# Patient Record
Sex: MALE | Race: WHITE | NOT HISPANIC OR LATINO | Employment: FULL TIME | URBAN - METROPOLITAN AREA
[De-identification: names, ages, dates, MRNs, and addresses within clinical notes are randomized per-mention and may not be internally consistent; named-entity substitution may affect disease eponyms.]

---

## 2018-10-16 ENCOUNTER — HOSPITAL ENCOUNTER (EMERGENCY)
Facility: HOSPITAL | Age: 58
Discharge: HOME | End: 2018-10-16
Attending: EMERGENCY MEDICINE

## 2018-10-16 VITALS
DIASTOLIC BLOOD PRESSURE: 70 MMHG | SYSTOLIC BLOOD PRESSURE: 145 MMHG | TEMPERATURE: 98 F | HEART RATE: 55 BPM | BODY MASS INDEX: 33.72 KG/M2 | RESPIRATION RATE: 18 BRPM | WEIGHT: 235 LBS | OXYGEN SATURATION: 97 %

## 2018-10-16 DIAGNOSIS — L97.528 DIABETIC ULCER OF TOE OF LEFT FOOT ASSOCIATED WITH DIABETES MELLITUS DUE TO UNDERLYING CONDITION, WITH OTHER ULCER SEVERITY: Primary | ICD-10-CM

## 2018-10-16 DIAGNOSIS — E08.621 DIABETIC ULCER OF TOE OF LEFT FOOT ASSOCIATED WITH DIABETES MELLITUS DUE TO UNDERLYING CONDITION, WITH OTHER ULCER SEVERITY: Primary | ICD-10-CM

## 2018-10-16 PROCEDURE — 99283 EMERGENCY DEPT VISIT LOW MDM: CPT | Mod: 25

## 2018-10-16 PROCEDURE — 25000003 PHARM REV CODE 250: Performed by: EMERGENCY MEDICINE

## 2018-10-16 RX ORDER — CEPHALEXIN 250 MG/1
500 CAPSULE ORAL
Status: COMPLETED | OUTPATIENT
Start: 2018-10-16 | End: 2018-10-16

## 2018-10-16 RX ORDER — CEPHALEXIN 500 MG/1
500 CAPSULE ORAL 4 TIMES DAILY
Qty: 39 CAPSULE | Refills: 0 | Status: SHIPPED | OUTPATIENT
Start: 2018-10-16 | End: 2018-10-26

## 2018-10-16 RX ORDER — CEPHALEXIN 250 MG/1
CAPSULE ORAL
Status: DISCONTINUED
Start: 2018-10-16 | End: 2018-10-16 | Stop reason: HOSPADM

## 2018-10-16 RX ADMIN — CEPHALEXIN 500 MG: 250 CAPSULE ORAL at 06:10

## 2018-10-16 NOTE — ED PROVIDER NOTES
Encounter Date: 10/16/2018    SCRIBE #1 NOTE: I, Aisha Vogel , am scribing for, and in the presence of, Dr. Farias .       History     Chief Complaint   Patient presents with    Wound Infection     reports wound on lt great toe       Time seen by provider: 5:43 PM on 10/16/2018    Ye Olivera is a 58 y.o. male with a PMHx of DM and HTN who presents to the ED with complaints of left toe pain with an onset x 1.5 months PTA. The patient relays that the skin on the bottom of his left great toe became very hard. After two weeks, the patient decided to peel off the hardened skin which created a small hole on the toe. Within the last 10 days, the patient has noticed that the toe has become red, painful, and swollen. He describes the pain as a constant burning. The patient decided to come in today because his daughter was very worried about gangrene. The patient states that he just started working again and will not have insurance for another 30 days. He denies any relief with Gabapentin. Denies any recent injury. He claims to be in good compliance with his diabetic medications. Denies any diatary changes. He does not have a podiatrist. The patient denies fever or any other symptoms at this time. No pertinent SHx noted.       The history is provided by the patient.     Review of patient's allergies indicates:  No Known Allergies  Past Medical History:   Diagnosis Date    Arthritis of knee 8/13/2013    HTN (hypertension) 8/13/2013    Type II or unspecified type diabetes mellitus without mention of complication, not stated as uncontrolled 8/13/2013     Past Surgical History:   Procedure Laterality Date    APPENDECTOMY      CATARACT EXTRACTION, BILATERAL       Family History   Problem Relation Age of Onset    Heart disease Mother     Heart disease Father     Diabetes Neg Hx     Cancer Neg Hx      Social History     Tobacco Use    Smoking status: Never Smoker   Substance Use Topics    Alcohol use:  Not on file    Drug use: Not on file     Review of Systems   Constitutional: Negative for activity change, appetite change, chills, fatigue and fever.   Eyes: Negative for visual disturbance.   Respiratory: Negative for apnea and shortness of breath.    Cardiovascular: Negative for chest pain and palpitations.   Gastrointestinal: Negative for abdominal distention and abdominal pain.   Genitourinary: Negative for difficulty urinating.   Musculoskeletal: Positive for arthralgias (L great toe ). Negative for neck pain and neck stiffness.   Skin: Positive for color change (erythema L great toe ). Negative for pallor and rash.        Positive swelling of L great toe    Neurological: Negative for headaches.   Hematological: Does not bruise/bleed easily.   Psychiatric/Behavioral: Positive for behavioral problems. Negative for agitation.       Physical Exam     Initial Vitals [10/16/18 1741]   BP Pulse Resp Temp SpO2   (!) 145/70 (!) 55 18 98 °F (36.7 °C) 97 %      MAP       --         Physical Exam    Nursing note and vitals reviewed.  Constitutional: He appears well-developed and well-nourished. He is not diaphoretic. No distress.   HENT:   Head: Normocephalic and atraumatic.   Mouth/Throat: Oropharynx is clear and moist.   Eyes: Conjunctivae are normal.   Neck: Neck supple.   Cardiovascular: Normal rate, regular rhythm, normal heart sounds and intact distal pulses. Exam reveals no gallop and no friction rub.    No murmur heard.  Pulses:       Dorsalis pedis pulses are 2+ on the right side, and 2+ on the left side.        Posterior tibial pulses are 2+ on the right side, and 2+ on the left side.   Pulmonary/Chest: Breath sounds normal. He has no wheezes. He has no rhonchi. He has no rales.   Abdominal: Soft. He exhibits no distension. There is no tenderness.   Musculoskeletal: Normal range of motion. He exhibits tenderness.    Mild tenderness of the L 1st toe. No crepitus. Full ROM at the 1st left MTP and   Metatarsophalangeal joint    Neurological: He is alert and oriented to person, place, and time. No sensory deficit.   Equal sensation to light touch in distal lower extremities.  Intact and equal gross motor function.   Skin: No rash noted. There is erythema.   Ulceration of the plantar surface of the left 1st toe. Mild erythema.   The bottom of the ulcer is visible. There is no bone exposure.   Brisk capillary refill .          ED Course   Procedures  Labs Reviewed - No data to display       Imaging Results    None          Medical Decision Making:   History:   Old Medical Records: I decided to obtain old medical records.  Clinical Tests:   Radiological Study: Reviewed and Ordered            Scribe Attestation:   Scribe #1: I performed the above scribed service and the documentation accurately describes the services I performed. I attest to the accuracy of the note.    I, Dr. Logan Farias, personally performed the services described in this documentation. All medical record entries made by the scribe were at my direction and in my presence.  I have reviewed the chart and agree that the record reflects my personal performance and is accurate and complete. Logan Farias MD.  9:36 PM 10/16/2018    Ye Olivera is a 58 y.o. male presenting with over 1 month history of left 1st toe ulceration.  Possible surrounding cellulitis with antibiotics initiated and outpatient podiatry follow-up recommended.  I do not think he requires admission for IV antibiotics.  I doubt osteomyelitis at this point.  Patient is aware he may require further imaging for further rule out of osteomyelitis including CT or MRI at podiatry discretion.  No sign of abscess.  Return precautions reviewed.           Clinical Impression:   The encounter diagnosis was Diabetic ulcer of toe of left foot associated with diabetes mellitus due to underlying condition, with other ulcer severity.      Disposition:   Disposition:  Discharged  Condition: Stable                        Logan Farias MD  10/16/18 [id number]

## 2018-11-13 ENCOUNTER — HISTORICAL (OUTPATIENT)
Dept: LAB | Facility: HOSPITAL | Age: 58
End: 2018-11-13

## 2018-11-16 LAB — FINAL CULTURE: NORMAL

## 2020-03-18 ENCOUNTER — HISTORICAL (OUTPATIENT)
Dept: ADMINISTRATIVE | Facility: HOSPITAL | Age: 60
End: 2020-03-18

## 2020-03-18 LAB
FLUAV AG UPPER RESP QL IA.RAPID: NEGATIVE
FLUBV AG UPPER RESP QL IA.RAPID: NEGATIVE

## 2020-08-21 ENCOUNTER — HISTORICAL (OUTPATIENT)
Dept: ADMINISTRATIVE | Facility: HOSPITAL | Age: 60
End: 2020-08-21

## 2020-08-21 LAB
ABS NEUT (OLG): 7.29 X10(3)/MCL (ref 2.1–9.2)
ALBUMIN SERPL-MCNC: 3.7 GM/DL (ref 3.4–5)
ALBUMIN/GLOB SERPL: 1.1 RATIO (ref 1.1–2)
ALP SERPL-CCNC: 100 UNIT/L (ref 40–150)
ALT SERPL-CCNC: 10 UNIT/L (ref 0–55)
AST SERPL-CCNC: 10 UNIT/L (ref 5–34)
BASOPHILS # BLD AUTO: 0.1 X10(3)/MCL (ref 0–0.2)
BASOPHILS NFR BLD AUTO: 0.6 %
BILIRUB SERPL-MCNC: 0.6 MG/DL
BILIRUBIN DIRECT+TOT PNL SERPL-MCNC: 0.3 MG/DL (ref 0–0.5)
BILIRUBIN DIRECT+TOT PNL SERPL-MCNC: 0.3 MG/DL (ref 0–0.8)
BUN SERPL-MCNC: 18 MG/DL (ref 8.4–25.7)
CALCIUM SERPL-MCNC: 8.4 MG/DL (ref 8.8–10)
CHLORIDE SERPL-SCNC: 104 MMOL/L (ref 98–107)
CO2 SERPL-SCNC: 26 MMOL/L (ref 23–31)
CREAT SERPL-MCNC: 1.32 MG/DL (ref 0.73–1.18)
EOSINOPHIL # BLD AUTO: 0.2 X10(3)/MCL (ref 0–0.9)
EOSINOPHIL NFR BLD AUTO: 1.5 %
ERYTHROCYTE [DISTWIDTH] IN BLOOD BY AUTOMATED COUNT: 16.7 % (ref 11.5–17)
GLOBULIN SER-MCNC: 3.3 GM/DL (ref 2.4–3.5)
GLUCOSE SERPL-MCNC: 92 MG/DL (ref 82–115)
HCT VFR BLD AUTO: 42.7 % (ref 42–52)
HGB BLD-MCNC: 13 GM/DL (ref 14–18)
LDH SERPL-CCNC: 144 UNIT/L (ref 140–271)
LYMPHOCYTES # BLD AUTO: 2 X10(3)/MCL (ref 0.6–4.6)
LYMPHOCYTES NFR BLD AUTO: 19 %
MCH RBC QN AUTO: 23.4 PG (ref 27–31)
MCHC RBC AUTO-ENTMCNC: 30.4 GM/DL (ref 33–36)
MCV RBC AUTO: 76.9 FL (ref 80–94)
MONOCYTES # BLD AUTO: 1 X10(3)/MCL (ref 0.1–1.3)
MONOCYTES NFR BLD AUTO: 9.4 %
NEUTROPHILS # BLD AUTO: 7.3 X10(3)/MCL (ref 2.1–9.2)
NEUTROPHILS NFR BLD AUTO: 68.7 %
PLATELET # BLD AUTO: 282 X10(3)/MCL (ref 130–400)
PMV BLD AUTO: 9.9 FL (ref 9.4–12.4)
POTASSIUM SERPL-SCNC: 4.6 MMOL/L (ref 3.5–5.1)
PROT SERPL-MCNC: 7 GM/DL (ref 5.8–7.6)
RBC # BLD AUTO: 5.55 X10(6)/MCL (ref 4.7–6.1)
SODIUM SERPL-SCNC: 138 MMOL/L (ref 136–145)
WBC # SPEC AUTO: 10.6 X10(3)/MCL (ref 4.5–11.5)

## 2020-09-03 ENCOUNTER — HISTORICAL (OUTPATIENT)
Dept: ADMINISTRATIVE | Facility: HOSPITAL | Age: 60
End: 2020-09-03

## 2020-09-03 LAB
ABS NEUT (OLG): 5.57 X10(3)/MCL (ref 2.1–9.2)
ALBUMIN SERPL-MCNC: 3.6 GM/DL (ref 3.4–4.8)
ALBUMIN/GLOB SERPL: 1.3 RATIO (ref 1.1–2)
ALP SERPL-CCNC: 95 UNIT/L (ref 40–150)
ALT SERPL-CCNC: 10 UNIT/L (ref 0–55)
APTT PPP: 29.9 SECOND(S) (ref 23.2–33.7)
AST SERPL-CCNC: 10 UNIT/L (ref 5–34)
BASOPHILS # BLD AUTO: 0.1 X10(3)/MCL (ref 0–0.2)
BASOPHILS NFR BLD AUTO: 1 %
BILIRUB SERPL-MCNC: 0.4 MG/DL
BILIRUBIN DIRECT+TOT PNL SERPL-MCNC: 0.2 MG/DL (ref 0–0.5)
BILIRUBIN DIRECT+TOT PNL SERPL-MCNC: 0.2 MG/DL (ref 0–0.8)
BUN SERPL-MCNC: 17.8 MG/DL (ref 8.4–25.7)
CALCIUM SERPL-MCNC: 8.6 MG/DL (ref 8.8–10)
CHLORIDE SERPL-SCNC: 102 MMOL/L (ref 98–107)
CO2 SERPL-SCNC: 31 MMOL/L (ref 23–31)
CREAT SERPL-MCNC: 1.23 MG/DL (ref 0.73–1.18)
EOSINOPHIL # BLD AUTO: 0.3 X10(3)/MCL (ref 0–0.9)
EOSINOPHIL NFR BLD AUTO: 3 %
ERYTHROCYTE [DISTWIDTH] IN BLOOD BY AUTOMATED COUNT: 17.2 % (ref 11.5–17)
GLOBULIN SER-MCNC: 2.8 GM/DL (ref 2.4–3.5)
GLUCOSE SERPL-MCNC: 128 MG/DL (ref 82–115)
HCT VFR BLD AUTO: 44.7 % (ref 42–52)
HGB BLD-MCNC: 12.8 GM/DL (ref 14–18)
INR PPP: 1.1 (ref 0–1.3)
LYMPHOCYTES # BLD AUTO: 2.1 X10(3)/MCL (ref 0.6–4.6)
LYMPHOCYTES NFR BLD AUTO: 24 %
MCH RBC QN AUTO: 22.8 PG (ref 27–31)
MCHC RBC AUTO-ENTMCNC: 28.6 GM/DL (ref 33–36)
MCV RBC AUTO: 79.5 FL (ref 80–94)
MONOCYTES # BLD AUTO: 0.8 X10(3)/MCL (ref 0.1–1.3)
MONOCYTES NFR BLD AUTO: 9 %
NEUTROPHILS # BLD AUTO: 5.57 X10(3)/MCL (ref 2.1–9.2)
NEUTROPHILS NFR BLD AUTO: 62 %
PLATELET # BLD AUTO: 261 X10(3)/MCL (ref 130–400)
PMV BLD AUTO: 10.4 FL (ref 9.4–12.4)
POTASSIUM SERPL-SCNC: 4.7 MMOL/L (ref 3.5–5.1)
PROT SERPL-MCNC: 6.4 GM/DL (ref 5.8–7.6)
PROTHROMBIN TIME: 13.9 SECOND(S) (ref 11.1–13.7)
RBC # BLD AUTO: 5.62 X10(6)/MCL (ref 4.7–6.1)
SODIUM SERPL-SCNC: 141 MMOL/L (ref 136–145)
WBC # SPEC AUTO: 8.9 X10(3)/MCL (ref 4.5–11.5)

## 2020-09-22 ENCOUNTER — HISTORICAL (OUTPATIENT)
Dept: ADMINISTRATIVE | Facility: HOSPITAL | Age: 60
End: 2020-09-22

## 2020-09-22 LAB
ABS NEUT (OLG): 15.36 X10(3)/MCL (ref 2.1–9.2)
ALBUMIN SERPL-MCNC: 2.7 GM/DL (ref 3.4–4.8)
ALBUMIN/GLOB SERPL: 0.8 RATIO (ref 1.1–2)
ALP SERPL-CCNC: 105 UNIT/L (ref 40–150)
ALT SERPL-CCNC: 12 UNIT/L (ref 0–55)
AST SERPL-CCNC: 23 UNIT/L (ref 5–34)
BASOPHILS # BLD AUTO: 0.1 X10(3)/MCL (ref 0–0.2)
BASOPHILS NFR BLD AUTO: 0.4 %
BILIRUB SERPL-MCNC: 0.6 MG/DL
BILIRUBIN DIRECT+TOT PNL SERPL-MCNC: 0.3 MG/DL (ref 0–0.5)
BILIRUBIN DIRECT+TOT PNL SERPL-MCNC: 0.3 MG/DL (ref 0–0.8)
BUN SERPL-MCNC: 62.1 MG/DL (ref 8.4–25.7)
CALCIUM SERPL-MCNC: 7.7 MG/DL (ref 8.8–10)
CHLORIDE SERPL-SCNC: 104 MMOL/L (ref 98–107)
CO2 SERPL-SCNC: 22 MMOL/L (ref 23–31)
CREAT SERPL-MCNC: 2.92 MG/DL (ref 0.73–1.18)
EOSINOPHIL # BLD AUTO: 0.3 X10(3)/MCL (ref 0–0.9)
EOSINOPHIL NFR BLD AUTO: 1.3 %
ERYTHROCYTE [DISTWIDTH] IN BLOOD BY AUTOMATED COUNT: 17 % (ref 11.5–17)
FERRITIN SERPL-MCNC: 426.73 NG/ML (ref 21.81–274.66)
GLOBULIN SER-MCNC: 3.3 GM/DL (ref 2.4–3.5)
GLUCOSE SERPL-MCNC: 129 MG/DL (ref 82–115)
HCT VFR BLD AUTO: 28.9 % (ref 42–52)
HGB BLD-MCNC: 8.8 GM/DL (ref 14–18)
IRON SATN MFR SERPL: 12 % (ref 20–50)
IRON SERPL-MCNC: 29 UG/DL (ref 65–175)
LYMPHOCYTES # BLD AUTO: 2.4 X10(3)/MCL (ref 0.6–4.6)
LYMPHOCYTES NFR BLD AUTO: 11.8 %
MCH RBC QN AUTO: 25.7 PG (ref 27–31)
MCHC RBC AUTO-ENTMCNC: 30.4 GM/DL (ref 33–36)
MCV RBC AUTO: 84.5 FL (ref 80–94)
MONOCYTES # BLD AUTO: 1.7 X10(3)/MCL (ref 0.1–1.3)
MONOCYTES NFR BLD AUTO: 8.2 %
NEUTROPHILS # BLD AUTO: 15.4 X10(3)/MCL (ref 2.1–9.2)
NEUTROPHILS NFR BLD AUTO: 73.8 %
PLATELET # BLD AUTO: 473 X10(3)/MCL (ref 130–400)
PMV BLD AUTO: 9.5 FL (ref 9.4–12.4)
POTASSIUM SERPL-SCNC: 4.8 MMOL/L (ref 3.5–5.1)
PROT SERPL-MCNC: 6 GM/DL (ref 5.8–7.6)
RBC # BLD AUTO: 3.42 X10(6)/MCL (ref 4.7–6.1)
SODIUM SERPL-SCNC: 137 MMOL/L (ref 136–145)
TIBC SERPL-MCNC: 204 UG/DL (ref 69–240)
TIBC SERPL-MCNC: 233 UG/DL (ref 250–450)
TRANSFERRIN SERPL-MCNC: 186 MG/DL (ref 174–364)
VIT B12 SERPL-MCNC: 332 PG/ML (ref 213–816)
WBC # SPEC AUTO: 20.8 X10(3)/MCL (ref 4.5–11.5)

## 2020-10-15 ENCOUNTER — HISTORICAL (OUTPATIENT)
Dept: ADMINISTRATIVE | Facility: HOSPITAL | Age: 60
End: 2020-10-15

## 2020-10-15 LAB
ABS NEUT (OLG): 6.12 X10(3)/MCL (ref 2.1–9.2)
ALBUMIN SERPL-MCNC: 3.7 GM/DL (ref 3.4–5)
ALBUMIN/GLOB SERPL: 1.2 RATIO (ref 1.1–2)
ALP SERPL-CCNC: 109 UNIT/L (ref 40–150)
ALT SERPL-CCNC: 13 UNIT/L (ref 0–55)
AST SERPL-CCNC: 17 UNIT/L (ref 5–34)
BASOPHILS # BLD AUTO: 0 X10(3)/MCL (ref 0–0.2)
BASOPHILS NFR BLD AUTO: 0.4 %
BILIRUB SERPL-MCNC: 0.4 MG/DL
BILIRUBIN DIRECT+TOT PNL SERPL-MCNC: 0.1 MG/DL (ref 0–0.5)
BILIRUBIN DIRECT+TOT PNL SERPL-MCNC: 0.3 MG/DL (ref 0–0.8)
BUN SERPL-MCNC: 30.5 MG/DL (ref 8.4–25.7)
CALCIUM SERPL-MCNC: 7.6 MG/DL (ref 8.8–10)
CHLORIDE SERPL-SCNC: 112 MMOL/L (ref 98–107)
CO2 SERPL-SCNC: 22 MMOL/L (ref 23–31)
CREAT SERPL-MCNC: 1.87 MG/DL (ref 0.73–1.18)
EOSINOPHIL # BLD AUTO: 0.2 X10(3)/MCL (ref 0–0.9)
EOSINOPHIL NFR BLD AUTO: 2.1 %
ERYTHROCYTE [DISTWIDTH] IN BLOOD BY AUTOMATED COUNT: 17.5 % (ref 11.5–17)
GLOBULIN SER-MCNC: 3.1 GM/DL (ref 2.4–3.5)
GLUCOSE SERPL-MCNC: 108 MG/DL (ref 82–115)
HCT VFR BLD AUTO: 34.1 % (ref 42–52)
HGB BLD-MCNC: 10.2 GM/DL (ref 14–18)
LYMPHOCYTES # BLD AUTO: 1.9 X10(3)/MCL (ref 0.6–4.6)
LYMPHOCYTES NFR BLD AUTO: 20.6 %
MCH RBC QN AUTO: 25.7 PG (ref 27–31)
MCHC RBC AUTO-ENTMCNC: 29.9 GM/DL (ref 33–36)
MCV RBC AUTO: 85.9 FL (ref 80–94)
MONOCYTES # BLD AUTO: 1 X10(3)/MCL (ref 0.1–1.3)
MONOCYTES NFR BLD AUTO: 10.3 %
NEUTROPHILS # BLD AUTO: 6.1 X10(3)/MCL (ref 2.1–9.2)
NEUTROPHILS NFR BLD AUTO: 65.4 %
PLATELET # BLD AUTO: 252 X10(3)/MCL (ref 130–400)
PMV BLD AUTO: 9.8 FL (ref 9.4–12.4)
POTASSIUM SERPL-SCNC: 5 MMOL/L (ref 3.5–5.1)
PROT SERPL-MCNC: 6.8 GM/DL (ref 5.8–7.6)
RBC # BLD AUTO: 3.97 X10(6)/MCL (ref 4.7–6.1)
SODIUM SERPL-SCNC: 148 MMOL/L (ref 136–145)
WBC # SPEC AUTO: 9.4 X10(3)/MCL (ref 4.5–11.5)

## 2020-11-11 ENCOUNTER — HISTORICAL (OUTPATIENT)
Dept: HEMATOLOGY/ONCOLOGY | Facility: CLINIC | Age: 60
End: 2020-11-11

## 2020-11-11 LAB
ABS NEUT (OLG): 11.06 X10(3)/MCL (ref 2.1–9.2)
ALBUMIN SERPL-MCNC: 3.5 GM/DL (ref 3.4–4.8)
ALP SERPL-CCNC: 184 UNIT/L (ref 40–150)
ALT SERPL-CCNC: 23 UNIT/L (ref 0–55)
ANION GAP SERPL CALC-SCNC: 15 MMOL/L
AST SERPL-CCNC: 18 UNIT/L (ref 5–34)
BASOPHILS # BLD AUTO: 0.2 X10(3)/MCL (ref 0–0.2)
BASOPHILS NFR BLD AUTO: 0.6 %
BILIRUB SERPL-MCNC: 0.4 MG/DL
BILIRUBIN DIRECT+TOT PNL SERPL-MCNC: 0.2 MG/DL (ref 0–0.5)
BILIRUBIN DIRECT+TOT PNL SERPL-MCNC: 0.2 MG/DL (ref 0–0.8)
BUN SERPL-MCNC: 30 MG/DL (ref 8–26)
CHLORIDE SERPL-SCNC: 104 MMOL/L (ref 98–109)
CREAT SERPL-MCNC: 2.5 MG/DL (ref 0.6–1.3)
EOSINOPHIL # BLD AUTO: 1.6 X10(3)/MCL (ref 0–0.9)
EOSINOPHIL NFR BLD AUTO: 6.3 %
ERYTHROCYTE [DISTWIDTH] IN BLOOD BY AUTOMATED COUNT: 17 % (ref 11.5–17)
GLUCOSE SERPL-MCNC: 109 MG/DL (ref 70–105)
HCT VFR BLD AUTO: 40.7 % (ref 42–52)
HCT VFR BLD CALC: 42 % (ref 38–51)
HGB BLD-MCNC: 12.7 GM/DL (ref 14–18)
HGB BLD-MCNC: 14.3 MG/DL (ref 12–17)
LIVER PROFILE INTERP: ABNORMAL
LYMPHOCYTES # BLD AUTO: 9.3 X10(3)/MCL (ref 0.6–4.6)
LYMPHOCYTES NFR BLD AUTO: 36.7 %
MCH RBC QN AUTO: 25.4 PG (ref 27–31)
MCHC RBC AUTO-ENTMCNC: 31.2 GM/DL (ref 33–36)
MCV RBC AUTO: 81.4 FL (ref 80–94)
MONOCYTES # BLD AUTO: 2.1 X10(3)/MCL (ref 0.1–1.3)
MONOCYTES NFR BLD AUTO: 8.3 %
NEUTROPHILS # BLD AUTO: 11.1 X10(3)/MCL (ref 2.1–9.2)
NEUTROPHILS NFR BLD AUTO: 43.8 %
PLATELET # BLD AUTO: 356 X10(3)/MCL (ref 130–400)
PMV BLD AUTO: 9.1 FL (ref 9.4–12.4)
POC IONIZED CALCIUM: 1.16 MMOL/L (ref 1.12–1.32)
POC TCO2: 27 MMOL/L (ref 24–29)
POTASSIUM BLD-SCNC: 4.4 MMOL/L (ref 3.5–4.9)
PROT SERPL-MCNC: 8.1 GM/DL (ref 5.8–7.6)
RBC # BLD AUTO: 5 X10(6)/MCL (ref 4.7–6.1)
SODIUM BLD-SCNC: 141 MMOL/L (ref 138–146)
WBC # SPEC AUTO: 25.3 X10(3)/MCL (ref 4.5–11.5)

## 2020-11-12 ENCOUNTER — HISTORICAL (OUTPATIENT)
Dept: INFUSION THERAPY | Facility: HOSPITAL | Age: 60
End: 2020-11-12

## 2020-11-25 ENCOUNTER — HISTORICAL (OUTPATIENT)
Dept: ADMINISTRATIVE | Facility: HOSPITAL | Age: 60
End: 2020-11-25

## 2020-11-25 LAB
ABS NEUT (OLG): 5.99 X10(3)/MCL (ref 2.1–9.2)
ALBUMIN SERPL-MCNC: 3.4 GM/DL (ref 3.4–4.8)
ALP SERPL-CCNC: 117 UNIT/L (ref 40–150)
ALT SERPL-CCNC: 12 UNIT/L (ref 0–55)
ANION GAP SERPL CALC-SCNC: 16 MMOL/L
ANISOCYTOSIS BLD QL SMEAR: ABNORMAL
AST SERPL-CCNC: 11 UNIT/L (ref 5–34)
BASOPHILS # BLD AUTO: 0.2 X10(3)/MCL (ref 0–0.2)
BASOPHILS NFR BLD AUTO: 1 %
BASOPHILS NFR BLD MANUAL: 2 % (ref 0–2)
BILIRUB SERPL-MCNC: 0.3 MG/DL
BILIRUBIN DIRECT+TOT PNL SERPL-MCNC: 0.1 MG/DL (ref 0–0.5)
BILIRUBIN DIRECT+TOT PNL SERPL-MCNC: 0.2 MG/DL (ref 0–0.8)
BUN SERPL-MCNC: 37 MG/DL (ref 8–26)
CHLORIDE SERPL-SCNC: 102 MMOL/L (ref 98–109)
CREAT SERPL-MCNC: 2.8 MG/DL (ref 0.6–1.3)
EOSINOPHIL # BLD AUTO: 4.2 X10(3)/MCL (ref 0–0.9)
EOSINOPHIL NFR BLD AUTO: 19.3 %
EOSINOPHIL NFR BLD MANUAL: 22 % (ref 0–8)
ERYTHROCYTE [DISTWIDTH] IN BLOOD BY AUTOMATED COUNT: 16.7 % (ref 11.5–17)
GLUCOSE SERPL-MCNC: 95 MG/DL (ref 70–105)
GRANULOCYTES NFR BLD MANUAL: 31 % (ref 47–80)
HCT VFR BLD AUTO: 41.3 % (ref 42–52)
HCT VFR BLD CALC: 42 % (ref 38–51)
HGB BLD-MCNC: 12.9 GM/DL (ref 14–18)
HGB BLD-MCNC: 14.3 MG/DL (ref 12–17)
HYPOCHROMIA BLD QL SMEAR: ABNORMAL
LIVER PROFILE INTERP: NORMAL
LYMPHOCYTES # BLD AUTO: 9.3 X10(3)/MCL (ref 0.6–4.6)
LYMPHOCYTES NFR BLD AUTO: 42 %
LYMPHOCYTES NFR BLD MANUAL: 38 % (ref 13–40)
MCH RBC QN AUTO: 25.2 PG (ref 27–31)
MCHC RBC AUTO-ENTMCNC: 31.2 GM/DL (ref 33–36)
MCV RBC AUTO: 80.8 FL (ref 80–94)
MICROCYTES BLD QL SMEAR: ABNORMAL
MONOCYTES # BLD AUTO: 1.4 X10(3)/MCL (ref 0.1–1.3)
MONOCYTES NFR BLD AUTO: 6.6 %
MONOCYTES NFR BLD MANUAL: 7 % (ref 2–11)
NEUTROPHILS # BLD AUTO: 6 X10(3)/MCL (ref 2.1–9.2)
NEUTROPHILS NFR BLD AUTO: 27.6 %
PLATELET # BLD AUTO: 434 X10(3)/MCL (ref 130–400)
PLATELET # BLD EST: ABNORMAL 10*3/UL
PMV BLD AUTO: 9.3 FL (ref 9.4–12.4)
POC IONIZED CALCIUM: 1.15 MMOL/L (ref 1.12–1.32)
POC TCO2: 27 MMOL/L (ref 24–29)
POTASSIUM BLD-SCNC: 4.7 MMOL/L (ref 3.5–4.9)
PROT SERPL-MCNC: 7.6 GM/DL (ref 5.8–7.6)
RBC # BLD AUTO: 5.11 X10(6)/MCL (ref 4.7–6.1)
RBC MORPH BLD: ABNORMAL
SODIUM BLD-SCNC: 140 MMOL/L (ref 138–146)
WBC # SPEC AUTO: 21.7 X10(3)/MCL (ref 4.5–11.5)

## 2020-12-04 ENCOUNTER — HISTORICAL (OUTPATIENT)
Dept: HEMATOLOGY/ONCOLOGY | Facility: CLINIC | Age: 60
End: 2020-12-04

## 2020-12-04 LAB
ABS NEUT (OLG): 5.5 X10(3)/MCL (ref 2.1–9.2)
ALBUMIN SERPL-MCNC: 3.6 GM/DL (ref 3.4–4.8)
ALP SERPL-CCNC: 115 UNIT/L (ref 40–150)
ALT SERPL-CCNC: 23 UNIT/L (ref 0–55)
ANION GAP SERPL CALC-SCNC: 16 MMOL/L
AST SERPL-CCNC: 13 UNIT/L (ref 5–34)
BASOPHILS # BLD AUTO: 0.1 X10(3)/MCL (ref 0–0.2)
BASOPHILS NFR BLD AUTO: 1 %
BILIRUB SERPL-MCNC: 0.4 MG/DL
BILIRUBIN DIRECT+TOT PNL SERPL-MCNC: 0.1 MG/DL (ref 0–0.5)
BILIRUBIN DIRECT+TOT PNL SERPL-MCNC: 0.3 MG/DL (ref 0–0.8)
BUN SERPL-MCNC: 39 MG/DL (ref 8–26)
CHLORIDE SERPL-SCNC: 102 MMOL/L (ref 98–109)
CREAT SERPL-MCNC: 2.5 MG/DL (ref 0.6–1.3)
EOSINOPHIL # BLD AUTO: 0.7 X10(3)/MCL (ref 0–0.9)
EOSINOPHIL NFR BLD AUTO: 6.1 %
ERYTHROCYTE [DISTWIDTH] IN BLOOD BY AUTOMATED COUNT: 16.8 % (ref 11.5–17)
GLUCOSE SERPL-MCNC: 102 MG/DL (ref 70–105)
HCT VFR BLD AUTO: 38.3 % (ref 42–52)
HCT VFR BLD CALC: 36 % (ref 38–51)
HGB BLD-MCNC: 11.8 GM/DL (ref 14–18)
HGB BLD-MCNC: 12.2 MG/DL (ref 12–17)
LIVER PROFILE INTERP: ABNORMAL
LYMPHOCYTES # BLD AUTO: 4.3 X10(3)/MCL (ref 0.6–4.6)
LYMPHOCYTES NFR BLD AUTO: 36.9 %
MCH RBC QN AUTO: 25.4 PG (ref 27–31)
MCHC RBC AUTO-ENTMCNC: 30.8 GM/DL (ref 33–36)
MCV RBC AUTO: 82.5 FL (ref 80–94)
MONOCYTES # BLD AUTO: 0.9 X10(3)/MCL (ref 0.1–1.3)
MONOCYTES NFR BLD AUTO: 8 %
NEUTROPHILS # BLD AUTO: 5.5 X10(3)/MCL (ref 2.1–9.2)
NEUTROPHILS NFR BLD AUTO: 47.5 %
PLATELET # BLD AUTO: 302 X10(3)/MCL (ref 130–400)
PMV BLD AUTO: 10 FL (ref 9.4–12.4)
POC IONIZED CALCIUM: 1.16 MMOL/L (ref 1.12–1.32)
POC TCO2: 24 MMOL/L (ref 24–29)
POTASSIUM BLD-SCNC: 4.3 MMOL/L (ref 3.5–4.9)
PROT SERPL-MCNC: 7.9 GM/DL (ref 5.8–7.6)
RBC # BLD AUTO: 4.64 X10(6)/MCL (ref 4.7–6.1)
SODIUM BLD-SCNC: 137 MMOL/L (ref 138–146)
WBC # SPEC AUTO: 11.6 X10(3)/MCL (ref 4.5–11.5)

## 2020-12-09 ENCOUNTER — HISTORICAL (OUTPATIENT)
Dept: INFUSION THERAPY | Facility: HOSPITAL | Age: 60
End: 2020-12-09

## 2021-01-08 ENCOUNTER — HISTORICAL (OUTPATIENT)
Dept: INFUSION THERAPY | Facility: HOSPITAL | Age: 61
End: 2021-01-08

## 2021-01-08 LAB
ABS NEUT (OLG): 4.61 X10(3)/MCL (ref 2.1–9.2)
ALBUMIN SERPL-MCNC: 4.3 GM/DL (ref 3.4–4.8)
ALP SERPL-CCNC: 114 UNIT/L (ref 40–150)
ALT SERPL-CCNC: 15 UNIT/L (ref 0–55)
ANION GAP SERPL CALC-SCNC: 14 MMOL/L
APPEARANCE, UA: CLEAR
AST SERPL-CCNC: 14 UNIT/L (ref 5–34)
BACTERIA SPEC CULT: NORMAL /HPF
BASOPHILS # BLD AUTO: 0 X10(3)/MCL (ref 0–0.2)
BASOPHILS NFR BLD AUTO: 0.5 %
BILIRUB SERPL-MCNC: 0.5 MG/DL
BILIRUB UR QL STRIP: NEGATIVE
BILIRUBIN DIRECT+TOT PNL SERPL-MCNC: 0.2 MG/DL (ref 0–0.5)
BILIRUBIN DIRECT+TOT PNL SERPL-MCNC: 0.3 MG/DL (ref 0–0.8)
BUN SERPL-MCNC: 42 MG/DL (ref 8–26)
CHLORIDE SERPL-SCNC: 104 MMOL/L (ref 98–109)
COLOR UR: YELLOW
CREAT SERPL-MCNC: 2.2 MG/DL (ref 0.6–1.3)
EOSINOPHIL # BLD AUTO: 0.5 X10(3)/MCL (ref 0–0.9)
EOSINOPHIL NFR BLD AUTO: 5.5 %
ERYTHROCYTE [DISTWIDTH] IN BLOOD BY AUTOMATED COUNT: 16.3 % (ref 11.5–17)
GLUCOSE (UA): NEGATIVE
GLUCOSE SERPL-MCNC: 110 MG/DL (ref 70–105)
HCT VFR BLD AUTO: 40.9 % (ref 42–52)
HCT VFR BLD CALC: 42 % (ref 38–51)
HGB BLD-MCNC: 12.8 GM/DL (ref 14–18)
HGB BLD-MCNC: 14.3 MG/DL (ref 12–17)
HGB UR QL STRIP: NEGATIVE
KETONES UR QL STRIP: NEGATIVE
LEUKOCYTE ESTERASE UR QL STRIP: NEGATIVE
LIVER PROFILE INTERP: ABNORMAL
LYMPHOCYTES # BLD AUTO: 3.4 X10(3)/MCL (ref 0.6–4.6)
LYMPHOCYTES NFR BLD AUTO: 36 %
MCH RBC QN AUTO: 25.4 PG (ref 27–31)
MCHC RBC AUTO-ENTMCNC: 31.3 GM/DL (ref 33–36)
MCV RBC AUTO: 81.3 FL (ref 80–94)
MONOCYTES # BLD AUTO: 0.8 X10(3)/MCL (ref 0.1–1.3)
MONOCYTES NFR BLD AUTO: 8.2 %
NEUTROPHILS # BLD AUTO: 4.6 X10(3)/MCL (ref 2.1–9.2)
NEUTROPHILS NFR BLD AUTO: 49.3 %
NITRITE UR QL STRIP: NEGATIVE
PH UR STRIP: 6 [PH] (ref 5–9)
PLATELET # BLD AUTO: 215 X10(3)/MCL (ref 130–400)
PMV BLD AUTO: 10.2 FL (ref 9.4–12.4)
POC IONIZED CALCIUM: 1.17 MMOL/L (ref 1.12–1.32)
POC TCO2: 27 MMOL/L (ref 24–29)
POTASSIUM BLD-SCNC: 4.9 MMOL/L (ref 3.5–4.9)
PROT SERPL-MCNC: 7.8 GM/DL (ref 5.8–7.6)
PROT UR QL STRIP: NEGATIVE
RBC # BLD AUTO: 5.03 X10(6)/MCL (ref 4.7–6.1)
RBC #/AREA URNS HPF: NORMAL /[HPF]
SODIUM BLD-SCNC: 139 MMOL/L (ref 138–146)
SP GR UR STRIP: 1.01 (ref 1–1.03)
SQUAMOUS EPITHELIAL, UA: NORMAL
TSH SERPL-ACNC: 1.98 UIU/ML (ref 0.35–4.94)
UA WBC MAN: NORMAL
UROBILINOGEN UR STRIP-ACNC: 0.2
WBC # SPEC AUTO: 9.4 X10(3)/MCL (ref 4.5–11.5)

## 2021-01-15 ENCOUNTER — HISTORICAL (OUTPATIENT)
Dept: ADMINISTRATIVE | Facility: HOSPITAL | Age: 61
End: 2021-01-15

## 2021-01-15 LAB
ABS NEUT (OLG): 4.57 X10(3)/MCL (ref 2.1–9.2)
ALBUMIN SERPL-MCNC: 4.1 GM/DL (ref 3.4–4.8)
ALBUMIN/GLOB SERPL: 1.1 RATIO (ref 1.1–2)
ALP SERPL-CCNC: 120 UNIT/L (ref 40–150)
ALT SERPL-CCNC: 16 UNIT/L (ref 0–55)
AST SERPL-CCNC: 16 UNIT/L (ref 5–34)
BASOPHILS # BLD AUTO: 0.1 X10(3)/MCL (ref 0–0.2)
BASOPHILS NFR BLD AUTO: 0.7 %
BILIRUB SERPL-MCNC: 0.5 MG/DL
BILIRUBIN DIRECT+TOT PNL SERPL-MCNC: 0.2 MG/DL (ref 0–0.5)
BILIRUBIN DIRECT+TOT PNL SERPL-MCNC: 0.3 MG/DL (ref 0–0.8)
BUN SERPL-MCNC: 35.1 MG/DL (ref 8.4–25.7)
CALCIUM SERPL-MCNC: 9.1 MG/DL (ref 8.8–10)
CHLORIDE SERPL-SCNC: 103 MMOL/L (ref 98–107)
CO2 SERPL-SCNC: 24 MMOL/L (ref 23–31)
CREAT SERPL-MCNC: 2.28 MG/DL (ref 0.73–1.18)
EOSINOPHIL # BLD AUTO: 0.4 X10(3)/MCL (ref 0–0.9)
EOSINOPHIL NFR BLD AUTO: 4.8 %
ERYTHROCYTE [DISTWIDTH] IN BLOOD BY AUTOMATED COUNT: 16 % (ref 11.5–17)
GLOBULIN SER-MCNC: 3.9 GM/DL (ref 2.4–3.5)
GLUCOSE SERPL-MCNC: 107 MG/DL (ref 82–115)
HCT VFR BLD AUTO: 42.2 % (ref 42–52)
HGB BLD-MCNC: 13.5 GM/DL (ref 14–18)
LYMPHOCYTES # BLD AUTO: 3.3 X10(3)/MCL (ref 0.6–4.6)
LYMPHOCYTES NFR BLD AUTO: 36.5 %
MCH RBC QN AUTO: 25.6 PG (ref 27–31)
MCHC RBC AUTO-ENTMCNC: 32 GM/DL (ref 33–36)
MCV RBC AUTO: 79.9 FL (ref 80–94)
MONOCYTES # BLD AUTO: 0.7 X10(3)/MCL (ref 0.1–1.3)
MONOCYTES NFR BLD AUTO: 7.5 %
NEUTROPHILS # BLD AUTO: 4.6 X10(3)/MCL (ref 2.1–9.2)
NEUTROPHILS NFR BLD AUTO: 50.2 %
PLATELET # BLD AUTO: 204 X10(3)/MCL (ref 130–400)
PMV BLD AUTO: 9.8 FL (ref 9.4–12.4)
POTASSIUM SERPL-SCNC: 4.7 MMOL/L (ref 3.5–5.1)
PROT SERPL-MCNC: 8 GM/DL (ref 5.8–7.6)
RBC # BLD AUTO: 5.28 X10(6)/MCL (ref 4.7–6.1)
SODIUM SERPL-SCNC: 137 MMOL/L (ref 136–145)
WBC # SPEC AUTO: 9.1 X10(3)/MCL (ref 4.5–11.5)

## 2021-01-29 ENCOUNTER — HISTORICAL (OUTPATIENT)
Dept: INFUSION THERAPY | Facility: HOSPITAL | Age: 61
End: 2021-01-29

## 2021-01-29 LAB
ABS NEUT (OLG): 5.91 X10(3)/MCL (ref 2.1–9.2)
ALBUMIN SERPL-MCNC: 4.1 GM/DL (ref 3.4–4.8)
ALP SERPL-CCNC: 116 UNIT/L (ref 40–150)
ALT SERPL-CCNC: 22 UNIT/L (ref 0–55)
ANION GAP SERPL CALC-SCNC: 14 MMOL/L
AST SERPL-CCNC: 17 UNIT/L (ref 5–34)
BASOPHILS # BLD AUTO: 0 X10(3)/MCL (ref 0–0.2)
BASOPHILS NFR BLD AUTO: 0.4 %
BILIRUB SERPL-MCNC: 0.5 MG/DL
BILIRUBIN DIRECT+TOT PNL SERPL-MCNC: 0.2 MG/DL (ref 0–0.5)
BILIRUBIN DIRECT+TOT PNL SERPL-MCNC: 0.3 MG/DL (ref 0–0.8)
BUN SERPL-MCNC: 41 MG/DL (ref 8–26)
CHLORIDE SERPL-SCNC: 106 MMOL/L (ref 98–109)
CREAT SERPL-MCNC: 2.3 MG/DL (ref 0.6–1.3)
EOSINOPHIL # BLD AUTO: 0.4 X10(3)/MCL (ref 0–0.9)
EOSINOPHIL NFR BLD AUTO: 3.3 %
ERYTHROCYTE [DISTWIDTH] IN BLOOD BY AUTOMATED COUNT: 16.2 % (ref 11.5–17)
GLUCOSE SERPL-MCNC: 82 MG/DL (ref 70–105)
HCT VFR BLD AUTO: 43 % (ref 42–52)
HCT VFR BLD CALC: 44 % (ref 38–51)
HGB BLD-MCNC: 13.9 GM/DL (ref 14–18)
HGB BLD-MCNC: 15 MG/DL (ref 12–17)
LIVER PROFILE INTERP: NORMAL
LYMPHOCYTES # BLD AUTO: 4.3 X10(3)/MCL (ref 0.6–4.6)
LYMPHOCYTES NFR BLD AUTO: 37.2 %
MCH RBC QN AUTO: 25.8 PG (ref 27–31)
MCHC RBC AUTO-ENTMCNC: 32.3 GM/DL (ref 33–36)
MCV RBC AUTO: 79.8 FL (ref 80–94)
MONOCYTES # BLD AUTO: 0.9 X10(3)/MCL (ref 0.1–1.3)
MONOCYTES NFR BLD AUTO: 8.1 %
NEUTROPHILS # BLD AUTO: 5.9 X10(3)/MCL (ref 2.1–9.2)
NEUTROPHILS NFR BLD AUTO: 50.7 %
PLATELET # BLD AUTO: 228 X10(3)/MCL (ref 130–400)
PMV BLD AUTO: 10 FL (ref 9.4–12.4)
POC IONIZED CALCIUM: 1.22 MMOL/L (ref 1.12–1.32)
POC TCO2: 25 MMOL/L (ref 24–29)
POTASSIUM BLD-SCNC: 4.8 MMOL/L (ref 3.5–4.9)
PROT SERPL-MCNC: 7.2 GM/DL (ref 5.8–7.6)
RBC # BLD AUTO: 5.39 X10(6)/MCL (ref 4.7–6.1)
SODIUM BLD-SCNC: 139 MMOL/L (ref 138–146)
TSH SERPL-ACNC: 1.13 UIU/ML (ref 0.35–4.94)
WBC # SPEC AUTO: 11.7 X10(3)/MCL (ref 4.5–11.5)

## 2021-02-04 ENCOUNTER — HISTORICAL (OUTPATIENT)
Dept: ADMINISTRATIVE | Facility: HOSPITAL | Age: 61
End: 2021-02-04

## 2021-02-04 LAB
ABS NEUT (OLG): 7.3 X10(3)/MCL (ref 2.1–9.2)
ALBUMIN SERPL-MCNC: 4 GM/DL (ref 3.4–4.8)
ALBUMIN/GLOB SERPL: 1.1 RATIO (ref 1.1–2)
ALP SERPL-CCNC: 117 UNIT/L (ref 40–150)
ALT SERPL-CCNC: 26 UNIT/L (ref 0–55)
AST SERPL-CCNC: 19 UNIT/L (ref 5–34)
BASOPHILS # BLD AUTO: 0.1 X10(3)/MCL (ref 0–0.2)
BASOPHILS NFR BLD AUTO: 0.5 %
BILIRUB SERPL-MCNC: 0.5 MG/DL
BILIRUBIN DIRECT+TOT PNL SERPL-MCNC: 0.2 MG/DL (ref 0–0.5)
BILIRUBIN DIRECT+TOT PNL SERPL-MCNC: 0.3 MG/DL (ref 0–0.8)
BUN SERPL-MCNC: 29.5 MG/DL (ref 8.4–25.7)
CALCIUM SERPL-MCNC: 9.1 MG/DL (ref 8.8–10)
CHLORIDE SERPL-SCNC: 103 MMOL/L (ref 98–107)
CO2 SERPL-SCNC: 25 MMOL/L (ref 23–31)
CREAT SERPL-MCNC: 2.01 MG/DL (ref 0.73–1.18)
EOSINOPHIL # BLD AUTO: 0.4 X10(3)/MCL (ref 0–0.9)
EOSINOPHIL NFR BLD AUTO: 2.9 %
ERYTHROCYTE [DISTWIDTH] IN BLOOD BY AUTOMATED COUNT: 16.1 % (ref 11.5–17)
GLOBULIN SER-MCNC: 3.7 GM/DL (ref 2.4–3.5)
GLUCOSE SERPL-MCNC: 107 MG/DL (ref 82–115)
HCT VFR BLD AUTO: 44.2 % (ref 42–52)
HGB BLD-MCNC: 14.4 GM/DL (ref 14–18)
LYMPHOCYTES # BLD AUTO: 4 X10(3)/MCL (ref 0.6–4.6)
LYMPHOCYTES NFR BLD AUTO: 31.2 %
MCH RBC QN AUTO: 26.1 PG (ref 27–31)
MCHC RBC AUTO-ENTMCNC: 32.6 GM/DL (ref 33–36)
MCV RBC AUTO: 80.2 FL (ref 80–94)
MONOCYTES # BLD AUTO: 1 X10(3)/MCL (ref 0.1–1.3)
MONOCYTES NFR BLD AUTO: 7.7 %
NEUTROPHILS # BLD AUTO: 7.3 X10(3)/MCL (ref 2.1–9.2)
NEUTROPHILS NFR BLD AUTO: 57.4 %
PLATELET # BLD AUTO: 229 X10(3)/MCL (ref 130–400)
PMV BLD AUTO: 10.1 FL (ref 9.4–12.4)
POTASSIUM SERPL-SCNC: 4.9 MMOL/L (ref 3.5–5.1)
PROT SERPL-MCNC: 7.7 GM/DL (ref 5.8–7.6)
RBC # BLD AUTO: 5.51 X10(6)/MCL (ref 4.7–6.1)
SODIUM SERPL-SCNC: 142 MMOL/L (ref 136–145)
WBC # SPEC AUTO: 12.7 X10(3)/MCL (ref 4.5–11.5)

## 2021-02-19 ENCOUNTER — HISTORICAL (OUTPATIENT)
Dept: INFUSION THERAPY | Facility: HOSPITAL | Age: 61
End: 2021-02-19

## 2021-02-19 LAB
ABS NEUT (OLG): 8.75 X10(3)/MCL (ref 2.1–9.2)
ALBUMIN SERPL-MCNC: 3.9 GM/DL (ref 3.4–4.8)
ALP SERPL-CCNC: 111 UNIT/L (ref 40–150)
ALT SERPL-CCNC: 23 UNIT/L (ref 0–55)
ANION GAP SERPL CALC-SCNC: 15 MMOL/L
AST SERPL-CCNC: 16 UNIT/L (ref 5–34)
BASOPHILS # BLD AUTO: 0 X10(3)/MCL (ref 0–0.2)
BASOPHILS NFR BLD AUTO: 0.4 %
BILIRUB SERPL-MCNC: 0.4 MG/DL
BILIRUBIN DIRECT+TOT PNL SERPL-MCNC: 0.2 MG/DL (ref 0–0.5)
BILIRUBIN DIRECT+TOT PNL SERPL-MCNC: 0.2 MG/DL (ref 0–0.8)
BUN SERPL-MCNC: 41 MG/DL (ref 8–26)
CHLORIDE SERPL-SCNC: 109 MMOL/L (ref 98–109)
CREAT SERPL-MCNC: 2.5 MG/DL (ref 0.6–1.3)
EOSINOPHIL # BLD AUTO: 0.3 X10(3)/MCL (ref 0–0.9)
EOSINOPHIL NFR BLD AUTO: 2.2 %
ERYTHROCYTE [DISTWIDTH] IN BLOOD BY AUTOMATED COUNT: 15.4 % (ref 11.5–17)
GLUCOSE SERPL-MCNC: 166 MG/DL (ref 70–105)
HCT VFR BLD AUTO: 44.1 % (ref 42–52)
HCT VFR BLD CALC: 45 % (ref 38–51)
HGB BLD-MCNC: 14.6 GM/DL (ref 14–18)
HGB BLD-MCNC: 15.3 MG/DL (ref 12–17)
LIVER PROFILE INTERP: NORMAL
LYMPHOCYTES # BLD AUTO: 3.5 X10(3)/MCL (ref 0.6–4.6)
LYMPHOCYTES NFR BLD AUTO: 26.1 %
MCH RBC QN AUTO: 26.4 PG (ref 27–31)
MCHC RBC AUTO-ENTMCNC: 33.1 GM/DL (ref 33–36)
MCV RBC AUTO: 79.6 FL (ref 80–94)
MONOCYTES # BLD AUTO: 0.8 X10(3)/MCL (ref 0.1–1.3)
MONOCYTES NFR BLD AUTO: 6 %
NEUTROPHILS # BLD AUTO: 8.8 X10(3)/MCL (ref 2.1–9.2)
NEUTROPHILS NFR BLD AUTO: 65 %
PLATELET # BLD AUTO: 245 X10(3)/MCL (ref 130–400)
PMV BLD AUTO: 10.3 FL (ref 9.4–12.4)
POC IONIZED CALCIUM: 1.2 MMOL/L (ref 1.12–1.32)
POC TCO2: 21 MMOL/L (ref 24–29)
POTASSIUM BLD-SCNC: 4.3 MMOL/L (ref 3.5–4.9)
PROT SERPL-MCNC: 7.2 GM/DL (ref 5.8–7.6)
RBC # BLD AUTO: 5.54 X10(6)/MCL (ref 4.7–6.1)
SODIUM BLD-SCNC: 139 MMOL/L (ref 138–146)
TSH SERPL-ACNC: 0.43 UIU/ML (ref 0.35–4.94)
WBC # SPEC AUTO: 13.5 X10(3)/MCL (ref 4.5–11.5)

## 2021-02-25 ENCOUNTER — HISTORICAL (OUTPATIENT)
Dept: ADMINISTRATIVE | Facility: HOSPITAL | Age: 61
End: 2021-02-25

## 2021-02-25 LAB
ABS NEUT (OLG): 7.75 X10(3)/MCL (ref 2.1–9.2)
ALBUMIN SERPL-MCNC: 3.9 GM/DL (ref 3.4–4.8)
ALBUMIN/GLOB SERPL: 1.1 RATIO (ref 1.1–2)
ALP SERPL-CCNC: 109 UNIT/L (ref 40–150)
ALT SERPL-CCNC: 27 UNIT/L (ref 0–55)
AST SERPL-CCNC: 19 UNIT/L (ref 5–34)
BASOPHILS # BLD AUTO: 0 X10(3)/MCL (ref 0–0.2)
BASOPHILS NFR BLD AUTO: 0.4 %
BILIRUB SERPL-MCNC: 0.4 MG/DL
BILIRUBIN DIRECT+TOT PNL SERPL-MCNC: 0.1 MG/DL (ref 0–0.5)
BILIRUBIN DIRECT+TOT PNL SERPL-MCNC: 0.3 MG/DL (ref 0–0.8)
BUN SERPL-MCNC: 36 MG/DL (ref 8.4–25.7)
CALCIUM SERPL-MCNC: 8.9 MG/DL (ref 8.8–10)
CHLORIDE SERPL-SCNC: 107 MMOL/L (ref 98–107)
CO2 SERPL-SCNC: 23 MMOL/L (ref 23–31)
CREAT SERPL-MCNC: 2.22 MG/DL (ref 0.73–1.18)
EOSINOPHIL # BLD AUTO: 0.3 X10(3)/MCL (ref 0–0.9)
EOSINOPHIL NFR BLD AUTO: 2.7 %
ERYTHROCYTE [DISTWIDTH] IN BLOOD BY AUTOMATED COUNT: 15.2 % (ref 11.5–17)
GLOBULIN SER-MCNC: 3.7 GM/DL (ref 2.4–3.5)
GLUCOSE SERPL-MCNC: 94 MG/DL (ref 82–115)
HCT VFR BLD AUTO: 43.3 % (ref 42–52)
HGB BLD-MCNC: 14.2 GM/DL (ref 14–18)
LYMPHOCYTES # BLD AUTO: 3.6 X10(3)/MCL (ref 0.6–4.6)
LYMPHOCYTES NFR BLD AUTO: 28.2 %
MCH RBC QN AUTO: 26.4 PG (ref 27–31)
MCHC RBC AUTO-ENTMCNC: 32.8 GM/DL (ref 33–36)
MCV RBC AUTO: 80.5 FL (ref 80–94)
MONOCYTES # BLD AUTO: 0.9 X10(3)/MCL (ref 0.1–1.3)
MONOCYTES NFR BLD AUTO: 7.4 %
NEUTROPHILS # BLD AUTO: 7.8 X10(3)/MCL (ref 2.1–9.2)
NEUTROPHILS NFR BLD AUTO: 61.1 %
PLATELET # BLD AUTO: 237 X10(3)/MCL (ref 130–400)
PMV BLD AUTO: 10.2 FL (ref 9.4–12.4)
POTASSIUM SERPL-SCNC: 4.8 MMOL/L (ref 3.5–5.1)
PROT SERPL-MCNC: 7.6 GM/DL (ref 5.8–7.6)
RBC # BLD AUTO: 5.38 X10(6)/MCL (ref 4.7–6.1)
SODIUM SERPL-SCNC: 140 MMOL/L (ref 136–145)
WBC # SPEC AUTO: 12.7 X10(3)/MCL (ref 4.5–11.5)

## 2021-03-12 ENCOUNTER — HISTORICAL (OUTPATIENT)
Dept: INFUSION THERAPY | Facility: HOSPITAL | Age: 61
End: 2021-03-12

## 2021-03-12 LAB
ABS NEUT (OLG): 6.99 X10(3)/MCL (ref 2.1–9.2)
ALBUMIN SERPL-MCNC: 4.1 GM/DL (ref 3.4–4.8)
ALP SERPL-CCNC: 116 UNIT/L (ref 40–150)
ALT SERPL-CCNC: 33 UNIT/L (ref 0–55)
ANION GAP SERPL CALC-SCNC: 16 MMOL/L
AST SERPL-CCNC: 25 UNIT/L (ref 5–34)
BASOPHILS # BLD AUTO: 0 X10(3)/MCL (ref 0–0.2)
BASOPHILS NFR BLD AUTO: 0.4 %
BILIRUB SERPL-MCNC: 0.6 MG/DL
BILIRUBIN DIRECT+TOT PNL SERPL-MCNC: 0.2 MG/DL (ref 0–0.5)
BILIRUBIN DIRECT+TOT PNL SERPL-MCNC: 0.4 MG/DL (ref 0–0.8)
BUN SERPL-MCNC: 30 MG/DL (ref 8–26)
CHLORIDE SERPL-SCNC: 107 MMOL/L (ref 98–109)
CREAT SERPL-MCNC: 2.2 MG/DL (ref 0.6–1.3)
EOSINOPHIL # BLD AUTO: 0.3 X10(3)/MCL (ref 0–0.9)
EOSINOPHIL NFR BLD AUTO: 2.9 %
ERYTHROCYTE [DISTWIDTH] IN BLOOD BY AUTOMATED COUNT: 14.9 % (ref 11.5–17)
GLUCOSE SERPL-MCNC: 116 MG/DL (ref 70–105)
HCT VFR BLD AUTO: 46.3 % (ref 42–52)
HCT VFR BLD CALC: 46 % (ref 38–51)
HGB BLD-MCNC: 15.3 GM/DL (ref 14–18)
HGB BLD-MCNC: 15.6 MG/DL (ref 12–17)
LIVER PROFILE INTERP: NORMAL
LYMPHOCYTES # BLD AUTO: 2.9 X10(3)/MCL (ref 0.6–4.6)
LYMPHOCYTES NFR BLD AUTO: 25.2 %
MCH RBC QN AUTO: 26.2 PG (ref 27–31)
MCHC RBC AUTO-ENTMCNC: 33 GM/DL (ref 33–36)
MCV RBC AUTO: 79.3 FL (ref 80–94)
MONOCYTES # BLD AUTO: 1.2 X10(3)/MCL (ref 0.1–1.3)
MONOCYTES NFR BLD AUTO: 10.4 %
NEUTROPHILS # BLD AUTO: 7 X10(3)/MCL (ref 2.1–9.2)
NEUTROPHILS NFR BLD AUTO: 60.8 %
PLATELET # BLD AUTO: 207 X10(3)/MCL (ref 130–400)
PMV BLD AUTO: 10.5 FL (ref 9.4–12.4)
POC IONIZED CALCIUM: 1.23 MMOL/L (ref 1.12–1.32)
POC TCO2: 22 MMOL/L (ref 24–29)
POTASSIUM BLD-SCNC: 4.2 MMOL/L (ref 3.5–4.9)
PROT SERPL-MCNC: 7.6 GM/DL (ref 5.8–7.6)
RBC # BLD AUTO: 5.84 X10(6)/MCL (ref 4.7–6.1)
SODIUM BLD-SCNC: 140 MMOL/L (ref 138–146)
TSH SERPL-ACNC: 0.86 UIU/ML (ref 0.35–4.94)
WBC # SPEC AUTO: 11.5 X10(3)/MCL (ref 4.5–11.5)

## 2021-04-01 ENCOUNTER — HISTORICAL (OUTPATIENT)
Dept: INFUSION THERAPY | Facility: HOSPITAL | Age: 61
End: 2021-04-01

## 2021-04-01 LAB
ABS NEUT (OLG): 4.08 X10(3)/MCL (ref 2.1–9.2)
ALBUMIN SERPL-MCNC: 3.7 GM/DL (ref 3.4–4.8)
ALP SERPL-CCNC: 96 UNIT/L (ref 40–150)
ALT SERPL-CCNC: 25 UNIT/L (ref 0–55)
ANION GAP SERPL CALC-SCNC: 16 MMOL/L
AST SERPL-CCNC: 22 UNIT/L (ref 5–34)
BASOPHILS # BLD AUTO: 0 X10(3)/MCL (ref 0–0.2)
BASOPHILS NFR BLD AUTO: 0.4 %
BILIRUB SERPL-MCNC: 0.4 MG/DL
BILIRUBIN DIRECT+TOT PNL SERPL-MCNC: 0.2 MG/DL (ref 0–0.5)
BILIRUBIN DIRECT+TOT PNL SERPL-MCNC: 0.2 MG/DL (ref 0–0.8)
BUN SERPL-MCNC: 33 MG/DL (ref 8–26)
CHLORIDE SERPL-SCNC: 101 MMOL/L (ref 98–109)
CREAT SERPL-MCNC: 2.4 MG/DL (ref 0.6–1.3)
EOSINOPHIL # BLD AUTO: 0.1 X10(3)/MCL (ref 0–0.9)
EOSINOPHIL NFR BLD AUTO: 2.1 %
ERYTHROCYTE [DISTWIDTH] IN BLOOD BY AUTOMATED COUNT: 14.8 % (ref 11.5–17)
GLUCOSE SERPL-MCNC: 164 MG/DL (ref 70–105)
HCT VFR BLD AUTO: 45.5 % (ref 42–52)
HCT VFR BLD CALC: 45 % (ref 38–51)
HGB BLD-MCNC: 14.6 GM/DL (ref 14–18)
HGB BLD-MCNC: 15.3 MG/DL (ref 12–17)
LIVER PROFILE INTERP: NORMAL
LYMPHOCYTES # BLD AUTO: 1.8 X10(3)/MCL (ref 0.6–4.6)
LYMPHOCYTES NFR BLD AUTO: 26.8 %
MCH RBC QN AUTO: 26.4 PG (ref 27–31)
MCHC RBC AUTO-ENTMCNC: 32.1 GM/DL (ref 33–36)
MCV RBC AUTO: 82.4 FL (ref 80–94)
MONOCYTES # BLD AUTO: 0.6 X10(3)/MCL (ref 0.1–1.3)
MONOCYTES NFR BLD AUTO: 9.4 %
NEUTROPHILS # BLD AUTO: 4.1 X10(3)/MCL (ref 2.1–9.2)
NEUTROPHILS NFR BLD AUTO: 61 %
PLATELET # BLD AUTO: 153 X10(3)/MCL (ref 130–400)
PMV BLD AUTO: 10.4 FL (ref 9.4–12.4)
POC IONIZED CALCIUM: 1.21 MMOL/L (ref 1.12–1.32)
POC TCO2: 26 MMOL/L (ref 24–29)
POTASSIUM BLD-SCNC: 4.1 MMOL/L (ref 3.5–4.9)
PROT SERPL-MCNC: 7 GM/DL (ref 5.8–7.6)
RBC # BLD AUTO: 5.52 X10(6)/MCL (ref 4.7–6.1)
SODIUM BLD-SCNC: 138 MMOL/L (ref 138–146)
TSH SERPL-ACNC: 0.68 UIU/ML (ref 0.35–4.94)
WBC # SPEC AUTO: 6.7 X10(3)/MCL (ref 4.5–11.5)

## 2021-04-08 ENCOUNTER — HISTORICAL (OUTPATIENT)
Dept: ADMINISTRATIVE | Facility: HOSPITAL | Age: 61
End: 2021-04-08

## 2021-04-08 LAB
ABS NEUT (OLG): 9.24 X10(3)/MCL (ref 2.1–9.2)
ALBUMIN SERPL-MCNC: 3.9 GM/DL (ref 3.4–4.8)
ALBUMIN/GLOB SERPL: 1.1 RATIO (ref 1.1–2)
ALP SERPL-CCNC: 111 UNIT/L (ref 40–150)
ALT SERPL-CCNC: 26 UNIT/L (ref 0–55)
APPEARANCE, UA: CLEAR
AST SERPL-CCNC: 20 UNIT/L (ref 5–34)
BACTERIA SPEC CULT: ABNORMAL /HPF
BASOPHILS # BLD AUTO: 0.1 X10(3)/MCL (ref 0–0.2)
BASOPHILS NFR BLD AUTO: 0.4 %
BILIRUB SERPL-MCNC: 0.4 MG/DL
BILIRUB UR QL STRIP: NEGATIVE
BILIRUBIN DIRECT+TOT PNL SERPL-MCNC: 0.1 MG/DL (ref 0–0.5)
BILIRUBIN DIRECT+TOT PNL SERPL-MCNC: 0.3 MG/DL (ref 0–0.8)
BUN SERPL-MCNC: 25.9 MG/DL (ref 8.4–25.7)
CALCIUM SERPL-MCNC: 9.2 MG/DL (ref 8.8–10)
CHLORIDE SERPL-SCNC: 104 MMOL/L (ref 98–107)
CO2 SERPL-SCNC: 22 MMOL/L (ref 23–31)
COLOR UR: YELLOW
CREAT SERPL-MCNC: 2.19 MG/DL (ref 0.73–1.18)
EOSINOPHIL # BLD AUTO: 0.4 X10(3)/MCL (ref 0–0.9)
EOSINOPHIL NFR BLD AUTO: 2.5 %
ERYTHROCYTE [DISTWIDTH] IN BLOOD BY AUTOMATED COUNT: 14.6 % (ref 11.5–17)
GLOBULIN SER-MCNC: 3.6 GM/DL (ref 2.4–3.5)
GLUCOSE (UA): NEGATIVE
GLUCOSE SERPL-MCNC: 93 MG/DL (ref 82–115)
HCT VFR BLD AUTO: 46.6 % (ref 42–52)
HGB BLD-MCNC: 15.3 GM/DL (ref 14–18)
HGB UR QL STRIP: NEGATIVE
KETONES UR QL STRIP: NEGATIVE
LEUKOCYTE ESTERASE UR QL STRIP: NEGATIVE
LYMPHOCYTES # BLD AUTO: 4.6 X10(3)/MCL (ref 0.6–4.6)
LYMPHOCYTES NFR BLD AUTO: 30.1 %
MCH RBC QN AUTO: 26.5 PG (ref 27–31)
MCHC RBC AUTO-ENTMCNC: 32.8 GM/DL (ref 33–36)
MCV RBC AUTO: 80.8 FL (ref 80–94)
MONOCYTES # BLD AUTO: 0.9 X10(3)/MCL (ref 0.1–1.3)
MONOCYTES NFR BLD AUTO: 5.8 %
NEUTROPHILS # BLD AUTO: 9.2 X10(3)/MCL (ref 2.1–9.2)
NEUTROPHILS NFR BLD AUTO: 60.5 %
NITRITE UR QL STRIP: NEGATIVE
PH UR STRIP: 5.5 [PH] (ref 5–9)
PLATELET # BLD AUTO: 267 X10(3)/MCL (ref 130–400)
PMV BLD AUTO: 9.6 FL (ref 9.4–12.4)
POTASSIUM SERPL-SCNC: 4.3 MMOL/L (ref 3.5–5.1)
PROT SERPL-MCNC: 7.5 GM/DL (ref 5.8–7.6)
PROT UR QL STRIP: ABNORMAL
RBC # BLD AUTO: 5.77 X10(6)/MCL (ref 4.7–6.1)
RBC #/AREA URNS HPF: ABNORMAL /[HPF]
SODIUM SERPL-SCNC: 139 MMOL/L (ref 136–145)
SP GR UR STRIP: 1.02 (ref 1–1.03)
SQUAMOUS EPITHELIAL, UA: ABNORMAL /HPF (ref 0–4)
UROBILINOGEN UR STRIP-ACNC: 0.2
WBC # SPEC AUTO: 15.3 X10(3)/MCL (ref 4.5–11.5)
WBC #/AREA URNS HPF: ABNORMAL /HPF

## 2021-04-22 ENCOUNTER — HISTORICAL (OUTPATIENT)
Dept: INFUSION THERAPY | Facility: HOSPITAL | Age: 61
End: 2021-04-22

## 2021-04-22 LAB
ABS NEUT (OLG): 5.63 X10(3)/MCL (ref 2.1–9.2)
ALBUMIN SERPL-MCNC: 3.8 GM/DL (ref 3.4–4.8)
ALP SERPL-CCNC: 102 UNIT/L (ref 40–150)
ALT SERPL-CCNC: 20 UNIT/L (ref 0–55)
ANION GAP SERPL CALC-SCNC: 15 MMOL/L
AST SERPL-CCNC: 21 UNIT/L (ref 5–34)
BASOPHILS # BLD AUTO: 0 X10(3)/MCL (ref 0–0.2)
BASOPHILS NFR BLD AUTO: 0.4 %
BILIRUB SERPL-MCNC: 0.6 MG/DL
BILIRUBIN DIRECT+TOT PNL SERPL-MCNC: 0.2 MG/DL (ref 0–0.5)
BILIRUBIN DIRECT+TOT PNL SERPL-MCNC: 0.4 MG/DL (ref 0–0.8)
BUN SERPL-MCNC: 36 MG/DL (ref 8–26)
CHLORIDE SERPL-SCNC: 105 MMOL/L (ref 98–109)
CREAT SERPL-MCNC: 2.2 MG/DL (ref 0.6–1.3)
EOSINOPHIL # BLD AUTO: 0.3 X10(3)/MCL (ref 0–0.9)
EOSINOPHIL NFR BLD AUTO: 2.8 %
ERYTHROCYTE [DISTWIDTH] IN BLOOD BY AUTOMATED COUNT: 14.6 % (ref 11.5–17)
GLUCOSE SERPL-MCNC: 116 MG/DL (ref 70–105)
HCT VFR BLD AUTO: 44.1 % (ref 42–52)
HCT VFR BLD CALC: 44 % (ref 38–51)
HGB BLD-MCNC: 14.1 GM/DL (ref 14–18)
HGB BLD-MCNC: 15 MG/DL (ref 12–17)
LIVER PROFILE INTERP: NORMAL
LYMPHOCYTES # BLD AUTO: 3 X10(3)/MCL (ref 0.6–4.6)
LYMPHOCYTES NFR BLD AUTO: 30.5 %
MCH RBC QN AUTO: 26.7 PG (ref 27–31)
MCHC RBC AUTO-ENTMCNC: 32 GM/DL (ref 33–36)
MCV RBC AUTO: 83.4 FL (ref 80–94)
MONOCYTES # BLD AUTO: 0.8 X10(3)/MCL (ref 0.1–1.3)
MONOCYTES NFR BLD AUTO: 8.2 %
NEUTROPHILS # BLD AUTO: 5.6 X10(3)/MCL (ref 2.1–9.2)
NEUTROPHILS NFR BLD AUTO: 57.9 %
PLATELET # BLD AUTO: 189 X10(3)/MCL (ref 130–400)
PMV BLD AUTO: 9.8 FL (ref 9.4–12.4)
POC IONIZED CALCIUM: 1.23 MMOL/L (ref 1.12–1.32)
POC TCO2: 24 MMOL/L (ref 24–29)
POTASSIUM BLD-SCNC: 4.3 MMOL/L (ref 3.5–4.9)
PROT SERPL-MCNC: 6.9 GM/DL (ref 5.8–7.6)
RBC # BLD AUTO: 5.29 X10(6)/MCL (ref 4.7–6.1)
SODIUM BLD-SCNC: 138 MMOL/L (ref 138–146)
TSH SERPL-ACNC: 1.42 UIU/ML (ref 0.35–4.94)
WBC # SPEC AUTO: 9.7 X10(3)/MCL (ref 4.5–11.5)

## 2021-05-13 ENCOUNTER — HISTORICAL (OUTPATIENT)
Dept: INFUSION THERAPY | Facility: HOSPITAL | Age: 61
End: 2021-05-13

## 2021-05-13 LAB
ABS NEUT (OLG): 5.65 X10(3)/MCL (ref 2.1–9.2)
ALBUMIN SERPL-MCNC: 3.9 GM/DL (ref 3.4–4.8)
ALP SERPL-CCNC: 114 UNIT/L (ref 40–150)
ALT SERPL-CCNC: 28 UNIT/L (ref 0–55)
ANION GAP SERPL CALC-SCNC: 12 MMOL/L
AST SERPL-CCNC: 24 UNIT/L (ref 5–34)
BASOPHILS # BLD AUTO: 0 X10(3)/MCL (ref 0–0.2)
BASOPHILS NFR BLD AUTO: 0.4 %
BILIRUB SERPL-MCNC: <0.5 MG/DL
BILIRUBIN DIRECT+TOT PNL SERPL-MCNC: 0.2 MG/DL (ref 0–0.5)
BILIRUBIN DIRECT+TOT PNL SERPL-MCNC: <0.3 MG/DL (ref 0–0.8)
BUN SERPL-MCNC: 32 MG/DL (ref 8–26)
CHLORIDE SERPL-SCNC: 103 MMOL/L (ref 98–109)
CREAT SERPL-MCNC: 2.3 MG/DL (ref 0.6–1.3)
EOSINOPHIL # BLD AUTO: 0.4 X10(3)/MCL (ref 0–0.9)
EOSINOPHIL NFR BLD AUTO: 4 %
ERYTHROCYTE [DISTWIDTH] IN BLOOD BY AUTOMATED COUNT: 14.8 % (ref 11.5–17)
GLUCOSE SERPL-MCNC: 129 MG/DL (ref 70–105)
HCT VFR BLD AUTO: 45.6 % (ref 42–52)
HCT VFR BLD CALC: 44 % (ref 38–51)
HGB BLD-MCNC: 14.5 GM/DL (ref 14–18)
HGB BLD-MCNC: 15 MG/DL (ref 12–17)
LIVER PROFILE INTERP: NORMAL
LYMPHOCYTES # BLD AUTO: 3.3 X10(3)/MCL (ref 0.6–4.6)
LYMPHOCYTES NFR BLD AUTO: 31.9 %
MCH RBC QN AUTO: 26.4 PG (ref 27–31)
MCHC RBC AUTO-ENTMCNC: 31.8 GM/DL (ref 33–36)
MCV RBC AUTO: 83.1 FL (ref 80–94)
MONOCYTES # BLD AUTO: 0.9 X10(3)/MCL (ref 0.1–1.3)
MONOCYTES NFR BLD AUTO: 8.4 %
NEUTROPHILS # BLD AUTO: 5.6 X10(3)/MCL (ref 2.1–9.2)
NEUTROPHILS NFR BLD AUTO: 54.9 %
PLATELET # BLD AUTO: 185 X10(3)/MCL (ref 130–400)
PMV BLD AUTO: 10.8 FL (ref 9.4–12.4)
POC IONIZED CALCIUM: 1.12 MMOL/L (ref 1.12–1.32)
POC TCO2: 28 MMOL/L (ref 24–29)
POTASSIUM BLD-SCNC: 4.7 MMOL/L (ref 3.5–4.9)
PROT SERPL-MCNC: 6.9 GM/DL (ref 5.8–7.6)
RBC # BLD AUTO: 5.49 X10(6)/MCL (ref 4.7–6.1)
SODIUM BLD-SCNC: 138 MMOL/L (ref 138–146)
TSH SERPL-ACNC: 1.51 UIU/ML (ref 0.35–4.94)
WBC # SPEC AUTO: 10.3 X10(3)/MCL (ref 4.5–11.5)

## 2021-06-02 ENCOUNTER — HISTORICAL (OUTPATIENT)
Dept: ADMINISTRATIVE | Facility: HOSPITAL | Age: 61
End: 2021-06-02

## 2021-06-02 LAB
ABS NEUT (OLG): 6.33 X10(3)/MCL (ref 2.1–9.2)
ALBUMIN SERPL-MCNC: 3.9 GM/DL (ref 3.4–4.8)
ALP SERPL-CCNC: 118 UNIT/L (ref 40–150)
ALT SERPL-CCNC: 37 UNIT/L (ref 0–55)
ANION GAP SERPL CALC-SCNC: 16 MMOL/L
AST SERPL-CCNC: 25 UNIT/L (ref 5–34)
BASOPHILS # BLD AUTO: 0.1 X10(3)/MCL (ref 0–0.2)
BASOPHILS NFR BLD AUTO: 0.6 %
BILIRUB SERPL-MCNC: 0.6 MG/DL
BILIRUBIN DIRECT+TOT PNL SERPL-MCNC: 0.2 MG/DL (ref 0–0.5)
BILIRUBIN DIRECT+TOT PNL SERPL-MCNC: 0.4 MG/DL (ref 0–0.8)
BUN SERPL-MCNC: 27 MG/DL (ref 8–26)
CHLORIDE SERPL-SCNC: 105 MMOL/L (ref 98–109)
CREAT SERPL-MCNC: 2.5 MG/DL (ref 0.6–1.3)
EOSINOPHIL # BLD AUTO: 0.4 X10(3)/MCL (ref 0–0.9)
EOSINOPHIL NFR BLD AUTO: 3.9 %
ERYTHROCYTE [DISTWIDTH] IN BLOOD BY AUTOMATED COUNT: 14.9 % (ref 11.5–17)
GLUCOSE SERPL-MCNC: 116 MG/DL (ref 70–105)
HCT VFR BLD AUTO: 47.2 % (ref 42–52)
HCT VFR BLD CALC: 46 % (ref 38–51)
HGB BLD-MCNC: 15.3 GM/DL (ref 14–18)
HGB BLD-MCNC: 15.6 MG/DL (ref 12–17)
LIVER PROFILE INTERP: NORMAL
LYMPHOCYTES # BLD AUTO: 3.1 X10(3)/MCL (ref 0.6–4.6)
LYMPHOCYTES NFR BLD AUTO: 28 %
MCH RBC QN AUTO: 27 PG (ref 27–31)
MCHC RBC AUTO-ENTMCNC: 32.4 GM/DL (ref 33–36)
MCV RBC AUTO: 83.4 FL (ref 80–94)
MONOCYTES # BLD AUTO: 1 X10(3)/MCL (ref 0.1–1.3)
MONOCYTES NFR BLD AUTO: 9.5 %
NEUTROPHILS # BLD AUTO: 6.3 X10(3)/MCL (ref 2.1–9.2)
NEUTROPHILS NFR BLD AUTO: 57.5 %
PLATELET # BLD AUTO: 201 X10(3)/MCL (ref 130–400)
PMV BLD AUTO: 10.5 FL (ref 9.4–12.4)
POC IONIZED CALCIUM: 1.18 MMOL/L (ref 1.12–1.32)
POC TCO2: 26 MMOL/L (ref 24–29)
POTASSIUM BLD-SCNC: 4.6 MMOL/L (ref 3.5–4.9)
PROT SERPL-MCNC: 7.5 GM/DL (ref 5.8–7.6)
RBC # BLD AUTO: 5.66 X10(6)/MCL (ref 4.7–6.1)
SODIUM BLD-SCNC: 141 MMOL/L (ref 138–146)
TSH SERPL-ACNC: 1.37 UIU/ML (ref 0.35–4.94)
WBC # SPEC AUTO: 11 X10(3)/MCL (ref 4.5–11.5)

## 2021-06-10 ENCOUNTER — HISTORICAL (OUTPATIENT)
Dept: INFUSION THERAPY | Facility: HOSPITAL | Age: 61
End: 2021-06-10

## 2021-07-01 ENCOUNTER — HISTORICAL (OUTPATIENT)
Dept: INFUSION THERAPY | Facility: HOSPITAL | Age: 61
End: 2021-07-01

## 2021-07-01 LAB
ABS NEUT (OLG): 6 X10(3)/MCL (ref 2.1–9.2)
ALBUMIN SERPL-MCNC: 3.6 GM/DL (ref 3.4–4.8)
ALP SERPL-CCNC: 132 UNIT/L (ref 40–150)
ALT SERPL-CCNC: 64 UNIT/L (ref 0–55)
ANION GAP SERPL CALC-SCNC: 18 MMOL/L
AST SERPL-CCNC: 41 UNIT/L (ref 5–34)
BASOPHILS # BLD AUTO: 0 X10(3)/MCL (ref 0–0.2)
BASOPHILS NFR BLD AUTO: 0.5 %
BILIRUB SERPL-MCNC: 0.6 MG/DL
BILIRUBIN DIRECT+TOT PNL SERPL-MCNC: 0.2 MG/DL (ref 0–0.5)
BILIRUBIN DIRECT+TOT PNL SERPL-MCNC: 0.4 MG/DL (ref 0–0.8)
BUN SERPL-MCNC: 46 MG/DL (ref 8–26)
CHLORIDE SERPL-SCNC: 104 MMOL/L (ref 98–109)
CREAT SERPL-MCNC: 2.8 MG/DL (ref 0.6–1.3)
EOSINOPHIL # BLD AUTO: 0.2 X10(3)/MCL (ref 0–0.9)
EOSINOPHIL NFR BLD AUTO: 2.5 %
ERYTHROCYTE [DISTWIDTH] IN BLOOD BY AUTOMATED COUNT: 14.5 % (ref 11.5–17)
GLUCOSE SERPL-MCNC: 113 MG/DL (ref 70–105)
HCT VFR BLD AUTO: 46.9 % (ref 42–52)
HCT VFR BLD CALC: 46 % (ref 38–51)
HGB BLD-MCNC: 15.4 GM/DL (ref 14–18)
HGB BLD-MCNC: 15.6 MG/DL (ref 12–17)
LIVER PROFILE INTERP: ABNORMAL
LYMPHOCYTES # BLD AUTO: 2.7 X10(3)/MCL (ref 0.6–4.6)
LYMPHOCYTES NFR BLD AUTO: 27.1 %
MCH RBC QN AUTO: 27.3 PG (ref 27–31)
MCHC RBC AUTO-ENTMCNC: 32.8 GM/DL (ref 33–36)
MCV RBC AUTO: 83 FL (ref 80–94)
MONOCYTES # BLD AUTO: 0.9 X10(3)/MCL (ref 0.1–1.3)
MONOCYTES NFR BLD AUTO: 9.1 %
NEUTROPHILS # BLD AUTO: 6 X10(3)/MCL (ref 2.1–9.2)
NEUTROPHILS NFR BLD AUTO: 60.6 %
PLATELET # BLD AUTO: 190 X10(3)/MCL (ref 130–400)
PMV BLD AUTO: 11.1 FL (ref 9.4–12.4)
POC IONIZED CALCIUM: 1.15 MMOL/L (ref 1.12–1.32)
POC TCO2: 23 MMOL/L (ref 24–29)
POTASSIUM BLD-SCNC: 4.8 MMOL/L (ref 3.5–4.9)
PROT SERPL-MCNC: 7.2 GM/DL (ref 5.8–7.6)
RBC # BLD AUTO: 5.65 X10(6)/MCL (ref 4.7–6.1)
SODIUM BLD-SCNC: 139 MMOL/L (ref 138–146)
TSH SERPL-ACNC: 1.52 UIU/ML (ref 0.35–4.94)
WBC # SPEC AUTO: 9.9 X10(3)/MCL (ref 4.5–11.5)

## 2021-07-14 ENCOUNTER — HISTORICAL (OUTPATIENT)
Dept: ADMINISTRATIVE | Facility: HOSPITAL | Age: 61
End: 2021-07-14

## 2021-07-14 LAB
ABS NEUT (OLG): 5.87 X10(3)/MCL (ref 2.1–9.2)
ALBUMIN SERPL-MCNC: 3.3 GM/DL (ref 3.4–4.8)
ALBUMIN/GLOB SERPL: 1 RATIO (ref 1.1–2)
ALP SERPL-CCNC: 120 UNIT/L (ref 40–150)
ALT SERPL-CCNC: 56 UNIT/L (ref 0–55)
AST SERPL-CCNC: 31 UNIT/L (ref 5–34)
BASOPHILS # BLD AUTO: 0 X10(3)/MCL (ref 0–0.2)
BASOPHILS NFR BLD AUTO: 0.5 %
BILIRUB SERPL-MCNC: 0.4 MG/DL
BILIRUBIN DIRECT+TOT PNL SERPL-MCNC: 0.2 MG/DL (ref 0–0.5)
BILIRUBIN DIRECT+TOT PNL SERPL-MCNC: 0.2 MG/DL (ref 0–0.8)
BUN SERPL-MCNC: 25.4 MG/DL (ref 8.4–25.7)
CALCIUM SERPL-MCNC: 8.2 MG/DL (ref 8.8–10)
CHLORIDE SERPL-SCNC: 108 MMOL/L (ref 98–107)
CO2 SERPL-SCNC: 19 MMOL/L (ref 23–31)
CREAT SERPL-MCNC: 2.08 MG/DL (ref 0.73–1.18)
EOSINOPHIL # BLD AUTO: 0.4 X10(3)/MCL (ref 0–0.9)
EOSINOPHIL NFR BLD AUTO: 4.3 %
ERYTHROCYTE [DISTWIDTH] IN BLOOD BY AUTOMATED COUNT: 14.6 % (ref 11.5–17)
GLOBULIN SER-MCNC: 3.4 GM/DL (ref 2.4–3.5)
GLUCOSE SERPL-MCNC: 153 MG/DL (ref 82–115)
HCT VFR BLD AUTO: 45 % (ref 42–52)
HGB BLD-MCNC: 15 GM/DL (ref 14–18)
LYMPHOCYTES # BLD AUTO: 3.1 X10(3)/MCL (ref 0.6–4.6)
LYMPHOCYTES NFR BLD AUTO: 29.5 %
MCH RBC QN AUTO: 27.5 PG (ref 27–31)
MCHC RBC AUTO-ENTMCNC: 33.3 GM/DL (ref 33–36)
MCV RBC AUTO: 82.4 FL (ref 80–94)
MONOCYTES # BLD AUTO: 0.9 X10(3)/MCL (ref 0.1–1.3)
MONOCYTES NFR BLD AUTO: 9 %
NEUTROPHILS # BLD AUTO: 5.9 X10(3)/MCL (ref 2.1–9.2)
NEUTROPHILS NFR BLD AUTO: 56.5 %
PLATELET # BLD AUTO: 190 X10(3)/MCL (ref 130–400)
PMV BLD AUTO: 10.4 FL (ref 9.4–12.4)
POTASSIUM SERPL-SCNC: 4.3 MMOL/L (ref 3.5–5.1)
PROT SERPL-MCNC: 6.7 GM/DL (ref 5.8–7.6)
RBC # BLD AUTO: 5.46 X10(6)/MCL (ref 4.7–6.1)
SODIUM SERPL-SCNC: 139 MMOL/L (ref 136–145)
WBC # SPEC AUTO: 10.4 X10(3)/MCL (ref 4.5–11.5)

## 2021-07-22 ENCOUNTER — HISTORICAL (OUTPATIENT)
Dept: INFUSION THERAPY | Facility: HOSPITAL | Age: 61
End: 2021-07-22

## 2021-07-22 LAB
ABS NEUT (OLG): 5.95 X10(3)/MCL (ref 2.1–9.2)
ALBUMIN SERPL-MCNC: 3.3 GM/DL (ref 3.4–4.8)
ALP SERPL-CCNC: 117 UNIT/L (ref 40–150)
ALT SERPL-CCNC: 66 UNIT/L (ref 0–55)
ANION GAP SERPL CALC-SCNC: 19 MMOL/L
AST SERPL-CCNC: 36 UNIT/L (ref 5–34)
BASOPHILS # BLD AUTO: 0 X10(3)/MCL (ref 0–0.2)
BASOPHILS NFR BLD AUTO: 0.5 %
BILIRUB SERPL-MCNC: 0.6 MG/DL
BILIRUBIN DIRECT+TOT PNL SERPL-MCNC: 0.2 MG/DL (ref 0–0.5)
BILIRUBIN DIRECT+TOT PNL SERPL-MCNC: 0.4 MG/DL (ref 0–0.8)
BUN SERPL-MCNC: 26 MG/DL (ref 8–26)
CHLORIDE SERPL-SCNC: 104 MMOL/L (ref 98–109)
CREAT SERPL-MCNC: 1.8 MG/DL (ref 0.6–1.3)
EOSINOPHIL # BLD AUTO: 0.4 X10(3)/MCL (ref 0–0.9)
EOSINOPHIL NFR BLD AUTO: 3.8 %
ERYTHROCYTE [DISTWIDTH] IN BLOOD BY AUTOMATED COUNT: 15.1 % (ref 11.5–17)
GLUCOSE SERPL-MCNC: 80 MG/DL (ref 70–105)
HCT VFR BLD AUTO: 46.2 % (ref 42–52)
HCT VFR BLD CALC: 46 % (ref 38–51)
HGB BLD-MCNC: 15.1 GM/DL (ref 14–18)
HGB BLD-MCNC: 15.6 MG/DL (ref 12–17)
LIVER PROFILE INTERP: ABNORMAL
LYMPHOCYTES # BLD AUTO: 2.6 X10(3)/MCL (ref 0.6–4.6)
LYMPHOCYTES NFR BLD AUTO: 25.7 %
MCH RBC QN AUTO: 27.1 PG (ref 27–31)
MCHC RBC AUTO-ENTMCNC: 32.7 GM/DL (ref 33–36)
MCV RBC AUTO: 82.8 FL (ref 80–94)
MONOCYTES # BLD AUTO: 1 X10(3)/MCL (ref 0.1–1.3)
MONOCYTES NFR BLD AUTO: 9.9 %
NEUTROPHILS # BLD AUTO: 6 X10(3)/MCL (ref 2.1–9.2)
NEUTROPHILS NFR BLD AUTO: 59.9 %
PLATELET # BLD AUTO: 164 X10(3)/MCL (ref 130–400)
PMV BLD AUTO: 10.4 FL (ref 9.4–12.4)
POC IONIZED CALCIUM: 1.25 MMOL/L (ref 1.12–1.32)
POC TCO2: 25 MMOL/L (ref 24–29)
POTASSIUM BLD-SCNC: 3.9 MMOL/L (ref 3.5–4.9)
PROT SERPL-MCNC: 6.7 GM/DL (ref 5.8–7.6)
RBC # BLD AUTO: 5.58 X10(6)/MCL (ref 4.7–6.1)
SODIUM BLD-SCNC: 143 MMOL/L (ref 138–146)
TSH SERPL-ACNC: 1.02 UIU/ML (ref 0.35–4.94)
WBC # SPEC AUTO: 9.9 X10(3)/MCL (ref 4.5–11.5)

## 2021-08-11 ENCOUNTER — HISTORICAL (OUTPATIENT)
Dept: ADMINISTRATIVE | Facility: HOSPITAL | Age: 61
End: 2021-08-11

## 2021-08-11 LAB
ABS NEUT (OLG): 6.91 X10(3)/MCL (ref 2.1–9.2)
ALBUMIN SERPL-MCNC: 3.6 GM/DL (ref 3.4–4.8)
ALP SERPL-CCNC: 120 UNIT/L (ref 40–150)
ALT SERPL-CCNC: 49 UNIT/L (ref 0–55)
ANION GAP SERPL CALC-SCNC: 19 MMOL/L
AST SERPL-CCNC: 31 UNIT/L (ref 5–34)
BASOPHILS # BLD AUTO: 0 X10(3)/MCL (ref 0–0.2)
BASOPHILS NFR BLD AUTO: 0.5 %
BILIRUB SERPL-MCNC: 0.6 MG/DL
BILIRUBIN DIRECT+TOT PNL SERPL-MCNC: 0.2 MG/DL (ref 0–0.5)
BILIRUBIN DIRECT+TOT PNL SERPL-MCNC: 0.4 MG/DL (ref 0–0.8)
BUN SERPL-MCNC: 22 MG/DL (ref 8–26)
CHLORIDE SERPL-SCNC: 101 MMOL/L (ref 98–109)
CREAT SERPL-MCNC: 2 MG/DL (ref 0.6–1.3)
EOSINOPHIL # BLD AUTO: 0.3 X10(3)/MCL (ref 0–0.9)
EOSINOPHIL NFR BLD AUTO: 2.7 %
ERYTHROCYTE [DISTWIDTH] IN BLOOD BY AUTOMATED COUNT: 14.6 % (ref 11.5–17)
GLUCOSE SERPL-MCNC: 113 MG/DL (ref 70–105)
HCT VFR BLD AUTO: 48.4 % (ref 42–52)
HCT VFR BLD CALC: 48 % (ref 38–51)
HGB BLD-MCNC: 15.4 GM/DL (ref 14–18)
HGB BLD-MCNC: 16.3 MG/DL (ref 12–17)
LIVER PROFILE INTERP: NORMAL
LYMPHOCYTES # BLD AUTO: 2.4 X10(3)/MCL (ref 0.6–4.6)
LYMPHOCYTES NFR BLD AUTO: 22.1 %
MCH RBC QN AUTO: 27.5 PG (ref 27–31)
MCHC RBC AUTO-ENTMCNC: 31.8 GM/DL (ref 33–36)
MCV RBC AUTO: 86.4 FL (ref 80–94)
MONOCYTES # BLD AUTO: 1 X10(3)/MCL (ref 0.1–1.3)
MONOCYTES NFR BLD AUTO: 9.5 %
NEUTROPHILS # BLD AUTO: 6.9 X10(3)/MCL (ref 2.1–9.2)
NEUTROPHILS NFR BLD AUTO: 64.9 %
PLATELET # BLD AUTO: 171 X10(3)/MCL (ref 130–400)
PMV BLD AUTO: 10.6 FL (ref 9.4–12.4)
POC IONIZED CALCIUM: 1.15 MMOL/L (ref 1.12–1.32)
POC TCO2: 25 MMOL/L (ref 24–29)
POTASSIUM BLD-SCNC: 4.1 MMOL/L (ref 3.5–4.9)
PROT SERPL-MCNC: 6.7 GM/DL (ref 5.8–7.6)
RBC # BLD AUTO: 5.6 X10(6)/MCL (ref 4.7–6.1)
SODIUM BLD-SCNC: 140 MMOL/L (ref 138–146)
TSH SERPL-ACNC: 1.56 UIU/ML (ref 0.35–4.94)
WBC # SPEC AUTO: 10.6 X10(3)/MCL (ref 4.5–11.5)

## 2021-08-12 ENCOUNTER — HISTORICAL (OUTPATIENT)
Dept: INFUSION THERAPY | Facility: HOSPITAL | Age: 61
End: 2021-08-12

## 2021-09-02 ENCOUNTER — HISTORICAL (OUTPATIENT)
Dept: INFUSION THERAPY | Facility: HOSPITAL | Age: 61
End: 2021-09-02

## 2021-09-02 LAB
ABS NEUT (OLG): 6.13 X10(3)/MCL (ref 2.1–9.2)
ALBUMIN SERPL-MCNC: 3.7 GM/DL (ref 3.4–4.8)
ALP SERPL-CCNC: 136 UNIT/L (ref 40–150)
ALT SERPL-CCNC: 25 UNIT/L (ref 0–55)
ANION GAP SERPL CALC-SCNC: 18 MMOL/L
AST SERPL-CCNC: 21 UNIT/L (ref 5–34)
BASOPHILS # BLD AUTO: 0.1 X10(3)/MCL (ref 0–0.2)
BASOPHILS NFR BLD AUTO: 0.7 %
BILIRUB SERPL-MCNC: 0.5 MG/DL
BILIRUBIN DIRECT+TOT PNL SERPL-MCNC: 0.2 MG/DL (ref 0–0.5)
BILIRUBIN DIRECT+TOT PNL SERPL-MCNC: 0.3 MG/DL (ref 0–0.8)
BUN SERPL-MCNC: 30 MG/DL (ref 8–26)
CHLORIDE SERPL-SCNC: 104 MMOL/L (ref 98–109)
CREAT SERPL-MCNC: 2.3 MG/DL (ref 0.6–1.3)
EOSINOPHIL # BLD AUTO: 0.4 X10(3)/MCL (ref 0–0.9)
EOSINOPHIL NFR BLD AUTO: 4.3 %
ERYTHROCYTE [DISTWIDTH] IN BLOOD BY AUTOMATED COUNT: 14.3 % (ref 11.5–17)
GLUCOSE SERPL-MCNC: 138 MG/DL (ref 70–105)
HCT VFR BLD AUTO: 45.2 % (ref 42–52)
HCT VFR BLD CALC: 44 % (ref 38–51)
HGB BLD-MCNC: 14.4 GM/DL (ref 14–18)
HGB BLD-MCNC: 15 MG/DL (ref 12–17)
LIVER PROFILE INTERP: NORMAL
LYMPHOCYTES # BLD AUTO: 2.5 X10(3)/MCL (ref 0.6–4.6)
LYMPHOCYTES NFR BLD AUTO: 24.5 %
MCH RBC QN AUTO: 27.9 PG (ref 27–31)
MCHC RBC AUTO-ENTMCNC: 31.9 GM/DL (ref 33–36)
MCV RBC AUTO: 87.4 FL (ref 80–94)
MONOCYTES # BLD AUTO: 0.9 X10(3)/MCL (ref 0.1–1.3)
MONOCYTES NFR BLD AUTO: 9.3 %
NEUTROPHILS # BLD AUTO: 6.1 X10(3)/MCL (ref 2.1–9.2)
NEUTROPHILS NFR BLD AUTO: 60.4 %
PLATELET # BLD AUTO: 195 X10(3)/MCL (ref 130–400)
PMV BLD AUTO: 10.3 FL (ref 9.4–12.4)
POC IONIZED CALCIUM: 1.18 MMOL/L (ref 1.12–1.32)
POC TCO2: 25 MMOL/L (ref 24–29)
POTASSIUM BLD-SCNC: 4.3 MMOL/L (ref 3.5–4.9)
PROT SERPL-MCNC: 7.3 GM/DL (ref 5.8–7.6)
RBC # BLD AUTO: 5.17 X10(6)/MCL (ref 4.7–6.1)
SODIUM BLD-SCNC: 141 MMOL/L (ref 138–146)
TSH SERPL-ACNC: 1.46 UIU/ML (ref 0.35–4.94)
WBC # SPEC AUTO: 10.2 X10(3)/MCL (ref 4.5–11.5)

## 2021-09-22 ENCOUNTER — HISTORICAL (OUTPATIENT)
Dept: ADMINISTRATIVE | Facility: HOSPITAL | Age: 61
End: 2021-09-22

## 2021-09-22 LAB
ABS NEUT (OLG): 7.35 X10(3)/MCL (ref 2.1–9.2)
ALBUMIN SERPL-MCNC: 4.1 GM/DL (ref 3.4–4.8)
ALP SERPL-CCNC: 178 UNIT/L (ref 40–150)
ALT SERPL-CCNC: 32 UNIT/L (ref 0–55)
ANION GAP SERPL CALC-SCNC: 17 MMOL/L
AST SERPL-CCNC: 30 UNIT/L (ref 5–34)
BASOPHILS # BLD AUTO: 0.1 X10(3)/MCL (ref 0–0.2)
BASOPHILS NFR BLD AUTO: 0.5 %
BILIRUB SERPL-MCNC: 0.6 MG/DL
BILIRUBIN DIRECT+TOT PNL SERPL-MCNC: 0.2 MG/DL (ref 0–0.5)
BILIRUBIN DIRECT+TOT PNL SERPL-MCNC: 0.4 MG/DL (ref 0–0.8)
BUN SERPL-MCNC: 29 MG/DL (ref 8–26)
CHLORIDE SERPL-SCNC: 103 MMOL/L (ref 98–109)
CREAT SERPL-MCNC: 2.1 MG/DL (ref 0.6–1.3)
EOSINOPHIL # BLD AUTO: 0.5 X10(3)/MCL (ref 0–0.9)
EOSINOPHIL NFR BLD AUTO: 4.8 %
ERYTHROCYTE [DISTWIDTH] IN BLOOD BY AUTOMATED COUNT: 13.2 % (ref 11.5–17)
GLUCOSE SERPL-MCNC: 121 MG/DL (ref 70–105)
HCT VFR BLD AUTO: 49.5 % (ref 42–52)
HCT VFR BLD CALC: 51 % (ref 38–51)
HGB BLD-MCNC: 15.7 GM/DL (ref 14–18)
HGB BLD-MCNC: 17.3 MG/DL (ref 12–17)
LIVER PROFILE INTERP: ABNORMAL
LYMPHOCYTES # BLD AUTO: 2.3 X10(3)/MCL (ref 0.6–4.6)
LYMPHOCYTES NFR BLD AUTO: 20.6 %
MCH RBC QN AUTO: 27.5 PG (ref 27–31)
MCHC RBC AUTO-ENTMCNC: 31.7 GM/DL (ref 33–36)
MCV RBC AUTO: 86.8 FL (ref 80–94)
MONOCYTES # BLD AUTO: 0.9 X10(3)/MCL (ref 0.1–1.3)
MONOCYTES NFR BLD AUTO: 8.1 %
NEUTROPHILS # BLD AUTO: 7.4 X10(3)/MCL (ref 2.1–9.2)
NEUTROPHILS NFR BLD AUTO: 65.4 %
PLATELET # BLD AUTO: 195 X10(3)/MCL (ref 130–400)
PMV BLD AUTO: 10.2 FL (ref 9.4–12.4)
POC IONIZED CALCIUM: 1.22 MMOL/L (ref 1.12–1.32)
POC TCO2: 26 MMOL/L (ref 24–29)
POTASSIUM BLD-SCNC: 4.4 MMOL/L (ref 3.5–4.9)
PROT SERPL-MCNC: 7.9 GM/DL (ref 5.8–7.6)
RBC # BLD AUTO: 5.7 X10(6)/MCL (ref 4.7–6.1)
SODIUM BLD-SCNC: 141 MMOL/L (ref 138–146)
TSH SERPL-ACNC: 2.21 UIU/ML (ref 0.35–4.94)
WBC # SPEC AUTO: 11.2 X10(3)/MCL (ref 4.5–11.5)

## 2021-09-23 ENCOUNTER — HISTORICAL (OUTPATIENT)
Dept: INFUSION THERAPY | Facility: HOSPITAL | Age: 61
End: 2021-09-23

## 2021-10-14 ENCOUNTER — HISTORICAL (OUTPATIENT)
Dept: INFUSION THERAPY | Facility: HOSPITAL | Age: 61
End: 2021-10-14

## 2021-10-14 LAB
ABS NEUT (OLG): 7.87 X10(3)/MCL (ref 2.1–9.2)
ALBUMIN SERPL-MCNC: 3.8 GM/DL (ref 3.4–4.8)
ALP SERPL-CCNC: 145 UNIT/L (ref 40–150)
ALT SERPL-CCNC: 28 UNIT/L (ref 0–55)
ANION GAP SERPL CALC-SCNC: 19 MMOL/L
AST SERPL-CCNC: 27 UNIT/L (ref 5–34)
BASOPHILS # BLD AUTO: 0 X10(3)/MCL (ref 0–0.2)
BASOPHILS NFR BLD AUTO: 0.4 %
BILIRUB SERPL-MCNC: 0.5 MG/DL
BILIRUBIN DIRECT+TOT PNL SERPL-MCNC: 0.2 MG/DL (ref 0–0.5)
BILIRUBIN DIRECT+TOT PNL SERPL-MCNC: 0.3 MG/DL (ref 0–0.8)
BUN SERPL-MCNC: 23 MG/DL (ref 8–26)
CHLORIDE SERPL-SCNC: 103 MMOL/L (ref 98–109)
CREAT SERPL-MCNC: 1.9 MG/DL (ref 0.6–1.3)
EOSINOPHIL # BLD AUTO: 0.4 X10(3)/MCL (ref 0–0.9)
EOSINOPHIL NFR BLD AUTO: 3 %
ERYTHROCYTE [DISTWIDTH] IN BLOOD BY AUTOMATED COUNT: 13.8 % (ref 11.5–17)
GLUCOSE SERPL-MCNC: 128 MG/DL (ref 70–105)
HCT VFR BLD AUTO: 44.6 % (ref 42–52)
HCT VFR BLD CALC: 45 % (ref 38–51)
HGB BLD-MCNC: 14.7 GM/DL (ref 14–18)
HGB BLD-MCNC: 15.3 MG/DL (ref 12–17)
LIVER PROFILE INTERP: NORMAL
LYMPHOCYTES # BLD AUTO: 3.2 X10(3)/MCL (ref 0.6–4.6)
LYMPHOCYTES NFR BLD AUTO: 26 %
MCH RBC QN AUTO: 27.6 PG (ref 27–31)
MCHC RBC AUTO-ENTMCNC: 33 GM/DL (ref 33–36)
MCV RBC AUTO: 83.7 FL (ref 80–94)
MONOCYTES # BLD AUTO: 0.9 X10(3)/MCL (ref 0.1–1.3)
MONOCYTES NFR BLD AUTO: 7.1 %
NEUTROPHILS # BLD AUTO: 7.9 X10(3)/MCL (ref 2.1–9.2)
NEUTROPHILS NFR BLD AUTO: 63 %
PLATELET # BLD AUTO: 213 X10(3)/MCL (ref 130–400)
PMV BLD AUTO: 9.9 FL (ref 9.4–12.4)
POC IONIZED CALCIUM: 1.16 MMOL/L (ref 1.12–1.32)
POC TCO2: 23 MMOL/L (ref 24–29)
POTASSIUM BLD-SCNC: 3.9 MMOL/L (ref 3.5–4.9)
PROT SERPL-MCNC: 7.2 GM/DL (ref 5.8–7.6)
RBC # BLD AUTO: 5.33 X10(6)/MCL (ref 4.7–6.1)
SODIUM BLD-SCNC: 140 MMOL/L (ref 138–146)
TSH SERPL-ACNC: 1.68 UIU/ML (ref 0.35–4.94)
WBC # SPEC AUTO: 12.5 X10(3)/MCL (ref 4.5–11.5)

## 2021-11-04 ENCOUNTER — HISTORICAL (OUTPATIENT)
Dept: INFUSION THERAPY | Facility: HOSPITAL | Age: 61
End: 2021-11-04

## 2021-11-04 LAB
ABS NEUT (OLG): 5.38 X10(3)/MCL (ref 2.1–9.2)
ALBUMIN SERPL-MCNC: 3.5 GM/DL (ref 3.4–4.8)
ALP SERPL-CCNC: 148 UNIT/L (ref 40–150)
ALT SERPL-CCNC: 31 UNIT/L (ref 0–55)
ANION GAP SERPL CALC-SCNC: 17 MMOL/L
AST SERPL-CCNC: 26 UNIT/L (ref 5–34)
BASOPHILS # BLD AUTO: 0.1 X10(3)/MCL (ref 0–0.2)
BASOPHILS NFR BLD AUTO: 0.6 %
BILIRUB SERPL-MCNC: 0.4 MG/DL
BILIRUBIN DIRECT+TOT PNL SERPL-MCNC: 0.1 MG/DL (ref 0–0.5)
BILIRUBIN DIRECT+TOT PNL SERPL-MCNC: 0.3 MG/DL (ref 0–0.8)
BUN SERPL-MCNC: 31 MG/DL (ref 8–26)
CHLORIDE SERPL-SCNC: 105 MMOL/L (ref 98–109)
CREAT SERPL-MCNC: 2.1 MG/DL (ref 0.6–1.3)
EOSINOPHIL # BLD AUTO: 0.5 X10(3)/MCL (ref 0–0.9)
EOSINOPHIL NFR BLD AUTO: 5 %
ERYTHROCYTE [DISTWIDTH] IN BLOOD BY AUTOMATED COUNT: 14.3 % (ref 11.5–17)
GLUCOSE SERPL-MCNC: 88 MG/DL (ref 70–105)
HCT VFR BLD AUTO: 44.7 % (ref 42–52)
HCT VFR BLD CALC: 44 % (ref 38–51)
HGB BLD-MCNC: 14.4 GM/DL (ref 14–18)
HGB BLD-MCNC: 15 MG/DL (ref 12–17)
LIVER PROFILE INTERP: NORMAL
LYMPHOCYTES # BLD AUTO: 3.2 X10(3)/MCL (ref 0.6–4.6)
LYMPHOCYTES NFR BLD AUTO: 31.8 %
MCH RBC QN AUTO: 27.3 PG (ref 27–31)
MCHC RBC AUTO-ENTMCNC: 32.2 GM/DL (ref 33–36)
MCV RBC AUTO: 84.8 FL (ref 80–94)
MONOCYTES # BLD AUTO: 0.8 X10(3)/MCL (ref 0.1–1.3)
MONOCYTES NFR BLD AUTO: 8.5 %
NEUTROPHILS # BLD AUTO: 5.4 X10(3)/MCL (ref 2.1–9.2)
NEUTROPHILS NFR BLD AUTO: 53.7 %
PLATELET # BLD AUTO: 170 X10(3)/MCL (ref 130–400)
PMV BLD AUTO: 10.1 FL (ref 9.4–12.4)
POC IONIZED CALCIUM: 1.19 MMOL/L (ref 1.12–1.32)
POC TCO2: 24 MMOL/L (ref 24–29)
POTASSIUM BLD-SCNC: 4 MMOL/L (ref 3.5–4.9)
PROT SERPL-MCNC: 7 GM/DL (ref 5.8–7.6)
RBC # BLD AUTO: 5.27 X10(6)/MCL (ref 4.7–6.1)
SODIUM BLD-SCNC: 141 MMOL/L (ref 138–146)
TSH SERPL-ACNC: 1.42 UIU/ML (ref 0.35–4.94)
WBC # SPEC AUTO: 10 X10(3)/MCL (ref 4.5–11.5)

## 2021-11-29 ENCOUNTER — HISTORICAL (OUTPATIENT)
Dept: INFUSION THERAPY | Facility: HOSPITAL | Age: 61
End: 2021-11-29

## 2021-11-29 LAB
ABS NEUT (OLG): 6.6 X10(3)/MCL (ref 2.1–9.2)
ALBUMIN SERPL-MCNC: 4 GM/DL (ref 3.4–4.8)
ALP SERPL-CCNC: 186 UNIT/L (ref 40–150)
ALT SERPL-CCNC: 31 UNIT/L (ref 0–55)
ANION GAP SERPL CALC-SCNC: 17 MMOL/L
AST SERPL-CCNC: 28 UNIT/L (ref 5–34)
BASOPHILS # BLD AUTO: 0.1 X10(3)/MCL (ref 0–0.2)
BASOPHILS NFR BLD AUTO: 0.5 %
BILIRUB SERPL-MCNC: 0.7 MG/DL
BILIRUBIN DIRECT+TOT PNL SERPL-MCNC: 0.2 MG/DL (ref 0–0.5)
BILIRUBIN DIRECT+TOT PNL SERPL-MCNC: 0.5 MG/DL (ref 0–0.8)
BUN SERPL-MCNC: 28 MG/DL (ref 8–26)
CHLORIDE SERPL-SCNC: 106 MMOL/L (ref 98–109)
CREAT SERPL-MCNC: 2.3 MG/DL (ref 0.6–1.3)
EOSINOPHIL # BLD AUTO: 0.4 X10(3)/MCL (ref 0–0.9)
EOSINOPHIL NFR BLD AUTO: 3.2 %
ERYTHROCYTE [DISTWIDTH] IN BLOOD BY AUTOMATED COUNT: 14.5 % (ref 11.5–17)
GLUCOSE SERPL-MCNC: 82 MG/DL (ref 70–105)
HCT VFR BLD AUTO: 48.8 % (ref 42–52)
HCT VFR BLD CALC: 48 % (ref 38–51)
HGB BLD-MCNC: 15.9 GM/DL (ref 14–18)
HGB BLD-MCNC: 16.3 MG/DL (ref 12–17)
LIVER PROFILE INTERP: ABNORMAL
LYMPHOCYTES # BLD AUTO: 2.9 X10(3)/MCL (ref 0.6–4.6)
LYMPHOCYTES NFR BLD AUTO: 25.9 %
MCH RBC QN AUTO: 27.4 PG (ref 27–31)
MCHC RBC AUTO-ENTMCNC: 32.6 GM/DL (ref 33–36)
MCV RBC AUTO: 84.1 FL (ref 80–94)
MONOCYTES # BLD AUTO: 1.2 X10(3)/MCL (ref 0.1–1.3)
MONOCYTES NFR BLD AUTO: 10.5 %
NEUTROPHILS # BLD AUTO: 6.6 X10(3)/MCL (ref 2.1–9.2)
NEUTROPHILS NFR BLD AUTO: 59.6 %
PLATELET # BLD AUTO: 214 X10(3)/MCL (ref 130–400)
PMV BLD AUTO: 10.3 FL (ref 9.4–12.4)
POC IONIZED CALCIUM: 1.17 MMOL/L (ref 1.12–1.32)
POC TCO2: 24 MMOL/L (ref 24–29)
POTASSIUM BLD-SCNC: 3.7 MMOL/L (ref 3.5–4.9)
PROT SERPL-MCNC: 7.4 GM/DL (ref 5.8–7.6)
RBC # BLD AUTO: 5.8 X10(6)/MCL (ref 4.7–6.1)
SODIUM BLD-SCNC: 142 MMOL/L (ref 138–146)
TSH SERPL-ACNC: 1.67 UIU/ML (ref 0.35–4.94)
WBC # SPEC AUTO: 11.1 X10(3)/MCL (ref 4.5–11.5)

## 2021-12-15 ENCOUNTER — HISTORICAL (OUTPATIENT)
Dept: ADMINISTRATIVE | Facility: HOSPITAL | Age: 61
End: 2021-12-15

## 2021-12-15 LAB
ABS NEUT (OLG): 5.31 X10(3)/MCL (ref 2.1–9.2)
ALBUMIN SERPL-MCNC: 3.7 GM/DL (ref 3.4–4.8)
ALP SERPL-CCNC: 269 UNIT/L (ref 40–150)
ALT SERPL-CCNC: 56 UNIT/L (ref 0–55)
ANION GAP SERPL CALC-SCNC: 16 MMOL/L
AST SERPL-CCNC: 49 UNIT/L (ref 5–34)
BASOPHILS # BLD AUTO: 0 X10(3)/MCL (ref 0–0.2)
BASOPHILS NFR BLD AUTO: 0.4 %
BILIRUB SERPL-MCNC: 0.6 MG/DL
BILIRUBIN DIRECT+TOT PNL SERPL-MCNC: 0.3 MG/DL (ref 0–0.5)
BILIRUBIN DIRECT+TOT PNL SERPL-MCNC: 0.3 MG/DL (ref 0–0.8)
BUN SERPL-MCNC: 25 MG/DL (ref 8–26)
CHLORIDE SERPL-SCNC: 103 MMOL/L (ref 98–109)
CREAT SERPL-MCNC: 1.9 MG/DL (ref 0.6–1.3)
EOSINOPHIL # BLD AUTO: 0.4 X10(3)/MCL (ref 0–0.9)
EOSINOPHIL NFR BLD AUTO: 4.2 %
ERYTHROCYTE [DISTWIDTH] IN BLOOD BY AUTOMATED COUNT: 14.4 % (ref 11.5–17)
GLUCOSE SERPL-MCNC: 106 MG/DL (ref 70–105)
HCT VFR BLD AUTO: 46.8 % (ref 42–52)
HCT VFR BLD CALC: 47 % (ref 38–51)
HGB BLD-MCNC: 15 GM/DL (ref 14–18)
HGB BLD-MCNC: 16 MG/DL (ref 12–17)
LIVER PROFILE INTERP: ABNORMAL
LYMPHOCYTES # BLD AUTO: 2.7 X10(3)/MCL (ref 0.6–4.6)
LYMPHOCYTES NFR BLD AUTO: 28.6 %
MCH RBC QN AUTO: 27.4 PG (ref 27–31)
MCHC RBC AUTO-ENTMCNC: 32.1 GM/DL (ref 33–36)
MCV RBC AUTO: 85.4 FL (ref 80–94)
MONOCYTES # BLD AUTO: 1 X10(3)/MCL (ref 0.1–1.3)
MONOCYTES NFR BLD AUTO: 10.2 %
NEUTROPHILS # BLD AUTO: 5.3 X10(3)/MCL (ref 2.1–9.2)
NEUTROPHILS NFR BLD AUTO: 56.4 %
PLATELET # BLD AUTO: 181 X10(3)/MCL (ref 130–400)
PMV BLD AUTO: 10.6 FL (ref 9.4–12.4)
POC IONIZED CALCIUM: 1.18 MMOL/L (ref 1.12–1.32)
POC TCO2: 27 MMOL/L (ref 24–29)
POTASSIUM BLD-SCNC: 4 MMOL/L (ref 3.5–4.9)
PROT SERPL-MCNC: 7 GM/DL (ref 5.8–7.6)
RBC # BLD AUTO: 5.48 X10(6)/MCL (ref 4.7–6.1)
SODIUM BLD-SCNC: 141 MMOL/L (ref 138–146)
TSH SERPL-ACNC: 1.26 UIU/ML (ref 0.35–4.94)
WBC # SPEC AUTO: 9.4 X10(3)/MCL (ref 4.5–11.5)

## 2021-12-20 ENCOUNTER — HISTORICAL (OUTPATIENT)
Dept: INFUSION THERAPY | Facility: HOSPITAL | Age: 61
End: 2021-12-20

## 2022-01-10 ENCOUNTER — HISTORICAL (OUTPATIENT)
Dept: INFUSION THERAPY | Facility: HOSPITAL | Age: 62
End: 2022-01-10

## 2022-01-10 LAB
ABS NEUT (OLG): 5.04 X10(3)/MCL (ref 2.1–9.2)
ALBUMIN SERPL-MCNC: 3.6 GM/DL (ref 3.4–4.8)
ALP SERPL-CCNC: 563 UNIT/L (ref 40–150)
ALT SERPL-CCNC: 132 UNIT/L (ref 0–55)
ANION GAP SERPL CALC-SCNC: 15 MMOL/L
AST SERPL-CCNC: 157 UNIT/L (ref 5–34)
BASOPHILS # BLD AUTO: 0 X10(3)/MCL (ref 0–0.2)
BASOPHILS NFR BLD AUTO: 0.6 %
BILIRUB SERPL-MCNC: 0.6 MG/DL
BILIRUBIN DIRECT+TOT PNL SERPL-MCNC: 0.2 MG/DL (ref 0–0.8)
BILIRUBIN DIRECT+TOT PNL SERPL-MCNC: 0.4 MG/DL (ref 0–0.5)
BUN SERPL-MCNC: 27 MG/DL (ref 8–26)
CHLORIDE SERPL-SCNC: 103 MMOL/L (ref 98–109)
CREAT SERPL-MCNC: 2 MG/DL (ref 0.6–1.3)
EOSINOPHIL # BLD AUTO: 0.4 X10(3)/MCL (ref 0–0.9)
EOSINOPHIL NFR BLD AUTO: 4.1 %
ERYTHROCYTE [DISTWIDTH] IN BLOOD BY AUTOMATED COUNT: 14.5 % (ref 11.5–17)
EST CREAT CLEARANCE SER (OHS): 39.65 ML/MIN
GLUCOSE SERPL-MCNC: 101 MG/DL (ref 70–105)
HCT VFR BLD AUTO: 48.4 % (ref 42–52)
HCT VFR BLD CALC: 49 % (ref 38–51)
HGB BLD-MCNC: 15.5 GM/DL (ref 14–18)
HGB BLD-MCNC: 16.7 MG/DL (ref 12–17)
LIVER PROFILE INTERP: ABNORMAL
LYMPHOCYTES # BLD AUTO: 2.3 X10(3)/MCL (ref 0.6–4.6)
LYMPHOCYTES NFR BLD AUTO: 25.6 %
MCH RBC QN AUTO: 27.1 PG (ref 27–31)
MCHC RBC AUTO-ENTMCNC: 32 GM/DL (ref 33–36)
MCV RBC AUTO: 84.6 FL (ref 80–94)
MONOCYTES # BLD AUTO: 1.1 X10(3)/MCL (ref 0.1–1.3)
MONOCYTES NFR BLD AUTO: 12.6 %
NEUTROPHILS # BLD AUTO: 5 X10(3)/MCL (ref 2.1–9.2)
NEUTROPHILS NFR BLD AUTO: 56.8 %
PLATELET # BLD AUTO: 186 X10(3)/MCL (ref 130–400)
PMV BLD AUTO: 10.9 FL (ref 9.4–12.4)
POC IONIZED CALCIUM: 1.22 MMOL/L (ref 1.12–1.32)
POC TCO2: 27 MMOL/L (ref 24–29)
POTASSIUM BLD-SCNC: 4 MMOL/L (ref 3.5–4.9)
PROT SERPL-MCNC: 7.3 GM/DL (ref 5.8–7.6)
RBC # BLD AUTO: 5.72 X10(6)/MCL (ref 4.7–6.1)
SODIUM BLD-SCNC: 140 MMOL/L (ref 138–146)
TSH SERPL-ACNC: 1.54 UIU/ML (ref 0.35–4.94)
WBC # SPEC AUTO: 8.9 X10(3)/MCL (ref 4.5–11.5)

## 2022-01-31 ENCOUNTER — HISTORICAL (OUTPATIENT)
Dept: ADMINISTRATIVE | Facility: HOSPITAL | Age: 62
End: 2022-01-31

## 2022-01-31 LAB
ABS NEUT (OLG): 5.8 (ref 2.1–9.2)
ANION GAP SERPL CALC-SCNC: 15 MMOL/L
BASOPHILS # BLD AUTO: 0 10*3/UL (ref 0–0.2)
BASOPHILS NFR BLD AUTO: 0.4 %
BUN SERPL-MCNC: 33 MG/DL (ref 8–26)
CHLORIDE SERPL-SCNC: 102 MMOL/L (ref 98–109)
CREAT SERPL-MCNC: 1.9 MG/DL (ref 0.6–1.3)
EOSINOPHIL # BLD AUTO: 0.4 10*3/UL (ref 0–0.9)
EOSINOPHIL NFR BLD AUTO: 4.9 %
ERYTHROCYTE [DISTWIDTH] IN BLOOD BY AUTOMATED COUNT: 15 % (ref 11.5–17)
GLUCOSE SERPL-MCNC: 129 MG/DL (ref 70–105)
HCT VFR BLD AUTO: 46.4 % (ref 42–52)
HCT VFR BLD CALC: 48 % (ref 38–51)
HGB BLD-MCNC: 14.3 G/DL (ref 14–18)
HGB BLD-MCNC: 16.3 G/DL (ref 12–17)
LYMPHOCYTES # BLD AUTO: 1.5 10*3/UL (ref 0.6–4.6)
LYMPHOCYTES NFR BLD AUTO: 16.5 %
MANUAL DIFF? (OHS): NO
MCH RBC QN AUTO: 27 PG (ref 27–31)
MCHC RBC AUTO-ENTMCNC: 30.8 G/DL (ref 33–36)
MCV RBC AUTO: 87.5 FL (ref 80–94)
MONOCYTES # BLD AUTO: 1.1 10*3/UL (ref 0.1–1.3)
MONOCYTES NFR BLD AUTO: 12.3 %
NEUTROPHILS # BLD AUTO: 5.8 10*3/UL (ref 2.1–9.2)
NEUTROPHILS NFR BLD AUTO: 64.9 %
PLATELET # BLD AUTO: 210 10*3/UL (ref 130–400)
PMV BLD AUTO: 10.8 FL (ref 9.4–12.4)
POC IONIZED CALCIUM: 1.18 (ref 1.12–1.32)
POC TCO2: 27 (ref 24–29)
POTASSIUM BLD-SCNC: 4.7 MMOL/L (ref 3.5–4.9)
RBC # BLD AUTO: 5.3 10*6/UL (ref 4.7–6.1)
SODIUM BLD-SCNC: 138 MMOL/L (ref 138–146)
TSH SERPL-ACNC: 1.94 M[IU]/L (ref 0.35–4.94)
WBC # SPEC AUTO: 9 10*3/UL (ref 4.5–11.5)

## 2022-02-22 ENCOUNTER — HISTORICAL (OUTPATIENT)
Dept: ADMINISTRATIVE | Facility: HOSPITAL | Age: 62
End: 2022-02-22

## 2022-02-22 LAB
ABS NEUT (OLG): 12.46 (ref 2.1–9.2)
ALBUMIN SERPL-MCNC: 3.2 G/DL (ref 3.4–4.8)
ALBUMIN/GLOB SERPL: 0.9 {RATIO} (ref 1.1–2)
ALP SERPL-CCNC: 364 U/L (ref 40–150)
ALT SERPL-CCNC: 78 U/L (ref 0–55)
AST SERPL-CCNC: 33 U/L (ref 5–34)
BASOPHILS # BLD AUTO: 0 10*3/UL (ref 0–0.2)
BASOPHILS NFR BLD AUTO: 0.1 %
BILIRUB SERPL-MCNC: 0.8 MG/DL
BILIRUBIN DIRECT+TOT PNL SERPL-MCNC: 0.3 (ref 0–0.8)
BILIRUBIN DIRECT+TOT PNL SERPL-MCNC: 0.5 (ref 0–0.5)
BUN SERPL-MCNC: 39 MG/DL (ref 8.4–25.7)
CALCIUM SERPL-MCNC: 9.1 MG/DL (ref 8.7–10.5)
CHLORIDE SERPL-SCNC: 103 MMOL/L (ref 98–107)
CO2 SERPL-SCNC: 25 MMOL/L (ref 23–31)
CREAT SERPL-MCNC: 2.09 MG/DL (ref 0.73–1.18)
EOSINOPHIL # BLD AUTO: 0 10*3/UL (ref 0–0.9)
EOSINOPHIL NFR BLD AUTO: 0.3 %
ERYTHROCYTE [DISTWIDTH] IN BLOOD BY AUTOMATED COUNT: 14.7 % (ref 11.5–17)
GLOBULIN SER-MCNC: 3.5 G/DL (ref 2.4–3.5)
GLUCOSE SERPL-MCNC: 252 MG/DL (ref 82–115)
HCT VFR BLD AUTO: 40.9 % (ref 42–52)
HEMOLYSIS INTERF INDEX SERPL-ACNC: 5
HGB BLD-MCNC: 13 G/DL (ref 14–18)
ICTERIC INTERF INDEX SERPL-ACNC: 1
LIPEMIC INTERF INDEX SERPL-ACNC: 4
LYMPHOCYTES # BLD AUTO: 1.3 10*3/UL (ref 0.6–4.6)
LYMPHOCYTES NFR BLD AUTO: 8.8 %
MANUAL DIFF? (OHS): NO
MCH RBC QN AUTO: 26.9 PG (ref 27–31)
MCHC RBC AUTO-ENTMCNC: 31.8 G/DL (ref 33–36)
MCV RBC AUTO: 84.7 FL (ref 80–94)
MONOCYTES # BLD AUTO: 0.6 10*3/UL (ref 0.1–1.3)
MONOCYTES NFR BLD AUTO: 3.8 %
NEUTROPHILS # BLD AUTO: 12.5 10*3/UL (ref 2.1–9.2)
NEUTROPHILS NFR BLD AUTO: 84.5 %
PLATELET # BLD AUTO: 205 10*3/UL (ref 130–400)
PMV BLD AUTO: 10.4 FL (ref 9.4–12.4)
POTASSIUM SERPL-SCNC: 4.6 MMOL/L (ref 3.5–5.1)
PROT SERPL-MCNC: 6.7 G/DL (ref 5.8–7.6)
RBC # BLD AUTO: 4.83 10*6/UL (ref 4.7–6.1)
SODIUM SERPL-SCNC: 137 MMOL/L (ref 136–145)
TSH SERPL-ACNC: 1.21 M[IU]/L (ref 0.35–4.94)
WBC # SPEC AUTO: 14.8 10*3/UL (ref 4.5–11.5)

## 2022-03-15 ENCOUNTER — HISTORICAL (OUTPATIENT)
Dept: ADMINISTRATIVE | Facility: HOSPITAL | Age: 62
End: 2022-03-15

## 2022-03-15 LAB
ABS NEUT (OLG): 11.92 (ref 2.1–9.2)
ANION GAP SERPL CALC-SCNC: 14 MMOL/L
BASOPHILS # BLD AUTO: 0 10*3/UL (ref 0–0.2)
BASOPHILS NFR BLD AUTO: 0.1 %
BUN SERPL-MCNC: 36 MG/DL (ref 8–26)
CHLORIDE SERPL-SCNC: 98 MMOL/L (ref 98–109)
CREAT SERPL-MCNC: 2 MG/DL (ref 0.6–1.3)
EOSINOPHIL # BLD AUTO: 0 10*3/UL (ref 0–0.9)
EOSINOPHIL NFR BLD AUTO: 0.1 %
ERYTHROCYTE [DISTWIDTH] IN BLOOD BY AUTOMATED COUNT: 15 % (ref 11.5–17)
GLUCOSE SERPL-MCNC: 375 MG/DL (ref 70–105)
HCT VFR BLD AUTO: 39.4 % (ref 42–52)
HCT VFR BLD CALC: 40 % (ref 38–51)
HGB BLD-MCNC: 13 G/DL (ref 14–18)
HGB BLD-MCNC: 13.6 G/DL (ref 12–17)
LYMPHOCYTES # BLD AUTO: 1.6 10*3/UL (ref 0.6–4.6)
LYMPHOCYTES NFR BLD AUTO: 10.9 %
MANUAL DIFF? (OHS): NO
MCH RBC QN AUTO: 27.7 PG (ref 27–31)
MCHC RBC AUTO-ENTMCNC: 33 G/DL (ref 33–36)
MCV RBC AUTO: 84 FL (ref 80–94)
MONOCYTES # BLD AUTO: 0.7 10*3/UL (ref 0.1–1.3)
MONOCYTES NFR BLD AUTO: 4.7 %
NEUTROPHILS # BLD AUTO: 11.9 10*3/UL (ref 2.1–9.2)
NEUTROPHILS NFR BLD AUTO: 80 %
PLATELET # BLD AUTO: 219 10*3/UL (ref 130–400)
PMV BLD AUTO: 10.4 FL (ref 9.4–12.4)
POC IONIZED CALCIUM: 1.11 (ref 1.12–1.32)
POC TCO2: 28 (ref 24–29)
POTASSIUM BLD-SCNC: 4.1 MMOL/L (ref 3.5–4.9)
RBC # BLD AUTO: 4.69 10*6/UL (ref 4.7–6.1)
SODIUM BLD-SCNC: 135 MMOL/L (ref 138–146)
WBC # SPEC AUTO: 14.9 10*3/UL (ref 4.5–11.5)

## 2022-04-28 ENCOUNTER — HISTORICAL (OUTPATIENT)
Dept: ADMINISTRATIVE | Facility: HOSPITAL | Age: 62
End: 2022-04-28
Payer: COMMERCIAL

## 2022-04-28 DIAGNOSIS — C64.9 RENAL CELL CARCINOMA, UNSPECIFIED LATERALITY: Primary | ICD-10-CM

## 2022-04-28 LAB
ABS NEUT (OLG): 8.64 (ref 2.1–9.2)
ALBUMIN SERPL-MCNC: 3.1 G/DL (ref 3.4–4.8)
ALBUMIN/GLOB SERPL: 0.8 {RATIO} (ref 1.1–2)
ALP SERPL-CCNC: 167 U/L (ref 40–150)
ALT SERPL-CCNC: 44 U/L (ref 0–55)
AST SERPL-CCNC: 36 U/L (ref 5–34)
BASOPHILS # BLD AUTO: 0.1 10*3/UL (ref 0–0.2)
BASOPHILS NFR BLD AUTO: 0.6 %
BILIRUB SERPL-MCNC: 0.8 MG/DL
BILIRUBIN DIRECT+TOT PNL SERPL-MCNC: 0.4 (ref 0–0.5)
BILIRUBIN DIRECT+TOT PNL SERPL-MCNC: 0.4 (ref 0–0.8)
BUN SERPL-MCNC: 35.6 MG/DL (ref 8.4–25.7)
CALCIUM SERPL-MCNC: 9.1 MG/DL (ref 8.7–10.5)
CHLORIDE SERPL-SCNC: 99 MMOL/L (ref 98–107)
CO2 SERPL-SCNC: 31 MMOL/L (ref 23–31)
CREAT SERPL-MCNC: 1.77 MG/DL (ref 0.73–1.18)
EOSINOPHIL # BLD AUTO: 0.2 10*3/UL (ref 0–0.9)
EOSINOPHIL NFR BLD AUTO: 1.9 %
ERYTHROCYTE [DISTWIDTH] IN BLOOD BY AUTOMATED COUNT: 16.9 % (ref 11.5–17)
GLOBULIN SER-MCNC: 3.9 G/DL (ref 2.4–3.5)
GLUCOSE SERPL-MCNC: 141 MG/DL (ref 82–115)
HCT VFR BLD AUTO: 44.9 % (ref 42–52)
HEMOLYSIS INTERF INDEX SERPL-ACNC: 14
HGB BLD-MCNC: 14.7 G/DL (ref 14–18)
ICTERIC INTERF INDEX SERPL-ACNC: 1
LIPEMIC INTERF INDEX SERPL-ACNC: 7
LYMPHOCYTES # BLD AUTO: 2.6 10*3/UL (ref 0.6–4.6)
LYMPHOCYTES NFR BLD AUTO: 21.1 %
MANUAL DIFF? (OHS): NO
MCH RBC QN AUTO: 27.2 PG (ref 27–31)
MCHC RBC AUTO-ENTMCNC: 32.7 G/DL (ref 33–36)
MCV RBC AUTO: 83 FL (ref 80–94)
MONOCYTES # BLD AUTO: 0.9 10*3/UL (ref 0.1–1.3)
MONOCYTES NFR BLD AUTO: 7.2 %
NEUTROPHILS # BLD AUTO: 8.6 10*3/UL (ref 2.1–9.2)
NEUTROPHILS NFR BLD AUTO: 69 %
PLATELET # BLD AUTO: 352 10*3/UL (ref 130–400)
PMV BLD AUTO: 9.4 FL (ref 9.4–12.4)
POTASSIUM SERPL-SCNC: 3.7 MMOL/L (ref 3.5–5.1)
PROT SERPL-MCNC: 7 G/DL (ref 5.8–7.6)
RBC # BLD AUTO: 5.41 10*6/UL (ref 4.7–6.1)
SODIUM SERPL-SCNC: 140 MMOL/L (ref 136–145)
WBC # SPEC AUTO: 12.5 10*3/UL (ref 4.5–11.5)

## 2022-04-30 NOTE — PROGRESS NOTES
Patient:   Ye Olivera            MRN: [id number]            FIN: [id number]               Age:   60 years     Sex:  Male     :  1960   Associated Diagnoses:   Cough; Renal cell carcinoma; Anemia; Secondary and unspecified malignant neoplasm of lymph node, unspecified   Author:   Elizabeth Alvarado MD      Urologist: Dr. Christopher Villasenor  PCP: Dr. Ly Quevedo    Renal Cell Carcinoma Stage IV([id number]) Mediastinal lymph nodes--Diagnosed 20  Biopsy/pathology:  Upper EUS done 9/3/20--large cluster of malignant appearing lymph nodes in subcarinal mediastinum causing extrinsic compression in middle 1/3 and thoracic esophagus biopsy c/w metastatic carcinoma, c/w renal cell carcinoma CD10+, PAX -8+, erythematous mucosa in antrum biopsy c/w mild chronic gastritis, inactive, normal duodenum, no pathology in CBD or left lobe of liver or pancreas.  CARIS results- MSI stable, Mismatch repair status proficient. Tumor burden low. Negative for: PD-L1, NTRK1/2/3, BAP1, FH, MET, PBRM1, SDHA. Pathologic variants identified: KDMSC (Exon 13 and 11) and VHL. Immunohistochemistry results: MLH1, MSH2, MSH6, and PM52 positive. PD-L1 negative.   Surgery/pathology:  Right radical nephrectomy and lymph node dissection done 20--clear cell renal cell carcinoma measures 9.2X8C8.7cm, Grade 3, margins clear, tumor limited to kidney, 4/6 lymph nodes positive for metastatic clear cell carcinoma.   Imagin. CT A/P w/o contrast AG 20--bulky right renal mass, midpole, measuring 10cm with evidence of internal necrosis and calcifications, primary mass does not extend outside the perinephric space, associated right perinephric edema, multiple enlarged retroperitoneal lymph nodes at level of the kidneys and inferior retroperitoneum, one measures 2.9X1.4cm, additional node measures 2.1X1.8cm, additional node measures 1.8X1.2cm, inferior retroperitoneal node at level of inferior pole right kidney measures 1.8X1.4cm, no  convincing evidence of renal vein involvement, no right adrenal gland involvement, no disease apparent outside of abdomen.  2. CT A/P w/ and w/o contrast [facility] 8/20/20--large right renal mass measures 10.6X9.5X7.4cm, with enlarged retroperitoneal nodes, largest measures 2.7cm.  3. CT chest [facility] 8/28/20--solitary left supraclavicular node, small and not overtly concerning, heterogeneous retroesophageal mass at the level of the tariq c/w metastatic deposit, no other evidence for metastatic disease.  4. MRI brain w/ and w/o contrast [facility] 8/28/20--no evidence for intracranial metastatic disease.  5. CT C/A/P done at [facility] 10/28/20--now appears to be 2 adjacent masses at the level of the tariq, measuring in aggregate 6.2X2.7cm as compared with 5.2X2.1cm, subcentimeter mediastinal and hilar nodes are seen, large intermediate density structure at the right renal fossa extending to pelvic brim, most likely post-operative hematoma or seroma, resolution of previously seen retroperitoneal adenopathy.    Current treatment plan:   1. IL2 started on 11/2/20--s/p 5 days, next treatment due 11/16/20      Visit Information   Visit type:  Scheduled follow-up.    Accompanied by:  Spouse.       Chief Complaint       11/11/2020 15:28 CST     Not going to bathroom right...    11/11/2020 15:21 CST     Not going to bathroom right...        Interval History   Current complaint.   Patient presents for follow-up of renal cell carcinoma. He received first 5 days of IL-2 last week. He reports having some rigors during the treatment and fluid retention. Also had constipation. In addition, he has had itching, not relieved with benadryl, atarax or ativan. He tried some hydrocortisone cream and helped some but he wasn't able to put it all over. Also has had fatigue. Fluid retention has now resolved.      Review of Systems   Constitutional:  Weakness, Fatigue, No fever, No chills.    Eye:  No recent visual problem.    Ear/Nose/Mouth/Throat:  No  decreased hearing, No nasal congestion, No sore throat.    Respiratory:  No cough, No sputum production, No wheezing.    Cardiovascular:  No chest pain, No palpitations, No peripheral edema.    Gastrointestinal:  Constipation, No nausea, No vomiting, No diarrhea, No abdominal pain.    Genitourinary   Hematology/Lymphatics:  No bleeding tendency.    Musculoskeletal:  No back pain, No joint pain.    Integumentary:  Rash, Pruritus, No skin lesion.    Neurologic:  No confusion, No headache.    Psychiatric:  Anxiety, No depression.    All other systems are negative      Health Status   Allergies:    Allergies (1) Active Reaction  No Known Allergies None Documented     Current medications:  (Selected)   Outpatient Medications  Future  CCA Normal Saline (0.9% NS) - 1000 mL: 1,000, form: Infusion, IV Piggyback, Once-chemo, Infuse over: 2 hr, *Est. first dose 11/12/20 8:20:00 CST, *Est. stop date 11/12/20 8:20:00 CST, Future Order, Days 1  Pending Complete  midazolam: 2 mg, form: Injection, IV Push, q5min, Order duration: 2 dose(s), first dose 09/03/20 8:54:00 CDT, stop date 09/03/20 9:03:00 CDT, STAT, (up to 5 mg for moderate anxiety)  Documented Medications  Documented  Aspirin Low Dose 81 mg oral delayed release tablet: 81 mg = 1 tab(s), Oral, Daily, # 30 tab(s), 0 Refill(s)  LORazepam 1 mg oral tablet: 1 mg = 1 tab(s), Oral, q4hr  atorvastatin 80 mg oral tablet: 80 mg = 1 tab(s), Oral, Daily  ciprofloxacin 500 mg oral tablet: 500 mg = 1 tab(s), Oral, BID  finasteride 5 mg oral tablet: 5 mg = 1 tab(s), Oral, Daily  lisinopril 10 mg oral tablet: 10 mg = 1 tab(s), Oral, Daily  metformin 850 mg oral tablet: 850 mg = 1 tab(s), Oral, BID  metoprolol succinate 25 mg oral tablet, extended release: 25 mg = 1 tab(s), Oral, Daily  potassium chloride 10 mEq oral CAPSULE extended release: 10 mEq = 1 cap(s), Oral, Daily  pregabalin 75 mg oral capsule: 75 mg = 1 cap(s), Oral, BID  sertraline 50 mg oral tablet: 50 mg = 1 tab(s), Oral,  Daily  tamsulosin 0.4 mg oral capsule: 0.4 mg = 1 cap(s), Oral, Daily  traMADol 50 mg oral tablet: 50 mg = 1 tab(s), Oral, q6hr  traZODone 100 mg oral tablet: 150 mg = 1.5 tab(s), Oral, qPM   Problem list:    Active Problems (15)  2019-nCoV   Anxiety   Back pain   CAD - Coronary artery disease   CPAP (continuous positive airway pressure) at home   Diabetes mellitus   Diabetic neuropathy   Esophageal mass   Hematuria   Hyperlipidemia   Hypertension   Kidney mass   MI - myocardial infarction   Obesity   Sleep apnea         Histories   Past Medical History:    Active  Diabetes mellitus ([id number])  CAD - Coronary artery disease ([id number])  Resolved  HTN - Hypertension ([id number]):  Resolved.   Family History:    Father  Cardiac arrest.  CAD (coronary artery disease)....  Mother  Cardiac arrest.  CAD (coronary artery disease)....  Hypertension.  Brother    History is negative.  Sister  CHF (congestive heart failure)....     Procedure history:    Biopsy Gastrointestinal (Upper) on 9/3/2020 at 60 Years.  Comments:  9/3/2020 11:01 Amor Richey RN  auto-populated from documented surgical case  EGD w/ Fine Needle Biopsy / Aspiration (Upper) on 9/3/2020 at 60 Years.  Comments:  9/3/2020 11:01 Amor Richey RN  auto-populated from documented surgical case  Endoscopic Ultrasonography (Upper) on 9/3/2020 at 60 Years.  Comments:  9/3/2020 11:01 Amor Richey RN  auto-populated from documented surgical case  Colonoscopy ([id number]) in 2012 at 52 Years.  CABG.  Appendectomy.  Angiogram ([id number]).  Cataract ([id number]).  Comments:  9/2/2020 10:09 ÁLVARO Lopez RN, Beckie ZAMORANO  bilateral  Esophagogastroduodenoscopy ([id number]).  R Nephrectomy.   Social History        Social & Psychosocial Habits    Alcohol  09/22/2020  Use: Past    Employment/School  08/21/2020  Status: Employed    Description: Cost planner    Home/Environment  08/21/2020  Lives with: Spouse    Substance Use  09/22/2020  Use:  Never    Tobacco  09/03/2020  Use: Former smoker, quit more    Patient Wants Consult For Cessation Counseling N/A    Comment: Quit smoking in 2015 - 08/21/2020 10:12 - Hardik Bronson LPN    Abuse/Neglect  09/03/2020  SHX Any signs of abuse or neglect No    Spiritual/Cultural  09/02/2020  Shinto Preference [place]    Spiritual Services to Visit? Yes  .     Alcohol  Use: Past    Employment/School  Status: Employed  Description: Cost planner    Home/Environment  Lives with: Spouse    Substance Use  Use: Never    Tobacco  Use: Former smoker, quit more  Comment: Quit smoking in 2015    Spiritual/Cultural  Shinto Preference [place].        Physical Examination      Vital Signs (last 24 hrs)_____  Last Charted___________  Temp Oral     36.5 DegC  (NOV 11 15:28)  Heart Rate Peripheral   69 bpm  (NOV 11 15:28)  Resp Rate         19 br/min  (NOV 11 15:28)  SBP      H 145mmHg  (NOV 11 15:31)  DBP      86 mmHg  (NOV 11 15:31)  SpO2      97 %  (NOV 11 15:28)  Weight      94.9 kg  (NOV 11 15:28)  Height      177 cm  (NOV 11 15:28)  BMI      30.29  (NOV 11 15:28)     General:  Alert and oriented, No acute distress, pleasant white male.    Eye:  Pupils are equal, round and reactive to light, Vision unchanged.    HENT:  Normocephalic, Normal hearing, Oral mucosa is moist.    Neck:  Supple, Non-tender, No jugular venous distention, No lymphadenopathy, No thyromegaly.    Respiratory:  Lungs are clear to auscultation, Respirations are non-labored, Breath sounds are equal.    Cardiovascular:  Normal rate, Regular rhythm, No murmur, No gallop, Normal peripheral perfusion, No edema.    Gastrointestinal:  Soft, Non-tender, Non-distended, Normal bowel sounds, No organomegaly, Scattered laparoscopic incisions healing well.    Lymphatics:  No lymphadenopathy neck, axilla, groin.    Musculoskeletal:  Normal range of motion, Normal strength, No deformity, Normal gait, right upper extremity PICC in place.    Integumentary:  Warm,  Intact, No rash.    Neurologic:  Alert, Oriented, Normal sensory, Normal motor function.    Psychiatric:  Cooperative, Appropriate mood & affect.    Cognition and Speech:  Oriented, Speech clear and coherent.    ECOG Performance Scale: 1- Strenuous physical activity restricted; fully ambulatory and able to carry out light work.      Review / Management   Results review:  Lab results   11/11/2020 15:07 CST     POC TCO2                  27.0 mmol/L                             POC Hb                    14.3 mg/dL                             POC Hct                   42.0 %                             POC Sodium                141 mmol/L                             POC Potassium             4.4 mmol/L                             POC Chloride              104 mmol/L                             POC Ion Calcium           1.16 mmol/L                             POC Glucose               109 mg/dL  HI                             POC BUN                   30.0 mg/dL  HI                             POC Creatinine            2.5 mg/dL  HI                             POC AGAP                  15.0  NA    11/11/2020 14:42 CST     WBC                       25.3 x10(3)/mcL  HI                             RBC                       5.00 x10(6)/mcL                             Hgb                       12.7 gm/dL  LOW                             Hct                       40.7 %  LOW                             Platelet                  356 x10(3)/mcL                             MCV                       81.4 fL                             MCH                       25.4 pg  LOW                             MCHC                      31.2 gm/dL  LOW                             RDW                       17.0 %                             MPV                       9.1 fL  LOW                             Abs Neut                  11.06 x10(3)/mcL  HI                             NEUT%                     43.8 %  NA                              NEUT#                     11.1 x10(3)/mcL  HI                             LYMPH%                    36.7 %  NA                             LYMPH#                    9.3 x10(3)/mcL  HI                             MONO%                     8.3 %  NA                             MONO#                     2.1 x10(3)/mcL  HI                             EOS%                      6.3 %  NA                             EOS#                      1.6 x10(3)/mcL  HI                             BASO%                     0.6 %  NA                             BASO#                     0.2 x10(3)/mcL  .       Impression and Plan   Diagnosis     Cough (PXY18-ZG R05).     Renal cell carcinoma (VXF62-DB C64.9).     Anemia (EJX38-RQ D64.9).     Renal cell carcinoma (LPT72-ZQ C64.9).     Secondary and unspecified malignant neoplasm of lymph node, unspecified (KKH10-BQ C77.9).     Plan   Patient with large right renal mass and retroperitoneal adenopathy diagnosed by CT in 7/2020.  CT chest showed mediastinal fanny mass and biopsy done via EUS + for metastatic renal cell carcinoma.  Patient s/p radical right nephrectomy done 9/11/20. Pathology showed 9.2cm clear cell renal cell carcinoma, margins clear with 4/6 lymph nodes positive.  Post-operative course complicated by hematoma requiring blood transfusion.  CARIS results- MSI stable, Mismatch repair status proficient. Tumor burden low. Negative for: PD-L1, NTRK1/2/3, BAP1, FH, MET, PBRM1, SDHA. Pathologic variants identified: KDMSC (Exon 13 and 11) and VHL. Immunohistochemistry results: MLH1, MSH2, MSH6, and PM52 positive. PD-L1 negative.     Referred to Dr. Haja Sal for evaluation for IL-2. Explained this is only treatment to be shown to achieve long-term remissions in stage IV disease and patient has limited disease at this time.  Patient started 1st 5 days of IL-2 on 11/2/20. Tolerated fairly well. Still having itching with scattered rash.   Recommended topical hydrocortisone  cream and Sarna lotion.   Labs today show elevated WBC, BUN/creatinine increased.  Will give 1L IV fluids tomorrow.  Patient will have 2nd 5 days starting on 11/16/20.  Will have patient RTC in 2 weeks with labs and visit.  PICC dressing changed today and flushed.    All questions answered at this time.      Elizabeth Alvarado MD

## 2022-04-30 NOTE — PROGRESS NOTES
Patient:   Ye Olivera            MRN: [id number]            FIN: [id number]               Age:   60 years     Sex:  Male     :  1960   Associated Diagnoses:   Cough; Renal cell carcinoma; Anemia; Secondary and unspecified malignant neoplasm of lymph node, unspecified   Author:   Jackie Bonilla      Urologist: Dr. Christopher Villasenor  PCP: Dr. Ly Quevedo    Renal Cell Carcinoma Stage IV(G4cP5M1) Mediastinal lymph nodes--Diagnosed 20  Biopsy/pathology:  Upper EUS done 9/3/20--large cluster of malignant appearing lymph nodes in subcarinal mediastinum causing extrinsic compression in middle 1/3 and thoracic esophagus biopsy c/w metastatic carcinoma, c/w renal cell carcinoma CD10+, PAX -8+, erythematous mucosa in antrum biopsy c/w mild chronic gastritis, inactive, normal duodenum, no pathology in CBD or left lobe of liver or pancreas.  CARIS results- MSI stable, Mismatch repair status proficient. Tumor burden low. Negative for: PD-L1, NTRK1/2/3, BAP1, FH, MET, PBRM1, SDHA. Pathologic variants identified: KDMSC (Exon 13 and 11) and VHL. Immunohistochemistry results: MLH1, MSH2, MSH6, and PM52 positive. PD-L1 negative.   Surgery/pathology:  Right radical nephrectomy and lymph node dissection done 20--clear cell renal cell carcinoma measures 9.2X8C8.7cm, Grade 3, margins clear, tumor limited to kidney, 4/6 lymph nodes positive for metastatic clear cell carcinoma.   Imagin. CT A/P w/o contrast AG 20--bulky right renal mass, midpole, measuring 10cm with evidence of internal necrosis and calcifications, primary mass does not extend outside the perinephric space, associated right perinephric edema, multiple enlarged retroperitoneal lymph nodes at level of the kidneys and inferior retroperitoneum, one measures 2.9X1.4cm, additional node measures 2.1X1.8cm, additional node measures 1.8X1.2cm, inferior retroperitoneal node at level of inferior pole right kidney measures 1.8X1.4cm,  no convincing evidence of renal vein involvement, no right adrenal gland involvement, no disease apparent outside of abdomen.  2. CT A/P w/ and w/o contrast [facility] 8/20/20--large right renal mass measures 10.6X9.5X7.4cm, with enlarged retroperitoneal nodes, largest measures 2.7cm.  3. CT chest [facility] 8/28/20--solitary left supraclavicular node, small and not overtly concerning, heterogeneous retroesophageal mass at the level of the tariq c/w metastatic deposit, no other evidence for metastatic disease.  4. MRI brain w/ and w/o contrast [facility] 8/28/20--no evidence for intracranial metastatic disease.  5. CT C/A/P done at [facility] 10/28/20--now appears to be 2 adjacent masses at the level of the tariq, measuring in aggregate 6.2X2.7cm as compared with 5.2X2.1cm, subcentimeter mediastinal and hilar nodes are seen, large intermediate density structure at the right renal fossa extending to pelvic brim, most likely post-operative hematoma or seroma, resolution of previously seen retroperitoneal adenopathy.    Current treatment plan:   1. IL2 started on 11/2/20.   2nd cycle started 11/16/20. Stopped early due to acute renal failure, volume overload and confusion.       Visit Information   Visit type:  Scheduled follow-up.    Accompanied by:  Spouse.       Chief Complaint   12/9/2020 13:59 CST      R knee pain, still itching        Interval History   Current complaint.   Patient presents for follow-up of renal cell carcinoma. His last round of IL-2 was stopped on 11/22/20 (after 7 days) due to acute renal failure, volume overload and confusion. His creatinine continues improving - 2.5 last week. He has gained back a few lbs that was lost after his first cycle. He has no more fluid retention. His only complaint is itching and he continues taking the Lorazepam and Benadryl. Otherwise he is doing okay. Scans are scheduled at [facility] on 12/23/20 and he has an appointment with Dr. Sal on 1/4/21.       Review of Systems   Constitutional:   Fatigue, No fever, No chills.    Eye:  No recent visual problem.    Ear/Nose/Mouth/Throat:  No decreased hearing, No nasal congestion, No sore throat.    Respiratory:  No cough, No sputum production, No wheezing.    Cardiovascular:  No chest pain, No palpitations, No peripheral edema.    Gastrointestinal:  Constipation, better, No nausea, No vomiting, No diarrhea, No abdominal pain.    Genitourinary   Hematology/Lymphatics:  No bleeding tendency.    Musculoskeletal:  No back pain, No joint pain.    Integumentary:  Pruritus, No rash, No skin lesion.    Neurologic:  Alert and oriented X4, No confusion, No headache.    Psychiatric:  Anxiety, No depression.    All other systems are negative      Health Status   Allergies:    Allergic Reactions (Selected)  No Known Allergies   Current medications:  (Selected)   Outpatient Medications  Pending Complete  midazolam: 2 mg, form: Injection, IV Push, q5min, Order duration: 2 dose(s), first dose 09/03/20 8:54:00 CDT, stop date 09/03/20 9:03:00 CDT, STAT, (up to 5 mg for moderate anxiety)  Prescriptions  Prescribed  LORazepam 1 mg oral tablet: 1 mg = 1 tab(s), Oral, q4hr, as needed for itching/anxiety, # 90 tab(s), 1 Refill(s), Pharmacy: [facility], 177, cm, Height/Length Dosing, 11/25/20 14:17:00 CST, 95.2, kg, Weight Dosing, 11/25/20 14:17:00 CST  Documented Medications  Documented  Aspirin Low Dose 81 mg oral delayed release tablet: 81 mg = 1 tab(s), Oral, Daily, # 30 tab(s), 0 Refill(s)  atorvastatin 80 mg oral tablet: 80 mg = 1 tab(s), Oral, Daily  ciprofloxacin 500 mg oral tablet: 500 mg = 1 tab(s), Oral, BID  finasteride 5 mg oral tablet: 5 mg = 1 tab(s), Oral, Daily  lisinopril 10 mg oral tablet: 10 mg = 1 tab(s), Oral, Daily  metformin 850 mg oral tablet: 850 mg = 1 tab(s), Oral, BID  metoprolol succinate 25 mg oral tablet, extended release: 25 mg = 1 tab(s), Oral, Daily  potassium chloride 10 mEq oral CAPSULE extended release: 10 mEq = 1 cap(s), Oral,  Daily  pregabalin 75 mg oral capsule: 75 mg = 1 cap(s), Oral, BID  sertraline 50 mg oral tablet: 50 mg = 1 tab(s), Oral, Daily  tamsulosin 0.4 mg oral capsule: 0.4 mg = 1 cap(s), Oral, Daily  traMADol 50 mg oral tablet: 50 mg = 1 tab(s), Oral, q6hr  traZODone 100 mg oral tablet: 150 mg = 1.5 tab(s), Oral, qPM      Histories   Past Medical History:    Active  Diabetes mellitus ([id number])  CAD - Coronary artery disease ([id number])  Resolved  HTN - Hypertension ([id number]):  Resolved.   Family History:    Father  Cardiac arrest.  CAD (coronary artery disease)....  Mother  Cardiac arrest.  CAD (coronary artery disease)....  Hypertension.  Brother    History is negative.  Sister  CHF (congestive heart failure)....     Procedure history:    Biopsy Gastrointestinal (Upper) on 9/3/2020 at 60 Years.  Comments:  9/3/2020 11:01 Amor Richey RN  auto-populated from documented surgical case  EGD w/ Fine Needle Biopsy / Aspiration (Upper) on 9/3/2020 at 60 Years.  Comments:  9/3/2020 11:01 Amor Richey RN  auto-populated from documented surgical case  Endoscopic Ultrasonography (Upper) on 9/3/2020 at 60 Years.  Comments:  9/3/2020 11:01 Amor Richey RN  auto-populated from documented surgical case  Colonoscopy ([id number]) in 2012 at 52 Years.  CABG.  Appendectomy.  Angiogram ([id number]).  Cataract ([id number]).  Comments:  9/2/2020 10:09 Beckie [place] RN  bilateral  Esophagogastroduodenoscopy ([id number]).  R Nephrectomy.   Social History     Alcohol  Use: Past    Employment/School  Status: Employed  Description: Cost planner    Home/Environment  Lives with: Spouse    Substance Use  Use: Never    Tobacco  Use: Former smoker, quit more  Comment: Quit smoking in 2015    Spiritual/Cultural  Sabianist Preference Oriental orthodox.        Physical Examination   General:  Alert and oriented, No acute distress, pleasant white male.    Eye:  Pupils are equal, round and reactive to light, Vision  unchanged.    HENT:  Normocephalic, Normal hearing, Oral mucosa is moist.    Neck:  Supple, Non-tender, No jugular venous distention, No lymphadenopathy, No thyromegaly.    Respiratory:  Lungs are clear to auscultation, Respirations are non-labored, Breath sounds are equal.    Cardiovascular:  Normal rate, Regular rhythm, No murmur, No gallop, Normal peripheral perfusion, No edema.    Gastrointestinal:  Soft, Non-tender, Non-distended, Normal bowel sounds, No organomegaly, Scattered laparoscopic incisions healing well.    Vital Signs   12/9/2020 13:59 CST      Temperature Oral          36.8 DegC                             Temperature Oral (calculated)             98.24 DegF                             Peripheral Pulse Rate     67 bpm                             SpO2                      100 %                             Systolic Blood Pressure   138 mmHg                             Diastolic Blood Pressure  80 mmHg     Lymphatics:  No lymphadenopathy neck, axilla, groin.    Musculoskeletal:  Normal range of motion, Normal strength, No deformity, Normal gait.    Integumentary:  Warm, Intact, No rash.    Neurologic:  Alert, Oriented, Normal sensory, Normal motor function.    Psychiatric:  Cooperative, Appropriate mood & affect.    Cognition and Speech:  Oriented, Speech clear and coherent.    ECOG Performance Scale: 1- Strenuous physical activity restricted; fully ambulatory and able to carry out light work.      Review / Management   Results review:  Lab results   12/4/2020 14:57 CST      Liver Interp                                           Bili Total                0.4 mg/dL                             Bili Direct               0.1 mg/dL                             Bili Indirect             0.30 mg/dL                             AST                       13 unit/L                             ALT                       23 unit/L                             Alk Phos                  115 unit/L                              Total Protein             7.9 gm/dL  HI                             Albumin Lvl               3.6 gm/dL    12/4/2020 13:03 CST      POC TCO2                  24.0 mmol/L                             POC Hb                    12.2 mg/dL                             POC Hct                   36.0 %  LOW                             POC Sodium                137 mmol/L  LOW                             POC Potassium             4.3 mmol/L                             POC Chloride              102 mmol/L                             POC Ion Calcium           1.16 mmol/L                             POC Glucose               102 mg/dL                             POC BUN                   39.0 mg/dL  HI                             POC Creatinine            2.5 mg/dL  HI                             POC AGAP                  16.0  NA    12/4/2020 12:54 CST      WBC                       11.6 x10(3)/mcL  HI                             RBC                       4.64 x10(6)/mcL  LOW                             Hgb                       11.8 gm/dL  LOW                             Hct                       38.3 %  LOW                             Platelet                  302 x10(3)/mcL                             MCV                       82.5 fL                             MCH                       25.4 pg  LOW                             MCHC                      30.8 gm/dL  LOW                             RDW                       16.8 %                             MPV                       10.0 fL                             Abs Neut                  5.50 x10(3)/mcL                             NEUT%                     47.5 %  NA                             NEUT#                     5.5 x10(3)/mcL                             LYMPH%                    36.9 %  NA                             LYMPH#                    4.3 x10(3)/mcL                             MONO%                     8.0 %  NA                             MONO#                      0.9 x10(3)/mcL                             EOS%                      6.1 %  NA                             EOS#                      0.7 x10(3)/mcL                             BASO%                     1.0 %  NA                             BASO#                     0.1 x10(3)/mcL  .       Impression and Plan   Diagnosis     Cough ([id number] R05).     Renal cell carcinoma (BON06-SV C64.9).     Anemia (AIQ25-UA D64.9).     Renal cell carcinoma (DCV78-EV C64.9).     Secondary and unspecified malignant neoplasm of lymph node, unspecified (AVC77-OV C77.9).     Plan   Patient with large right renal mass and retroperitoneal adenopathy diagnosed by CT in 7/2020.  CT chest showed mediastinal fanny mass and biopsy done via EUS + for metastatic renal cell carcinoma.  Patient s/p radical right nephrectomy done 9/11/20. Pathology showed 9.2cm clear cell renal cell carcinoma, margins clear with 4/6 lymph nodes positive.  Post-operative course complicated by hematoma requiring blood transfusion.  CARIS results- MSI stable, Mismatch repair status proficient. Tumor burden low. Negative for: PD-L1, NTRK1/2/3, BAP1, FH, MET, PBRM1, SDHA. Pathologic variants identified: KDMSC (Exon 13 and 11) and VHL. Immunohistochemistry results: MLH1, MSH2, MSH6, and PM52 positive. PD-L1 negative.     Referred to Dr. Haja Sal for evaluation for IL-2. Explained this is only treatment to be shown to achieve long-term remissions in stage IV disease and patient has limited disease at this time.  Patient started 1st 5 days of IL-2 on 11/2/20.   Started his 2nd round on 11/16/20 and it was stopped on 11/22/20 due to acute renal failure, volume overload and agitation.   Labs show continued improvement in renal function. WBC remains elevated but is better.   He would like IV fluids today - will give 1 liter of Normal saline today.   Will try Vistaril as needed for itching to see if it offers better relief than Benadryl.   Continue  topical hydrocortisone cream and Sarna lotion.     Plan is for scans to be done at [facility], already ordered by Dr. Sal for 12/23/20.   Scheduled to return to Dr. Sal on 1/4/21.   If IL-2 is not successful, will plan for treatment with Inlyta/Keytruda.  Will have him follow-up in clinic in 3 weeks prior to him returning to [place].   All questions answered at this time.      MARCIN Laboy

## 2022-05-02 DIAGNOSIS — C64.9 RENAL CELL CARCINOMA, UNSPECIFIED LATERALITY: Primary | ICD-10-CM

## 2022-05-04 ENCOUNTER — PATIENT MESSAGE (OUTPATIENT)
Dept: HEMATOLOGY/ONCOLOGY | Facility: CLINIC | Age: 62
End: 2022-05-04
Payer: COMMERCIAL

## 2022-05-18 PROBLEM — G47.30 SLEEP APNEA: Status: ACTIVE | Noted: 2022-05-18

## 2022-05-18 PROBLEM — I10 BENIGN ESSENTIAL HYPERTENSION: Status: ACTIVE | Noted: 2022-05-18

## 2022-05-18 PROBLEM — C64.9 RENAL CELL CARCINOMA: Status: ACTIVE | Noted: 2022-05-18

## 2022-05-18 PROBLEM — M54.9 BACK PAIN: Status: ACTIVE | Noted: 2022-05-18

## 2022-05-18 PROBLEM — E11.40 DIABETIC NEUROPATHY: Status: ACTIVE | Noted: 2022-05-18

## 2022-05-18 PROBLEM — C64.1 CLEAR CELL CARCINOMA OF RIGHT KIDNEY: Status: ACTIVE | Noted: 2022-05-18

## 2022-05-18 PROBLEM — F41.9 ANXIETY: Status: ACTIVE | Noted: 2022-05-18

## 2022-05-18 PROBLEM — M10.9 GOUT: Status: ACTIVE | Noted: 2022-05-18

## 2022-05-18 PROBLEM — C77.9 MALIGNANT NEOPLASM METASTATIC TO LYMPH NODES: Status: ACTIVE | Noted: 2022-05-18

## 2022-05-18 PROBLEM — Z99.89 USES CONTINUOUS POSITIVE AIRWAY PRESSURE (CPAP) VENTILATION AT HOME: Status: ACTIVE | Noted: 2022-05-18

## 2022-05-18 PROBLEM — R11.2 CHEMOTHERAPY INDUCED NAUSEA AND VOMITING: Status: ACTIVE | Noted: 2022-05-18

## 2022-05-18 PROBLEM — F32.A DEPRESSIVE DISORDER: Status: ACTIVE | Noted: 2022-05-18

## 2022-05-18 PROBLEM — I25.10 CORONARY ARTERY DISEASE INVOLVING NATIVE CORONARY ARTERY OF NATIVE HEART WITHOUT ANGINA PECTORIS: Status: ACTIVE | Noted: 2019-06-20

## 2022-05-18 PROBLEM — T45.1X5A CHEMOTHERAPY INDUCED NAUSEA AND VOMITING: Status: ACTIVE | Noted: 2022-05-18

## 2022-05-18 PROBLEM — E78.5 HYPERLIPIDEMIA: Status: ACTIVE | Noted: 2017-12-21

## 2022-05-18 NOTE — PROGRESS NOTES
Subjective:       Patient ID: Ye Olivera is a 62 y.o. male.    Urologist: Dr. Christopher Villasenor  PCP: Dr. Ly Quevedo  Nephrologist: Dr. Jenae Le    Renal Cell Carcinoma Stage IV([id number]) Mediastinal lymph nodes--Diagnosed 20  Biopsy/pathology:  Upper EUS done 9/3/20--large cluster of malignant appearing lymph nodes in subcarinal mediastinum causing extrinsic compression in middle 1/3 and thoracic esophagus biopsy c/w metastatic carcinoma, c/w renal cell carcinoma CD10+, PAX -8+, erythematous mucosa in antrum biopsy c/w mild chronic gastritis, inactive, normal duodenum, no pathology in CBD or left lobe of liver or pancreas.  CARIS results- MSI stable, Mismatch repair status proficient. Tumor burden low. Negative for: PD-L1, NTRK1/2/3, BAP1, FH, MET, PBRM1, SDHA. Pathologic variants identified: KDMSC (Exon 13 and 11) and VHL. Immunohistochemistry results: MLH1, MSH2, MSH6, and PM52 positive. PD-L1 negative.   Surgery/pathology:  Right radical nephrectomy and lymph node dissection done 20--clear cell renal cell carcinoma measures 9.2X8C8.7cm, Grade 3, margins clear, tumor limited to kidney, 4/6 lymph nodes positive for metastatic clear cell carcinoma.   Imagin. CT A/P w/o contrast [facility] 20--bulky right renal mass, midpole, measuring 10cm with evidence of internal necrosis and calcifications, primary mass does not extend outside the perinephric space, associated right perinephric edema, multiple enlarged retroperitoneal lymph nodes at level of the kidneys and inferior retroperitoneum, one measures 2.9X1.4cm, additional node measures 2.1X1.8cm, additional node measures 1.8X1.2cm, inferior retroperitoneal node at level of inferior pole right kidney measures 1.8X1.4cm, no convincing evidence of renal vein involvement, no right adrenal gland involvement, no disease apparent outside of abdomen.  2. CT A/P w/ and w/o contrast [facility] 20--large right renal mass measures 10.6X9.5X7.4cm, with  enlarged retroperitoneal nodes, largest measures 2.7cm.  3. CT chest [facility] 8/28/20--solitary left supraclavicular node, small and not overtly concerning, heterogeneous retroesophageal mass at the level of the tariq c/w metastatic deposit, no other evidence for metastatic disease.  4. MRI brain w/ and w/o contrast [facility] 8/28/20--no evidence for intracranial metastatic disease.  5. CT C/A/P done at [facility] 10/28/20--now appears to be 2 adjacent masses at the level of the tariq, measuring in aggregate 6.2X2.7cm as compared with 5.2X2.1cm, subcentimeter mediastinal and hilar nodes are seen, large intermediate density structure at the right renal fossa extending to pelvic brim, most likely post-operative hematoma or seroma, resolution of previously seen retroperitoneal adenopathy.  6. CT C/A/P w&w/o at [facility] on 12/23/20--Interval enlargement of the retro-esophageal mass with mild interval increase in size of multiple paraesophageal and prevascular lymph nodes compatible with progression of disease. Interval decrease in size of the right retroperitoneal postop seroma or hematoma. No evidence of metastatic disease below the diaphragm. Prior right nephrectomy and adrenalectomy. Chololithiasis and stable atherosclerosis.   7. CT C/A/P w/o contrast done at [facility] on 4/5/21--interval decrease in size of multiple prevascular, subcarinal and para-esophageal lymph nodes c/w response to treatment, no new pulmonary nodule or lymph node, no evidence for metastatic disease below the diaphragm, interval decrease in size of the post-operative seromas adjacent to right psoas muscle.  8. CT C/A/P w/o contrast done at [facility] on 7/7/21--stable appearance of previously identified prevascular, subcarinal, and paraesophageal nodes, there are an increased number of sub-centimeter nodes in the aortopulmonary window and precarinal region, nonspecific in appearance, no other evidence of local recurrence or metastatic disease.   9. MRI cervical spine w/ and w/o  contrast done at [facility] 8/4/21--no metastatic disease, multilevel spondylotic changes, no acute abnormality.   10. MRI brain w/ and w/o contrast 8/4/21--normal maturation of the previously seen lacunar infarct at the left centrum semiovale, no new abnormalities, specifically, no evidence of intracranial metastatic disease.   11. CT C/A/P w/o contrast done at [facility] 10/22/21--stable reactive mediastinal adenopathy, interval resection of right kidney and adrenal gland with post-surgical changes but no evidence of metastatic disease.   12. CT C/A/P w/o contrast done at [facility] 1/26/22--4 new pulmonary nodules larges LLL 14mm, 9mm LLL, 5mm LLL, 5mm RML, with enlarging prevascular lymph node now measures 18mm, previous 8mm, concerning for metastatic disease, new mesenteric implant in LLQ measures 11mm, enlarging retroperitoneal LN now measures 10D98tl, previous 14X8mm concerning for progression disease, cholelithiasis.   13. MRI brain w/ and w/o  contrast done at [facility] 3/31/22--no acute intracranial abnormality.   14. CT C/A/P w/o contrast done at [facility] 4/5/22--progressive retroperitoneal and mediastinal adenopathy, with development of hilar adenopathy, increase in size and number of pulmonary nodules.     Treatment History:   1. IL2 started on 11/2/20. 2nd cycle started 11/16/20 - stopped after 7 days due to acute renal failure, volume overload and confusion. Stopped due to progression.  2. Keytruda/Inlyta 1/8/21--1/31/22 (stopped due to progression, autoimmune hepatitis).  3. Prednisone 80mg daily 1/31/22--weaned and stopped 4/14/22.    Treatment Plan:  Cabometyx 60mg daily started 4/7/22  Decrease dose to 40mg daily 4/28/22 due to side effects.  HOLD on 5/19/22 until groin rash and abscess heal.     Chief Complaint: Pain (Pt reports pain in his hands and knees. ) and Cramping    HPI   Patient presents for follow-up of renal cell carcinoma. Patient is doing okay. His daughter is with him. He is eating better and gained a few lbs.  "Occasional nausea but no vomiting. Still having fatigue and joint pains. His biggest complaint today is the rash and boils noted to his groin. States they have not improved and he will occasionally "pop" them with pus colored discharge. Denies any fevers. He also has knee spurs and is requesting referral to Ortho. Continues on the Cabometyx at 40mg daily. No other problems reported.        Past Medical History:   Diagnosis Date    Arthritis of knee 8/13/2013    HTN (hypertension) 8/13/2013    Type II or unspecified type diabetes mellitus without mention of complication, not stated as uncontrolled 8/13/2013      Review of patient's allergies indicates:  No Known Allergies     Current Outpatient Medications:     aspirin 81 mg Cap, Take 81 mg by mouth once daily at 6am., Disp: , Rfl:     atorvastatin (LIPITOR) 80 MG tablet, Take 80 mg by mouth once daily., Disp: , Rfl:     BENICAR HCT 40-25 mg per tablet, TAKE ONE TABLET BY MOUTH ONCE DAILY, Disp: 90 tablet, Rfl: 0    cabozantinib (CABOMETYX) 40 mg Tab, Take 40 mg by mouth once daily., Disp: , Rfl:     desvenlafaxine succinate (PRISTIQ) 50 MG Tb24, Take 50 mg by mouth once daily., Disp: , Rfl:     diphenoxylate-atropine 2.5-0.025 mg (LOMOTIL) 2.5-0.025 mg per tablet, Take 1 tablet by mouth as needed., Disp: , Rfl:     finasteride (PROSCAR) 5 mg tablet, Take 5 mg by mouth as needed., Disp: , Rfl:     furosemide (LASIX) 40 MG tablet, Take 40 mg by mouth once daily at 6am., Disp: , Rfl:     HYDROcodone-acetaminophen (NORCO) 5-325 mg per tablet, Take 1 tablet by mouth as needed., Disp: , Rfl:     LORazepam (ATIVAN) 1 MG tablet, Take 1 mg by mouth 2 (two) times a day., Disp: , Rfl:     metformin (GLUCOPHAGE) 1000 MG tablet, Take 1 tablet (1,000 mg total) by mouth 2 (two) times daily with meals., Disp: 180 tablet, Rfl: 12    metoprolol succinate (TOPROL-XL) 50 MG 24 hr tablet, Take 1 tablet by mouth once daily., Disp: , Rfl:     ondansetron (ZOFRAN) 4 MG " tablet, Take 4 mg by mouth every 6 (six) hours as needed., Disp: , Rfl:     pantoprazole (PROTONIX) 40 MG tablet, Take 40 mg by mouth once daily., Disp: , Rfl:     potassium chloride (MICRO-K) 10 MEQ CpSR, Take 10 mEq by mouth once daily., Disp: , Rfl:     pregabalin (LYRICA) 75 MG capsule, Take 75 mg by mouth 2 (two) times a day., Disp: , Rfl:     prochlorperazine (COMPAZINE) 5 MG tablet, Take 5 mg by mouth as needed., Disp: , Rfl:     QUEtiapine (SEROQUEL) 50 MG tablet, Take 150 mg by mouth every evening., Disp: , Rfl:     tamsulosin (FLOMAX) 0.4 mg Cap, Take 1 capsule by mouth every evening., Disp: , Rfl:     traMADoL (ULTRAM) 50 mg tablet, Take 50 mg by mouth every 6 (six) hours., Disp: , Rfl:     albuterol 90 mcg/actuation inhaler, Inhale 2 puffs into the lungs every 4 (four) hours as needed., Disp: 17 g, Rfl: 2    citalopram (CELEXA) 20 MG tablet, Take 1 tablet (20 mg total) by mouth once daily., Disp: 30 tablet, Rfl: 6    fluconazole (DIFLUCAN) 150 MG Tab, Take 1 tablet (150 mg total) by mouth once. for 1 dose, Disp: 1 tablet, Rfl: 0    mupirocin calcium 2% (BACTROBAN) 2 % cream, Apply to affected area 3 times daily, Disp: 30 g, Rfl: 3    sulfamethoxazole-trimethoprim 800-160mg (BACTRIM DS) 800-160 mg Tab, Take 1 tablet by mouth 2 (two) times daily. for 14 days, Disp: 28 tablet, Rfl: 0    Current Facility-Administered Medications:     albuterol sulfate nebulizer solution 2.5 mg, 2.5 mg, Nebulization, Q4H PRN, Thierno Snider MD  Review of Systems   Constitutional: Positive for fatigue. Negative for activity change and fever.        Weight gain   Eyes: Negative for visual disturbance.   Respiratory: Negative for cough and shortness of breath.    Cardiovascular: Negative for chest pain.   Gastrointestinal: Positive for nausea. Negative for abdominal pain, blood in stool, constipation, diarrhea and vomiting.   Genitourinary: Negative for difficulty urinating.   Musculoskeletal: Positive for back  pain and leg pain.        Joint pains all over   Integumentary:  Positive for rash.        Multiple boils on his groin/scrotum   Neurological: Negative for dizziness, weakness and headaches.   Psychiatric/Behavioral: Negative for behavioral problems and suicidal ideas.            Vitals:    05/19/22 0904   BP: 132/84   Pulse: 87   Resp: 14   Temp: 97.3 °F (36.3 °C)      Physical Exam  Constitutional:       General: He is awake.      Appearance: Normal appearance. He is overweight.   HENT:      Head: Normocephalic.   Eyes:      General: Lids are normal. Vision grossly intact.      Extraocular Movements: Extraocular movements intact.      Conjunctiva/sclera: Conjunctivae normal.   Cardiovascular:      Rate and Rhythm: Normal rate and regular rhythm.      Pulses: Normal pulses.      Heart sounds: Normal heart sounds.   Pulmonary:      Effort: Pulmonary effort is normal.      Breath sounds: Normal breath sounds and air entry.   Abdominal:      General: Abdomen is flat. Bowel sounds are normal.      Palpations: Abdomen is soft.      Comments: Scattered lap incisions healed well   Genitourinary:         Comments: Erythematous, blistering cellulitis rash noted to groin areas. Also looks fungal in appearance. Several abscess noted in scrotum. Erythematous, hard and tender  Musculoskeletal:      Cervical back: Normal, normal range of motion and neck supple.      Thoracic back: Normal.      Lumbar back: Normal.   Feet:      Right foot:      Skin integrity: Skin integrity normal.   Lymphadenopathy:      Comments: No palpable adenopathy   Skin:     General: Skin is warm.      Comments: See above     Neurological:      Mental Status: He is alert and oriented to person, place, and time.   Psychiatric:         Attention and Perception: Attention normal.         Mood and Affect: Mood normal.         Speech: Speech normal.         Behavior: Behavior is cooperative.         Thought Content: Thought content does not include suicidal  ideation.       ECOG SCORE    1 - Restricted in strenuous activity-ambulatory and able to carry out work of a light nature       Lab Visit on 05/19/2022   Component Date Value    WBC 05/19/2022 9.9     RBC 05/19/2022 4.86     Hgb 05/19/2022 13.8 (A)    Hct 05/19/2022 43.1     MCV 05/19/2022 88.7     MCH 05/19/2022 28.4     MCHC 05/19/2022 32.0 (A)    RDW 05/19/2022 18.7 (A)    Platelet 05/19/2022 182     MPV 05/19/2022 10.3     Neut % 05/19/2022 57.0     Lymph % 05/19/2022 31.2     Mono % 05/19/2022 7.5     Eos % 05/19/2022 3.7     Basophil % 05/19/2022 0.3     Lymph # 05/19/2022 3.08     Neut # 05/19/2022 5.6     Mono # 05/19/2022 0.74     Eos # 05/19/2022 0.37     Baso # 05/19/2022 0.03     IG# 05/19/2022 0.03 (A)    IG% 05/19/2022 0.3           Assessment:       Problem List Items Addressed This Visit        Oncology    Clear cell carcinoma of right kidney - Primary    Relevant Medications    fluconazole (DIFLUCAN) 150 MG Tab    sulfamethoxazole-trimethoprim 800-160mg (BACTRIM DS) 800-160 mg Tab    mupirocin calcium 2% (BACTROBAN) 2 % cream    Other Relevant Orders    Ambulatory referral/consult to Orthopedics    Comprehensive Metabolic Panel    CBC Auto Differential       GI    Chemotherapy induced nausea and vomiting    Relevant Medications    fluconazole (DIFLUCAN) 150 MG Tab    sulfamethoxazole-trimethoprim 800-160mg (BACTRIM DS) 800-160 mg Tab    mupirocin calcium 2% (BACTROBAN) 2 % cream    Other Relevant Orders    Ambulatory referral/consult to Orthopedics    Comprehensive Metabolic Panel    CBC Auto Differential             Plan:         Patient with large right renal mass and retroperitoneal adenopathy diagnosed by CT in 7/2020.  CT chest showed mediastinal fanny mass and biopsy done via EUS + for metastatic renal cell carcinoma.  Patient s/p radical right nephrectomy done 9/11/20. Pathology showed 9.2cm clear cell renal cell carcinoma, margins clear with 4/6 lymph nodes  positive.  Post-operative course complicated by hematoma requiring blood transfusion.  CARIS results- MSI stable, Mismatch repair status proficient. Tumor burden low. Negative for: PD-L1, NTRK1/2/3, BAP1, FH, MET, PBRM1, SDHA. Pathologic variants identified: KDMSC (Exon 13 and 11) and VHL. Immunohistochemistry results: MLH1, MSH2, MSH6, and PM52 positive. PD-L1 negative.     Referred to Dr. Haja Sal for evaluation for IL-2. Explained this is only treatment to be shown to achieve long-term remissions in stage IV disease and patient has limited disease at this time.  Patient started 1st 5 days of IL-2 on 11/2/20.   Started his 2nd round on 11/16/20 and it was stopped on 11/22/20 due to acute renal failure, volume overload and agitation.   CT scan on 12/23/20 at [facility] revealed progression of his disease. Dr. Sal contacted patient and discussed the results. He also reached out to Dr. Alvarado to notify her that the patient will not be returning.   Started Keytruda 200mg every 3 weeks on 1/8/21 + Inlyta 5mg oral BID on 1/11/21.  CT C/A/P done at [facility] on 10/22/21 showed stable mild reactive mediastinal nodes, otherwise stable.  Patient continued on treatment s/p cycle 18 Keytruda given 1/10/22.  Labs on 1/10/22 showed elevated LFTs. Inlyta held.  CT C/A/P done at [facility] on 1/26/22 showed unfortunately disease progression with new pulmonary nodules, enlargement in mediastinal node, new mesenteric nodule and retroperitoneal adenopathy.  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 Hepatitis panel negative. Keytruda stopped. Started on Prednisone 80mg daily on 1/31/22.  Patient had suicidal ideation and severe depression during visit in 3/2022, seen by psychiatry and meds adjusted, much improved.  CT C/A/P from [facility] on 4/5/22 showed progressive disease.    Treatment change recommended to Cabometyx 60mg daily per NCCN guidelines (Category 1) for subsequent treatment. Started on  4/7/22.  Prednisone weaned and stopped 4/14/22.  Patient is having multiple side effects from Cabometyx.  Reduced dose to 40mg at his last appointment on 4/28/22.     Worsening cellulitis rash to groin. Several abscesses palpated in scrotum.   Recommended he HOLD the Cabometyx for now. Will send prescription for Bactrim DS BID x 14 days to his pharmacy. Diflucan 150mg oral x 1 dose as well. He was instructed to contact clinic or present to the ED if rash worsens or he develops a fever. Recommended he keep the area clean and dry.   This was a problem before his treatments, chronic.     Continue Compazine for nausea.   Continue Norco for joint pain.   Continue MMW for soreness in mouth.   RTC 3 weeks for follow-up with labs.   Plan to repeat CT C/A/P w/o contrast at [facility] in 7/2022.     All questions answered at this time.       MARCIN Laboy

## 2022-05-19 ENCOUNTER — TELEPHONE (OUTPATIENT)
Dept: HEMATOLOGY/ONCOLOGY | Facility: CLINIC | Age: 62
End: 2022-05-19

## 2022-05-19 ENCOUNTER — OFFICE VISIT (OUTPATIENT)
Dept: HEMATOLOGY/ONCOLOGY | Facility: CLINIC | Age: 62
End: 2022-05-19
Payer: MEDICAID

## 2022-05-19 ENCOUNTER — LAB VISIT (OUTPATIENT)
Dept: LAB | Facility: HOSPITAL | Age: 62
End: 2022-05-19
Attending: INTERNAL MEDICINE
Payer: MEDICAID

## 2022-05-19 VITALS
TEMPERATURE: 97 F | BODY MASS INDEX: 31.31 KG/M2 | OXYGEN SATURATION: 96 % | SYSTOLIC BLOOD PRESSURE: 132 MMHG | HEART RATE: 87 BPM | RESPIRATION RATE: 14 BRPM | DIASTOLIC BLOOD PRESSURE: 84 MMHG | WEIGHT: 218.69 LBS | HEIGHT: 70 IN

## 2022-05-19 DIAGNOSIS — T45.1X5A CHEMOTHERAPY INDUCED NAUSEA AND VOMITING: ICD-10-CM

## 2022-05-19 DIAGNOSIS — C64.9 RENAL CELL CARCINOMA, UNSPECIFIED LATERALITY: ICD-10-CM

## 2022-05-19 DIAGNOSIS — R11.2 CHEMOTHERAPY INDUCED NAUSEA AND VOMITING: ICD-10-CM

## 2022-05-19 DIAGNOSIS — C64.1 CLEAR CELL CARCINOMA OF RIGHT KIDNEY: Primary | ICD-10-CM

## 2022-05-19 LAB
ALBUMIN SERPL-MCNC: 3.2 GM/DL (ref 3.4–4.8)
ALBUMIN/GLOB SERPL: 0.9 RATIO (ref 1.1–2)
ALP SERPL-CCNC: 220 UNIT/L (ref 40–150)
ALT SERPL-CCNC: 48 UNIT/L (ref 0–55)
AST SERPL-CCNC: 39 UNIT/L (ref 5–34)
BASOPHILS # BLD AUTO: 0.03 X10(3)/MCL (ref 0–0.2)
BASOPHILS NFR BLD AUTO: 0.3 %
BILIRUBIN DIRECT+TOT PNL SERPL-MCNC: 0.6 MG/DL
BUN SERPL-MCNC: 25.2 MG/DL (ref 8.4–25.7)
CALCIUM SERPL-MCNC: 8.3 MG/DL (ref 8.8–10)
CHLORIDE SERPL-SCNC: 102 MMOL/L (ref 98–107)
CO2 SERPL-SCNC: 27 MMOL/L (ref 23–31)
CREAT SERPL-MCNC: 1.7 MG/DL (ref 0.73–1.18)
EOSINOPHIL # BLD AUTO: 0.37 X10(3)/MCL (ref 0–0.9)
EOSINOPHIL NFR BLD AUTO: 3.7 %
ERYTHROCYTE [DISTWIDTH] IN BLOOD BY AUTOMATED COUNT: 18.7 % (ref 11.5–17)
GLOBULIN SER-MCNC: 3.5 GM/DL (ref 2.4–3.5)
GLUCOSE SERPL-MCNC: 93 MG/DL (ref 82–115)
HCT VFR BLD AUTO: 43.1 % (ref 42–52)
HGB BLD-MCNC: 13.8 GM/DL (ref 14–18)
IMM GRANULOCYTES # BLD AUTO: 0.03 X10(3)/MCL (ref 0–0.02)
IMM GRANULOCYTES NFR BLD AUTO: 0.3 % (ref 0–0.43)
LYMPHOCYTES # BLD AUTO: 3.08 X10(3)/MCL (ref 0.6–4.6)
LYMPHOCYTES NFR BLD AUTO: 31.2 %
MCH RBC QN AUTO: 28.4 PG (ref 27–31)
MCHC RBC AUTO-ENTMCNC: 32 MG/DL (ref 33–36)
MCV RBC AUTO: 88.7 FL (ref 80–94)
MONOCYTES # BLD AUTO: 0.74 X10(3)/MCL (ref 0.1–1.3)
MONOCYTES NFR BLD AUTO: 7.5 %
NEUTROPHILS # BLD AUTO: 5.6 X10(3)/MCL (ref 2.1–9.2)
NEUTROPHILS NFR BLD AUTO: 57 %
PLATELET # BLD AUTO: 182 X10(3)/MCL (ref 130–400)
PMV BLD AUTO: 10.3 FL (ref 9.4–12.4)
POTASSIUM SERPL-SCNC: 3.9 MMOL/L (ref 3.5–5.1)
PROT SERPL-MCNC: 6.7 GM/DL (ref 5.8–7.6)
RBC # BLD AUTO: 4.86 X10(6)/MCL (ref 4.7–6.1)
SODIUM SERPL-SCNC: 140 MMOL/L (ref 136–145)
WBC # SPEC AUTO: 9.9 X10(3)/MCL (ref 4.5–11.5)

## 2022-05-19 PROCEDURE — 99999 PR PBB SHADOW E&M-EST. PATIENT-LVL V: CPT | Mod: PBBFAC,,, | Performed by: NURSE PRACTITIONER

## 2022-05-19 PROCEDURE — 3079F DIAST BP 80-89 MM HG: CPT | Mod: CPTII,,, | Performed by: NURSE PRACTITIONER

## 2022-05-19 PROCEDURE — 3075F PR MOST RECENT SYSTOLIC BLOOD PRESS GE 130-139MM HG: ICD-10-PCS | Mod: CPTII,,, | Performed by: NURSE PRACTITIONER

## 2022-05-19 PROCEDURE — 99215 OFFICE O/P EST HI 40 MIN: CPT | Mod: S$PBB,,, | Performed by: NURSE PRACTITIONER

## 2022-05-19 PROCEDURE — 3075F SYST BP GE 130 - 139MM HG: CPT | Mod: CPTII,,, | Performed by: NURSE PRACTITIONER

## 2022-05-19 PROCEDURE — 1160F PR REVIEW ALL MEDS BY PRESCRIBER/CLIN PHARMACIST DOCUMENTED: ICD-10-PCS | Mod: CPTII,,, | Performed by: NURSE PRACTITIONER

## 2022-05-19 PROCEDURE — 1159F PR MEDICATION LIST DOCUMENTED IN MEDICAL RECORD: ICD-10-PCS | Mod: CPTII,,, | Performed by: NURSE PRACTITIONER

## 2022-05-19 PROCEDURE — 99215 PR OFFICE/OUTPT VISIT, EST, LEVL V, 40-54 MIN: ICD-10-PCS | Mod: S$PBB,,, | Performed by: NURSE PRACTITIONER

## 2022-05-19 PROCEDURE — 3079F PR MOST RECENT DIASTOLIC BLOOD PRESSURE 80-89 MM HG: ICD-10-PCS | Mod: CPTII,,, | Performed by: NURSE PRACTITIONER

## 2022-05-19 PROCEDURE — 3008F BODY MASS INDEX DOCD: CPT | Mod: CPTII,,, | Performed by: NURSE PRACTITIONER

## 2022-05-19 PROCEDURE — 1160F RVW MEDS BY RX/DR IN RCRD: CPT | Mod: CPTII,,, | Performed by: NURSE PRACTITIONER

## 2022-05-19 PROCEDURE — 36415 COLL VENOUS BLD VENIPUNCTURE: CPT

## 2022-05-19 PROCEDURE — 99999 PR PBB SHADOW E&M-EST. PATIENT-LVL V: ICD-10-PCS | Mod: PBBFAC,,, | Performed by: NURSE PRACTITIONER

## 2022-05-19 PROCEDURE — 1159F MED LIST DOCD IN RCRD: CPT | Mod: CPTII,,, | Performed by: NURSE PRACTITIONER

## 2022-05-19 PROCEDURE — 99215 OFFICE O/P EST HI 40 MIN: CPT | Mod: PBBFAC | Performed by: NURSE PRACTITIONER

## 2022-05-19 PROCEDURE — 3008F PR BODY MASS INDEX (BMI) DOCUMENTED: ICD-10-PCS | Mod: CPTII,,, | Performed by: NURSE PRACTITIONER

## 2022-05-19 PROCEDURE — 85025 COMPLETE CBC W/AUTO DIFF WBC: CPT

## 2022-05-19 PROCEDURE — 80053 COMPREHEN METABOLIC PANEL: CPT

## 2022-05-19 RX ORDER — TAMSULOSIN HYDROCHLORIDE 0.4 MG/1
1 CAPSULE ORAL NIGHTLY
COMMUNITY
Start: 2022-04-28 | End: 2022-09-22 | Stop reason: SDUPTHER

## 2022-05-19 RX ORDER — FINASTERIDE 5 MG/1
5 TABLET, FILM COATED ORAL
COMMUNITY
Start: 2022-03-24

## 2022-05-19 RX ORDER — POTASSIUM CHLORIDE 750 MG/1
10 CAPSULE, EXTENDED RELEASE ORAL DAILY
COMMUNITY
Start: 2022-05-04 | End: 2022-10-11

## 2022-05-19 RX ORDER — METOPROLOL SUCCINATE 50 MG/1
1 TABLET, EXTENDED RELEASE ORAL DAILY
COMMUNITY
Start: 2021-07-22 | End: 2022-11-01

## 2022-05-19 RX ORDER — SULFAMETHOXAZOLE AND TRIMETHOPRIM 800; 160 MG/1; MG/1
1 TABLET ORAL 2 TIMES DAILY
Qty: 28 TABLET | Refills: 0 | Status: SHIPPED | OUTPATIENT
Start: 2022-05-19 | End: 2022-06-09

## 2022-05-19 RX ORDER — ATORVASTATIN CALCIUM 80 MG/1
80 TABLET, FILM COATED ORAL DAILY
COMMUNITY
Start: 2022-05-04

## 2022-05-19 RX ORDER — DESVENLAFAXINE SUCCINATE 50 MG/1
100 TABLET, EXTENDED RELEASE ORAL DAILY
Status: ON HOLD | COMMUNITY
Start: 2022-05-17 | End: 2023-05-17 | Stop reason: HOSPADM

## 2022-05-19 RX ORDER — HYDROCODONE BITARTRATE AND ACETAMINOPHEN 5; 325 MG/1; MG/1
1 TABLET ORAL
COMMUNITY
Start: 2022-04-28 | End: 2022-09-01

## 2022-05-19 RX ORDER — FUROSEMIDE 40 MG/1
40 TABLET ORAL DAILY
COMMUNITY
Start: 2022-04-28 | End: 2022-10-11

## 2022-05-19 RX ORDER — LORAZEPAM 1 MG/1
1 TABLET ORAL 2 TIMES DAILY
COMMUNITY
Start: 2022-04-28 | End: 2022-10-11

## 2022-05-19 RX ORDER — QUETIAPINE FUMARATE 50 MG/1
150 TABLET, FILM COATED ORAL NIGHTLY
Status: ON HOLD | COMMUNITY
Start: 2022-03-23 | End: 2023-05-17 | Stop reason: HOSPADM

## 2022-05-19 RX ORDER — ONDANSETRON 4 MG/1
4 TABLET, FILM COATED ORAL EVERY 6 HOURS PRN
Status: ON HOLD | COMMUNITY
Start: 2021-10-14 | End: 2023-05-17 | Stop reason: HOSPADM

## 2022-05-19 RX ORDER — PROCHLORPERAZINE MALEATE 5 MG
5 TABLET ORAL
Status: ON HOLD | COMMUNITY
Start: 2022-04-28 | End: 2023-05-17 | Stop reason: HOSPADM

## 2022-05-19 RX ORDER — PANTOPRAZOLE SODIUM 40 MG/1
40 TABLET, DELAYED RELEASE ORAL DAILY
COMMUNITY
Start: 2022-04-14 | End: 2022-09-22 | Stop reason: SDUPTHER

## 2022-05-19 RX ORDER — DIPHENOXYLATE HYDROCHLORIDE AND ATROPINE SULFATE 2.5; .025 MG/1; MG/1
1 TABLET ORAL
COMMUNITY
Start: 2021-09-02

## 2022-05-19 RX ORDER — TRAMADOL HYDROCHLORIDE 50 MG/1
50 TABLET ORAL EVERY 6 HOURS
COMMUNITY
Start: 2022-05-17

## 2022-05-19 RX ORDER — FLUCONAZOLE 150 MG/1
150 TABLET ORAL ONCE
Qty: 1 TABLET | Refills: 0 | Status: SHIPPED | OUTPATIENT
Start: 2022-05-19 | End: 2022-05-19

## 2022-05-19 RX ORDER — PREGABALIN 75 MG/1
100 CAPSULE ORAL 3 TIMES DAILY
COMMUNITY
Start: 2022-04-15 | End: 2023-03-27

## 2022-05-19 RX ORDER — MUPIROCIN CALCIUM 20 MG/G
CREAM TOPICAL
Qty: 30 G | Refills: 3 | Status: SHIPPED | OUTPATIENT
Start: 2022-05-19 | End: 2022-09-01

## 2022-05-19 NOTE — TELEPHONE ENCOUNTER
His potassium has been good since he is not having any more diarrhea. Have him hold it for the duration of the Bactrim. Thanks!

## 2022-06-03 PROBLEM — Z87.891 FORMER TOBACCO USE: Status: ACTIVE | Noted: 2020-05-19

## 2022-06-03 PROBLEM — C64.1 CLEAR CELL CARCINOMA OF RIGHT KIDNEY: Status: ACTIVE | Noted: 2020-10-01

## 2022-06-03 NOTE — PROGRESS NOTES
Subjective:       Patient ID: Ye Olivera is a 62 y.o. male.    Urologist: Dr. Christopher Villasenor  PCP: Dr. Ly Quevedo  Nephrologist: Dr. Jenae Le    Renal Cell Carcinoma Stage IV([id number]) Mediastinal lymph nodes--Diagnosed 20  Biopsy/pathology:  Upper EUS done 9/3/20--large cluster of malignant appearing lymph nodes in subcarinal mediastinum causing extrinsic compression in middle 1/3 and thoracic esophagus biopsy c/w metastatic carcinoma, c/w renal cell carcinoma CD10+, PAX -8+, erythematous mucosa in antrum biopsy c/w mild chronic gastritis, inactive, normal duodenum, no pathology in CBD or left lobe of liver or pancreas.  CARIS results- MSI stable, Mismatch repair status proficient. Tumor burden low. Negative for: PD-L1, NTRK1/2/3, BAP1, FH, MET, PBRM1, SDHA. Pathologic variants identified: KDMSC (Exon 13 and 11) and VHL. Immunohistochemistry results: MLH1, MSH2, MSH6, and PM52 positive. PD-L1 negative.   Surgery/pathology:  Right radical nephrectomy and lymph node dissection done 20--clear cell renal cell carcinoma measures 9.2X8C8.7cm, Grade 3, margins clear, tumor limited to kidney, 4/6 lymph nodes positive for metastatic clear cell carcinoma.   Imagin. CT A/P w/o contrast [facility] 20--bulky right renal mass, midpole, measuring 10cm with evidence of internal necrosis and calcifications, primary mass does not extend outside the perinephric space, associated right perinephric edema, multiple enlarged retroperitoneal lymph nodes at level of the kidneys and inferior retroperitoneum, one measures 2.9X1.4cm, additional node measures 2.1X1.8cm, additional node measures 1.8X1.2cm, inferior retroperitoneal node at level of inferior pole right kidney measures 1.8X1.4cm, no convincing evidence of renal vein involvement, no right adrenal gland involvement, no disease apparent outside of abdomen.  2. CT A/P w/ and w/o contrast [facility] 20--large right renal mass measures 10.6X9.5X7.4cm, with  enlarged retroperitoneal nodes, largest measures 2.7cm.  3. CT chest [facility] 8/28/20--solitary left supraclavicular node, small and not overtly concerning, heterogeneous retroesophageal mass at the level of the tariq c/w metastatic deposit, no other evidence for metastatic disease.  4. MRI brain w/ and w/o contrast [facility] 8/28/20--no evidence for intracranial metastatic disease.  5. CT C/A/P done at [facility] 10/28/20--now appears to be 2 adjacent masses at the level of the tariq, measuring in aggregate 6.2X2.7cm as compared with 5.2X2.1cm, subcentimeter mediastinal and hilar nodes are seen, large intermediate density structure at the right renal fossa extending to pelvic brim, most likely post-operative hematoma or seroma, resolution of previously seen retroperitoneal adenopathy.  6. CT C/A/P w&w/o at [facility] on 12/23/20--Interval enlargement of the retro-esophageal mass with mild interval increase in size of multiple paraesophageal and prevascular lymph nodes compatible with progression of disease. Interval decrease in size of the right retroperitoneal postop seroma or hematoma. No evidence of metastatic disease below the diaphragm. Prior right nephrectomy and adrenalectomy. Chololithiasis and stable atherosclerosis.   7. CT C/A/P w/o contrast done at [facility] on 4/5/21--interval decrease in size of multiple prevascular, subcarinal and para-esophageal lymph nodes c/w response to treatment, no new pulmonary nodule or lymph node, no evidence for metastatic disease below the diaphragm, interval decrease in size of the post-operative seromas adjacent to right psoas muscle.  8. CT C/A/P w/o contrast done at [facility] on 7/7/21--stable appearance of previously identified prevascular, subcarinal, and paraesophageal nodes, there are an increased number of sub-centimeter nodes in the aortopulmonary window and precarinal region, nonspecific in appearance, no other evidence of local recurrence or metastatic disease.   9. MRI cervical spine w/ and w/o  contrast done at [facility] 8/4/21--no metastatic disease, multilevel spondylotic changes, no acute abnormality.   10. MRI brain w/ and w/o contrast 8/4/21--normal maturation of the previously seen lacunar infarct at the left centrum semiovale, no new abnormalities, specifically, no evidence of intracranial metastatic disease.   11. CT C/A/P w/o contrast done at [facility] 10/22/21--stable reactive mediastinal adenopathy, interval resection of right kidney and adrenal gland with post-surgical changes but no evidence of metastatic disease.   12. CT C/A/P w/o contrast done at [facility] 1/26/22--4 new pulmonary nodules larges LLL 14mm, 9mm LLL, 5mm LLL, 5mm RML, with enlarging prevascular lymph node now measures 18mm, previous 8mm, concerning for metastatic disease, new mesenteric implant in LLQ measures 11mm, enlarging retroperitoneal LN now measures [id number], previous 14X8mm concerning for progression disease, cholelithiasis.   13. MRI brain w/ and w/o  contrast done at [facility] 3/31/22--no acute intracranial abnormality.   14. CT C/A/P w/o contrast done at [facility] 4/5/22--progressive retroperitoneal and mediastinal adenopathy, with development of hilar adenopathy, increase in size and number of pulmonary nodules.     Treatment History:   1. IL2 started on 11/2/20. 2nd cycle started 11/16/20 - stopped after 7 days due to acute renal failure, volume overload and confusion. Stopped due to progression.  2. Keytruda/Inlyta 1/8/21--1/31/22 (stopped due to progression, autoimmune hepatitis).  3. Prednisone 80mg daily 1/31/22--weaned and stopped 4/14/22.    Treatment Plan:  Cabometyx 60mg daily started 4/7/22  Decrease dose to 40mg daily 4/28/22 due to side effects.  HOLD on 5/19/22 until groin rash and abscess heal.     Chief Complaint: Other Misc (Pt reports that PCP increased his Pristiq to 100 mg.)    HPI   Patient presents for follow-up of renal cell carcinoma. Patient is doing okay. His daughter is with him. He is eating better and gained a few  "lbs. Occasional nausea but no vomiting. Still having fatigue and joint pains. His biggest complaint today is the rash and boils noted to his groin. States they have not improved and he will occasionally "pop" them with pus colored discharge. Denies any fevers. He also has knee spurs and is requesting referral to Ortho. Continues on the Cabometyx at 40mg daily. No other problems reported.        Past Medical History:   Diagnosis Date    Arthritis of knee 8/13/2013    HTN (hypertension) 8/13/2013    Type II or unspecified type diabetes mellitus without mention of complication, not stated as uncontrolled 8/13/2013      Review of patient's allergies indicates:  No Known Allergies     Current Outpatient Medications:     aspirin 81 mg Cap, Take 81 mg by mouth once daily at 6am., Disp: , Rfl:     atorvastatin (LIPITOR) 80 MG tablet, Take 80 mg by mouth once daily., Disp: , Rfl:     desvenlafaxine succinate (PRISTIQ) 50 MG Tb24, Take 50 mg by mouth once daily., Disp: , Rfl:     diphenoxylate-atropine 2.5-0.025 mg (LOMOTIL) 2.5-0.025 mg per tablet, Take 1 tablet by mouth as needed., Disp: , Rfl:     finasteride (PROSCAR) 5 mg tablet, Take 5 mg by mouth as needed., Disp: , Rfl:     furosemide (LASIX) 40 MG tablet, Take 40 mg by mouth once daily at 6am., Disp: , Rfl:     HYDROcodone-acetaminophen (NORCO) 5-325 mg per tablet, Take 1 tablet by mouth as needed., Disp: , Rfl:     LORazepam (ATIVAN) 1 MG tablet, Take 1 mg by mouth 2 (two) times a day., Disp: , Rfl:     metformin (GLUCOPHAGE) 1000 MG tablet, Take 1 tablet (1,000 mg total) by mouth 2 (two) times daily with meals., Disp: 180 tablet, Rfl: 12    metoprolol succinate (TOPROL-XL) 50 MG 24 hr tablet, Take 1 tablet by mouth once daily., Disp: , Rfl:     mupirocin calcium 2% (BACTROBAN) 2 % cream, Apply to affected area 3 times daily, Disp: 30 g, Rfl: 3    ondansetron (ZOFRAN) 4 MG tablet, Take 4 mg by mouth every 6 (six) hours as needed., Disp: , Rfl:     " pantoprazole (PROTONIX) 40 MG tablet, Take 40 mg by mouth once daily., Disp: , Rfl:     potassium chloride (MICRO-K) 10 MEQ CpSR, Take 10 mEq by mouth once daily., Disp: , Rfl:     pregabalin (LYRICA) 75 MG capsule, Take 75 mg by mouth 2 (two) times a day., Disp: , Rfl:     prochlorperazine (COMPAZINE) 5 MG tablet, Take 5 mg by mouth as needed., Disp: , Rfl:     QUEtiapine (SEROQUEL) 50 MG tablet, Take 150 mg by mouth every evening., Disp: , Rfl:     tamsulosin (FLOMAX) 0.4 mg Cap, Take 1 capsule by mouth every evening., Disp: , Rfl:     tolterodine (DETROL LA) 4 MG 24 hr capsule, Take 4 mg by mouth once daily., Disp: , Rfl:     traMADoL (ULTRAM) 50 mg tablet, Take 50 mg by mouth every 6 (six) hours., Disp: , Rfl:     albuterol 90 mcg/actuation inhaler, Inhale 2 puffs into the lungs every 4 (four) hours as needed., Disp: 17 g, Rfl: 2    citalopram (CELEXA) 20 MG tablet, Take 1 tablet (20 mg total) by mouth once daily., Disp: 30 tablet, Rfl: 6    Current Facility-Administered Medications:     albuterol sulfate nebulizer solution 2.5 mg, 2.5 mg, Nebulization, Q4H PRN, Thierno Snider MD  Review of Systems   Constitutional: Positive for fatigue. Negative for activity change and fever.        Weight gain   Eyes: Negative for visual disturbance.   Respiratory: Negative for cough and shortness of breath.    Cardiovascular: Negative for chest pain.   Gastrointestinal: Positive for nausea. Negative for abdominal pain, blood in stool, constipation, diarrhea and vomiting.   Genitourinary: Negative for difficulty urinating.   Musculoskeletal: Positive for back pain and leg pain.        Joint pains all over   Integumentary:  Positive for rash and wound (Diabetic wound to left great toe).        Boils on his groin/scrotum are resolved   Neurological: Negative for dizziness, weakness and headaches.   Psychiatric/Behavioral: Negative for behavioral problems and suicidal ideas.        Vitals:    06/09/22 1427   BP: 114/71    Pulse: 81   Resp: 14   Temp: 97.9 °F (36.6 °C)      Physical Exam  Constitutional:       General: He is awake.      Appearance: Normal appearance. He is overweight.   HENT:      Head: Normocephalic.   Eyes:      General: Lids are normal. Vision grossly intact.      Extraocular Movements: Extraocular movements intact.      Conjunctiva/sclera: Conjunctivae normal.   Cardiovascular:      Rate and Rhythm: Normal rate and regular rhythm.      Pulses: Normal pulses.      Heart sounds: Normal heart sounds.   Pulmonary:      Effort: Pulmonary effort is normal.      Breath sounds: Normal breath sounds and air entry.   Abdominal:      General: Abdomen is flat. Bowel sounds are normal.      Palpations: Abdomen is soft.      Comments: Scattered lap incisions healed well   Genitourinary:     Testes: Normal.          Comments: Rash and abscesses have resolved to groin. He does have irritation and moisture noted to perineum. Small irritation noted to right buttock/rectum  Musculoskeletal:      Cervical back: Normal, normal range of motion and neck supple.      Thoracic back: Normal.      Lumbar back: Normal.        Feet:    Feet:      Comments: Diabetic ulcer/wound on left great toe with necrotic tissue present  Lymphadenopathy:      Comments: No palpable adenopathy   Skin:     General: Skin is warm.      Comments: Several bruises noted to bilateral forearms   Neurological:      Mental Status: He is alert and oriented to person, place, and time.   Psychiatric:         Attention and Perception: Attention normal.         Mood and Affect: Mood normal.         Speech: Speech normal.         Behavior: Behavior is cooperative.         Thought Content: Thought content does not include suicidal ideation.       ECOG SCORE    1 - Restricted in strenuous activity-ambulatory and able to carry out work of a light nature       Lab Visit on 06/09/2022   Component Date Value    WBC 06/09/2022 9.7     RBC 06/09/2022 4.48 (A)    Hgb 06/09/2022  13.1 (A)    Hct 06/09/2022 40.9 (A)    MCV 06/09/2022 91.3     MCH 06/09/2022 29.2     MCHC 06/09/2022 32.0 (A)    RDW 06/09/2022 17.8 (A)    Platelet 06/09/2022 183     MPV 06/09/2022 9.9     Neut % 06/09/2022 60.4     Lymph % 06/09/2022 28.5     Mono % 06/09/2022 7.5     Eos % 06/09/2022 2.9     Basophil % 06/09/2022 0.3     Lymph # 06/09/2022 2.76     Neut # 06/09/2022 5.8     Mono # 06/09/2022 0.73     Eos # 06/09/2022 0.28     Baso # 06/09/2022 0.03     IG# 06/09/2022 0.04 (A)    IG% 06/09/2022 0.4           Assessment:       Problem List Items Addressed This Visit        Oncology    Clear cell carcinoma of right kidney - Primary    Relevant Orders    Comprehensive Metabolic Panel    CBC Auto Differential             Plan:         Patient with large right renal mass and retroperitoneal adenopathy diagnosed by CT in 7/2020.  CT chest showed mediastinal fanny mass and biopsy done via EUS + for metastatic renal cell carcinoma.  Patient s/p radical right nephrectomy done 9/11/20. Pathology showed 9.2cm clear cell renal cell carcinoma, margins clear with 4/6 lymph nodes positive.  Post-operative course complicated by hematoma requiring blood transfusion.  CARIS results- MSI stable, Mismatch repair status proficient. Tumor burden low. Negative for: PD-L1, NTRK1/2/3, BAP1, FH, MET, PBRM1, SDHA. Pathologic variants identified: KDMSC (Exon 13 and 11) and VHL. Immunohistochemistry results: MLH1, MSH2, MSH6, and PM52 positive. PD-L1 negative.     Referred to Dr. Haja Sal for evaluation for IL-2. Explained this is only treatment to be shown to achieve long-term remissions in stage IV disease and patient has limited disease at this time.  Patient started 1st 5 days of IL-2 on 11/2/20.   Started his 2nd round on 11/16/20 and it was stopped on 11/22/20 due to acute renal failure, volume overload and agitation.   CT scan on 12/23/20 at [facility] revealed progression of his disease. Dr. Sal contacted  patient and discussed the results. He also reached out to Dr. Alvarado to notify her that the patient will not be returning.   Started Keytruda 200mg every 3 weeks on 1/8/21 + Inlyta 5mg oral BID on 1/11/21.  CT C/A/P done at [facility] on 10/22/21 showed stable mild reactive mediastinal nodes, otherwise stable.  Patient continued on treatment s/p cycle 18 Keytruda given 1/10/22.  Labs on 1/10/22 showed elevated LFTs. Inlyta held.  CT C/A/P done at [facility] on 1/26/22 showed unfortunately disease progression with new pulmonary nodules, enlargement in mediastinal node, new mesenteric nodule and retroperitoneal adenopathy.  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 Hepatitis panel negative. Keytruda stopped. Started on Prednisone 80mg daily on 1/31/22.  Patient had suicidal ideation and severe depression during visit in 3/2022, seen by psychiatry and meds adjusted, much improved.  CT C/A/P from [facility] on 4/5/22 showed progressive disease.    Treatment change recommended to Cabometyx 60mg daily per NCCN guidelines (Category 1) for subsequent treatment. Started on 4/7/22.  Prednisone weaned and stopped 4/14/22.  Patient was having multiple side effects (n/v, diarrhea, mouth sores) from Cabometyx.  Reduced dose to 40mg on 4/28/22.     At his last appointment, noted worsening cellulitis rash to groin. Several abscesses palpated in scrotum.   Cabometyx has been on hold since 5/19/22. Completed Diflucan and Bactrim DS. The rash looks much better today, no abscess noted. He does have moisture noted to groin area. Recommended he keep the area clean and dry. Apply antifungal powders to help keep dry. This was a problem before his treatments, chronic.   Patient mentions he is scheduled for TURP with Dr. Villasenor at [facility] on 6/23/22. Dr. Alvarado is aware.   For this reason, will continue to hold the Cabometyx.   Will have him follow-up in 1 month with Dr. Alvarado.   Plan to schedule his CT scans at his  next appointment.     Continue Compazine for nausea.   Continue Norco for joint pain.   All questions answered at this time.       MARCIN Laboy

## 2022-06-09 ENCOUNTER — LAB VISIT (OUTPATIENT)
Dept: LAB | Facility: HOSPITAL | Age: 62
End: 2022-06-09
Attending: INTERNAL MEDICINE
Payer: MEDICAID

## 2022-06-09 ENCOUNTER — OFFICE VISIT (OUTPATIENT)
Dept: HEMATOLOGY/ONCOLOGY | Facility: CLINIC | Age: 62
End: 2022-06-09
Payer: MEDICAID

## 2022-06-09 VITALS
OXYGEN SATURATION: 99 % | SYSTOLIC BLOOD PRESSURE: 114 MMHG | HEIGHT: 70 IN | HEART RATE: 81 BPM | DIASTOLIC BLOOD PRESSURE: 71 MMHG | TEMPERATURE: 98 F | WEIGHT: 222 LBS | RESPIRATION RATE: 14 BRPM | BODY MASS INDEX: 31.78 KG/M2

## 2022-06-09 DIAGNOSIS — C64.1 CLEAR CELL CARCINOMA OF RIGHT KIDNEY: ICD-10-CM

## 2022-06-09 DIAGNOSIS — T45.1X5A CHEMOTHERAPY INDUCED NAUSEA AND VOMITING: ICD-10-CM

## 2022-06-09 DIAGNOSIS — R11.2 CHEMOTHERAPY INDUCED NAUSEA AND VOMITING: ICD-10-CM

## 2022-06-09 DIAGNOSIS — C64.1 CLEAR CELL CARCINOMA OF RIGHT KIDNEY: Primary | ICD-10-CM

## 2022-06-09 LAB
ALBUMIN SERPL-MCNC: 3.4 GM/DL (ref 3.4–4.8)
ALBUMIN/GLOB SERPL: 1 RATIO (ref 1.1–2)
ALP SERPL-CCNC: 143 UNIT/L (ref 40–150)
ALT SERPL-CCNC: 38 UNIT/L (ref 0–55)
AST SERPL-CCNC: 32 UNIT/L (ref 5–34)
BASOPHILS # BLD AUTO: 0.03 X10(3)/MCL (ref 0–0.2)
BASOPHILS NFR BLD AUTO: 0.3 %
BILIRUBIN DIRECT+TOT PNL SERPL-MCNC: 0.4 MG/DL
BUN SERPL-MCNC: 25.7 MG/DL (ref 8.4–25.7)
CALCIUM SERPL-MCNC: 8.7 MG/DL (ref 8.8–10)
CHLORIDE SERPL-SCNC: 103 MMOL/L (ref 98–107)
CO2 SERPL-SCNC: 26 MMOL/L (ref 23–31)
CREAT SERPL-MCNC: 1.87 MG/DL (ref 0.73–1.18)
EOSINOPHIL # BLD AUTO: 0.28 X10(3)/MCL (ref 0–0.9)
EOSINOPHIL NFR BLD AUTO: 2.9 %
ERYTHROCYTE [DISTWIDTH] IN BLOOD BY AUTOMATED COUNT: 17.8 % (ref 11.5–17)
GLOBULIN SER-MCNC: 3.4 GM/DL (ref 2.4–3.5)
GLUCOSE SERPL-MCNC: 116 MG/DL (ref 82–115)
HCT VFR BLD AUTO: 40.9 % (ref 42–52)
HGB BLD-MCNC: 13.1 GM/DL (ref 14–18)
IMM GRANULOCYTES # BLD AUTO: 0.04 X10(3)/MCL (ref 0–0.02)
IMM GRANULOCYTES NFR BLD AUTO: 0.4 % (ref 0–0.43)
LYMPHOCYTES # BLD AUTO: 2.76 X10(3)/MCL (ref 0.6–4.6)
LYMPHOCYTES NFR BLD AUTO: 28.5 %
MCH RBC QN AUTO: 29.2 PG (ref 27–31)
MCHC RBC AUTO-ENTMCNC: 32 MG/DL (ref 33–36)
MCV RBC AUTO: 91.3 FL (ref 80–94)
MONOCYTES # BLD AUTO: 0.73 X10(3)/MCL (ref 0.1–1.3)
MONOCYTES NFR BLD AUTO: 7.5 %
NEUTROPHILS # BLD AUTO: 5.8 X10(3)/MCL (ref 2.1–9.2)
NEUTROPHILS NFR BLD AUTO: 60.4 %
PLATELET # BLD AUTO: 183 X10(3)/MCL (ref 130–400)
PMV BLD AUTO: 9.9 FL (ref 9.4–12.4)
POTASSIUM SERPL-SCNC: 4.2 MMOL/L (ref 3.5–5.1)
PROT SERPL-MCNC: 6.8 GM/DL (ref 5.8–7.6)
RBC # BLD AUTO: 4.48 X10(6)/MCL (ref 4.7–6.1)
SODIUM SERPL-SCNC: 141 MMOL/L (ref 136–145)
WBC # SPEC AUTO: 9.7 X10(3)/MCL (ref 4.5–11.5)

## 2022-06-09 PROCEDURE — 3074F PR MOST RECENT SYSTOLIC BLOOD PRESSURE < 130 MM HG: ICD-10-PCS | Mod: CPTII,,, | Performed by: NURSE PRACTITIONER

## 2022-06-09 PROCEDURE — 3078F DIAST BP <80 MM HG: CPT | Mod: CPTII,,, | Performed by: NURSE PRACTITIONER

## 2022-06-09 PROCEDURE — 99999 PR PBB SHADOW E&M-EST. PATIENT-LVL V: ICD-10-PCS | Mod: PBBFAC,,, | Performed by: NURSE PRACTITIONER

## 2022-06-09 PROCEDURE — 1159F PR MEDICATION LIST DOCUMENTED IN MEDICAL RECORD: ICD-10-PCS | Mod: CPTII,,, | Performed by: NURSE PRACTITIONER

## 2022-06-09 PROCEDURE — 99215 OFFICE O/P EST HI 40 MIN: CPT | Mod: S$PBB,,, | Performed by: NURSE PRACTITIONER

## 2022-06-09 PROCEDURE — 85025 COMPLETE CBC W/AUTO DIFF WBC: CPT

## 2022-06-09 PROCEDURE — 80053 COMPREHEN METABOLIC PANEL: CPT

## 2022-06-09 PROCEDURE — 99215 OFFICE O/P EST HI 40 MIN: CPT | Mod: PBBFAC | Performed by: NURSE PRACTITIONER

## 2022-06-09 PROCEDURE — 1160F PR REVIEW ALL MEDS BY PRESCRIBER/CLIN PHARMACIST DOCUMENTED: ICD-10-PCS | Mod: CPTII,,, | Performed by: NURSE PRACTITIONER

## 2022-06-09 PROCEDURE — 3008F PR BODY MASS INDEX (BMI) DOCUMENTED: ICD-10-PCS | Mod: CPTII,,, | Performed by: NURSE PRACTITIONER

## 2022-06-09 PROCEDURE — 3078F PR MOST RECENT DIASTOLIC BLOOD PRESSURE < 80 MM HG: ICD-10-PCS | Mod: CPTII,,, | Performed by: NURSE PRACTITIONER

## 2022-06-09 PROCEDURE — 99999 PR PBB SHADOW E&M-EST. PATIENT-LVL V: CPT | Mod: PBBFAC,,, | Performed by: NURSE PRACTITIONER

## 2022-06-09 PROCEDURE — 3074F SYST BP LT 130 MM HG: CPT | Mod: CPTII,,, | Performed by: NURSE PRACTITIONER

## 2022-06-09 PROCEDURE — 1159F MED LIST DOCD IN RCRD: CPT | Mod: CPTII,,, | Performed by: NURSE PRACTITIONER

## 2022-06-09 PROCEDURE — 99215 PR OFFICE/OUTPT VISIT, EST, LEVL V, 40-54 MIN: ICD-10-PCS | Mod: S$PBB,,, | Performed by: NURSE PRACTITIONER

## 2022-06-09 PROCEDURE — 1160F RVW MEDS BY RX/DR IN RCRD: CPT | Mod: CPTII,,, | Performed by: NURSE PRACTITIONER

## 2022-06-09 PROCEDURE — 3008F BODY MASS INDEX DOCD: CPT | Mod: CPTII,,, | Performed by: NURSE PRACTITIONER

## 2022-06-09 PROCEDURE — 36415 COLL VENOUS BLD VENIPUNCTURE: CPT

## 2022-06-09 RX ORDER — TOLTERODINE 4 MG/1
4 CAPSULE, EXTENDED RELEASE ORAL DAILY
COMMUNITY
Start: 2022-06-01

## 2022-06-27 ENCOUNTER — TELEPHONE (OUTPATIENT)
Dept: HEMATOLOGY/ONCOLOGY | Facility: CLINIC | Age: 62
End: 2022-06-27
Payer: MEDICAID

## 2022-06-27 NOTE — TELEPHONE ENCOUNTER
Dr. Alvarado said 7-10 days after surgery is fine. If he is all healed up then he can start this Thursday but he can wait until next Monday if he would like.

## 2022-07-01 NOTE — PROGRESS NOTES
Subjective:       Patient ID: Ye Olivera is a 62 y.o. male.    Urologist: Dr. Christopher Villasenor  PCP: Dr. Ly Quevedo  Nephrologist: Dr. Jenae Le    Renal Cell Carcinoma Stage IV([id number]) Mediastinal lymph nodes--Diagnosed 20  Biopsy/pathology:  Upper EUS done 9/3/20--large cluster of malignant appearing lymph nodes in subcarinal mediastinum causing extrinsic compression in middle 1/3 and thoracic esophagus biopsy c/w metastatic carcinoma, c/w renal cell carcinoma CD10+, PAX -8+, erythematous mucosa in antrum biopsy c/w mild chronic gastritis, inactive, normal duodenum, no pathology in CBD or left lobe of liver or pancreas.  CARIS results- MSI stable, Mismatch repair status proficient. Tumor burden low. Negative for: PD-L1, NTRK1/2/3, BAP1, FH, MET, PBRM1, SDHA. Pathologic variants identified: KDMSC (Exon 13 and 11) and VHL. Immunohistochemistry results: MLH1, MSH2, MSH6, and PM52 positive. PD-L1 negative.   Surgery/pathology:  Right radical nephrectomy and lymph node dissection done 20--clear cell renal cell carcinoma measures 9.2X8C8.7cm, Grade 3, margins clear, tumor limited to kidney, 4/6 lymph nodes positive for metastatic clear cell carcinoma.   Imagin. CT A/P w/o contrast [facility] 20--bulky right renal mass, midpole, measuring 10cm with evidence of internal necrosis and calcifications, primary mass does not extend outside the perinephric space, associated right perinephric edema, multiple enlarged retroperitoneal lymph nodes at level of the kidneys and inferior retroperitoneum, one measures 2.9X1.4cm, additional node measures 2.1X1.8cm, additional node measures 1.8X1.2cm, inferior retroperitoneal node at level of inferior pole right kidney measures 1.8X1.4cm, no convincing evidence of renal vein involvement, no right adrenal gland involvement, no disease apparent outside of abdomen.  2. CT A/P w/ and w/o contrast [facility] 20--large right renal mass measures 10.6X9.5X7.4cm, with  enlarged retroperitoneal nodes, largest measures 2.7cm.  3. CT chest [facility] 8/28/20--solitary left supraclavicular node, small and not overtly concerning, heterogeneous retroesophageal mass at the level of the tariq c/w metastatic deposit, no other evidence for metastatic disease.  4. MRI brain w/ and w/o contrast [facility] 8/28/20--no evidence for intracranial metastatic disease.  5. CT C/A/P done at [facility] 10/28/20--now appears to be 2 adjacent masses at the level of the tariq, measuring in aggregate 6.2X2.7cm as compared with 5.2X2.1cm, subcentimeter mediastinal and hilar nodes are seen, large intermediate density structure at the right renal fossa extending to pelvic brim, most likely post-operative hematoma or seroma, resolution of previously seen retroperitoneal adenopathy.  6. CT C/A/P w&w/o at [facility] on 12/23/20--Interval enlargement of the retro-esophageal mass with mild interval increase in size of multiple paraesophageal and prevascular lymph nodes compatible with progression of disease. Interval decrease in size of the right retroperitoneal postop seroma or hematoma. No evidence of metastatic disease below the diaphragm. Prior right nephrectomy and adrenalectomy. Chololithiasis and stable atherosclerosis.   7. CT C/A/P w/o contrast done at [facility] on 4/5/21--interval decrease in size of multiple prevascular, subcarinal and para-esophageal lymph nodes c/w response to treatment, no new pulmonary nodule or lymph node, no evidence for metastatic disease below the diaphragm, interval decrease in size of the post-operative seromas adjacent to right psoas muscle.  8. CT C/A/P w/o contrast done at [facility] on 7/7/21--stable appearance of previously identified prevascular, subcarinal, and paraesophageal nodes, there are an increased number of sub-centimeter nodes in the aortopulmonary window and precarinal region, nonspecific in appearance, no other evidence of local recurrence or metastatic disease.   9. MRI cervical spine w/ and w/o  contrast done at [facility] 8/4/21--no metastatic disease, multilevel spondylotic changes, no acute abnormality.   10. MRI brain w/ and w/o contrast 8/4/21--normal maturation of the previously seen lacunar infarct at the left centrum semiovale, no new abnormalities, specifically, no evidence of intracranial metastatic disease.   11. CT C/A/P w/o contrast done at [facility] 10/22/21--stable reactive mediastinal adenopathy, interval resection of right kidney and adrenal gland with post-surgical changes but no evidence of metastatic disease.   12. CT C/A/P w/o contrast done at [facility] 1/26/22--4 new pulmonary nodules larges LLL 14mm, 9mm LLL, 5mm LLL, 5mm RML, with enlarging prevascular lymph node now measures 18mm, previous 8mm, concerning for metastatic disease, new mesenteric implant in LLQ measures 11mm, enlarging retroperitoneal LN now measures [id number], previous 14X8mm concerning for progression disease, cholelithiasis.   13. MRI brain w/ and w/o  contrast done at [facility] 3/31/22--no acute intracranial abnormality.   14. CT C/A/P w/o contrast done at [facility] 4/5/22--progressive retroperitoneal and mediastinal adenopathy, with development of hilar adenopathy, increase in size and number of pulmonary nodules.     Treatment History:   1. IL2 started on 11/2/20. 2nd cycle started 11/16/20 - stopped after 7 days due to acute renal failure, volume overload and confusion. Stopped due to progression.  2. Keytruda/Inlyta 1/8/21--1/31/22 (stopped due to progression, autoimmune hepatitis).  3. Prednisone 80mg daily 1/31/22--weaned and stopped 4/14/22.    Treatment Plan:  Cabometyx 60mg daily started 4/7/22  Decrease dose to 40mg daily 4/28/22 due to side effects.  HOLD on 5/19/22 until groin rash and abscess heal/also underwent TURP on 6/23/22  Restarted 7/6/22    Chief Complaint: Other Misc (Pt reports that he is cleared from [facility] to start chemo pills.)    HPI   Patient presents for follow-up of renal cell carcinoma. He underwent TURP with   [facility] 2 weeks ago. He restarted his Cabometyx yesterday. So far doing okay. Labs today with normal CBCdiff. Discussed delaying obtaining next CT to give him more time back on Cabometyx.      Past Medical History:   Diagnosis Date    Arthritis of knee 8/13/2013    HTN (hypertension) 8/13/2013    Type II or unspecified type diabetes mellitus without mention of complication, not stated as uncontrolled 8/13/2013      Review of patient's allergies indicates:  No Known Allergies     Current Outpatient Medications:     atorvastatin (LIPITOR) 80 MG tablet, Take 80 mg by mouth once daily., Disp: , Rfl:     desvenlafaxine succinate (PRISTIQ) 50 MG Tb24, Take 50 mg by mouth once daily., Disp: , Rfl:     diphenoxylate-atropine 2.5-0.025 mg (LOMOTIL) 2.5-0.025 mg per tablet, Take 1 tablet by mouth as needed., Disp: , Rfl:     finasteride (PROSCAR) 5 mg tablet, Take 5 mg by mouth as needed., Disp: , Rfl:     furosemide (LASIX) 40 MG tablet, Take 40 mg by mouth once daily at 6am., Disp: , Rfl:     HYDROcodone-acetaminophen (NORCO) 5-325 mg per tablet, Take 1 tablet by mouth as needed., Disp: , Rfl:     LORazepam (ATIVAN) 1 MG tablet, Take 1 mg by mouth 2 (two) times a day., Disp: , Rfl:     metformin (GLUCOPHAGE) 1000 MG tablet, Take 1 tablet (1,000 mg total) by mouth 2 (two) times daily with meals., Disp: 180 tablet, Rfl: 12    metoprolol succinate (TOPROL-XL) 50 MG 24 hr tablet, Take 1 tablet by mouth once daily., Disp: , Rfl:     mupirocin calcium 2% (BACTROBAN) 2 % cream, Apply to affected area 3 times daily, Disp: 30 g, Rfl: 3    ondansetron (ZOFRAN) 4 MG tablet, Take 4 mg by mouth every 6 (six) hours as needed., Disp: , Rfl:     pantoprazole (PROTONIX) 40 MG tablet, Take 40 mg by mouth once daily., Disp: , Rfl:     potassium chloride (MICRO-K) 10 MEQ CpSR, Take 10 mEq by mouth once daily., Disp: , Rfl:     pregabalin (LYRICA) 75 MG capsule, Take 75 mg by mouth 2 (two) times a day., Disp: , Rfl:      prochlorperazine (COMPAZINE) 5 MG tablet, Take 5 mg by mouth as needed., Disp: , Rfl:     QUEtiapine (SEROQUEL) 50 MG tablet, Take 150 mg by mouth every evening., Disp: , Rfl:     tamsulosin (FLOMAX) 0.4 mg Cap, Take 1 capsule by mouth every evening., Disp: , Rfl:     tolterodine (DETROL LA) 4 MG 24 hr capsule, Take 4 mg by mouth once daily., Disp: , Rfl:     traMADoL (ULTRAM) 50 mg tablet, Take 50 mg by mouth every 6 (six) hours., Disp: , Rfl:     albuterol 90 mcg/actuation inhaler, Inhale 2 puffs into the lungs every 4 (four) hours as needed., Disp: 17 g, Rfl: 2    aspirin 81 mg Cap, Take 81 mg by mouth once daily at 6am., Disp: , Rfl:     citalopram (CELEXA) 20 MG tablet, Take 1 tablet (20 mg total) by mouth once daily., Disp: 30 tablet, Rfl: 6    Current Facility-Administered Medications:     albuterol sulfate nebulizer solution 2.5 mg, 2.5 mg, Nebulization, Q4H PRN, Thierno Snider MD     Review of Systems   Constitutional: Negative for activity change, appetite change, fatigue, fever and unexpected weight change.        Weight gain   HENT: Negative for mouth sores.    Eyes: Negative for visual disturbance.   Respiratory: Negative for cough and shortness of breath.    Cardiovascular: Negative for chest pain.   Gastrointestinal: Negative for abdominal pain, blood in stool, constipation, diarrhea, nausea and vomiting.   Genitourinary: Negative for difficulty urinating and frequency.   Musculoskeletal: Positive for back pain. Negative for leg pain.        Joint pains all over   Integumentary:  Negative for rash and wound.   Neurological: Negative for dizziness, weakness and headaches.   Hematological: Negative for adenopathy.   Psychiatric/Behavioral: Negative for behavioral problems and suicidal ideas. The patient is not nervous/anxious.      Physical Exam  Constitutional:       Appearance: Normal appearance.   HENT:      Head: Normocephalic and atraumatic.      Nose: Nose normal.   Eyes:      Extraocular  Movements: Extraocular movements intact.      Conjunctiva/sclera: Conjunctivae normal.   Cardiovascular:      Rate and Rhythm: Normal rate and regular rhythm.      Heart sounds: Normal heart sounds.   Pulmonary:      Effort: Pulmonary effort is normal.   Abdominal:      Palpations: Abdomen is soft.   Musculoskeletal:         General: Normal range of motion.   Skin:     General: Skin is warm and dry.   Neurological:      General: No focal deficit present.      Mental Status: He is alert.   Psychiatric:         Mood and Affect: Mood normal.            Vitals:    07/07/22 1101   BP: 128/80   Pulse: 65   Resp: 14   Temp: 98.2 °F (36.8 °C)        ECOG SCORE         Lab Visit on 07/07/2022   Component Date Value    WBC 07/07/2022 11.8 (A)    RBC 07/07/2022 4.40 (A)    Hgb 07/07/2022 12.7 (A)    Hct 07/07/2022 40.9 (A)    MCV 07/07/2022 93.0     MCH 07/07/2022 28.9     MCHC 07/07/2022 31.1 (A)    RDW 07/07/2022 15.5     Platelet 07/07/2022 230     MPV 07/07/2022 9.8     Neut % 07/07/2022 66.2     Lymph % 07/07/2022 20.2     Mono % 07/07/2022 8.6     Eos % 07/07/2022 2.9     Basophil % 07/07/2022 0.7     Lymph # 07/07/2022 2.38     Neut # 07/07/2022 7.8     Mono # 07/07/2022 1.01     Eos # 07/07/2022 0.34     Baso # 07/07/2022 0.08     IG# 07/07/2022 0.17 (A)    IG% 07/07/2022 1.4           Assessment:        1. 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  CT chest showed mediastinal fanny mass and biopsy done via EUS + for metastatic renal cell carcinoma.  Patient s/p radical right nephrectomy done 9/11/20. Pathology showed 9.2cm clear cell renal cell carcinoma, margins clear with 4/6 lymph nodes positive.  Post-operative course complicated by hematoma requiring blood transfusion.  CARIS results- MSI stable, Mismatch repair status proficient. Tumor  burden low. Negative for: PD-L1, NTRK1/2/3, BAP1, FH, MET, PBRM1, SDHA. Pathologic variants identified: KDMSC (Exon 13 and 11) and VHL. Immunohistochemistry results: MLH1, MSH2, MSH6, and PM52 positive. PD-L1 negative.     Referred to Dr. Haja Sal for evaluation for IL-2. Explained this is only treatment to be shown to achieve long-term remissions in stage IV disease and patient has limited disease at this time.  Patient started 1st 5 days of IL-2 on 11/2/20.   Started his 2nd round on 11/16/20 and it was stopped on 11/22/20 due to acute renal failure, volume overload and agitation.   CT scan on 12/23/20 at [facility] revealed progression of his disease. Dr. Sal contacted patient and discussed the results. He also reached out to Dr. Alvarado to notify her that the patient will not be returning.   Started Keytruda 200mg every 3 weeks on 1/8/21 + Inlyta 5mg oral BID on 1/11/21.  CT C/A/P done at [facility] on 10/22/21 showed stable mild reactive mediastinal nodes, otherwise stable.  Patient continued on treatment s/p cycle 18 Keytruda given 1/10/22.  Labs on 1/10/22 showed elevated LFTs. Inlyta held.  CT C/A/P done at [facility] on 1/26/22 showed unfortunately disease progression with new pulmonary nodules, enlargement in mediastinal node, new mesenteric nodule and retroperitoneal adenopathy.  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 Hepatitis panel negative. Keytruda stopped. Started on Prednisone 80mg daily on 1/31/22.  Patient had suicidal ideation and severe depression during visit in 3/2022, seen by psychiatry and meds adjusted, much improved.  CT C/A/P from [facility] on 4/5/22 showed progressive disease.    Treatment change recommended to Cabometyx 60mg daily per NCCN guidelines (Category 1) for subsequent treatment. Started on 4/7/22.  Prednisone weaned and stopped 4/14/22.  Patient was having multiple side effects (n/v, diarrhea, mouth sores) from Cabometyx.  Reduced dose to 40mg on 4/28/22.  Held on 5/19/22 due to cellulitis/rashes/abscesses.  Continued to hold s/p TURP on 6/23/22.    Cabometyx restarted on 7/6/22.  Labs today with mildly elevated WBC, otherwise CBCdiff normal. Will f/u CMP.  RTC 3 weeks for follow-up labs and toxicity check. Will also check TSH due to his prior history of immunotherapy.   Plan to schedule his CT scans at his next appointment to be done sometime in August.  I don't want to get them too early since he has been off treatment for sometime.    Continue Compazine for nausea.   Continue Norco for joint pain.   All questions answered at this time.       Elizabeth Alvarado MD

## 2022-07-07 ENCOUNTER — LAB VISIT (OUTPATIENT)
Dept: LAB | Facility: HOSPITAL | Age: 62
End: 2022-07-07
Attending: INTERNAL MEDICINE
Payer: MEDICAID

## 2022-07-07 ENCOUNTER — OFFICE VISIT (OUTPATIENT)
Dept: HEMATOLOGY/ONCOLOGY | Facility: CLINIC | Age: 62
End: 2022-07-07
Payer: MEDICAID

## 2022-07-07 VITALS
SYSTOLIC BLOOD PRESSURE: 128 MMHG | DIASTOLIC BLOOD PRESSURE: 80 MMHG | RESPIRATION RATE: 14 BRPM | BODY MASS INDEX: 31.88 KG/M2 | TEMPERATURE: 98 F | HEIGHT: 70 IN | WEIGHT: 222.69 LBS | HEART RATE: 65 BPM | OXYGEN SATURATION: 96 %

## 2022-07-07 DIAGNOSIS — E03.2 HYPOTHYROIDISM DUE TO DRUGS: ICD-10-CM

## 2022-07-07 DIAGNOSIS — C64.1 CLEAR CELL CARCINOMA OF RIGHT KIDNEY: ICD-10-CM

## 2022-07-07 DIAGNOSIS — C64.9 RENAL CELL CARCINOMA, UNSPECIFIED LATERALITY: Primary | ICD-10-CM

## 2022-07-07 LAB
ALBUMIN SERPL-MCNC: 3.4 GM/DL (ref 3.4–4.8)
ALBUMIN/GLOB SERPL: 1 RATIO (ref 1.1–2)
ALP SERPL-CCNC: 120 UNIT/L (ref 40–150)
ALT SERPL-CCNC: 12 UNIT/L (ref 0–55)
AST SERPL-CCNC: 17 UNIT/L (ref 5–34)
BASOPHILS # BLD AUTO: 0.08 X10(3)/MCL (ref 0–0.2)
BASOPHILS NFR BLD AUTO: 0.7 %
BILIRUBIN DIRECT+TOT PNL SERPL-MCNC: 0.5 MG/DL
BUN SERPL-MCNC: 27.8 MG/DL (ref 8.4–25.7)
CALCIUM SERPL-MCNC: 9 MG/DL (ref 8.8–10)
CHLORIDE SERPL-SCNC: 101 MMOL/L (ref 98–107)
CO2 SERPL-SCNC: 27 MMOL/L (ref 23–31)
CREAT SERPL-MCNC: 1.94 MG/DL (ref 0.73–1.18)
EOSINOPHIL # BLD AUTO: 0.34 X10(3)/MCL (ref 0–0.9)
EOSINOPHIL NFR BLD AUTO: 2.9 %
ERYTHROCYTE [DISTWIDTH] IN BLOOD BY AUTOMATED COUNT: 15.5 % (ref 11.5–17)
GLOBULIN SER-MCNC: 3.4 GM/DL (ref 2.4–3.5)
GLUCOSE SERPL-MCNC: 96 MG/DL (ref 82–115)
HCT VFR BLD AUTO: 40.9 % (ref 42–52)
HGB BLD-MCNC: 12.7 GM/DL (ref 14–18)
IMM GRANULOCYTES # BLD AUTO: 0.17 X10(3)/MCL (ref 0–0.04)
IMM GRANULOCYTES NFR BLD AUTO: 1.4 %
LYMPHOCYTES # BLD AUTO: 2.38 X10(3)/MCL (ref 0.6–4.6)
LYMPHOCYTES NFR BLD AUTO: 20.2 %
MCH RBC QN AUTO: 28.9 PG (ref 27–31)
MCHC RBC AUTO-ENTMCNC: 31.1 MG/DL (ref 33–36)
MCV RBC AUTO: 93 FL (ref 80–94)
MONOCYTES # BLD AUTO: 1.01 X10(3)/MCL (ref 0.1–1.3)
MONOCYTES NFR BLD AUTO: 8.6 %
NEUTROPHILS # BLD AUTO: 7.8 X10(3)/MCL (ref 2.1–9.2)
NEUTROPHILS NFR BLD AUTO: 66.2 %
PLATELET # BLD AUTO: 230 X10(3)/MCL (ref 130–400)
PMV BLD AUTO: 9.8 FL (ref 7.4–10.4)
POTASSIUM SERPL-SCNC: 4.1 MMOL/L (ref 3.5–5.1)
PROT SERPL-MCNC: 6.8 GM/DL (ref 5.8–7.6)
RBC # BLD AUTO: 4.4 X10(6)/MCL (ref 4.7–6.1)
SODIUM SERPL-SCNC: 141 MMOL/L (ref 136–145)
WBC # SPEC AUTO: 11.8 X10(3)/MCL (ref 4.5–11.5)

## 2022-07-07 PROCEDURE — 3074F PR MOST RECENT SYSTOLIC BLOOD PRESSURE < 130 MM HG: ICD-10-PCS | Mod: CPTII,,, | Performed by: INTERNAL MEDICINE

## 2022-07-07 PROCEDURE — 3008F BODY MASS INDEX DOCD: CPT | Mod: CPTII,,, | Performed by: INTERNAL MEDICINE

## 2022-07-07 PROCEDURE — 3074F SYST BP LT 130 MM HG: CPT | Mod: CPTII,,, | Performed by: INTERNAL MEDICINE

## 2022-07-07 PROCEDURE — 3079F PR MOST RECENT DIASTOLIC BLOOD PRESSURE 80-89 MM HG: ICD-10-PCS | Mod: CPTII,,, | Performed by: INTERNAL MEDICINE

## 2022-07-07 PROCEDURE — 3079F DIAST BP 80-89 MM HG: CPT | Mod: CPTII,,, | Performed by: INTERNAL MEDICINE

## 2022-07-07 PROCEDURE — 1159F PR MEDICATION LIST DOCUMENTED IN MEDICAL RECORD: ICD-10-PCS | Mod: CPTII,,, | Performed by: INTERNAL MEDICINE

## 2022-07-07 PROCEDURE — 99215 OFFICE O/P EST HI 40 MIN: CPT | Mod: S$PBB,,, | Performed by: INTERNAL MEDICINE

## 2022-07-07 PROCEDURE — 99999 PR PBB SHADOW E&M-EST. PATIENT-LVL V: ICD-10-PCS | Mod: PBBFAC,,, | Performed by: INTERNAL MEDICINE

## 2022-07-07 PROCEDURE — 99215 PR OFFICE/OUTPT VISIT, EST, LEVL V, 40-54 MIN: ICD-10-PCS | Mod: S$PBB,,, | Performed by: INTERNAL MEDICINE

## 2022-07-07 PROCEDURE — 85025 COMPLETE CBC W/AUTO DIFF WBC: CPT

## 2022-07-07 PROCEDURE — 80053 COMPREHEN METABOLIC PANEL: CPT

## 2022-07-07 PROCEDURE — 1160F PR REVIEW ALL MEDS BY PRESCRIBER/CLIN PHARMACIST DOCUMENTED: ICD-10-PCS | Mod: CPTII,,, | Performed by: INTERNAL MEDICINE

## 2022-07-07 PROCEDURE — 36415 COLL VENOUS BLD VENIPUNCTURE: CPT

## 2022-07-07 PROCEDURE — 99215 OFFICE O/P EST HI 40 MIN: CPT | Mod: PBBFAC | Performed by: INTERNAL MEDICINE

## 2022-07-07 PROCEDURE — 99999 PR PBB SHADOW E&M-EST. PATIENT-LVL V: CPT | Mod: PBBFAC,,, | Performed by: INTERNAL MEDICINE

## 2022-07-07 PROCEDURE — 1160F RVW MEDS BY RX/DR IN RCRD: CPT | Mod: CPTII,,, | Performed by: INTERNAL MEDICINE

## 2022-07-07 PROCEDURE — 1159F MED LIST DOCD IN RCRD: CPT | Mod: CPTII,,, | Performed by: INTERNAL MEDICINE

## 2022-07-07 PROCEDURE — 3008F PR BODY MASS INDEX (BMI) DOCUMENTED: ICD-10-PCS | Mod: CPTII,,, | Performed by: INTERNAL MEDICINE

## 2022-07-07 PROCEDURE — 84075 ASSAY ALKALINE PHOSPHATASE: CPT

## 2022-07-14 ENCOUNTER — TELEPHONE (OUTPATIENT)
Dept: HEMATOLOGY/ONCOLOGY | Facility: CLINIC | Age: 62
End: 2022-07-14
Payer: MEDICAID

## 2022-07-27 ENCOUNTER — TELEPHONE (OUTPATIENT)
Dept: HEMATOLOGY/ONCOLOGY | Facility: CLINIC | Age: 62
End: 2022-07-27
Payer: MEDICAID

## 2022-07-27 NOTE — TELEPHONE ENCOUNTER
Currently inpatient at [facility]. States he has been diagnosed with complete renal failure; currently on dialysis 3x weekly. He went into hospital shortly after having TURP. Should be discharged today or tomorrow. He wants to keep appointment on Monday.     Ania -     Could you please get records.    Thanks.

## 2022-07-27 NOTE — TELEPHONE ENCOUNTER
If for some reason he does not go home today or tomorrow. He needs to let us know. I hope he stopped the Cabometyx while admitted.

## 2022-08-01 ENCOUNTER — TELEPHONE (OUTPATIENT)
Dept: HEMATOLOGY/ONCOLOGY | Facility: CLINIC | Age: 62
End: 2022-08-01
Payer: MEDICAID

## 2022-08-01 NOTE — TELEPHONE ENCOUNTER
I called pt again and he said that he was back in the hospital. He said that he will have his wife call us to r/s when he gets out.

## 2022-08-01 NOTE — TELEPHONE ENCOUNTER
I called pt to check on him as he was a n/s today. I LM for him to see if he wants to r/s for Wednesday, 8/3.

## 2022-08-10 ENCOUNTER — OFFICE VISIT (OUTPATIENT)
Dept: HEMATOLOGY/ONCOLOGY | Facility: CLINIC | Age: 62
End: 2022-08-10
Payer: MEDICAID

## 2022-08-10 ENCOUNTER — LAB VISIT (OUTPATIENT)
Dept: LAB | Facility: HOSPITAL | Age: 62
End: 2022-08-10
Attending: INTERNAL MEDICINE
Payer: MEDICAID

## 2022-08-10 ENCOUNTER — TELEPHONE (OUTPATIENT)
Dept: HEMATOLOGY/ONCOLOGY | Facility: CLINIC | Age: 62
End: 2022-08-10

## 2022-08-10 VITALS
RESPIRATION RATE: 14 BRPM | BODY MASS INDEX: 29.97 KG/M2 | WEIGHT: 214.06 LBS | DIASTOLIC BLOOD PRESSURE: 86 MMHG | HEART RATE: 81 BPM | TEMPERATURE: 98 F | OXYGEN SATURATION: 99 % | HEIGHT: 71 IN | SYSTOLIC BLOOD PRESSURE: 129 MMHG

## 2022-08-10 DIAGNOSIS — C77.9 MALIGNANT NEOPLASM METASTATIC TO LYMPH NODES, UNSPECIFIED LYMPH NODE REGION: ICD-10-CM

## 2022-08-10 DIAGNOSIS — C64.1 CLEAR CELL CARCINOMA OF RIGHT KIDNEY: Primary | ICD-10-CM

## 2022-08-10 DIAGNOSIS — C64.9 RENAL CELL CARCINOMA, UNSPECIFIED LATERALITY: ICD-10-CM

## 2022-08-10 DIAGNOSIS — E03.2 HYPOTHYROIDISM DUE TO DRUGS: ICD-10-CM

## 2022-08-10 LAB
ALBUMIN SERPL-MCNC: 3 GM/DL (ref 3.4–4.8)
ALBUMIN/GLOB SERPL: 0.8 RATIO (ref 1.1–2)
ALP SERPL-CCNC: 116 UNIT/L (ref 40–150)
ALT SERPL-CCNC: 8 UNIT/L (ref 0–55)
AST SERPL-CCNC: 15 UNIT/L (ref 5–34)
BASOPHILS # BLD AUTO: 0.04 X10(3)/MCL (ref 0–0.2)
BASOPHILS NFR BLD AUTO: 0.3 %
BILIRUBIN DIRECT+TOT PNL SERPL-MCNC: 0.3 MG/DL
BUN SERPL-MCNC: 44 MG/DL (ref 8.4–25.7)
CALCIUM SERPL-MCNC: 8.7 MG/DL (ref 8.8–10)
CHLORIDE SERPL-SCNC: 103 MMOL/L (ref 98–107)
CO2 SERPL-SCNC: 29 MMOL/L (ref 23–31)
CREAT SERPL-MCNC: 3.22 MG/DL (ref 0.73–1.18)
EOSINOPHIL # BLD AUTO: 0.3 X10(3)/MCL (ref 0–0.9)
EOSINOPHIL NFR BLD AUTO: 2.4 %
ERYTHROCYTE [DISTWIDTH] IN BLOOD BY AUTOMATED COUNT: 15.8 % (ref 11.5–17)
GFR SERPLBLD CREATININE-BSD FMLA CKD-EPI: 21 MLS/MIN/1.73/M2
GLOBULIN SER-MCNC: 3.9 GM/DL (ref 2.4–3.5)
GLUCOSE SERPL-MCNC: 122 MG/DL (ref 82–115)
HCT VFR BLD AUTO: 30.4 % (ref 42–52)
HGB BLD-MCNC: 9.3 GM/DL (ref 14–18)
IMM GRANULOCYTES # BLD AUTO: 0.6 X10(3)/MCL (ref 0–0.04)
IMM GRANULOCYTES NFR BLD AUTO: 4.7 %
LYMPHOCYTES # BLD AUTO: 3.68 X10(3)/MCL (ref 0.6–4.6)
LYMPHOCYTES NFR BLD AUTO: 28.8 %
MCH RBC QN AUTO: 27.6 PG (ref 27–31)
MCHC RBC AUTO-ENTMCNC: 30.6 MG/DL (ref 33–36)
MCV RBC AUTO: 90.2 FL (ref 80–94)
MONOCYTES # BLD AUTO: 1.29 X10(3)/MCL (ref 0.1–1.3)
MONOCYTES NFR BLD AUTO: 10.1 %
NEUTROPHILS # BLD AUTO: 6.9 X10(3)/MCL (ref 2.1–9.2)
NEUTROPHILS NFR BLD AUTO: 53.7 %
PLATELET # BLD AUTO: 301 X10(3)/MCL (ref 130–400)
PMV BLD AUTO: 9.7 FL (ref 7.4–10.4)
POTASSIUM SERPL-SCNC: 3.8 MMOL/L (ref 3.5–5.1)
PROT SERPL-MCNC: 6.9 GM/DL (ref 5.8–7.6)
RBC # BLD AUTO: 3.37 X10(6)/MCL (ref 4.7–6.1)
SODIUM SERPL-SCNC: 142 MMOL/L (ref 136–145)
TSH SERPL-ACNC: 1.13 UIU/ML (ref 0.35–4.94)
WBC # SPEC AUTO: 12.8 X10(3)/MCL (ref 4.5–11.5)

## 2022-08-10 PROCEDURE — 3074F SYST BP LT 130 MM HG: CPT | Mod: CPTII,,, | Performed by: INTERNAL MEDICINE

## 2022-08-10 PROCEDURE — 99215 OFFICE O/P EST HI 40 MIN: CPT | Mod: PBBFAC | Performed by: INTERNAL MEDICINE

## 2022-08-10 PROCEDURE — 99999 PR PBB SHADOW E&M-EST. PATIENT-LVL V: CPT | Mod: PBBFAC,,, | Performed by: INTERNAL MEDICINE

## 2022-08-10 PROCEDURE — 80053 COMPREHEN METABOLIC PANEL: CPT

## 2022-08-10 PROCEDURE — 36415 COLL VENOUS BLD VENIPUNCTURE: CPT

## 2022-08-10 PROCEDURE — 1159F PR MEDICATION LIST DOCUMENTED IN MEDICAL RECORD: ICD-10-PCS | Mod: CPTII,,, | Performed by: INTERNAL MEDICINE

## 2022-08-10 PROCEDURE — 3074F PR MOST RECENT SYSTOLIC BLOOD PRESSURE < 130 MM HG: ICD-10-PCS | Mod: CPTII,,, | Performed by: INTERNAL MEDICINE

## 2022-08-10 PROCEDURE — 1160F RVW MEDS BY RX/DR IN RCRD: CPT | Mod: CPTII,,, | Performed by: INTERNAL MEDICINE

## 2022-08-10 PROCEDURE — 99215 OFFICE O/P EST HI 40 MIN: CPT | Mod: S$PBB,,, | Performed by: INTERNAL MEDICINE

## 2022-08-10 PROCEDURE — 3008F PR BODY MASS INDEX (BMI) DOCUMENTED: ICD-10-PCS | Mod: CPTII,,, | Performed by: INTERNAL MEDICINE

## 2022-08-10 PROCEDURE — 3008F BODY MASS INDEX DOCD: CPT | Mod: CPTII,,, | Performed by: INTERNAL MEDICINE

## 2022-08-10 PROCEDURE — 1159F MED LIST DOCD IN RCRD: CPT | Mod: CPTII,,, | Performed by: INTERNAL MEDICINE

## 2022-08-10 PROCEDURE — 99999 PR PBB SHADOW E&M-EST. PATIENT-LVL V: ICD-10-PCS | Mod: PBBFAC,,, | Performed by: INTERNAL MEDICINE

## 2022-08-10 PROCEDURE — 3079F PR MOST RECENT DIASTOLIC BLOOD PRESSURE 80-89 MM HG: ICD-10-PCS | Mod: CPTII,,, | Performed by: INTERNAL MEDICINE

## 2022-08-10 PROCEDURE — 85025 COMPLETE CBC W/AUTO DIFF WBC: CPT

## 2022-08-10 PROCEDURE — 84443 ASSAY THYROID STIM HORMONE: CPT

## 2022-08-10 PROCEDURE — 3079F DIAST BP 80-89 MM HG: CPT | Mod: CPTII,,, | Performed by: INTERNAL MEDICINE

## 2022-08-10 PROCEDURE — 99215 PR OFFICE/OUTPT VISIT, EST, LEVL V, 40-54 MIN: ICD-10-PCS | Mod: S$PBB,,, | Performed by: INTERNAL MEDICINE

## 2022-08-10 PROCEDURE — 1160F PR REVIEW ALL MEDS BY PRESCRIBER/CLIN PHARMACIST DOCUMENTED: ICD-10-PCS | Mod: CPTII,,, | Performed by: INTERNAL MEDICINE

## 2022-08-10 NOTE — PROGRESS NOTES
Subjective:       Patient ID: Ye Olivera is a 62 y.o. male.    Urologist: Dr. Christopher Villasenor  PCP: Dr. Ly Quevedo  Nephrologist: Dr. Jenae Le    Renal Cell Carcinoma Stage IV([id number]) Mediastinal lymph nodes--Diagnosed 20  Biopsy/pathology:  Upper EUS done 9/3/20--large cluster of malignant appearing lymph nodes in subcarinal mediastinum causing extrinsic compression in middle 1/3 and thoracic esophagus biopsy c/w metastatic carcinoma, c/w renal cell carcinoma CD10+, PAX -8+, erythematous mucosa in antrum biopsy c/w mild chronic gastritis, inactive, normal duodenum, no pathology in CBD or left lobe of liver or pancreas.  CARIS results- MSI stable, Mismatch repair status proficient. Tumor burden low. Negative for: PD-L1, NTRK1/2/3, BAP1, FH, MET, PBRM1, SDHA. Pathologic variants identified: KDMSC (Exon 13 and 11) and VHL. Immunohistochemistry results: MLH1, MSH2, MSH6, and PM52 positive. PD-L1 negative.   Surgery/pathology:  Right radical nephrectomy and lymph node dissection done 20--clear cell renal cell carcinoma measures 9.2X8C8.7cm, Grade 3, margins clear, tumor limited to kidney, 4/6 lymph nodes positive for metastatic clear cell carcinoma.   Imagin. CT A/P w/o contrast [facility] 20--bulky right renal mass, midpole, measuring 10cm with evidence of internal necrosis and calcifications, primary mass does not extend outside the perinephric space, associated right perinephric edema, multiple enlarged retroperitoneal lymph nodes at level of the kidneys and inferior retroperitoneum, one measures 2.9X1.4cm, additional node measures 2.1X1.8cm, additional node measures 1.8X1.2cm, inferior retroperitoneal node at level of inferior pole right kidney measures 1.8X1.4cm, no convincing evidence of renal vein involvement, no right adrenal gland involvement, no disease apparent outside of abdomen.  2. CT A/P w/ and w/o contrast [facility] 20--large right renal mass measures 10.6X9.5X7.4cm, with  enlarged retroperitoneal nodes, largest measures 2.7cm.  3. CT chest [facility] 8/28/20--solitary left supraclavicular node, small and not overtly concerning, heterogeneous retroesophageal mass at the level of the tariq c/w metastatic deposit, no other evidence for metastatic disease.  4. MRI brain w/ and w/o contrast [facility] 8/28/20--no evidence for intracranial metastatic disease.  5. CT C/A/P done at [facility] 10/28/20--now appears to be 2 adjacent masses at the level of the tariq, measuring in aggregate 6.2X2.7cm as compared with 5.2X2.1cm, subcentimeter mediastinal and hilar nodes are seen, large intermediate density structure at the right renal fossa extending to pelvic brim, most likely post-operative hematoma or seroma, resolution of previously seen retroperitoneal adenopathy.  6. CT C/A/P w&w/o at [facility] on 12/23/20--Interval enlargement of the retro-esophageal mass with mild interval increase in size of multiple paraesophageal and prevascular lymph nodes compatible with progression of disease. Interval decrease in size of the right retroperitoneal postop seroma or hematoma. No evidence of metastatic disease below the diaphragm. Prior right nephrectomy and adrenalectomy. Chololithiasis and stable atherosclerosis.   7. CT C/A/P w/o contrast done at [facility] on 4/5/21--interval decrease in size of multiple prevascular, subcarinal and para-esophageal lymph nodes c/w response to treatment, no new pulmonary nodule or lymph node, no evidence for metastatic disease below the diaphragm, interval decrease in size of the post-operative seromas adjacent to right psoas muscle.  8. CT C/A/P w/o contrast done at [facility] on 7/7/21--stable appearance of previously identified prevascular, subcarinal, and paraesophageal nodes, there are an increased number of sub-centimeter nodes in the aortopulmonary window and precarinal region, nonspecific in appearance, no other evidence of local recurrence or metastatic disease.   9. MRI cervical spine w/ and w/o  contrast done at [facility] 8/4/21--no metastatic disease, multilevel spondylotic changes, no acute abnormality.   10. MRI brain w/ and w/o contrast 8/4/21--normal maturation of the previously seen lacunar infarct at the left centrum semiovale, no new abnormalities, specifically, no evidence of intracranial metastatic disease.   11. CT C/A/P w/o contrast done at [facility] 10/22/21--stable reactive mediastinal adenopathy, interval resection of right kidney and adrenal gland with post-surgical changes but no evidence of metastatic disease.   12. CT C/A/P w/o contrast done at [facility] 1/26/22--4 new pulmonary nodules larges LLL 14mm, 9mm LLL, 5mm LLL, 5mm RML, with enlarging prevascular lymph node now measures 18mm, previous 8mm, concerning for metastatic disease, new mesenteric implant in LLQ measures 11mm, enlarging retroperitoneal LN now measures [id number], previous 14X8mm concerning for progression disease, cholelithiasis.   13. MRI brain w/ and w/o  contrast done at [facility] 3/31/22--no acute intracranial abnormality.   14. CT C/A/P w/o contrast done at [facility] 4/5/22--progressive retroperitoneal and mediastinal adenopathy, with development of hilar adenopathy, increase in size and number of pulmonary nodules.   15. CT done at [facility] showed large hematoma 14cm in bladder, results not available.  16. CT head w/o contrast done at [facility] 7/31/22--No intracranial hemorrhage or acute traumatic brain parenchymal injury evident by CT.         Treatment History:   1. IL2 started on 11/2/20. 2nd cycle started 11/16/20 - stopped after 7 days due to acute renal failure, volume overload and confusion. Stopped due to progression.  2. Keytruda/Inlyta 1/8/21--1/31/22 (stopped due to progression, autoimmune hepatitis).  3. Prednisone 80mg daily 1/31/22--weaned and stopped 4/14/22.    Treatment Plan:  Cabometyx 60mg daily started 4/7/22  Decrease dose to 40mg daily 4/28/22 due to side effects.  HOLD on 5/19/22 until groin rash and abscess heal/also underwent TURP  on 6/23/22  Restarted 7/6/22--held 7/16/22 for hospitalization/bladder hematoma    Chief Complaint: Other Misc (Pt reports that he is recovering from his hospital stay.)    HPI   Patient presents for follow-up of renal cell carcinoma. He has not been here in awhile. [facility] found to have a 14cm hematoma s/p TURP causing hydronephrosis and now he is on dialysis. He also had Covid infection and UTI. Patient underwent clot evacuation X 2 from the bladder as well. He was just discharged on Saturday and had catheter removed when his urine cleared. He continues on dialysis, and he is not sure if it will be permanent.      Past Medical History:   Diagnosis Date    Arthritis of knee 8/13/2013    HTN (hypertension) 8/13/2013    Type II or unspecified type diabetes mellitus without mention of complication, not stated as uncontrolled 8/13/2013      Review of patient's allergies indicates:  No Known Allergies     Current Outpatient Medications:     atorvastatin (LIPITOR) 80 MG tablet, Take 80 mg by mouth once daily., Disp: , Rfl:     desvenlafaxine succinate (PRISTIQ) 50 MG Tb24, Take 100 mg by mouth once daily., Disp: , Rfl:     finasteride (PROSCAR) 5 mg tablet, Take 5 mg by mouth as needed., Disp: , Rfl:     furosemide (LASIX) 40 MG tablet, Take 40 mg by mouth once daily at 6am., Disp: , Rfl:     HYDROcodone-acetaminophen (NORCO) 5-325 mg per tablet, Take 1 tablet by mouth as needed., Disp: , Rfl:     metoprolol succinate (TOPROL-XL) 50 MG 24 hr tablet, Take 1 tablet by mouth once daily., Disp: , Rfl:     mupirocin calcium 2% (BACTROBAN) 2 % cream, Apply to affected area 3 times daily, Disp: 30 g, Rfl: 3    pantoprazole (PROTONIX) 40 MG tablet, Take 40 mg by mouth once daily., Disp: , Rfl:     potassium chloride (MICRO-K) 10 MEQ CpSR, Take 10 mEq by mouth once daily., Disp: , Rfl:     pregabalin (LYRICA) 75 MG capsule, Take 75 mg by mouth 2 (two) times a day., Disp: , Rfl:     QUEtiapine  (SEROQUEL) 50 MG tablet, Take 150 mg by mouth every evening., Disp: , Rfl:     tamsulosin (FLOMAX) 0.4 mg Cap, Take 1 capsule by mouth every evening., Disp: , Rfl:     tolterodine (DETROL LA) 4 MG 24 hr capsule, Take 4 mg by mouth once daily., Disp: , Rfl:     traMADoL (ULTRAM) 50 mg tablet, Take 50 mg by mouth every 6 (six) hours., Disp: , Rfl:     albuterol 90 mcg/actuation inhaler, Inhale 2 puffs into the lungs every 4 (four) hours as needed., Disp: 17 g, Rfl: 2    aspirin 81 mg Cap, Take 81 mg by mouth once daily at 6am., Disp: , Rfl:     citalopram (CELEXA) 20 MG tablet, Take 1 tablet (20 mg total) by mouth once daily., Disp: 30 tablet, Rfl: 6    diphenoxylate-atropine 2.5-0.025 mg (LOMOTIL) 2.5-0.025 mg per tablet, Take 1 tablet by mouth as needed., Disp: , Rfl:     LORazepam (ATIVAN) 1 MG tablet, Take 1 mg by mouth 2 (two) times a day., Disp: , Rfl:     metformin (GLUCOPHAGE) 1000 MG tablet, Take 1 tablet (1,000 mg total) by mouth 2 (two) times daily with meals. (Patient not taking: Reported on 8/10/2022), Disp: 180 tablet, Rfl: 12    ondansetron (ZOFRAN) 4 MG tablet, Take 4 mg by mouth every 6 (six) hours as needed., Disp: , Rfl:     prochlorperazine (COMPAZINE) 5 MG tablet, Take 5 mg by mouth as needed., Disp: , Rfl:     Current Facility-Administered Medications:     albuterol sulfate nebulizer solution 2.5 mg, 2.5 mg, Nebulization, Q4H PRN, Thierno Snider MD     Review of Systems   Constitutional: Negative for activity change, appetite change, fatigue, fever and unexpected weight change.        Weight gain   HENT: Negative for mouth sores.    Eyes: Negative for visual disturbance.   Respiratory: Negative for cough and shortness of breath.    Cardiovascular: Negative for chest pain.   Gastrointestinal: Negative for abdominal pain, blood in stool, constipation, diarrhea, nausea and vomiting.   Genitourinary: Negative for difficulty urinating and frequency.   Musculoskeletal: Positive for back  pain. Negative for leg pain.        Joint pains all over   Integumentary:  Negative for rash and wound.   Neurological: Negative for dizziness, weakness and headaches.   Hematological: Negative for adenopathy.   Psychiatric/Behavioral: Negative for behavioral problems and suicidal ideas. The patient is not nervous/anxious.      Physical Exam  Constitutional:       Appearance: Normal appearance.   HENT:      Head: Normocephalic and atraumatic.      Nose: Nose normal.   Eyes:      Extraocular Movements: Extraocular movements intact.      Conjunctiva/sclera: Conjunctivae normal.   Cardiovascular:      Rate and Rhythm: Normal rate and regular rhythm.      Heart sounds: Normal heart sounds.   Pulmonary:      Effort: Pulmonary effort is normal.   Chest:      Comments: Right chest wall with dialysis catheter in place  Abdominal:      Palpations: Abdomen is soft.   Musculoskeletal:         General: Normal range of motion.      Comments: Generalized weakness, walking with a walker   Skin:     General: Skin is warm and dry.   Neurological:      General: No focal deficit present.      Mental Status: He is alert.   Psychiatric:         Mood and Affect: Mood normal.            Vitals:    08/10/22 1437   BP: 129/86   Pulse: 81   Resp: 14   Temp: 97.7 °F (36.5 °C)        ECOG SCORE         Lab Visit on 08/10/2022   Component Date Value    WBC 08/10/2022 12.8 (A)    RBC 08/10/2022 3.37 (A)    Hgb 08/10/2022 9.3 (A)    Hct 08/10/2022 30.4 (A)    MCV 08/10/2022 90.2     MCH 08/10/2022 27.6     MCHC 08/10/2022 30.6 (A)    RDW 08/10/2022 15.8     Platelet 08/10/2022 301     MPV 08/10/2022 9.7     Neut % 08/10/2022 53.7     Lymph % 08/10/2022 28.8     Mono % 08/10/2022 10.1     Eos % 08/10/2022 2.4     Basophil % 08/10/2022 0.3     Lymph # 08/10/2022 3.68     Neut # 08/10/2022 6.9     Mono # 08/10/2022 1.29     Eos # 08/10/2022 0.30     Baso # 08/10/2022 0.04     IG# 08/10/2022 0.60 (A)    IG% 08/10/2022 4.7            Assessment:        1. Clear cell carcinoma of right kidney    2. Malignant neoplasm metastatic to lymph nodes, unspecified lymph node region            Plan:         Patient with large right renal mass and retroperitoneal adenopathy diagnosed by CT in 7/2020.  CT chest showed mediastinal fanny mass and biopsy done via EUS + for metastatic renal cell carcinoma.  Patient s/p radical right nephrectomy done 9/11/20. Pathology showed 9.2cm clear cell renal cell carcinoma, margins clear with 4/6 lymph nodes positive.  Post-operative course complicated by hematoma requiring blood transfusion.  CARIS results- MSI stable, Mismatch repair status proficient. Tumor burden low. Negative for: PD-L1, NTRK1/2/3, BAP1, FH, MET, PBRM1, SDHA. Pathologic variants identified: KDMSC (Exon 13 and 11) and VHL. Immunohistochemistry results: MLH1, MSH2, MSH6, and PM52 positive. PD-L1 negative.     Referred to Dr. Haja Sal for evaluation for IL-2. Explained this is only treatment to be shown to achieve long-term remissions in stage IV disease and patient has limited disease at this time.  Patient started 1st 5 days of IL-2 on 11/2/20.   Started his 2nd round on 11/16/20 and it was stopped on 11/22/20 due to acute renal failure, volume overload and agitation.   CT scan on 12/23/20 at [facility] revealed progression of his disease. Dr. Sal contacted patient and discussed the results. He also reached out to Dr. Alvarado to notify her that the patient will not be returning.   Started Keytruda 200mg every 3 weeks on 1/8/21 + Inlyta 5mg oral BID on 1/11/21.  CT C/A/P done at [facility] on 10/22/21 showed stable mild reactive mediastinal nodes, otherwise stable.  Patient continued on treatment s/p cycle 18 Keytruda given 1/10/22.  Labs on 1/10/22 showed elevated LFTs. Inlyta held.  CT C/A/P done at [facility] on 1/26/22 showed unfortunately disease progression with new pulmonary nodules, enlargement in mediastinal node, new mesenteric nodule and  retroperitoneal adenopathy.  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 Hepatitis panel negative. Keytruda stopped. Started on Prednisone 80mg daily on 1/31/22.  Patient had suicidal ideation and severe depression during visit in 3/2022, seen by psychiatry and meds adjusted, much improved.  CT C/A/P from [facility] on 4/5/22 showed progressive disease.    Treatment change recommended to Cabometyx 60mg daily per NCCN guidelines (Category 1) for subsequent treatment. Started on 4/7/22.  Prednisone weaned and stopped 4/14/22.  Patient was having multiple side effects (n/v, diarrhea, mouth sores) from Cabometyx.  Reduced dose to 40mg on 4/28/22. Held on 5/19/22 due to cellulitis/rashes/abscesses.  Continued to hold s/p TURP on 6/23/22 and restarted 7/6/22.  Patient hospitalized at [facility] 7/16/22 for large bladder hematoma s/p clot evacuation X 2.   Cabometyx has been on hold since 7/16/22.  Will obtain CT results from [facility].    Labs today with mildly elevated WBC, and progressive anemia, patient now on dialysis.  Will continue holding Cabometyx for now.    RTC 3 weeks for follow-up. Patient to bring lab results from dialysis.   Will obtain repeat CT C/A/P w/o contrast at [facility] before RTC.     Continue Compazine for nausea.   Continue Norco for joint pain.   All questions answered at this time.       Elizabeth Alvarado MD

## 2022-08-29 NOTE — PROGRESS NOTES
Subjective:       Patient ID: Ye Olivera is a 62 y.o. male.    Urologist: Dr. Christopher Villasenor  PCP: Dr. Ly Quevedo  Nephrologist: Dr. Jenae Le    Renal Cell Carcinoma Stage IV([id number]) Mediastinal lymph nodes--Diagnosed 20  Biopsy/pathology:  Upper EUS done 9/3/20--large cluster of malignant appearing lymph nodes in subcarinal mediastinum causing extrinsic compression in middle 1/3 and thoracic esophagus biopsy c/w metastatic carcinoma, c/w renal cell carcinoma CD10+, PAX -8+, erythematous mucosa in antrum biopsy c/w mild chronic gastritis, inactive, normal duodenum, no pathology in CBD or left lobe of liver or pancreas.  CARIS results- MSI stable, Mismatch repair status proficient. Tumor burden low. Negative for: PD-L1, NTRK1/2/3, BAP1, FH, MET, PBRM1, SDHA. Pathologic variants identified: KDMSC (Exon 13 and 11) and VHL. Immunohistochemistry results: MLH1, MSH2, MSH6, and PM52 positive. PD-L1 negative.   Surgery/pathology:  Right radical nephrectomy and lymph node dissection done 20--clear cell renal cell carcinoma measures 9.2X8C8.7cm, Grade 3, margins clear, tumor limited to kidney, 4/6 lymph nodes positive for metastatic clear cell carcinoma.   Imagin. CT A/P w/o contrast [facility] 20--bulky right renal mass, midpole, measuring 10cm with evidence of internal necrosis and calcifications, primary mass does not extend outside the perinephric space, associated right perinephric edema, multiple enlarged retroperitoneal lymph nodes at level of the kidneys and inferior retroperitoneum, one measures 2.9X1.4cm, additional node measures 2.1X1.8cm, additional node measures 1.8X1.2cm, inferior retroperitoneal node at level of inferior pole right kidney measures 1.8X1.4cm, no convincing evidence of renal vein involvement, no right adrenal gland involvement, no disease apparent outside of abdomen.  2. CT A/P w/ and w/o contrast [facility] 20--large right renal mass measures 10.6X9.5X7.4cm, with  enlarged retroperitoneal nodes, largest measures 2.7cm.  3. CT chest [facility] 8/28/20--solitary left supraclavicular node, small and not overtly concerning, heterogeneous retroesophageal mass at the level of the tariq c/w metastatic deposit, no other evidence for metastatic disease.  4. MRI brain w/ and w/o contrast [facility] 8/28/20--no evidence for intracranial metastatic disease.  5. CT C/A/P done at [facility] 10/28/20--now appears to be 2 adjacent masses at the level of the tariq, measuring in aggregate 6.2X2.7cm as compared with 5.2X2.1cm, subcentimeter mediastinal and hilar nodes are seen, large intermediate density structure at the right renal fossa extending to pelvic brim, most likely post-operative hematoma or seroma, resolution of previously seen retroperitoneal adenopathy.  6. CT C/A/P w&w/o at [facility] on 12/23/20--Interval enlargement of the retro-esophageal mass with mild interval increase in size of multiple paraesophageal and prevascular lymph nodes compatible with progression of disease. Interval decrease in size of the right retroperitoneal postop seroma or hematoma. No evidence of metastatic disease below the diaphragm. Prior right nephrectomy and adrenalectomy. Chololithiasis and stable atherosclerosis.   7. CT C/A/P w/o contrast done at [facility] on 4/5/21--interval decrease in size of multiple prevascular, subcarinal and para-esophageal lymph nodes c/w response to treatment, no new pulmonary nodule or lymph node, no evidence for metastatic disease below the diaphragm, interval decrease in size of the post-operative seromas adjacent to right psoas muscle.  8. CT C/A/P w/o contrast done at [facility] on 7/7/21--stable appearance of previously identified prevascular, subcarinal, and paraesophageal nodes, there are an increased number of sub-centimeter nodes in the aortopulmonary window and precarinal region, nonspecific in appearance, no other evidence of local recurrence or metastatic disease.   9. MRI cervical spine w/ and w/o  contrast done at [facility] 8/4/21--no metastatic disease, multilevel spondylotic changes, no acute abnormality.   10. MRI brain w/ and w/o contrast 8/4/21--normal maturation of the previously seen lacunar infarct at the left centrum semiovale, no new abnormalities, specifically, no evidence of intracranial metastatic disease.   11. CT C/A/P w/o contrast done at [facility] 10/22/21--stable reactive mediastinal adenopathy, interval resection of right kidney and adrenal gland with post-surgical changes but no evidence of metastatic disease.   12. CT C/A/P w/o contrast done at [facility] 1/26/22--4 new pulmonary nodules larges LLL 14mm, 9mm LLL, 5mm LLL, 5mm RML, with enlarging prevascular lymph node now measures 18mm, previous 8mm, concerning for metastatic disease, new mesenteric implant in LLQ measures 11mm, enlarging retroperitoneal LN now measures [id number], previous 14X8mm concerning for progression disease, cholelithiasis.   13. MRI brain w/ and w/o  contrast done at [facility] 3/31/22--no acute intracranial abnormality.   14. CT C/A/P w/o contrast done at [facility] 4/5/22--progressive retroperitoneal and mediastinal adenopathy, with development of hilar adenopathy, increase in size and number of pulmonary nodules.   15. CT done at [facility] showed large hematoma 14cm in bladder, results not available.  16. CT head w/o contrast done at [facility] 7/31/22--No intracranial hemorrhage or acute traumatic brain parenchymal injury evident by CT.     17. CT C/A/P w/o contrast done 8/29/22--mixed response to treatment, worsening mediastinal adenopathy, stable pulmonary nodules, improved retroperitoneal lymph node, enlarging pericaval lymph node.    Treatment History:   1. IL2 started on 11/2/20. 2nd cycle started 11/16/20 - stopped after 7 days due to acute renal failure, volume overload and confusion. Stopped due to progression.  2. Keytruda/Inlyta 1/8/21--1/31/22 (stopped due to progression, autoimmune hepatitis).  3. Prednisone 80mg daily 1/31/22--weaned and  stopped 4/14/22.    Hospitalization:   [facility] 7/2022 14cm hematoma s/p TURP causing hydronephrosis, JOE and patient required initiation of dialysis, UTI, Covid infection.     Treatment Plan:  Cabometyx 60mg daily started 4/7/22  Decrease dose to 40mg daily 4/28/22 due to side effects.  HOLD on 5/19/22 until groin rash and abscess heal/also underwent TURP on 6/23/22  Restarted 7/6/22--held 7/16/22 for hospitalization/bladder hematoma  Cabometyx 40mg daily restarted 9/1/22    Chief Complaint: Other Misc (Pt reports no new concerns today.)    HPI   Patient presents for follow-up of renal cell carcinoma. He is doing much better. He remains on dialysis though there are plans to try to wean him off. He is urinating normally. Denies any other complaints. Repeat CT does show some disease progression, which is to be expected. Discussed starting back Cabometyx and he is in agreement.      Past Medical History:   Diagnosis Date    Arthritis of knee 8/13/2013    HTN (hypertension) 8/13/2013    Type II or unspecified type diabetes mellitus without mention of complication, not stated as uncontrolled 8/13/2013      Review of patient's allergies indicates:  No Known Allergies     Current Outpatient Medications:     atorvastatin (LIPITOR) 80 MG tablet, Take 80 mg by mouth once daily., Disp: , Rfl:     desvenlafaxine succinate (PRISTIQ) 50 MG Tb24, Take 100 mg by mouth once daily., Disp: , Rfl:     finasteride (PROSCAR) 5 mg tablet, Take 5 mg by mouth as needed., Disp: , Rfl:     furosemide (LASIX) 40 MG tablet, Take 40 mg by mouth once daily at 6am., Disp: , Rfl:     LORazepam (ATIVAN) 1 MG tablet, Take 1 mg by mouth 2 (two) times a day., Disp: , Rfl:     metoprolol succinate (TOPROL-XL) 50 MG 24 hr tablet, Take 1 tablet by mouth once daily. Pt takes a half is BP is over 130/80, Disp: , Rfl:     ondansetron (ZOFRAN) 4 MG tablet, Take 4 mg by mouth every 6 (six) hours as needed., Disp: , Rfl:     pantoprazole (PROTONIX) 40 MG tablet,  Take 40 mg by mouth once daily., Disp: , Rfl:     pregabalin (LYRICA) 75 MG capsule, Take 75 mg by mouth 2 (two) times a day., Disp: , Rfl:     prochlorperazine (COMPAZINE) 5 MG tablet, Take 5 mg by mouth as needed., Disp: , Rfl:     QUEtiapine (SEROQUEL) 50 MG tablet, Take 150 mg by mouth every evening., Disp: , Rfl:     tamsulosin (FLOMAX) 0.4 mg Cap, Take 1 capsule by mouth every evening., Disp: , Rfl:     tolterodine (DETROL LA) 4 MG 24 hr capsule, Take 4 mg by mouth once daily., Disp: , Rfl:     traMADoL (ULTRAM) 50 mg tablet, Take 50 mg by mouth every 6 (six) hours., Disp: , Rfl:     albuterol 90 mcg/actuation inhaler, Inhale 2 puffs into the lungs every 4 (four) hours as needed., Disp: 17 g, Rfl: 2    aspirin 81 mg Cap, Take 81 mg by mouth once daily at 6am., Disp: , Rfl:     cabozantinib (CABOMETYX) 40 mg Tab, TAKE ONE TABLET BY MOUTH ONCE DAILY AT THE SAME TIME ON AN EMPTY STOMACH AT LEAST 1 HOUR BEFORE OR 2 HOURS AFTER EATING. AVOID GRAPEFRUIT PRODUCTS (Patient not taking: Reported on 9/1/2022.), Disp: 30 tablet, Rfl: 2    citalopram (CELEXA) 20 MG tablet, Take 1 tablet (20 mg total) by mouth once daily., Disp: 30 tablet, Rfl: 6    diphenoxylate-atropine 2.5-0.025 mg (LOMOTIL) 2.5-0.025 mg per tablet, Take 1 tablet by mouth as needed., Disp: , Rfl:     HYDROcodone-acetaminophen (NORCO) 5-325 mg per tablet, Take 1 tablet by mouth as needed., Disp: , Rfl:     metformin (GLUCOPHAGE) 1000 MG tablet, Take 1 tablet (1,000 mg total) by mouth 2 (two) times daily with meals. (Patient not taking: No sig reported), Disp: 180 tablet, Rfl: 12    mupirocin calcium 2% (BACTROBAN) 2 % cream, Apply to affected area 3 times daily (Patient not taking: Reported on 9/1/2022), Disp: 30 g, Rfl: 3    potassium chloride (MICRO-K) 10 MEQ CpSR, Take 10 mEq by mouth once daily., Disp: , Rfl:     Current Facility-Administered Medications:     albuterol sulfate nebulizer solution 2.5 mg, 2.5 mg, Nebulization, Q4H PRN, Thierno Snider,  MD     Review of Systems   Constitutional:  Negative for activity change, appetite change, fatigue, fever and unexpected weight change.        Weight gain   HENT:  Negative for mouth sores.    Eyes:  Negative for visual disturbance.   Respiratory:  Negative for cough and shortness of breath.    Cardiovascular:  Negative for chest pain.   Gastrointestinal:  Negative for abdominal pain, blood in stool, constipation, diarrhea, nausea and vomiting.   Genitourinary:  Negative for difficulty urinating and frequency.   Musculoskeletal:  Positive for back pain. Negative for leg pain.        Joint pains all over   Integumentary:  Negative for rash and wound.   Neurological:  Negative for dizziness, weakness and headaches.   Hematological:  Negative for adenopathy.   Psychiatric/Behavioral:  Negative for behavioral problems and suicidal ideas. The patient is not nervous/anxious.    Physical Exam  Constitutional:       Appearance: Normal appearance.   HENT:      Head: Normocephalic and atraumatic.      Nose: Nose normal.   Eyes:      Extraocular Movements: Extraocular movements intact.      Conjunctiva/sclera: Conjunctivae normal.   Cardiovascular:      Rate and Rhythm: Normal rate and regular rhythm.      Heart sounds: Normal heart sounds.   Pulmonary:      Effort: Pulmonary effort is normal.   Chest:      Comments: Right chest wall with dialysis catheter in place  Abdominal:      Palpations: Abdomen is soft.   Musculoskeletal:         General: Normal range of motion.      Comments: Generalized weakness, walking with a walker   Skin:     General: Skin is warm and dry.   Neurological:      General: No focal deficit present.      Mental Status: He is alert.   Psychiatric:         Mood and Affect: Mood normal.          Vitals:    09/01/22 1323   BP: 134/74   Pulse: 85   Resp: 14   Temp: 98.4 °F (36.9 °C)          ECOG SCORE           No visits with results within 1 Week(s) from this visit.   Latest known visit with results is:    Lab Visit on 08/10/2022   Component Date Value    Sodium Level 08/10/2022 142     Potassium Level 08/10/2022 3.8     Chloride 08/10/2022 103     Carbon Dioxide 08/10/2022 29     Glucose Level 08/10/2022 122 (H)     Blood Urea Nitrogen 08/10/2022 44.0 (H)     Creatinine 08/10/2022 3.22 (H)     Calcium Level Total 08/10/2022 8.7 (L)     Protein Total 08/10/2022 6.9     Albumin Level 08/10/2022 3.0 (L)     Globulin 08/10/2022 3.9 (H)     Albumin/Globulin Ratio 08/10/2022 0.8 (L)     Bilirubin Total 08/10/2022 0.3     Alkaline Phosphatase 08/10/2022 116     Alanine Aminotransferase 08/10/2022 8     Aspartate Aminotransfera* 08/10/2022 15     eGFR 08/10/2022 21     Thyroid Stimulating Horm* 08/10/2022 1.1293     WBC 08/10/2022 12.8 (H)     RBC 08/10/2022 3.37 (L)     Hgb 08/10/2022 9.3 (L)     Hct 08/10/2022 30.4 (L)     MCV 08/10/2022 90.2     MCH 08/10/2022 27.6     MCHC 08/10/2022 30.6 (L)     RDW 08/10/2022 15.8     Platelet 08/10/2022 301     MPV 08/10/2022 9.7     Neut % 08/10/2022 53.7     Lymph % 08/10/2022 28.8     Mono % 08/10/2022 10.1     Eos % 08/10/2022 2.4     Basophil % 08/10/2022 0.3     Lymph # 08/10/2022 3.68     Neut # 08/10/2022 6.9     Mono # 08/10/2022 1.29     Eos # 08/10/2022 0.30     Baso # 08/10/2022 0.04     IG# 08/10/2022 0.60 (H)     IG% 08/10/2022 4.7           Assessment:        1. Clear cell carcinoma of right kidney          Plan:         Patient with large right renal mass and retroperitoneal adenopathy diagnosed by CT in 7/2020.  CT chest showed mediastinal fanny mass and biopsy done via EUS + for metastatic renal cell carcinoma.  Patient s/p radical right nephrectomy done 9/11/20. Pathology showed 9.2cm clear cell renal cell carcinoma, margins clear with 4/6 lymph nodes positive.  Post-operative course complicated by hematoma requiring blood transfusion.  CARIS results- MSI stable, Mismatch repair status proficient. Tumor burden low. Negative for: PD-L1, NTRK1/2/3, BAP1, FH, MET,  PBRM1, SDHA. Pathologic variants identified: KDMSC (Exon 13 and 11) and VHL. Immunohistochemistry results: MLH1, MSH2, MSH6, and PM52 positive. PD-L1 negative.     Referred to Dr. Haja Sal for evaluation for IL-2. Explained this is only treatment to be shown to achieve long-term remissions in stage IV disease and patient has limited disease at this time.  Patient started 1st 5 days of IL-2 on 11/2/20.   Started his 2nd round on 11/16/20 and it was stopped on 11/22/20 due to acute renal failure, volume overload and agitation.   CT scan on 12/23/20 at [facility] revealed progression of his disease. Dr. Sal contacted patient and discussed the results. He also reached out to Dr. Alvarado to notify her that the patient will not be returning.   Started Keytruda 200mg every 3 weeks on 1/8/21 + Inlyta 5mg oral BID on 1/11/21.  CT C/A/P done at [facility] on 10/22/21 showed stable mild reactive mediastinal nodes, otherwise stable.  Patient continued on treatment s/p cycle 18 Keytruda given 1/10/22.  Labs on 1/10/22 showed elevated LFTs. Inlyta held.  CT C/A/P done at [facility] on 1/26/22 showed unfortunately disease progression with new pulmonary nodules, enlargement in mediastinal node, new mesenteric nodule and retroperitoneal adenopathy.  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 Hepatitis panel negative. Keytruda stopped. Started on Prednisone 80mg daily on 1/31/22.  Patient had suicidal ideation and severe depression during visit in 3/2022, seen by psychiatry and meds adjusted, much improved.  CT C/A/P from [facility] on 4/5/22 showed progressive disease.  Treatment change recommended to Cabometyx 60mg daily per NCCN guidelines (Category 1) for subsequent treatment. Started on 4/7/22.  Prednisone weaned and stopped 4/14/22.  Patient was having multiple side effects (n/v, diarrhea, mouth sores) from Cabometyx.  Reduced dose to 40mg on 4/28/22. Held on 5/19/22 due to  cellulitis/rashes/abscesses.    Continued to hold s/p TURP on 6/23/22 and restarted 7/6/22.  Patient hospitalized at [facility] 7/16/22 for large bladder hematoma s/p clot evacuation X 2.   Cabometyx has been on hold since 7/16/22.  Repeat CT C/A/P done at [facility] on 8/29/22 shows mixed findings, but mostly disease progression in fanny metastases in mediastinum, which is to be expected since he has been off treatment.   Labs pending for today, will follow-up.  Discussed that Cabometyx has not been studied in dialysis patients, but no dosage adjustment is usually required.    Will restart Cabometyx at 40mg daily.  RTC 3 weeks for follow-up with labs and toxicity check.  Plan to repeat CT in 3 months at [facility].     Continue Compazine for nausea.   Continue Norco for joint pain.   All questions answered at this time.       Elizabeth Alvarado MD

## 2022-09-01 ENCOUNTER — OFFICE VISIT (OUTPATIENT)
Dept: HEMATOLOGY/ONCOLOGY | Facility: CLINIC | Age: 62
End: 2022-09-01
Payer: MEDICAID

## 2022-09-01 VITALS
RESPIRATION RATE: 14 BRPM | HEIGHT: 71 IN | DIASTOLIC BLOOD PRESSURE: 74 MMHG | SYSTOLIC BLOOD PRESSURE: 134 MMHG | HEART RATE: 85 BPM | OXYGEN SATURATION: 96 % | TEMPERATURE: 98 F | WEIGHT: 214.06 LBS | BODY MASS INDEX: 29.97 KG/M2

## 2022-09-01 DIAGNOSIS — C64.1 CLEAR CELL CARCINOMA OF RIGHT KIDNEY: Primary | ICD-10-CM

## 2022-09-01 LAB
ALBUMIN SERPL-MCNC: 3.3 GM/DL (ref 3.4–4.8)
ALBUMIN/GLOB SERPL: 0.9 RATIO (ref 1.1–2)
ALP SERPL-CCNC: 129 UNIT/L (ref 40–150)
ALT SERPL-CCNC: 7 UNIT/L (ref 0–55)
AST SERPL-CCNC: 14 UNIT/L (ref 5–34)
BASOPHILS # BLD AUTO: 0.06 X10(3)/MCL (ref 0–0.2)
BASOPHILS NFR BLD AUTO: 0.6 %
BILIRUBIN DIRECT+TOT PNL SERPL-MCNC: 0.6 MG/DL
BUN SERPL-MCNC: 20.5 MG/DL (ref 8.4–25.7)
CALCIUM SERPL-MCNC: 8.8 MG/DL (ref 8.8–10)
CHLORIDE SERPL-SCNC: 99 MMOL/L (ref 98–107)
CO2 SERPL-SCNC: 30 MMOL/L (ref 23–31)
CREAT SERPL-MCNC: 2.02 MG/DL (ref 0.73–1.18)
EOSINOPHIL # BLD AUTO: 0.14 X10(3)/MCL (ref 0–0.9)
EOSINOPHIL NFR BLD AUTO: 1.4 %
ERYTHROCYTE [DISTWIDTH] IN BLOOD BY AUTOMATED COUNT: 17.4 % (ref 11.5–17)
GFR SERPLBLD CREATININE-BSD FMLA CKD-EPI: 37 MLS/MIN/1.73/M2
GLOBULIN SER-MCNC: 3.8 GM/DL (ref 2.4–3.5)
GLUCOSE SERPL-MCNC: 179 MG/DL (ref 82–115)
HCT VFR BLD AUTO: 36.1 % (ref 42–52)
HGB BLD-MCNC: 10.4 GM/DL (ref 14–18)
IMM GRANULOCYTES # BLD AUTO: 0.17 X10(3)/MCL (ref 0–0.04)
IMM GRANULOCYTES NFR BLD AUTO: 1.7 %
LYMPHOCYTES # BLD AUTO: 1.22 X10(3)/MCL (ref 0.6–4.6)
LYMPHOCYTES NFR BLD AUTO: 12.5 %
MCH RBC QN AUTO: 26.5 PG (ref 27–31)
MCHC RBC AUTO-ENTMCNC: 28.8 MG/DL (ref 33–36)
MCV RBC AUTO: 92.1 FL (ref 80–94)
MONOCYTES # BLD AUTO: 1.06 X10(3)/MCL (ref 0.1–1.3)
MONOCYTES NFR BLD AUTO: 10.9 %
NEUTROPHILS # BLD AUTO: 7.1 X10(3)/MCL (ref 2.1–9.2)
NEUTROPHILS NFR BLD AUTO: 72.9 %
PLATELET # BLD AUTO: 224 X10(3)/MCL (ref 130–400)
PMV BLD AUTO: 10 FL (ref 7.4–10.4)
POTASSIUM SERPL-SCNC: 3.5 MMOL/L (ref 3.5–5.1)
PROT SERPL-MCNC: 7.1 GM/DL (ref 5.8–7.6)
RBC # BLD AUTO: 3.92 X10(6)/MCL (ref 4.7–6.1)
SODIUM SERPL-SCNC: 138 MMOL/L (ref 136–145)
WBC # SPEC AUTO: 9.8 X10(3)/MCL (ref 4.5–11.5)

## 2022-09-01 PROCEDURE — 3078F DIAST BP <80 MM HG: CPT | Mod: CPTII,,, | Performed by: INTERNAL MEDICINE

## 2022-09-01 PROCEDURE — 3008F PR BODY MASS INDEX (BMI) DOCUMENTED: ICD-10-PCS | Mod: CPTII,,, | Performed by: INTERNAL MEDICINE

## 2022-09-01 PROCEDURE — 1160F PR REVIEW ALL MEDS BY PRESCRIBER/CLIN PHARMACIST DOCUMENTED: ICD-10-PCS | Mod: CPTII,,, | Performed by: INTERNAL MEDICINE

## 2022-09-01 PROCEDURE — 3078F PR MOST RECENT DIASTOLIC BLOOD PRESSURE < 80 MM HG: ICD-10-PCS | Mod: CPTII,,, | Performed by: INTERNAL MEDICINE

## 2022-09-01 PROCEDURE — 99215 OFFICE O/P EST HI 40 MIN: CPT | Mod: PBBFAC | Performed by: INTERNAL MEDICINE

## 2022-09-01 PROCEDURE — 3008F BODY MASS INDEX DOCD: CPT | Mod: CPTII,,, | Performed by: INTERNAL MEDICINE

## 2022-09-01 PROCEDURE — 1159F MED LIST DOCD IN RCRD: CPT | Mod: CPTII,,, | Performed by: INTERNAL MEDICINE

## 2022-09-01 PROCEDURE — 1159F PR MEDICATION LIST DOCUMENTED IN MEDICAL RECORD: ICD-10-PCS | Mod: CPTII,,, | Performed by: INTERNAL MEDICINE

## 2022-09-01 PROCEDURE — 3075F SYST BP GE 130 - 139MM HG: CPT | Mod: CPTII,,, | Performed by: INTERNAL MEDICINE

## 2022-09-01 PROCEDURE — 3075F PR MOST RECENT SYSTOLIC BLOOD PRESS GE 130-139MM HG: ICD-10-PCS | Mod: CPTII,,, | Performed by: INTERNAL MEDICINE

## 2022-09-01 PROCEDURE — 99999 PR PBB SHADOW E&M-EST. PATIENT-LVL V: CPT | Mod: PBBFAC,,, | Performed by: INTERNAL MEDICINE

## 2022-09-01 PROCEDURE — 99999 PR PBB SHADOW E&M-EST. PATIENT-LVL V: ICD-10-PCS | Mod: PBBFAC,,, | Performed by: INTERNAL MEDICINE

## 2022-09-01 PROCEDURE — 99215 PR OFFICE/OUTPT VISIT, EST, LEVL V, 40-54 MIN: ICD-10-PCS | Mod: S$PBB,,, | Performed by: INTERNAL MEDICINE

## 2022-09-01 PROCEDURE — 80053 COMPREHEN METABOLIC PANEL: CPT | Performed by: INTERNAL MEDICINE

## 2022-09-01 PROCEDURE — 36415 COLL VENOUS BLD VENIPUNCTURE: CPT | Performed by: INTERNAL MEDICINE

## 2022-09-01 PROCEDURE — 1160F RVW MEDS BY RX/DR IN RCRD: CPT | Mod: CPTII,,, | Performed by: INTERNAL MEDICINE

## 2022-09-01 PROCEDURE — 85025 COMPLETE CBC W/AUTO DIFF WBC: CPT | Performed by: INTERNAL MEDICINE

## 2022-09-01 PROCEDURE — 99215 OFFICE O/P EST HI 40 MIN: CPT | Mod: S$PBB,,, | Performed by: INTERNAL MEDICINE

## 2022-09-22 ENCOUNTER — APPOINTMENT (OUTPATIENT)
Dept: LAB | Facility: HOSPITAL | Age: 62
End: 2022-09-22
Attending: INTERNAL MEDICINE
Payer: MEDICAID

## 2022-09-22 ENCOUNTER — OFFICE VISIT (OUTPATIENT)
Dept: HEMATOLOGY/ONCOLOGY | Facility: CLINIC | Age: 62
End: 2022-09-22
Payer: MEDICAID

## 2022-09-22 VITALS
RESPIRATION RATE: 14 BRPM | OXYGEN SATURATION: 98 % | TEMPERATURE: 98 F | HEIGHT: 71 IN | WEIGHT: 216.19 LBS | BODY MASS INDEX: 30.27 KG/M2 | DIASTOLIC BLOOD PRESSURE: 93 MMHG | SYSTOLIC BLOOD PRESSURE: 170 MMHG | HEART RATE: 54 BPM

## 2022-09-22 DIAGNOSIS — N18.4 CHRONIC KIDNEY DISEASE, STAGE IV (SEVERE): ICD-10-CM

## 2022-09-22 DIAGNOSIS — D63.1 ANEMIA IN STAGE 4 CHRONIC KIDNEY DISEASE: ICD-10-CM

## 2022-09-22 DIAGNOSIS — N18.4 ANEMIA IN STAGE 4 CHRONIC KIDNEY DISEASE: ICD-10-CM

## 2022-09-22 DIAGNOSIS — C64.1 CLEAR CELL CARCINOMA OF RIGHT KIDNEY: Primary | ICD-10-CM

## 2022-09-22 PROBLEM — N18.9 ANEMIA IN CHRONIC KIDNEY DISEASE: Status: ACTIVE | Noted: 2022-08-22

## 2022-09-22 PROBLEM — I12.9 MALIGNANT HYPERTENSIVE KIDNEY DISEASE WITH CHRONIC KIDNEY DISEASE STAGE I THROUGH STAGE IV, OR UNSPECIFIED: Status: ACTIVE | Noted: 2022-08-22

## 2022-09-22 PROBLEM — R33.9 URINARY RETENTION: Status: ACTIVE | Noted: 2022-07-18

## 2022-09-22 PROBLEM — L97.529 DIABETIC ULCER OF TOE OF LEFT FOOT ASSOCIATED WITH TYPE 2 DIABETES MELLITUS: Status: ACTIVE | Noted: 2022-08-24

## 2022-09-22 PROBLEM — K21.9 GASTRO-ESOPHAGEAL REFLUX DISEASE WITHOUT ESOPHAGITIS: Status: ACTIVE | Noted: 2022-07-29

## 2022-09-22 PROBLEM — G47.33 OSA ON CPAP: Status: ACTIVE | Noted: 2022-05-18

## 2022-09-22 PROBLEM — M13.80 OTHER SPECIFIED ARTHRITIS, UNSPECIFIED SITE: Status: ACTIVE | Noted: 2022-07-29

## 2022-09-22 PROBLEM — I25.2 OLD MYOCARDIAL INFARCTION: Status: ACTIVE | Noted: 2022-07-29

## 2022-09-22 PROBLEM — T78.2XXA ANAPHYLACTIC SHOCK, UNSPECIFIED, INITIAL ENCOUNTER: Status: ACTIVE | Noted: 2022-08-02

## 2022-09-22 PROBLEM — D50.9 IRON DEFICIENCY ANEMIA, UNSPECIFIED: Status: ACTIVE | Noted: 2022-09-03

## 2022-09-22 PROBLEM — E87.5 HYPERKALEMIA: Status: ACTIVE | Noted: 2022-08-24

## 2022-09-22 PROBLEM — E11.621 DIABETIC ULCER OF TOE OF LEFT FOOT ASSOCIATED WITH TYPE 2 DIABETES MELLITUS: Status: ACTIVE | Noted: 2022-08-24

## 2022-09-22 PROBLEM — D68.9 COAGULATION DEFECT, UNSPECIFIED: Status: ACTIVE | Noted: 2022-08-02

## 2022-09-22 LAB
BASOPHILS # BLD AUTO: 0.05 X10(3)/MCL (ref 0–0.2)
BASOPHILS NFR BLD AUTO: 0.5 %
EOSINOPHIL # BLD AUTO: 0.19 X10(3)/MCL (ref 0–0.9)
EOSINOPHIL NFR BLD AUTO: 1.9 %
ERYTHROCYTE [DISTWIDTH] IN BLOOD BY AUTOMATED COUNT: 15.8 % (ref 11.5–17)
HCT VFR BLD AUTO: 41.2 % (ref 42–52)
HGB BLD-MCNC: 12.2 GM/DL (ref 14–18)
IMM GRANULOCYTES # BLD AUTO: 0.02 X10(3)/MCL (ref 0–0.04)
IMM GRANULOCYTES NFR BLD AUTO: 0.2 %
LYMPHOCYTES # BLD AUTO: 2.72 X10(3)/MCL (ref 0.6–4.6)
LYMPHOCYTES NFR BLD AUTO: 27.2 %
MCH RBC QN AUTO: 25.6 PG (ref 27–31)
MCHC RBC AUTO-ENTMCNC: 29.6 MG/DL (ref 33–36)
MCV RBC AUTO: 86.6 FL (ref 80–94)
MONOCYTES # BLD AUTO: 0.56 X10(3)/MCL (ref 0.1–1.3)
MONOCYTES NFR BLD AUTO: 5.6 %
NEUTROPHILS # BLD AUTO: 6.5 X10(3)/MCL (ref 2.1–9.2)
NEUTROPHILS NFR BLD AUTO: 64.6 %
PLATELET # BLD AUTO: 314 X10(3)/MCL (ref 130–400)
PMV BLD AUTO: 9.7 FL (ref 7.4–10.4)
RBC # BLD AUTO: 4.76 X10(6)/MCL (ref 4.7–6.1)
WBC # SPEC AUTO: 10 X10(3)/MCL (ref 4.5–11.5)

## 2022-09-22 PROCEDURE — 99999 PR PBB SHADOW E&M-EST. PATIENT-LVL V: ICD-10-PCS | Mod: PBBFAC,,, | Performed by: NURSE PRACTITIONER

## 2022-09-22 PROCEDURE — 36415 COLL VENOUS BLD VENIPUNCTURE: CPT | Performed by: INTERNAL MEDICINE

## 2022-09-22 PROCEDURE — 3008F BODY MASS INDEX DOCD: CPT | Mod: CPTII,,, | Performed by: NURSE PRACTITIONER

## 2022-09-22 PROCEDURE — 99214 OFFICE O/P EST MOD 30 MIN: CPT | Mod: S$PBB,,, | Performed by: NURSE PRACTITIONER

## 2022-09-22 PROCEDURE — 1160F PR REVIEW ALL MEDS BY PRESCRIBER/CLIN PHARMACIST DOCUMENTED: ICD-10-PCS | Mod: CPTII,,, | Performed by: NURSE PRACTITIONER

## 2022-09-22 PROCEDURE — 3077F PR MOST RECENT SYSTOLIC BLOOD PRESSURE >= 140 MM HG: ICD-10-PCS | Mod: CPTII,,, | Performed by: NURSE PRACTITIONER

## 2022-09-22 PROCEDURE — 1159F PR MEDICATION LIST DOCUMENTED IN MEDICAL RECORD: ICD-10-PCS | Mod: CPTII,,, | Performed by: NURSE PRACTITIONER

## 2022-09-22 PROCEDURE — 3080F PR MOST RECENT DIASTOLIC BLOOD PRESSURE >= 90 MM HG: ICD-10-PCS | Mod: CPTII,,, | Performed by: NURSE PRACTITIONER

## 2022-09-22 PROCEDURE — 3008F PR BODY MASS INDEX (BMI) DOCUMENTED: ICD-10-PCS | Mod: CPTII,,, | Performed by: NURSE PRACTITIONER

## 2022-09-22 PROCEDURE — 1160F RVW MEDS BY RX/DR IN RCRD: CPT | Mod: CPTII,,, | Performed by: NURSE PRACTITIONER

## 2022-09-22 PROCEDURE — 99214 PR OFFICE/OUTPT VISIT, EST, LEVL IV, 30-39 MIN: ICD-10-PCS | Mod: S$PBB,,, | Performed by: NURSE PRACTITIONER

## 2022-09-22 PROCEDURE — 99999 PR PBB SHADOW E&M-EST. PATIENT-LVL V: CPT | Mod: PBBFAC,,, | Performed by: NURSE PRACTITIONER

## 2022-09-22 PROCEDURE — 1159F MED LIST DOCD IN RCRD: CPT | Mod: CPTII,,, | Performed by: NURSE PRACTITIONER

## 2022-09-22 PROCEDURE — 3077F SYST BP >= 140 MM HG: CPT | Mod: CPTII,,, | Performed by: NURSE PRACTITIONER

## 2022-09-22 PROCEDURE — 99215 OFFICE O/P EST HI 40 MIN: CPT | Mod: PBBFAC | Performed by: NURSE PRACTITIONER

## 2022-09-22 PROCEDURE — 3080F DIAST BP >= 90 MM HG: CPT | Mod: CPTII,,, | Performed by: NURSE PRACTITIONER

## 2022-09-22 RX ORDER — TAMSULOSIN HYDROCHLORIDE 0.4 MG/1
1 CAPSULE ORAL NIGHTLY
Qty: 30 CAPSULE | Refills: 6 | Status: SHIPPED | OUTPATIENT
Start: 2022-09-22 | End: 2023-07-06

## 2022-09-22 RX ORDER — PANTOPRAZOLE SODIUM 40 MG/1
40 TABLET, DELAYED RELEASE ORAL DAILY
Qty: 30 TABLET | Refills: 6 | Status: SHIPPED | OUTPATIENT
Start: 2022-09-22 | End: 2023-04-14

## 2022-09-22 NOTE — PROGRESS NOTES
Subjective:       Patient ID: Ye Olivera is a 62 y.o. male.    Urologist: Dr. Christopher Villasenor  PCP: Dr. Ly Quevedo  Nephrologist: Dr. Jenae Le    Renal Cell Carcinoma Stage IV([id number]) Mediastinal lymph nodes--Diagnosed 20  Biopsy/pathology:  Upper EUS done 9/3/20--large cluster of malignant appearing lymph nodes in subcarinal mediastinum causing extrinsic compression in middle 1/3 and thoracic esophagus biopsy c/w metastatic carcinoma, c/w renal cell carcinoma CD10+, PAX -8+, erythematous mucosa in antrum biopsy c/w mild chronic gastritis, inactive, normal duodenum, no pathology in CBD or left lobe of liver or pancreas.  CARIS results- MSI stable, Mismatch repair status proficient. Tumor burden low. Negative for: PD-L1, NTRK1/2/3, BAP1, FH, MET, PBRM1, SDHA. Pathologic variants identified: KDMSC (Exon 13 and 11) and VHL. Immunohistochemistry results: MLH1, MSH2, MSH6, and PM52 positive. PD-L1 negative.   Surgery/pathology:  Right radical nephrectomy and lymph node dissection done 20--clear cell renal cell carcinoma measures 9.2X8C8.7cm, Grade 3, margins clear, tumor limited to kidney, 4/6 lymph nodes positive for metastatic clear cell carcinoma.   Imagin. CT A/P w/o contrast [facility] 20--bulky right renal mass, midpole, measuring 10cm with evidence of internal necrosis and calcifications, primary mass does not extend outside the perinephric space, associated right perinephric edema, multiple enlarged retroperitoneal lymph nodes at level of the kidneys and inferior retroperitoneum, one measures 2.9X1.4cm, additional node measures 2.1X1.8cm, additional node measures 1.8X1.2cm, inferior retroperitoneal node at level of inferior pole right kidney measures 1.8X1.4cm, no convincing evidence of renal vein involvement, no right adrenal gland involvement, no disease apparent outside of abdomen.  2. CT A/P w/ and w/o contrast [facility] 20--large right renal mass measures 10.6X9.5X7.4cm, with  enlarged retroperitoneal nodes, largest measures 2.7cm.  3. CT chest [facility] 8/28/20--solitary left supraclavicular node, small and not overtly concerning, heterogeneous retroesophageal mass at the level of the tariq c/w metastatic deposit, no other evidence for metastatic disease.  4. MRI brain w/ and w/o contrast [facility] 8/28/20--no evidence for intracranial metastatic disease.  5. CT C/A/P done at [facility] 10/28/20--now appears to be 2 adjacent masses at the level of the tariq, measuring in aggregate 6.2X2.7cm as compared with 5.2X2.1cm, subcentimeter mediastinal and hilar nodes are seen, large intermediate density structure at the right renal fossa extending to pelvic brim, most likely post-operative hematoma or seroma, resolution of previously seen retroperitoneal adenopathy.  6. CT C/A/P w&w/o at [facility] on 12/23/20--Interval enlargement of the retro-esophageal mass with mild interval increase in size of multiple paraesophageal and prevascular lymph nodes compatible with progression of disease. Interval decrease in size of the right retroperitoneal postop seroma or hematoma. No evidence of metastatic disease below the diaphragm. Prior right nephrectomy and adrenalectomy. Chololithiasis and stable atherosclerosis.   7. CT C/A/P w/o contrast done at [facility] on 4/5/21--interval decrease in size of multiple prevascular, subcarinal and para-esophageal lymph nodes c/w response to treatment, no new pulmonary nodule or lymph node, no evidence for metastatic disease below the diaphragm, interval decrease in size of the post-operative seromas adjacent to right psoas muscle.  8. CT C/A/P w/o contrast done at [facility] on 7/7/21--stable appearance of previously identified prevascular, subcarinal, and paraesophageal nodes, there are an increased number of sub-centimeter nodes in the aortopulmonary window and precarinal region, nonspecific in appearance, no other evidence of local recurrence or metastatic disease.   9. MRI cervical spine w/ and w/o  contrast done at [facility] 8/4/21--no metastatic disease, multilevel spondylotic changes, no acute abnormality.   10. MRI brain w/ and w/o contrast 8/4/21--normal maturation of the previously seen lacunar infarct at the left centrum semiovale, no new abnormalities, specifically, no evidence of intracranial metastatic disease.   11. CT C/A/P w/o contrast done at [facility] 10/22/21--stable reactive mediastinal adenopathy, interval resection of right kidney and adrenal gland with post-surgical changes but no evidence of metastatic disease.   12. CT C/A/P w/o contrast done at [facility] 1/26/22--4 new pulmonary nodules larges LLL 14mm, 9mm LLL, 5mm LLL, 5mm RML, with enlarging prevascular lymph node now measures 18mm, previous 8mm, concerning for metastatic disease, new mesenteric implant in LLQ measures 11mm, enlarging retroperitoneal LN now measures [id number], previous 14X8mm concerning for progression disease, cholelithiasis.   13. MRI brain w/ and w/o  contrast done at [facility] 3/31/22--no acute intracranial abnormality.   14. CT C/A/P w/o contrast done at [facility] 4/5/22--progressive retroperitoneal and mediastinal adenopathy, with development of hilar adenopathy, increase in size and number of pulmonary nodules.   15. CT done at [facility] showed large hematoma 14cm in bladder, results not available.  16. CT head w/o contrast done at [facility] 7/31/22--No intracranial hemorrhage or acute traumatic brain parenchymal injury evident by CT.     17. CT C/A/P w/o contrast done 8/29/22--mixed response to treatment, worsening mediastinal adenopathy, stable pulmonary nodules, improved retroperitoneal lymph node, enlarging pericaval lymph node.    Treatment History:   1. IL2 started on 11/2/20. 2nd cycle started 11/16/20 - stopped after 7 days due to acute renal failure, volume overload and confusion. Stopped due to progression.  2. Keytruda/Inlyta 1/8/21--1/31/22 (stopped due to progression, autoimmune hepatitis).  3. Prednisone 80mg daily 1/31/22--weaned and  stopped 4/14/22.    Hospitalization:   [facility] 7/2022 14cm hematoma s/p TURP causing hydronephrosis, JOE and patient required initiation of dialysis, UTI, Covid infection.     Treatment Plan:  Cabometyx 60mg daily started 4/7/22  Decrease dose to 40mg daily 4/28/22 due to side effects.  HOLD on 5/19/22 until groin rash and abscess heal/also underwent TURP on 6/23/22  Restarted 7/6/22--held 7/16/22 for hospitalization/bladder hematoma  Cabometyx 40mg daily restarted 9/1/22    Chief Complaint: Other Misc (Pt reports that he is feeling great and no longer doing dialysis.)    HPI   Patient presents for follow-up of renal cell carcinoma. He is doing very well. He was restarted on the Cabometyx at his last appointment. States he is no longer on hemodialysis. His last treatment was about 2 weeks ago. Plans are to remove the catheter next week. He is also ambulating without a walker. He states he has not had any side effects from the Cabometyx as of yet. Denies any complaints at this time.      Past Medical History:   Diagnosis Date    Arthritis of knee 8/13/2013    HTN (hypertension) 8/13/2013    Type II or unspecified type diabetes mellitus without mention of complication, not stated as uncontrolled 8/13/2013      Review of patient's allergies indicates:  No Known Allergies     Current Outpatient Medications:     aspirin 81 mg Cap, Take 81 mg by mouth once daily at 6am., Disp: , Rfl:     atorvastatin (LIPITOR) 80 MG tablet, Take 80 mg by mouth once daily., Disp: , Rfl:     cabozantinib (CABOMETYX) 40 mg Tab, TAKE ONE TABLET BY MOUTH ONCE DAILY AT THE SAME TIME ON AN EMPTY STOMACH AT LEAST 1 HOUR BEFORE OR 2 HOURS AFTER EATING. AVOID GRAPEFRUIT PRODUCTS, Disp: 30 tablet, Rfl: 2    desvenlafaxine succinate (PRISTIQ) 50 MG Tb24, Take 100 mg by mouth once daily., Disp: , Rfl:     diphenoxylate-atropine 2.5-0.025 mg (LOMOTIL) 2.5-0.025 mg per tablet, Take 1 tablet by mouth as needed., Disp: , Rfl:     finasteride (PROSCAR) 5 mg  tablet, Take 5 mg by mouth as needed., Disp: , Rfl:     LORazepam (ATIVAN) 1 MG tablet, Take 1 mg by mouth 2 (two) times a day., Disp: , Rfl:     metoprolol succinate (TOPROL-XL) 50 MG 24 hr tablet, Take 1 tablet by mouth once daily. Pt takes a half is BP is over 130/80, Disp: , Rfl:     ondansetron (ZOFRAN) 4 MG tablet, Take 4 mg by mouth every 6 (six) hours as needed., Disp: , Rfl:     pregabalin (LYRICA) 75 MG capsule, Take 75 mg by mouth 2 (two) times a day., Disp: , Rfl:     prochlorperazine (COMPAZINE) 5 MG tablet, Take 5 mg by mouth as needed., Disp: , Rfl:     QUEtiapine (SEROQUEL) 50 MG tablet, Take 150 mg by mouth every evening., Disp: , Rfl:     tolterodine (DETROL LA) 4 MG 24 hr capsule, Take 4 mg by mouth once daily., Disp: , Rfl:     traMADoL (ULTRAM) 50 mg tablet, Take 50 mg by mouth every 6 (six) hours., Disp: , Rfl:     albuterol 90 mcg/actuation inhaler, Inhale 2 puffs into the lungs every 4 (four) hours as needed., Disp: 17 g, Rfl: 2    citalopram (CELEXA) 20 MG tablet, Take 1 tablet (20 mg total) by mouth once daily., Disp: 30 tablet, Rfl: 6    furosemide (LASIX) 40 MG tablet, Take 40 mg by mouth once daily at 6am., Disp: , Rfl:     pantoprazole (PROTONIX) 40 MG tablet, Take 1 tablet (40 mg total) by mouth once daily., Disp: 30 tablet, Rfl: 6    potassium chloride (MICRO-K) 10 MEQ CpSR, Take 10 mEq by mouth once daily., Disp: , Rfl:     tamsulosin (FLOMAX) 0.4 mg Cap, Take 1 capsule (0.4 mg total) by mouth every evening., Disp: 30 capsule, Rfl: 6    Current Facility-Administered Medications:     albuterol sulfate nebulizer solution 2.5 mg, 2.5 mg, Nebulization, Q4H PRN, Thierno Snider MD     Review of Systems   Constitutional:  Positive for fatigue. Negative for activity change, appetite change, fever and unexpected weight change.        Weight gain   HENT:  Negative for mouth sores.    Eyes:  Negative for visual disturbance.   Respiratory:  Negative for cough and shortness of breath.     Cardiovascular:  Negative for chest pain.   Gastrointestinal:  Negative for abdominal pain, blood in stool, constipation, diarrhea, nausea and vomiting.   Genitourinary:  Negative for difficulty urinating and frequency.   Musculoskeletal:  Positive for back pain. Negative for leg pain.        Joint pains all over   Integumentary:  Negative for rash and wound.   Neurological:  Negative for dizziness, weakness and headaches.   Hematological:  Negative for adenopathy.   Psychiatric/Behavioral:  Negative for behavioral problems and suicidal ideas. The patient is not nervous/anxious.      Physical Exam  Constitutional:       Appearance: Normal appearance. He is overweight.   HENT:      Head: Normocephalic and atraumatic.      Nose: Nose normal.   Eyes:      General: Lids are normal.      Extraocular Movements: Extraocular movements intact.      Conjunctiva/sclera: Conjunctivae normal.   Cardiovascular:      Rate and Rhythm: Normal rate and regular rhythm.      Heart sounds: Normal heart sounds.   Pulmonary:      Effort: Pulmonary effort is normal.      Breath sounds: Normal breath sounds.   Chest:      Comments: Right chest wall with dialysis catheter in place  Abdominal:      General: Abdomen is protuberant. Bowel sounds are normal.      Palpations: Abdomen is soft.   Musculoskeletal:         General: Normal range of motion.      Cervical back: Neck supple.      Right lower leg: No edema.      Left lower leg: No edema.   Skin:     General: Skin is warm and dry.   Neurological:      General: No focal deficit present.      Mental Status: He is alert and oriented to person, place, and time.   Psychiatric:         Attention and Perception: Attention normal.         Mood and Affect: Mood normal.         Speech: Speech normal.         Behavior: Behavior normal. Behavior is cooperative.        Vitals:    09/22/22 1024   BP: (!) 170/93   Pulse: (!) 54   Resp: 14   Temp: 97.9 °F (36.6 °C)          ECOG SCORE    2 - Capable of  all selfcare but unable to carry out any work activities, active > 50% of hours       No visits with results within 1 Week(s) from this visit.   Latest known visit with results is:   Office Visit on 09/01/2022   Component Date Value    WBC 09/22/2022 10.0     RBC 09/22/2022 4.76     Hgb 09/22/2022 12.2 (L)     Hct 09/22/2022 41.2 (L)     MCV 09/22/2022 86.6     MCH 09/22/2022 25.6 (L)     MCHC 09/22/2022 29.6 (L)     RDW 09/22/2022 15.8     Platelet 09/22/2022 314     MPV 09/22/2022 9.7     Neut % 09/22/2022 64.6     Lymph % 09/22/2022 27.2     Mono % 09/22/2022 5.6     Eos % 09/22/2022 1.9     Basophil % 09/22/2022 0.5     Lymph # 09/22/2022 2.72     Neut # 09/22/2022 6.5     Mono # 09/22/2022 0.56     Eos # 09/22/2022 0.19     Baso # 09/22/2022 0.05     IG# 09/22/2022 0.02     IG% 09/22/2022 0.2     Sodium Level 09/01/2022 138     Potassium Level 09/01/2022 3.5     Chloride 09/01/2022 99     Carbon Dioxide 09/01/2022 30     Glucose Level 09/01/2022 179 (H)     Blood Urea Nitrogen 09/01/2022 20.5     Creatinine 09/01/2022 2.02 (H)     Calcium Level Total 09/01/2022 8.8     Protein Total 09/01/2022 7.1     Albumin Level 09/01/2022 3.3 (L)     Globulin 09/01/2022 3.8 (H)     Albumin/Globulin Ratio 09/01/2022 0.9 (L)     Bilirubin Total 09/01/2022 0.6     Alkaline Phosphatase 09/01/2022 129     Alanine Aminotransferase 09/01/2022 7     Aspartate Aminotransfera* 09/01/2022 14     eGFR 09/01/2022 37     WBC 09/01/2022 9.8     RBC 09/01/2022 3.92 (L)     Hgb 09/01/2022 10.4 (L)     Hct 09/01/2022 36.1 (L)     MCV 09/01/2022 92.1     MCH 09/01/2022 26.5 (L)     MCHC 09/01/2022 28.8 (L)     RDW 09/01/2022 17.4 (H)     Platelet 09/01/2022 224     MPV 09/01/2022 10.0     Neut % 09/01/2022 72.9     Lymph % 09/01/2022 12.5     Mono % 09/01/2022 10.9     Eos % 09/01/2022 1.4     Basophil % 09/01/2022 0.6     Lymph # 09/01/2022 1.22     Neut # 09/01/2022 7.1     Mono # 09/01/2022 1.06     Eos # 09/01/2022 0.14     Baso #  09/01/2022 0.06     IG# 09/01/2022 0.17 (H)     IG% 09/01/2022 1.7           Assessment:        1. Clear cell carcinoma of right kidney    2. Chronic kidney disease, stage IV (severe)    3. Anemia in stage 4 chronic kidney disease          Plan:         Patient with large right renal mass and retroperitoneal adenopathy diagnosed by CT in 7/2020.  CT chest showed mediastinal fanny mass and biopsy done via EUS + for metastatic renal cell carcinoma.  Patient s/p radical right nephrectomy done 9/11/20. Pathology showed 9.2cm clear cell renal cell carcinoma, margins clear with 4/6 lymph nodes positive.  Post-operative course complicated by hematoma requiring blood transfusion.  CARIS results- MSI stable, Mismatch repair status proficient. Tumor burden low. Negative for: PD-L1, NTRK1/2/3, BAP1, FH, MET, PBRM1, SDHA. Pathologic variants identified: KDMSC (Exon 13 and 11) and VHL. Immunohistochemistry results: MLH1, MSH2, MSH6, and PM52 positive. PD-L1 negative.     Referred to Dr. Haja Sal for evaluation for IL-2. Explained this is only treatment to be shown to achieve long-term remissions in stage IV disease and patient has limited disease at this time.  Patient started 1st 5 days of IL-2 on 11/2/20.   Started his 2nd round on 11/16/20 and it was stopped on 11/22/20 due to acute renal failure, volume overload and agitation.   CT scan on 12/23/20 at [facility] revealed progression of his disease. Dr. Sal contacted patient and discussed the results. He also reached out to Dr. Alvarado to notify her that the patient will not be returning.   Started Keytruda 200mg every 3 weeks on 1/8/21 + Inlyta 5mg oral BID on 1/11/21.  CT C/A/P done at [facility] on 10/22/21 showed stable mild reactive mediastinal nodes, otherwise stable.  Patient continued on treatment s/p cycle 18 Keytruda given 1/10/22.  Labs on 1/10/22 showed elevated LFTs. Inlyta held.  CT C/A/P done at [facility] on 1/26/22 showed unfortunately disease progression with new  pulmonary nodules, enlargement in mediastinal node, new mesenteric nodule and retroperitoneal adenopathy.  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 Hepatitis panel negative. Keytruda stopped. Started on Prednisone 80mg daily on 1/31/22.  Patient had suicidal ideation and severe depression during visit in 3/2022, seen by psychiatry and meds adjusted, much improved.  CT C/A/P from [facility] on 4/5/22 showed progressive disease.  Treatment change recommended to Cabometyx 60mg daily per NCCN guidelines (Category 1) for subsequent treatment. Started on 4/7/22.  Prednisone weaned and stopped 4/14/22.  Patient was having multiple side effects (n/v, diarrhea, mouth sores) from Cabometyx.  Reduced dose to 40mg on 4/28/22. Held on 5/19/22 due to cellulitis/rashes/abscesses.    Continued to hold s/p TURP on 6/23/22 and restarted 7/6/22.  Patient hospitalized at [facility] 7/16/22 for large bladder hematoma s/p clot evacuation X 2. Cabometyx stopped again.   Repeat CT C/A/P done at [facility] on 8/29/22 shows mixed findings, but mostly disease progression in fanny metastases in mediastinum, which is to be expected since he has been off treatment.   Restarted back on Cabometyx on 9/1/22 although it has not been studied in dialysis patients, but no dosage adjustment is usually required.  He is tolerating the 40mg daily very well. Continue at this time. Consider increasing if he continues tolerating.   CBC today shows improving anemia, otherwise good. CMP is pending.   He is no longer on hemodialysis.   RTC 3 weeks for follow-up with labs and toxicity check.  Plan to repeat CT in 3 months at [facility].     Continue Compazine for nausea.   Continue Norco for joint pain.   All questions answered at this time.       MARCIN Laboy

## 2022-10-04 NOTE — PROGRESS NOTES
Subjective:       Patient ID: Ye Olivera is a 62 y.o. male.    Urologist: Dr. Christopher Villasenor  PCP: Dr. Ly Quevedo  Nephrologist: Dr. Jenae Le    Renal Cell Carcinoma Stage IV([id number]) Mediastinal lymph nodes--Diagnosed 20  Biopsy/pathology:  Upper EUS done 9/3/20--large cluster of malignant appearing lymph nodes in subcarinal mediastinum causing extrinsic compression in middle 1/3 and thoracic esophagus biopsy c/w metastatic carcinoma, c/w renal cell carcinoma CD10+, PAX -8+, erythematous mucosa in antrum biopsy c/w mild chronic gastritis, inactive, normal duodenum, no pathology in CBD or left lobe of liver or pancreas.  CARIS results- MSI stable, Mismatch repair status proficient. Tumor burden low. Negative for: PD-L1, NTRK1/2/3, BAP1, FH, MET, PBRM1, SDHA. Pathologic variants identified: KDMSC (Exon 13 and 11) and VHL. Immunohistochemistry results: MLH1, MSH2, MSH6, and PM52 positive. PD-L1 negative.   Surgery/pathology:  Right radical nephrectomy and lymph node dissection done 20--clear cell renal cell carcinoma measures 9.2X8C8.7cm, Grade 3, margins clear, tumor limited to kidney, 4/6 lymph nodes positive for metastatic clear cell carcinoma.   Imagin. CT A/P w/o contrast [facility] 20--bulky right renal mass, midpole, measuring 10cm with evidence of internal necrosis and calcifications, primary mass does not extend outside the perinephric space, associated right perinephric edema, multiple enlarged retroperitoneal lymph nodes at level of the kidneys and inferior retroperitoneum, one measures 2.9X1.4cm, additional node measures 2.1X1.8cm, additional node measures 1.8X1.2cm, inferior retroperitoneal node at level of inferior pole right kidney measures 1.8X1.4cm, no convincing evidence of renal vein involvement, no right adrenal gland involvement, no disease apparent outside of abdomen.  2. CT A/P w/ and w/o contrast [facility] 20--large right renal mass measures 10.6X9.5X7.4cm, with  enlarged retroperitoneal nodes, largest measures 2.7cm.  3. CT chest [facility] 8/28/20--solitary left supraclavicular node, small and not overtly concerning, heterogeneous retroesophageal mass at the level of the tariq c/w metastatic deposit, no other evidence for metastatic disease.  4. MRI brain w/ and w/o contrast [facility] 8/28/20--no evidence for intracranial metastatic disease.  5. CT C/A/P done at [facility] 10/28/20--now appears to be 2 adjacent masses at the level of the tariq, measuring in aggregate 6.2X2.7cm as compared with 5.2X2.1cm, subcentimeter mediastinal and hilar nodes are seen, large intermediate density structure at the right renal fossa extending to pelvic brim, most likely post-operative hematoma or seroma, resolution of previously seen retroperitoneal adenopathy.  6. CT C/A/P w&w/o at [facility] on 12/23/20--Interval enlargement of the retro-esophageal mass with mild interval increase in size of multiple paraesophageal and prevascular lymph nodes compatible with progression of disease. Interval decrease in size of the right retroperitoneal postop seroma or hematoma. No evidence of metastatic disease below the diaphragm. Prior right nephrectomy and adrenalectomy. Chololithiasis and stable atherosclerosis.   7. CT C/A/P w/o contrast done at [facility] on 4/5/21--interval decrease in size of multiple prevascular, subcarinal and para-esophageal lymph nodes c/w response to treatment, no new pulmonary nodule or lymph node, no evidence for metastatic disease below the diaphragm, interval decrease in size of the post-operative seromas adjacent to right psoas muscle.  8. CT C/A/P w/o contrast done at [facility] on 7/7/21--stable appearance of previously identified prevascular, subcarinal, and paraesophageal nodes, there are an increased number of sub-centimeter nodes in the aortopulmonary window and precarinal region, nonspecific in appearance, no other evidence of local recurrence or metastatic disease.   9. MRI cervical spine w/ and w/o  contrast done at [facility] 8/4/21--no metastatic disease, multilevel spondylotic changes, no acute abnormality.   10. MRI brain w/ and w/o contrast 8/4/21--normal maturation of the previously seen lacunar infarct at the left centrum semiovale, no new abnormalities, specifically, no evidence of intracranial metastatic disease.   11. CT C/A/P w/o contrast done at [facility] 10/22/21--stable reactive mediastinal adenopathy, interval resection of right kidney and adrenal gland with post-surgical changes but no evidence of metastatic disease.   12. CT C/A/P w/o contrast done at [facility] 1/26/22--4 new pulmonary nodules larges LLL 14mm, 9mm LLL, 5mm LLL, 5mm RML, with enlarging prevascular lymph node now measures 18mm, previous 8mm, concerning for metastatic disease, new mesenteric implant in LLQ measures 11mm, enlarging retroperitoneal LN now measures 35X16tp, previous 14X8mm concerning for progression disease, cholelithiasis.   13. MRI brain w/ and w/o  contrast done at [facility] 3/31/22--no acute intracranial abnormality.   14. CT C/A/P w/o contrast done at [facility] 4/5/22--progressive retroperitoneal and mediastinal adenopathy, with development of hilar adenopathy, increase in size and number of pulmonary nodules.   15. CT done at [facility] showed large hematoma 14cm in bladder, results not available.  16. CT head w/o contrast done at [facility] 7/31/22--No intracranial hemorrhage or acute traumatic brain parenchymal injury evident by CT.     17. CT C/A/P w/o contrast done 8/29/22--mixed response to treatment, worsening mediastinal adenopathy, stable pulmonary nodules, improved retroperitoneal lymph node, enlarging pericaval lymph node.    Treatment History:   1. IL2 started on 11/2/20. 2nd cycle started 11/16/20 - stopped after 7 days due to acute renal failure, volume overload and confusion. Stopped due to progression.  2. Keytruda/Inlyta 1/8/21--1/31/22 (stopped due to progression, autoimmune hepatitis).  3. Prednisone 80mg daily 1/31/22--weaned and  stopped 4/14/22.    Hospitalization:   [facility] 7/2022 14cm hematoma s/p TURP causing hydronephrosis, JOE and patient required initiation of dialysis, UTI, Covid infection.     Treatment Plan:  Cabometyx 60mg daily started 4/7/22  Decrease dose to 40mg daily 4/28/22 due to side effects.  HOLD on 5/19/22 until groin rash and abscess heal/also underwent TURP on 6/23/22  Restarted 7/6/22--held 7/16/22 for hospitalization/bladder hematoma  Cabometyx 40mg daily restarted 9/1/22    Chief Complaint: Other Misc (Pt reports no that he is no longer on dialysis. His BP has been high and PCP has prescribed a new BP med.)    HPI   Patient presents for follow-up of renal cell carcinoma. He is doing very well. He was restarted on the Cabometyx at the beginning of September. He remains off hemodialysis and his catheter has been removed. He states he has not had any side effects from the Cabometyx as of yet. Mentions his BP has been high and his PCP started him on a new medication but he has not started as of yet. Denies any complaints at this time.      Past Medical History:   Diagnosis Date    Arthritis of knee 8/13/2013    HTN (hypertension) 8/13/2013    Type II or unspecified type diabetes mellitus without mention of complication, not stated as uncontrolled 8/13/2013      Review of patient's allergies indicates:  No Known Allergies     Current Outpatient Medications:     aspirin 81 mg Cap, Take 81 mg by mouth once daily at 6am., Disp: , Rfl:     atorvastatin (LIPITOR) 80 MG tablet, Take 80 mg by mouth once daily., Disp: , Rfl:     cabozantinib (CABOMETYX) 40 mg Tab, TAKE ONE TABLET BY MOUTH ONCE DAILY AT THE SAME TIME ON AN EMPTY STOMACH AT LEAST 1 HOUR BEFORE OR 2 HOURS AFTER EATING. AVOID GRAPEFRUIT PRODUCTS, Disp: 30 tablet, Rfl: 2    desvenlafaxine succinate (PRISTIQ) 50 MG Tb24, Take 100 mg by mouth once daily., Disp: , Rfl:     diphenoxylate-atropine 2.5-0.025 mg (LOMOTIL) 2.5-0.025 mg per tablet, Take 1 tablet by mouth as  needed., Disp: , Rfl:     finasteride (PROSCAR) 5 mg tablet, Take 5 mg by mouth as needed., Disp: , Rfl:     furosemide (LASIX) 40 MG tablet, Take 40 mg by mouth once daily at 6am., Disp: , Rfl:     LORazepam (ATIVAN) 1 MG tablet, Take 1 mg by mouth 2 (two) times a day., Disp: , Rfl:     ondansetron (ZOFRAN) 4 MG tablet, Take 4 mg by mouth every 6 (six) hours as needed., Disp: , Rfl:     pantoprazole (PROTONIX) 40 MG tablet, Take 1 tablet (40 mg total) by mouth once daily., Disp: 30 tablet, Rfl: 6    pregabalin (LYRICA) 75 MG capsule, Take 75 mg by mouth 2 (two) times a day., Disp: , Rfl:     prochlorperazine (COMPAZINE) 5 MG tablet, Take 5 mg by mouth as needed., Disp: , Rfl:     QUEtiapine (SEROQUEL) 50 MG tablet, Take 150 mg by mouth every evening., Disp: , Rfl:     tamsulosin (FLOMAX) 0.4 mg Cap, Take 1 capsule (0.4 mg total) by mouth every evening., Disp: 30 capsule, Rfl: 6    tolterodine (DETROL LA) 4 MG 24 hr capsule, Take 4 mg by mouth once daily., Disp: , Rfl:     traMADoL (ULTRAM) 50 mg tablet, Take 50 mg by mouth every 6 (six) hours., Disp: , Rfl:     albuterol 90 mcg/actuation inhaler, Inhale 2 puffs into the lungs every 4 (four) hours as needed., Disp: 17 g, Rfl: 2    citalopram (CELEXA) 20 MG tablet, Take 1 tablet (20 mg total) by mouth once daily., Disp: 30 tablet, Rfl: 6    metoprolol succinate (TOPROL-XL) 50 MG 24 hr tablet, Take 1 tablet by mouth once daily. Pt takes a half is BP is over 130/80, Disp: , Rfl:     potassium chloride (MICRO-K) 10 MEQ CpSR, Take 10 mEq by mouth once daily., Disp: , Rfl:     Current Facility-Administered Medications:     albuterol sulfate nebulizer solution 2.5 mg, 2.5 mg, Nebulization, Q4H PRN, Thierno Snider MD     Review of Systems   Constitutional:  Positive for fatigue. Negative for activity change, appetite change, fever and unexpected weight change.        Weight gain   HENT:  Negative for mouth sores.    Eyes:  Negative for visual disturbance.   Respiratory:   Negative for cough and shortness of breath.    Cardiovascular:  Negative for chest pain.   Gastrointestinal:  Negative for abdominal pain, blood in stool, constipation, diarrhea, nausea and vomiting.   Genitourinary:  Negative for difficulty urinating and frequency.   Musculoskeletal:  Positive for back pain. Negative for leg pain.        Joint pains all over   Integumentary:  Negative for rash and wound.   Neurological:  Negative for dizziness, weakness and headaches.   Hematological:  Negative for adenopathy.   Psychiatric/Behavioral:  Negative for behavioral problems and suicidal ideas. The patient is not nervous/anxious.    Vitals:    10/11/22 1001   BP: (!) 150/82   Pulse: 60   Resp: 14   Temp: 98.1 °F (36.7 °C)      Physical Exam  Constitutional:       Appearance: Normal appearance. He is overweight.   HENT:      Head: Normocephalic and atraumatic.      Nose: Nose normal.   Eyes:      General: Lids are normal.      Extraocular Movements: Extraocular movements intact.      Conjunctiva/sclera: Conjunctivae normal.   Cardiovascular:      Rate and Rhythm: Normal rate and regular rhythm.      Heart sounds: Normal heart sounds.   Pulmonary:      Effort: Pulmonary effort is normal.      Breath sounds: Normal breath sounds.   Abdominal:      General: Abdomen is protuberant. Bowel sounds are normal.      Palpations: Abdomen is soft.   Musculoskeletal:         General: Normal range of motion.      Cervical back: Neck supple.      Right lower leg: No edema.      Left lower leg: No edema.   Skin:     General: Skin is warm and dry.   Neurological:      General: No focal deficit present.      Mental Status: He is alert and oriented to person, place, and time.   Psychiatric:         Attention and Perception: Attention normal.         Mood and Affect: Mood normal.         Speech: Speech normal.         Behavior: Behavior normal. Behavior is cooperative.        ECOG SCORE    1 - Restricted in strenuous activity-ambulatory and  able to carry out work of a light nature       Lab Visit on 10/07/2022   Component Date Value    Sodium Level 10/07/2022 140     Potassium Level 10/07/2022 4.1     Chloride 10/07/2022 107     Carbon Dioxide 10/07/2022 25     Glucose Level 10/07/2022 148 (H)     Blood Urea Nitrogen 10/07/2022 23.9     Creatinine 10/07/2022 1.81 (H)     Calcium Level Total 10/07/2022 8.8     Protein Total 10/07/2022 7.4     Albumin Level 10/07/2022 3.1 (L)     Globulin 10/07/2022 4.3 (H)     Albumin/Globulin Ratio 10/07/2022 0.7 (L)     Bilirubin Total 10/07/2022 0.3     Alkaline Phosphatase 10/07/2022 189 (H)     Alanine Aminotransferase 10/07/2022 43     Aspartate Aminotransfera* 10/07/2022 35 (H)     eGFR 10/07/2022 42     WBC 10/07/2022 8.0     RBC 10/07/2022 5.50     Hgb 10/07/2022 13.8 (L)     Hct 10/07/2022 45.6     MCV 10/07/2022 82.9     MCH 10/07/2022 25.1 (L)     MCHC 10/07/2022 30.3 (L)     RDW 10/07/2022 16.5     Platelet 10/07/2022 259     MPV 10/07/2022 9.5     Neut % 10/07/2022 64.3     Lymph % 10/07/2022 27.8     Mono % 10/07/2022 5.6     Eos % 10/07/2022 1.8     Basophil % 10/07/2022 0.4     Lymph # 10/07/2022 2.22     Neut # 10/07/2022 5.2     Mono # 10/07/2022 0.45     Eos # 10/07/2022 0.14     Baso # 10/07/2022 0.03     IG# 10/07/2022 0.01     IG% 10/07/2022 0.1           Assessment:        1. Clear cell carcinoma of right kidney    2. 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 Anemia in stage 4 chronic kidney disease    4. Other iron deficiency anemia          Plan:         Patient with large right renal mass and retroperitoneal adenopathy diagnosed by CT in 7/2020.  CT chest showed mediastinal fanny mass and biopsy done via EUS + for metastatic renal cell carcinoma.  Patient s/p radical right nephrectomy done 9/11/20. Pathology showed 9.2cm clear cell renal cell carcinoma, margins clear with 4/6 lymph nodes positive.  Post-operative course complicated by hematoma requiring blood  transfusion.  CARIS results- MSI stable, Mismatch repair status proficient. Tumor burden low. Negative for: PD-L1, NTRK1/2/3, BAP1, FH, MET, PBRM1, SDHA. Pathologic variants identified: KDMSC (Exon 13 and 11) and VHL. Immunohistochemistry results: MLH1, MSH2, MSH6, and PM52 positive. PD-L1 negative.     Referred to Dr. Haja Sal for evaluation for IL-2. Explained this is only treatment to be shown to achieve long-term remissions in stage IV disease and patient has limited disease at this time.  Patient started 1st 5 days of IL-2 on 11/2/20.   Started his 2nd round on 11/16/20 and it was stopped on 11/22/20 due to acute renal failure, volume overload and agitation.   CT scan on 12/23/20 at [facility] revealed progression of his disease. Dr. Sal contacted patient and discussed the results. He also reached out to Dr. Alvarado to notify her that the patient will not be returning.   Started Keytruda 200mg every 3 weeks on 1/8/21 + Inlyta 5mg oral BID on 1/11/21.  CT C/A/P done at [facility] on 10/22/21 showed stable mild reactive mediastinal nodes, otherwise stable.  Patient continued on treatment s/p cycle 18 Keytruda given 1/10/22.  Labs on 1/10/22 showed elevated LFTs. Inlyta held.  CT C/A/P done at [facility] on 1/26/22 showed unfortunately disease progression with new pulmonary nodules, enlargement in mediastinal node, new mesenteric nodule and retroperitoneal adenopathy.  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 Hepatitis panel negative. Keytruda stopped. Started on Prednisone 80mg daily on 1/31/22.  Patient had suicidal ideation and severe depression during visit in 3/2022, seen by psychiatry and meds adjusted, much improved.  CT C/A/P from [facility] on 4/5/22 showed progressive disease.  Treatment change recommended to Cabometyx 60mg daily per NCCN guidelines (Category 1) for subsequent treatment. Started on 4/7/22.  Prednisone weaned and stopped 4/14/22.  Patient was having multiple side effects  (n/v, diarrhea, mouth sores) from Cabometyx.  Reduced dose to 40mg on 4/28/22. Held on 5/19/22 due to cellulitis/rashes/abscesses.    Continued to hold s/p TURP on 6/23/22 and restarted 7/6/22.  Patient hospitalized at [facility] 7/16/22 for large bladder hematoma s/p clot evacuation X 2. Cabometyx stopped again.   Repeat CT C/A/P done at [facility] on 8/29/22 shows mixed findings, but mostly disease progression in fanny metastases in mediastinum, which is to be expected since he has been off treatment.   Restarted back on Cabometyx on 9/1/22 although it has not been studied in dialysis patients, but no dosage adjustment is usually required.  He is tolerating the 40mg daily very well. Continue at this time.   CBC shows improving anemia, otherwise good. CMP with stable renal function, creatinine 1.81. Alk phos and AST slightly elevated.    He is no longer on hemodialysis.   RTC 3 weeks for follow-up with labs and toxicity check.  Plan to repeat CT in 3 months at [facility] which will be at the end of November.     Continue Compazine for nausea.   Continue Norco for joint pain.   All questions answered at this time.       MARCIN Laboy

## 2022-10-07 ENCOUNTER — LAB VISIT (OUTPATIENT)
Dept: LAB | Facility: HOSPITAL | Age: 62
End: 2022-10-07
Attending: INTERNAL MEDICINE
Payer: MEDICAID

## 2022-10-07 DIAGNOSIS — D63.1 ANEMIA IN STAGE 4 CHRONIC KIDNEY DISEASE: ICD-10-CM

## 2022-10-07 DIAGNOSIS — N18.4 CHRONIC KIDNEY DISEASE, STAGE IV (SEVERE): ICD-10-CM

## 2022-10-07 DIAGNOSIS — N18.4 ANEMIA IN STAGE 4 CHRONIC KIDNEY DISEASE: ICD-10-CM

## 2022-10-07 DIAGNOSIS — C64.1 CLEAR CELL CARCINOMA OF RIGHT KIDNEY: ICD-10-CM

## 2022-10-07 LAB
ALBUMIN SERPL-MCNC: 3.1 GM/DL (ref 3.4–4.8)
ALBUMIN/GLOB SERPL: 0.7 RATIO (ref 1.1–2)
ALP SERPL-CCNC: 189 UNIT/L (ref 40–150)
ALT SERPL-CCNC: 43 UNIT/L (ref 0–55)
AST SERPL-CCNC: 35 UNIT/L (ref 5–34)
BASOPHILS # BLD AUTO: 0.03 X10(3)/MCL (ref 0–0.2)
BASOPHILS NFR BLD AUTO: 0.4 %
BILIRUBIN DIRECT+TOT PNL SERPL-MCNC: 0.3 MG/DL
BUN SERPL-MCNC: 23.9 MG/DL (ref 8.4–25.7)
CALCIUM SERPL-MCNC: 8.8 MG/DL (ref 8.8–10)
CHLORIDE SERPL-SCNC: 107 MMOL/L (ref 98–107)
CO2 SERPL-SCNC: 25 MMOL/L (ref 23–31)
CREAT SERPL-MCNC: 1.81 MG/DL (ref 0.73–1.18)
EOSINOPHIL # BLD AUTO: 0.14 X10(3)/MCL (ref 0–0.9)
EOSINOPHIL NFR BLD AUTO: 1.8 %
ERYTHROCYTE [DISTWIDTH] IN BLOOD BY AUTOMATED COUNT: 16.5 % (ref 11.5–17)
GFR SERPLBLD CREATININE-BSD FMLA CKD-EPI: 42 MLS/MIN/1.73/M2
GLOBULIN SER-MCNC: 4.3 GM/DL (ref 2.4–3.5)
GLUCOSE SERPL-MCNC: 148 MG/DL (ref 82–115)
HCT VFR BLD AUTO: 45.6 % (ref 42–52)
HGB BLD-MCNC: 13.8 GM/DL (ref 14–18)
IMM GRANULOCYTES # BLD AUTO: 0.01 X10(3)/MCL (ref 0–0.04)
IMM GRANULOCYTES NFR BLD AUTO: 0.1 %
LYMPHOCYTES # BLD AUTO: 2.22 X10(3)/MCL (ref 0.6–4.6)
LYMPHOCYTES NFR BLD AUTO: 27.8 %
MCH RBC QN AUTO: 25.1 PG (ref 27–31)
MCHC RBC AUTO-ENTMCNC: 30.3 MG/DL (ref 33–36)
MCV RBC AUTO: 82.9 FL (ref 80–94)
MONOCYTES # BLD AUTO: 0.45 X10(3)/MCL (ref 0.1–1.3)
MONOCYTES NFR BLD AUTO: 5.6 %
NEUTROPHILS # BLD AUTO: 5.2 X10(3)/MCL (ref 2.1–9.2)
NEUTROPHILS NFR BLD AUTO: 64.3 %
PLATELET # BLD AUTO: 259 X10(3)/MCL (ref 130–400)
PMV BLD AUTO: 9.5 FL (ref 7.4–10.4)
POTASSIUM SERPL-SCNC: 4.1 MMOL/L (ref 3.5–5.1)
PROT SERPL-MCNC: 7.4 GM/DL (ref 5.8–7.6)
RBC # BLD AUTO: 5.5 X10(6)/MCL (ref 4.7–6.1)
SODIUM SERPL-SCNC: 140 MMOL/L (ref 136–145)
WBC # SPEC AUTO: 8 X10(3)/MCL (ref 4.5–11.5)

## 2022-10-07 PROCEDURE — 36415 COLL VENOUS BLD VENIPUNCTURE: CPT

## 2022-10-07 PROCEDURE — 85025 COMPLETE CBC W/AUTO DIFF WBC: CPT

## 2022-10-07 PROCEDURE — 80053 COMPREHEN METABOLIC PANEL: CPT

## 2022-10-11 ENCOUNTER — OFFICE VISIT (OUTPATIENT)
Dept: HEMATOLOGY/ONCOLOGY | Facility: CLINIC | Age: 62
End: 2022-10-11
Payer: MEDICAID

## 2022-10-11 VITALS
DIASTOLIC BLOOD PRESSURE: 82 MMHG | HEART RATE: 60 BPM | OXYGEN SATURATION: 98 % | TEMPERATURE: 98 F | BODY MASS INDEX: 30.08 KG/M2 | SYSTOLIC BLOOD PRESSURE: 150 MMHG | WEIGHT: 214.88 LBS | HEIGHT: 71 IN | RESPIRATION RATE: 14 BRPM

## 2022-10-11 DIAGNOSIS — D50.8 OTHER IRON DEFICIENCY ANEMIA: ICD-10-CM

## 2022-10-11 DIAGNOSIS — D63.1 ANEMIA IN STAGE 4 CHRONIC KIDNEY DISEASE: ICD-10-CM

## 2022-10-11 DIAGNOSIS — C77.8 MALIGNANT NEOPLASM METASTATIC TO LYMPH NODES OF MULTIPLE SITES: ICD-10-CM

## 2022-10-11 DIAGNOSIS — N18.4 ANEMIA IN STAGE 4 CHRONIC KIDNEY DISEASE: ICD-10-CM

## 2022-10-11 DIAGNOSIS — C64.1 CLEAR CELL CARCINOMA OF RIGHT KIDNEY: Primary | ICD-10-CM

## 2022-10-11 PROCEDURE — 1159F PR MEDICATION LIST DOCUMENTED IN MEDICAL RECORD: ICD-10-PCS | Mod: CPTII,,, | Performed by: NURSE PRACTITIONER

## 2022-10-11 PROCEDURE — 1159F MED LIST DOCD IN RCRD: CPT | Mod: CPTII,,, | Performed by: NURSE PRACTITIONER

## 2022-10-11 PROCEDURE — 99999 PR PBB SHADOW E&M-EST. PATIENT-LVL IV: ICD-10-PCS | Mod: PBBFAC,,, | Performed by: NURSE PRACTITIONER

## 2022-10-11 PROCEDURE — 3077F PR MOST RECENT SYSTOLIC BLOOD PRESSURE >= 140 MM HG: ICD-10-PCS | Mod: CPTII,,, | Performed by: NURSE PRACTITIONER

## 2022-10-11 PROCEDURE — 99215 OFFICE O/P EST HI 40 MIN: CPT | Mod: S$PBB,,, | Performed by: NURSE PRACTITIONER

## 2022-10-11 PROCEDURE — 3079F PR MOST RECENT DIASTOLIC BLOOD PRESSURE 80-89 MM HG: ICD-10-PCS | Mod: CPTII,,, | Performed by: NURSE PRACTITIONER

## 2022-10-11 PROCEDURE — 99214 OFFICE O/P EST MOD 30 MIN: CPT | Mod: PBBFAC | Performed by: NURSE PRACTITIONER

## 2022-10-11 PROCEDURE — 3008F PR BODY MASS INDEX (BMI) DOCUMENTED: ICD-10-PCS | Mod: CPTII,,, | Performed by: NURSE PRACTITIONER

## 2022-10-11 PROCEDURE — 1160F PR REVIEW ALL MEDS BY PRESCRIBER/CLIN PHARMACIST DOCUMENTED: ICD-10-PCS | Mod: CPTII,,, | Performed by: NURSE PRACTITIONER

## 2022-10-11 PROCEDURE — 1160F RVW MEDS BY RX/DR IN RCRD: CPT | Mod: CPTII,,, | Performed by: NURSE PRACTITIONER

## 2022-10-11 PROCEDURE — 99215 PR OFFICE/OUTPT VISIT, EST, LEVL V, 40-54 MIN: ICD-10-PCS | Mod: S$PBB,,, | Performed by: NURSE PRACTITIONER

## 2022-10-11 PROCEDURE — 3077F SYST BP >= 140 MM HG: CPT | Mod: CPTII,,, | Performed by: NURSE PRACTITIONER

## 2022-10-11 PROCEDURE — 3008F BODY MASS INDEX DOCD: CPT | Mod: CPTII,,, | Performed by: NURSE PRACTITIONER

## 2022-10-11 PROCEDURE — 99999 PR PBB SHADOW E&M-EST. PATIENT-LVL IV: CPT | Mod: PBBFAC,,, | Performed by: NURSE PRACTITIONER

## 2022-10-11 PROCEDURE — 3079F DIAST BP 80-89 MM HG: CPT | Mod: CPTII,,, | Performed by: NURSE PRACTITIONER

## 2022-10-11 RX ORDER — SYRING-NEEDL,DISP,INSUL,0.3 ML 31GX15/64"
SYRINGE, EMPTY DISPOSABLE MISCELLANEOUS
COMMUNITY
Start: 2022-05-03

## 2022-10-11 RX ORDER — HYOSCYAMINE SULFATE 0.12 MG/1
0.12 TABLET SUBLINGUAL EVERY 6 HOURS PRN
COMMUNITY
Start: 2022-06-24

## 2022-10-11 RX ORDER — CALCIUM CITRATE/VITAMIN D3 200MG-6.25
TABLET ORAL 3 TIMES DAILY
COMMUNITY
Start: 2022-09-19

## 2022-10-11 RX ORDER — MUPIROCIN 20 MG/G
OINTMENT TOPICAL
COMMUNITY
Start: 2021-09-02 | End: 2023-01-04

## 2022-10-11 RX ORDER — DICLOFENAC SODIUM 10 MG/G
2 GEL TOPICAL 4 TIMES DAILY
COMMUNITY
Start: 2022-05-24 | End: 2023-01-04

## 2022-10-11 RX ORDER — TERBINAFINE HYDROCHLORIDE 250 MG/1
250 TABLET ORAL
Status: ON HOLD | COMMUNITY
Start: 2021-09-02 | End: 2023-05-17 | Stop reason: HOSPADM

## 2022-10-11 RX ORDER — LANCETS
EACH MISCELLANEOUS
COMMUNITY
Start: 2022-03-24

## 2022-10-11 RX ORDER — MULTIVITAMIN
1 TABLET ORAL DAILY
COMMUNITY

## 2022-10-11 RX ORDER — INSULIN GLARGINE 100 [IU]/ML
INJECTION, SOLUTION SUBCUTANEOUS
COMMUNITY
Start: 2022-04-28

## 2022-10-24 ENCOUNTER — TELEPHONE (OUTPATIENT)
Dept: HEMATOLOGY/ONCOLOGY | Facility: CLINIC | Age: 62
End: 2022-10-24
Payer: MEDICAID

## 2022-10-24 NOTE — TELEPHONE ENCOUNTER
Sounds like he has developed hand/foot from the Cabometyx. He will need to hold it until symptoms resolve. He has an appointment with us next week so he can hold it until we see him.

## 2022-10-24 NOTE — TELEPHONE ENCOUNTER
Per Jackie - advised to hold cabometyx. Keep appointment here next week. I also advised that he use unscented lotion on hands and feet a few times daily. He voiced understanding.

## 2022-10-25 NOTE — PROGRESS NOTES
Subjective:       Patient ID: Ye Olivera is a 62 y.o. male.    Urologist: Dr. Christopher Villasenor  PCP: Dr. Ly Quevedo  Nephrologist: Dr. Jenae Le    Renal Cell Carcinoma Stage IV([id number]) Mediastinal lymph nodes--Diagnosed 20  Biopsy/pathology:  Upper EUS done 9/3/20--large cluster of malignant appearing lymph nodes in subcarinal mediastinum causing extrinsic compression in middle 1/3 and thoracic esophagus biopsy c/w metastatic carcinoma, c/w renal cell carcinoma CD10+, PAX -8+, erythematous mucosa in antrum biopsy c/w mild chronic gastritis, inactive, normal duodenum, no pathology in CBD or left lobe of liver or pancreas.  CARIS results- MSI stable, Mismatch repair status proficient. Tumor burden low. Negative for: PD-L1, NTRK1/2/3, BAP1, FH, MET, PBRM1, SDHA. Pathologic variants identified: KDMSC (Exon 13 and 11) and VHL. Immunohistochemistry results: MLH1, MSH2, MSH6, and PM52 positive. PD-L1 negative.   Surgery/pathology:  Right radical nephrectomy and lymph node dissection done 20--clear cell renal cell carcinoma measures 9.2X8C8.7cm, Grade 3, margins clear, tumor limited to kidney, 4/6 lymph nodes positive for metastatic clear cell carcinoma.   Imagin. CT A/P w/o contrast [facility] 20--bulky right renal mass, midpole, measuring 10cm with evidence of internal necrosis and calcifications, primary mass does not extend outside the perinephric space, associated right perinephric edema, multiple enlarged retroperitoneal lymph nodes at level of the kidneys and inferior retroperitoneum, one measures 2.9X1.4cm, additional node measures 2.1X1.8cm, additional node measures 1.8X1.2cm, inferior retroperitoneal node at level of inferior pole right kidney measures 1.8X1.4cm, no convincing evidence of renal vein involvement, no right adrenal gland involvement, no disease apparent outside of abdomen.  2. CT A/P w/ and w/o contrast [facility] 20--large right renal mass measures 10.6X9.5X7.4cm, with  enlarged retroperitoneal nodes, largest measures 2.7cm.  3. CT chest [facility] 8/28/20--solitary left supraclavicular node, small and not overtly concerning, heterogeneous retroesophageal mass at the level of the tariq c/w metastatic deposit, no other evidence for metastatic disease.  4. MRI brain w/ and w/o contrast [facility] 8/28/20--no evidence for intracranial metastatic disease.  5. CT C/A/P done at [facility] 10/28/20--now appears to be 2 adjacent masses at the level of the tariq, measuring in aggregate 6.2X2.7cm as compared with 5.2X2.1cm, subcentimeter mediastinal and hilar nodes are seen, large intermediate density structure at the right renal fossa extending to pelvic brim, most likely post-operative hematoma or seroma, resolution of previously seen retroperitoneal adenopathy.  6. CT C/A/P w&w/o at [facility] on 12/23/20--Interval enlargement of the retro-esophageal mass with mild interval increase in size of multiple paraesophageal and prevascular lymph nodes compatible with progression of disease. Interval decrease in size of the right retroperitoneal postop seroma or hematoma. No evidence of metastatic disease below the diaphragm. Prior right nephrectomy and adrenalectomy. Chololithiasis and stable atherosclerosis.   7. CT C/A/P w/o contrast done at [facility] on 4/5/21--interval decrease in size of multiple prevascular, subcarinal and para-esophageal lymph nodes c/w response to treatment, no new pulmonary nodule or lymph node, no evidence for metastatic disease below the diaphragm, interval decrease in size of the post-operative seromas adjacent to right psoas muscle.  8. CT C/A/P w/o contrast done at [facility] on 7/7/21--stable appearance of previously identified prevascular, subcarinal, and paraesophageal nodes, there are an increased number of sub-centimeter nodes in the aortopulmonary window and precarinal region, nonspecific in appearance, no other evidence of local recurrence or metastatic disease.   9. MRI cervical spine w/ and w/o  contrast done at [facility] 8/4/21--no metastatic disease, multilevel spondylotic changes, no acute abnormality.   10. MRI brain w/ and w/o contrast 8/4/21--normal maturation of the previously seen lacunar infarct at the left centrum semiovale, no new abnormalities, specifically, no evidence of intracranial metastatic disease.   11. CT C/A/P w/o contrast done at [facility] 10/22/21--stable reactive mediastinal adenopathy, interval resection of right kidney and adrenal gland with post-surgical changes but no evidence of metastatic disease.   12. CT C/A/P w/o contrast done at [facility] 1/26/22--4 new pulmonary nodules larges LLL 14mm, 9mm LLL, 5mm LLL, 5mm RML, with enlarging prevascular lymph node now measures 18mm, previous 8mm, concerning for metastatic disease, new mesenteric implant in LLQ measures 11mm, enlarging retroperitoneal LN now measures 14F14sa, previous 14X8mm concerning for progression disease, cholelithiasis.   13. MRI brain w/ and w/o  contrast done at [facility] 3/31/22--no acute intracranial abnormality.   14. CT C/A/P w/o contrast done at [facility] 4/5/22--progressive retroperitoneal and mediastinal adenopathy, with development of hilar adenopathy, increase in size and number of pulmonary nodules.   15. CT done at [facility] showed large hematoma 14cm in bladder, results not available.  16. CT head w/o contrast done at [facility] 7/31/22--No intracranial hemorrhage or acute traumatic brain parenchymal injury evident by CT.     17. CT C/A/P w/o contrast done 8/29/22--mixed response to treatment, worsening mediastinal adenopathy, stable pulmonary nodules, improved retroperitoneal lymph node, enlarging pericaval lymph node.    Treatment History:   1. IL2 started on 11/2/20. 2nd cycle started 11/16/20 - stopped after 7 days due to acute renal failure, volume overload and confusion. Stopped due to progression.  2. Keytruda/Inlyta 1/8/21--1/31/22 (stopped due to progression, autoimmune hepatitis).  3. Prednisone 80mg daily 1/31/22--weaned and  stopped 4/14/22.    Hospitalization:   [facility] 7/2022 14cm hematoma s/p TURP causing hydronephrosis, JOE and patient required initiation of dialysis, UTI, Covid infection.     Treatment Plan:  Cabometyx 60mg daily started 4/7/22  Decrease dose to 40mg daily 4/28/22 due to side effects.  HOLD on 5/19/22 until groin rash and abscess heal/also underwent TURP on 6/23/22  Restarted 7/6/22--held 7/16/22 for hospitalization/bladder hematoma  Cabometyx 40mg daily restarted 9/1/22. On hold since 10/24/22 for hand/foot syndrome.     Chief Complaint: Other Misc (Pt reports that his hands burn and are painful. Reports that his hands have been peeling for about two day. Pt reports itching on his head.)    HPI   Patient presents for follow-up of renal cell carcinoma. He is doing very well. He was restarted on the Cabometyx at the beginning of September. He remains off hemodialysis and his catheter has been removed. He contacted the clinic last week with rash to hands. He was instructed to hold the Cabometyx. This has been on hold since last week. Still with peeling and sensitivity to the hands. Recommended he keep them moisturized and to avoid harsh soaps. Mentions his BP has been mostly good at home and he is taking Coreg daily. Occasional high readings. No other complaints at this time. Appetite good and weight increased.      Past Medical History:   Diagnosis Date    Arthritis of knee 8/13/2013    HTN (hypertension) 8/13/2013    Type II or unspecified type diabetes mellitus without mention of complication, not stated as uncontrolled 8/13/2013      Review of patient's allergies indicates:  No Known Allergies     Current Outpatient Medications:     aspirin 81 mg Cap, Take 81 mg by mouth once daily at 6am., Disp: , Rfl:     atorvastatin (LIPITOR) 80 MG tablet, Take 80 mg by mouth once daily., Disp: , Rfl:     cabozantinib (CABOMETYX) 40 mg Tab, TAKE ONE TABLET BY MOUTH ONCE DAILY AT THE SAME TIME ON AN EMPTY STOMACH AT LEAST 1 HOUR  "BEFORE OR 2 HOURS AFTER EATING. AVOID GRAPEFRUIT PRODUCTS, Disp: 30 tablet, Rfl: 2    desvenlafaxine succinate (PRISTIQ) 50 MG Tb24, Take 100 mg by mouth once daily., Disp: , Rfl:     diclofenac sodium (VOLTAREN) 1 % Gel, Apply 2 g topically 4 (four) times daily., Disp: , Rfl:     finasteride (PROSCAR) 5 mg tablet, Take 5 mg by mouth as needed., Disp: , Rfl:     insulin glargine (LANTUS) 100 unit/mL injection, Inject into the skin., Disp: , Rfl:     insulin syr/ndl U100 half warner (BD VEO INSULIN SYR, HALF UNIT,) 0.3 mL 31 gauge x 15/64" Syrg, USE 1 syringe DAILY, Disp: , Rfl:     lancets Misc, Use TID to check blood sugar. Dx: E11.9, patient is insulin dependent, Disp: , Rfl:     multivitamin (THERAGRAN) per tablet, Take 1 tablet by mouth once daily., Disp: , Rfl:     mupirocin (BACTROBAN) 2 % ointment, Apply topically., Disp: , Rfl:     ondansetron (ZOFRAN) 4 MG tablet, Take 4 mg by mouth every 6 (six) hours as needed., Disp: , Rfl:     pantoprazole (PROTONIX) 40 MG tablet, Take 1 tablet (40 mg total) by mouth once daily., Disp: 30 tablet, Rfl: 6    pregabalin (LYRICA) 75 MG capsule, Take 75 mg by mouth 2 (two) times a day., Disp: , Rfl:     prochlorperazine (COMPAZINE) 5 MG tablet, Take 5 mg by mouth as needed., Disp: , Rfl:     QUEtiapine (SEROQUEL) 50 MG tablet, Take 150 mg by mouth every evening., Disp: , Rfl:     tamsulosin (FLOMAX) 0.4 mg Cap, Take 1 capsule (0.4 mg total) by mouth every evening., Disp: 30 capsule, Rfl: 6    terbinafine HCL (LAMISIL) 250 mg tablet, Take 250 mg by mouth., Disp: , Rfl:     tolterodine (DETROL LA) 4 MG 24 hr capsule, Take 4 mg by mouth once daily., Disp: , Rfl:     traMADoL (ULTRAM) 50 mg tablet, Take 50 mg by mouth every 6 (six) hours., Disp: , Rfl:     TRUE METRIX GLUCOSE TEST STRIP Strp, 3 (three) times daily., Disp: , Rfl:     albuterol 90 mcg/actuation inhaler, Inhale 2 puffs into the lungs every 4 (four) hours as needed., Disp: 17 g, Rfl: 2    carvediloL (COREG) 12.5 MG " tablet, Take 12.5 mg by mouth 2 (two) times daily., Disp: , Rfl:     citalopram (CELEXA) 20 MG tablet, Take 1 tablet (20 mg total) by mouth once daily., Disp: 30 tablet, Rfl: 6    diphenoxylate-atropine 2.5-0.025 mg (LOMOTIL) 2.5-0.025 mg per tablet, Take 1 tablet by mouth as needed., Disp: , Rfl:     hyoscyamine (LEVSIN/SL) 0.125 mg Subl, Place 0.125 mg under the tongue every 6 (six) hours as needed., Disp: , Rfl:     tuberculin,purif.prot.deriv. (TUBERSOL IDRM), Inject 0.1 mLs into the skin., Disp: , Rfl:     Current Facility-Administered Medications:     albuterol sulfate nebulizer solution 2.5 mg, 2.5 mg, Nebulization, Q4H PRN, Thierno Snider MD     Review of Systems   Constitutional:  Positive for fatigue. Negative for activity change, appetite change, fever and unexpected weight change.        Weight gain   HENT:  Negative for mouth sores.    Eyes:  Negative for visual disturbance.   Respiratory:  Negative for cough and shortness of breath.    Cardiovascular:  Negative for chest pain.   Gastrointestinal:  Negative for abdominal pain, blood in stool, constipation, diarrhea, nausea and vomiting.   Genitourinary:  Negative for difficulty urinating and frequency.   Musculoskeletal:  Positive for back pain. Negative for leg pain.        Joint pains all over   Integumentary:  Negative for rash and wound.        Hand/foot syndrome rash   Neurological:  Positive for numbness. Negative for dizziness, weakness and headaches.        Neuropathy to feet. Sensitivity to hands   Hematological:  Negative for adenopathy.   Psychiatric/Behavioral:  Negative for behavioral problems and suicidal ideas. The patient is not nervous/anxious.    Vitals:    11/01/22 1430   BP: (!) 152/90   Pulse: 61   Resp: 14   Temp: 97.9 °F (36.6 °C)        Physical Exam  Constitutional:       Appearance: Normal appearance. He is overweight.   HENT:      Head: Normocephalic and atraumatic.      Nose: Nose normal.   Eyes:      General: Lids are  normal. Vision grossly intact.      Extraocular Movements: Extraocular movements intact.      Conjunctiva/sclera: Conjunctivae normal.   Cardiovascular:      Rate and Rhythm: Normal rate and regular rhythm.      Heart sounds: Normal heart sounds.   Pulmonary:      Effort: Pulmonary effort is normal.      Breath sounds: Normal breath sounds.   Chest:      Comments: HD access removed to right upper CW  Abdominal:      General: Abdomen is protuberant. Bowel sounds are normal.      Palpations: Abdomen is soft.   Musculoskeletal:         General: Normal range of motion.      Cervical back: Neck supple.      Right lower leg: No edema.      Left lower leg: No edema.   Skin:     General: Skin is warm and dry.      Comments: Peeling and cracking noted to bilateral hands c/w hand/foot syndrome   Neurological:      General: No focal deficit present.      Mental Status: He is alert and oriented to person, place, and time.   Psychiatric:         Attention and Perception: Attention normal.         Mood and Affect: Mood normal.         Speech: Speech normal.         Behavior: Behavior normal. Behavior is cooperative.        ECOG SCORE    1 - Restricted in strenuous activity-ambulatory and able to carry out work of a light nature       Lab Visit on 10/28/2022   Component Date Value    Sodium Level 10/28/2022 139     Potassium Level 10/28/2022 3.9     Chloride 10/28/2022 103     Carbon Dioxide 10/28/2022 28     Glucose Level 10/28/2022 128 (H)     Blood Urea Nitrogen 10/28/2022 27.1 (H)     Creatinine 10/28/2022 1.73 (H)     Calcium Level Total 10/28/2022 8.7 (L)     Protein Total 10/28/2022 6.8     Albumin Level 10/28/2022 3.1 (L)     Globulin 10/28/2022 3.7 (H)     Albumin/Globulin Ratio 10/28/2022 0.8 (L)     Bilirubin Total 10/28/2022 0.4     Alkaline Phosphatase 10/28/2022 179 (H)     Alanine Aminotransferase 10/28/2022 40     Aspartate Aminotransfera* 10/28/2022 28     eGFR 10/28/2022 44     WBC 10/28/2022 9.6     RBC  10/28/2022 5.32     Hgb 10/28/2022 13.9 (L)     Hct 10/28/2022 44.7     MCV 10/28/2022 84.0     MCH 10/28/2022 26.1 (L)     MCHC 10/28/2022 31.1 (L)     RDW 10/28/2022 18.5 (H)     Platelet 10/28/2022 161     MPV 10/28/2022 10.4     Neut % 10/28/2022 60.2     Lymph % 10/28/2022 28.0     Mono % 10/28/2022 6.6     Eos % 10/28/2022 4.7     Basophil % 10/28/2022 0.4     Lymph # 10/28/2022 2.68     Neut # 10/28/2022 5.8     Mono # 10/28/2022 0.63     Eos # 10/28/2022 0.45     Baso # 10/28/2022 0.04     IG# 10/28/2022 0.01     IG% 10/28/2022 0.1           Assessment:        1. Clear cell carcinoma of right kidney    2. 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 Renal cell carcinoma, unspecified laterality    4. Anemia in stage 4 chronic kidney disease    5. Other iron deficiency anemia          Plan:         Patient with large right renal mass and retroperitoneal adenopathy diagnosed by CT in 7/2020.  CT chest showed mediastinal fanny mass and biopsy done via EUS + for metastatic renal cell carcinoma.  Patient s/p radical right nephrectomy done 9/11/20. Pathology showed 9.2cm clear cell renal cell carcinoma, margins clear with 4/6 lymph nodes positive.  Post-operative course complicated by hematoma requiring blood transfusion.  CARIS results- MSI stable, Mismatch repair status proficient. Tumor burden low. Negative for: PD-L1, NTRK1/2/3, BAP1, FH, MET, PBRM1, SDHA. Pathologic variants identified: KDMSC (Exon 13 and 11) and VHL. Immunohistochemistry results: MLH1, MSH2, MSH6, and PM52 positive. PD-L1 negative.     Referred to Dr. Haja Sal for evaluation for IL-2. Explained this is only treatment to be shown to achieve long-term remissions in stage IV disease and patient has limited disease at this time.  Patient started 1st 5 days of IL-2 on 11/2/20.   Started his 2nd round on 11/16/20 and it was stopped on 11/22/20 due to acute renal failure, volume overload and agitation.   CT scan on  12/23/20 at [facility] revealed progression of his disease. Dr. Sal contacted patient and discussed the results. He also reached out to Dr. Alvarado to notify her that the patient will not be returning.   Started Keytruda 200mg every 3 weeks on 1/8/21 + Inlyta 5mg oral BID on 1/11/21.  CT C/A/P done at [facility] on 10/22/21 showed stable mild reactive mediastinal nodes, otherwise stable.  Patient continued on treatment s/p cycle 18 Keytruda given 1/10/22.  Labs on 1/10/22 showed elevated LFTs. Inlyta held.  CT C/A/P done at [facility] on 1/26/22 showed unfortunately disease progression with new pulmonary nodules, enlargement in mediastinal node, new mesenteric nodule and retroperitoneal adenopathy.  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 Hepatitis panel negative. Keytruda stopped. Started on Prednisone 80mg daily on 1/31/22.  Patient had suicidal ideation and severe depression during visit in 3/2022, seen by psychiatry and meds adjusted, much improved.  CT C/A/P from [facility] on 4/5/22 showed progressive disease.  Treatment change recommended to Cabometyx 60mg daily per NCCN guidelines (Category 1) for subsequent treatment. Started on 4/7/22.  Prednisone weaned and stopped 4/14/22.  Patient was having multiple side effects (n/v, diarrhea, mouth sores) from Cabometyx.  Reduced dose to 40mg on 4/28/22. Held on 5/19/22 due to cellulitis/rashes/abscesses.    Continued to hold s/p TURP on 6/23/22 and restarted 7/6/22.  Patient hospitalized at [facility] 7/16/22 for large bladder hematoma s/p clot evacuation X 2. Cabometyx stopped again.   Repeat CT C/A/P done at [facility] on 8/29/22 shows mixed findings, but mostly disease progression in fanny metastases in mediastinum, which is to be expected since he has been off treatment.   Restarted back on Cabometyx (lose dose 40mg) on 9/1/22 although it has not been studied in dialysis patients, but no dosage adjustment is usually required.    Recent CBC shows  improving anemia, otherwise good. CMP with stable renal function, creatinine 1.73. Alk phos improving.  He contacted the clinic last week with rash to hands. He was instructed to hold the Cabometyx. This has been on hold since 10/24/22. Still with peeling and sensitivity to the hands consistent with Hand/foot syndrome. Recommended he keep them moisturized and to avoid harsh soaps.   Ok to restart should the rash improve but he was instructed to hold should it worsen.   Plan to repeat CT in 2 weeks at [facility].  Will follow-up in 3 weeks to discuss CT scan results.     Continue Compazine for nausea.   Continue Norco for joint pain.   Refill Vistaril to help with itching.   All questions answered at this time.       MARCIN Laboy

## 2022-10-28 ENCOUNTER — LAB VISIT (OUTPATIENT)
Dept: LAB | Facility: HOSPITAL | Age: 62
End: 2022-10-28
Attending: INTERNAL MEDICINE
Payer: MEDICAID

## 2022-10-28 DIAGNOSIS — C77.8 MALIGNANT NEOPLASM METASTATIC TO LYMPH NODES OF MULTIPLE SITES: ICD-10-CM

## 2022-10-28 DIAGNOSIS — D50.8 OTHER IRON DEFICIENCY ANEMIA: ICD-10-CM

## 2022-10-28 DIAGNOSIS — D63.1 ANEMIA IN STAGE 4 CHRONIC KIDNEY DISEASE: ICD-10-CM

## 2022-10-28 DIAGNOSIS — N18.4 ANEMIA IN STAGE 4 CHRONIC KIDNEY DISEASE: ICD-10-CM

## 2022-10-28 DIAGNOSIS — C64.1 CLEAR CELL CARCINOMA OF RIGHT KIDNEY: ICD-10-CM

## 2022-10-28 LAB
ALBUMIN SERPL-MCNC: 3.1 GM/DL (ref 3.4–4.8)
ALBUMIN/GLOB SERPL: 0.8 RATIO (ref 1.1–2)
ALP SERPL-CCNC: 179 UNIT/L (ref 40–150)
ALT SERPL-CCNC: 40 UNIT/L (ref 0–55)
AST SERPL-CCNC: 28 UNIT/L (ref 5–34)
BASOPHILS # BLD AUTO: 0.04 X10(3)/MCL (ref 0–0.2)
BASOPHILS NFR BLD AUTO: 0.4 %
BILIRUBIN DIRECT+TOT PNL SERPL-MCNC: 0.4 MG/DL
BUN SERPL-MCNC: 27.1 MG/DL (ref 8.4–25.7)
CALCIUM SERPL-MCNC: 8.7 MG/DL (ref 8.8–10)
CHLORIDE SERPL-SCNC: 103 MMOL/L (ref 98–107)
CO2 SERPL-SCNC: 28 MMOL/L (ref 23–31)
CREAT SERPL-MCNC: 1.73 MG/DL (ref 0.73–1.18)
EOSINOPHIL # BLD AUTO: 0.45 X10(3)/MCL (ref 0–0.9)
EOSINOPHIL NFR BLD AUTO: 4.7 %
ERYTHROCYTE [DISTWIDTH] IN BLOOD BY AUTOMATED COUNT: 18.5 % (ref 11.5–17)
GFR SERPLBLD CREATININE-BSD FMLA CKD-EPI: 44 MLS/MIN/1.73/M2
GLOBULIN SER-MCNC: 3.7 GM/DL (ref 2.4–3.5)
GLUCOSE SERPL-MCNC: 128 MG/DL (ref 82–115)
HCT VFR BLD AUTO: 44.7 % (ref 42–52)
HGB BLD-MCNC: 13.9 GM/DL (ref 14–18)
IMM GRANULOCYTES # BLD AUTO: 0.01 X10(3)/MCL (ref 0–0.04)
IMM GRANULOCYTES NFR BLD AUTO: 0.1 %
LYMPHOCYTES # BLD AUTO: 2.68 X10(3)/MCL (ref 0.6–4.6)
LYMPHOCYTES NFR BLD AUTO: 28 %
MCH RBC QN AUTO: 26.1 PG (ref 27–31)
MCHC RBC AUTO-ENTMCNC: 31.1 MG/DL (ref 33–36)
MCV RBC AUTO: 84 FL (ref 80–94)
MONOCYTES # BLD AUTO: 0.63 X10(3)/MCL (ref 0.1–1.3)
MONOCYTES NFR BLD AUTO: 6.6 %
NEUTROPHILS # BLD AUTO: 5.8 X10(3)/MCL (ref 2.1–9.2)
NEUTROPHILS NFR BLD AUTO: 60.2 %
PLATELET # BLD AUTO: 161 X10(3)/MCL (ref 130–400)
PMV BLD AUTO: 10.4 FL (ref 7.4–10.4)
POTASSIUM SERPL-SCNC: 3.9 MMOL/L (ref 3.5–5.1)
PROT SERPL-MCNC: 6.8 GM/DL (ref 5.8–7.6)
RBC # BLD AUTO: 5.32 X10(6)/MCL (ref 4.7–6.1)
SODIUM SERPL-SCNC: 139 MMOL/L (ref 136–145)
WBC # SPEC AUTO: 9.6 X10(3)/MCL (ref 4.5–11.5)

## 2022-10-28 PROCEDURE — 80053 COMPREHEN METABOLIC PANEL: CPT

## 2022-10-28 PROCEDURE — 36415 COLL VENOUS BLD VENIPUNCTURE: CPT

## 2022-10-28 PROCEDURE — 85025 COMPLETE CBC W/AUTO DIFF WBC: CPT

## 2022-11-01 ENCOUNTER — TELEPHONE (OUTPATIENT)
Dept: HEMATOLOGY/ONCOLOGY | Facility: CLINIC | Age: 62
End: 2022-11-01

## 2022-11-01 ENCOUNTER — OFFICE VISIT (OUTPATIENT)
Dept: HEMATOLOGY/ONCOLOGY | Facility: CLINIC | Age: 62
End: 2022-11-01
Payer: MEDICAID

## 2022-11-01 VITALS
HEIGHT: 71 IN | WEIGHT: 223.63 LBS | RESPIRATION RATE: 14 BRPM | DIASTOLIC BLOOD PRESSURE: 90 MMHG | OXYGEN SATURATION: 96 % | HEART RATE: 61 BPM | BODY MASS INDEX: 31.31 KG/M2 | TEMPERATURE: 98 F | SYSTOLIC BLOOD PRESSURE: 152 MMHG

## 2022-11-01 DIAGNOSIS — D63.1 ANEMIA IN STAGE 4 CHRONIC KIDNEY DISEASE: ICD-10-CM

## 2022-11-01 DIAGNOSIS — C64.9 RENAL CELL CARCINOMA, UNSPECIFIED LATERALITY: ICD-10-CM

## 2022-11-01 DIAGNOSIS — L27.1 HAND FOOT SYNDROME: ICD-10-CM

## 2022-11-01 DIAGNOSIS — C77.8 MALIGNANT NEOPLASM METASTATIC TO LYMPH NODES OF MULTIPLE SITES: ICD-10-CM

## 2022-11-01 DIAGNOSIS — D50.8 OTHER IRON DEFICIENCY ANEMIA: ICD-10-CM

## 2022-11-01 DIAGNOSIS — N18.4 CHRONIC KIDNEY DISEASE, STAGE IV (SEVERE): ICD-10-CM

## 2022-11-01 DIAGNOSIS — C64.1 CLEAR CELL CARCINOMA OF RIGHT KIDNEY: Primary | ICD-10-CM

## 2022-11-01 DIAGNOSIS — N18.4 ANEMIA IN STAGE 4 CHRONIC KIDNEY DISEASE: ICD-10-CM

## 2022-11-01 PROCEDURE — 99213 OFFICE O/P EST LOW 20 MIN: CPT | Mod: PBBFAC | Performed by: NURSE PRACTITIONER

## 2022-11-01 PROCEDURE — 3077F PR MOST RECENT SYSTOLIC BLOOD PRESSURE >= 140 MM HG: ICD-10-PCS | Mod: CPTII,,, | Performed by: NURSE PRACTITIONER

## 2022-11-01 PROCEDURE — 3008F BODY MASS INDEX DOCD: CPT | Mod: CPTII,,, | Performed by: NURSE PRACTITIONER

## 2022-11-01 PROCEDURE — 3080F PR MOST RECENT DIASTOLIC BLOOD PRESSURE >= 90 MM HG: ICD-10-PCS | Mod: CPTII,,, | Performed by: NURSE PRACTITIONER

## 2022-11-01 PROCEDURE — 99999 PR PBB SHADOW E&M-EST. PATIENT-LVL III: ICD-10-PCS | Mod: PBBFAC,,, | Performed by: NURSE PRACTITIONER

## 2022-11-01 PROCEDURE — 1159F PR MEDICATION LIST DOCUMENTED IN MEDICAL RECORD: ICD-10-PCS | Mod: CPTII,,, | Performed by: NURSE PRACTITIONER

## 2022-11-01 PROCEDURE — 3077F SYST BP >= 140 MM HG: CPT | Mod: CPTII,,, | Performed by: NURSE PRACTITIONER

## 2022-11-01 PROCEDURE — 3008F PR BODY MASS INDEX (BMI) DOCUMENTED: ICD-10-PCS | Mod: CPTII,,, | Performed by: NURSE PRACTITIONER

## 2022-11-01 PROCEDURE — 1160F RVW MEDS BY RX/DR IN RCRD: CPT | Mod: CPTII,,, | Performed by: NURSE PRACTITIONER

## 2022-11-01 PROCEDURE — 1160F PR REVIEW ALL MEDS BY PRESCRIBER/CLIN PHARMACIST DOCUMENTED: ICD-10-PCS | Mod: CPTII,,, | Performed by: NURSE PRACTITIONER

## 2022-11-01 PROCEDURE — 99999 PR PBB SHADOW E&M-EST. PATIENT-LVL III: CPT | Mod: PBBFAC,,, | Performed by: NURSE PRACTITIONER

## 2022-11-01 PROCEDURE — 3080F DIAST BP >= 90 MM HG: CPT | Mod: CPTII,,, | Performed by: NURSE PRACTITIONER

## 2022-11-01 PROCEDURE — 1159F MED LIST DOCD IN RCRD: CPT | Mod: CPTII,,, | Performed by: NURSE PRACTITIONER

## 2022-11-01 PROCEDURE — 99215 PR OFFICE/OUTPT VISIT, EST, LEVL V, 40-54 MIN: ICD-10-PCS | Mod: S$PBB,,, | Performed by: NURSE PRACTITIONER

## 2022-11-01 PROCEDURE — 99215 OFFICE O/P EST HI 40 MIN: CPT | Mod: S$PBB,,, | Performed by: NURSE PRACTITIONER

## 2022-11-01 RX ORDER — CARVEDILOL 12.5 MG/1
12.5 TABLET ORAL 2 TIMES DAILY
Status: ON HOLD | COMMUNITY
Start: 2022-10-11 | End: 2023-05-17 | Stop reason: HOSPADM

## 2022-11-01 RX ORDER — HYDROXYZINE PAMOATE 25 MG/1
25 CAPSULE ORAL EVERY 4 HOURS PRN
Qty: 30 CAPSULE | Refills: 4 | Status: SHIPPED | OUTPATIENT
Start: 2022-11-01 | End: 2024-03-14

## 2022-11-10 ENCOUNTER — TELEPHONE (OUTPATIENT)
Dept: HEMATOLOGY/ONCOLOGY | Facility: CLINIC | Age: 62
End: 2022-11-10
Payer: MEDICAID

## 2022-11-10 NOTE — TELEPHONE ENCOUNTER
Patient requesting status of scans that were to be scheduled @ [facility]. Message forwarded.

## 2022-11-20 NOTE — PROGRESS NOTES
Subjective:       Patient ID: Ye Olivera is a 62 y.o. male.    Urologist: Dr. Christopher Villasenor  PCP: Dr. Ly Quevedo  Nephrologist: Dr. Jenae Le    Renal Cell Carcinoma Stage IV([id number]) Mediastinal lymph nodes--Diagnosed 20  Biopsy/pathology:  Upper EUS done 9/3/20--large cluster of malignant appearing lymph nodes in subcarinal mediastinum causing extrinsic compression in middle 1/3 and thoracic esophagus biopsy c/w metastatic carcinoma, c/w renal cell carcinoma CD10+, PAX -8+, erythematous mucosa in antrum biopsy c/w mild chronic gastritis, inactive, normal duodenum, no pathology in CBD or left lobe of liver or pancreas.  CARIS results- MSI stable, Mismatch repair status proficient. Tumor burden low. Negative for: PD-L1, NTRK1/2/3, BAP1, FH, MET, PBRM1, SDHA. Pathologic variants identified: KDMSC (Exon 13 and 11) and VHL. Immunohistochemistry results: MLH1, MSH2, MSH6, and PM52 positive. PD-L1 negative.   Surgery/pathology:  Right radical nephrectomy and lymph node dissection done 20--clear cell renal cell carcinoma measures 9.2X8C8.7cm, Grade 3, margins clear, tumor limited to kidney, 4/6 lymph nodes positive for metastatic clear cell carcinoma.   Imagin. CT A/P w/o contrast [facility] 20--bulky right renal mass, midpole, measuring 10cm with evidence of internal necrosis and calcifications, primary mass does not extend outside the perinephric space, associated right perinephric edema, multiple enlarged retroperitoneal lymph nodes at level of the kidneys and inferior retroperitoneum, one measures 2.9X1.4cm, additional node measures 2.1X1.8cm, additional node measures 1.8X1.2cm, inferior retroperitoneal node at level of inferior pole right kidney measures 1.8X1.4cm, no convincing evidence of renal vein involvement, no right adrenal gland involvement, no disease apparent outside of abdomen.  2. CT A/P w/ and w/o contrast [facility] 20--large right renal mass measures 10.6X9.5X7.4cm, with  enlarged retroperitoneal nodes, largest measures 2.7cm.  3. CT chest [facility] 8/28/20--solitary left supraclavicular node, small and not overtly concerning, heterogeneous retroesophageal mass at the level of the tariq c/w metastatic deposit, no other evidence for metastatic disease.  4. MRI brain w/ and w/o contrast [facility] 8/28/20--no evidence for intracranial metastatic disease.  5. CT C/A/P done at [facility] 10/28/20--now appears to be 2 adjacent masses at the level of the tariq, measuring in aggregate 6.2X2.7cm as compared with 5.2X2.1cm, subcentimeter mediastinal and hilar nodes are seen, large intermediate density structure at the right renal fossa extending to pelvic brim, most likely post-operative hematoma or seroma, resolution of previously seen retroperitoneal adenopathy.  6. CT C/A/P w&w/o at [facility] on 12/23/20--Interval enlargement of the retro-esophageal mass with mild interval increase in size of multiple paraesophageal and prevascular lymph nodes compatible with progression of disease. Interval decrease in size of the right retroperitoneal postop seroma or hematoma. No evidence of metastatic disease below the diaphragm. Prior right nephrectomy and adrenalectomy. Chololithiasis and stable atherosclerosis.   7. CT C/A/P w/o contrast done at [facility] on 4/5/21--interval decrease in size of multiple prevascular, subcarinal and para-esophageal lymph nodes c/w response to treatment, no new pulmonary nodule or lymph node, no evidence for metastatic disease below the diaphragm, interval decrease in size of the post-operative seromas adjacent to right psoas muscle.  8. CT C/A/P w/o contrast done at [facility] on 7/7/21--stable appearance of previously identified prevascular, subcarinal, and paraesophageal nodes, there are an increased number of sub-centimeter nodes in the aortopulmonary window and precarinal region, nonspecific in appearance, no other evidence of local recurrence or metastatic disease.   9. MRI cervical spine w/ and w/o  contrast done at [facility] 8/4/21--no metastatic disease, multilevel spondylotic changes, no acute abnormality.   10. MRI brain w/ and w/o contrast 8/4/21--normal maturation of the previously seen lacunar infarct at the left centrum semiovale, no new abnormalities, specifically, no evidence of intracranial metastatic disease.   11. CT C/A/P w/o contrast done at [facility] 10/22/21--stable reactive mediastinal adenopathy, interval resection of right kidney and adrenal gland with post-surgical changes but no evidence of metastatic disease.   12. CT C/A/P w/o contrast done at [facility] 1/26/22--4 new pulmonary nodules larges LLL 14mm, 9mm LLL, 5mm LLL, 5mm RML, with enlarging prevascular lymph node now measures 18mm, previous 8mm, concerning for metastatic disease, new mesenteric implant in LLQ measures 11mm, enlarging retroperitoneal LN now measures [id number], previous 14X8mm concerning for progression disease, cholelithiasis.   13. MRI brain w/ and w/o  contrast done at [facility] 3/31/22--no acute intracranial abnormality.   14. CT C/A/P w/o contrast done at [facility] 4/5/22--progressive retroperitoneal and mediastinal adenopathy, with development of hilar adenopathy, increase in size and number of pulmonary nodules.   15. CT done at [facility] showed large hematoma 14cm in bladder, results not available.  16. CT head w/o contrast done at [facility] 7/31/22--No intracranial hemorrhage or acute traumatic brain parenchymal injury evident by CT.     17. CT C/A/P w/o contrast done at [facility] 8/29/22--mixed response to treatment, worsening mediastinal adenopathy, stable pulmonary nodules, improved retroperitoneal lymph node, enlarging pericaval lymph node.  18. CT C/A/P w/o contrast at [facility] 11/18/22--Interval decreased in mediastinal adenopathy, pulmonary nodules and portacaval nodes, c/w treatment response.     Treatment History:   1. IL2 started on 11/2/20. 2nd cycle started 11/16/20 - stopped after 7 days due to acute renal failure, volume overload and confusion.  Stopped due to progression.  2. Keytruda/Inlyta 1/8/21--1/31/22 (stopped due to progression, autoimmune hepatitis).  3. Prednisone 80mg daily 1/31/22--weaned and stopped 4/14/22.    Hospitalization:   [facility] 7/2022 14cm hematoma s/p TURP causing hydronephrosis, JOE and patient required initiation of dialysis, UTI, Covid infection.     Treatment Plan:  Cabometyx 60mg daily started 4/7/22  Decrease dose to 40mg daily 4/28/22 due to side effects.  HOLD on 5/19/22 until groin rash and abscess heal/also underwent TURP on 6/23/22  Restarted 7/6/22--held 7/16/22 for hospitalization/bladder hematoma  Cabometyx 40mg daily restarted 9/1/22. On hold since 10/24/22 for hand/foot syndrome.   Restarted on 11/9/22     Chief Complaint: Other Misc (Pt reports no new concerns today.)    HPI   Patient presents for follow-up of renal cell carcinoma. He restarted Cabometyx on 11/9/22 and so far he is tolerating well. His hands have healed mostly and he is watching them closely and keeping them well moisturized. His BP has been running high and I suggested he get back with his kidney doctor about starting on another medication. He is urinating good. He has follow-up with Dr. Hamilton robin.    Past Medical History:   Diagnosis Date    Arthritis of knee 8/13/2013    HTN (hypertension) 8/13/2013    Type II or unspecified type diabetes mellitus without mention of complication, not stated as uncontrolled 8/13/2013      Review of patient's allergies indicates:  No Known Allergies     Current Outpatient Medications:     aspirin 81 mg Cap, Take 81 mg by mouth once daily at 6am., Disp: , Rfl:     atorvastatin (LIPITOR) 80 MG tablet, Take 80 mg by mouth once daily., Disp: , Rfl:     cabozantinib (CABOMETYX) 40 mg Tab, TAKE ONE TABLET BY MOUTH ONCE DAILY AT THE SAME TIME ON AN EMPTY STOMACH AT LEAST 1 HOUR BEFORE OR 2 HOURS AFTER EATING. AVOID GRAPEFRUIT PRODUCTS, Disp: 30 tablet, Rfl: 2    carvediloL (COREG) 12.5 MG tablet, Take 12.5 mg by mouth 2  "(two) times daily., Disp: , Rfl:     desvenlafaxine succinate (PRISTIQ) 50 MG Tb24, Take 100 mg by mouth once daily., Disp: , Rfl:     diclofenac sodium (VOLTAREN) 1 % Gel, Apply 2 g topically 4 (four) times daily., Disp: , Rfl:     diphenoxylate-atropine 2.5-0.025 mg (LOMOTIL) 2.5-0.025 mg per tablet, Take 1 tablet by mouth as needed., Disp: , Rfl:     finasteride (PROSCAR) 5 mg tablet, Take 5 mg by mouth as needed., Disp: , Rfl:     hydrOXYzine pamoate (VISTARIL) 25 MG Cap, Take 1 capsule (25 mg total) by mouth every 4 (four) hours as needed (itching)., Disp: 30 capsule, Rfl: 4    hyoscyamine (LEVSIN/SL) 0.125 mg Subl, Place 0.125 mg under the tongue every 6 (six) hours as needed., Disp: , Rfl:     insulin glargine (LANTUS) 100 unit/mL injection, Inject into the skin., Disp: , Rfl:     insulin syr/ndl U100 half warner (BD VEO INSULIN SYR, HALF UNIT,) 0.3 mL 31 gauge x 15/64" Syrg, USE 1 syringe DAILY, Disp: , Rfl:     lancets Misc, Use TID to check blood sugar. Dx: E11.9, patient is insulin dependent, Disp: , Rfl:     multivitamin (THERAGRAN) per tablet, Take 1 tablet by mouth once daily., Disp: , Rfl:     mupirocin (BACTROBAN) 2 % ointment, Apply topically., Disp: , Rfl:     ondansetron (ZOFRAN) 4 MG tablet, Take 4 mg by mouth every 6 (six) hours as needed., Disp: , Rfl:     pantoprazole (PROTONIX) 40 MG tablet, Take 1 tablet (40 mg total) by mouth once daily., Disp: 30 tablet, Rfl: 6    pregabalin (LYRICA) 75 MG capsule, Take 75 mg by mouth 2 (two) times a day., Disp: , Rfl:     prochlorperazine (COMPAZINE) 5 MG tablet, Take 5 mg by mouth as needed., Disp: , Rfl:     QUEtiapine (SEROQUEL) 50 MG tablet, Take 150 mg by mouth every evening., Disp: , Rfl:     tamsulosin (FLOMAX) 0.4 mg Cap, Take 1 capsule (0.4 mg total) by mouth every evening., Disp: 30 capsule, Rfl: 6    terbinafine HCL (LAMISIL) 250 mg tablet, Take 250 mg by mouth., Disp: , Rfl:     tolterodine (DETROL LA) 4 MG 24 hr capsule, Take 4 mg by mouth once " daily., Disp: , Rfl:     traMADoL (ULTRAM) 50 mg tablet, Take 50 mg by mouth every 6 (six) hours., Disp: , Rfl:     TRUE METRIX GLUCOSE TEST STRIP Strp, 3 (three) times daily., Disp: , Rfl:     tuberculin,purif.prot.deriv. (TUBERSOL IDRM), Inject 0.1 mLs into the skin., Disp: , Rfl:     albuterol 90 mcg/actuation inhaler, Inhale 2 puffs into the lungs every 4 (four) hours as needed., Disp: 17 g, Rfl: 2    citalopram (CELEXA) 20 MG tablet, Take 1 tablet (20 mg total) by mouth once daily., Disp: 30 tablet, Rfl: 6    Current Facility-Administered Medications:     albuterol sulfate nebulizer solution 2.5 mg, 2.5 mg, Nebulization, Q4H PRN, Thierno Snider MD     Review of Systems   Constitutional:  Positive for fatigue. Negative for activity change, appetite change, fever and unexpected weight change.        Weight gain   HENT:  Negative for mouth sores.    Eyes:  Negative for visual disturbance.   Respiratory:  Negative for cough and shortness of breath.    Cardiovascular:  Negative for chest pain.   Gastrointestinal:  Negative for abdominal pain, blood in stool, constipation, diarrhea, nausea and vomiting.   Genitourinary:  Negative for difficulty urinating and frequency.   Musculoskeletal:  Positive for back pain. Negative for leg pain.        Joint pains all over   Integumentary:  Negative for rash and wound.        Hand-foot syndrome improved   Neurological:  Positive for numbness. Negative for dizziness, weakness and headaches.        Neuropathy to feet. Sensitivity to hands   Hematological:  Negative for adenopathy.   Psychiatric/Behavioral:  Negative for behavioral problems and suicidal ideas. The patient is not nervous/anxious.    Vitals:    11/22/22 1035   BP: (!) 148/92   Pulse: (!) 47   Resp: 14   Temp: 97.9 °F (36.6 °C)        Physical Exam  Constitutional:       Appearance: Normal appearance. He is overweight.   HENT:      Head: Normocephalic and atraumatic.      Nose: Nose normal.   Eyes:      General:  Lids are normal. Vision grossly intact.      Extraocular Movements: Extraocular movements intact.      Conjunctiva/sclera: Conjunctivae normal.   Cardiovascular:      Rate and Rhythm: Regular rhythm. Bradycardia present.      Heart sounds: Normal heart sounds.      Comments: Bradycardia present, on Coreg  Pulmonary:      Effort: Pulmonary effort is normal.      Breath sounds: Normal breath sounds.   Chest:      Comments: HD access removed to right upper CW  Abdominal:      General: Abdomen is protuberant. Bowel sounds are normal.      Palpations: Abdomen is soft.   Musculoskeletal:         General: Normal range of motion.      Cervical back: Neck supple.      Right lower leg: No edema.      Left lower leg: No edema.   Skin:     General: Skin is warm and dry.      Comments: Some healing erythematous dry patches on hands, nothing active currently   Neurological:      General: No focal deficit present.      Mental Status: He is alert and oriented to person, place, and time.   Psychiatric:         Attention and Perception: Attention normal.         Mood and Affect: Mood normal.         Speech: Speech normal.         Behavior: Behavior normal. Behavior is cooperative.        ECOG SCORE           Lab Visit on 11/22/2022   Component Date Value    WBC 11/22/2022 10.3     RBC 11/22/2022 5.42     Hgb 11/22/2022 14.1     Hct 11/22/2022 45.6     MCV 11/22/2022 84.1     MCH 11/22/2022 26.0 (L)     MCHC 11/22/2022 30.9 (L)     RDW 11/22/2022 19.7 (H)     Platelet 11/22/2022 182     MPV 11/22/2022 9.5     Neut % 11/22/2022 56.5     Lymph % 11/22/2022 29.8     Mono % 11/22/2022 7.1     Eos % 11/22/2022 6.0     Basophil % 11/22/2022 0.4     Lymph # 11/22/2022 3.05     Neut # 11/22/2022 5.8     Mono # 11/22/2022 0.73     Eos # 11/22/2022 0.61     Baso # 11/22/2022 0.04     IG# 11/22/2022 0.02     IG% 11/22/2022 0.2           Assessment:        1. Clear cell carcinoma of right kidney    2. Malignant neoplasm metastatic to  intrathoracic lymph node            Plan:         Patient with large right renal mass and retroperitoneal adenopathy diagnosed by CT in 7/2020.  CT chest showed mediastinal fanny mass and biopsy done via EUS + for metastatic renal cell carcinoma.  Patient s/p radical right nephrectomy done 9/11/20. Pathology showed 9.2cm clear cell renal cell carcinoma, margins clear with 4/6 lymph nodes positive.  Post-operative course complicated by hematoma requiring blood transfusion.  CARIS results- MSI stable, Mismatch repair status proficient. Tumor burden low. Negative for: PD-L1, NTRK1/2/3, BAP1, FH, MET, PBRM1, SDHA. Pathologic variants identified: KDMSC (Exon 13 and 11) and VHL. Immunohistochemistry results: MLH1, MSH2, MSH6, and PM52 positive. PD-L1 negative.     Referred to Dr. Haja Sal for evaluation for IL-2. Explained this is only treatment to be shown to achieve long-term remissions in stage IV disease and patient has limited disease at this time.  Patient started 1st 5 days of IL-2 on 11/2/20.   Started his 2nd round on 11/16/20 and it was stopped on 11/22/20 due to acute renal failure, volume overload and agitation.   CT scan on 12/23/20 at [facility] revealed progression of his disease. Dr. Sal contacted patient and discussed the results. He also reached out to Dr. Alvarado to notify her that the patient will not be returning.   Started Keytruda 200mg every 3 weeks on 1/8/21 + Inlyta 5mg oral BID on 1/11/21.  CT C/A/P done at [facility] on 10/22/21 showed stable mild reactive mediastinal nodes, otherwise stable.  Patient continued on treatment s/p cycle 18 Keytruda given 1/10/22.  Labs on 1/10/22 showed elevated LFTs. Inlyta held.  CT C/A/P done at [facility] on 1/26/22 showed unfortunately disease progression with new pulmonary nodules, enlargement in mediastinal node, new mesenteric nodule and retroperitoneal adenopathy.  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 Hepatitis panel negative.  Keytruda stopped. Started on Prednisone 80mg daily on 1/31/22.  Patient had suicidal ideation and severe depression during visit in 3/2022, seen by psychiatry and meds adjusted, much improved.  CT C/A/P from [facility] on 4/5/22 showed progressive disease.  Treatment change recommended to Cabometyx 60mg daily per NCCN guidelines (Category 1) for subsequent treatment. Started on 4/7/22.  Prednisone weaned and stopped 4/14/22.  Patient was having multiple side effects (n/v, diarrhea, mouth sores) from Cabometyx.  Reduced dose to 40mg on 4/28/22. Held on 5/19/22 due to cellulitis/rashes/abscesses.      Continued to hold s/p TURP on 6/23/22 and restarted 7/6/22.  Patient hospitalized at [facility] 7/16/22 for large bladder hematoma s/p clot evacuation X 2. Cabometyx stopped again.   Repeat CT C/A/P done at [facility] on 8/29/22 showed mixed findings, but mostly disease progression in fanny metastases in mediastinum, which is to be expected since he has been off treatment.   Restarted back on Cabometyx (lower dose 40mg) on 9/1/22 although it has not been studied in dialysis patients, but no dosage adjustment is usually required.  Held on 10/24/22 for Hand-foot syndrome, restarted 11/9/22.     Labs today with CBCdiff normal, CMP pending and will f/u  Patient will continue to keep hands well moisturized.   RTC 3 weeks for follow-up with repeat labs and visit.   If hand-foot syndrome worsens again, will need to consider dose reduction of Cabometyx to 20mg.   Plan to repeat CT C/A/P w/o contrast at [facility] in 3 months--2/2023.      Continue Compazine for nausea.   Continue Norco for joint pain.   Continue Vistaril for chronic pruritis.   All questions answered at this time.       Elizabeth Alvarado MD

## 2022-11-22 ENCOUNTER — OFFICE VISIT (OUTPATIENT)
Dept: HEMATOLOGY/ONCOLOGY | Facility: CLINIC | Age: 62
End: 2022-11-22
Payer: MEDICAID

## 2022-11-22 ENCOUNTER — LAB VISIT (OUTPATIENT)
Dept: LAB | Facility: HOSPITAL | Age: 62
End: 2022-11-22
Attending: INTERNAL MEDICINE
Payer: MEDICAID

## 2022-11-22 VITALS
BODY MASS INDEX: 31.8 KG/M2 | OXYGEN SATURATION: 97 % | SYSTOLIC BLOOD PRESSURE: 148 MMHG | TEMPERATURE: 98 F | RESPIRATION RATE: 14 BRPM | HEIGHT: 71 IN | HEART RATE: 47 BPM | WEIGHT: 227.13 LBS | DIASTOLIC BLOOD PRESSURE: 92 MMHG

## 2022-11-22 DIAGNOSIS — D50.8 OTHER IRON DEFICIENCY ANEMIA: ICD-10-CM

## 2022-11-22 DIAGNOSIS — C64.1 CLEAR CELL CARCINOMA OF RIGHT KIDNEY: Primary | ICD-10-CM

## 2022-11-22 DIAGNOSIS — N18.4 CHRONIC KIDNEY DISEASE, STAGE IV (SEVERE): ICD-10-CM

## 2022-11-22 DIAGNOSIS — C77.1 MALIGNANT NEOPLASM METASTATIC TO INTRATHORACIC LYMPH NODE: ICD-10-CM

## 2022-11-22 DIAGNOSIS — N18.4 ANEMIA IN STAGE 4 CHRONIC KIDNEY DISEASE: ICD-10-CM

## 2022-11-22 DIAGNOSIS — C77.8 MALIGNANT NEOPLASM METASTATIC TO LYMPH NODES OF MULTIPLE SITES: ICD-10-CM

## 2022-11-22 DIAGNOSIS — L27.1 HAND FOOT SYNDROME: ICD-10-CM

## 2022-11-22 DIAGNOSIS — D63.1 ANEMIA IN STAGE 4 CHRONIC KIDNEY DISEASE: ICD-10-CM

## 2022-11-22 DIAGNOSIS — C64.1 CLEAR CELL CARCINOMA OF RIGHT KIDNEY: ICD-10-CM

## 2022-11-22 DIAGNOSIS — C64.9 RENAL CELL CARCINOMA, UNSPECIFIED LATERALITY: ICD-10-CM

## 2022-11-22 LAB
ALBUMIN SERPL-MCNC: 2.9 GM/DL (ref 3.4–4.8)
ALBUMIN/GLOB SERPL: 0.9 RATIO (ref 1.1–2)
ALP SERPL-CCNC: 147 UNIT/L (ref 40–150)
ALT SERPL-CCNC: 40 UNIT/L (ref 0–55)
AST SERPL-CCNC: 29 UNIT/L (ref 5–34)
BASOPHILS # BLD AUTO: 0.04 X10(3)/MCL (ref 0–0.2)
BASOPHILS NFR BLD AUTO: 0.4 %
BILIRUBIN DIRECT+TOT PNL SERPL-MCNC: 0.3 MG/DL
BUN SERPL-MCNC: 28 MG/DL (ref 8.4–25.7)
CALCIUM SERPL-MCNC: 8.5 MG/DL (ref 8.8–10)
CHLORIDE SERPL-SCNC: 108 MMOL/L (ref 98–107)
CO2 SERPL-SCNC: 25 MMOL/L (ref 23–31)
CREAT SERPL-MCNC: 1.78 MG/DL (ref 0.73–1.18)
EOSINOPHIL # BLD AUTO: 0.61 X10(3)/MCL (ref 0–0.9)
EOSINOPHIL NFR BLD AUTO: 6 %
ERYTHROCYTE [DISTWIDTH] IN BLOOD BY AUTOMATED COUNT: 19.7 % (ref 11.5–17)
GFR SERPLBLD CREATININE-BSD FMLA CKD-EPI: 43 MLS/MIN/1.73/M2
GLOBULIN SER-MCNC: 3.4 GM/DL (ref 2.4–3.5)
GLUCOSE SERPL-MCNC: 97 MG/DL (ref 82–115)
HCT VFR BLD AUTO: 45.6 % (ref 42–52)
HGB BLD-MCNC: 14.1 GM/DL (ref 14–18)
IMM GRANULOCYTES # BLD AUTO: 0.02 X10(3)/MCL (ref 0–0.04)
IMM GRANULOCYTES NFR BLD AUTO: 0.2 %
LYMPHOCYTES # BLD AUTO: 3.05 X10(3)/MCL (ref 0.6–4.6)
LYMPHOCYTES NFR BLD AUTO: 29.8 %
MCH RBC QN AUTO: 26 PG (ref 27–31)
MCHC RBC AUTO-ENTMCNC: 30.9 MG/DL (ref 33–36)
MCV RBC AUTO: 84.1 FL (ref 80–94)
MONOCYTES # BLD AUTO: 0.73 X10(3)/MCL (ref 0.1–1.3)
MONOCYTES NFR BLD AUTO: 7.1 %
NEUTROPHILS # BLD AUTO: 5.8 X10(3)/MCL (ref 2.1–9.2)
NEUTROPHILS NFR BLD AUTO: 56.5 %
PLATELET # BLD AUTO: 182 X10(3)/MCL (ref 130–400)
PMV BLD AUTO: 9.5 FL (ref 7.4–10.4)
POTASSIUM SERPL-SCNC: 4.3 MMOL/L (ref 3.5–5.1)
PROT SERPL-MCNC: 6.3 GM/DL (ref 5.8–7.6)
RBC # BLD AUTO: 5.42 X10(6)/MCL (ref 4.7–6.1)
SODIUM SERPL-SCNC: 141 MMOL/L (ref 136–145)
WBC # SPEC AUTO: 10.3 X10(3)/MCL (ref 4.5–11.5)

## 2022-11-22 PROCEDURE — 3080F PR MOST RECENT DIASTOLIC BLOOD PRESSURE >= 90 MM HG: ICD-10-PCS | Mod: CPTII,,, | Performed by: INTERNAL MEDICINE

## 2022-11-22 PROCEDURE — 3080F DIAST BP >= 90 MM HG: CPT | Mod: CPTII,,, | Performed by: INTERNAL MEDICINE

## 2022-11-22 PROCEDURE — 85025 COMPLETE CBC W/AUTO DIFF WBC: CPT

## 2022-11-22 PROCEDURE — 3008F BODY MASS INDEX DOCD: CPT | Mod: CPTII,,, | Performed by: INTERNAL MEDICINE

## 2022-11-22 PROCEDURE — 1159F MED LIST DOCD IN RCRD: CPT | Mod: CPTII,,, | Performed by: INTERNAL MEDICINE

## 2022-11-22 PROCEDURE — 99213 OFFICE O/P EST LOW 20 MIN: CPT | Mod: PBBFAC | Performed by: INTERNAL MEDICINE

## 2022-11-22 PROCEDURE — 1159F PR MEDICATION LIST DOCUMENTED IN MEDICAL RECORD: ICD-10-PCS | Mod: CPTII,,, | Performed by: INTERNAL MEDICINE

## 2022-11-22 PROCEDURE — 99999 PR PBB SHADOW E&M-EST. PATIENT-LVL III: CPT | Mod: PBBFAC,,, | Performed by: INTERNAL MEDICINE

## 2022-11-22 PROCEDURE — 3077F SYST BP >= 140 MM HG: CPT | Mod: CPTII,,, | Performed by: INTERNAL MEDICINE

## 2022-11-22 PROCEDURE — 99999 PR PBB SHADOW E&M-EST. PATIENT-LVL III: ICD-10-PCS | Mod: PBBFAC,,, | Performed by: INTERNAL MEDICINE

## 2022-11-22 PROCEDURE — 3077F PR MOST RECENT SYSTOLIC BLOOD PRESSURE >= 140 MM HG: ICD-10-PCS | Mod: CPTII,,, | Performed by: INTERNAL MEDICINE

## 2022-11-22 PROCEDURE — 1160F PR REVIEW ALL MEDS BY PRESCRIBER/CLIN PHARMACIST DOCUMENTED: ICD-10-PCS | Mod: CPTII,,, | Performed by: INTERNAL MEDICINE

## 2022-11-22 PROCEDURE — 99215 OFFICE O/P EST HI 40 MIN: CPT | Mod: S$PBB,,, | Performed by: INTERNAL MEDICINE

## 2022-11-22 PROCEDURE — 80053 COMPREHEN METABOLIC PANEL: CPT

## 2022-11-22 PROCEDURE — 99215 PR OFFICE/OUTPT VISIT, EST, LEVL V, 40-54 MIN: ICD-10-PCS | Mod: S$PBB,,, | Performed by: INTERNAL MEDICINE

## 2022-11-22 PROCEDURE — 3008F PR BODY MASS INDEX (BMI) DOCUMENTED: ICD-10-PCS | Mod: CPTII,,, | Performed by: INTERNAL MEDICINE

## 2022-11-22 PROCEDURE — 36415 COLL VENOUS BLD VENIPUNCTURE: CPT

## 2022-11-22 PROCEDURE — 1160F RVW MEDS BY RX/DR IN RCRD: CPT | Mod: CPTII,,, | Performed by: INTERNAL MEDICINE

## 2022-11-28 ENCOUNTER — TELEPHONE (OUTPATIENT)
Dept: HEMATOLOGY/ONCOLOGY | Facility: CLINIC | Age: 62
End: 2022-11-28
Payer: MEDICAID

## 2022-11-28 NOTE — TELEPHONE ENCOUNTER
"Patient started having issues with hand/foot syndrome again over the weekend. He stopped the cabometyx. States that his feet  are worse than his hands. Complaining of redness and burning. States that he has a "diabetic toe" on his left foot that had peeling skin, which has now turned into an open wound. He is going to see his PCP this am for evaluation and possible wound care. He would like to know if he should continue to hold or if his dose will be reduced. Please advise.  "

## 2022-11-28 NOTE — TELEPHONE ENCOUNTER
Definitely needs to hold. He has had diabetic wounds to his feet before that require antibiotics. Once his wound has healed then we can consider restarting but not at this time.

## 2022-12-07 NOTE — PROGRESS NOTES
Subjective:       Patient ID: Ye Olivera is a 62 y.o. male.    Urologist: Dr. Christopher Villasenor  PCP: Dr. Ly Quevedo  Nephrologist: Dr. Jenae Le    Renal Cell Carcinoma Stage IV([id number]) Mediastinal lymph nodes--Diagnosed 20  Biopsy/pathology:  Upper EUS done 9/3/20--large cluster of malignant appearing lymph nodes in subcarinal mediastinum causing extrinsic compression in middle 1/3 and thoracic esophagus biopsy c/w metastatic carcinoma, c/w renal cell carcinoma CD10+, PAX -8+, erythematous mucosa in antrum biopsy c/w mild chronic gastritis, inactive, normal duodenum, no pathology in CBD or left lobe of liver or pancreas.  CARIS results- MSI stable, Mismatch repair status proficient. Tumor burden low. Negative for: PD-L1, NTRK1/2/3, BAP1, FH, MET, PBRM1, SDHA. Pathologic variants identified: KDMSC (Exon 13 and 11) and VHL. Immunohistochemistry results: MLH1, MSH2, MSH6, and PM52 positive. PD-L1 negative.   Surgery/pathology:  Right radical nephrectomy and lymph node dissection done 20--clear cell renal cell carcinoma measures 9.2X8C8.7cm, Grade 3, margins clear, tumor limited to kidney, 4/6 lymph nodes positive for metastatic clear cell carcinoma.   Imagin. CT A/P w/o contrast [facility] 20--bulky right renal mass, midpole, measuring 10cm with evidence of internal necrosis and calcifications, primary mass does not extend outside the perinephric space, associated right perinephric edema, multiple enlarged retroperitoneal lymph nodes at level of the kidneys and inferior retroperitoneum, one measures 2.9X1.4cm, additional node measures 2.1X1.8cm, additional node measures 1.8X1.2cm, inferior retroperitoneal node at level of inferior pole right kidney measures 1.8X1.4cm, no convincing evidence of renal vein involvement, no right adrenal gland involvement, no disease apparent outside of abdomen.  2. CT A/P w/ and w/o contrast [facility] 20--large right renal mass measures 10.6X9.5X7.4cm, with  enlarged retroperitoneal nodes, largest measures 2.7cm.  3. CT chest [facility] 8/28/20--solitary left supraclavicular node, small and not overtly concerning, heterogeneous retroesophageal mass at the level of the tariq c/w metastatic deposit, no other evidence for metastatic disease.  4. MRI brain w/ and w/o contrast [facility] 8/28/20--no evidence for intracranial metastatic disease.  5. CT C/A/P done at [facility] 10/28/20--now appears to be 2 adjacent masses at the level of the tariq, measuring in aggregate 6.2X2.7cm as compared with 5.2X2.1cm, subcentimeter mediastinal and hilar nodes are seen, large intermediate density structure at the right renal fossa extending to pelvic brim, most likely post-operative hematoma or seroma, resolution of previously seen retroperitoneal adenopathy.  6. CT C/A/P w&w/o at [facility] on 12/23/20--Interval enlargement of the retro-esophageal mass with mild interval increase in size of multiple paraesophageal and prevascular lymph nodes compatible with progression of disease. Interval decrease in size of the right retroperitoneal postop seroma or hematoma. No evidence of metastatic disease below the diaphragm. Prior right nephrectomy and adrenalectomy. Chololithiasis and stable atherosclerosis.   7. CT C/A/P w/o contrast done at [facility] on 4/5/21--interval decrease in size of multiple prevascular, subcarinal and para-esophageal lymph nodes c/w response to treatment, no new pulmonary nodule or lymph node, no evidence for metastatic disease below the diaphragm, interval decrease in size of the post-operative seromas adjacent to right psoas muscle.  8. CT C/A/P w/o contrast done at [facility] on 7/7/21--stable appearance of previously identified prevascular, subcarinal, and paraesophageal nodes, there are an increased number of sub-centimeter nodes in the aortopulmonary window and precarinal region, nonspecific in appearance, no other evidence of local recurrence or metastatic disease.   9. MRI cervical spine w/ and w/o  contrast done at [facility] 8/4/21--no metastatic disease, multilevel spondylotic changes, no acute abnormality.   10. MRI brain w/ and w/o contrast 8/4/21--normal maturation of the previously seen lacunar infarct at the left centrum semiovale, no new abnormalities, specifically, no evidence of intracranial metastatic disease.   11. CT C/A/P w/o contrast done at [facility] 10/22/21--stable reactive mediastinal adenopathy, interval resection of right kidney and adrenal gland with post-surgical changes but no evidence of metastatic disease.   12. CT C/A/P w/o contrast done at [facility] 1/26/22--4 new pulmonary nodules larges LLL 14mm, 9mm LLL, 5mm LLL, 5mm RML, with enlarging prevascular lymph node now measures 18mm, previous 8mm, concerning for metastatic disease, new mesenteric implant in LLQ measures 11mm, enlarging retroperitoneal LN now measures [id number], previous 14X8mm concerning for progression disease, cholelithiasis.   13. MRI brain w/ and w/o  contrast done at [facility] 3/31/22--no acute intracranial abnormality.   14. CT C/A/P w/o contrast done at [facility] 4/5/22--progressive retroperitoneal and mediastinal adenopathy, with development of hilar adenopathy, increase in size and number of pulmonary nodules.   15. CT done at [facility] showed large hematoma 14cm in bladder, results not available.  16. CT head w/o contrast done at [facility] 7/31/22--No intracranial hemorrhage or acute traumatic brain parenchymal injury evident by CT.     17. CT C/A/P w/o contrast done at [facility] 8/29/22--mixed response to treatment, worsening mediastinal adenopathy, stable pulmonary nodules, improved retroperitoneal lymph node, enlarging pericaval lymph node.  18. CT C/A/P w/o contrast at [facility] 11/18/22--Interval decreased in mediastinal adenopathy, pulmonary nodules and portacaval nodes, c/w treatment response.     Treatment History:   1. IL2 started on 11/2/20. 2nd cycle started 11/16/20 - stopped after 7 days due to acute renal failure, volume overload and confusion.  Stopped due to progression.  2. Keytruda/Inlyta 1/8/21--1/31/22 (stopped due to progression, autoimmune hepatitis).  3. Prednisone 80mg daily 1/31/22--weaned and stopped 4/14/22.    Hospitalization:   [facility] 7/2022 14cm hematoma s/p TURP causing hydronephrosis, JOE and patient required initiation of dialysis, UTI, Covid infection.     Treatment Plan:  Cabometyx 60mg daily started 4/7/22  Decrease dose to 40mg daily 4/28/22 due to side effects.  HOLD on 5/19/22 until groin rash and abscess heal/also underwent TURP on 6/23/22  Restarted 7/6/22--held 7/16/22 for hospitalization/bladder hematoma  Cabometyx 40mg daily restarted 9/1/22. On hold since 10/24/22 for hand/foot syndrome.   Restarted on 11/9/22, held 11/28/22 for hand-foot syndrome, foot wound    Chief Complaint: Other Misc (Pt reports L knee pain. He has an appt with ortho this Friday. Pt reports that he used to get fluids when he was Keytruda and his urine is currently very dark. BP today 96/62.), Shortness of Breath, Edema, Fever, and Dizziness      HPI   Patient presents for follow-up of renal cell carcinoma. He held Cabometyx again for hand-foot syndrome and also now has a wound on left big toe. His PCP referred him to wound care but he has not heard from them. He is feeling weak today. BP is low. Discussed fluids today. He is still urinating but it is dark.    Past Medical History:   Diagnosis Date    Arthritis of knee 8/13/2013    HTN (hypertension) 8/13/2013    Type II or unspecified type diabetes mellitus without mention of complication, not stated as uncontrolled 8/13/2013      Review of patient's allergies indicates:  No Known Allergies     Current Outpatient Medications:     amLODIPine (NORVASC) 5 MG tablet, Take 5 mg by mouth every morning., Disp: , Rfl:     atorvastatin (LIPITOR) 80 MG tablet, Take 80 mg by mouth once daily., Disp: , Rfl:     carvediloL (COREG) 12.5 MG tablet, Take 12.5 mg by mouth 2 (two) times daily., Disp: , Rfl:      "desvenlafaxine succinate (PRISTIQ) 50 MG Tb24, Take 100 mg by mouth once daily., Disp: , Rfl:     diclofenac sodium (VOLTAREN) 1 % Gel, Apply 2 g topically 4 (four) times daily., Disp: , Rfl:     diphenoxylate-atropine 2.5-0.025 mg (LOMOTIL) 2.5-0.025 mg per tablet, Take 1 tablet by mouth as needed., Disp: , Rfl:     finasteride (PROSCAR) 5 mg tablet, Take 5 mg by mouth as needed., Disp: , Rfl:     hydrOXYzine pamoate (VISTARIL) 25 MG Cap, Take 1 capsule (25 mg total) by mouth every 4 (four) hours as needed (itching)., Disp: 30 capsule, Rfl: 4    hyoscyamine (LEVSIN/SL) 0.125 mg Subl, Place 0.125 mg under the tongue every 6 (six) hours as needed., Disp: , Rfl:     insulin glargine (LANTUS) 100 unit/mL injection, Inject into the skin., Disp: , Rfl:     insulin syr/ndl U100 half warner (BD VEO INSULIN SYR, HALF UNIT,) 0.3 mL 31 gauge x 15/64" Syrg, USE 1 syringe DAILY, Disp: , Rfl:     lancets Misc, Use TID to check blood sugar. Dx: E11.9, patient is insulin dependent, Disp: , Rfl:     multivitamin (THERAGRAN) per tablet, Take 1 tablet by mouth once daily., Disp: , Rfl:     mupirocin (BACTROBAN) 2 % ointment, Apply topically., Disp: , Rfl:     ondansetron (ZOFRAN) 4 MG tablet, Take 4 mg by mouth every 6 (six) hours as needed., Disp: , Rfl:     pantoprazole (PROTONIX) 40 MG tablet, Take 1 tablet (40 mg total) by mouth once daily., Disp: 30 tablet, Rfl: 6    pregabalin (LYRICA) 75 MG capsule, Take 75 mg by mouth 2 (two) times a day., Disp: , Rfl:     prochlorperazine (COMPAZINE) 5 MG tablet, Take 5 mg by mouth as needed., Disp: , Rfl:     QUEtiapine (SEROQUEL) 50 MG tablet, Take 150 mg by mouth every evening., Disp: , Rfl:     tamsulosin (FLOMAX) 0.4 mg Cap, Take 1 capsule (0.4 mg total) by mouth every evening., Disp: 30 capsule, Rfl: 6    terbinafine HCL (LAMISIL) 250 mg tablet, Take 250 mg by mouth., Disp: , Rfl:     tolterodine (DETROL LA) 4 MG 24 hr capsule, Take 4 mg by mouth once daily., Disp: , Rfl:     traMADoL " (ULTRAM) 50 mg tablet, Take 50 mg by mouth every 6 (six) hours., Disp: , Rfl:     TRUE METRIX GLUCOSE TEST STRIP Strp, 3 (three) times daily., Disp: , Rfl:     tuberculin,purif.prot.deriv. (TUBERSOL IDRM), Inject 0.1 mLs into the skin., Disp: , Rfl:     albuterol 90 mcg/actuation inhaler, Inhale 2 puffs into the lungs every 4 (four) hours as needed., Disp: 17 g, Rfl: 2    aspirin 81 mg Cap, Take 81 mg by mouth once daily at 6am., Disp: , Rfl:     cabozantinib (CABOMETYX) 40 mg Tab, TAKE ONE TABLET BY MOUTH ONCE DAILY AT THE SAME TIME ON AN EMPTY STOMACH AT LEAST 1 HOUR BEFORE OR 2 HOURS AFTER EATING. AVOID GRAPEFRUIT PRODUCTS (Patient not taking: Reported on 12/13/2022.), Disp: 30 tablet, Rfl: 2    citalopram (CELEXA) 20 MG tablet, Take 1 tablet (20 mg total) by mouth once daily., Disp: 30 tablet, Rfl: 6    Current Facility-Administered Medications:     albuterol sulfate nebulizer solution 2.5 mg, 2.5 mg, Nebulization, Q4H PRN, Thierno Snider MD     Review of Systems   Constitutional:  Positive for fatigue. Negative for activity change, appetite change, fever and unexpected weight change.        Weight gain   HENT:  Negative for mouth sores.    Eyes:  Negative for visual disturbance.   Respiratory:  Negative for cough and shortness of breath.    Cardiovascular:  Negative for chest pain.   Gastrointestinal:  Negative for abdominal pain, blood in stool, constipation, diarrhea, nausea and vomiting.   Genitourinary:  Negative for difficulty urinating and frequency.   Musculoskeletal:  Positive for back pain. Negative for leg pain.        Left knee pain   Integumentary:  Negative for rash and wound.        +left toe wound   Neurological:  Positive for weakness and numbness. Negative for dizziness and headaches.        Neuropathy to feet. Sensitivity to hands   Hematological:  Negative for adenopathy.   Psychiatric/Behavioral:  Negative for behavioral problems and suicidal ideas. The patient is not nervous/anxious.     Vitals:    12/13/22 0938   BP: 96/62   Pulse: 63   Resp: 14   Temp: 96.4 °F (35.8 °C)      Physical Exam  Constitutional:       Appearance: Normal appearance. He is overweight.   HENT:      Head: Normocephalic and atraumatic.      Nose: Nose normal.   Eyes:      General: Lids are normal. Vision grossly intact.      Extraocular Movements: Extraocular movements intact.      Conjunctiva/sclera: Conjunctivae normal.   Cardiovascular:      Rate and Rhythm: Regular rhythm. Bradycardia present.      Heart sounds: Normal heart sounds.      Comments: Bradycardia present, on Coreg  Pulmonary:      Effort: Pulmonary effort is normal.      Breath sounds: Normal breath sounds.   Chest:      Comments: HD access removed to right upper CW  Abdominal:      General: Abdomen is protuberant. Bowel sounds are normal.      Palpations: Abdomen is soft.   Musculoskeletal:         General: Normal range of motion.      Cervical back: Neck supple.      Right lower leg: No edema.      Left lower leg: No edema.   Skin:     General: Skin is warm and dry.      Comments: Some healing erythematous dry patches on hands, nothing active currently   Neurological:      General: No focal deficit present.      Mental Status: He is alert and oriented to person, place, and time.   Psychiatric:         Attention and Perception: Attention normal.         Mood and Affect: Mood normal.         Speech: Speech normal.         Behavior: Behavior normal. Behavior is cooperative.        ECOG SCORE           Lab Visit on 12/13/2022   Component Date Value    WBC 12/13/2022 10.4     RBC 12/13/2022 5.26     Hgb 12/13/2022 13.9 (L)     Hct 12/13/2022 45.4     MCV 12/13/2022 86.3     MCH 12/13/2022 26.4 (L)     MCHC 12/13/2022 30.6 (L)     RDW 12/13/2022 20.1 (H)     Platelet 12/13/2022 182     MPV 12/13/2022 9.8     Neut % 12/13/2022 66.2     Lymph % 12/13/2022 21.5     Mono % 12/13/2022 8.2     Eos % 12/13/2022 3.2     Basophil % 12/13/2022 0.4     Lymph # 12/13/2022  2.24     Neut # 12/13/2022 6.9     Mono # 12/13/2022 0.86     Eos # 12/13/2022 0.33     Baso # 12/13/2022 0.04     IG# 12/13/2022 0.05 (H)     IG% 12/13/2022 0.5           Assessment:        1. Clear cell carcinoma of right kidney          Plan:         Patient with large right renal mass and retroperitoneal adenopathy diagnosed by CT in 7/2020.  CT chest showed mediastinal fanny mass and biopsy done via EUS + for metastatic renal cell carcinoma.  Patient s/p radical right nephrectomy done 9/11/20. Pathology showed 9.2cm clear cell renal cell carcinoma, margins clear with 4/6 lymph nodes positive.  Post-operative course complicated by hematoma requiring blood transfusion.  CARIS results- MSI stable, Mismatch repair status proficient. Tumor burden low. Negative for: PD-L1, NTRK1/2/3, BAP1, FH, MET, PBRM1, SDHA. Pathologic variants identified: KDMSC (Exon 13 and 11) and VHL. Immunohistochemistry results: MLH1, MSH2, MSH6, and PM52 positive. PD-L1 negative.     Referred to Dr. Haja Sal for evaluation for IL-2. Explained this is only treatment to be shown to achieve long-term remissions in stage IV disease and patient has limited disease at this time.  Patient started 1st 5 days of IL-2 on 11/2/20.   Started his 2nd round on 11/16/20 and it was stopped on 11/22/20 due to acute renal failure, volume overload and agitation.   CT scan on 12/23/20 at [facility] revealed progression of his disease. Dr. Sal contacted patient and discussed the results. He also reached out to Dr. Alvarado to notify her that the patient will not be returning.   Started Keytruda 200mg every 3 weeks on 1/8/21 + Inlyta 5mg oral BID on 1/11/21.  CT C/A/P done at [facility] on 10/22/21 showed stable mild reactive mediastinal nodes, otherwise stable.  Patient continued on treatment s/p cycle 18 Keytruda given 1/10/22.  Labs on 1/10/22 showed elevated LFTs. Inlyta held.  CT C/A/P done at [facility] on 1/26/22 showed unfortunately disease progression with new  pulmonary nodules, enlargement in mediastinal node, new mesenteric nodule and retroperitoneal adenopathy.  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 Hepatitis panel negative. Keytruda stopped. Started on Prednisone 80mg daily on 1/31/22.  Patient had suicidal ideation and severe depression during visit in 3/2022, seen by psychiatry and meds adjusted, much improved.  CT C/A/P from [facility] on 4/5/22 showed progressive disease.  Treatment change recommended to Cabometyx 60mg daily per NCCN guidelines (Category 1) for subsequent treatment. Started on 4/7/22.  Prednisone weaned and stopped 4/14/22.  Patient was having multiple side effects (n/v, diarrhea, mouth sores) from Cabometyx.  Reduced dose to 40mg on 4/28/22. Held on 5/19/22 due to cellulitis/rashes/abscesses.      Continued to hold s/p TURP on 6/23/22 and restarted 7/6/22.  Patient hospitalized at [facility] 7/16/22 for large bladder hematoma s/p clot evacuation X 2. Cabometyx stopped again.   Repeat CT C/A/P done at [facility] on 8/29/22 showed mixed findings, but mostly disease progression in fanny metastases in mediastinum, which is to be expected since he has been off treatment.   Restarted back on Cabometyx (lower dose 40mg) on 9/1/22 although it has not been studied in dialysis patients, but no dosage adjustment is usually required.  Held on 10/24/22 for Hand-foot syndrome, restarted 11/9/22.   CT done at [facility] on 11/18/22 with disease improvement.  Cabometyx held again for hand-foot syndrome and wound on left toe 11/28/22.    BP low today.   Will give IV fluids.  Hold Norvasc, likely not needing as much BP medication since he is off Cabometyx.  Labs with mild anemia only, renal function is stable.  Continue holding Cabometyx.    RTC 3 weeks for follow-up with repeat labs and visit and see if we can restart.  Plan for the 40mg QOD until done with current prescription and then change to 20mg daily dose.  Plan to  repeat CT C/A/P w/o contrast at [facility] in 3 months--2/2023.    Continue Compazine for nausea.   Continue Norco for joint pain.   Continue Vistaril for chronic pruritis.   All questions answered at this time.       Elizabeth Alvarado MD

## 2022-12-13 ENCOUNTER — TELEPHONE (OUTPATIENT)
Dept: HEMATOLOGY/ONCOLOGY | Facility: CLINIC | Age: 62
End: 2022-12-13

## 2022-12-13 ENCOUNTER — INFUSION (OUTPATIENT)
Dept: INFUSION THERAPY | Facility: HOSPITAL | Age: 62
End: 2022-12-13
Payer: MEDICAID

## 2022-12-13 ENCOUNTER — OFFICE VISIT (OUTPATIENT)
Dept: HEMATOLOGY/ONCOLOGY | Facility: CLINIC | Age: 62
End: 2022-12-13
Payer: MEDICAID

## 2022-12-13 VITALS
OXYGEN SATURATION: 95 % | WEIGHT: 223.69 LBS | SYSTOLIC BLOOD PRESSURE: 96 MMHG | BODY MASS INDEX: 31.31 KG/M2 | TEMPERATURE: 96 F | RESPIRATION RATE: 14 BRPM | HEART RATE: 63 BPM | DIASTOLIC BLOOD PRESSURE: 62 MMHG | HEIGHT: 71 IN

## 2022-12-13 VITALS
TEMPERATURE: 98 F | RESPIRATION RATE: 20 BRPM | DIASTOLIC BLOOD PRESSURE: 63 MMHG | OXYGEN SATURATION: 98 % | HEART RATE: 57 BPM | SYSTOLIC BLOOD PRESSURE: 111 MMHG

## 2022-12-13 DIAGNOSIS — E86.0 DEHYDRATION: ICD-10-CM

## 2022-12-13 DIAGNOSIS — E86.0 DEHYDRATION: Primary | ICD-10-CM

## 2022-12-13 DIAGNOSIS — L27.1 HAND FOOT SYNDROME: ICD-10-CM

## 2022-12-13 DIAGNOSIS — C64.1 CLEAR CELL CARCINOMA OF RIGHT KIDNEY: Primary | ICD-10-CM

## 2022-12-13 PROCEDURE — 99215 OFFICE O/P EST HI 40 MIN: CPT | Mod: PBBFAC,25 | Performed by: INTERNAL MEDICINE

## 2022-12-13 PROCEDURE — 1159F PR MEDICATION LIST DOCUMENTED IN MEDICAL RECORD: ICD-10-PCS | Mod: CPTII,,, | Performed by: INTERNAL MEDICINE

## 2022-12-13 PROCEDURE — 3074F SYST BP LT 130 MM HG: CPT | Mod: CPTII,,, | Performed by: INTERNAL MEDICINE

## 2022-12-13 PROCEDURE — 25000003 PHARM REV CODE 250: Performed by: INTERNAL MEDICINE

## 2022-12-13 PROCEDURE — 3008F PR BODY MASS INDEX (BMI) DOCUMENTED: ICD-10-PCS | Mod: CPTII,,, | Performed by: INTERNAL MEDICINE

## 2022-12-13 PROCEDURE — 3074F PR MOST RECENT SYSTOLIC BLOOD PRESSURE < 130 MM HG: ICD-10-PCS | Mod: CPTII,,, | Performed by: INTERNAL MEDICINE

## 2022-12-13 PROCEDURE — 99999 PR PBB SHADOW E&M-EST. PATIENT-LVL V: CPT | Mod: PBBFAC,,, | Performed by: INTERNAL MEDICINE

## 2022-12-13 PROCEDURE — 1160F PR REVIEW ALL MEDS BY PRESCRIBER/CLIN PHARMACIST DOCUMENTED: ICD-10-PCS | Mod: CPTII,,, | Performed by: INTERNAL MEDICINE

## 2022-12-13 PROCEDURE — 96360 HYDRATION IV INFUSION INIT: CPT

## 2022-12-13 PROCEDURE — 3078F DIAST BP <80 MM HG: CPT | Mod: CPTII,,, | Performed by: INTERNAL MEDICINE

## 2022-12-13 PROCEDURE — 3008F BODY MASS INDEX DOCD: CPT | Mod: CPTII,,, | Performed by: INTERNAL MEDICINE

## 2022-12-13 PROCEDURE — 99215 PR OFFICE/OUTPT VISIT, EST, LEVL V, 40-54 MIN: ICD-10-PCS | Mod: S$PBB,,, | Performed by: INTERNAL MEDICINE

## 2022-12-13 PROCEDURE — 96361 HYDRATE IV INFUSION ADD-ON: CPT

## 2022-12-13 PROCEDURE — 1159F MED LIST DOCD IN RCRD: CPT | Mod: CPTII,,, | Performed by: INTERNAL MEDICINE

## 2022-12-13 PROCEDURE — 3078F PR MOST RECENT DIASTOLIC BLOOD PRESSURE < 80 MM HG: ICD-10-PCS | Mod: CPTII,,, | Performed by: INTERNAL MEDICINE

## 2022-12-13 PROCEDURE — 99215 OFFICE O/P EST HI 40 MIN: CPT | Mod: S$PBB,,, | Performed by: INTERNAL MEDICINE

## 2022-12-13 PROCEDURE — 99999 PR PBB SHADOW E&M-EST. PATIENT-LVL V: ICD-10-PCS | Mod: PBBFAC,,, | Performed by: INTERNAL MEDICINE

## 2022-12-13 PROCEDURE — 1160F RVW MEDS BY RX/DR IN RCRD: CPT | Mod: CPTII,,, | Performed by: INTERNAL MEDICINE

## 2022-12-13 RX ORDER — AMLODIPINE BESYLATE 5 MG/1
5 TABLET ORAL EVERY MORNING
Status: ON HOLD | COMMUNITY
Start: 2022-11-28 | End: 2023-05-17 | Stop reason: HOSPADM

## 2022-12-13 RX ORDER — SODIUM CHLORIDE 0.9 % (FLUSH) 0.9 %
10 SYRINGE (ML) INJECTION
Status: CANCELLED | OUTPATIENT
Start: 2022-12-13

## 2022-12-13 RX ORDER — SODIUM CHLORIDE 0.9 % (FLUSH) 0.9 %
10 SYRINGE (ML) INJECTION
Status: DISCONTINUED | OUTPATIENT
Start: 2022-12-13 | End: 2022-12-13 | Stop reason: HOSPADM

## 2022-12-13 RX ORDER — HEPARIN 100 UNIT/ML
500 SYRINGE INTRAVENOUS
Status: CANCELLED | OUTPATIENT
Start: 2022-12-13

## 2022-12-13 RX ORDER — HEPARIN 100 UNIT/ML
500 SYRINGE INTRAVENOUS
Status: DISCONTINUED | OUTPATIENT
Start: 2022-12-13 | End: 2022-12-13 | Stop reason: HOSPADM

## 2022-12-13 RX ADMIN — SODIUM CHLORIDE 1000 ML: 9 INJECTION, SOLUTION INTRAVENOUS at 11:12

## 2022-12-28 PROBLEM — C64.9 RENAL CELL CARCINOMA: Status: RESOLVED | Noted: 2022-05-18 | Resolved: 2022-12-28

## 2022-12-28 NOTE — PROGRESS NOTES
Subjective:       Patient ID: Ye Olivera is a 62 y.o. male.    Urologist: Dr. Christopher Villasenor  PCP: Dr. Ly Quevedo  Nephrologist: Dr. Jenae Le    Renal Cell Carcinoma Stage IV([id number]) Mediastinal lymph nodes--Diagnosed 20  Biopsy/pathology:  Upper EUS done 9/3/20--large cluster of malignant appearing lymph nodes in subcarinal mediastinum causing extrinsic compression in middle 1/3 and thoracic esophagus biopsy c/w metastatic carcinoma, c/w renal cell carcinoma CD10+, PAX -8+, erythematous mucosa in antrum biopsy c/w mild chronic gastritis, inactive, normal duodenum, no pathology in CBD or left lobe of liver or pancreas.  CARIS results- MSI stable, Mismatch repair status proficient. Tumor burden low. Negative for: PD-L1, NTRK1/2/3, BAP1, FH, MET, PBRM1, SDHA. Pathologic variants identified: KDMSC (Exon 13 and 11) and VHL. Immunohistochemistry results: MLH1, MSH2, MSH6, and PM52 positive. PD-L1 negative.   Surgery/pathology:  Right radical nephrectomy and lymph node dissection done 20--clear cell renal cell carcinoma measures 9.2X8C8.7cm, Grade 3, margins clear, tumor limited to kidney, 4/6 lymph nodes positive for metastatic clear cell carcinoma.   Imagin. CT A/P w/o contrast [facility] 20--bulky right renal mass, midpole, measuring 10cm with evidence of internal necrosis and calcifications, primary mass does not extend outside the perinephric space, associated right perinephric edema, multiple enlarged retroperitoneal lymph nodes at level of the kidneys and inferior retroperitoneum, one measures 2.9X1.4cm, additional node measures 2.1X1.8cm, additional node measures 1.8X1.2cm, inferior retroperitoneal node at level of inferior pole right kidney measures 1.8X1.4cm, no convincing evidence of renal vein involvement, no right adrenal gland involvement, no disease apparent outside of abdomen.  2. CT A/P w/ and w/o contrast [facility] 20--large right renal mass measures 10.6X9.5X7.4cm, with  enlarged retroperitoneal nodes, largest measures 2.7cm.  3. CT chest [facility] 8/28/20--solitary left supraclavicular node, small and not overtly concerning, heterogeneous retroesophageal mass at the level of the tariq c/w metastatic deposit, no other evidence for metastatic disease.  4. MRI brain w/ and w/o contrast [facility] 8/28/20--no evidence for intracranial metastatic disease.  5. CT C/A/P done at [facility] 10/28/20--now appears to be 2 adjacent masses at the level of the tariq, measuring in aggregate 6.2X2.7cm as compared with 5.2X2.1cm, subcentimeter mediastinal and hilar nodes are seen, large intermediate density structure at the right renal fossa extending to pelvic brim, most likely post-operative hematoma or seroma, resolution of previously seen retroperitoneal adenopathy.  6. CT C/A/P w&w/o at [facility] on 12/23/20--Interval enlargement of the retro-esophageal mass with mild interval increase in size of multiple paraesophageal and prevascular lymph nodes compatible with progression of disease. Interval decrease in size of the right retroperitoneal postop seroma or hematoma. No evidence of metastatic disease below the diaphragm. Prior right nephrectomy and adrenalectomy. Chololithiasis and stable atherosclerosis.   7. CT C/A/P w/o contrast done at [facility] on 4/5/21--interval decrease in size of multiple prevascular, subcarinal and para-esophageal lymph nodes c/w response to treatment, no new pulmonary nodule or lymph node, no evidence for metastatic disease below the diaphragm, interval decrease in size of the post-operative seromas adjacent to right psoas muscle.  8. CT C/A/P w/o contrast done at [facility] on 7/7/21--stable appearance of previously identified prevascular, subcarinal, and paraesophageal nodes, there are an increased number of sub-centimeter nodes in the aortopulmonary window and precarinal region, nonspecific in appearance, no other evidence of local recurrence or metastatic disease.   9. MRI cervical spine w/ and w/o  contrast done at [facility] 8/4/21--no metastatic disease, multilevel spondylotic changes, no acute abnormality.   10. MRI brain w/ and w/o contrast 8/4/21--normal maturation of the previously seen lacunar infarct at the left centrum semiovale, no new abnormalities, specifically, no evidence of intracranial metastatic disease.   11. CT C/A/P w/o contrast done at [facility] 10/22/21--stable reactive mediastinal adenopathy, interval resection of right kidney and adrenal gland with post-surgical changes but no evidence of metastatic disease.   12. CT C/A/P w/o contrast done at [facility] 1/26/22--4 new pulmonary nodules larges LLL 14mm, 9mm LLL, 5mm LLL, 5mm RML, with enlarging prevascular lymph node now measures 18mm, previous 8mm, concerning for metastatic disease, new mesenteric implant in LLQ measures 11mm, enlarging retroperitoneal LN now measures [id number], previous 14X8mm concerning for progression disease, cholelithiasis.   13. MRI brain w/ and w/o  contrast done at [facility] 3/31/22--no acute intracranial abnormality.   14. CT C/A/P w/o contrast done at [facility] 4/5/22--progressive retroperitoneal and mediastinal adenopathy, with development of hilar adenopathy, increase in size and number of pulmonary nodules.   15. CT done at [facility] showed large hematoma 14cm in bladder, results not available.  16. CT head w/o contrast done at [facility] 7/31/22--No intracranial hemorrhage or acute traumatic brain parenchymal injury evident by CT.     17. CT C/A/P w/o contrast done at [facility] 8/29/22--mixed response to treatment, worsening mediastinal adenopathy, stable pulmonary nodules, improved retroperitoneal lymph node, enlarging pericaval lymph node.  18. CT C/A/P w/o contrast at [facility] 11/18/22--Interval decreased in mediastinal adenopathy, pulmonary nodules and portacaval nodes, c/w treatment response.     Treatment History:   1. IL2 started on 11/2/20. 2nd cycle started 11/16/20 - stopped after 7 days due to acute renal failure, volume overload and confusion.  Stopped due to progression.  2. Keytruda/Inlyta 1/8/21--1/31/22 (stopped due to progression, autoimmune hepatitis).  3. Prednisone 80mg daily 1/31/22--weaned and stopped 4/14/22.    Hospitalization:   [facility] 7/2022 14cm hematoma s/p TURP causing hydronephrosis, JOE and patient required initiation of dialysis, UTI, Covid infection.     Treatment Plan:  Cabometyx 60mg daily started 4/7/22  Decrease dose to 40mg daily 4/28/22 due to side effects.  HOLD on 5/19/22 until groin rash and abscess heal/also underwent TURP on 6/23/22  Restarted 7/6/22--held 7/16/22 for hospitalization/bladder hematoma  Cabometyx 40mg daily restarted 9/1/22. On hold since 10/24/22 for hand/foot syndrome.   Restarted on 11/9/22, held 11/28/22 for hand-foot syndrome, foot wound.   Will decrease dose to 20mg daily. He will restart on 1/5/23 but at 40mg QOD until new prescription arrives.     Chief Complaint: Other Misc (Pt reports no new concerns today.)    HPI   Patient presents for follow-up of renal cell carcinoma. He is doing okay. Held Cabometyx again for hand-foot syndrome and also wound on left big toe since 11/28/22. His PCP referred him to wound care and the wound is much better, nearly resolved. Hands/feet with no peeling at this time. He has gained back a few lbs. He would like to get started on treatment again. Dr. Alvarado had wanted to decrease the dosage to 20mg daily and he is in agreement. No other problems reported today.     Past Medical History:   Diagnosis Date    Arthritis of knee 8/13/2013    HTN (hypertension) 8/13/2013    Type II or unspecified type diabetes mellitus without mention of complication, not stated as uncontrolled 8/13/2013      Review of patient's allergies indicates:  No Known Allergies     Current Outpatient Medications:     amLODIPine (NORVASC) 5 MG tablet, Take 5 mg by mouth every morning., Disp: , Rfl:     aspirin 81 mg Cap, Take 81 mg by mouth once daily at 6am., Disp: , Rfl:     atorvastatin (LIPITOR) 80  "MG tablet, Take 80 mg by mouth once daily., Disp: , Rfl:     carvediloL (COREG) 12.5 MG tablet, Take 12.5 mg by mouth 2 (two) times daily., Disp: , Rfl:     desvenlafaxine succinate (PRISTIQ) 50 MG Tb24, Take 100 mg by mouth once daily., Disp: , Rfl:     finasteride (PROSCAR) 5 mg tablet, Take 5 mg by mouth as needed., Disp: , Rfl:     hydrOXYzine pamoate (VISTARIL) 25 MG Cap, Take 1 capsule (25 mg total) by mouth every 4 (four) hours as needed (itching)., Disp: 30 capsule, Rfl: 4    hyoscyamine (LEVSIN/SL) 0.125 mg Subl, Place 0.125 mg under the tongue every 6 (six) hours as needed., Disp: , Rfl:     insulin glargine (LANTUS) 100 unit/mL injection, Inject into the skin., Disp: , Rfl:     insulin syr/ndl U100 half warner (BD VEO INSULIN SYR, HALF UNIT,) 0.3 mL 31 gauge x 15/64" Syrg, USE 1 syringe DAILY, Disp: , Rfl:     lancets Misc, Use TID to check blood sugar. Dx: E11.9, patient is insulin dependent, Disp: , Rfl:     multivitamin (THERAGRAN) per tablet, Take 1 tablet by mouth once daily., Disp: , Rfl:     ondansetron (ZOFRAN) 4 MG tablet, Take 4 mg by mouth every 6 (six) hours as needed., Disp: , Rfl:     pantoprazole (PROTONIX) 40 MG tablet, Take 1 tablet (40 mg total) by mouth once daily., Disp: 30 tablet, Rfl: 6    pregabalin (LYRICA) 75 MG capsule, Take 75 mg by mouth 2 (two) times a day., Disp: , Rfl:     prochlorperazine (COMPAZINE) 5 MG tablet, Take 5 mg by mouth as needed., Disp: , Rfl:     QUEtiapine (SEROQUEL) 50 MG tablet, Take 150 mg by mouth every evening., Disp: , Rfl:     tamsulosin (FLOMAX) 0.4 mg Cap, Take 1 capsule (0.4 mg total) by mouth every evening., Disp: 30 capsule, Rfl: 6    terbinafine HCL (LAMISIL) 250 mg tablet, Take 250 mg by mouth., Disp: , Rfl:     tolterodine (DETROL LA) 4 MG 24 hr capsule, Take 4 mg by mouth once daily., Disp: , Rfl:     traMADoL (ULTRAM) 50 mg tablet, Take 50 mg by mouth every 6 (six) hours., Disp: , Rfl:     TRUE METRIX GLUCOSE TEST STRIP Strp, 3 (three) times " daily., Disp: , Rfl:     albuterol 90 mcg/actuation inhaler, Inhale 2 puffs into the lungs every 4 (four) hours as needed., Disp: 17 g, Rfl: 2    cabozantinib (CABOMETYX) 40 mg Tab, TAKE ONE TABLET BY MOUTH ONCE DAILY AT THE SAME TIME ON AN EMPTY STOMACH AT LEAST 1 HOUR BEFORE OR 2 HOURS AFTER EATING. AVOID GRAPEFRUIT PRODUCTS (Patient not taking: Reported on 12/13/2022.), Disp: 30 tablet, Rfl: 2    citalopram (CELEXA) 20 MG tablet, Take 1 tablet (20 mg total) by mouth once daily., Disp: 30 tablet, Rfl: 6    diclofenac sodium (VOLTAREN) 1 % Gel, Apply 2 g topically 4 (four) times daily., Disp: , Rfl:     diphenoxylate-atropine 2.5-0.025 mg (LOMOTIL) 2.5-0.025 mg per tablet, Take 1 tablet by mouth as needed., Disp: , Rfl:     mupirocin (BACTROBAN) 2 % ointment, Apply topically., Disp: , Rfl:     tuberculin,purif.prot.deriv. (TUBERSOL IDRM), Inject 0.1 mLs into the skin., Disp: , Rfl:     Current Facility-Administered Medications:     albuterol sulfate nebulizer solution 2.5 mg, 2.5 mg, Nebulization, Q4H PRN, Thierno Snider MD     Review of Systems   Constitutional:  Negative for appetite change and unexpected weight change.   HENT:  Negative for mouth sores.    Eyes:  Positive for visual disturbance.   Respiratory:  Positive for shortness of breath. Negative for cough.    Cardiovascular:  Negative for chest pain.   Gastrointestinal:  Negative for abdominal pain and diarrhea.   Genitourinary:  Negative for frequency.   Musculoskeletal:  Negative for back pain.   Integumentary:         Left toe wound nearly resolved   Neurological:  Negative for headaches.   Hematological:  Negative for adenopathy.   Psychiatric/Behavioral:  The patient is not nervous/anxious.    Vitals:    01/04/23 1116   BP: 119/79   Pulse: (!) 57   Resp: 14   Temp: 97.9 °F (36.6 °C)     Physical Exam  Constitutional:       Appearance: Normal appearance. He is overweight.   HENT:      Head: Normocephalic and atraumatic.      Nose: Nose normal.    Eyes:      General: Lids are normal. Vision grossly intact.      Extraocular Movements: Extraocular movements intact.      Conjunctiva/sclera: Conjunctivae normal.   Cardiovascular:      Rate and Rhythm: Regular rhythm. Bradycardia present.      Heart sounds: Normal heart sounds.      Comments: Bradycardia present, on Coreg  Pulmonary:      Effort: Pulmonary effort is normal.      Breath sounds: Normal breath sounds.   Chest:      Comments: HD access removed to right upper CW  Abdominal:      General: Abdomen is protuberant. Bowel sounds are normal.      Palpations: Abdomen is soft.   Musculoskeletal:         General: Normal range of motion.      Cervical back: Neck supple.      Right lower leg: No edema.      Left lower leg: No edema.        Feet:    Feet:      Comments: Left great toe wound nearly healed, no drainage present  Skin:     General: Skin is warm and dry.   Neurological:      General: No focal deficit present.      Mental Status: He is alert and oriented to person, place, and time.   Psychiatric:         Attention and Perception: Attention normal.         Mood and Affect: Mood normal.         Speech: Speech normal.         Behavior: Behavior normal. Behavior is cooperative.        ECOG SCORE    2 - Capable of all selfcare but unable to carry out any work activities, active > 50% of hours       No visits with results within 1 Week(s) from this visit.   Latest known visit with results is:   Lab Visit on 12/13/2022   Component Date Value    Sodium Level 12/13/2022 140     Potassium Level 12/13/2022 3.9     Chloride 12/13/2022 106     Carbon Dioxide 12/13/2022 26     Glucose Level 12/13/2022 156 (H)     Blood Urea Nitrogen 12/13/2022 21.4     Creatinine 12/13/2022 1.84 (H)     Calcium Level Total 12/13/2022 8.3 (L)     Protein Total 12/13/2022 6.3     Albumin Level 12/13/2022 3.0 (L)     Globulin 12/13/2022 3.3     Albumin/Globulin Ratio 12/13/2022 0.9 (L)     Bilirubin Total 12/13/2022 0.8     Alkaline  Phosphatase 12/13/2022 112     Alanine Aminotransferase 12/13/2022 16     Aspartate Aminotransfera* 12/13/2022 15     eGFR 12/13/2022 41     Thyroid Stimulating Horm* 12/13/2022 2.5655     WBC 12/13/2022 10.4     RBC 12/13/2022 5.26     Hgb 12/13/2022 13.9 (L)     Hct 12/13/2022 45.4     MCV 12/13/2022 86.3     MCH 12/13/2022 26.4 (L)     MCHC 12/13/2022 30.6 (L)     RDW 12/13/2022 20.1 (H)     Platelet 12/13/2022 182     MPV 12/13/2022 9.8     Neut % 12/13/2022 66.2     Lymph % 12/13/2022 21.5     Mono % 12/13/2022 8.2     Eos % 12/13/2022 3.2     Basophil % 12/13/2022 0.4     Lymph # 12/13/2022 2.24     Neut # 12/13/2022 6.9     Mono # 12/13/2022 0.86     Eos # 12/13/2022 0.33     Baso # 12/13/2022 0.04     IG# 12/13/2022 0.05 (H)     IG% 12/13/2022 0.5         Outside labs on 12/30/22: WBC 10.8, H/H 14.4/45.4, Plts 211, ANC 7300. CMP with BUN 29, creat 2.09 otherwise good.   Assessment:        1. Clear cell carcinoma of right kidney    2. 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 Anemia in stage 4 chronic kidney disease    4. Other iron deficiency anemia          Plan:         Patient with large right renal mass and retroperitoneal adenopathy diagnosed by CT in 7/2020.  CT chest showed mediastinal fanny mass and biopsy done via EUS + for metastatic renal cell carcinoma.  Patient s/p radical right nephrectomy done 9/11/20. Pathology showed 9.2cm clear cell renal cell carcinoma, margins clear with 4/6 lymph nodes positive.  Post-operative course complicated by hematoma requiring blood transfusion.  CARIS results- MSI stable, Mismatch repair status proficient. Tumor burden low. Negative for: PD-L1, NTRK1/2/3, BAP1, FH, MET, PBRM1, SDHA. Pathologic variants identified: KDMSC (Exon 13 and 11) and VHL. Immunohistochemistry results: MLH1, MSH2, MSH6, and PM52 positive. PD-L1 negative.     Referred to Dr. Haja Sal for evaluation for IL-2. Explained this is only treatment to be shown to  achieve long-term remissions in stage IV disease and patient has limited disease at this time.  Patient started 1st 5 days of IL-2 on 11/2/20.   Started his 2nd round on 11/16/20 and it was stopped on 11/22/20 due to acute renal failure, volume overload and agitation.   CT scan on 12/23/20 at [facility] revealed progression of his disease. Dr. Sal contacted patient and discussed the results. He also reached out to Dr. Alvarado to notify her that the patient will not be returning.   Started Keytruda 200mg every 3 weeks on 1/8/21 + Inlyta 5mg oral BID on 1/11/21.  CT C/A/P done at [facility] on 10/22/21 showed stable mild reactive mediastinal nodes, otherwise stable.  Patient continued on treatment s/p cycle 18 Keytruda given 1/10/22.  Labs on 1/10/22 showed elevated LFTs. Inlyta held.  CT C/A/P done at [facility] on 1/26/22 showed unfortunately disease progression with new pulmonary nodules, enlargement in mediastinal node, new mesenteric nodule and retroperitoneal adenopathy.  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 Hepatitis panel negative. Keytruda stopped. Started on Prednisone 80mg daily on 1/31/22.  Patient had suicidal ideation and severe depression during visit in 3/2022, seen by psychiatry and meds adjusted, much improved.  CT C/A/P from [facility] on 4/5/22 showed progressive disease.  Treatment change recommended to Cabometyx 60mg daily per NCCN guidelines (Category 1) for subsequent treatment. Started on 4/7/22.  Prednisone weaned and stopped 4/14/22.  Patient was having multiple side effects (n/v, diarrhea, mouth sores) from Cabometyx.  Reduced dose to 40mg on 4/28/22. Held on 5/19/22 due to cellulitis/rashes/abscesses.      Continued to hold s/p TURP on 6/23/22 and restarted 7/6/22.  Patient hospitalized at [facility] 7/16/22 for large bladder hematoma s/p clot evacuation X 2. Cabometyx stopped again.   Repeat CT C/A/P done at [facility] on 8/29/22 showed mixed findings, but mostly disease  progression in fanny metastases in mediastinum, which is to be expected since he has been off treatment.   Restarted back on Cabometyx (lower dose 40mg) on 9/1/22 although it has not been studied in dialysis patients, but no dosage adjustment is usually required.  Held on 10/24/22 for Hand-foot syndrome, restarted 11/9/22.   CT done at [facility] on 11/18/22 with disease improvement.  Cabometyx held again for hand-foot syndrome and wound on left toe 11/28/22.    Outside labs with normal CBC. Mild renal insufficiency continues, creatinine 2.02.   Hand-foot syndrome resolved. Left toe wound nearly resolved.   He would to restart the Cabometyx.   Plan for the 40mg QOD until done with current prescription and then change to 20mg daily dose. New prescription sent to pharmacy.   Labs at [facility] prior to next appointment.   Follow-up in 3 weeks.   Plan to repeat CT C/A/P w/o contrast at [facility] next month--2/2023. Will schedule at next appointment.     Continue Compazine for nausea.   Continue Norco for joint pain.   Continue Vistaril for chronic pruritis.   All questions answered at this time.       MARCIN Laboy

## 2022-12-29 ENCOUNTER — TELEPHONE (OUTPATIENT)
Dept: HEMATOLOGY/ONCOLOGY | Facility: CLINIC | Age: 62
End: 2022-12-29
Payer: MEDICAID

## 2023-01-04 ENCOUNTER — TELEPHONE (OUTPATIENT)
Dept: HEMATOLOGY/ONCOLOGY | Facility: CLINIC | Age: 63
End: 2023-01-04

## 2023-01-04 ENCOUNTER — OFFICE VISIT (OUTPATIENT)
Dept: HEMATOLOGY/ONCOLOGY | Facility: CLINIC | Age: 63
End: 2023-01-04
Payer: MEDICAID

## 2023-01-04 ENCOUNTER — SPECIALTY PHARMACY (OUTPATIENT)
Dept: PHARMACY | Facility: CLINIC | Age: 63
End: 2023-01-04
Payer: MEDICAID

## 2023-01-04 VITALS
HEIGHT: 71 IN | TEMPERATURE: 98 F | BODY MASS INDEX: 31.99 KG/M2 | HEART RATE: 57 BPM | DIASTOLIC BLOOD PRESSURE: 79 MMHG | OXYGEN SATURATION: 97 % | WEIGHT: 228.5 LBS | RESPIRATION RATE: 14 BRPM | SYSTOLIC BLOOD PRESSURE: 119 MMHG

## 2023-01-04 DIAGNOSIS — D50.8 OTHER IRON DEFICIENCY ANEMIA: ICD-10-CM

## 2023-01-04 DIAGNOSIS — D63.1 ANEMIA IN STAGE 4 CHRONIC KIDNEY DISEASE: ICD-10-CM

## 2023-01-04 DIAGNOSIS — C77.1 MALIGNANT NEOPLASM METASTATIC TO INTRATHORACIC LYMPH NODE: ICD-10-CM

## 2023-01-04 DIAGNOSIS — C64.1 CLEAR CELL CARCINOMA OF RIGHT KIDNEY: Primary | ICD-10-CM

## 2023-01-04 DIAGNOSIS — N18.4 ANEMIA IN STAGE 4 CHRONIC KIDNEY DISEASE: ICD-10-CM

## 2023-01-04 PROCEDURE — 1160F PR REVIEW ALL MEDS BY PRESCRIBER/CLIN PHARMACIST DOCUMENTED: ICD-10-PCS | Mod: CPTII,,, | Performed by: NURSE PRACTITIONER

## 2023-01-04 PROCEDURE — 99999 PR PBB SHADOW E&M-EST. PATIENT-LVL V: CPT | Mod: PBBFAC,,, | Performed by: NURSE PRACTITIONER

## 2023-01-04 PROCEDURE — 1160F RVW MEDS BY RX/DR IN RCRD: CPT | Mod: CPTII,,, | Performed by: NURSE PRACTITIONER

## 2023-01-04 PROCEDURE — 99999 PR PBB SHADOW E&M-EST. PATIENT-LVL V: ICD-10-PCS | Mod: PBBFAC,,, | Performed by: NURSE PRACTITIONER

## 2023-01-04 PROCEDURE — 99214 PR OFFICE/OUTPT VISIT, EST, LEVL IV, 30-39 MIN: ICD-10-PCS | Mod: S$PBB,,, | Performed by: NURSE PRACTITIONER

## 2023-01-04 PROCEDURE — 3074F PR MOST RECENT SYSTOLIC BLOOD PRESSURE < 130 MM HG: ICD-10-PCS | Mod: CPTII,,, | Performed by: NURSE PRACTITIONER

## 2023-01-04 PROCEDURE — 3008F PR BODY MASS INDEX (BMI) DOCUMENTED: ICD-10-PCS | Mod: CPTII,,, | Performed by: NURSE PRACTITIONER

## 2023-01-04 PROCEDURE — 3078F PR MOST RECENT DIASTOLIC BLOOD PRESSURE < 80 MM HG: ICD-10-PCS | Mod: CPTII,,, | Performed by: NURSE PRACTITIONER

## 2023-01-04 PROCEDURE — 1159F PR MEDICATION LIST DOCUMENTED IN MEDICAL RECORD: ICD-10-PCS | Mod: CPTII,,, | Performed by: NURSE PRACTITIONER

## 2023-01-04 PROCEDURE — 3074F SYST BP LT 130 MM HG: CPT | Mod: CPTII,,, | Performed by: NURSE PRACTITIONER

## 2023-01-04 PROCEDURE — 3008F BODY MASS INDEX DOCD: CPT | Mod: CPTII,,, | Performed by: NURSE PRACTITIONER

## 2023-01-04 PROCEDURE — 3078F DIAST BP <80 MM HG: CPT | Mod: CPTII,,, | Performed by: NURSE PRACTITIONER

## 2023-01-04 PROCEDURE — 1159F MED LIST DOCD IN RCRD: CPT | Mod: CPTII,,, | Performed by: NURSE PRACTITIONER

## 2023-01-04 PROCEDURE — 99214 OFFICE O/P EST MOD 30 MIN: CPT | Mod: S$PBB,,, | Performed by: NURSE PRACTITIONER

## 2023-01-04 PROCEDURE — 99215 OFFICE O/P EST HI 40 MIN: CPT | Mod: PBBFAC | Performed by: NURSE PRACTITIONER

## 2023-01-04 NOTE — TELEPHONE ENCOUNTER
Received rx for Cabometyx, dose reduced due to side effects. PA already on file. Estimated copay $0, will forward for initial consultation

## 2023-01-05 ENCOUNTER — SPECIALTY PHARMACY (OUTPATIENT)
Dept: PHARMACY | Facility: CLINIC | Age: 63
End: 2023-01-05
Payer: MEDICAID

## 2023-01-05 NOTE — TELEPHONE ENCOUNTER
Specialty Pharmacy - Initial Clinical Assessment    Specialty Medication Orders Linked to Encounter      Flowsheet Row Most Recent Value   Medication #1 cabozantinib (CABOMETYX) 20 mg Tab (Order#[id number], Rx#[id number])          Patient Diagnosis   C64.1 - Clear cell carcinoma of right kidney    Subjective    Ye Olivera is a 62 y.o. male, who is followed by the specialty pharmacy service for management and education.    Recent Encounters       Date Type Provider Description    01/05/2023 Specialty Pharmacy Cherelle Rey PharmD Initial Clinical Assessment    01/04/2023 Specialty Pharmacy Cherelle Rey PharmD Referral Authorization          Clinical call attempts since last clinical assessment   No call attempts found.     Current Outpatient Medications   Medication Sig    albuterol 90 mcg/actuation inhaler Inhale 2 puffs into the lungs every 4 (four) hours as needed.    amLODIPine (NORVASC) 5 MG tablet Take 5 mg by mouth every morning.    aspirin 81 mg Cap Take 81 mg by mouth once daily at 6am.    atorvastatin (LIPITOR) 80 MG tablet Take 80 mg by mouth once daily.    cabozantinib (CABOMETYX) 20 mg Tab Take 20 mg by mouth once daily.    carvediloL (COREG) 12.5 MG tablet Take 12.5 mg by mouth 2 (two) times daily.    citalopram (CELEXA) 20 MG tablet Take 1 tablet (20 mg total) by mouth once daily.    desvenlafaxine succinate (PRISTIQ) 50 MG Tb24 Take 100 mg by mouth once daily.    diphenoxylate-atropine 2.5-0.025 mg (LOMOTIL) 2.5-0.025 mg per tablet Take 1 tablet by mouth as needed.    finasteride (PROSCAR) 5 mg tablet Take 5 mg by mouth as needed.    hydrOXYzine pamoate (VISTARIL) 25 MG Cap Take 1 capsule (25 mg total) by mouth every 4 (four) hours as needed (itching).    hyoscyamine (LEVSIN/SL) 0.125 mg Subl Place 0.125 mg under the tongue every 6 (six) hours as needed.    insulin glargine (LANTUS) 100 unit/mL injection Inject into the skin.    insulin syr/ndl U100 half warner (BD VEO INSULIN SYR, HALF  "UNIT,) 0.3 mL 31 gauge x 15/64" Syrg USE 1 syringe DAILY    lancets Misc Use TID to check blood sugar. Dx: E11.9, patient is insulin dependent    multivitamin (THERAGRAN) per tablet Take 1 tablet by mouth once daily.    ondansetron (ZOFRAN) 4 MG tablet Take 4 mg by mouth every 6 (six) hours as needed.    pantoprazole (PROTONIX) 40 MG tablet Take 1 tablet (40 mg total) by mouth once daily.    pregabalin (LYRICA) 75 MG capsule Take 75 mg by mouth 2 (two) times a day.    prochlorperazine (COMPAZINE) 5 MG tablet Take 5 mg by mouth as needed.    QUEtiapine (SEROQUEL) 50 MG tablet Take 150 mg by mouth every evening.    tamsulosin (FLOMAX) 0.4 mg Cap Take 1 capsule (0.4 mg total) by mouth every evening.    terbinafine HCL (LAMISIL) 250 mg tablet Take 250 mg by mouth.    tolterodine (DETROL LA) 4 MG 24 hr capsule Take 4 mg by mouth once daily.    traMADoL (ULTRAM) 50 mg tablet Take 50 mg by mouth every 6 (six) hours.    TRUE METRIX GLUCOSE TEST STRIP Strp 3 (three) times daily.    tuberculin,purif.prot.deriv. (TUBERSOL IDRM) Inject 0.1 mLs into the skin.   Last reviewed on 1/5/2023  9:17 AM by Cherelle Rey PharmD    Review of patient's allergies indicates:  No Known AllergiesLast reviewed on  1/5/2023 9:17 AM by Cherelle Rey    Drug Interactions    Drug interactions evaluated: yes  Clinically relevant drug interactions identified: no  Provided the patient with educational material regarding drug interactions: not applicable         Adverse Effects    Pruritus: Pos  Rash: Pos  Ulcers/sores: Pos  Constitution: System reviewed and is negative  Eyes: System reviewed and is negative  Endocrine and Metabolic: System reviewed and is negative  Gastrointestinal: System reviewed and is negative  Genitourinary: System reviewed and is negative  Cardiovascular: System reviewed and is negative  Hematologic: System reviewed and is negative  Musculoskeletal: System reviewed and is negative  HENT: System reviewed and is " negative  Neurological: System reviewed and is negative  Psychiatric: System reviewed and is negative  Respiratory: System reviewed and is negative       Assessment Questions - Documented Responses      Flowsheet Row Most Recent Value   Assessment    Medication Reconciliation completed for patient Yes   During the past 4 weeks, has patient missed any activities due to condition or medication? No   During the past 4 weeks, did patient have any of the following urgent care visits? None   Goals of Therapy Status Achieving   Status of the patients ability to self-administer: Is Able   All education points have been covered with patient? No, patient declined- printed education provided   Welcome packet contents reviewed and discussed with patient? Yes   Assesment completed? Yes   Plan Therapy continued   Do you need to open a clinical intervention (i-vent)? No   Do you want to schedule first shipment? Yes   Medication #1 Assessment Info    Patient status Existing medication, New to [facility]   Is this medication appropriate for the patient? Yes   Is this medication effective? Yes          Refill Questions - Documented Responses      Flowsheet Row Most Recent Value   Patient Availability and HIPAA Verification    Does patient want to proceed with activity? Yes   HIPAA/medical authority confirmed? Yes   Relationship to patient of person spoken to? Self   Refill Screening Questions    When does the patient need to receive the medication? 01/06/23   Refill Delivery Questions    How will the patient receive the medication? [facility]   When does the patient need to receive the medication? 01/06/23   Shipping Address Home   Address in [facility] confirmed and updated if neccessary? Yes   Expected Copay ($) 0   Is the patient able to afford the medication copay? Yes   Payment Method zero copay   Days supply of Refill 30   Supplies needed? No supplies needed   Refill activity completed? Yes   Refill activity plan Refill scheduled  "  Shipment/Pickup Date: 01/05/23            Objective    He has a past medical history of Arthritis of knee (8/13/2013), HTN (hypertension) (8/13/2013), and Type II or unspecified type diabetes mellitus without mention of complication, not stated as uncontrolled (8/13/2013).    Tried/failed medications: N/A    BP Readings from Last 4 Encounters:   01/04/23 119/79   12/13/22 111/63   12/13/22 96/62   11/22/22 (!) 148/92     Ht Readings from Last 4 Encounters:   01/04/23 5' 10.87" (1.8 m)   12/13/22 5' 10.87" (1.8 m)   11/22/22 5' 10.87" (1.8 m)   11/01/22 5' 10.87" (1.8 m)     Wt Readings from Last 4 Encounters:   01/04/23 103.6 kg (228 lb 8 oz)   12/13/22 101.5 kg (223 lb 11.2 oz)   11/22/22 103 kg (227 lb 1.6 oz)   11/01/22 101.4 kg (223 lb 9.6 oz)     Recent Labs   Lab Result Units 12/13/22  0907 11/22/22  1026 10/28/22  1018 10/07/22  1037   RBC x10(6)/mcL 5.26 5.42 5.32 5.50   Hgb gm/dL 13.9 L 14.1 13.9 L 13.8 L   Hct % 45.4 45.6 44.7 45.6   WBC x10(3)/mcL 10.4 10.3 9.6 8.0   Platelet x10(3)/mcL 182 182 161 259   Sodium Level mmol/L 140 141 139 140   Potassium Level mmol/L 3.9 4.3 3.9 4.1   Blood Urea Nitrogen mg/dL 21.4 28.0 H 27.1 H 23.9   Creatinine mg/dL 1.84 H 1.78 H 1.73 H 1.81 H   Calcium Level Total mg/dL 8.3 L 8.5 L 8.7 L 8.8   Albumin Level gm/dL 3.0 L 2.9 L 3.1 L 3.1 L   Bilirubin Total mg/dL 0.8 0.3 0.4 0.3   Alkaline Phosphatase unit/L 112 147 179 H 189 H   Aspartate Aminotransferase unit/L 15 29 28 35 H   Alanine Aminotransferase unit/L 16 40 40 43     The goals of cancer treatment include:  Achieving remission of cancer, if possible  Reducing tumor size and spread of cancer, if remission is not possible  Minimizing pain and symptoms of the cancer  Preventing infection and other complications of treatment  Promoting adequate nutrition  Encouraging proper hydration  Improving or maintaining quality of life  Maintaining optimal therapy adherence  Minimizing and managing side effects    Goals of " Therapy Status: Achieving    Assessment/Plan  Patient plans to continue therapy without changes      Indication, dosage, appropriateness, effectiveness, safety and convenience of his specialty medication(s) were reviewed today.     Patient Education   Pharmacist offer to  patient was declined. Printed educational materials will be provided with medication.  Patient did accept verbal education on the following topics:  · Proper use, timely administration, and missed dose management  · New or changed medications, including prescribe and over the counter medications and supplements  · Storage, safe handling, and disposal    Patient has been on Cabometyx 60 mg, decreased to 40 mg, now new rx for 20 mg due to side effects. Was receiving from another specialty pharmacy. Continue medication at reduced dose.    Tasks added this encounter   1/29/2023 - Refill Call (Auto Added)  6/27/2023 - Clinical - Follow Up Assesement (180 day)   Tasks due within next 3 months   No tasks due.     Cherelle Rey, PharmD  [facility]  [address] [place] [id number]  Phone: [phone]  Fax: [phone]

## 2023-01-20 NOTE — PROGRESS NOTES
Subjective:       Patient ID: Ye Olivera is a 62 y.o. male.    Urologist: Dr. Christopher Villasenor  PCP: Dr. Ly Quevedo  Nephrologist: Dr. Jenae Le    Renal Cell Carcinoma Stage IV([id number]) Mediastinal lymph nodes--Diagnosed 20  Biopsy/pathology:  Upper EUS done 9/3/20--large cluster of malignant appearing lymph nodes in subcarinal mediastinum causing extrinsic compression in middle 1/3 and thoracic esophagus biopsy c/w metastatic carcinoma, c/w renal cell carcinoma CD10+, PAX -8+, erythematous mucosa in antrum biopsy c/w mild chronic gastritis, inactive, normal duodenum, no pathology in CBD or left lobe of liver or pancreas.  CARIS results- MSI stable, Mismatch repair status proficient. Tumor burden low. Negative for: PD-L1, NTRK1/2/3, BAP1, FH, MET, PBRM1, SDHA. Pathologic variants identified: KDMSC (Exon 13 and 11) and VHL. Immunohistochemistry results: MLH1, MSH2, MSH6, and PM52 positive. PD-L1 negative.   Surgery/pathology:  Right radical nephrectomy and lymph node dissection done 20--clear cell renal cell carcinoma measures 9.2X8C8.7cm, Grade 3, margins clear, tumor limited to kidney, 4/6 lymph nodes positive for metastatic clear cell carcinoma.   Imagin. CT A/P w/o contrast [facility] 20--bulky right renal mass, midpole, measuring 10cm with evidence of internal necrosis and calcifications, primary mass does not extend outside the perinephric space, associated right perinephric edema, multiple enlarged retroperitoneal lymph nodes at level of the kidneys and inferior retroperitoneum, one measures 2.9X1.4cm, additional node measures 2.1X1.8cm, additional node measures 1.8X1.2cm, inferior retroperitoneal node at level of inferior pole right kidney measures 1.8X1.4cm, no convincing evidence of renal vein involvement, no right adrenal gland involvement, no disease apparent outside of abdomen.  2. CT A/P w/ and w/o contrast [facility] 20--large right renal mass measures 10.6X9.5X7.4cm, with  enlarged retroperitoneal nodes, largest measures 2.7cm.  3. CT chest [facility] 8/28/20--solitary left supraclavicular node, small and not overtly concerning, heterogeneous retroesophageal mass at the level of the tariq c/w metastatic deposit, no other evidence for metastatic disease.  4. MRI brain w/ and w/o contrast [facility] 8/28/20--no evidence for intracranial metastatic disease.  5. CT C/A/P done at [facility] 10/28/20--now appears to be 2 adjacent masses at the level of the tariq, measuring in aggregate 6.2X2.7cm as compared with 5.2X2.1cm, subcentimeter mediastinal and hilar nodes are seen, large intermediate density structure at the right renal fossa extending to pelvic brim, most likely post-operative hematoma or seroma, resolution of previously seen retroperitoneal adenopathy.  6. CT C/A/P w&w/o at [facility] on 12/23/20--Interval enlargement of the retro-esophageal mass with mild interval increase in size of multiple paraesophageal and prevascular lymph nodes compatible with progression of disease. Interval decrease in size of the right retroperitoneal postop seroma or hematoma. No evidence of metastatic disease below the diaphragm. Prior right nephrectomy and adrenalectomy. Chololithiasis and stable atherosclerosis.   7. CT C/A/P w/o contrast done at [facility] on 4/5/21--interval decrease in size of multiple prevascular, subcarinal and para-esophageal lymph nodes c/w response to treatment, no new pulmonary nodule or lymph node, no evidence for metastatic disease below the diaphragm, interval decrease in size of the post-operative seromas adjacent to right psoas muscle.  8. CT C/A/P w/o contrast done at [facility] on 7/7/21--stable appearance of previously identified prevascular, subcarinal, and paraesophageal nodes, there are an increased number of sub-centimeter nodes in the aortopulmonary window and precarinal region, nonspecific in appearance, no other evidence of local recurrence or metastatic disease.   9. MRI cervical spine w/ and w/o  contrast done at [facility] 8/4/21--no metastatic disease, multilevel spondylotic changes, no acute abnormality.   10. MRI brain w/ and w/o contrast 8/4/21--normal maturation of the previously seen lacunar infarct at the left centrum semiovale, no new abnormalities, specifically, no evidence of intracranial metastatic disease.   11. CT C/A/P w/o contrast done at [facility] 10/22/21--stable reactive mediastinal adenopathy, interval resection of right kidney and adrenal gland with post-surgical changes but no evidence of metastatic disease.   12. CT C/A/P w/o contrast done at [facility] 1/26/22--4 new pulmonary nodules larges LLL 14mm, 9mm LLL, 5mm LLL, 5mm RML, with enlarging prevascular lymph node now measures 18mm, previous 8mm, concerning for metastatic disease, new mesenteric implant in LLQ measures 11mm, enlarging retroperitoneal LN now measures [id number], previous 14X8mm concerning for progression disease, cholelithiasis.   13. MRI brain w/ and w/o  contrast done at [facility] 3/31/22--no acute intracranial abnormality.   14. CT C/A/P w/o contrast done at [facility] 4/5/22--progressive retroperitoneal and mediastinal adenopathy, with development of hilar adenopathy, increase in size and number of pulmonary nodules.   15. CT done at [facility] showed large hematoma 14cm in bladder, results not available.  16. CT head w/o contrast done at [facility] 7/31/22--No intracranial hemorrhage or acute traumatic brain parenchymal injury evident by CT.     17. CT C/A/P w/o contrast done at [facility] 8/29/22--mixed response to treatment, worsening mediastinal adenopathy, stable pulmonary nodules, improved retroperitoneal lymph node, enlarging pericaval lymph node.  18. CT C/A/P w/o contrast at [facility] 11/18/22--Interval decreased in mediastinal adenopathy, pulmonary nodules and portacaval nodes, c/w treatment response.     Treatment History:   1. IL2 started on 11/2/20. 2nd cycle started 11/16/20 - stopped after 7 days due to acute renal failure, volume overload and confusion.  Stopped due to progression.  2. Keytruda/Inlyta 1/8/21--1/31/22 (stopped due to progression, autoimmune hepatitis).  3. Prednisone 80mg daily 1/31/22--weaned and stopped 4/14/22.    Hospitalization:   [facility] 7/2022 14cm hematoma s/p TURP causing hydronephrosis, JOE and patient required initiation of dialysis, UTI, Covid infection.     Treatment Plan:  Cabometyx 60mg daily started 4/7/22  Decrease dose to 40mg daily 4/28/22 due to side effects.  HOLD on 5/19/22 until groin rash and abscess heal/also underwent TURP on 6/23/22  Restarted 7/6/22--held 7/16/22 for hospitalization/bladder hematoma  Cabometyx 40mg daily restarted 9/1/22. On hold since 10/24/22 for hand/foot syndrome.   Restarted on 11/9/22, held 11/28/22 for hand-foot syndrome, foot wound.   Will decrease dose to 20mg daily. Restarted on 1/5/23.     Chief Complaint: Other Misc (Pt reports that he has lost his  and has dropped things. Also having some balance issues and lack of coordination but denies any falls. ), Shortness of Breath, and Urinary Incontinence    HPI   Patient presents for follow-up of renal cell carcinoma. He is doing okay. He restarted the Cabometyx again on 1/5/23. He is doing very well on the decreased dosage. Mentions his PCP increased his Lyrica to 100mg TID and since then he has had some coordination and balance problems. He has noticed he is dropping more items. Denies any headaches. Denies any new pain. Bowels are moving well with colace at night. Complains of urinary incontinence but this is not new. Otherwise he is doing well and would like to go to [place] to visit his son next month. No other problems reported today.     Past Medical History:   Diagnosis Date    Arthritis of knee 8/13/2013    HTN (hypertension) 8/13/2013    Type II or unspecified type diabetes mellitus without mention of complication, not stated as uncontrolled 8/13/2013      Review of patient's allergies indicates:  No Known Allergies     Current Outpatient  "Medications:     amLODIPine (NORVASC) 5 MG tablet, Take 5 mg by mouth every morning., Disp: , Rfl:     aspirin 81 mg Cap, Take 81 mg by mouth once daily at 6am., Disp: , Rfl:     atorvastatin (LIPITOR) 80 MG tablet, Take 80 mg by mouth once daily., Disp: , Rfl:     cabozantinib (CABOMETYX) 20 mg Tab, Take 1 tablet (20 mg) by mouth once daily., Disp: 30 tablet, Rfl: 6    carvediloL (COREG) 12.5 MG tablet, Take 12.5 mg by mouth 2 (two) times daily., Disp: , Rfl:     desvenlafaxine succinate (PRISTIQ) 50 MG Tb24, Take 100 mg by mouth once daily., Disp: , Rfl:     diphenoxylate-atropine 2.5-0.025 mg (LOMOTIL) 2.5-0.025 mg per tablet, Take 1 tablet by mouth as needed., Disp: , Rfl:     finasteride (PROSCAR) 5 mg tablet, Take 5 mg by mouth as needed., Disp: , Rfl:     hydrOXYzine pamoate (VISTARIL) 25 MG Cap, Take 1 capsule (25 mg total) by mouth every 4 (four) hours as needed (itching)., Disp: 30 capsule, Rfl: 4    hyoscyamine (LEVSIN/SL) 0.125 mg Subl, Place 0.125 mg under the tongue every 6 (six) hours as needed., Disp: , Rfl:     insulin glargine (LANTUS) 100 unit/mL injection, Inject into the skin., Disp: , Rfl:     insulin syr/ndl U100 half warner (BD VEO INSULIN SYR, HALF UNIT,) 0.3 mL 31 gauge x 15/64" Syrg, USE 1 syringe DAILY, Disp: , Rfl:     lancets Misc, Use TID to check blood sugar. Dx: E11.9, patient is insulin dependent, Disp: , Rfl:     multivitamin (THERAGRAN) per tablet, Take 1 tablet by mouth once daily., Disp: , Rfl:     ondansetron (ZOFRAN) 4 MG tablet, Take 4 mg by mouth every 6 (six) hours as needed., Disp: , Rfl:     pantoprazole (PROTONIX) 40 MG tablet, Take 1 tablet (40 mg total) by mouth once daily., Disp: 30 tablet, Rfl: 6    pregabalin (LYRICA) 75 MG capsule, Take 100 mg by mouth 3 (three) times daily., Disp: , Rfl:     prochlorperazine (COMPAZINE) 5 MG tablet, Take 5 mg by mouth as needed., Disp: , Rfl:     QUEtiapine (SEROQUEL) 50 MG tablet, Take 150 mg by mouth every evening., Disp: , Rfl: "     tamsulosin (FLOMAX) 0.4 mg Cap, Take 1 capsule (0.4 mg total) by mouth every evening., Disp: 30 capsule, Rfl: 6    terbinafine HCL (LAMISIL) 250 mg tablet, Take 250 mg by mouth., Disp: , Rfl:     tolterodine (DETROL LA) 4 MG 24 hr capsule, Take 4 mg by mouth once daily., Disp: , Rfl:     traMADoL (ULTRAM) 50 mg tablet, Take 50 mg by mouth every 6 (six) hours., Disp: , Rfl:     TRUE METRIX GLUCOSE TEST STRIP Strp, 3 (three) times daily., Disp: , Rfl:     tuberculin,purif.prot.deriv. (TUBERSOL IDRM), Inject 0.1 mLs into the skin., Disp: , Rfl:     albuterol 90 mcg/actuation inhaler, Inhale 2 puffs into the lungs every 4 (four) hours as needed., Disp: 17 g, Rfl: 2    citalopram (CELEXA) 20 MG tablet, Take 1 tablet (20 mg total) by mouth once daily., Disp: 30 tablet, Rfl: 6    Current Facility-Administered Medications:     albuterol sulfate nebulizer solution 2.5 mg, 2.5 mg, Nebulization, Q4H PRN, Thierno Snider MD     Review of Systems   Constitutional:  Negative for appetite change and unexpected weight change (weight gain).   HENT:  Negative for mouth sores.    Eyes:  Positive for visual disturbance.   Respiratory:  Positive for shortness of breath (chronic, no change). Negative for cough.    Cardiovascular:  Negative for chest pain.   Gastrointestinal:  Negative for abdominal pain and diarrhea.   Genitourinary:  Negative for frequency.   Musculoskeletal:  Negative for back pain.   Integumentary:         Left toe wound nearly resolved   Neurological:  Positive for coordination difficulties and coordination difficulties. Negative for headaches.   Hematological:  Negative for adenopathy.   Psychiatric/Behavioral:  The patient is not nervous/anxious.    Vitals:    01/25/23 1422   BP: 123/76   Pulse: (!) 51   Resp: 14   Temp: 97.9 °F (36.6 °C)     Physical Exam  Constitutional:       Appearance: Normal appearance. He is overweight.   HENT:      Head: Normocephalic and atraumatic.      Nose: Nose normal.   Eyes:       General: Lids are normal. Vision grossly intact.      Extraocular Movements: Extraocular movements intact.      Conjunctiva/sclera: Conjunctivae normal.   Cardiovascular:      Rate and Rhythm: Regular rhythm. Bradycardia present.      Heart sounds: Normal heart sounds.      Comments: Bradycardia present, on Coreg  Pulmonary:      Effort: Pulmonary effort is normal.      Breath sounds: Normal breath sounds.   Chest:      Comments: HD access removed to right upper CW  Abdominal:      General: Abdomen is protuberant. Bowel sounds are normal.      Palpations: Abdomen is soft.   Musculoskeletal:         General: Normal range of motion.      Cervical back: Neck supple.      Right lower leg: No edema.      Left lower leg: No edema.        Feet:    Feet:      Comments: Left great toe wound nearly healed, no drainage present  Skin:     General: Skin is warm and dry.   Neurological:      General: No focal deficit present.      Mental Status: He is alert and oriented to person, place, and time.   Psychiatric:         Attention and Perception: Attention normal.         Mood and Affect: Mood normal.         Speech: Speech normal.         Behavior: Behavior normal. Behavior is cooperative.        ECOG SCORE    2 - Capable of all selfcare but unable to carry out any work activities, active > 50% of hours          Outside labs on 1/23/23: WBC 10.3, H/H 13.4/43.3, Plts 225, ANC 6600. CMP with BUN 27, creat 1.66, alk phos 167,otherwise good.   Assessment:        1. Clear cell carcinoma of right kidney    2. Chronic kidney disease, stage IV (severe)    3. 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 Other iron deficiency anemia    5. Balance problem          Plan:         Patient with large right renal mass and retroperitoneal adenopathy diagnosed by CT in 7/2020.  CT chest showed mediastinal fanny mass and biopsy done via EUS + for metastatic renal cell carcinoma.  Patient s/p radical right nephrectomy done  9/11/20. Pathology showed 9.2cm clear cell renal cell carcinoma, margins clear with 4/6 lymph nodes positive.  Post-operative course complicated by hematoma requiring blood transfusion.  CARIS results- MSI stable, Mismatch repair status proficient. Tumor burden low. Negative for: PD-L1, NTRK1/2/3, BAP1, FH, MET, PBRM1, SDHA. Pathologic variants identified: KDMSC (Exon 13 and 11) and VHL. Immunohistochemistry results: MLH1, MSH2, MSH6, and PM52 positive. PD-L1 negative.     Referred to Dr. Haja Sal for evaluation for IL-2. Explained this is only treatment to be shown to achieve long-term remissions in stage IV disease and patient has limited disease at this time.  Patient started 1st 5 days of IL-2 on 11/2/20.   Started his 2nd round on 11/16/20 and it was stopped on 11/22/20 due to acute renal failure, volume overload and agitation.   CT scan on 12/23/20 at [facility] revealed progression of his disease. Dr. Sal contacted patient and discussed the results. He also reached out to Dr. Alvarado to notify her that the patient will not be returning.   Started Keytruda 200mg every 3 weeks on 1/8/21 + Inlyta 5mg oral BID on 1/11/21.  CT C/A/P done at [facility] on 10/22/21 showed stable mild reactive mediastinal nodes, otherwise stable.  Patient continued on treatment s/p cycle 18 Keytruda given 1/10/22.  Labs on 1/10/22 showed elevated LFTs. Inlyta held.  CT C/A/P done at [facility] on 1/26/22 showed unfortunately disease progression with new pulmonary nodules, enlargement in mediastinal node, new mesenteric nodule and retroperitoneal adenopathy.  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 Hepatitis panel negative. Keytruda stopped. Started on Prednisone 80mg daily on 1/31/22.  Patient had suicidal ideation and severe depression during visit in 3/2022, seen by psychiatry and meds adjusted, much improved.  CT C/A/P from [facility] on 4/5/22 showed progressive disease.  Treatment change recommended to  Cabometyx 60mg daily per NCCN guidelines (Category 1) for subsequent treatment. Started on 4/7/22.  Prednisone weaned and stopped 4/14/22.  Patient was having multiple side effects (n/v, diarrhea, mouth sores) from Cabometyx.  Reduced dose to 40mg on 4/28/22. Held on 5/19/22 due to cellulitis/rashes/abscesses.      Continued to hold s/p TURP on 6/23/22 and restarted 7/6/22.  Patient hospitalized at [facility] 7/16/22 for large bladder hematoma s/p clot evacuation X 2. Cabometyx stopped again.   Repeat CT C/A/P done at [facility] on 8/29/22 showed mixed findings, but mostly disease progression in fanny metastases in mediastinum, which is to be expected since he has been off treatment.   Restarted back on Cabometyx (lower dose 40mg) on 9/1/22 although it has not been studied in dialysis patients, but no dosage adjustment is usually required.  Held on 10/24/22 for Hand-foot syndrome, restarted 11/9/22.   CT done at [facility] on 11/18/22 with disease improvement.  Cabometyx held again for hand-foot syndrome and wound on left toe 11/28/22.    Restarted Cabometyx on 1/5/23 at 20mg daily.  Labs from earlier this week at [facility] revealed stable creatinine at 1.66. Mild anemia with Hgb of 13.4 g/dL. Alk phos increased at 167. Otherwise labs good.   Plan to repeat CT C/A/P w/o contrast at [facility] in 2 weeks. Will send orders. Will also order his labs to be done there as well.   Since he is having new coordination and balance problems, will also obtain MRI of brain. His symptoms could be from the increased Lyrica dosage but I would like to make sure. He is in agreement.   Follow-up in 3 weeks to discuss scan results.     Continue Compazine for nausea.   Continue Norco for joint pain.   Continue Vistaril for chronic pruritis.   All questions answered at this time.       Jackie Boyd, [facility]

## 2023-01-23 ENCOUNTER — TELEPHONE (OUTPATIENT)
Dept: HEMATOLOGY/ONCOLOGY | Facility: CLINIC | Age: 63
End: 2023-01-23
Payer: MEDICAID

## 2023-01-25 ENCOUNTER — TELEPHONE (OUTPATIENT)
Dept: HEMATOLOGY/ONCOLOGY | Facility: CLINIC | Age: 63
End: 2023-01-25

## 2023-01-25 ENCOUNTER — OFFICE VISIT (OUTPATIENT)
Dept: HEMATOLOGY/ONCOLOGY | Facility: CLINIC | Age: 63
End: 2023-01-25
Payer: MEDICAID

## 2023-01-25 VITALS
DIASTOLIC BLOOD PRESSURE: 76 MMHG | WEIGHT: 237.38 LBS | RESPIRATION RATE: 14 BRPM | BODY MASS INDEX: 33.23 KG/M2 | HEART RATE: 51 BPM | TEMPERATURE: 98 F | OXYGEN SATURATION: 97 % | SYSTOLIC BLOOD PRESSURE: 123 MMHG | HEIGHT: 71 IN

## 2023-01-25 DIAGNOSIS — N18.4 CHRONIC KIDNEY DISEASE, STAGE IV (SEVERE): ICD-10-CM

## 2023-01-25 DIAGNOSIS — C64.1 CLEAR CELL CARCINOMA OF RIGHT KIDNEY: Primary | ICD-10-CM

## 2023-01-25 DIAGNOSIS — C77.1 MALIGNANT NEOPLASM METASTATIC TO INTRATHORACIC LYMPH NODE: ICD-10-CM

## 2023-01-25 DIAGNOSIS — D50.8 OTHER IRON DEFICIENCY ANEMIA: ICD-10-CM

## 2023-01-25 DIAGNOSIS — R26.89 BALANCE PROBLEM: ICD-10-CM

## 2023-01-25 PROBLEM — N18.32 STAGE 3B CHRONIC KIDNEY DISEASE: Status: ACTIVE | Noted: 2023-01-18

## 2023-01-25 PROCEDURE — 99999 PR PBB SHADOW E&M-EST. PATIENT-LVL IV: ICD-10-PCS | Mod: PBBFAC,,, | Performed by: NURSE PRACTITIONER

## 2023-01-25 PROCEDURE — 1160F RVW MEDS BY RX/DR IN RCRD: CPT | Mod: CPTII,,, | Performed by: NURSE PRACTITIONER

## 2023-01-25 PROCEDURE — 99999 PR PBB SHADOW E&M-EST. PATIENT-LVL IV: CPT | Mod: PBBFAC,,, | Performed by: NURSE PRACTITIONER

## 2023-01-25 PROCEDURE — 99214 OFFICE O/P EST MOD 30 MIN: CPT | Mod: S$PBB,,, | Performed by: NURSE PRACTITIONER

## 2023-01-25 PROCEDURE — 3008F PR BODY MASS INDEX (BMI) DOCUMENTED: ICD-10-PCS | Mod: CPTII,,, | Performed by: NURSE PRACTITIONER

## 2023-01-25 PROCEDURE — 1159F PR MEDICATION LIST DOCUMENTED IN MEDICAL RECORD: ICD-10-PCS | Mod: CPTII,,, | Performed by: NURSE PRACTITIONER

## 2023-01-25 PROCEDURE — 1160F PR REVIEW ALL MEDS BY PRESCRIBER/CLIN PHARMACIST DOCUMENTED: ICD-10-PCS | Mod: CPTII,,, | Performed by: NURSE PRACTITIONER

## 2023-01-25 PROCEDURE — 3008F BODY MASS INDEX DOCD: CPT | Mod: CPTII,,, | Performed by: NURSE PRACTITIONER

## 2023-01-25 PROCEDURE — 99214 OFFICE O/P EST MOD 30 MIN: CPT | Mod: PBBFAC | Performed by: NURSE PRACTITIONER

## 2023-01-25 PROCEDURE — 3078F PR MOST RECENT DIASTOLIC BLOOD PRESSURE < 80 MM HG: ICD-10-PCS | Mod: CPTII,,, | Performed by: NURSE PRACTITIONER

## 2023-01-25 PROCEDURE — 99214 PR OFFICE/OUTPT VISIT, EST, LEVL IV, 30-39 MIN: ICD-10-PCS | Mod: S$PBB,,, | Performed by: NURSE PRACTITIONER

## 2023-01-25 PROCEDURE — 1159F MED LIST DOCD IN RCRD: CPT | Mod: CPTII,,, | Performed by: NURSE PRACTITIONER

## 2023-01-25 PROCEDURE — 3074F PR MOST RECENT SYSTOLIC BLOOD PRESSURE < 130 MM HG: ICD-10-PCS | Mod: CPTII,,, | Performed by: NURSE PRACTITIONER

## 2023-01-25 PROCEDURE — 3074F SYST BP LT 130 MM HG: CPT | Mod: CPTII,,, | Performed by: NURSE PRACTITIONER

## 2023-01-25 PROCEDURE — 3078F DIAST BP <80 MM HG: CPT | Mod: CPTII,,, | Performed by: NURSE PRACTITIONER

## 2023-01-27 ENCOUNTER — SPECIALTY PHARMACY (OUTPATIENT)
Dept: PHARMACY | Facility: CLINIC | Age: 63
End: 2023-01-27
Payer: MEDICAID

## 2023-01-27 NOTE — TELEPHONE ENCOUNTER
Incoming call regarding Cabometyx refill, Pt stated he has 7 days on hand. Insurance is rejecting RTS 1/29/23. Pt stated its okay to call him back on 1/30/23 for follow up with refill.

## 2023-01-30 NOTE — TELEPHONE ENCOUNTER
Specialty Pharmacy - Refill Coordination    Specialty Medication Orders Linked to Encounter      Flowsheet Row Most Recent Value   Medication #1 cabozantinib (CABOMETYX) 40 mg Tab (Order#[id number], Rx#)            Refill Questions - Documented Responses      Flowsheet Row Most Recent Value   Patient Availability and HIPAA Verification    Does patient want to proceed with activity? Yes   HIPAA/medical authority confirmed? Yes   Relationship to patient of person spoken to? Self   Refill Screening Questions    Changes to allergies? No   Changes to medications? No   New conditions since last clinic visit? No   Unplanned office visit, urgent care, ED, or hospital admission in the last 4 weeks? No   How does patient/caregiver feel medication is working? Good   Financial problems or insurance changes? No   How many doses of your specialty medications were missed in the last 4 weeks? 0   Would patient like to speak to a pharmacist? No   When does the patient need to receive the medication? 02/03/23   Refill Delivery Questions    How will the patient receive the medication? [facility]   When does the patient need to receive the medication? 02/03/23   Shipping Address Home   Address in [facility] confirmed and updated if neccessary? Yes   Expected Copay ($) 0   Is the patient able to afford the medication copay? Yes   Payment Method zero copay   Days supply of Refill 30   Supplies needed? No supplies needed   Refill activity completed? Yes   Refill activity plan Refill scheduled   Shipment/Pickup Date: 01/31/23            Current Outpatient Medications   Medication Sig    albuterol 90 mcg/actuation inhaler Inhale 2 puffs into the lungs every 4 (four) hours as needed.    amLODIPine (NORVASC) 5 MG tablet Take 5 mg by mouth every morning.    aspirin 81 mg Cap Take 81 mg by mouth once daily at 6am.    atorvastatin (LIPITOR) 80 MG tablet Take 80 mg by mouth once daily.    cabozantinib (CABOMETYX) 20 mg Tab Take 1 tablet (20 mg)  "by mouth once daily.    carvediloL (COREG) 12.5 MG tablet Take 12.5 mg by mouth 2 (two) times daily.    citalopram (CELEXA) 20 MG tablet Take 1 tablet (20 mg total) by mouth once daily.    desvenlafaxine succinate (PRISTIQ) 50 MG Tb24 Take 100 mg by mouth once daily.    diphenoxylate-atropine 2.5-0.025 mg (LOMOTIL) 2.5-0.025 mg per tablet Take 1 tablet by mouth as needed.    finasteride (PROSCAR) 5 mg tablet Take 5 mg by mouth as needed.    hydrOXYzine pamoate (VISTARIL) 25 MG Cap Take 1 capsule (25 mg total) by mouth every 4 (four) hours as needed (itching).    hyoscyamine (LEVSIN/SL) 0.125 mg Subl Place 0.125 mg under the tongue every 6 (six) hours as needed.    insulin glargine (LANTUS) 100 unit/mL injection Inject into the skin.    insulin syr/ndl U100 half warner (BD VEO INSULIN SYR, HALF UNIT,) 0.3 mL 31 gauge x 15/64" Syrg USE 1 syringe DAILY    lancets Misc Use TID to check blood sugar. Dx: E11.9, patient is insulin dependent    multivitamin (THERAGRAN) per tablet Take 1 tablet by mouth once daily.    ondansetron (ZOFRAN) 4 MG tablet Take 4 mg by mouth every 6 (six) hours as needed.    pantoprazole (PROTONIX) 40 MG tablet Take 1 tablet (40 mg total) by mouth once daily.    pregabalin (LYRICA) 75 MG capsule Take 100 mg by mouth 3 (three) times daily.    prochlorperazine (COMPAZINE) 5 MG tablet Take 5 mg by mouth as needed.    QUEtiapine (SEROQUEL) 50 MG tablet Take 150 mg by mouth every evening.    tamsulosin (FLOMAX) 0.4 mg Cap Take 1 capsule (0.4 mg total) by mouth every evening.    terbinafine HCL (LAMISIL) 250 mg tablet Take 250 mg by mouth.    tolterodine (DETROL LA) 4 MG 24 hr capsule Take 4 mg by mouth once daily.    traMADoL (ULTRAM) 50 mg tablet Take 50 mg by mouth every 6 (six) hours.    TRUE METRIX GLUCOSE TEST STRIP Strp 3 (three) times daily.    tuberculin,purif.prot.deriv. (TUBERSOL IDRM) Inject 0.1 mLs into the skin.   Last reviewed on 1/25/2023  2:39 PM by DESIRE Abbott    Review of " patient's allergies indicates:  No Known Allergies Last reviewed on  1/25/2023 2:39 PM by Jackie Boyd      Tasks added this encounter   2/26/2023 - Refill Call (Auto Added)   Tasks due within next 3 months   No tasks due.     [facility] - Specialty Pharmacy  [address]  [facility] [address]  Phone: [phone]  Fax: [phone]

## 2023-02-09 ENCOUNTER — TELEPHONE (OUTPATIENT)
Dept: HEMATOLOGY/ONCOLOGY | Facility: CLINIC | Age: 63
End: 2023-02-09
Payer: MEDICAID

## 2023-02-10 NOTE — PROGRESS NOTES
Subjective:       Patient ID: Ye Olivera is a 62 y.o. male.    Urologist: Dr. Christopher Villasenor  PCP: Dr. Ly Quevedo  Nephrologist: Dr. Jenae Le    Renal Cell Carcinoma Stage IV([id number]) Mediastinal lymph nodes--Diagnosed 20  Biopsy/pathology:  Upper EUS done 9/3/20--large cluster of malignant appearing lymph nodes in subcarinal mediastinum causing extrinsic compression in middle 1/3 and thoracic esophagus biopsy c/w metastatic carcinoma, c/w renal cell carcinoma CD10+, PAX -8+, erythematous mucosa in antrum biopsy c/w mild chronic gastritis, inactive, normal duodenum, no pathology in CBD or left lobe of liver or pancreas. CARIS results- MSI stable, Mismatch repair status proficient. Tumor burden low. Negative for: PD-L1, NTRK1/2/3, BAP1, FH, MET, PBRM1, SDHA. Pathologic variants identified: KDMSC (Exon 13 and 11) and VHL. Immunohistochemistry results: MLH1, MSH2, MSH6, and PM52 positive. PD-L1 negative.   Surgery/pathology:  Right radical nephrectomy and lymph node dissection done 20--clear cell renal cell carcinoma measures 9.2X8C8.7cm, Grade 3, margins clear, tumor limited to kidney, 4/6 lymph nodes positive for metastatic clear cell carcinoma.   Imagin. CT A/P w/o contrast [facility] 20--bulky right renal mass, midpole, measuring 10cm with evidence of internal necrosis and calcifications, primary mass does not extend outside the perinephric space, associated right perinephric edema, multiple enlarged retroperitoneal lymph nodes at level of the kidneys and inferior retroperitoneum, one measures 2.9X1.4cm, additional node measures 2.1X1.8cm, additional node measures 1.8X1.2cm, inferior retroperitoneal node at level of inferior pole right kidney measures 1.8X1.4cm, no convincing evidence of renal vein involvement, no right adrenal gland involvement, no disease apparent outside of abdomen.  2. CT A/P w/ and w/o contrast [facility] 20--large right renal mass measures 10.6X9.5X7.4cm, with  enlarged retroperitoneal nodes, largest measures 2.7cm.  3. CT chest [facility] 8/28/20--solitary left supraclavicular node, small and not overtly concerning, heterogeneous retroesophageal mass at the level of the tariq c/w metastatic deposit, no other evidence for metastatic disease.  4. MRI brain w/ and w/o contrast [facility] 8/28/20--no evidence for intracranial metastatic disease.  5. CT C/A/P done at [facility] 10/28/20--now appears to be 2 adjacent masses at the level of the tariq, measuring in aggregate 6.2X2.7cm as compared with 5.2X2.1cm, subcentimeter mediastinal and hilar nodes are seen, large intermediate density structure at the right renal fossa extending to pelvic brim, most likely post-operative hematoma or seroma, resolution of previously seen retroperitoneal adenopathy.  6. CT C/A/P w&w/o at [facility] on 12/23/20--Interval enlargement of the retro-esophageal mass with mild interval increase in size of multiple paraesophageal and prevascular lymph nodes compatible with progression of disease. Interval decrease in size of the right retroperitoneal postop seroma or hematoma. No evidence of metastatic disease below the diaphragm. Prior right nephrectomy and adrenalectomy. Chololithiasis and stable atherosclerosis.   7. CT C/A/P w/o contrast done at [facility] on 4/5/21--interval decrease in size of multiple prevascular, subcarinal and para-esophageal lymph nodes c/w response to treatment, no new pulmonary nodule or lymph node, no evidence for metastatic disease below the diaphragm, interval decrease in size of the post-operative seromas adjacent to right psoas muscle.  8. CT C/A/P w/o contrast done at [facility] on 7/7/21--stable appearance of previously identified prevascular, subcarinal, and paraesophageal nodes, there are an increased number of sub-centimeter nodes in the aortopulmonary window and precarinal region, nonspecific in appearance, no other evidence of local recurrence or metastatic disease.   9. MRI cervical spine w/ and w/o  contrast done at [facility] 8/4/21--no metastatic disease, multilevel spondylotic changes, no acute abnormality.   10. MRI brain w/ and w/o contrast 8/4/21--normal maturation of the previously seen lacunar infarct at the left centrum semiovale, no new abnormalities, specifically, no evidence of intracranial metastatic disease.   11. CT C/A/P w/o contrast done at [facility] 10/22/21--stable reactive mediastinal adenopathy, interval resection of right kidney and adrenal gland with post-surgical changes but no evidence of metastatic disease.   12. CT C/A/P w/o contrast done at [facility] 1/26/22--4 new pulmonary nodules larges LLL 14mm, 9mm LLL, 5mm LLL, 5mm RML, with enlarging prevascular lymph node now measures 18mm, previous 8mm, concerning for metastatic disease, new mesenteric implant in LLQ measures 11mm, enlarging retroperitoneal LN now measures [id number], previous 14X8mm concerning for progression disease, cholelithiasis.   13. MRI brain w/ and w/o  contrast done at [facility] 3/31/22--no acute intracranial abnormality.   14. CT C/A/P w/o contrast done at [facility] 4/5/22--progressive retroperitoneal and mediastinal adenopathy, with development of hilar adenopathy, increase in size and number of pulmonary nodules.   15. CT done at [facility] showed large hematoma 14cm in bladder, results not available.  16. CT head w/o contrast done at [facility] 7/31/22--No intracranial hemorrhage or acute traumatic brain parenchymal injury evident by CT.     17. CT C/A/P w/o contrast done at [facility] 8/29/22--mixed response to treatment, worsening mediastinal adenopathy, stable pulmonary nodules, improved retroperitoneal lymph node, enlarging pericaval lymph node.  18. CT C/A/P w/o contrast at [facility] 11/18/22--Interval decreased in mediastinal adenopathy, pulmonary nodules and portacaval nodes, c/w treatment response.   19. CT C/A/P w/ contrast at [facility] 2/8/23--stable mediastinal lymph nodes, enlarging left lower lobe pulmonary nodule, enlarging retroperitoneal lymph nodes.      Treatment History:   1. IL2 started on 11/2/20. 2nd cycle started 11/16/20 - stopped after 7 days due to acute renal failure, volume overload and confusion. Stopped due to progression.  2. Keytruda/Inlyta 1/8/21--1/31/22 (stopped due to progression, autoimmune hepatitis).  3. Prednisone 80mg daily 1/31/22--weaned and stopped 4/14/22.    Hospitalization:   [facility] 7/2022 14cm hematoma s/p TURP causing hydronephrosis, JOE and patient required initiation of dialysis, UTI, Covid infection.     Treatment Plan:  Cabometyx 60mg daily started 4/7/22  Decrease dose to 40mg daily 4/28/22 due to side effects.  HOLD on 5/19/22 until groin rash and abscess heal/also underwent TURP on 6/23/22  Restarted 7/6/22--held 7/16/22 for hospitalization/bladder hematoma  Cabometyx 40mg daily restarted 9/1/22. On hold since 10/24/22 for hand/foot syndrome.   Restarted on 11/9/22, held 11/28/22 for hand-foot syndrome, foot wound.   Dose decreased to 20mg daily. Restarted on 1/5/23.     Chief Complaint: Other Misc (Pt reports no new concerns today.)    HPI   Patient presents for follow-up of renal cell carcinoma. He is doing well. Continues on Cabometyx. Reports developing a small cellulitis rash lesion in his groin, stopped Cabo for 2 days and resolved. No other wounds or rashes. His BP is slightly elevated today, but he did not take his medication. He reports that his BP has been good. Recent CT shows mixed findings and I went over the results with patient and plans to continue with with the Cabometyx. His renal function has been stable.     Past Medical History:   Diagnosis Date    Arthritis of knee 8/13/2013    HTN (hypertension) 8/13/2013    Type II or unspecified type diabetes mellitus without mention of complication, not stated as uncontrolled 8/13/2013      Review of patient's allergies indicates:  No Known Allergies     Current Outpatient Medications:     amLODIPine (NORVASC) 5 MG tablet, Take 5 mg by mouth every morning., Disp: ,  "Rfl:     aspirin 81 mg Cap, Take 81 mg by mouth once daily at 6am., Disp: , Rfl:     atorvastatin (LIPITOR) 80 MG tablet, Take 80 mg by mouth once daily., Disp: , Rfl:     cabozantinib (CABOMETYX) 20 mg Tab, Take 1 tablet (20 mg) by mouth once daily., Disp: 30 tablet, Rfl: 6    carvediloL (COREG) 12.5 MG tablet, Take 12.5 mg by mouth 2 (two) times daily., Disp: , Rfl:     desvenlafaxine succinate (PRISTIQ) 50 MG Tb24, Take 100 mg by mouth once daily., Disp: , Rfl:     diphenoxylate-atropine 2.5-0.025 mg (LOMOTIL) 2.5-0.025 mg per tablet, Take 1 tablet by mouth as needed., Disp: , Rfl:     finasteride (PROSCAR) 5 mg tablet, Take 5 mg by mouth as needed., Disp: , Rfl:     hydrOXYzine pamoate (VISTARIL) 25 MG Cap, Take 1 capsule (25 mg total) by mouth every 4 (four) hours as needed (itching)., Disp: 30 capsule, Rfl: 4    hyoscyamine (LEVSIN/SL) 0.125 mg Subl, Place 0.125 mg under the tongue every 6 (six) hours as needed., Disp: , Rfl:     insulin glargine (LANTUS) 100 unit/mL injection, Inject into the skin., Disp: , Rfl:     insulin syr/ndl U100 half warner (BD VEO INSULIN SYR, HALF UNIT,) 0.3 mL 31 gauge x 15/64" Syrg, USE 1 syringe DAILY, Disp: , Rfl:     lancets Misc, Use TID to check blood sugar. Dx: E11.9, patient is insulin dependent, Disp: , Rfl:     multivitamin (THERAGRAN) per tablet, Take 1 tablet by mouth once daily., Disp: , Rfl:     ondansetron (ZOFRAN) 4 MG tablet, Take 4 mg by mouth every 6 (six) hours as needed., Disp: , Rfl:     pantoprazole (PROTONIX) 40 MG tablet, Take 1 tablet (40 mg total) by mouth once daily., Disp: 30 tablet, Rfl: 6    pregabalin (LYRICA) 75 MG capsule, Take 100 mg by mouth 3 (three) times daily., Disp: , Rfl:     prochlorperazine (COMPAZINE) 5 MG tablet, Take 5 mg by mouth as needed., Disp: , Rfl:     QUEtiapine (SEROQUEL) 50 MG tablet, Take 150 mg by mouth every evening., Disp: , Rfl:     tamsulosin (FLOMAX) 0.4 mg Cap, Take 1 capsule (0.4 mg total) by mouth every evening., " Disp: 30 capsule, Rfl: 6    terbinafine HCL (LAMISIL) 250 mg tablet, Take 250 mg by mouth., Disp: , Rfl:     tolterodine (DETROL LA) 4 MG 24 hr capsule, Take 4 mg by mouth once daily., Disp: , Rfl:     traMADoL (ULTRAM) 50 mg tablet, Take 50 mg by mouth every 6 (six) hours., Disp: , Rfl:     TRUE METRIX GLUCOSE TEST STRIP Strp, 3 (three) times daily., Disp: , Rfl:     tuberculin,purif.prot.deriv. (TUBERSOL IDRM), Inject 0.1 mLs into the skin., Disp: , Rfl:     albuterol 90 mcg/actuation inhaler, Inhale 2 puffs into the lungs every 4 (four) hours as needed., Disp: 17 g, Rfl: 2    citalopram (CELEXA) 20 MG tablet, Take 1 tablet (20 mg total) by mouth once daily., Disp: 30 tablet, Rfl: 6    Current Facility-Administered Medications:     albuterol sulfate nebulizer solution 2.5 mg, 2.5 mg, Nebulization, Q4H PRN, Thierno Snider MD     Review of Systems   Constitutional:  Positive for fatigue. Negative for appetite change and unexpected weight change (weight gain).   HENT:  Negative for mouth sores.    Respiratory:  Negative for cough. Shortness of breath: chronic, no change.   Cardiovascular:  Negative for chest pain.   Gastrointestinal:  Negative for abdominal pain and diarrhea.   Genitourinary:  Negative for frequency.   Musculoskeletal:  Negative for back pain.   Neurological:  Negative for headaches.   Hematological:  Negative for adenopathy.   Psychiatric/Behavioral:  The patient is not nervous/anxious.      Vitals:    02/14/23 1043   BP: (!) 156/92   Pulse: (!) 54   Resp: 14   Temp: 97.7 °F (36.5 °C)     Wt Readings from Last 3 Encounters:   02/14/23 1043 107.1 kg (236 lb 3.2 oz)   01/25/23 1422 107.7 kg (237 lb 6.4 oz)   01/04/23 1116 103.6 kg (228 lb 8 oz)       Physical Exam  Constitutional:       Appearance: Normal appearance. He is overweight.   HENT:      Head: Normocephalic and atraumatic.      Nose: Nose normal.   Eyes:      General: Lids are normal. Vision grossly intact.      Extraocular Movements:  Extraocular movements intact.      Conjunctiva/sclera: Conjunctivae normal.   Cardiovascular:      Rate and Rhythm: Regular rhythm. Bradycardia present.      Heart sounds: Normal heart sounds.      Comments: Bradycardia present, on Coreg  Pulmonary:      Effort: Pulmonary effort is normal.      Breath sounds: Normal breath sounds.   Chest:      Comments: HD access removed to right upper CW  Abdominal:      General: Abdomen is protuberant. Bowel sounds are normal.      Palpations: Abdomen is soft.   Musculoskeletal:         General: Normal range of motion.      Cervical back: Neck supple.      Right lower leg: No edema.      Left lower leg: No edema.   Skin:     General: Skin is warm and dry.   Neurological:      General: No focal deficit present.      Mental Status: He is alert and oriented to person, place, and time.   Psychiatric:         Attention and Perception: Attention normal.         Mood and Affect: Mood normal.         Speech: Speech normal.         Behavior: Behavior normal. Behavior is cooperative.        No visits with results within 1 Week(s) from this visit.   Latest known visit with results is:   Lab Visit on 12/13/2022   Component Date Value Ref Range Status    Sodium Level 12/13/2022 140  136 - 145 mmol/L Final    Potassium Level 12/13/2022 3.9  3.5 - 5.1 mmol/L Final    Chloride 12/13/2022 106  98 - 107 mmol/L Final    Carbon Dioxide 12/13/2022 26  23 - 31 mmol/L Final    Glucose Level 12/13/2022 156 (H)  82 - 115 mg/dL Final    Blood Urea Nitrogen 12/13/2022 21.4  8.4 - 25.7 mg/dL Final    Creatinine 12/13/2022 1.84 (H)  0.73 - 1.18 mg/dL Final    Calcium Level Total 12/13/2022 8.3 (L)  8.8 - 10.0 mg/dL Final    Protein Total 12/13/2022 6.3  5.8 - 7.6 gm/dL Final    Albumin Level 12/13/2022 3.0 (L)  3.4 - 4.8 gm/dL Final    Globulin 12/13/2022 3.3  2.4 - 3.5 gm/dL Final    Albumin/Globulin Ratio 12/13/2022 0.9 (L)  1.1 - 2.0 ratio Final    Bilirubin Total 12/13/2022 0.8  <=1.5 mg/dL Final     Alkaline Phosphatase 12/13/2022 112  40 - 150 unit/L Final    Alanine Aminotransferase 12/13/2022 16  0 - 55 unit/L Final    Aspartate Aminotransferase 12/13/2022 15  5 - 34 unit/L Final    eGFR 12/13/2022 41  mls/min/1.73/m2 Final    Thyroid Stimulating Hormone 12/13/2022 2.5655  0.3500 - 4.9400 uIU/mL Final    WBC 12/13/2022 10.4  4.5 - 11.5 x10(3)/mcL Final    RBC 12/13/2022 5.26  4.70 - 6.10 x10(6)/mcL Final    Hgb 12/13/2022 13.9 (L)  14.0 - 18.0 gm/dL Final    Hct 12/13/2022 45.4  42.0 - 52.0 % Final    MCV 12/13/2022 86.3  80.0 - 94.0 fL Final    MCH 12/13/2022 26.4 (L)  27.0 - 31.0 pg Final    MCHC 12/13/2022 30.6 (L)  33.0 - 36.0 mg/dL Final    RDW 12/13/2022 20.1 (H)  11.5 - 17.0 % Final    Platelet 12/13/2022 182  130 - 400 x10(3)/mcL Final    MPV 12/13/2022 9.8  7.4 - 10.4 fL Final    Neut % 12/13/2022 66.2  % Final    Lymph % 12/13/2022 21.5  % Final    Mono % 12/13/2022 8.2  % Final    Eos % 12/13/2022 3.2  % Final    Basophil % 12/13/2022 0.4  % Final    Lymph # 12/13/2022 2.24  0.6 - 4.6 x10(3)/mcL Final    Neut # 12/13/2022 6.9  2.1 - 9.2 x10(3)/mcL Final    Mono # 12/13/2022 0.86  0.1 - 1.3 x10(3)/mcL Final    Eos # 12/13/2022 0.33  0 - 0.9 x10(3)/mcL Final    Baso # 12/13/2022 0.04  0 - 0.2 x10(3)/mcL Final    IG# 12/13/2022 0.05 (H)  0 - 0.04 x10(3)/mcL Final    IG% 12/13/2022 0.5  % Final          Assessment:        1. Clear cell carcinoma of right kidney    2. Chronic kidney disease, stage IV (severe)    3. 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 Other iron deficiency anemia    5. Balance problem          Plan:         Patient with large right renal mass and retroperitoneal adenopathy diagnosed by CT in 7/2020.  CT chest showed mediastinal fanny mass and biopsy done via EUS + for metastatic renal cell carcinoma.  Patient s/p radical right nephrectomy done 9/11/20. Pathology showed 9.2cm clear cell renal cell carcinoma, margins clear with 4/6 lymph nodes  positive.  Post-operative course complicated by hematoma requiring blood transfusion.  CARIS results- MSI stable, Mismatch repair status proficient. Tumor burden low. Negative for: PD-L1, NTRK1/2/3, BAP1, FH, MET, PBRM1, SDHA. Pathologic variants identified: KDMSC (Exon 13 and 11) and VHL. Immunohistochemistry results: MLH1, MSH2, MSH6, and PM52 positive. PD-L1 negative.     Referred to Dr. Haja Sal for evaluation for IL-2. Explained this is only treatment to be shown to achieve long-term remissions in stage IV disease and patient has limited disease at this time.  Patient started 1st 5 days of IL-2 on 11/2/20.   Started his 2nd round on 11/16/20 and it was stopped on 11/22/20 due to acute renal failure, volume overload and agitation.   CT scan on 12/23/20 at [facility] revealed progression of his disease. Dr. Sal contacted patient and discussed the results. He also reached out to Dr. Alvarado to notify her that the patient will not be returning.   Started Keytruda 200mg every 3 weeks on 1/8/21 + Inlyta 5mg oral BID on 1/11/21.  CT C/A/P done at [facility] on 10/22/21 showed stable mild reactive mediastinal nodes, otherwise stable.  Patient continued on treatment s/p cycle 18 Keytruda given 1/10/22.  Labs on 1/10/22 showed elevated LFTs. Inlyta held.  CT C/A/P done at [facility] on 1/26/22 showed unfortunately disease progression with new pulmonary nodules, enlargement in mediastinal node, new mesenteric nodule and retroperitoneal adenopathy.  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 Hepatitis panel negative. Keytruda stopped. Started on Prednisone 80mg daily on 1/31/22.  Patient had suicidal ideation and severe depression during visit in 3/2022, seen by psychiatry and meds adjusted, much improved.  CT C/A/P from [facility] on 4/5/22 showed progressive disease.  Treatment change recommended to Cabometyx 60mg daily per NCCN guidelines (Category 1) for subsequent treatment. Started on  4/7/22.  Prednisone weaned and stopped 4/14/22.  Patient was having multiple side effects (n/v, diarrhea, mouth sores) from Cabometyx.  Reduced dose to 40mg on 4/28/22. Held on 5/19/22 due to cellulitis/rashes/abscesses.      Continued to hold s/p TURP on 6/23/22 and restarted 7/6/22.  Patient hospitalized at [facility] 7/16/22 for large bladder hematoma s/p clot evacuation X 2. Cabometyx stopped again.   Repeat CT C/A/P done at [facility] on 8/29/22 showed mixed findings, but mostly disease progression in fanny metastases in mediastinum, which is to be expected since he has been off treatment.   Restarted back on Cabometyx (lower dose 40mg) on 9/1/22 although it has not been studied in dialysis patients, but no dosage adjustment is usually required.  Held on 10/24/22 for Hand-foot syndrome, restarted 11/9/22.   CT done at [facility] on 11/18/22 with disease improvement.  Cabometyx held again for hand-foot syndrome and wound on left toe 11/28/22.  Restarted Cabometyx on 1/5/23 at 20mg daily.    CT C/A/P at [facility] done on 2/8/23 shows mixed findings, stable mediastinal lymph nodes, enlargement in LLL nodule, and enlargement in abdominal lymph nodes.  Given that the findings are mixed and that he has not been on Cabometyx consistently, I would recommend to continue with current treatment and repeat CT in 3 months.     Labs are pending for today.   Looking ahead to future treatment options, would try again with immunotherapy, single agent Opdivo. He never really failed Keytruda, just developed autoimmune hepatitis, so this would need to be monitored closely.   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 Will refer for genetic testing to see if he would be a candidate for treatment with Welireg.   RTC 4 weeks for follow-up with repeat labs.     Continue Compazine for nausea.   Continue Norco for joint pain.   Continue Vistaril for chronic pruritis.   All questions answered at this time.      Elizabeth Alvarado MD

## 2023-02-14 ENCOUNTER — TELEPHONE (OUTPATIENT)
Dept: HEMATOLOGY/ONCOLOGY | Facility: CLINIC | Age: 63
End: 2023-02-14

## 2023-02-14 ENCOUNTER — OFFICE VISIT (OUTPATIENT)
Dept: HEMATOLOGY/ONCOLOGY | Facility: CLINIC | Age: 63
End: 2023-02-14
Payer: MEDICAID

## 2023-02-14 VITALS
HEART RATE: 54 BPM | OXYGEN SATURATION: 97 % | HEIGHT: 71 IN | SYSTOLIC BLOOD PRESSURE: 156 MMHG | RESPIRATION RATE: 14 BRPM | DIASTOLIC BLOOD PRESSURE: 92 MMHG | BODY MASS INDEX: 33.06 KG/M2 | WEIGHT: 236.19 LBS | TEMPERATURE: 98 F

## 2023-02-14 DIAGNOSIS — C64.1 CLEAR CELL CARCINOMA OF RIGHT KIDNEY: Primary | ICD-10-CM

## 2023-02-14 DIAGNOSIS — R26.89 BALANCE PROBLEM: ICD-10-CM

## 2023-02-14 DIAGNOSIS — D50.8 OTHER IRON DEFICIENCY ANEMIA: ICD-10-CM

## 2023-02-14 DIAGNOSIS — C77.1 MALIGNANT NEOPLASM METASTATIC TO INTRATHORACIC LYMPH NODE: ICD-10-CM

## 2023-02-14 DIAGNOSIS — N18.4 CHRONIC KIDNEY DISEASE, STAGE IV (SEVERE): ICD-10-CM

## 2023-02-14 PROCEDURE — 99214 OFFICE O/P EST MOD 30 MIN: CPT | Mod: PBBFAC | Performed by: INTERNAL MEDICINE

## 2023-02-14 PROCEDURE — 36415 COLL VENOUS BLD VENIPUNCTURE: CPT | Performed by: INTERNAL MEDICINE

## 2023-02-14 PROCEDURE — 3008F PR BODY MASS INDEX (BMI) DOCUMENTED: ICD-10-PCS | Mod: CPTII,,, | Performed by: INTERNAL MEDICINE

## 2023-02-14 PROCEDURE — 3008F BODY MASS INDEX DOCD: CPT | Mod: CPTII,,, | Performed by: INTERNAL MEDICINE

## 2023-02-14 PROCEDURE — 3080F DIAST BP >= 90 MM HG: CPT | Mod: CPTII,,, | Performed by: INTERNAL MEDICINE

## 2023-02-14 PROCEDURE — 1160F PR REVIEW ALL MEDS BY PRESCRIBER/CLIN PHARMACIST DOCUMENTED: ICD-10-PCS | Mod: CPTII,,, | Performed by: INTERNAL MEDICINE

## 2023-02-14 PROCEDURE — 99215 PR OFFICE/OUTPT VISIT, EST, LEVL V, 40-54 MIN: ICD-10-PCS | Mod: S$PBB,,, | Performed by: INTERNAL MEDICINE

## 2023-02-14 PROCEDURE — 99999 PR PBB SHADOW E&M-EST. PATIENT-LVL IV: ICD-10-PCS | Mod: PBBFAC,,, | Performed by: INTERNAL MEDICINE

## 2023-02-14 PROCEDURE — 1160F RVW MEDS BY RX/DR IN RCRD: CPT | Mod: CPTII,,, | Performed by: INTERNAL MEDICINE

## 2023-02-14 PROCEDURE — 3077F SYST BP >= 140 MM HG: CPT | Mod: CPTII,,, | Performed by: INTERNAL MEDICINE

## 2023-02-14 PROCEDURE — 1159F MED LIST DOCD IN RCRD: CPT | Mod: CPTII,,, | Performed by: INTERNAL MEDICINE

## 2023-02-14 PROCEDURE — 3080F PR MOST RECENT DIASTOLIC BLOOD PRESSURE >= 90 MM HG: ICD-10-PCS | Mod: CPTII,,, | Performed by: INTERNAL MEDICINE

## 2023-02-14 PROCEDURE — 3077F PR MOST RECENT SYSTOLIC BLOOD PRESSURE >= 140 MM HG: ICD-10-PCS | Mod: CPTII,,, | Performed by: INTERNAL MEDICINE

## 2023-02-14 PROCEDURE — 99999 PR PBB SHADOW E&M-EST. PATIENT-LVL IV: CPT | Mod: PBBFAC,,, | Performed by: INTERNAL MEDICINE

## 2023-02-14 PROCEDURE — 99215 OFFICE O/P EST HI 40 MIN: CPT | Mod: S$PBB,,, | Performed by: INTERNAL MEDICINE

## 2023-02-14 PROCEDURE — 1159F PR MEDICATION LIST DOCUMENTED IN MEDICAL RECORD: ICD-10-PCS | Mod: CPTII,,, | Performed by: INTERNAL MEDICINE

## 2023-02-27 ENCOUNTER — SPECIALTY PHARMACY (OUTPATIENT)
Dept: PHARMACY | Facility: CLINIC | Age: 63
End: 2023-02-27
Payer: MEDICAID

## 2023-02-27 NOTE — TELEPHONE ENCOUNTER
Outgoing call regarding Cabometyx refill, pt states he missed a couple of doses throughout the month due to hand and foot. Rph was not available informed pt will follow up. Pt states he has 7 days on hand. Informed pt medication needs a PA, informed pt will follow up once PA is approved. Routed to assigned [facility]

## 2023-02-27 NOTE — TELEPHONE ENCOUNTER
Confirmed with patient with reaction. Reports occurred prior to last provider appt. 2/14. Pt reports medication has been continued and symptoms have improved. With experiencing once more, but since improved/

## 2023-02-28 ENCOUNTER — SPECIALTY PHARMACY (OUTPATIENT)
Dept: PHARMACY | Facility: CLINIC | Age: 63
End: 2023-02-28
Payer: MEDICAID

## 2023-02-28 NOTE — TELEPHONE ENCOUNTER
Specialty Pharmacy - Refill Coordination    Specialty Medication Orders Linked to Encounter      Flowsheet Row Most Recent Value   Medication #1 cabozantinib (CABOMETYX) 20 mg Tab (Order#[id number], Rx#[id number])            Refill Questions - Documented Responses    Pt reports doing well on medication since several doses held.  Flowsheet Row Most Recent Value   Patient Availability and HIPAA Verification    Does patient want to proceed with activity? Yes   HIPAA/medical authority confirmed? Yes   Relationship to patient of person spoken to? Self   Refill Screening Questions    Changes to allergies? No   Changes to medications? No   New conditions since last clinic visit? No   Unplanned office visit, urgent care, ED, or hospital admission in the last 4 weeks? No   How does patient/caregiver feel medication is working? Good   Financial problems or insurance changes? No   How many doses of your specialty medications were missed in the last 4 weeks? 4   Would patient like to speak to a pharmacist? No   When does the patient need to receive the medication? 03/04/23   Refill Delivery Questions    How will the patient receive the medication? [facility]   When does the patient need to receive the medication? 03/04/23   Shipping Address Home   Address in [facility] confirmed and updated if neccessary? Yes   Expected Copay ($) 0   Is the patient able to afford the medication copay? Yes   Payment Method zero copay   Days supply of Refill 30   Supplies needed? No supplies needed   Refill activity completed? No   Refill activity plan Refill scheduled   Shipment/Pickup Date: 03/02/23            Current Outpatient Medications   Medication Sig    albuterol 90 mcg/actuation inhaler Inhale 2 puffs into the lungs every 4 (four) hours as needed.    amLODIPine (NORVASC) 5 MG tablet Take 5 mg by mouth every morning.    aspirin 81 mg Cap Take 81 mg by mouth once daily at 6am.    atorvastatin (LIPITOR) 80 MG tablet Take 80 mg by mouth  "once daily.    cabozantinib (CABOMETYX) 20 mg Tab Take 1 tablet (20 mg) by mouth once daily.    carvediloL (COREG) 12.5 MG tablet Take 12.5 mg by mouth 2 (two) times daily.    citalopram (CELEXA) 20 MG tablet Take 1 tablet (20 mg total) by mouth once daily.    desvenlafaxine succinate (PRISTIQ) 50 MG Tb24 Take 100 mg by mouth once daily.    diphenoxylate-atropine 2.5-0.025 mg (LOMOTIL) 2.5-0.025 mg per tablet Take 1 tablet by mouth as needed.    finasteride (PROSCAR) 5 mg tablet Take 5 mg by mouth as needed.    hydrOXYzine pamoate (VISTARIL) 25 MG Cap Take 1 capsule (25 mg total) by mouth every 4 (four) hours as needed (itching).    hyoscyamine (LEVSIN/SL) 0.125 mg Subl Place 0.125 mg under the tongue every 6 (six) hours as needed.    insulin glargine (LANTUS) 100 unit/mL injection Inject into the skin.    insulin syr/ndl U100 half warner (BD VEO INSULIN SYR, HALF UNIT,) 0.3 mL 31 gauge x 15/64" Syrg USE 1 syringe DAILY    lancets Misc Use TID to check blood sugar. Dx: E11.9, patient is insulin dependent    multivitamin (THERAGRAN) per tablet Take 1 tablet by mouth once daily.    ondansetron (ZOFRAN) 4 MG tablet Take 4 mg by mouth every 6 (six) hours as needed.    pantoprazole (PROTONIX) 40 MG tablet Take 1 tablet (40 mg total) by mouth once daily.    pregabalin (LYRICA) 75 MG capsule Take 100 mg by mouth 3 (three) times daily.    prochlorperazine (COMPAZINE) 5 MG tablet Take 5 mg by mouth as needed.    QUEtiapine (SEROQUEL) 50 MG tablet Take 150 mg by mouth every evening.    tamsulosin (FLOMAX) 0.4 mg Cap Take 1 capsule (0.4 mg total) by mouth every evening.    terbinafine HCL (LAMISIL) 250 mg tablet Take 250 mg by mouth.    tolterodine (DETROL LA) 4 MG 24 hr capsule Take 4 mg by mouth once daily.    traMADoL (ULTRAM) 50 mg tablet Take 50 mg by mouth every 6 (six) hours.    TRUE METRIX GLUCOSE TEST STRIP Strp 3 (three) times daily.    tuberculin,purif.prot.deriv. (TUBERSOL IDRM) Inject 0.1 mLs into the skin.   Last " reviewed on 2/14/2023 11:06 AM by Elizabeth Alvarado MD    Review of patient's allergies indicates:  No Known Allergies Last reviewed on  2/14/2023 11:06 AM by Elizabeth Alvarado      Tasks added this encounter   3/27/2023 - Refill Call (Auto Added)   Tasks due within next 3 months   No tasks due.     Micaela Burt, PharmD  [facility]  [address]  [facility] [address]  Phone: [phone]  Fax: [phone]

## 2023-03-07 NOTE — PROGRESS NOTES
REFERRING PHYSICIAN: Dr. Elizabeth Alvarado    Subjective:       Patient ID: Ye Olivera is a 62 y.o. male.    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 Patient presented via Telemedicine Visit (audio and video) today for risk assessment, genetic counseling, and consideration for genetic testing.    HPI  Past Medical History:   Diagnosis Date    Arthritis of knee 8/13/2013    HTN (hypertension) 8/13/2013    Type II or unspecified type diabetes mellitus without mention of complication, not stated as uncontrolled 8/13/2013        Past Surgical History:   Procedure Laterality Date    APPENDECTOMY      CATARACT EXTRACTION, BILATERAL          Review of patient's allergies indicates:  Patient has no known allergies.     Review of Systems        Problem List Items Addressed This Visit    None       Oncology History    No history exists.      Family History   Problem Relation Age of Onset    Heart disease Mother     Heart disease Father     Breast cancer Other 47    Diabetes Neg Hx     Cancer Neg Hx             Assessment:   Risk Assessment:  This patient is at increased risk of having an inherited genetic mutation that increases her risk for cancer. She meets criteria for genetic testing based on the National Comprehensive Cancer Network (NCCN) criteria due to a personal history of kidney cancer with somatic VHL mutation.  Based on his likelihood of having a mutation, VHL Analysis with [facility] [facility]Exp[facility]+RNAinsight panel testing was described in detail.    Education and Counseling:  [facility] evaluates a broad number of hereditary cancer syndromes to help define patients' cancer risk. This cancer panel tests (77 total): AIP, ALK, APC*, JONI*, AXIN2, BAP1, BARD1, BLM, BMPR1A, BRCA1*, BRCA2*, BRIP1*, CDC73, CDH1*, CDK4, CDKN1B, CDKN2A, CHEK2*, CTNNA1, DICER1, FANCC, FH, FLCN, GALNT12, KIF1B, LZTR1,  MAX, MEN1, MET, MLH1*, MSH2*, MSH3, MSH6*, MUTYH*, NBN, NF1*, NF2, NTHL1, PALB2*, PHOX2B, PMS2*, POT1, AFKIQ7P, PTCH1, PTEN*, RAD51C*, RAD51D*, RB1, RECQL, RET, SDHA, SDHAF2, SDHB, SDHC, SDHD, SMAD4, SMARCA4, SMARCB1, SMARCE1, STK11, SUFU, RXEG241, TP53*, TSC1, TSC2, VHL and XRCC2 (sequencing and deletion/duplication); EGFR, EGLN1, HOXB13, KIT, MITF, PDGFRA, POLD1 and POLE (sequencing only); EPCAM and GREM1 (deletion/duplication only). DNA and RNA analyses performed for * genes.    Von Hippel-Lindau syndrome (VHL) is a hereditary condition associated with tumors arising in multiple organs. VHL-related tumors include hemangioblastomas, which are blood vessel tumors of the brain, spinal cord, and retina. The retinal tumors are also called retinal angiomas, which can lead to blindness if not treated in a timely manner. People with VHL also have an increased risk of developing clear cell renal cell carcinoma (ccRCC), which is a specific type of kidney cancer, as well as a type of tumor in the pancreas known as pancreatic neuroendocrine tumor (pNET). Tumors of the adrenal gland or pheochromocytoma can also develop, with a small number becoming metastatic, meaning they spread to other parts of the body.     Other features of VHL include: kidney cysts, which are closed sacs usually filled with fluid; pancreatic cysts, epididymal cystadenomas, which are tumors near a mans testicles; broad ligament cystadenomas, which occur near the fallopian tubes in women; and endolymphatic sac tumors (ELST), which are tumors of the inner ear that may cause hearing loss.    Risks of cancer associated with inherited cancer predisposition mutations were discussed in detail.  If a mutation were found, this patient would have a significantly increased risk for cancer.  Inherited cancer syndromes included in this test, may have different, but still significant risk for cancer.  Risk of cancer with any particular gene mutation will be discussed  at the time of results disclosure and based on the results.    The availability of clinical management options for inherited cancer predisposition mutation carriers was discussed, including increased surveillance, chemoprevention, and prophylactic surgery. Details of the testing process, including benefits and limitations of genetic analysis as well as the implications of possible test results, were discussed.  Because this patient is the first member of his family to be tested comprehensive panel testing was presented.  Related insurance issues were discussed.      Summary:  This patient was evaluated for hereditary risk of cancer and was found to be at an increased risk of having an inherited cancer predisposition gene mutation.  The option of genetic testing was explained in detail, including the possible impact of this information on family members.  Since this patient wishes to proceed with testing an informed consent was obtained and blood drawn and sent to [facility].  Results will be expected 4 weeks from this time.  A follow-up appointment will be scheduled for results disclosure.       The patient location is: home.     Visit type: audiovisual    Face to Face time with patient: 25 minutes  40 minutes of total time spent on the encounter, which includes face to face time and non-face to face time preparing to see the patient (eg, review of tests), Obtaining and/or reviewing separately obtained history, Documenting clinical information in the electronic or other health record, Independently interpreting results (not separately reported) and communicating results to the patient/family/caregiver, or Care coordination (not separately reported).       Each patient to whom he or she provides medical services by telemedicine is:  (1) informed of the relationship between the physician and patient and the respective role of any other health care provider with respect to management of the patient; and (2)  notified that he or she may decline to receive medical services by telemedicine and may withdraw from such care at any time.    ALFREDO LOW, PhD     Answers submitted by the patient for this visit:  Review of Systems Questionnaire (Submitted on 3/4/2023)  appetite change : No  unexpected weight change: Yes  mouth sores: No  visual disturbance: No  cough: Yes  shortness of breath: Yes  chest pain: No  abdominal pain: No  diarrhea: Yes  frequency: No  back pain: No  rash: Yes  headaches: No  adenopathy: No  nervous/ anxious: No

## 2023-03-08 ENCOUNTER — OFFICE VISIT (OUTPATIENT)
Dept: HEMATOLOGY/ONCOLOGY | Facility: CLINIC | Age: 63
End: 2023-03-08
Payer: MEDICAID

## 2023-03-08 DIAGNOSIS — C64.1 CLEAR CELL CARCINOMA OF RIGHT KIDNEY: ICD-10-CM

## 2023-03-08 PROCEDURE — 99215 PR OFFICE/OUTPT VISIT, EST, LEVL V, 40-54 MIN: ICD-10-PCS | Mod: 95,,, | Performed by: NURSE PRACTITIONER

## 2023-03-08 PROCEDURE — 1159F MED LIST DOCD IN RCRD: CPT | Mod: CPTII,95,, | Performed by: NURSE PRACTITIONER

## 2023-03-08 PROCEDURE — 99215 OFFICE O/P EST HI 40 MIN: CPT | Mod: 95,,, | Performed by: NURSE PRACTITIONER

## 2023-03-08 PROCEDURE — 1159F PR MEDICATION LIST DOCUMENTED IN MEDICAL RECORD: ICD-10-PCS | Mod: CPTII,95,, | Performed by: NURSE PRACTITIONER

## 2023-03-10 NOTE — PROGRESS NOTES
Subjective:       Patient ID: Ye Olivera is a 62 y.o. male.    Urologist: Dr. Christopher Villasenor  PCP: Dr. Ly Quevedo  Nephrologist: Dr. Jenae Le    Renal Cell Carcinoma Stage IV([id number]) Mediastinal lymph nodes--Diagnosed 20  Biopsy/pathology:  Upper EUS done 9/3/20--large cluster of malignant appearing lymph nodes in subcarinal mediastinum causing extrinsic compression in middle 1/3 and thoracic esophagus biopsy c/w metastatic carcinoma, c/w renal cell carcinoma CD10+, PAX -8+, erythematous mucosa in antrum biopsy c/w mild chronic gastritis, inactive, normal duodenum, no pathology in CBD or left lobe of liver or pancreas. CARIS results- MSI stable, Mismatch repair status proficient. Tumor burden low. Negative for: PD-L1, NTRK1/2/3, BAP1, FH, MET, PBRM1, SDHA. Pathologic variants identified: KDMSC (Exon 13 and 11) and VHL. Immunohistochemistry results: MLH1, MSH2, MSH6, and PM52 positive. PD-L1 negative.   Surgery/pathology:  Right radical nephrectomy and lymph node dissection done 20--clear cell renal cell carcinoma measures 9.2X8C8.7cm, Grade 3, margins clear, tumor limited to kidney, 4/6 lymph nodes positive for metastatic clear cell carcinoma.   Imagin. CT A/P w/o contrast [facility] 20--bulky right renal mass, midpole, measuring 10cm with evidence of internal necrosis and calcifications, primary mass does not extend outside the perinephric space, associated right perinephric edema, multiple enlarged retroperitoneal lymph nodes at level of the kidneys and inferior retroperitoneum, one measures 2.9X1.4cm, additional node measures 2.1X1.8cm, additional node measures 1.8X1.2cm, inferior retroperitoneal node at level of inferior pole right kidney measures 1.8X1.4cm, no convincing evidence of renal vein involvement, no right adrenal gland involvement, no disease apparent outside of abdomen.  2. CT A/P w/ and w/o contrast [facility] 20--large right renal mass measures 10.6X9.5X7.4cm, with  enlarged retroperitoneal nodes, largest measures 2.7cm.  3. CT chest [facility] 8/28/20--solitary left supraclavicular node, small and not overtly concerning, heterogeneous retroesophageal mass at the level of the tariq c/w metastatic deposit, no other evidence for metastatic disease.  4. MRI brain w/ and w/o contrast [facility] 8/28/20--no evidence for intracranial metastatic disease.  5. CT C/A/P done at [facility] 10/28/20--now appears to be 2 adjacent masses at the level of the tariq, measuring in aggregate 6.2X2.7cm as compared with 5.2X2.1cm, subcentimeter mediastinal and hilar nodes are seen, large intermediate density structure at the right renal fossa extending to pelvic brim, most likely post-operative hematoma or seroma, resolution of previously seen retroperitoneal adenopathy.  6. CT C/A/P w&w/o at [facility] on 12/23/20--Interval enlargement of the retro-esophageal mass with mild interval increase in size of multiple paraesophageal and prevascular lymph nodes compatible with progression of disease. Interval decrease in size of the right retroperitoneal postop seroma or hematoma. No evidence of metastatic disease below the diaphragm. Prior right nephrectomy and adrenalectomy. Chololithiasis and stable atherosclerosis.   7. CT C/A/P w/o contrast done at [facility] on 4/5/21--interval decrease in size of multiple prevascular, subcarinal and para-esophageal lymph nodes c/w response to treatment, no new pulmonary nodule or lymph node, no evidence for metastatic disease below the diaphragm, interval decrease in size of the post-operative seromas adjacent to right psoas muscle.  8. CT C/A/P w/o contrast done at [facility] on 7/7/21--stable appearance of previously identified prevascular, subcarinal, and paraesophageal nodes, there are an increased number of sub-centimeter nodes in the aortopulmonary window and precarinal region, nonspecific in appearance, no other evidence of local recurrence or metastatic disease.   9. MRI cervical spine w/ and w/o  contrast done at [facility] 8/4/21--no metastatic disease, multilevel spondylotic changes, no acute abnormality.   10. MRI brain w/ and w/o contrast 8/4/21--normal maturation of the previously seen lacunar infarct at the left centrum semiovale, no new abnormalities, specifically, no evidence of intracranial metastatic disease.   11. CT C/A/P w/o contrast done at [facility] 10/22/21--stable reactive mediastinal adenopathy, interval resection of right kidney and adrenal gland with post-surgical changes but no evidence of metastatic disease.   12. CT C/A/P w/o contrast done at [facility] 1/26/22--4 new pulmonary nodules larges LLL 14mm, 9mm LLL, 5mm LLL, 5mm RML, with enlarging prevascular lymph node now measures 18mm, previous 8mm, concerning for metastatic disease, new mesenteric implant in LLQ measures 11mm, enlarging retroperitoneal LN now measures [id number], previous 14X8mm concerning for progression disease, cholelithiasis.   13. MRI brain w/ and w/o  contrast done at [facility] 3/31/22--no acute intracranial abnormality.   14. CT C/A/P w/o contrast done at [facility] 4/5/22--progressive retroperitoneal and mediastinal adenopathy, with development of hilar adenopathy, increase in size and number of pulmonary nodules.   15. CT done at [facility] showed large hematoma 14cm in bladder, results not available.  16. CT head w/o contrast done at [facility] 7/31/22--No intracranial hemorrhage or acute traumatic brain parenchymal injury evident by CT.     17. CT C/A/P w/o contrast done at [facility] 8/29/22--mixed response to treatment, worsening mediastinal adenopathy, stable pulmonary nodules, improved retroperitoneal lymph node, enlarging pericaval lymph node.  18. CT C/A/P w/o contrast at [facility] 11/18/22--Interval decreased in mediastinal adenopathy, pulmonary nodules and portacaval nodes, c/w treatment response.   19. CT C/A/P w/ contrast at [facility] 2/8/23--stable mediastinal lymph nodes, enlarging left lower lobe pulmonary nodule, enlarging retroperitoneal lymph nodes.      Treatment History:   1. IL2 started on 11/2/20. 2nd cycle started 11/16/20 - stopped after 7 days due to acute renal failure, volume overload and confusion. Stopped due to progression.  2. Keytruda/Inlyta 1/8/21--1/31/22 (stopped due to progression, autoimmune hepatitis).  3. Prednisone 80mg daily 1/31/22--weaned and stopped 4/14/22.    Hospitalization:   [facility] 7/2022 14cm hematoma s/p TURP causing hydronephrosis, JOE and patient required initiation of dialysis, UTI, Covid infection.     Treatment Plan:  Cabometyx 60mg daily started 4/7/22  Decrease dose to 40mg daily 4/28/22 due to side effects.  HOLD on 5/19/22 until groin rash and abscess heal/also underwent TURP on 6/23/22  Restarted 7/6/22--held 7/16/22 for hospitalization/bladder hematoma  Cabometyx 40mg daily restarted 9/1/22. On hold since 10/24/22 for hand/foot syndrome.   Restarted on 11/9/22, held 11/28/22 for hand-foot syndrome, foot wound.   Dose decreased to 20mg daily. Restarted on 1/5/23.     Chief Complaint: Follow-up (Pt states he has no concerns today)    HPI   Patient presents for follow-up of renal cell carcinoma. He is doing well. Continues on Cabometyx. His left great toe diabetic ulcer wound continues. It will improve and then start looking bad again. He is not currently being followed by wound care so I would like to refer him to Dr. Manriquez for evaluation. No other wounds or rashes. Continues with peripheral neuropathy to lower extremities. Taking Lyrica TID. He reports that his BP has been good. His renal function has been stable. He continues gaining weight. Bowels are moving well. No other problems reported.     Past Medical History:   Diagnosis Date    Arthritis of knee 8/13/2013    HTN (hypertension) 8/13/2013    Type II or unspecified type diabetes mellitus without mention of complication, not stated as uncontrolled 8/13/2013      Review of patient's allergies indicates:  No Known Allergies     Current Outpatient Medications:      "albuterol 90 mcg/actuation inhaler, Inhale 2 puffs into the lungs every 4 (four) hours as needed., Disp: 17 g, Rfl: 2    amLODIPine (NORVASC) 5 MG tablet, Take 5 mg by mouth every morning., Disp: , Rfl:     aspirin 81 mg Cap, Take 81 mg by mouth once daily at 6am., Disp: , Rfl:     atorvastatin (LIPITOR) 80 MG tablet, Take 80 mg by mouth once daily., Disp: , Rfl:     cabozantinib (CABOMETYX) 20 mg Tab, Take 1 tablet (20 mg) by mouth once daily., Disp: 30 tablet, Rfl: 6    carvediloL (COREG) 12.5 MG tablet, Take 12.5 mg by mouth 2 (two) times daily., Disp: , Rfl:     citalopram (CELEXA) 20 MG tablet, Take 1 tablet (20 mg total) by mouth once daily., Disp: 30 tablet, Rfl: 6    desvenlafaxine succinate (PRISTIQ) 50 MG Tb24, Take 100 mg by mouth once daily., Disp: , Rfl:     diphenoxylate-atropine 2.5-0.025 mg (LOMOTIL) 2.5-0.025 mg per tablet, Take 1 tablet by mouth as needed., Disp: , Rfl:     finasteride (PROSCAR) 5 mg tablet, Take 5 mg by mouth as needed., Disp: , Rfl:     hydrOXYzine pamoate (VISTARIL) 25 MG Cap, Take 1 capsule (25 mg total) by mouth every 4 (four) hours as needed (itching)., Disp: 30 capsule, Rfl: 4    hyoscyamine (LEVSIN/SL) 0.125 mg Subl, Place 0.125 mg under the tongue every 6 (six) hours as needed., Disp: , Rfl:     insulin glargine (LANTUS) 100 unit/mL injection, Inject into the skin., Disp: , Rfl:     insulin syr/ndl U100 half warner (BD VEO INSULIN SYR, HALF UNIT,) 0.3 mL 31 gauge x 15/64" Syrg, USE 1 syringe DAILY, Disp: , Rfl:     lancets Misc, Use TID to check blood sugar. Dx: E11.9, patient is insulin dependent, Disp: , Rfl:     multivitamin (THERAGRAN) per tablet, Take 1 tablet by mouth once daily., Disp: , Rfl:     ondansetron (ZOFRAN) 4 MG tablet, Take 4 mg by mouth every 6 (six) hours as needed., Disp: , Rfl:     pantoprazole (PROTONIX) 40 MG tablet, Take 1 tablet (40 mg total) by mouth once daily., Disp: 30 tablet, Rfl: 6    pregabalin (LYRICA) 75 MG capsule, Take 100 mg by mouth 3 " (three) times daily., Disp: , Rfl:     prochlorperazine (COMPAZINE) 5 MG tablet, Take 5 mg by mouth as needed., Disp: , Rfl:     QUEtiapine (SEROQUEL) 50 MG tablet, Take 150 mg by mouth every evening., Disp: , Rfl:     tamsulosin (FLOMAX) 0.4 mg Cap, Take 1 capsule (0.4 mg total) by mouth every evening., Disp: 30 capsule, Rfl: 6    terbinafine HCL (LAMISIL) 250 mg tablet, Take 250 mg by mouth., Disp: , Rfl:     tolterodine (DETROL LA) 4 MG 24 hr capsule, Take 4 mg by mouth once daily., Disp: , Rfl:     traMADoL (ULTRAM) 50 mg tablet, Take 50 mg by mouth every 6 (six) hours., Disp: , Rfl:     TRUE METRIX GLUCOSE TEST STRIP Strp, 3 (three) times daily., Disp: , Rfl:     tuberculin,purif.prot.deriv. (TUBERSOL IDRM), Inject 0.1 mLs into the skin., Disp: , Rfl:     Current Facility-Administered Medications:     albuterol sulfate nebulizer solution 2.5 mg, 2.5 mg, Nebulization, Q4H PRN, Thierno Snider MD     Review of Systems   Constitutional:  Positive for fatigue. Negative for appetite change and unexpected weight change (weight gain).   HENT:  Negative for mouth sores.    Respiratory:  Negative for cough. Shortness of breath: chronic, no change.   Cardiovascular:  Negative for chest pain.   Gastrointestinal:  Negative for abdominal pain and diarrhea.   Genitourinary:  Negative for frequency.   Musculoskeletal:  Negative for back pain.   Integumentary:  Positive for wound.        Left great toe wound   Neurological:  Negative for headaches.   Hematological:  Negative for adenopathy.   Psychiatric/Behavioral:  The patient is not nervous/anxious.      Vitals:    03/16/23 0952   BP: 136/74   Pulse: (!) 54   Resp: 18   Temp: 97.7 °F (36.5 °C)     Wt Readings from Last 3 Encounters:   03/16/23 0952 107.9 kg (237 lb 14.4 oz)   02/14/23 1043 107.1 kg (236 lb 3.2 oz)   01/25/23 1422 107.7 kg (237 lb 6.4 oz)     Physical Exam  Constitutional:       Appearance: Normal appearance. He is overweight.   HENT:      Head:  Normocephalic and atraumatic.      Nose: Nose normal.   Eyes:      General: Lids are normal. Vision grossly intact.      Extraocular Movements: Extraocular movements intact.      Conjunctiva/sclera: Conjunctivae normal.   Cardiovascular:      Rate and Rhythm: Regular rhythm. Bradycardia present.      Heart sounds: Normal heart sounds.      Comments: Bradycardia present, on Coreg  Pulmonary:      Effort: Pulmonary effort is normal.      Breath sounds: Normal breath sounds.   Chest:      Comments: HD access removed to right upper CW  Abdominal:      General: Abdomen is protuberant. Bowel sounds are normal. There is no distension.      Palpations: Abdomen is soft.      Tenderness: There is no abdominal tenderness.   Musculoskeletal:         General: Normal range of motion.      Cervical back: Neck supple.      Right lower leg: No edema.      Left lower leg: No edema.        Feet:    Feet:      Comments: Underside of left great toe - nonhealing wound that appears deeper than last visit. Sloth tissue. No pus or erythema. No necrotic tissue  Skin:     General: Skin is warm and dry.   Neurological:      General: No focal deficit present.      Mental Status: He is alert and oriented to person, place, and time.   Psychiatric:         Attention and Perception: Attention normal.         Mood and Affect: Mood normal.         Speech: Speech normal.         Behavior: Behavior normal. Behavior is cooperative.       Outside labs at [facility] [place] on 3/14/23: CBC normal. CMP with BUN 35 and creat 1.70, alk phos 150. Otherwise good.   Assessment:        1. Clear cell carcinoma of right kidney    2. Stage 3b chronic kidney disease    3. 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 Anemia in stage 4 chronic kidney disease          Plan:         Patient with large right renal mass and retroperitoneal adenopathy diagnosed by CT in 7/2020.  CT chest showed mediastinal fanny mass and biopsy done via EUS + for metastatic renal cell  carcinoma.  Patient s/p radical right nephrectomy done 9/11/20. Pathology showed 9.2cm clear cell renal cell carcinoma, margins clear with 4/6 lymph nodes positive.  Post-operative course complicated by hematoma requiring blood transfusion.  CARIS results- MSI stable, Mismatch repair status proficient. Tumor burden low. Negative for: PD-L1, NTRK1/2/3, BAP1, FH, MET, PBRM1, SDHA. Pathologic variants identified: KDMSC (Exon 13 and 11) and VHL. Immunohistochemistry results: MLH1, MSH2, MSH6, and PM52 positive. PD-L1 negative.     Referred to Dr. Haja Sal for evaluation for IL-2. Explained this is only treatment to be shown to achieve long-term remissions in stage IV disease and patient has limited disease at this time.  Patient started 1st 5 days of IL-2 on 11/2/20.   Started his 2nd round on 11/16/20 and it was stopped on 11/22/20 due to acute renal failure, volume overload and agitation.   CT scan on 12/23/20 at [facility] revealed progression of his disease. Dr. Sal contacted patient and discussed the results. He also reached out to Dr. Alvarado to notify her that the patient will not be returning.   Started Keytruda 200mg every 3 weeks on 1/8/21 + Inlyta 5mg oral BID on 1/11/21.  CT C/A/P done at [facility] on 10/22/21 showed stable mild reactive mediastinal nodes, otherwise stable.  Patient continued on treatment s/p cycle 18 Keytruda given 1/10/22.  Labs on 1/10/22 showed elevated LFTs. Inlyta held.  CT C/A/P done at [facility] on 1/26/22 showed unfortunately disease progression with new pulmonary nodules, enlargement in mediastinal node, new mesenteric nodule and retroperitoneal adenopathy.  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 Hepatitis panel negative. Keytruda stopped. Started on Prednisone 80mg daily on 1/31/22.  Patient had suicidal ideation and severe depression during visit in 3/2022, seen by psychiatry and meds adjusted, much improved.  CT C/A/P from [facility] on 4/5/22 showed  progressive disease.  Treatment change recommended to Cabometyx 60mg daily per NCCN guidelines (Category 1) for subsequent treatment. Started on 4/7/22.  Prednisone weaned and stopped 4/14/22.  Patient was having multiple side effects (n/v, diarrhea, mouth sores) from Cabometyx.  Reduced dose to 40mg on 4/28/22. Held on 5/19/22 due to cellulitis/rashes/abscesses.      Continued to hold s/p TURP on 6/23/22 and restarted 7/6/22.  Patient hospitalized at [facility] 7/16/22 for large bladder hematoma s/p clot evacuation X 2. Cabometyx stopped again.   Repeat CT C/A/P done at [facility] on 8/29/22 showed mixed findings, but mostly disease progression in fanny metastases in mediastinum, which is to be expected since he has been off treatment.   Restarted back on Cabometyx (lower dose 40mg) on 9/1/22 although it has not been studied in dialysis patients, but no dosage adjustment is usually required.  Held on 10/24/22 for Hand-foot syndrome, restarted 11/9/22.   CT done at [facility] on 11/18/22 with disease improvement.  Cabometyx held again for hand-foot syndrome and wound on left toe 11/28/22.  Restarted Cabometyx on 1/5/23 at 20mg daily.    CT C/A/P at [facility] done on 2/8/23 shows mixed findings, stable mediastinal lymph nodes, enlargement in LLL nodule, and enlargement in abdominal lymph nodes.  Given that the findings are mixed and that he has not been on Cabometyx consistently, Dr. Alvarado recommended to continue with current treatment and repeat CT in 3 months.   Outside labs at [facility] were good. Stable renal insufficiency noted.   RTC 3 weeks for follow-up with repeat labs.   Will send referral to Dr. Manriquez for his left great toe non-healing wound. He is no longer being followed by  wound care since he is no longer home bound. The wound appears to be deeper today than his last visit. If needed, we could hold the Cabometyx for better healing.     Looking ahead to future treatment options, would  try again with immunotherapy, single agent Opdivo. He never really failed Keytruda, just developed autoimmune hepatitis, so this would need to be monitored closely.   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 Referred for genetic testing which was done today to see if he would be a candidate for treatment with Welireg.     Continue Compazine for nausea.   Continue Norco for joint pain.   Continue Vistaril for chronic pruritis.   All questions answered at this time.     Jackie Boyd, [facility]

## 2023-03-14 DIAGNOSIS — L97.524 DIABETIC ULCER OF TOE OF LEFT FOOT ASSOCIATED WITH TYPE 2 DIABETES MELLITUS, WITH NECROSIS OF BONE: Primary | ICD-10-CM

## 2023-03-14 DIAGNOSIS — E11.621 DIABETIC ULCER OF TOE OF LEFT FOOT ASSOCIATED WITH TYPE 2 DIABETES MELLITUS, WITH NECROSIS OF BONE: Primary | ICD-10-CM

## 2023-03-16 ENCOUNTER — OFFICE VISIT (OUTPATIENT)
Dept: HEMATOLOGY/ONCOLOGY | Facility: CLINIC | Age: 63
End: 2023-03-16
Payer: MEDICAID

## 2023-03-16 VITALS
SYSTOLIC BLOOD PRESSURE: 136 MMHG | BODY MASS INDEX: 33.3 KG/M2 | HEART RATE: 54 BPM | RESPIRATION RATE: 18 BRPM | TEMPERATURE: 98 F | OXYGEN SATURATION: 98 % | HEIGHT: 71 IN | DIASTOLIC BLOOD PRESSURE: 74 MMHG | WEIGHT: 237.88 LBS

## 2023-03-16 DIAGNOSIS — C77.1 MALIGNANT NEOPLASM METASTATIC TO INTRATHORACIC LYMPH NODE: ICD-10-CM

## 2023-03-16 DIAGNOSIS — S91.102A OPEN WOUND OF LEFT GREAT TOE, INITIAL ENCOUNTER: ICD-10-CM

## 2023-03-16 DIAGNOSIS — N18.32 STAGE 3B CHRONIC KIDNEY DISEASE: ICD-10-CM

## 2023-03-16 DIAGNOSIS — D63.1 ANEMIA IN STAGE 4 CHRONIC KIDNEY DISEASE: ICD-10-CM

## 2023-03-16 DIAGNOSIS — C64.1 CLEAR CELL CARCINOMA OF RIGHT KIDNEY: Primary | ICD-10-CM

## 2023-03-16 DIAGNOSIS — N18.4 ANEMIA IN STAGE 4 CHRONIC KIDNEY DISEASE: ICD-10-CM

## 2023-03-16 PROCEDURE — 1159F PR MEDICATION LIST DOCUMENTED IN MEDICAL RECORD: ICD-10-PCS | Mod: CPTII,,, | Performed by: NURSE PRACTITIONER

## 2023-03-16 PROCEDURE — 3075F SYST BP GE 130 - 139MM HG: CPT | Mod: CPTII,,, | Performed by: NURSE PRACTITIONER

## 2023-03-16 PROCEDURE — 99999 PR PBB SHADOW E&M-EST. PATIENT-LVL III: ICD-10-PCS | Mod: PBBFAC,,, | Performed by: NURSE PRACTITIONER

## 2023-03-16 PROCEDURE — 99999 PR PBB SHADOW E&M-EST. PATIENT-LVL III: CPT | Mod: PBBFAC,,, | Performed by: NURSE PRACTITIONER

## 2023-03-16 PROCEDURE — 3008F PR BODY MASS INDEX (BMI) DOCUMENTED: ICD-10-PCS | Mod: CPTII,,, | Performed by: NURSE PRACTITIONER

## 2023-03-16 PROCEDURE — 99215 PR OFFICE/OUTPT VISIT, EST, LEVL V, 40-54 MIN: ICD-10-PCS | Mod: S$PBB,,, | Performed by: NURSE PRACTITIONER

## 2023-03-16 PROCEDURE — 3078F PR MOST RECENT DIASTOLIC BLOOD PRESSURE < 80 MM HG: ICD-10-PCS | Mod: CPTII,,, | Performed by: NURSE PRACTITIONER

## 2023-03-16 PROCEDURE — 3075F PR MOST RECENT SYSTOLIC BLOOD PRESS GE 130-139MM HG: ICD-10-PCS | Mod: CPTII,,, | Performed by: NURSE PRACTITIONER

## 2023-03-16 PROCEDURE — 1159F MED LIST DOCD IN RCRD: CPT | Mod: CPTII,,, | Performed by: NURSE PRACTITIONER

## 2023-03-16 PROCEDURE — 3078F DIAST BP <80 MM HG: CPT | Mod: CPTII,,, | Performed by: NURSE PRACTITIONER

## 2023-03-16 PROCEDURE — 1160F RVW MEDS BY RX/DR IN RCRD: CPT | Mod: CPTII,,, | Performed by: NURSE PRACTITIONER

## 2023-03-16 PROCEDURE — 1160F PR REVIEW ALL MEDS BY PRESCRIBER/CLIN PHARMACIST DOCUMENTED: ICD-10-PCS | Mod: CPTII,,, | Performed by: NURSE PRACTITIONER

## 2023-03-16 PROCEDURE — 99215 OFFICE O/P EST HI 40 MIN: CPT | Mod: S$PBB,,, | Performed by: NURSE PRACTITIONER

## 2023-03-16 PROCEDURE — 99213 OFFICE O/P EST LOW 20 MIN: CPT | Mod: PBBFAC | Performed by: NURSE PRACTITIONER

## 2023-03-16 PROCEDURE — 3008F BODY MASS INDEX DOCD: CPT | Mod: CPTII,,, | Performed by: NURSE PRACTITIONER

## 2023-03-19 NOTE — TELEPHONE ENCOUNTER
Monitor: The problem is stable.  Evaluation: No labs/tests required today.  Assessment/Treatment:  Continue current treatment/monitoring regimen.   Spoke with both patient and his significant other. States that he has blisters on the palms of his hands that presented about 3 weeks ago. States that his feet are sore as well, but no blisters. Went into urgent care recently and is using a steroid cream, but no improvement. Currently on Cabometyx 40 mg. Please advise.

## 2023-03-27 ENCOUNTER — SPECIALTY PHARMACY (OUTPATIENT)
Dept: PHARMACY | Facility: CLINIC | Age: 63
End: 2023-03-27
Payer: MEDICAID

## 2023-03-27 ENCOUNTER — HOSPITAL ENCOUNTER (OUTPATIENT)
Dept: WOUND CARE | Facility: HOSPITAL | Age: 63
Discharge: HOME | End: 2023-03-27
Attending: EMERGENCY MEDICINE
Payer: MEDICAID

## 2023-03-27 ENCOUNTER — HOSPITAL ENCOUNTER (OUTPATIENT)
Dept: RADIOLOGY | Facility: HOSPITAL | Age: 63
Discharge: HOME | End: 2023-03-27
Attending: EMERGENCY MEDICINE
Payer: MEDICAID

## 2023-03-27 VITALS
BODY MASS INDEX: 32.93 KG/M2 | WEIGHT: 230 LBS | TEMPERATURE: 98 F | HEIGHT: 70 IN | RESPIRATION RATE: 20 BRPM | HEART RATE: 51 BPM | SYSTOLIC BLOOD PRESSURE: 126 MMHG | DIASTOLIC BLOOD PRESSURE: 73 MMHG

## 2023-03-27 DIAGNOSIS — I10 BENIGN ESSENTIAL HYPERTENSION: ICD-10-CM

## 2023-03-27 DIAGNOSIS — E11.621 DIABETIC ULCER OF LEFT GREAT TOE: ICD-10-CM

## 2023-03-27 DIAGNOSIS — C64.1 MALIGNANT NEOPLASM OF RIGHT KIDNEY, EXCEPT RENAL PELVIS: Primary | ICD-10-CM

## 2023-03-27 DIAGNOSIS — E08.41 DIABETIC MONONEUROPATHY ASSOCIATED WITH DIABETES MELLITUS DUE TO UNDERLYING CONDITION: ICD-10-CM

## 2023-03-27 DIAGNOSIS — Z87.891 FORMER TOBACCO USE: ICD-10-CM

## 2023-03-27 DIAGNOSIS — L97.529 DIABETIC ULCER OF LEFT GREAT TOE: ICD-10-CM

## 2023-03-27 DIAGNOSIS — C64.1 CLEAR CELL CARCINOMA OF RIGHT KIDNEY: ICD-10-CM

## 2023-03-27 DIAGNOSIS — E78.2 MIXED HYPERLIPIDEMIA: ICD-10-CM

## 2023-03-27 DIAGNOSIS — C77.1 MALIGNANT NEOPLASM METASTATIC TO INTRATHORACIC LYMPH NODE: ICD-10-CM

## 2023-03-27 DIAGNOSIS — N18.32 STAGE 3B CHRONIC KIDNEY DISEASE: ICD-10-CM

## 2023-03-27 DIAGNOSIS — L97.522 SKIN ULCER OF LEFT GREAT TOE WITH FAT LAYER EXPOSED: ICD-10-CM

## 2023-03-27 DIAGNOSIS — R46.89 SELF NEGLECT: ICD-10-CM

## 2023-03-27 DIAGNOSIS — D50.8 OTHER IRON DEFICIENCY ANEMIA: ICD-10-CM

## 2023-03-27 LAB — POCT GLUCOSE: 126 MG/DL (ref 70–110)

## 2023-03-27 PROCEDURE — 11042 DEBRIDEMENT: ICD-10-PCS | Mod: ,,, | Performed by: EMERGENCY MEDICINE

## 2023-03-27 PROCEDURE — 87077 CULTURE AEROBIC IDENTIFY: CPT

## 2023-03-27 PROCEDURE — 73660 X-RAY EXAM OF TOE(S): CPT | Mod: TC,LT

## 2023-03-27 PROCEDURE — 99205 OFFICE O/P NEW HI 60 MIN: CPT | Mod: 25,,, | Performed by: EMERGENCY MEDICINE

## 2023-03-27 PROCEDURE — 11042 DBRDMT SUBQ TIS 1ST 20SQCM/<: CPT

## 2023-03-27 PROCEDURE — 11042 DBRDMT SUBQ TIS 1ST 20SQCM/<: CPT | Mod: ,,, | Performed by: EMERGENCY MEDICINE

## 2023-03-27 PROCEDURE — 27000999 HC MEDICAL RECORD PHOTO DOCUMENTATION

## 2023-03-27 PROCEDURE — 99205 PR OFFICE/OUTPT VISIT, NEW, LEVL V, 60-74 MIN: ICD-10-PCS | Mod: 25,,, | Performed by: EMERGENCY MEDICINE

## 2023-03-27 RX ORDER — QUETIAPINE FUMARATE 50 MG/1
3 TABLET, FILM COATED ORAL NIGHTLY
COMMUNITY
Start: 2022-11-28

## 2023-03-27 RX ORDER — SERTRALINE HYDROCHLORIDE 50 MG/1
50 TABLET, FILM COATED ORAL
COMMUNITY
Start: 2022-10-31

## 2023-03-27 RX ORDER — CARVEDILOL 12.5 MG/1
1 TABLET ORAL 2 TIMES DAILY
COMMUNITY
Start: 2023-01-13

## 2023-03-27 RX ORDER — PREGABALIN 100 MG/1
100 CAPSULE ORAL
COMMUNITY
Start: 2023-01-18 | End: 2024-03-14

## 2023-03-27 RX ORDER — AZELASTINE 1 MG/ML
SPRAY, METERED NASAL
COMMUNITY
Start: 2023-03-07

## 2023-03-27 RX ORDER — PROCHLORPERAZINE MALEATE 5 MG
5 TABLET ORAL EVERY 8 HOURS PRN
COMMUNITY
Start: 2022-04-28

## 2023-03-27 RX ORDER — DESVENLAFAXINE 100 MG/1
100 TABLET, EXTENDED RELEASE ORAL
COMMUNITY
Start: 2023-02-27

## 2023-03-27 RX ORDER — ATORVASTATIN CALCIUM 80 MG/1
1 TABLET, FILM COATED ORAL NIGHTLY
Status: ON HOLD | COMMUNITY
Start: 2023-03-06 | End: 2023-05-17 | Stop reason: HOSPADM

## 2023-03-27 RX ORDER — AMLODIPINE BESYLATE 5 MG/1
1 TABLET ORAL EVERY MORNING
Status: ON HOLD | COMMUNITY
Start: 2022-11-28 | End: 2023-05-17 | Stop reason: HOSPADM

## 2023-03-27 NOTE — TELEPHONE ENCOUNTER
Outgoing call to patient for Cabometyx refill. Patient states he has 9 days remaining due to holding for a few days. Requests call back on 3/30 to set up refill

## 2023-03-27 NOTE — PATIENT INSTRUCTIONS
Pt seen today by: Dr. Deepa Manriquez    Home health and self care DRESSING INSTRUCTIONS:     Supplies ordered from [facility]    Will call if need antibiotics depending on culture results.  Get labs done that was ordered by MD arceo to return visit.  Toe x-ray done today        Wound location: Left great toe (plantar)    Dressings to be changed daily and as needed if soiled or not intact.     Cleanse wound with wound cleanser or saline  Apply calcium alginate silver to wound bed          Cover with exudry or abd pad, wrap with nik and secure with tape.       Compression with: N/A    Return visit: April 3rd, 2023 at 9am      Wound may have been debrided in clinic: if so, WHAT YOU NEED TO KNOW:    Debridement is the removal of infected, damaged, or dead tissue so a wound can heal properly. Your wound may need more than one debridement. Debridement can cause bleeding, and a small amount of blood is expected.  AFTER A DEBRIDEMENT:    Keep your wound clean and dry. Do not remove the dressing unless instructed.  Follow the wound care orders provided to you or your home health care provider.  If you have pain, take over the counter pain relievers or pain medication if prescribed.  Elevate the wound and limit excessive activity to prevent bleeding and/or swelling in your wound.  If you see blood coming through the dressing, apply gauze and tape over the dressing and hold firm pressure to the wound with your hand for 5-10 minutes continuously, without peeking, to help the bleeding stop.  Contact [facility] wound care team at [phone] or go to the nearest Emergency department if:    You have a fever greater than 101 taken by mouth.  Your pain gets worse or does not go away, even after taking your regular pain medicine.  Your skin around your wound is red, hot, swollen, or draining pus.  You have bleeding that continues to come through the dressing after holding pressure for 10 minutes       Compression: You  may be using a compressive type of dressings to control edema: If so, keep your compression wrap or tubigrip in place. Do not get them wet, they should feel snug. If they feel tight, or cause pain in any way,  elevate your legs above your heart for 15 minutes. If the wraps still cause pain or you can not tolerate compression,  please remove and notify the clinic or your home health.     Nutrition:  The current daily value (%DV) for protein is 50 grams per day and is meant as a general goal for most people. Further increasing your dietary protein intake is very important for wound healing. Typically one needs over 100g of protein per day to help with wound healing needs.  If you are a dialysis patient or have problems with your kidneys, talk to your Nephrologist about how much protein you can take in with your condition.  Examples of high protein items that can be added to your diet include: eggs, chicken, red meats, almonds, cottage cheese, Greek yogurt, beans, and peanut butter.  Fortified protein bars, shakes and drinks can add 15-30 additional grams of protein per serving.   Also add:   1 daily general multivitamin   Eligio : 1 packet twice daily   Vitamin C : 500mg twice daily   Zinc 220 mg daily  Vit D : once daily    Offloading   Offload your wound. This means to reduce pressure on and around the wound that reduces blood flow to the wound and prevents healing. Your wound care team will discuss specific ways for you to offload your specific wound. Common offloading strategies include:    Turn or reposition every 2 hours or sooner  Use special devices like boots and shoes to lift the wound off of hard surfaces  Padded dressings can reduce wound pressure        Call our [facility] wound clinic for questions/concerns a [phone] .

## 2023-03-27 NOTE — PROCEDURES
Debridement    Date/Time: 3/27/2023 8:00 AM  Performed by: Deepa Manriquez MD  Authorized by: Deepa Manriquez MD   Associated wounds:        Altered Skin Integrity 03/27/23 0854 Left plantar Toe, first #1 Diabetic Ulcer  Consent Done?:  Yes (Written)  Local anesthesia used?: Yes      Wound Details:    Location:  Left foot    Location:  Left 1st Toe    Type of Debridement:  Excisional       Length (cm):  1.2       Area (sq cm):  1.44       Width (cm):  1.2       Percent Debrided (%):  100       Depth (cm):  0.6       Total Area Debrided (sq cm):  1.44    Depth of debridement:  Subcutaneous tissue    Tissue debrided:  Dermis, Epidermis and Subcutaneous    Devitalized tissue debrided:  Biofilm, Slough and Callus    Instruments:  Curette  Bleeding:  Minimal  Hemostasis Achieved: Yes  Method Used:  Pressure  Patient tolerance:  Patient tolerated the procedure well with no immediate complications     Culture sent after debridement

## 2023-03-27 NOTE — PROGRESS NOTES
Subjective:       Patient ID: Ye Olivera is a 62 y.o. male.    Chief Complaint: No chief complaint on file.    NEW PATIENT    62-year-old WM battling stage IV kidney cancer since 2020 currently on Cabaometyx.  He recently saw his oncologist team, nurse practitioner Jackie Boyd on 3/16/23 who noted pt's chronic left great toe diabetic foot ulcer and how he was not being managed by anyone so she referred him to this clinic.  He comes in today for his first visit on 3/27/23.  Patient has a history of hypertension, diabetes, neuropathy, dyslipidemia, CKD and bradycardia.    Patient tells me he has had this diabetic foot ulcer for at least 2 years.  He states that it would wax and wane, close and open. He saw the wound care [facility] only twice , last time being over a year ago . Says ulcer has remained open since then but he failed to call them back. He does not do any form of local wound care, doesn't cover it, doesn't offload (has eliot and boot at home he says) and basically has just ignored it. He last saw PCP Jan 2023 but says they didn't discuss it (her note does not mention it; she does mention it on a note from Nov 2022).      His PCP : Dr Ly Quevedo    Past Medical History:  08/13/2013: Arthritis of knee  No date: Cancer      Comment:  stage IV renal cell carcinoma  No date: Cataract  No date: Coronary artery disease  No date: Diabetes mellitus, type 2  08/13/2013: HTN (hypertension)  No date: Hyperlipidemia  08/13/2013: Type II or unspecified type diabetes mellitus without   mention of complication, not stated as uncontrolled     Past Surgical History:  No date: APPENDECTOMY  No date: CATARACT EXTRACTION, BILATERAL    Nephrectomy 2020    SH: quit smoking 2015        Review of Systems   Constitutional: Negative.    HENT: Negative.     Respiratory: Negative.     Cardiovascular: Negative.    Gastrointestinal: Negative.    Musculoskeletal: Negative.    Skin:  Positive for  wound.   Neurological:  Positive for numbness. Negative for dizziness, seizures, syncope and facial asymmetry.       Objective:      Vitals:    03/27/23 0816   BP: 126/73   Pulse: (!) 51   Resp: 20   Temp: 97.5 °F (36.4 °C)     @poctglucose@  Recent Labs   Lab 03/27/23  0815   POCTGLUCOSE 126*     Physical Exam  Vitals reviewed.   Constitutional:       Appearance: He is obese.   HENT:      Head: Normocephalic and atraumatic.      Mouth/Throat:      Pharynx: Oropharynx is clear.   Cardiovascular:      Pulses:           Dorsalis pedis pulses are 2+ on the right side and 1+ on the left side.      Comments: Hair on legs bilaterally  Pulmonary:      Effort: Pulmonary effort is normal.   Abdominal:      Palpations: Abdomen is soft.   Musculoskeletal:      Right lower leg: No edema.      Left lower leg: No edema.        Feet:    Skin:     General: Skin is warm and dry.      Capillary Refill: Capillary refill takes less than 2 seconds.   Neurological:      General: No focal deficit present.      Mental Status: He is alert and oriented to person, place, and time. Mental status is at baseline.            Altered Skin Integrity 03/27/23 0854 Left plantar Toe, first #1 Diabetic Ulcer (Active)   03/27/23 0854   Altered Skin Integrity Present on Admission - Did Patient arrive to the hospital with altered skin?:    Side: Left   Orientation: plantar   Location: Toe, first   Wound Number: #1   Is this injury device related?: No   Primary Wound Type: Diabetic [facility]   Description of Altered Skin Integrity:    Ankle-Brachial Index: done on 03/27/2023  Lt. DP=1.19, PT=1.21    Rt. DP=1.14,  PT=1.22   Pulses: Palpable x 4, doppler Lt. dp=biphasic pt=triphasic    Rt=biphasic x 2   Removal Indication and Assessment:    Wound Outcome:    (Retired) Wound Length (cm):    (Retired) Wound Width (cm):    (Retired) Depth (cm):    Wound Description (Comments):    Removal Indications:    Wound Image    03/27/23 0900   Dressing Appearance Moist  drainage 03/27/23 0900   Drainage Amount Moderate 03/27/23 0900   Drainage Characteristics/Odor Serosanguineous;Malodorous 03/27/23 0900   Appearance Red;Yellow 03/27/23 0900   Black (%), Wound Tissue Color 0 % 03/27/23 0900   Red (%), Wound Tissue Color 80 % 03/27/23 0900   Yellow (%), Wound Tissue Color 20 % 03/27/23 0900   Periwound Area Intact;Macerated 03/27/23 0900   Wound Edges Callused 03/27/23 0900   Wound Length (cm) 0.8 cm 03/27/23 0900   Wound Width (cm) 0.9 cm 03/27/23 0900   Wound Depth (cm) 0.6 cm 03/27/23 0900   Wound Volume (cm^3) 0.432 cm^3 03/27/23 0900   Wound Surface Area (cm^2) 0.72 cm^2 03/27/23 0900   Undermining (depth (cm)/location) 0.7cm from 12-4 o'clock 03/27/23 0900   Care Cleansed with:;Wound cleanser;Applied: 03/27/23 0900   Dressing Applied;Calcium alginate;Silver;Absorptive Pad;Rolled gauze 03/27/23 0943           Assessment:       1. Skin ulcer of left great toe with fat layer exposed    2. Diabetic ulcer of left great toe    3. Self neglect    4. Clear cell carcinoma of right kidney    5. 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 Stage 3b chronic kidney disease    7. Other iron deficiency anemia    8. Benign essential hypertension    9. Diabetic mononeuropathy associated with diabetes mellitus due to underlying condition    10. Former tobacco use    11. Mixed hyperlipidemia          Chronic Left hallux U [facility] eat least 2021: first clinic visit 3/27/23  Diabetes with neuropathy, hba1c 5.9 jan 2023  Kidney cancer since 2020 , prior right nephrectomy, ongoing oral chemotherapy  Hypertension  Bradycardia  Dyslipidemia  Obesity, bmi 33  Self neglect of chronic wound  CKD    LABS from [facility]:   01/13/23 03/16/22 01/17/20 06/17/17 12/06/16    HEMOGLOBIN A1C 5.9         Total Bilirubin     ALT (SGPT) P5P     AST     Albumin     Alkaline Phosphatase     BUN, Bld  23    Calcium     Chloride     CO2 Total     Creatinine, Ser  1.76    Glucose, Bld  124    Potassium      Total Protein     Sodium 137 - 147 mmol/L 141    Anion Gap     GFR-QUYEN >=60 mL/min/1.73mSq 39    Resulting Agency  [facility]   Specimen Collected: 01/13/23 Last Resulted: 01/13/23          Hemoglobin  14.4    Hematocrit 42.0 - 52.0 % 44.5          Plan:     Plan of Care:    This is a new pt to this clinic being sent from oncology team to help treat a chronic left hallux DFU which began TWO years ago since at least 2021. Pt says he went to [facility] a couple of times but last time was over a year ago. He has neglected the wound which is rather unfortunate because wounds like this that go neglected for such a long period of time ultimately end up with an amputation because of chronic underlying osteomyelitis.  To further complicate his situation of being a diabetic with neuropathy is that he is getting chronic chemotherapy for his renal cancer.  He does not demonstrate signs of peripheral vascular disease.  I told him I will definitely do what I can to help salvage this toe but based on my experience it will be difficult to achieve.  I  will get an xray today to start with; most likely though will need MRI.  I will also order a sed rate and a CRP  I debrided it today (sharp, subcutaneous) and then sent culture; follow cx and treat accordingly; may need IV antibiotics  Wound Care Orders: dressings of  silver alginate to be applied to the wound daily  Offloading:  Key to healing as well.  Must offload the toe wound; he says he has a Darco shoe and/or walking boot at home but has not been using it and wears regular tennis shoes.  I encouraged him to initiate the offloading devices again as well as to bring them to me next week so I can see what they look like.  I also told him to purchase a rollng knee scooter,  Nutrition: Must have a high protein diet to support wound  healing as allowed by his underlying renal disease. Also rec MVI along with vit C, vit D, zinc and Eligio  Diabetes: must have a strict  diabetic diet, take meds   Return to clinic 1 week  Prognosis for healing guarded           The time spent including preparing to see the patient, obtaining patient history and assessment, evaluation of the plan of care, patient/caregiver counseling and education, orders, documentation, coordination of care, and other professional medical management activities for today's encounter was 65 minutes.    Time spent performing procedures during today's encounter was 10 minutes.

## 2023-03-28 NOTE — PROGRESS NOTES
Subjective:       Patient ID: Ye Olivera is a 62 y.o. male.    Urologist: Dr. Christopher Villasenor  PCP: Dr. Ly Quevedo  Nephrologist: Dr. Jenae Le    Renal Cell Carcinoma Stage IV([id number]) Mediastinal lymph nodes--Diagnosed 20  Biopsy/pathology:  Upper EUS done 9/3/20--large cluster of malignant appearing lymph nodes in subcarinal mediastinum causing extrinsic compression in middle 1/3 and thoracic esophagus biopsy c/w metastatic carcinoma, c/w renal cell carcinoma CD10+, PAX -8+, erythematous mucosa in antrum biopsy c/w mild chronic gastritis, inactive, normal duodenum, no pathology in CBD or left lobe of liver or pancreas. CARIS results- MSI stable, Mismatch repair status proficient. Tumor burden low. Negative for: PD-L1, NTRK1/2/3, BAP1, FH, MET, PBRM1, SDHA. Pathologic variants identified: KDMSC (Exon 13 and 11) and VHL. Immunohistochemistry results: MLH1, MSH2, MSH6, and PM52 positive. PD-L1 negative.   Surgery/pathology:  Right radical nephrectomy and lymph node dissection done 20--clear cell renal cell carcinoma measures 9.2X8C8.7cm, Grade 3, margins clear, tumor limited to kidney, 4/6 lymph nodes positive for metastatic clear cell carcinoma.   Imagin. CT A/P w/o contrast [facility] 20--bulky right renal mass, midpole, measuring 10cm with evidence of internal necrosis and calcifications, primary mass does not extend outside the perinephric space, associated right perinephric edema, multiple enlarged retroperitoneal lymph nodes at level of the kidneys and inferior retroperitoneum, one measures 2.9X1.4cm, additional node measures 2.1X1.8cm, additional node measures 1.8X1.2cm, inferior retroperitoneal node at level of inferior pole right kidney measures 1.8X1.4cm, no convincing evidence of renal vein involvement, no right adrenal gland involvement, no disease apparent outside of abdomen.  2. CT A/P w/ and w/o contrast [facility] 20--large right renal mass measures 10.6X9.5X7.4cm, with  enlarged retroperitoneal nodes, largest measures 2.7cm.  3. CT chest [facility] 8/28/20--solitary left supraclavicular node, small and not overtly concerning, heterogeneous retroesophageal mass at the level of the tariq c/w metastatic deposit, no other evidence for metastatic disease.  4. MRI brain w/ and w/o contrast [facility] 8/28/20--no evidence for intracranial metastatic disease.  5. CT C/A/P done at [facility] 10/28/20--now appears to be 2 adjacent masses at the level of the tariq, measuring in aggregate 6.2X2.7cm as compared with 5.2X2.1cm, subcentimeter mediastinal and hilar nodes are seen, large intermediate density structure at the right renal fossa extending to pelvic brim, most likely post-operative hematoma or seroma, resolution of previously seen retroperitoneal adenopathy.  6. CT C/A/P w&w/o at [facility] on 12/23/20--Interval enlargement of the retro-esophageal mass with mild interval increase in size of multiple paraesophageal and prevascular lymph nodes compatible with progression of disease. Interval decrease in size of the right retroperitoneal postop seroma or hematoma. No evidence of metastatic disease below the diaphragm. Prior right nephrectomy and adrenalectomy. Chololithiasis and stable atherosclerosis.   7. CT C/A/P w/o contrast done at [facility] on 4/5/21--interval decrease in size of multiple prevascular, subcarinal and para-esophageal lymph nodes c/w response to treatment, no new pulmonary nodule or lymph node, no evidence for metastatic disease below the diaphragm, interval decrease in size of the post-operative seromas adjacent to right psoas muscle.  8. CT C/A/P w/o contrast done at [facility] on 7/7/21--stable appearance of previously identified prevascular, subcarinal, and paraesophageal nodes, there are an increased number of sub-centimeter nodes in the aortopulmonary window and precarinal region, nonspecific in appearance, no other evidence of local recurrence or metastatic disease.   9. MRI cervical spine w/ and w/o  contrast done at [facility] 8/4/21--no metastatic disease, multilevel spondylotic changes, no acute abnormality.   10. MRI brain w/ and w/o contrast 8/4/21--normal maturation of the previously seen lacunar infarct at the left centrum semiovale, no new abnormalities, specifically, no evidence of intracranial metastatic disease.   11. CT C/A/P w/o contrast done at [facility] 10/22/21--stable reactive mediastinal adenopathy, interval resection of right kidney and adrenal gland with post-surgical changes but no evidence of metastatic disease.   12. CT C/A/P w/o contrast done at [facility] 1/26/22--4 new pulmonary nodules larges LLL 14mm, 9mm LLL, 5mm LLL, 5mm RML, with enlarging prevascular lymph node now measures 18mm, previous 8mm, concerning for metastatic disease, new mesenteric implant in LLQ measures 11mm, enlarging retroperitoneal LN now measures [id number], previous 14X8mm concerning for progression disease, cholelithiasis.   13. MRI brain w/ and w/o  contrast done at [facility] 3/31/22--no acute intracranial abnormality.   14. CT C/A/P w/o contrast done at [facility] 4/5/22--progressive retroperitoneal and mediastinal adenopathy, with development of hilar adenopathy, increase in size and number of pulmonary nodules.   15. CT done at [facility] showed large hematoma 14cm in bladder, results not available.  16. CT head w/o contrast done at [facility] 7/31/22--No intracranial hemorrhage or acute traumatic brain parenchymal injury evident by CT.     17. CT C/A/P w/o contrast done at [facility] 8/29/22--mixed response to treatment, worsening mediastinal adenopathy, stable pulmonary nodules, improved retroperitoneal lymph node, enlarging pericaval lymph node.  18. CT C/A/P w/o contrast at [facility] 11/18/22--Interval decreased in mediastinal adenopathy, pulmonary nodules and portacaval nodes, c/w treatment response.   19. CT C/A/P w/ contrast at [facility] 2/8/23--stable mediastinal lymph nodes, enlarging left lower lobe pulmonary nodule, enlarging retroperitoneal lymph nodes.      Treatment History:   1. IL2 started on 11/2/20. 2nd cycle started 11/16/20 - stopped after 7 days due to acute renal failure, volume overload and confusion. Stopped due to progression.  2. Keytruda/Inlyta 1/8/21--1/31/22 (stopped due to progression, autoimmune hepatitis).  3. Prednisone 80mg daily 1/31/22--weaned and stopped 4/14/22.    Hospitalization:   [facility] 7/2022 14cm hematoma s/p TURP causing hydronephrosis, JOE and patient required initiation of dialysis, UTI, Covid infection.     Treatment Plan:  Cabometyx 60mg daily started 4/7/22  Decrease dose to 40mg daily 4/28/22 due to side effects.  HOLD on 5/19/22 until groin rash and abscess heal/also underwent TURP on 6/23/22  Restarted 7/6/22--held 7/16/22 for hospitalization/bladder hematoma  Cabometyx 40mg daily restarted 9/1/22. On hold since 10/24/22 for hand/foot syndrome.   Restarted on 11/9/22, held 11/28/22 for hand-foot syndrome, foot wound.   Dose decreased to 20mg daily. Restarted on 1/5/23. Stopped on 3/17/23 due to diabetic foot wound.     Chief Complaint: Other Misc (Pt reports that he is still seeing wound care for his diabetic toe. Reports that he took a bad fall on Monday.)    HPI   Patient presents for follow-up of renal cell carcinoma. He is doing okay. He was referred to Dr. Manriquez for evaluation for the left great toe diabetic wound. Mentions he stopped the Cabometyx on 3/17/23 in preparation for the visit. Seen last week and underwent debridement on 3/27/23. Would was cultured and positive for many Enterobacter cloacae complex & many alcaligenes faecalis, prescribed Cipro because of his impaired renal function. Mentions he will need at least 1 month of antibiotics since he is renally dosed at once daily. Scheduled for MRI of foot today to rule out osteomyelitis. There is a possibility he will need amputation.   Also, he fell in the parking lot prior to the appointment at wound care but sustained on minor injuries, no fractures. Seen by  "Ortho today about the injury to his right knee which was injected then drained. Continues with peripheral neuropathy to lower extremities. Taking Lyrica TID. He reports that his BP has been good. He continues gaining weight. Bowels are moving well. No other problems reported.     Past Medical History:   Diagnosis Date    Arthritis of knee 08/13/2013    Cancer     stage IV renal cell carcinoma    Cataract     Coronary artery disease     Diabetes mellitus, type 2     HTN (hypertension) 08/13/2013    Hx of CABG     Hyperlipidemia     Type II or unspecified type diabetes mellitus without mention of complication, not stated as uncontrolled 08/13/2013      Review of patient's allergies indicates:  No Known Allergies     Current Outpatient Medications:     amLODIPine (NORVASC) 5 MG tablet, Take 5 mg by mouth every morning., Disp: , Rfl:     aspirin 81 mg Cap, Take 81 mg by mouth once daily at 6am., Disp: , Rfl:     atorvastatin (LIPITOR) 80 MG tablet, Take 80 mg by mouth once daily., Disp: , Rfl:     azelastine (ASTELIN) 137 mcg (0.1 %) nasal spray, SMARTSIG:Both Nares, Disp: , Rfl:     carvediloL (COREG) 12.5 MG tablet, Take 12.5 mg by mouth 2 (two) times daily., Disp: , Rfl:     ciprofloxacin HCl (CIPRO) 500 MG tablet, Take 1 tablet (500 mg total) by mouth once daily., Disp: 30 tablet, Rfl: 0    desvenlafaxine succinate (PRISTIQ) 100 MG Tb24, Take 100 mg by mouth., Disp: , Rfl:     finasteride (PROSCAR) 5 mg tablet, Take 5 mg by mouth as needed., Disp: , Rfl:     HYDROcodone-acetaminophen (NORCO) 5-325 mg per tablet, Take 1 tablet by mouth every 12 (twelve) hours as needed for Pain., Disp: 6 tablet, Rfl: 0    insulin glargine (LANTUS) 100 unit/mL injection, Inject into the skin. Takes 20units, Disp: , Rfl:     insulin syr/ndl U100 half warner (BD VEO INSULIN SYR, HALF UNIT,) 0.3 mL 31 gauge x 15/64" Syrg, USE 1 syringe DAILY, Disp: , Rfl:     lancets Misc, Use TID to check blood sugar. Dx: E11.9, patient is insulin " dependent, Disp: , Rfl:     multivitamin (THERAGRAN) per tablet, Take 1 tablet by mouth once daily., Disp: , Rfl:     ondansetron (ZOFRAN) 4 MG tablet, Take 4 mg by mouth every 6 (six) hours as needed., Disp: , Rfl:     pantoprazole (PROTONIX) 40 MG tablet, Take 1 tablet (40 mg total) by mouth once daily., Disp: 30 tablet, Rfl: 6    pregabalin (LYRICA) 100 MG capsule, Take 100 mg by mouth., Disp: , Rfl:     prochlorperazine (COMPAZINE) 5 MG tablet, Take 5 mg by mouth as needed., Disp: , Rfl:     QUEtiapine (SEROQUEL) 50 MG tablet, Take 150 mg by mouth every evening., Disp: , Rfl:     tamsulosin (FLOMAX) 0.4 mg Cap, Take 1 capsule (0.4 mg total) by mouth every evening., Disp: 30 capsule, Rfl: 6    tolterodine (DETROL LA) 4 MG 24 hr capsule, Take 4 mg by mouth once daily., Disp: , Rfl:     traMADoL (ULTRAM) 50 mg tablet, Take 50 mg by mouth every 6 (six) hours., Disp: , Rfl:     TRUE METRIX GLUCOSE TEST STRIP Strp, 3 (three) times daily., Disp: , Rfl:     amLODIPine (NORVASC) 5 MG tablet, Take 1 tablet by mouth every morning., Disp: , Rfl:     atorvastatin (LIPITOR) 80 MG tablet, Take 1 tablet by mouth every evening., Disp: , Rfl:     cabozantinib (CABOMETYX) 20 mg Tab, Take 1 tablet (20 mg) by mouth once daily. (Patient not taking: Reported on 4/6/2023.), Disp: 30 tablet, Rfl: 6    carvediloL (COREG) 12.5 MG tablet, Take 1 tablet by mouth 2 (two) times daily., Disp: , Rfl:     desvenlafaxine succinate (PRISTIQ) 50 MG Tb24, Take 100 mg by mouth once daily., Disp: , Rfl:     diphenoxylate-atropine 2.5-0.025 mg (LOMOTIL) 2.5-0.025 mg per tablet, Take 1 tablet by mouth as needed., Disp: , Rfl:     hydrOXYzine pamoate (VISTARIL) 25 MG Cap, Take 1 capsule (25 mg total) by mouth every 4 (four) hours as needed (itching). (Patient not taking: Reported on 4/6/2023), Disp: 30 capsule, Rfl: 4    hyoscyamine (LEVSIN/SL) 0.125 mg Subl, Place 0.125 mg under the tongue every 6 (six) hours as needed., Disp: , Rfl:      prochlorperazine (COMPAZINE) 5 MG tablet, Take 5 mg by mouth every 8 (eight) hours as needed., Disp: , Rfl:     QUEtiapine (SEROQUEL) 50 MG tablet, Take 3 tablets by mouth every evening., Disp: , Rfl:     sertraline (ZOLOFT) 50 MG tablet, Take 50 mg by mouth., Disp: , Rfl:     terbinafine HCL (LAMISIL) 250 mg tablet, Take 250 mg by mouth., Disp: , Rfl:     tuberculin,purif.prot.deriv. (TUBERSOL IDRM), Inject 0.1 mLs into the skin., Disp: , Rfl:   No current facility-administered medications for this visit.     Review of Systems   Constitutional:  Positive for fatigue. Negative for appetite change and unexpected weight change (weight gain).   HENT:  Negative for mouth sores.    Respiratory:  Negative for cough. Shortness of breath: chronic, no change.   Cardiovascular:  Negative for chest pain.   Gastrointestinal:  Negative for abdominal pain and diarrhea.   Genitourinary:  Negative for frequency.   Musculoskeletal:  Negative for back pain.   Integumentary:  Positive for wound.        Left great toe wound   Neurological:  Negative for headaches.   Hematological:  Negative for adenopathy.   Psychiatric/Behavioral:  The patient is not nervous/anxious.      Vitals:    04/06/23 1326   BP: 128/68   Pulse: 62   Resp: 14   Temp: 97.7 °F (36.5 °C)     Wt Readings from Last 3 Encounters:   04/06/23 1326 107.9 kg (237 lb 14.4 oz)   04/03/23 1031 106.6 kg (235 lb)   04/03/23 0906 104.3 kg (230 lb)     Physical Exam  Constitutional:       Appearance: Normal appearance. He is overweight.   HENT:      Head: Normocephalic and atraumatic.      Nose: Nose normal.   Eyes:      General: Lids are normal. Vision grossly intact.      Extraocular Movements: Extraocular movements intact.      Conjunctiva/sclera: Conjunctivae normal.   Cardiovascular:      Rate and Rhythm: Normal rate and regular rhythm.      Heart sounds: Normal heart sounds.      Comments: Bradycardia present, on Coreg  Pulmonary:      Effort: Pulmonary effort is normal.       Breath sounds: Normal breath sounds.   Chest:      Comments: HD access removed to right upper CW  Abdominal:      General: Abdomen is protuberant. Bowel sounds are normal. There is no distension.      Palpations: Abdomen is soft.      Tenderness: There is no abdominal tenderness.   Musculoskeletal:         General: Normal range of motion.      Cervical back: Neck supple.      Right lower leg: No edema.      Left lower leg: No edema.        Feet:    Feet:      Comments: Wound covered today  Skin:     General: Skin is warm and dry.      Comments: Multiple scabs and scrapes from fall earlier this week. Right knee with ace wrap   Neurological:      General: No focal deficit present.      Mental Status: He is alert and oriented to person, place, and time.   Psychiatric:         Attention and Perception: Attention normal.         Mood and Affect: Mood normal.         Speech: Speech normal.         Behavior: Behavior normal. Behavior is cooperative.       Outside labs at [facility] on 4/5/23: CBC with WBC 13.28, Hgb of 13.8/43.2, platelets 192, CMP with BUN 34 and creat 2.13, alk phos 124. Otherwise good.   Assessment:        1. Clear cell carcinoma of right kidney    2. Stage 3b chronic kidney disease    3. 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 Anemia in stage 3b chronic kidney disease    5. Other iron deficiency anemia          Plan:         Patient with large right renal mass and retroperitoneal adenopathy diagnosed by CT in 7/2020.  CT chest showed mediastinal fanny mass and biopsy done via EUS + for metastatic renal cell carcinoma.  Patient s/p radical right nephrectomy done 9/11/20. Pathology showed 9.2cm clear cell renal cell carcinoma, margins clear with 4/6 lymph nodes positive.  Post-operative course complicated by hematoma requiring blood transfusion.  CARIS results- MSI stable, Mismatch repair status proficient. Tumor burden low. Negative for: PD-L1, NTRK1/2/3, BAP1, FH, MET, PBRM1,  SDHA. Pathologic variants identified: KDMSC (Exon 13 and 11) and VHL. Immunohistochemistry results: MLH1, MSH2, MSH6, and PM52 positive. PD-L1 negative.     Referred to Dr. Haja Sal for evaluation for IL-2. Explained this is only treatment to be shown to achieve long-term remissions in stage IV disease and patient has limited disease at this time.  Patient started 1st 5 days of IL-2 on 11/2/20.   Started his 2nd round on 11/16/20 and it was stopped on 11/22/20 due to acute renal failure, volume overload and agitation.   CT scan on 12/23/20 at [facility] revealed progression of his disease. Dr. Sal contacted patient and discussed the results. He also reached out to Dr. Alvarado to notify her that the patient will not be returning.   Started Keytruda 200mg every 3 weeks on 1/8/21 + Inlyta 5mg oral BID on 1/11/21.  CT C/A/P done at [facility] on 10/22/21 showed stable mild reactive mediastinal nodes, otherwise stable.  Patient continued on treatment s/p cycle 18 Keytruda given 1/10/22.  Labs on 1/10/22 showed elevated LFTs. Inlyta held.  CT C/A/P done at [facility] on 1/26/22 showed unfortunately disease progression with new pulmonary nodules, enlargement in mediastinal node, new mesenteric nodule and retroperitoneal adenopathy.  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 Hepatitis panel negative. Keytruda stopped. Started on Prednisone 80mg daily on 1/31/22.  Patient had suicidal ideation and severe depression during visit in 3/2022, seen by psychiatry and meds adjusted, much improved.  CT C/A/P from [facility] on 4/5/22 showed progressive disease.  Treatment change recommended to Cabometyx 60mg daily per NCCN guidelines (Category 1) for subsequent treatment. Started on 4/7/22.  Prednisone weaned and stopped 4/14/22.  Patient was having multiple side effects (n/v, diarrhea, mouth sores) from Cabometyx.  Reduced dose to 40mg on 4/28/22. Held on 5/19/22 due to cellulitis/rashes/abscesses.      Continued to  hold s/p TURP on 6/23/22 and restarted 7/6/22.  Patient hospitalized at [facility] 7/16/22 for large bladder hematoma s/p clot evacuation X 2. Cabometyx stopped again.   Repeat CT C/A/P done at [facility] on 8/29/22 showed mixed findings, but mostly disease progression in fanny metastases in mediastinum, which is to be expected since he has been off treatment.   Restarted back on Cabometyx (lower dose 40mg) on 9/1/22 although it has not been studied in dialysis patients, but no dosage adjustment is usually required.  Held on 10/24/22 for Hand-foot syndrome, restarted 11/9/22.   CT done at [facility] on 11/18/22 with disease improvement.  Cabometyx held again for hand-foot syndrome and wound on left toe 11/28/22.  Restarted Cabometyx on 1/5/23 at 20mg daily.    CT C/A/P at [facility] done on 2/8/23 shows mixed findings, stable mediastinal lymph nodes, enlargement in LLL nodule, and enlargement in abdominal lymph nodes.  Given that the findings are mixed and that he has not been on Cabometyx consistently, Dr. Alvarado recommended to continue with current treatment and repeat CT in 3 months.     Referral sent to Dr. Manriquez for his left great toe non-healing wound. S/p debridement on 3/27/23. She saw him again on 4/3/23. Mentions he stopped the Cabometyx on 3/17/23 in preparation for the visit. Would was cultured and positive for many Enterobacter cloacae complex & many alcaligenes faecalis, prescribed Cipro because of his impaired renal function. Mentions he will need at least 1 month of antibiotics since he is renally dosed at once daily.   MRI of left foot today shows reactive marrow edema at the distal phalanx of the great toe with no osteomyelitis or abscess, shallow soft tissue ulceration at the plantar great toe at the level of the distal phalanx., mild degenerative arthrosis at the 4th tarsometatarsal articulation.  Patient would prefer to have amputation if this will speed up the process so he  can resume his chemotherapy treatments. Will speak with Dr. Manriquez to determine best plan of care.   Regardless, he will need to repeat CT scans in 4 weeks at [facility]. Will send orders.   Labs again in 3 weeks at [facility].   Follow-up in 4 weeks after scans compelted.     Looking ahead to future treatment options, would try again with immunotherapy, single agent Opdivo. He never really failed Keytruda, just developed autoimmune hepatitis, so this would need to be monitored closely.   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 Referred for genetic testing which was done on 3/16/23 to see if he would be a candidate for treatment with Welireg. Results pending.     Continue Compazine for nausea.   Continue Norco for joint pain.   Continue Vistaril for chronic pruritis.   All questions answered at this time.     Jackie Boyd, REYES

## 2023-03-29 RX ORDER — CIPROFLOXACIN 500 MG/1
500 TABLET ORAL DAILY
Qty: 30 TABLET | Refills: 0 | Status: SHIPPED | OUTPATIENT
Start: 2023-03-29 | End: 2023-04-28

## 2023-03-30 NOTE — TELEPHONE ENCOUNTER
Cabometyx is on hold until wound is healed. Patient has follow up with provider on 4/6 to reassess. Will pend refill call to 4/7. Patient reports 9 tablets on hand

## 2023-04-01 LAB
BACTERIA SPEC CULT: ABNORMAL
BACTERIA SPEC CULT: ABNORMAL

## 2023-04-03 ENCOUNTER — HOSPITAL ENCOUNTER (EMERGENCY)
Facility: HOSPITAL | Age: 63
Discharge: HOME | End: 2023-04-03
Attending: EMERGENCY MEDICINE
Payer: MEDICAID

## 2023-04-03 ENCOUNTER — HOSPITAL ENCOUNTER (OUTPATIENT)
Dept: WOUND CARE | Facility: HOSPITAL | Age: 63
Discharge: HOME | End: 2023-04-03
Attending: EMERGENCY MEDICINE
Payer: MEDICAID

## 2023-04-03 VITALS
TEMPERATURE: 98 F | BODY MASS INDEX: 33.64 KG/M2 | OXYGEN SATURATION: 98 % | SYSTOLIC BLOOD PRESSURE: 172 MMHG | HEART RATE: 52 BPM | DIASTOLIC BLOOD PRESSURE: 81 MMHG | RESPIRATION RATE: 18 BRPM | WEIGHT: 235 LBS | HEIGHT: 70 IN

## 2023-04-03 VITALS
HEIGHT: 70 IN | HEART RATE: 61 BPM | OXYGEN SATURATION: 93 % | RESPIRATION RATE: 20 BRPM | DIASTOLIC BLOOD PRESSURE: 86 MMHG | BODY MASS INDEX: 32.93 KG/M2 | WEIGHT: 230 LBS | TEMPERATURE: 98 F | SYSTOLIC BLOOD PRESSURE: 154 MMHG

## 2023-04-03 DIAGNOSIS — C64.1 CLEAR CELL CARCINOMA OF RIGHT KIDNEY: ICD-10-CM

## 2023-04-03 DIAGNOSIS — T07.XXXA ABRASIONS OF MULTIPLE SITES: ICD-10-CM

## 2023-04-03 DIAGNOSIS — N18.32 STAGE 3B CHRONIC KIDNEY DISEASE: ICD-10-CM

## 2023-04-03 DIAGNOSIS — C77.1 MALIGNANT NEOPLASM METASTATIC TO INTRATHORACIC LYMPH NODE: ICD-10-CM
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
## 2023-04-03 DIAGNOSIS — S50.311A ABRASION OF RIGHT ELBOW, INITIAL ENCOUNTER: ICD-10-CM

## 2023-04-03 DIAGNOSIS — E08.41 DIABETIC MONONEUROPATHY ASSOCIATED WITH DIABETES MELLITUS DUE TO UNDERLYING CONDITION: ICD-10-CM

## 2023-04-03 DIAGNOSIS — L97.529 DIABETIC ULCER OF LEFT GREAT TOE: ICD-10-CM

## 2023-04-03 DIAGNOSIS — E78.2 MIXED HYPERLIPIDEMIA: ICD-10-CM
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
## 2023-04-03 DIAGNOSIS — S80.01XA CONTUSION OF RIGHT KNEE, INITIAL ENCOUNTER: ICD-10-CM

## 2023-04-03 DIAGNOSIS — E11.621 DIABETIC ULCER OF LEFT GREAT TOE: ICD-10-CM

## 2023-04-03 DIAGNOSIS — L97.522 SKIN ULCER OF LEFT GREAT TOE WITH FAT LAYER EXPOSED: ICD-10-CM

## 2023-04-03 DIAGNOSIS — D50.8 OTHER IRON DEFICIENCY ANEMIA: ICD-10-CM

## 2023-04-03 DIAGNOSIS — R46.89 SELF NEGLECT: ICD-10-CM

## 2023-04-03 LAB — POCT GLUCOSE: 108 MG/DL (ref 70–110)

## 2023-04-03 PROCEDURE — 27000999 HC MEDICAL RECORD PHOTO DOCUMENTATION

## 2023-04-03 PROCEDURE — 11042 DBRDMT SUBQ TIS 1ST 20SQCM/<: CPT

## 2023-04-03 PROCEDURE — 99213 PR OFFICE/OUTPT VISIT, EST, LEVL III, 20-29 MIN: ICD-10-PCS | Mod: 25,,, | Performed by: EMERGENCY MEDICINE

## 2023-04-03 PROCEDURE — 99284 EMERGENCY DEPT VISIT MOD MDM: CPT | Mod: 25

## 2023-04-03 PROCEDURE — 11042 DBRDMT SUBQ TIS 1ST 20SQCM/<: CPT | Mod: ,,, | Performed by: EMERGENCY MEDICINE

## 2023-04-03 PROCEDURE — 99213 OFFICE O/P EST LOW 20 MIN: CPT | Mod: 25,,, | Performed by: EMERGENCY MEDICINE

## 2023-04-03 PROCEDURE — 11042 DEBRIDEMENT: ICD-10-PCS | Mod: ,,, | Performed by: EMERGENCY MEDICINE

## 2023-04-03 RX ORDER — HYDROCODONE BITARTRATE AND ACETAMINOPHEN 5; 325 MG/1; MG/1
1 TABLET ORAL EVERY 12 HOURS PRN
Qty: 6 TABLET | Refills: 0 | Status: SHIPPED | OUTPATIENT
Start: 2023-04-03 | End: 2023-04-06

## 2023-04-03 NOTE — FIRST PROVIDER EVALUATION
"Medical screening examination initiated.  I have conducted a focused provider triage encounter, findings are as follows:    Brief history of present illness:  62-year-old male presents to ED for evaluation after trip and fall in parking lot today.  Patient was leaving wound care when tripped and fell using his roller scooter hitting his head and face.  Complains of right forearm in right knee pain.  Denies loss of consciousness or use of blood thinners    Vitals:    04/03/23 1031   BP: (!) 172/81   Pulse: (!) 52   Resp: 18   Temp: 98.3 °F (36.8 °C)   SpO2: 98%   Weight: 106.6 kg (235 lb)   Height: 5' 10" (1.778 m)       Pertinent physical exam:  Awake alert and oriented sitting in wheelchair    Brief workup plan:  CT head and face, x-ray forearm knee    Preliminary workup initiated; this workup will be continued and followed by the physician or advanced practice provider that is assigned to the patient when roomed.  "

## 2023-04-03 NOTE — DISCHARGE INSTRUCTIONS
Keep wounds clean with mild soap and water. Apply triple antibiotic ointment to the abrasions and apply a nonstick dressing to the elbow/forearm area. Ice to knee and forearm for help with swelling from fall.     May take tylenol for pain as needed. May take norco for more severe pain if needed. Do not take and drive.

## 2023-04-03 NOTE — ED PROVIDER NOTES
Encounter Date: 4/3/2023       History     Chief Complaint   Patient presents with    Fall     Pt fell in parking lot. Neg loc, neg blood thinners. Pt has right FA pain and right knee pain. Pt states he hit face on ground however no external markings noted.      63 y/o male who presents with falling while using his scooter in the parking lot. States he got tangled in it and then fell. States he think his hat helped break his fall but unsure if he hit his head. No obvious contusion. Right elbow/forearm contusion/pain. Right knee contusion. He was on his way to wound care.     The history is provided by the patient. No  was used.   Fall  The accident occurred 2 to 3 hours ago. The fall occurred while walking.   Review of patient's allergies indicates:  No Known Allergies  Past Medical History:   Diagnosis Date    Arthritis of knee 08/13/2013    Cancer     stage IV renal cell carcinoma    Cataract     Coronary artery disease     Diabetes mellitus, type 2     HTN (hypertension) 08/13/2013    Hx of CABG     Hyperlipidemia     Type II or unspecified type diabetes mellitus without mention of complication, not stated as uncontrolled 08/13/2013     Past Surgical History:   Procedure Laterality Date    APPENDECTOMY      CATARACT EXTRACTION, BILATERAL       Family History   Problem Relation Age of Onset    Heart disease Mother     Heart disease Father     Heart disease Sister     Breast cancer Other 47    Diabetes Neg Hx     Cancer Neg Hx      Social History     Tobacco Use    Smoking status: Former    Smokeless tobacco: Never     Review of Systems   Musculoskeletal:  Positive for joint swelling.   All other systems reviewed and are negative.    Physical Exam     Initial Vitals [04/03/23 1031]   BP Pulse Resp Temp SpO2   (!) 172/81 (!) 52 18 98.3 °F (36.8 °C) 98 %      MAP       --         Physical Exam    Nursing note and vitals reviewed.  Constitutional: He appears well-developed and well-nourished.    Cardiovascular:  Normal rate, regular rhythm and intact distal pulses.           Pulmonary/Chest: Breath sounds normal. No respiratory distress.   Musculoskeletal:      Right elbow: No swelling or deformity. Normal range of motion. Tenderness present.      Right knee: No swelling, deformity, effusion, erythema or ecchymosis. Normal range of motion. Tenderness present.      Comments: Superficial skin tears/abrasions to right elbow and forearm.     +abrasion to right knee     Neurological: He is alert and oriented to person, place, and time.   Skin: Skin is warm and dry.   Psychiatric: He has a normal mood and affect.       ED Course   Procedures  Labs Reviewed - No data to display       Imaging Results              CT Maxillofacial Without Contrast (Final result)  Result time 04/03/23 11:23:16      Final result by Bety Nagy MD (04/03/23 11:23:16)                   Impression:      No acute fracture identified.      Electronically signed by: Bety Nagy  Date:    04/03/2023  Time:    11:23               Narrative:    EXAMINATION:  CT MAXILLOFACIAL WITHOUT CONTRAST    CLINICAL HISTORY:  Facial trauma, blunt;    TECHNIQUE:  Volumetric CT acquisition of the facial bones without contrast. Axial, coronal and sagittal reconstructions.    Automatic exposure control was utilized to limit radiation dose.    DLP: 1623 mGy-cm    COMPARISON:  None    FINDINGS:  There is no acute fracture identified.  There is a small right maxillary sinus mucosal retention cyst.  The mastoid air cells are clear.    The orbits and soft tissues are unremarkable.                                       CT Head Without Contrast (Final result)  Result time 04/03/23 11:21:29      Final result by Bety Nagy MD (04/03/23 11:21:29)                   Impression:      No acute intracranial abnormality.      Electronically signed by: Bety Nagy  Date:    04/03/2023  Time:    11:21               Narrative:    EXAMINATION:  CT  HEAD WITHOUT CONTRAST    CLINICAL HISTORY:  Head trauma, moderate-severe;    TECHNIQUE:  Axial scans were obtained from skull base to the vertex.    Coronal and sagittal reconstructions obtained from the axial data.    Automatic exposure control was utilized to limit radiation dose.    Contrast: None    Radiation Dose:    Total DLP: 1623 mGy*cm    COMPARISON:  None    FINDINGS:  There is no acute intracranial hemorrhage or edema. The gray-white matter differentiation is preserved.    There is no mass effect or midline shift. The ventricles and sulci are normal in size. The basal cisterns are patent. There is no abnormal extra-axial fluid collection.    The calvarium and skull base are intact.  There is a right maxillary sinus mucosal retention cyst.                                       X-Ray Knee Complete 4 Or More Views Right (Final result)  Result time 04/03/23 11:11:12      Final result by Bety Nagy MD (04/03/23 11:11:12)                   Impression:      No acute bony abnormality.      Electronically signed by: Bety Nagy  Date:    04/03/2023  Time:    11:11               Narrative:    EXAMINATION:  XR KNEE COMP 4 OR MORE VIEWS RIGHT    CLINICAL HISTORY:  fall, knee pain;    COMPARISON:  None.    FINDINGS:  There is no acute fracture or malalignment.  There are tricompartmental osteophytes.  There is no knee joint effusion.  Vascular calcifications are noted.                                       X-Ray Forearm Right (Final result)  Result time 04/03/23 11:06:40      Final result by Bety Nagy MD (04/03/23 11:06:40)                   Impression:      No acute bony abnormality.      Electronically signed by: Bety Nagy  Date:    04/03/2023  Time:    11:06               Narrative:    EXAMINATION:  XR FOREARM RIGHT    CLINICAL HISTORY:  Unspecified fall, initial encounter    COMPARISON:  None.    FINDINGS:  There is no definite acute fracture or malalignment.  The soft tissues are  unremarkable.                                       Medications - No data to display  Medical Decision Making:   Differential Diagnosis:   Includes but not limited to head bleed, knee contusion, right elbow contusion, elbow fracture  Clinical Tests:   Radiological Study: Ordered and Reviewed  ED Management:  63 y/o male who presents with being on his scooter when he got tangled and tripped and fell over scooter. Has right elbow contusion/abrasions, right knee contusion/abrasion. He has no obvious contusions to face/head. Imaging all neg. Wrapped right elbow/forearm abrasions. Will send short course of pain meds to pharmacy. Discussed cleaining wounds with soap and water twice daily and triple antibiotic ointment.                         Clinical Impression:   Final diagnoses:  [W19.XXXA] Fall  [W19.XXXA] Fall, initial encounter (Primary)  [S80.01XA] Contusion of right knee, initial encounter  [S50.311A] Abrasion of right elbow, initial encounter        ED Disposition Condition    Discharge Stable          ED Prescriptions       Medication Sig Dispense Start Date End Date Auth. Provider    HYDROcodone-acetaminophen (NORCO) 5-325 mg per tablet Take 1 tablet by mouth every 12 (twelve) hours as needed for Pain. 6 tablet 4/3/2023 4/6/2023 DESIRE Briceño          Follow-up Information       Follow up With Specialties Details Why Contact Info    Ly Quevedo MD Internal Medicine Call in 1 week As needed, If symptoms worsen [address] [phone]               DESIRE Briceño  04/03/23 3388

## 2023-04-03 NOTE — PATIENT INSTRUCTIONS
Pt seen today by: Dr. Deepa Manriquez    Home health and self care DRESSING INSTRUCTIONS:     [facility]    Continue Cipro as directed.      MRI  scheduled for Thursday, April 6th, 2023 at 11:30    at [facility]                                                                                                [address], [address]                                                                                                 [place] [address]      Wound location: Left great toe (plantar)    Dressings to be changed daily and as needed if soiled or not intact.     Cleanse wound with wound cleanser or saline  Apply calcium alginate silver to wound bed          Cover with exudry or abd pad, wrap with nik and secure with tape.    Right arm skin tear:    Cleanse daily with soap and water  Apply adaptic  Wrap with kerlix roll gauze       Compression with: N/A    Return visit: April 11th, 2023 at 9am      Wound may have been debrided in clinic: if so, WHAT YOU NEED TO KNOW:    Debridement is the removal of infected, damaged, or dead tissue so a wound can heal properly. Your wound may need more than one debridement. Debridement can cause bleeding, and a small amount of blood is expected.  AFTER A DEBRIDEMENT:    Keep your wound clean and dry. Do not remove the dressing unless instructed.  Follow the wound care orders provided to you or your home health care provider.  If you have pain, take over the counter pain relievers or pain medication if prescribed.  Elevate the wound and limit excessive activity to prevent bleeding and/or swelling in your wound.  If you see blood coming through the dressing, apply gauze and tape over the dressing and hold firm pressure to the wound with your hand for 5-10 minutes continuously, without peeking, to help the bleeding stop.  Contact [facility] wound care team at [phone] or go to the nearest Emergency department  if:    You have a fever greater than 101 taken by mouth.  Your pain gets worse or does not go away, even after taking your regular pain medicine.  Your skin around your wound is red, hot, swollen, or draining pus.  You have bleeding that continues to come through the dressing after holding pressure for 10 minutes       Compression: You may be using a compressive type of dressings to control edema: If so, keep your compression wrap or tubigrip in place. Do not get them wet, they should feel snug. If they feel tight, or cause pain in any way,  elevate your legs above your heart for 15 minutes. If the wraps still cause pain or you can not tolerate compression,  please remove and notify the clinic or your home health.     Nutrition:  The current daily value (%DV) for protein is 50 grams per day and is meant as a general goal for most people. Further increasing your dietary protein intake is very important for wound healing. Typically one needs over 100g of protein per day to help with wound healing needs.  If you are a dialysis patient or have problems with your kidneys, talk to your Nephrologist about how much protein you can take in with your condition.  Examples of high protein items that can be added to your diet include: eggs, chicken, red meats, almonds, cottage cheese, Greek yogurt, beans, and peanut butter.  Fortified protein bars, shakes and drinks can add 15-30 additional grams of protein per serving.   Also add:   1 daily general multivitamin   Eligio : 1 packet twice daily   Vitamin C : 500mg twice daily   Zinc 220 mg daily  Vit D : once daily    Offloading   Offload your wound. This means to reduce pressure on and around the wound that reduces blood flow to the wound and prevents healing. Your wound care team will discuss specific ways for you to offload your specific wound. Common offloading strategies include:    Turn or reposition every 2 hours or sooner  Use special devices like boots and shoes to lift  the wound off of hard surfaces  Padded dressings can reduce wound pressure        Call our [facility] wound clinic for questions/concerns a [phone] .

## 2023-04-03 NOTE — PROCEDURES
"Debridement    Date/Time: 4/3/2023 8:55 AM  Performed by: Deepa Manriquez MD  Authorized by: Deepa Manriquez MD   Associated wounds:        Altered Skin Integrity 03/27/23 0854 Left plantar Toe, first #1 Diabetic Ulcer  Time out: Immediately prior to procedure a "time out" was called to verify the correct patient, procedure, equipment, support staff and site/side marked as required.    Consent Done?:  Yes (Written)  Local anesthesia used?: Yes    Local anesthetic:  Topical anesthetic    Wound Details:    Location:  Left foot    Location:  Left 1st Toe    Type of Debridement:  Excisional       Length (cm):  1       Area (sq cm):  0.8       Width (cm):  0.8       Percent Debrided (%):  100       Depth (cm):  0.4       Total Area Debrided (sq cm):  0.8    Depth of debridement:  Subcutaneous tissue    Tissue debrided:  Dermis, Epidermis and Subcutaneous    Devitalized tissue debrided:  Biofilm and Slough    Instruments:  Curette  Bleeding:  Moderate  Hemostasis Achieved: Yes  Method Used:  Silver Nitrate and Pressure  Patient tolerance:  Patient tolerated the procedure well with no immediate complications  "

## 2023-04-03 NOTE — PROGRESS NOTES
Subjective:       Patient ID: Ye Olivera is a 62 y.o. male.    Chief Complaint: Diabetic Foot Ulcer      62-year-old WM battling stage IV kidney cancer since 2020..  He recently saw his oncologist team, NP Jackie Boyd on 3/16/23 who noted pt's chronic left great toe volar diabetic foot ulcer and how he was not being managed by anyone so she referred him to this clinic. He was on Cabometyx for the cancer. He also has a history of hypertension, diabetes, neuropathy, dyslipidemia, CKD and bradycardia.    I  met him for first time on  3/27/23 where he told me he has had this DFU for at least 2 years.  He reported it would wax and wane, close and open. He saw the wound care clinic at [facility] only twice , last time being over a year ago . Says ulcer has remained open since then but he failed to call them back or do any form of local wound care or even cover it, really just ignoring it.     He last saw PCP Dr Quevedo in Jan 2023 but says they didn't discuss it (her note does not mention it; she does mention it on a note from Nov 2022).    I noted a deep chronic volar left great toe ulcer down to bone level although not overtly exposed , with odor, maceration and undermining. I told him he most likely has chronic osteomyelitis and because of the chronicity of this ulcer, would most likely end up with amputation of the toe. I ordered xray with plans for MRI and a culture.  Cx + Many Enterobacter cloacae complex & many alcaligenes faecalis. I had called in cipro and prescribed only daily instead of BID to be precautious of CKD  He comes in today on 4/3/23 for recheck . He got a rolling knee scooter : reports he fell of it here in parking lot. He fell forward: scraped RUE and hit face on ground. He says nothing feels broken. Feels a little nauseous. No neck/backpain, no focal weakness , numbness or paresthesias      Review of Systems   Constitutional: Negative.    HENT: Negative.     Respiratory:  Negative.     Cardiovascular: Negative.    Gastrointestinal:  Positive for nausea.   Musculoskeletal: Negative.    Skin:  Positive for wound.   Neurological:  Positive for numbness. Negative for dizziness, seizures, syncope and facial asymmetry.       Objective:      Vitals:    04/03/23 0906   BP: (!) 154/86   Pulse: 61   Resp: 20   Temp: 98.1 °F (36.7 °C)     @poctglucose@  Recent Labs   Lab 04/03/23  0912   POCTGLUCOSE 108     Physical Exam  Vitals reviewed.   Constitutional:       Appearance: He is obese.   HENT:      Head: Normocephalic and atraumatic.      Mouth/Throat:      Pharynx: Oropharynx is clear.   Cardiovascular:      Pulses:           Dorsalis pedis pulses are 2+ on the right side and 1+ on the left side.      Comments: Hair on legs bilaterally  Pulmonary:      Effort: Pulmonary effort is normal.   Abdominal:      Palpations: Abdomen is soft.   Musculoskeletal:        Arms:       Right lower leg: No edema.      Left lower leg: No edema.        Feet:       Comments: Abrasions RUE forearm   Skin:     General: Skin is warm and dry.      Capillary Refill: Capillary refill takes less than 2 seconds.   Neurological:      General: No focal deficit present.      Mental Status: He is alert and oriented to person, place, and time. Mental status is at baseline.            Altered Skin Integrity 03/27/23 0854 Left plantar Toe, first #1 Diabetic Ulcer (Active)   03/27/23 0854   Altered Skin Integrity Present on Admission - Did Patient arrive to the hospital with altered skin?:    Side: Left   Orientation: plantar   Location: Toe, first   Wound Number: #1   Is this injury device related?: No   Primary Wound Type: Diabetic ulc   Description of Altered Skin Integrity:    Ankle-Brachial Index: done on 03/27/2023  Lt. DP=1.19, PT=1.21    Rt. DP=1.14,  PT=1.22   Pulses: Palpable x 4, doppler Lt. dp=triphasic pt=biphasic    Rt=biphasic x 2   Removal Indication and Assessment:    Wound Outcome:    (Retired) Wound Length  (cm):    (Retired) Wound Width (cm):    (Retired) Depth (cm):    Wound Description (Comments):    Removal Indications:    Wound Image   04/03/23 0936   Description of Altered Skin Integrity Full thickness tissue loss. Subcutaneous fat may be visible but bone, tendon or muscle are not exposed 04/03/23 0936   Dressing Appearance Moist drainage 04/03/23 0936   Drainage Amount Small 04/03/23 0936   Drainage Characteristics/Odor Serosanguineous 04/03/23 0936   Appearance Pink;White 04/03/23 0936   Black (%), Wound Tissue Color 0 % 04/03/23 0936   Red (%), Wound Tissue Color 10 % 04/03/23 0936   Yellow (%), Wound Tissue Color 90 % 04/03/23 0936   Periwound Area Intact 04/03/23 0936   Wound Edges Callused 04/03/23 0936   Wound Length (cm) 1 cm 04/03/23 0936   Wound Width (cm) 0.8 cm 04/03/23 0936   Wound Depth (cm) 0.4 cm 04/03/23 0936   Wound Volume (cm^3) 0.32 cm^3 04/03/23 0936   Wound Surface Area (cm^2) 0.8 cm^2 04/03/23 0936   Care Cleansed with:;Wound cleanser;Applied: 04/03/23 0936            Altered Skin Integrity 04/03/23 0920 Right Elbow #2 Skin Tear Partial thickness tissue loss. Shallow open ulcer with a red or pink wound bed, without slough. Intact or Open/Ruptured Serum-filled blister. (Active)   04/03/23 0920   Altered Skin Integrity Present on Admission - Did Patient arrive to the hospital with altered skin?:    Side: Right   Orientation:    Location: Elbow   Wound Number: #2   Is this injury device related?: No   Primary Wound Type: Skin tear   Description of Altered Skin Integrity: Partial thickness tissue loss. Shallow open ulcer with a red or pink wound bed, without slough. Intact or Open/Ruptured Serum-filled blister.   Ankle-Brachial Index:    Pulses:    Removal Indication and Assessment:    Wound Outcome:    (Retired) Wound Length (cm):    (Retired) Wound Width (cm):    (Retired) Depth (cm):    Wound Description (Comments):    Removal Indications:    Wound Image   04/03/23 0930   Dressing Appearance  Open to air 04/03/23 0930   Drainage Amount Small 04/03/23 0930   Drainage Characteristics/Odor Sanguineous 04/03/23 0930   Appearance Red;Moist;Epithelialization 04/03/23 0930   Tissue loss description Partial thickness 04/03/23 0930   Black (%), Wound Tissue Color 0 % 04/03/23 0930   Red (%), Wound Tissue Color 100 % 04/03/23 0930   Yellow (%), Wound Tissue Color 0 % 04/03/23 0930   Periwound Area Intact 04/03/23 0930   Wound Length (cm) 9 cm 04/03/23 0930   Wound Width (cm) 1.8 cm 04/03/23 0930   Wound Depth (cm) 0.1 cm 04/03/23 0930   Wound Volume (cm^3) 1.62 cm^3 04/03/23 0930   Wound Surface Area (cm^2) 16.2 cm^2 04/03/23 0930   Care Cleansed with:;Wound cleanser;Applied: 04/03/23 0930            Altered Skin Integrity 04/03/23 0930 Right posterior Arm #3 Skin Tear Partial thickness tissue loss. Shallow open ulcer with a red or pink wound bed, without slough. Intact or Open/Ruptured Serum-filled blister. (Active)   04/03/23 0930   Altered Skin Integrity Present on Admission - Did Patient arrive to the hospital with altered skin?:    Side: Right   Orientation: posterior   Location: Arm   Wound Number: #3   Is this injury device related?: No   Primary Wound Type: Skin tear   Description of Altered Skin Integrity: Partial thickness tissue loss. Shallow open ulcer with a red or pink wound bed, without slough. Intact or Open/Ruptured Serum-filled blister.   Ankle-Brachial Index:    Pulses:    Removal Indication and Assessment:    Wound Outcome:    (Retired) Wound Length (cm):    (Retired) Wound Width (cm):    (Retired) Depth (cm):    Wound Description (Comments):    Removal Indications:    Description of Altered Skin Integrity Partial thickness tissue loss. Shallow open ulcer with a red or pink wound bed, without slough. Intact or Open/Ruptured Serum-filled blister. 04/03/23 1048   Dressing Appearance Open to air 04/03/23 1048   Drainage Amount Small 04/03/23 1048   Drainage Characteristics/Odor Sanguineous  04/03/23 1048   Appearance Pink;Red;Epithelialization;Maroon 04/03/23 1048   Black (%), Wound Tissue Color 0 % 04/03/23 1048   Red (%), Wound Tissue Color 100 % 04/03/23 1048   Yellow (%), Wound Tissue Color 0 % 04/03/23 1048   Periwound Area Intact;Ecchymotic 04/03/23 1048   Wound Edges Defined 04/03/23 1048   Wound Length (cm) 1.1 cm 04/03/23 1048   Wound Width (cm) 1.4 cm 04/03/23 1048   Wound Depth (cm) 0.1 cm 04/03/23 1048   Wound Volume (cm^3) 0.154 cm^3 04/03/23 1048   Wound Surface Area (cm^2) 1.54 cm^2 04/03/23 1048   Care Cleansed with:;Wound cleanser;Applied: 04/03/23 1048           Assessment:       1. Skin ulcer of left great toe with fat layer exposed    2. Diabetic ulcer of left great toe    3. Diabetic mononeuropathy associated with diabetes mellitus due to underlying condition    4. Self neglect    5. Clear cell carcinoma of right kidney    6. 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 Stage 3b chronic kidney disease    8. Other iron deficiency anemia    9. Mixed hyperlipidemia    10. Fall, initial encounter    11. Abrasions of multiple sites          Chronic Left hallux DFU since at least 2021: first clinic visit 3/27/23  3/27/23 cx Many Enterobacter cloacae complex & many alcaligenes faecalis  Diabetes with neuropathy, hba1c 5.9 jan 2023  Kidney cancer since 2020 , prior right nephrectomy, ongoing oral chemotherapy  Hypertension  Bradycardia  Dyslipidemia  Obesity, bmi 33  Self neglect of chronic wound  CKD  Trip/fall on rolling knee scooter on 4/3/23: abrasions to RUE    LABS from [facility]:   01/13/23 03/16/22 01/17/20 06/17/17 12/06/16    HEMOGLOBIN A1C 5.9         Total Bilirubin     ALT (SGPT) P5P     AST     Albumin     Alkaline Phosphatase     BUN, Bld  23    Calcium     Chloride     CO2 Total     Creatinine, Ser  1.76    Glucose, Bld  124    Potassium     Total Protein     Sodium 137 - 147 mmol/L 141    Anion Gap     GFR-QUYEN >=60 mL/min/1.73mSq 39    Resulting Agency   [facility]   Specimen Collected: 01/13/23 Last Resulted: 01/13/23          Hemoglobin  14.4    Hematocrit 42.0 - 52.0 % 44.5      3/27/23 tissue cx    Aerobic Culture - Tissue Many Enterobacter cloacae complex Abnormal        Many Alcaligenes faecalis ssp faecalis Abnormal             Resulting Agency: [facility]     Susceptibility     Enterobacter cloacae complex Alcaligenes faecalis ssp faecalis     Not Specified Not Specified     Amox/K Clav >=32  Resistant       Cefazolin >=64  Resistant >=64  Resistant     Cefepime <=0.12  Sensitive       Cefuroxime 16  Resistant       Ciprofloxacin <=0.25  Sensitive <=0.25  Sensitive     Gentamicin <=1  Sensitive <=1  Sensitive     Levofloxacin <=0.12  Sensitive 0.25  Sensitive     Meropenem <=0.25  Sensitive       Piperacillin/Tazobactam 8  Sensitive <=4  Sensitive     Tetracycline 2  Sensitive 4  Sensitive     Tobramycin <=1  Sensitive 2  Sensitive     Trimethoprim/Sulfamethoxazole <=20  Sensitive <=20  Sensitive                                XR TOE 2 OR MORE VIEWS LEFT     FINDINGS:  There is no acute fracture or malalignment.  There are no destructive bone changes.  The soft tissues are unremarkable.     Impression:   No acute bony abnormality.      Electronically signed by: Bety Nagy  Date:                                            03/27/2023  Time:                                           10:43  Plan:     Plan of Care:    Ulcer debrided  I reviewed xray and cx: already sent in cipro daily instead of bid considering CKD > I have already ordered MRI left foot for osteo check and it is scheduled for this week.  He got a new rolling knee scooter: had a fall in parking lot when he got here for this visits. He scraped up RUE. He feels a bit woozy and nauseated so I will send to ED for eval   Wound Care Orders: dressings of  silver alginate to be applied to the wound daily  Offloading:  Key to healing as well.  Must offload the toe wound; Darco shoe and/or  walking boot at home ; rollng knee scooter but needs to be careful   Nutrition: Must have a high protein diet to support wound  healing as allowed by his underlying renal disease. Also rec MVI along with vit C, vit D, zinc and Eligio  Diabetes: must have a strict diabetic diet, take meds   Return to clinic 1 week  Prognosis for healing guarded           The time spent including preparing to see the patient, obtaining patient history and assessment, evaluation of the plan of care, patient/caregiver counseling and education, orders, documentation, coordination of care, and other professional medical management activities for today's encounter was 25 minutes.    Time spent performing procedures during today's encounter was 10 minutes.

## 2023-04-06 ENCOUNTER — OFFICE VISIT (OUTPATIENT)
Dept: HEMATOLOGY/ONCOLOGY | Facility: CLINIC | Age: 63
End: 2023-04-06
Payer: MEDICAID

## 2023-04-06 VITALS
WEIGHT: 237.88 LBS | RESPIRATION RATE: 14 BRPM | HEIGHT: 70 IN | DIASTOLIC BLOOD PRESSURE: 68 MMHG | TEMPERATURE: 98 F | SYSTOLIC BLOOD PRESSURE: 128 MMHG | HEART RATE: 62 BPM | BODY MASS INDEX: 34.06 KG/M2 | OXYGEN SATURATION: 97 %

## 2023-04-06 DIAGNOSIS — E08.621 DIABETIC ULCER OF TOE OF RIGHT FOOT ASSOCIATED WITH DIABETES MELLITUS DUE TO UNDERLYING CONDITION, WITH FAT LAYER EXPOSED: Primary | ICD-10-CM

## 2023-04-06 DIAGNOSIS — L97.512 DIABETIC ULCER OF TOE OF RIGHT FOOT ASSOCIATED WITH DIABETES MELLITUS DUE TO UNDERLYING CONDITION, WITH FAT LAYER EXPOSED: Primary | ICD-10-CM

## 2023-04-06 DIAGNOSIS — C64.1 CLEAR CELL CARCINOMA OF RIGHT KIDNEY: Primary | ICD-10-CM

## 2023-04-06 DIAGNOSIS — N18.32 ANEMIA IN STAGE 3B CHRONIC KIDNEY DISEASE: ICD-10-CM

## 2023-04-06 DIAGNOSIS — N18.32 STAGE 3B CHRONIC KIDNEY DISEASE: ICD-10-CM

## 2023-04-06 DIAGNOSIS — D50.8 OTHER IRON DEFICIENCY ANEMIA: ICD-10-CM

## 2023-04-06 DIAGNOSIS — C77.1 MALIGNANT NEOPLASM METASTATIC TO INTRATHORACIC LYMPH NODE: ICD-10-CM

## 2023-04-06 DIAGNOSIS — D63.1 ANEMIA IN STAGE 3B CHRONIC KIDNEY DISEASE: ICD-10-CM

## 2023-04-06 PROCEDURE — 3078F PR MOST RECENT DIASTOLIC BLOOD PRESSURE < 80 MM HG: ICD-10-PCS | Mod: CPTII,,, | Performed by: NURSE PRACTITIONER

## 2023-04-06 PROCEDURE — 99215 OFFICE O/P EST HI 40 MIN: CPT | Mod: PBBFAC | Performed by: NURSE PRACTITIONER

## 2023-04-06 PROCEDURE — 99215 OFFICE O/P EST HI 40 MIN: CPT | Mod: S$PBB,,, | Performed by: NURSE PRACTITIONER

## 2023-04-06 PROCEDURE — 1160F PR REVIEW ALL MEDS BY PRESCRIBER/CLIN PHARMACIST DOCUMENTED: ICD-10-PCS | Mod: CPTII,,, | Performed by: NURSE PRACTITIONER

## 2023-04-06 PROCEDURE — 3008F PR BODY MASS INDEX (BMI) DOCUMENTED: ICD-10-PCS | Mod: CPTII,,, | Performed by: NURSE PRACTITIONER

## 2023-04-06 PROCEDURE — 99999 PR PBB SHADOW E&M-EST. PATIENT-LVL V: CPT | Mod: PBBFAC,,, | Performed by: NURSE PRACTITIONER

## 2023-04-06 PROCEDURE — 99999 PR PBB SHADOW E&M-EST. PATIENT-LVL V: ICD-10-PCS | Mod: PBBFAC,,, | Performed by: NURSE PRACTITIONER

## 2023-04-06 PROCEDURE — 3074F PR MOST RECENT SYSTOLIC BLOOD PRESSURE < 130 MM HG: ICD-10-PCS | Mod: CPTII,,, | Performed by: NURSE PRACTITIONER

## 2023-04-06 PROCEDURE — 3078F DIAST BP <80 MM HG: CPT | Mod: CPTII,,, | Performed by: NURSE PRACTITIONER

## 2023-04-06 PROCEDURE — 99215 PR OFFICE/OUTPT VISIT, EST, LEVL V, 40-54 MIN: ICD-10-PCS | Mod: S$PBB,,, | Performed by: NURSE PRACTITIONER

## 2023-04-06 PROCEDURE — 1159F PR MEDICATION LIST DOCUMENTED IN MEDICAL RECORD: ICD-10-PCS | Mod: CPTII,,, | Performed by: NURSE PRACTITIONER

## 2023-04-06 PROCEDURE — 1159F MED LIST DOCD IN RCRD: CPT | Mod: CPTII,,, | Performed by: NURSE PRACTITIONER

## 2023-04-06 PROCEDURE — 1160F RVW MEDS BY RX/DR IN RCRD: CPT | Mod: CPTII,,, | Performed by: NURSE PRACTITIONER

## 2023-04-06 PROCEDURE — 3074F SYST BP LT 130 MM HG: CPT | Mod: CPTII,,, | Performed by: NURSE PRACTITIONER

## 2023-04-06 PROCEDURE — 3008F BODY MASS INDEX DOCD: CPT | Mod: CPTII,,, | Performed by: NURSE PRACTITIONER

## 2023-04-10 NOTE — TELEPHONE ENCOUNTER
Per Jackie Boyd NP, Cabometyx will be on hold for at least another 2 weeks. Will pend refill call to 4/24 to follow up

## 2023-04-11 ENCOUNTER — HOSPITAL ENCOUNTER (OUTPATIENT)
Dept: WOUND CARE | Facility: HOSPITAL | Age: 63
Discharge: HOME | End: 2023-04-11
Attending: EMERGENCY MEDICINE
Payer: MEDICAID

## 2023-04-11 VITALS
DIASTOLIC BLOOD PRESSURE: 80 MMHG | SYSTOLIC BLOOD PRESSURE: 135 MMHG | WEIGHT: 237 LBS | TEMPERATURE: 98 F | RESPIRATION RATE: 18 BRPM | HEIGHT: 70 IN | BODY MASS INDEX: 33.93 KG/M2 | HEART RATE: 59 BPM

## 2023-04-11 DIAGNOSIS — C64.1 CLEAR CELL CARCINOMA OF RIGHT KIDNEY: ICD-10-CM

## 2023-04-11 DIAGNOSIS — N18.32 STAGE 3B CHRONIC KIDNEY DISEASE: ICD-10-CM

## 2023-04-11 DIAGNOSIS — R46.89 SELF NEGLECT: ICD-10-CM

## 2023-04-11 DIAGNOSIS — C77.1 MALIGNANT NEOPLASM METASTATIC TO INTRATHORACIC LYMPH NODE: ICD-10-CM

## 2023-04-11 DIAGNOSIS — L97.529 DIABETIC ULCER OF LEFT GREAT TOE: ICD-10-CM

## 2023-04-11 DIAGNOSIS — L97.522 SKIN ULCER OF LEFT GREAT TOE WITH FAT LAYER EXPOSED: Primary | ICD-10-CM

## 2023-04-11 DIAGNOSIS — D50.8 OTHER IRON DEFICIENCY ANEMIA: ICD-10-CM

## 2023-04-11 DIAGNOSIS — E08.41 DIABETIC MONONEUROPATHY ASSOCIATED WITH DIABETES MELLITUS DUE TO UNDERLYING CONDITION: ICD-10-CM

## 2023-04-11 DIAGNOSIS — E11.621 DIABETIC ULCER OF LEFT GREAT TOE: ICD-10-CM

## 2023-04-11 DIAGNOSIS — E78.2 MIXED HYPERLIPIDEMIA: ICD-10-CM

## 2023-04-11 LAB — POCT GLUCOSE: 89 MG/DL (ref 70–110)

## 2023-04-11 PROCEDURE — 99499 NO LOS: ICD-10-PCS | Mod: ,,, | Performed by: EMERGENCY MEDICINE

## 2023-04-11 PROCEDURE — 11042 DEBRIDEMENT: ICD-10-PCS | Mod: ,,, | Performed by: EMERGENCY MEDICINE

## 2023-04-11 PROCEDURE — 27000999 HC MEDICAL RECORD PHOTO DOCUMENTATION

## 2023-04-11 PROCEDURE — 11042 DBRDMT SUBQ TIS 1ST 20SQCM/<: CPT | Mod: ,,, | Performed by: EMERGENCY MEDICINE

## 2023-04-11 PROCEDURE — 99499 UNLISTED E&M SERVICE: CPT | Mod: ,,, | Performed by: EMERGENCY MEDICINE

## 2023-04-11 PROCEDURE — 11042 DBRDMT SUBQ TIS 1ST 20SQCM/<: CPT

## 2023-04-11 NOTE — PATIENT INSTRUCTIONS
Pt seen today by: Dr. Deepa Manriquez    Home health and self care DRESSING INSTRUCTIONS:       Supplies ordered from [facility]          Wound location: Left great toe (plantar)    Cleanse wound with wound cleanser or saline  Apply calcium alginate silver to wound bed          Cover with exudry or abd pad, wrap with nik and secure with tape.    Right arm skin tear:    Cleanse daily with soap and water       Return visit: April 18th, 2023 at 10:00 am      Wound may have been debrided in clinic: if so, WHAT YOU NEED TO KNOW:    Debridement is the removal of infected, damaged, or dead tissue so a wound can heal properly. Your wound may need more than one debridement. Debridement can cause bleeding, and a small amount of blood is expected.  AFTER A DEBRIDEMENT:    Keep your wound clean and dry. Do not remove the dressing unless instructed.  Follow the wound care orders provided to you or your home health care provider.  If you have pain, take over the counter pain relievers or pain medication if prescribed.  Elevate the wound and limit excessive activity to prevent bleeding and/or swelling in your wound.  If you see blood coming through the dressing, apply gauze and tape over the dressing and hold firm pressure to the wound with your hand for 5-10 minutes continuously, without peeking, to help the bleeding stop.  Contact [facility] wound care team at [phone] or go to the nearest Emergency department if:    You have a fever greater than 101 taken by mouth.  Your pain gets worse or does not go away, even after taking your regular pain medicine.  Your skin around your wound is red, hot, swollen, or draining pus.  You have bleeding that continues to come through the dressing after holding pressure for 10 minutes       The current daily value (%DV) for protein is 50 grams per day and is meant as a general goal for most people. Further increasing your dietary protein intake is very important for wound healing.  Typically one needs over 100g of protein per day to help with wound healing needs.  If you are a dialysis patient or have problems with your kidneys, talk to your Nephrologist about how much protein you can take in with your condition.  Examples of high protein items that can be added to your diet include: eggs, chicken, red meats, almonds, cottage cheese, Greek yogurt, beans, and peanut butter.  Fortified protein bars, shakes and drinks can add 15-30 additional grams of protein per serving.   Also add:   1 daily general multivitamin   Eligio : 1 packet twice daily   Vitamin C : 500mg twice daily   Zinc 220 mg daily  Vit D : once daily    Offloading   Offload your wound. This means to reduce pressure on and around the wound that reduces blood flow to the wound and prevents healing. Your wound care team will discuss specific ways for you to offload your specific wound. Common offloading strategies include:    Turn or reposition every 2 hours or sooner  Use special devices like boots and shoes to lift the wound off of hard surfaces  Padded dressings can reduce wound pressure        Call our [facility] wound clinic for questions/concerns a [phone] .

## 2023-04-11 NOTE — PROCEDURES
"Debridement    Date/Time: 4/11/2023 9:00 AM  Performed by: Deepa Manriquez MD  Authorized by: Deepa Manriquez MD   Associated wounds:        Altered Skin Integrity 04/11/23 0939 Left plantar Toe, first #1 Diabetic Ulcer  Time out: Immediately prior to procedure a "time out" was called to verify the correct patient, procedure, equipment, support staff and site/side marked as required.    Consent Done?:  Yes (Written)  Local anesthesia used?: Yes    Local anesthetic:  Topical anesthetic    Wound Details:    Location:  Left foot    Location:  Left 1st Toe    Type of Debridement:  Excisional       Length (cm):  0.7       Area (sq cm):  0.56       Width (cm):  0.8       Percent Debrided (%):  100       Depth (cm):  0.7       Total Area Debrided (sq cm):  0.56    Depth of debridement:  Subcutaneous tissue    Tissue debrided:  Dermis, Epidermis and Subcutaneous    Devitalized tissue debrided:  Biofilm and Slough    Instruments:  Curette  Bleeding:  Minimal  Method Used:  Pressure  Patient tolerance:  Patient tolerated the procedure well with no immediate complications  "

## 2023-04-11 NOTE — PROGRESS NOTES
Subjective:       Patient ID: Ye Olivera is a 63 y.o. male.    Chief Complaint: Diabetic Foot Ulcer (Left great toe (plantar))      62-year-old WM battling stage IV kidney cancer since 2020..  He recently saw his oncologist team, NP Jackie Boyd on 3/16/23 who noted pt's chronic left great toe volar diabetic foot ulcer and how he was not being managed by anyone so she referred him to this clinic. He was on Cabometyx for the cancer. He also has a history of hypertension, diabetes, neuropathy, dyslipidemia, CKD and bradycardia.    I  met him for first time on  3/27/23 where he told me he has had this DFU for at least 2 years.  He reported it would wax and wane, close and open. He saw the wound care clinic at [facility] only twice , last time being over a year ago . Says ulcer has remained open since then but he failed to call them back or do any form of local wound care or even cover it, really just ignoring it.     He last saw PCP Dr Quevedo in Jan 2023 but says they didn't discuss it (her note does not mention it; she does mention it on a note from Nov 2022).    I noted a deep chronic volar left great toe ulcer down to bone level although not overtly exposed , with odor, maceration and undermining. I was very concerned for osteomyelitis.    I ordered xray , MRI and a culture.  Cx + Many Enterobacter cloacae complex & many alcaligenes faecalis. I had called in cipro and prescribed only daily instead of BID to be precautious of CKD.   On his return last week on 4/3/23, he got a  rolling knee scooter but fell in parking lot on way in and was abrased to RUE and dizzy and nauseated. He went to ED for evaluation  MRI foot complete: no evidence of osteomyelitis. Pt considering an amputation so he can resume chemotherapy.      Review of Systems   Constitutional: Negative.    HENT: Negative.     Respiratory: Negative.     Cardiovascular: Negative.    Gastrointestinal:  Positive for nausea.    Musculoskeletal: Negative.    Skin:  Positive for wound.   Neurological:  Positive for numbness. Negative for dizziness, seizures, syncope and facial asymmetry.       Objective:      Vitals:    04/11/23 0944   BP: 135/80   Pulse: (!) 59   Resp: 18   Temp: 97.5 °F (36.4 °C)     @poctglucose@  Recent Labs   Lab 04/11/23  0925   POCTGLUCOSE 89     Physical Exam  Vitals reviewed.   Constitutional:       Appearance: He is obese.   HENT:      Head: Normocephalic and atraumatic.      Mouth/Throat:      Pharynx: Oropharynx is clear.   Cardiovascular:      Pulses:           Dorsalis pedis pulses are 2+ on the right side and 1+ on the left side.      Comments: Hair on legs bilaterally  Pulmonary:      Effort: Pulmonary effort is normal.   Abdominal:      Palpations: Abdomen is soft.   Musculoskeletal:         General: No swelling or tenderness.        Arms:       Right lower leg: No edema.      Left lower leg: No edema.        Feet:       Comments: Abrasions RUE forearm: drying up; no infection   Skin:     General: Skin is warm and dry.      Capillary Refill: Capillary refill takes less than 2 seconds.   Neurological:      General: No focal deficit present.      Mental Status: He is alert and oriented to person, place, and time. Mental status is at baseline.            Altered Skin Integrity 04/11/23 0939 Left plantar Toe, first #1 Diabetic Ulcer (Active)   04/11/23 0939   Altered Skin Integrity Present on Admission - Did Patient arrive to the hospital with altered skin?: yes   Side: Left   Orientation: plantar   Location: Toe, first   Wound Number: #1   Is this injury device related?: No   Primary Wound Type: Diabetic ulc   Description of Altered Skin Integrity:    Ankle-Brachial Index: done on 03/27/2023  Lt. DP=1.19, PT=1.21    Rt. DP=1.14,  PT=1.22   Pulses: Palpable x 4, doppler Lt. dp=triphasic pt=biphasic    Rt=biphasic x 2   Removal Indication and Assessment:    Wound Outcome:    (Retired) Wound Length (cm):     (Retired) Wound Width (cm):    (Retired) Depth (cm):    Wound Description (Comments):    Removal Indications:    Wound Image    04/11/23 0930   Dressing Appearance Intact;Moist drainage 04/11/23 0930   Drainage Amount Moderate 04/11/23 0930   Drainage Characteristics/Odor Yellow;Serosanguineous;No odor 04/11/23 0930   Appearance Red;Yellow 04/11/23 0930   Tissue loss description Full thickness 04/11/23 0930   Black (%), Wound Tissue Color 0 % 04/11/23 0930   Red (%), Wound Tissue Color 85 % 04/11/23 0930   Yellow (%), Wound Tissue Color 15 % 04/11/23 0930   Periwound Area Intact;Dry 04/11/23 0930   Wound Edges Defined;Callused 04/11/23 0930   Wound Length (cm) 0.7 cm 04/11/23 0930   Wound Width (cm) 0.8 cm 04/11/23 0930   Wound Depth (cm) 0.7 cm 04/11/23 0930   Wound Volume (cm^3) 0.392 cm^3 04/11/23 0930   Wound Surface Area (cm^2) 0.56 cm^2 04/11/23 0930   Undermining (depth (cm)/location) 0.3 cm b/t 12:00-12:00 oclock 04/11/23 0930   Care Cleansed with:;Wound cleanser;Applied: 04/11/23 0930   Dressing Applied;Calcium alginate;Silver;Absorptive Pad;Rolled gauze 04/11/23 1009   Periwound Care Absorptive dressing applied 04/11/23 1009   Off Loading Off loading shoe 04/11/23 1009            Altered Skin Integrity 04/11/23 0938 Right Elbow #2 Skin Tear Partial thickness tissue loss. Shallow open ulcer with a red or pink wound bed, without slough. Intact or Open/Ruptured Serum-filled blister. (Active)   04/11/23 0938   Altered Skin Integrity Present on Admission - Did Patient arrive to the hospital with altered skin?: yes   Side: Right   Orientation:    Location: Elbow   Wound Number: #2   Is this injury device related?: No   Primary Wound Type: Skin tear   Description of Altered Skin Integrity: Partial thickness tissue loss. Shallow open ulcer with a red or pink wound bed, without slough. Intact or Open/Ruptured Serum-filled blister.   Ankle-Brachial Index:    Pulses:    Removal Indication and Assessment:    Wound  Outcome:    (Retired) Wound Length (cm):    (Retired) Wound Width (cm):    (Retired) Depth (cm):    Wound Description (Comments):    Removal Indications:    Wound Image   04/11/23 0930   Dressing Appearance Open to air 04/11/23 0930   Drainage Amount None 04/11/23 0930   Appearance Eschar;Epithelialization 04/11/23 0930   Tissue loss description Not applicable 04/11/23 0930   Black (%), Wound Tissue Color 100 % 04/11/23 0930   Red (%), Wound Tissue Color 0 % 04/11/23 0930   Yellow (%), Wound Tissue Color 0 % 04/11/23 0930   Periwound Area Intact;Dry 04/11/23 0930   Wound Edges Defined 04/11/23 0930   Wound Length (cm) 2 cm 04/11/23 0930   Wound Width (cm) 1.5 cm 04/11/23 0930   Wound Depth (cm) 0 cm 04/11/23 0930   Wound Volume (cm^3) 0 cm^3 04/11/23 0930   Wound Surface Area (cm^2) 3 cm^2 04/11/23 0930   Care Cleansed with:;Wound cleanser 04/11/23 0930   Dressing Other (comment) 04/11/23 1009            Altered Skin Integrity 04/11/23 0939 Right posterior Arm #3 Skin Tear Partial thickness tissue loss. Shallow open ulcer with a red or pink wound bed, without slough. Intact or Open/Ruptured Serum-filled blister. (Active)   04/11/23 0939   Altered Skin Integrity Present on Admission - Did Patient arrive to the hospital with altered skin?: yes   Side: Right   Orientation: posterior   Location: Arm   Wound Number: #3   Is this injury device related?: No   Primary Wound Type: Skin tear   Description of Altered Skin Integrity: Partial thickness tissue loss. Shallow open ulcer with a red or pink wound bed, without slough. Intact or Open/Ruptured Serum-filled blister.   Ankle-Brachial Index:    Pulses:    Removal Indication and Assessment:    Wound Outcome:    (Retired) Wound Length (cm):    (Retired) Wound Width (cm):    (Retired) Depth (cm):    Wound Description (Comments):    Removal Indications:    Wound Image   04/11/23 0930   Dressing Appearance Open to air 04/11/23 0930   Drainage Amount None 04/11/23 0930    Appearance Eschar 04/11/23 0930   Tissue loss description Not applicable 04/11/23 0930   Black (%), Wound Tissue Color 100 % 04/11/23 0930   Red (%), Wound Tissue Color 0 % 04/11/23 0930   Yellow (%), Wound Tissue Color 0 % 04/11/23 0930   Periwound Area Intact;Dry 04/11/23 0930   Wound Edges Defined 04/11/23 0930   Wound Length (cm) 4.5 cm 04/11/23 0930   Wound Width (cm) 0.5 cm 04/11/23 0930   Wound Depth (cm) 0 cm 04/11/23 0930   Wound Volume (cm^3) 0 cm^3 04/11/23 0930   Wound Surface Area (cm^2) 2.25 cm^2 04/11/23 0930   Care Cleansed with:;Wound cleanser 04/11/23 0930   Dressing Other (comment) 04/11/23 1009           Assessment:       1. Skin ulcer of left great toe with fat layer exposed    2. Diabetic ulcer of left great toe    3. Diabetic mononeuropathy associated with diabetes mellitus due to underlying condition    4. Self neglect    5. Clear cell carcinoma of right kidney    6. 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 Stage 3b chronic kidney disease    8. Other iron deficiency anemia    9. Mixed hyperlipidemia          Chronic Left hallux DFU since at least 2021: first clinic visit 3/27/23  3/27/23 cx Many Enterobacter cloacae complex & many alcaligenes faecalis: rx cipro daily   Neg MRI for osteomyelitis on 4/6/23  Diabetes with neuropathy, hba1c 5.9 jan 2023  Kidney cancer since 2020 , prior right nephrectomy, oral chemotherapy on hold  Hypertension  Bradycardia  Dyslipidemia  Obesity, bmi 33  Self neglect of chronic wound  CKD  Trip/fall on rolling knee scooter on 4/3/23: abrasions to RUE    LABS from [facility]:   01/13/23 03/16/22 01/17/20 06/17/17 12/06/16    HEMOGLOBIN A1C 5.9         Total Bilirubin     ALT (SGPT) P5P     AST     Albumin     Alkaline Phosphatase     BUN, Bld  23    Calcium     Chloride     CO2 Total     Creatinine, Ser  1.76    Glucose, Bld  124    Potassium     Total Protein     Sodium 137 - 147 mmol/L 141    Anion Gap     GFR-QUYEN >=60 mL/min/1.73mSq 39     Resulting Agency  [facility]   Specimen Collected: 01/13/23 Last Resulted: 01/13/23          Hemoglobin  14.4    Hematocrit 42.0 - 52.0 % 44.5      3/27/23 tissue cx    Aerobic Culture - Tissue Many Enterobacter cloacae complex Abnormal        Many Alcaligenes faecalis ssp faecalis Abnormal             Resulting Agency: [facility]     Susceptibility     Enterobacter cloacae complex Alcaligenes faecalis ssp faecalis     Not Specified Not Specified     Amox/K Clav >=32  Resistant       Cefazolin >=64  Resistant >=64  Resistant     Cefepime <=0.12  Sensitive       Cefuroxime 16  Resistant       Ciprofloxacin <=0.25  Sensitive <=0.25  Sensitive     Gentamicin <=1  Sensitive <=1  Sensitive     Levofloxacin <=0.12  Sensitive 0.25  Sensitive     Meropenem <=0.25  Sensitive       Piperacillin/Tazobactam 8  Sensitive <=4  Sensitive     Tetracycline 2  Sensitive 4  Sensitive     Tobramycin <=1  Sensitive 2  Sensitive     Trimethoprim/Sulfamethoxazole <=20  Sensitive <=20  Sensitive                                XR TOE 2 OR MORE VIEWS LEFT     FINDINGS:  There is no acute fracture or malalignment.  There are no destructive bone changes.  The soft tissues are unremarkable.     Impression:   No acute bony abnormality.      Electronically signed by: Bety Nagy  Date:                                            03/27/2023  Time:                                           10:43    EXAMINATION:  MRI FOOT (FOREFOOT) LEFT W W/O CONTRAST     CLINICAL HISTORY:  Foot swelling, diabetic, osteomyelitis suspected, xray done;  Type 2 diabetes mellitus with foot ulcer     TECHNIQUE:  Axial T2 fat sat, axial T1, coronal PD, coronal T2 fat sat, sagittal T1, sagittal STIR, axial T1 fat sat, and 3 plane T1 fat sat post contrast 1.5 T magnetic resonance imaging was performed through the left distal midfoot and forefoot.  20 mL Clariscan gadolinium contrast was administered intravenously.     COMPARISON:  None.  Correlation is made  with left forefoot/great toe radiographs of 03/27/2023.     FINDINGS:  There is reactive marrow edema throughout the distal phalanx of the great toe with no osteomyelitis.  There is no abscess or drainable fluid collection.  A shallow soft tissue ulcer is noted at the plantar 1st metatarsal at the level of the distal phalanx.  There is no tendon pathology.  There is mild subcutaneous edema and swelling along the dorsal forefoot.  No muscle edema or atrophy.  No septic arthritis or other significant joint space abnormality at the forefoot.  There is mild degenerative arthrosis at the 4th tarsometatarsal articulation with no acute pathology at the midfoot.  Alignment is anatomic and there is no acute or chronic fracture.     Impression:     Reactive marrow edema at the distal phalanx of the great toe with no osteomyelitis or abscess.     Shallow soft tissue ulceration at the plantar great toe at the level of the distal phalanx.     Mild degenerative arthrosis at the 4th tarsometatarsal articulation.        Electronically signed by: Logan Gomez  Date:                                            04/06/2023  Time:                                           13:42  Plan:     Plan of Care:    Ulcer debrided  On cipro daily  Neg Mri for osteomyelitis  Wound Care Orders: dressings of  silver alginate to be applied to the wound daily  Offloading:  Key to healing as well.  He had a rolling knee scooter but fell; today he has a rollator  Nutrition: Must have a high protein diet to support wound  healing as allowed by his underlying renal disease. Also rec MVI along with vit C, vit D, zinc and Eligio  Diabetes: must have a strict diabetic diet, take meds   Pt is considering an amputation because he is now off the chemo and is concerned about that. I told him that an ulcer like this could take min 3 months to heal and that is if everything goes well.  However, no local podiatrist will take his insurance. He gets Medicare this  summer in July. If he wants to proceed with amputation, may need to go to [facility]. I told him to try and get podiatry providers from his insurance company  Return to clinic 1 week           The time spent including preparing to see the patient, obtaining patient history and assessment, evaluation of the plan of care, patient/caregiver counseling and education, orders, documentation, coordination of care, and other professional medical management activities for today's encounter was 25 minutes.    Time spent performing procedures during today's encounter was 10 minutes.

## 2023-04-11 NOTE — LETTER
[facility] - [facility]ound [facility]  [address]  [place] [address]  Phone: [phone]  Fax: [phone]    Please contact Laura with any questions/concerns.  April 13, 2023     Patient: Ye Olivera   MR Number: [id number]   YOB: 1960   Date of Visit: 4/11/2023       Good Afternoon :    I am referring my patient, Ye Olivera, to you for evaluation/amputation of Left Great Toe. The patient has renal cancer and is now off of chemo due to this ulcer. He would like to start chemo again and wants to amputate the toe prior. Please see attached note.     I appreciate your assistance in his care and look forward to your findings and recommendations.    Sincerely,         [facility]                              CC  Laura Shetty LPN/HBO Tech  Deepa Manriquez MD

## 2023-04-12 ENCOUNTER — OFFICE VISIT (OUTPATIENT)
Dept: HEMATOLOGY/ONCOLOGY | Facility: CLINIC | Age: 63
End: 2023-04-12
Payer: MEDICAID

## 2023-04-12 DIAGNOSIS — C64.1 CLEAR CELL CARCINOMA OF RIGHT KIDNEY: Primary | ICD-10-CM

## 2023-04-12 PROCEDURE — 99213 PR OFFICE/OUTPT VISIT, EST, LEVL III, 20-29 MIN: ICD-10-PCS | Mod: 95,,, | Performed by: NURSE PRACTITIONER

## 2023-04-12 PROCEDURE — 99213 OFFICE O/P EST LOW 20 MIN: CPT | Mod: 95,,, | Performed by: NURSE PRACTITIONER

## 2023-04-12 NOTE — PROGRESS NOTES
REFERRING PHYSICIAN:  Dr. Elizabeth Alvarado    Subjective:       Patient ID: Ye Olivera is a 63 y.o. male.    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 Patient presented for genetic counseling on 3/8/2023 and was found appropriate for genetic testing based on the National Comprehensive Cancer Network (NCCN) criteria due to a personal history of kidney cancer with somatic VHL mutation. He signed consent, lab was drawn and sent to [facility] for CancerNext-Expanded panel testing. He presented via Telemedicine Visit (audio and video) today for results disclosure.     HPI  Past Medical History:   Diagnosis Date    Arthritis of knee 08/13/2013    Cancer     stage IV renal cell carcinoma    Cataract     Coronary artery disease     Diabetes mellitus, type 2     HTN (hypertension) 08/13/2013    Hx of CABG     Hyperlipidemia     Type II or unspecified type diabetes mellitus without mention of complication, not stated as uncontrolled 08/13/2013        Past Surgical History:   Procedure Laterality Date    APPENDECTOMY      CATARACT EXTRACTION, BILATERAL          Review of patient's allergies indicates:  No Known Allergies     Review of Systems            Problem List Items Addressed This Visit    None    Oncology History   Clear cell carcinoma of right kidney   7/30/2020 Cancer Staged    Staging form: Kidney, AJCC 8th Edition  - Clinical stage from 7/30/2020: Stage IV (cT2a, cN1, cM1)       5/18/2022 Initial Diagnosis    Clear cell carcinoma of right kidney                Family History   Problem Relation Age of Onset    Heart disease Mother     Heart disease Father     Heart disease Sister     Breast cancer Other 47    Diabetes Neg Hx     Cancer Neg Hx         Assessment:   Genetic testing was appropriate for this patient because of her personal and family history. [facility]  indicated that there was no deleterious mutation and no variants of uncertain significance found in (77 total): AIP, ALK, APC*, JONI*, AXIN2, BAP1, BARD1, BLM, BMPR1A, BRCA1*, BRCA2*, BRIP1*, CDC73, CDH1*, CDK4, CDKN1B, CDKN2A, CHEK2*, CTNNA1, DICER1, FANCC, FH, FLCN, GALNT12, KIF1B, LZTR1, MAX, MEN1, MET, MLH1*, MSH2*, MSH3, MSH6*, MUTYH*, NBN, NF1*, NF2, NTHL1, PALB2*, PHOX2B, PMS2*, POT1, YHFQP2H, PTCH1, PTEN*, RAD51C*, RAD51D*, RB1, RECQL, RET, SDHA, SDHAF2, SDHB, SDHC, SDHD, SMAD4, SMARCA4, SMARCB1, SMARCE1, STK11, SUFU, PZZB723, TP53*, TSC1, TSC2, VHL and XRCC2 (sequencing and deletion/duplication); EGFR, EGLN1, HOXB13, KIT, MITF, PDGFRA, POLD1 and POLE (sequencing only); EPCAM and GREM1 (deletion/duplication only). DNA and RNA analyses performed for * genes.    Somatic versus germline mutations was discussed. The VHL mutation found with somatic testing is not in the germline. It was explained to the patient, that, although this analysis indicated a negative result, there are other, rare genetic abnormalities that this test will not detect. This result, however, rules out the majority of abnormalities believed to be responsible for hereditary susceptibility to cancer.       A copy of her result will be emailed to the patient: [email or web address]     The patient location is: home    Visit type: audiovisual    Face to Face time with patient: 10 minutes  20 minutes of total time spent on the encounter, which includes face to face time and non-face to face time preparing to see the patient (eg, review of tests), Obtaining and/or reviewing separately obtained history, Documenting clinical information in the electronic or other health record, Independently interpreting results (not separately reported) and communicating results to the patient/family/caregiver, or Care coordination (not separately reported).         Each patient to whom he or she provides medical services by telemedicine is:  (1) informed of the  relationship between the physician and patient and the respective role of any other health care provider with respect to management of the patient; and (2) notified that he or she may decline to receive medical services by telemedicine and may withdraw from such care at any time.     ALFREDO LOW, PhD

## 2023-04-13 NOTE — PROGRESS NOTES
Upon reviewing pts insurance, a podiatrist in the [place] and surrounding areas was not found. [facility] in [place] accepts pts insurance. Referral was sent.

## 2023-04-18 ENCOUNTER — HOSPITAL ENCOUNTER (OUTPATIENT)
Dept: WOUND CARE | Facility: HOSPITAL | Age: 63
Discharge: HOME | End: 2023-04-18
Attending: EMERGENCY MEDICINE
Payer: MEDICAID

## 2023-04-18 VITALS
WEIGHT: 237 LBS | SYSTOLIC BLOOD PRESSURE: 139 MMHG | DIASTOLIC BLOOD PRESSURE: 67 MMHG | HEIGHT: 70 IN | TEMPERATURE: 98 F | HEART RATE: 49 BPM | RESPIRATION RATE: 18 BRPM | BODY MASS INDEX: 33.93 KG/M2

## 2023-04-18 DIAGNOSIS — E78.2 MIXED HYPERLIPIDEMIA: ICD-10-CM

## 2023-04-18 DIAGNOSIS — E11.621 DIABETIC ULCER OF LEFT GREAT TOE: ICD-10-CM

## 2023-04-18 DIAGNOSIS — L97.522 SKIN ULCER OF LEFT GREAT TOE WITH FAT LAYER EXPOSED: Primary | ICD-10-CM

## 2023-04-18 DIAGNOSIS — C77.1 MALIGNANT NEOPLASM METASTATIC TO INTRATHORACIC LYMPH NODE: ICD-10-CM

## 2023-04-18 DIAGNOSIS — C64.1 CLEAR CELL CARCINOMA OF RIGHT KIDNEY: ICD-10-CM

## 2023-04-18 DIAGNOSIS — R46.89 SELF NEGLECT: ICD-10-CM

## 2023-04-18 DIAGNOSIS — D50.8 OTHER IRON DEFICIENCY ANEMIA: ICD-10-CM

## 2023-04-18 DIAGNOSIS — E08.41 DIABETIC MONONEUROPATHY ASSOCIATED WITH DIABETES MELLITUS DUE TO UNDERLYING CONDITION: ICD-10-CM

## 2023-04-18 DIAGNOSIS — L97.529 DIABETIC ULCER OF LEFT GREAT TOE: ICD-10-CM

## 2023-04-18 DIAGNOSIS — N18.32 STAGE 3B CHRONIC KIDNEY DISEASE: ICD-10-CM

## 2023-04-18 LAB — POCT GLUCOSE: 109 MG/DL (ref 70–110)

## 2023-04-18 PROCEDURE — 11042 DBRDMT SUBQ TIS 1ST 20SQCM/<: CPT

## 2023-04-18 PROCEDURE — 11042 DEBRIDEMENT: ICD-10-PCS | Mod: ,,, | Performed by: EMERGENCY MEDICINE

## 2023-04-18 PROCEDURE — 27000999 HC MEDICAL RECORD PHOTO DOCUMENTATION

## 2023-04-18 PROCEDURE — 11042 DBRDMT SUBQ TIS 1ST 20SQCM/<: CPT | Mod: ,,, | Performed by: EMERGENCY MEDICINE

## 2023-04-18 NOTE — PROCEDURES
"Debridement    Date/Time: 4/18/2023 9:49 AM  Performed by: Deepa Manriquez MD  Authorized by: Deepa Manriquez MD   Associated wounds:        Altered Skin Integrity 04/11/23 0939 Left plantar Toe, first #1 Diabetic Ulcer  Time out: Immediately prior to procedure a "time out" was called to verify the correct patient, procedure, equipment, support staff and site/side marked as required.    Consent Done?:  Yes (Written)    Preparation: Patient was prepped and draped in usual sterile fashion    Local anesthesia used?: Yes    Local anesthetic:  Topical anesthetic    Wound Details:    Location:  Left foot    Location:  Left 1st Toe    Type of Debridement:  Excisional       Length (cm):  0.7       Area (sq cm):  0.7       Width (cm):  1       Percent Debrided (%):  100       Depth (cm):  0.7       Total Area Debrided (sq cm):  0.7    Depth of debridement:  Subcutaneous tissue    Tissue debrided:  Dermis, Epidermis and Subcutaneous    Devitalized tissue debrided:  Biofilm and Slough    Instruments:  Curette  Bleeding:  Minimal  Hemostasis Achieved: Yes  Method Used:  Pressure  Patient tolerance:  Patient tolerated the procedure well with no immediate complications  "

## 2023-04-18 NOTE — PROGRESS NOTES
Subjective:       Patient ID: Ye Olivera is a 63 y.o. male.    Chief Complaint: No chief complaint on file.      62-year-old WM battling stage IV kidney cancer since 2020..  He recently saw his oncologist team, NP Jackie Boyd on 3/16/23 who noted pt's chronic left great toe volar diabetic foot ulcer and how he was not being managed by anyone so she referred him to this clinic. He was on Cabometyx for the cancer. He also has a history of hypertension, diabetes, neuropathy, dyslipidemia, CKD and bradycardia.    I  met him for first time on  3/27/23 where he told me he has had this DFU for at least 2 years.  He reported it would wax and wane, close and open. He saw the wound care clinic at [facility] only twice , last time being over a year ago . Says ulcer has remained open since then but he failed to call them back or do any form of local wound care or even cover it, really just ignoring it.     He last saw PCP Dr Quevedo in Jan 2023 but says they didn't discuss it (her note does not mention it; she does mention it on a note from Nov 2022).    I noted a deep chronic volar left great toe ulcer down to bone level although not overtly exposed , with odor, maceration and undermining. I was very concerned for osteomyelitis.    I ordered xray , MRI and a culture.  Cx + Many Enterobacter cloacae complex & many alcaligenes faecalis. I had called in cipro and prescribed only daily instead of BID to be precautious of CKD.   On his return last week on 4/3/23, he got a  rolling knee scooter but fell in parking lot on way in and was abrased to RUE and dizzy and nauseated. He went to ED for evaluation  MRI foot complete: no evidence of osteomyelitis.  Has not used scooter since the fall. He is requesting a great toe amputation so he can resume his chemo which is being held by oncologist because of this wound and known to adversely affect wound healing. Staff cannot find a podiatrist to take his insurance.        Review of Systems   Constitutional: Negative.    HENT: Negative.     Respiratory: Negative.     Cardiovascular: Negative.    Gastrointestinal:  Positive for nausea.   Musculoskeletal: Negative.    Skin:  Positive for wound.   Neurological:  Positive for numbness. Negative for dizziness, seizures, syncope and facial asymmetry.       Objective:      Vitals:    04/18/23 0956   BP: 139/67   Pulse: (!) 49   Resp: 18   Temp: 97.7 °F (36.5 °C)     @poctglucose@  Recent Labs   Lab 04/18/23  1003   POCTGLUCOSE 109     Physical Exam  Vitals reviewed.   Constitutional:       Appearance: He is obese.   HENT:      Head: Normocephalic and atraumatic.      Mouth/Throat:      Pharynx: Oropharynx is clear.   Cardiovascular:      Pulses:           Dorsalis pedis pulses are 2+ on the right side and 1+ on the left side.      Comments: Hair on legs bilaterally  Pulmonary:      Effort: Pulmonary effort is normal.   Abdominal:      Palpations: Abdomen is soft.   Musculoskeletal:         General: No swelling or tenderness.        Arms:       Right lower leg: No edema.      Left lower leg: No edema.        Feet:       Comments: Abrasions RUE forearm:healing   Skin:     General: Skin is warm and dry.      Capillary Refill: Capillary refill takes less than 2 seconds.   Neurological:      General: No focal deficit present.      Mental Status: He is alert and oriented to person, place, and time. Mental status is at baseline.            Altered Skin Integrity 04/11/23 0939 Left plantar Toe, first #1 Diabetic Ulcer (Active)   04/11/23 0939   Altered Skin Integrity Present on Admission - Did Patient arrive to the hospital with altered skin?: yes   Side: Left   Orientation: plantar   Location: Toe, first   Wound Number: #1   Is this injury device related?: No   Primary Wound Type: Diabetic ulc   Description of Altered Skin Integrity:    Ankle-Brachial Index: done on 03/27/2023  Lt. DP=1.19, PT=1.21    Rt. DP=1.14,  PT=1.22   Pulses: Palpable  x 4, doppler Lt. dp=triphasic pt=biphasic    Rt=biphasic x 2   Removal Indication and Assessment:    Wound Outcome:    (Retired) Wound Length (cm):    (Retired) Wound Width (cm):    (Retired) Depth (cm):    Wound Description (Comments):    Removal Indications:    Wound Image   04/18/23 1022   Description of Altered Skin Integrity Full thickness tissue loss. Subcutaneous fat may be visible but bone, tendon or muscle are not exposed 04/18/23 1022   Dressing Appearance Moist drainage 04/18/23 1022   Drainage Amount Moderate 04/18/23 1022   Drainage Characteristics/Odor Sanguineous 04/18/23 1022   Appearance Red;Pink;Slough 04/18/23 1022   Black (%), Wound Tissue Color 0 % 04/18/23 1022   Red (%), Wound Tissue Color 80 % 04/18/23 1022   Yellow (%), Wound Tissue Color 20 % 04/18/23 1022   Periwound Area Tillson 04/18/23 1022   Wound Edges Defined 04/18/23 1022   Wound Length (cm) 0.7 cm 04/18/23 1022   Wound Width (cm) 1 cm 04/18/23 1022   Wound Depth (cm) 0.5 cm 04/18/23 1022   Wound Volume (cm^3) 0.35 cm^3 04/18/23 1022   Wound Surface Area (cm^2) 0.7 cm^2 04/18/23 1022   Undermining (depth (cm)/location) 0.3cm at 9 oclock 04/18/23 1022   Care Cleansed with:;Soap and water;Antimicrobial agent;Applied: 04/18/23 1022            Altered Skin Integrity 04/11/23 0938 Right Elbow #2 Skin Tear Partial thickness tissue loss. Shallow open ulcer with a red or pink wound bed, without slough. Intact or Open/Ruptured Serum-filled blister. (Active)   04/11/23 0938   Altered Skin Integrity Present on Admission - Did Patient arrive to the hospital with altered skin?: yes   Side: Right   Orientation:    Location: Elbow   Wound Number: #2   Is this injury device related?: No   Primary Wound Type: Skin tear   Description of Altered Skin Integrity: Partial thickness tissue loss. Shallow open ulcer with a red or pink wound bed, without slough. Intact or Open/Ruptured Serum-filled blister.   Ankle-Brachial Index:    Pulses:    Removal  Indication and Assessment:    Wound Outcome:    (Retired) Wound Length (cm):    (Retired) Wound Width (cm):    (Retired) Depth (cm):    Wound Description (Comments):    Removal Indications:    Wound Image   04/18/23 1026   Description of Altered Skin Integrity Partial thickness tissue loss. Shallow open ulcer with a red or pink wound bed, without slough. Intact or Open/Ruptured Serum-filled blister. 04/18/23 1026   Dressing Appearance Open to air;Dry 04/18/23 1026   Drainage Amount None 04/18/23 1026   Appearance Eschar;Epithelialization 04/18/23 1026   Black (%), Wound Tissue Color 0 % 04/18/23 1026   Red (%), Wound Tissue Color 50 % 04/18/23 1026   Yellow (%), Wound Tissue Color 50 % 04/18/23 1026   Periwound Area Intact 04/18/23 1026   Wound Edges Approximated 04/18/23 1026   Wound Length (cm) 2.5 cm 04/18/23 1026   Wound Width (cm) 1.5 cm 04/18/23 1026   Wound Depth (cm) 0 cm 04/18/23 1026   Wound Volume (cm^3) 0 cm^3 04/18/23 1026   Wound Surface Area (cm^2) 3.75 cm^2 04/18/23 1026   Care Cleansed with:;Antimicrobial agent;Applied: 04/18/23 1026   Dressing Applied 04/18/23 1026            Altered Skin Integrity 04/11/23 0939 Right posterior Arm #3 Skin Tear Partial thickness tissue loss. Shallow open ulcer with a red or pink wound bed, without slough. Intact or Open/Ruptured Serum-filled blister. (Active)   04/11/23 0939   Altered Skin Integrity Present on Admission - Did Patient arrive to the hospital with altered skin?: yes   Side: Right   Orientation: posterior   Location: Arm   Wound Number: #3   Is this injury device related?: No   Primary Wound Type: Skin tear   Description of Altered Skin Integrity: Partial thickness tissue loss. Shallow open ulcer with a red or pink wound bed, without slough. Intact or Open/Ruptured Serum-filled blister.   Ankle-Brachial Index:    Pulses:    Removal Indication and Assessment:    Wound Outcome:    (Retired) Wound Length (cm):    (Retired) Wound Width (cm):    (Retired)  Depth (cm):    Wound Description (Comments):    Removal Indications:    Wound Image   04/18/23 1027   Description of Altered Skin Integrity Partial thickness tissue loss. Shallow open ulcer with a red or pink wound bed, without slough. Intact or Open/Ruptured Serum-filled blister. 04/18/23 1027   Dressing Appearance Open to air 04/18/23 1027   Drainage Amount None 04/18/23 1027   Appearance Eschar;Epithelialization 04/18/23 1027   Black (%), Wound Tissue Color 0 % 04/18/23 1027   Red (%), Wound Tissue Color 80 % 04/18/23 1027   Yellow (%), Wound Tissue Color 20 % 04/18/23 1027   Periwound Area Intact 04/18/23 1027   Wound Edges Approximated 04/18/23 1027   Wound Length (cm) 4.3 cm 04/18/23 1027   Wound Width (cm) 0.3 cm 04/18/23 1027   Wound Depth (cm) 0 cm 04/18/23 1027   Wound Volume (cm^3) 0 cm^3 04/18/23 1027   Wound Surface Area (cm^2) 1.29 cm^2 04/18/23 1027   Care Cleansed with:;Antimicrobial agent;Applied: 04/18/23 1027           Assessment:       1. Skin ulcer of left great toe with fat layer exposed    2. Diabetic ulcer of left great toe    3. Diabetic mononeuropathy associated with diabetes mellitus due to underlying condition    4. Self neglect    5. Clear cell carcinoma of right kidney    6. 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 Stage 3b chronic kidney disease    8. Other iron deficiency anemia    9. Mixed hyperlipidemia          Chronic Left hallux DFU since at least 2021: first clinic visit 3/27/23  3/27/23 cx Many Enterobacter cloacae complex & many alcaligenes faecalis: rx cipro daily   Neg MRI for osteomyelitis on 4/6/23  Diabetes with neuropathy, hba1c 5.9 jan 2023  Kidney cancer since 2020 , prior right nephrectomy, oral chemotherapy on hold  Hypertension  Bradycardia  Dyslipidemia  Obesity, bmi 33  Self neglect of chronic wound  CKD  Trip/fall on rolling knee scooter on 4/3/23: abrasions to RUE    LABS from [facility]:   01/13/23 03/16/22 01/17/20 06/17/17 12/06/16     HEMOGLOBIN A1C 5.9         Total Bilirubin     ALT (SGPT) P5P     AST     Albumin     Alkaline Phosphatase     BUN, Bld  23    Calcium     Chloride     CO2 Total     Creatinine, Ser  1.76    Glucose, Bld  124    Potassium     Total Protein     Sodium 137 - 147 mmol/L 141    Anion Gap     GFR-QUYEN >=60 mL/min/1.73mSq 39    Resulting Agency  [facility]   Specimen Collected: 01/13/23 Last Resulted: 01/13/23          Hemoglobin  14.4    Hematocrit 42.0 - 52.0 % 44.5      3/27/23 tissue cx    Aerobic Culture - Tissue Many Enterobacter cloacae complex Abnormal        Many Alcaligenes faecalis ssp faecalis Abnormal             Resulting Agency: [facility]     Susceptibility     Enterobacter cloacae complex Alcaligenes faecalis ssp faecalis     Not Specified Not Specified     Amox/K Clav >=32  Resistant       Cefazolin >=64  Resistant >=64  Resistant     Cefepime <=0.12  Sensitive       Cefuroxime 16  Resistant       Ciprofloxacin <=0.25  Sensitive <=0.25  Sensitive     Gentamicin <=1  Sensitive <=1  Sensitive     Levofloxacin <=0.12  Sensitive 0.25  Sensitive     Meropenem <=0.25  Sensitive       Piperacillin/Tazobactam 8  Sensitive <=4  Sensitive     Tetracycline 2  Sensitive 4  Sensitive     Tobramycin <=1  Sensitive 2  Sensitive     Trimethoprim/Sulfamethoxazole <=20  Sensitive <=20  Sensitive                                XR TOE 2 OR MORE VIEWS LEFT     FINDINGS:  There is no acute fracture or malalignment.  There are no destructive bone changes.  The soft tissues are unremarkable.     Impression:   No acute bony abnormality.      Electronically signed by: Bety Nagy  Date:                                            03/27/2023  Time:                                           10:43    EXAMINATION:  MRI FOOT (FOREFOOT) LEFT W W/O CONTRAST     CLINICAL HISTORY:  Foot swelling, diabetic, osteomyelitis suspected, xray done;  Type 2 diabetes mellitus with foot ulcer     TECHNIQUE:  Axial T2 fat sat, axial T1,  coronal PD, coronal T2 fat sat, sagittal T1, sagittal STIR, axial T1 fat sat, and 3 plane T1 fat sat post contrast 1.5 T magnetic resonance imaging was performed through the left distal midfoot and forefoot.  20 mL Clariscan gadolinium contrast was administered intravenously.     COMPARISON:  None.  Correlation is made with left forefoot/great toe radiographs of 03/27/2023.     FINDINGS:  There is reactive marrow edema throughout the distal phalanx of the great toe with no osteomyelitis.  There is no abscess or drainable fluid collection.  A shallow soft tissue ulcer is noted at the plantar 1st metatarsal at the level of the distal phalanx.  There is no tendon pathology.  There is mild subcutaneous edema and swelling along the dorsal forefoot.  No muscle edema or atrophy.  No septic arthritis or other significant joint space abnormality at the forefoot.  There is mild degenerative arthrosis at the 4th tarsometatarsal articulation with no acute pathology at the midfoot.  Alignment is anatomic and there is no acute or chronic fracture.     Impression:     Reactive marrow edema at the distal phalanx of the great toe with no osteomyelitis or abscess.     Shallow soft tissue ulceration at the plantar great toe at the level of the distal phalanx.     Mild degenerative arthrosis at the 4th tarsometatarsal articulation.        Electronically signed by: Logan Gomez  Date:                                            04/06/2023  Time:                                           13:42  Plan:     Plan of Care:    Ulcer debrided  On cipro daily  Neg Mri for osteomyelitis  Wound Care Orders: dressings of  silver alginate to be applied to the wound daily  Offloading:  Key to healing as well.  He had a rolling knee scooter but fell;last week had a  rollator; today just a eliot  Nutrition: Must have a high protein diet to support wound  healing as allowed by his underlying renal disease. Also rec MVI along with vit C, vit D, zinc and  Eligio  Diabetes: must have a strict diabetic diet, take meds   Pt asking for  an amputation because he is now off the chemo and is concerned about that. I told him that an ulcer like this could take several months to  heal and that is if everything goes well.  However, cannot find a podiatrist that takes his insurance. (Even in [facility] area). Will refer to [facility] gen sx clinic  Return to clinic 1 week

## 2023-04-18 NOTE — PATIENT INSTRUCTIONS
Pt seen today by: Dr. Deepa Manriquez    Home health and self care DRESSING INSTRUCTIONS:       Supplies ordered from [facility]          Wound location: Left great toe (plantar)    Cleanse wound with wound cleanser or saline  Apply collagen to wound bed          Cover with exudry or abd pad, wrap with nik and secure with tape.    Right arm skin tear:    Cleanse daily with soap and water       Return visit: April 25th 2023 at 9:00 am      Wound may have been debrided in clinic: if so, WHAT YOU NEED TO KNOW:    Debridement is the removal of infected, damaged, or dead tissue so a wound can heal properly. Your wound may need more than one debridement. Debridement can cause bleeding, and a small amount of blood is expected.  AFTER A DEBRIDEMENT:    Keep your wound clean and dry. Do not remove the dressing unless instructed.  Follow the wound care orders provided to you or your home health care provider.  If you have pain, take over the counter pain relievers or pain medication if prescribed.  Elevate the wound and limit excessive activity to prevent bleeding and/or swelling in your wound.  If you see blood coming through the dressing, apply gauze and tape over the dressing and hold firm pressure to the wound with your hand for 5-10 minutes continuously, without peeking, to help the bleeding stop.  Contact [facility] wound care team at [phone] or go to the nearest Emergency department if:    You have a fever greater than 101 taken by mouth.  Your pain gets worse or does not go away, even after taking your regular pain medicine.  Your skin around your wound is red, hot, swollen, or draining pus.  You have bleeding that continues to come through the dressing after holding pressure for 10 minutes       The current daily value (%DV) for protein is 50 grams per day and is meant as a general goal for most people. Further increasing your dietary protein intake is very important for wound healing. Typically one  needs over 100g of protein per day to help with wound healing needs.  If you are a dialysis patient or have problems with your kidneys, talk to your Nephrologist about how much protein you can take in with your condition.  Examples of high protein items that can be added to your diet include: eggs, chicken, red meats, almonds, cottage cheese, Greek yogurt, beans, and peanut butter.  Fortified protein bars, shakes and drinks can add 15-30 additional grams of protein per serving.   Also add:   1 daily general multivitamin   Eligio : 1 packet twice daily   Vitamin C : 500mg twice daily   Zinc 220 mg daily  Vit D : once daily    Offloading   Offload your wound. This means to reduce pressure on and around the wound that reduces blood flow to the wound and prevents healing. Your wound care team will discuss specific ways for you to offload your specific wound. Common offloading strategies include:    Turn or reposition every 2 hours or sooner  Use special devices like boots and shoes to lift the wound off of hard surfaces  Padded dressings can reduce wound pressure        Call our [facility] wound clinic for questions/concerns a [phone] .

## 2023-04-20 ENCOUNTER — TELEPHONE (OUTPATIENT)
Dept: WOUND CARE | Facility: HOSPITAL | Age: 63
End: 2023-04-20
Payer: MEDICAID

## 2023-04-20 NOTE — TELEPHONE ENCOUNTER
Called [facility] to follow up on referral that was sent for Toe amputation. [facility] conformed that they received it and that they would call pt to schedule appt. Wound care physician aware.

## 2023-04-24 ENCOUNTER — TELEPHONE (OUTPATIENT)
Dept: HEMATOLOGY/ONCOLOGY | Facility: CLINIC | Age: 63
End: 2023-04-24
Payer: MEDICAID

## 2023-04-24 NOTE — TELEPHONE ENCOUNTER
Per patient, Cabometyx is still on hold. He has consultation appt on 4/25 for toe amputation. His next follow up with oncology is on 5/9 and patient would like a follow up to reassess restarting Cabometyx after that appt. Will pend refill call to 5/10 to follow up

## 2023-04-24 NOTE — TELEPHONE ENCOUNTER
Patient calling to check status of scan appointments. Voice message forwarded to radiology schedulers.

## 2023-04-25 ENCOUNTER — OFFICE VISIT (OUTPATIENT)
Dept: SURGERY | Facility: CLINIC | Age: 63
End: 2023-04-25
Payer: MEDICAID

## 2023-04-25 ENCOUNTER — HOSPITAL ENCOUNTER (OUTPATIENT)
Dept: WOUND CARE | Facility: HOSPITAL | Age: 63
Discharge: HOME | End: 2023-04-25
Attending: EMERGENCY MEDICINE
Payer: MEDICAID

## 2023-04-25 VITALS
HEIGHT: 70 IN | BODY MASS INDEX: 34.45 KG/M2 | WEIGHT: 240.63 LBS | SYSTOLIC BLOOD PRESSURE: 124 MMHG | DIASTOLIC BLOOD PRESSURE: 67 MMHG | OXYGEN SATURATION: 97 % | TEMPERATURE: 98 F | RESPIRATION RATE: 18 BRPM | HEART RATE: 56 BPM

## 2023-04-25 VITALS
DIASTOLIC BLOOD PRESSURE: 63 MMHG | WEIGHT: 235 LBS | SYSTOLIC BLOOD PRESSURE: 114 MMHG | HEART RATE: 56 BPM | RESPIRATION RATE: 18 BRPM | BODY MASS INDEX: 33.64 KG/M2 | TEMPERATURE: 98 F | HEIGHT: 70 IN

## 2023-04-25 DIAGNOSIS — R46.89 SELF NEGLECT: ICD-10-CM

## 2023-04-25 DIAGNOSIS — E08.41 DIABETIC MONONEUROPATHY ASSOCIATED WITH DIABETES MELLITUS DUE TO UNDERLYING CONDITION: ICD-10-CM

## 2023-04-25 DIAGNOSIS — L97.522 SKIN ULCER OF LEFT GREAT TOE WITH FAT LAYER EXPOSED: ICD-10-CM

## 2023-04-25 DIAGNOSIS — E11.621 DIABETIC ULCER OF LEFT GREAT TOE: ICD-10-CM

## 2023-04-25 DIAGNOSIS — L97.529 DIABETIC ULCER OF LEFT GREAT TOE: ICD-10-CM

## 2023-04-25 DIAGNOSIS — E78.2 MIXED HYPERLIPIDEMIA: ICD-10-CM

## 2023-04-25 DIAGNOSIS — C77.1 MALIGNANT NEOPLASM METASTATIC TO INTRATHORACIC LYMPH NODE: ICD-10-CM

## 2023-04-25 DIAGNOSIS — L97.512 DIABETIC ULCER OF TOE OF RIGHT FOOT ASSOCIATED WITH DIABETES MELLITUS DUE TO UNDERLYING CONDITION, WITH FAT LAYER EXPOSED: ICD-10-CM

## 2023-04-25 DIAGNOSIS — E08.621 DIABETIC ULCER OF TOE OF RIGHT FOOT ASSOCIATED WITH DIABETES MELLITUS DUE TO UNDERLYING CONDITION, WITH FAT LAYER EXPOSED: ICD-10-CM

## 2023-04-25 DIAGNOSIS — N18.32 STAGE 3B CHRONIC KIDNEY DISEASE: ICD-10-CM

## 2023-04-25 DIAGNOSIS — C64.1 CLEAR CELL CARCINOMA OF RIGHT KIDNEY: ICD-10-CM

## 2023-04-25 LAB — POCT GLUCOSE: 98 MG/DL (ref 70–110)

## 2023-04-25 PROCEDURE — 99215 OFFICE O/P EST HI 40 MIN: CPT | Mod: PBBFAC

## 2023-04-25 PROCEDURE — 27000999 HC MEDICAL RECORD PHOTO DOCUMENTATION

## 2023-04-25 PROCEDURE — 11042 DBRDMT SUBQ TIS 1ST 20SQCM/<: CPT | Mod: ,,, | Performed by: EMERGENCY MEDICINE

## 2023-04-25 PROCEDURE — 11042 DEBRIDEMENT: ICD-10-PCS | Mod: ,,, | Performed by: EMERGENCY MEDICINE

## 2023-04-25 PROCEDURE — 99499 UNLISTED E&M SERVICE: CPT | Mod: ,,, | Performed by: EMERGENCY MEDICINE

## 2023-04-25 PROCEDURE — 99499 NO LOS: ICD-10-PCS | Mod: ,,, | Performed by: EMERGENCY MEDICINE

## 2023-04-25 PROCEDURE — 11042 DBRDMT SUBQ TIS 1ST 20SQCM/<: CPT

## 2023-04-25 RX ORDER — DICLOFENAC SODIUM 75 MG/1
75 TABLET, DELAYED RELEASE ORAL 2 TIMES DAILY
Status: ON HOLD | COMMUNITY
Start: 2023-04-12 | End: 2023-05-17 | Stop reason: HOSPADM

## 2023-04-25 NOTE — PROCEDURES
"Debridement    Date/Time: 4/25/2023 9:25 AM  Performed by: Deepa Manriquez MD  Authorized by: Deepa Manriquez MD   Associated wounds:        Altered Skin Integrity 04/11/23 0939 Left plantar Toe, first #1 Diabetic Ulcer  Time out: Immediately prior to procedure a "time out" was called to verify the correct patient, procedure, equipment, support staff and site/side marked as required.    Consent Done?:  Yes (Written)  Local anesthesia used?: Yes    Local anesthetic:  Topical anesthetic    Wound Details:    Location:  Left foot    Location:  Left 1st Toe    Type of Debridement:  Excisional       Length (cm):  0.7       Area (sq cm):  0.63       Width (cm):  0.9       Percent Debrided (%):  100       Depth (cm):  0.6       Total Area Debrided (sq cm):  0.63    Depth of debridement:  Subcutaneous tissue    Tissue debrided:  Dermis, Epidermis and Subcutaneous    Devitalized tissue debrided:  Biofilm and Slough    Instruments:  Curette  Bleeding:  Minimal  Hemostasis Achieved: Yes  Method Used:  Pressure  Patient tolerance:  Patient tolerated the procedure well with no immediate complications  "

## 2023-04-25 NOTE — PATIENT INSTRUCTIONS
Pt seen today by: Dr. Deepa Manriquez    Home health and self care DRESSING INSTRUCTIONS:       [facility]          Wound location: Left great toe (plantar)    Cleanse wound with wound cleanser or saline  Apply collagen to wound bed          Cover with exudry or abd pad, wrap with nik and secure with tape.      Return visit: May 2nd 2023 at 9:00 am      Wound may have been debrided in clinic: if so, WHAT YOU NEED TO KNOW:    Debridement is the removal of infected, damaged, or dead tissue so a wound can heal properly. Your wound may need more than one debridement. Debridement can cause bleeding, and a small amount of blood is expected.  AFTER A DEBRIDEMENT:    Keep your wound clean and dry. Do not remove the dressing unless instructed.  Follow the wound care orders provided to you or your home health care provider.  If you have pain, take over the counter pain relievers or pain medication if prescribed.  Elevate the wound and limit excessive activity to prevent bleeding and/or swelling in your wound.  If you see blood coming through the dressing, apply gauze and tape over the dressing and hold firm pressure to the wound with your hand for 5-10 minutes continuously, without peeking, to help the bleeding stop.  Contact [facility] wound care team at [phone] or go to the nearest Emergency department if:    You have a fever greater than 101 taken by mouth.  Your pain gets worse or does not go away, even after taking your regular pain medicine.  Your skin around your wound is red, hot, swollen, or draining pus.  You have bleeding that continues to come through the dressing after holding pressure for 10 minutes       The current daily value (%DV) for protein is 50 grams per day and is meant as a general goal for most people. Further increasing your dietary protein intake is very important for wound healing. Typically one needs over 100g of protein per day to help with wound healing  needs.  If you are a dialysis patient or have problems with your kidneys, talk to your Nephrologist about how much protein you can take in with your condition.  Examples of high protein items that can be added to your diet include: eggs, chicken, red meats, almonds, cottage cheese, Greek yogurt, beans, and peanut butter.  Fortified protein bars, shakes and drinks can add 15-30 additional grams of protein per serving.   Also add:   1 daily general multivitamin   Eligio : 1 packet twice daily   Vitamin C : 500mg twice daily   Zinc 220 mg daily  Vit D : once daily    Offloading   Offload your wound. This means to reduce pressure on and around the wound that reduces blood flow to the wound and prevents healing. Your wound care team will discuss specific ways for you to offload your specific wound. Common offloading strategies include:    Turn or reposition every 2 hours or sooner  Use special devices like boots and shoes to lift the wound off of hard surfaces  Padded dressings can reduce wound pressure        Call our [facility] wound clinic for questions/concerns a [phone] .

## 2023-04-25 NOTE — PROGRESS NOTES
"Subjective:       Patient ID: Ye Olivera is a 63 y.o. male.    Chief Complaint: Diabetic Foot Ulcer      62-year-old WM battling stage IV kidney cancer since 2020. His oncologist team, NP Jackie Boyd on 3/16/23 noted pt's chronic left great toe volar diabetic foot ulcer and how he was not being managed by anyone so she referred him to this clinic. He was on Cabometyx for the cancer. He also has a history of hypertension, diabetes, neuropathy, dyslipidemia, CKD and bradycardia.    I  met him for first time on 3/27/23 where he told me he has had this DFU for at least 2 years.  He reported it would wax and wane, close and open. He saw the wound care clinic at [facility] only twice , last time being over a year ago . Says ulcer has remained open since then but he failed to call them back or do any form of local wound care or even cover it.. really just ignoring it.     He last saw PCP Dr Quevedo in Jan 2023 but says they didn't discuss it (her note does not mention it; she does mention it on a note from Nov 2022).    I noted a deep chronic volar left great toe ulcer down to bone level although not overtly exposed , with odor, maceration and undermining. I was very concerned for osteomyelitis.    I ordered xray , MRI and a culture.  Cx + Many Enterobacter cloacae complex & many alcaligenes faecalis. I had called in cipro and prescribed only daily instead of BID to be precautious of CKD. He was advised to offload and got a rolling knee scooter but fell down and not using it since.  Dr LAN Alvarado is holding his TKI Cabometyx for his cancer, "as it impairs wound healing" per her note.  This DFU has proven to be long standing, chronic and nonhealing. Pt would to proceed with amputation of the toe so he can get back on chemo.  No podiatrist will take his insurance so I referred to [facility] gen surgery and he is seeing later this morning.      Review of Systems   Constitutional: Negative.    HENT: Negative.   "   Respiratory: Negative.     Cardiovascular: Negative.    Gastrointestinal:  Positive for nausea.   Musculoskeletal: Negative.    Skin:  Positive for wound.   Neurological:  Positive for numbness. Negative for dizziness, seizures, syncope and facial asymmetry.       Objective:      Vitals:    04/25/23 0858   BP: 114/63   Pulse: (!) 56   Resp: 18   Temp: 97.6 °F (36.4 °C)     @poctglucose@  Recent Labs   Lab 04/25/23  0902   POCTGLUCOSE 98     Physical Exam  Vitals reviewed.   Constitutional:       Appearance: He is obese.   HENT:      Head: Normocephalic and atraumatic.      Mouth/Throat:      Pharynx: Oropharynx is clear.   Cardiovascular:      Pulses:           Dorsalis pedis pulses are 2+ on the right side and 1+ on the left side.      Comments: Hair on legs bilaterally  Pulmonary:      Effort: Pulmonary effort is normal.   Abdominal:      Palpations: Abdomen is soft.   Musculoskeletal:         General: No swelling or tenderness.        Arms:       Right lower leg: No edema.      Left lower leg: No edema.        Feet:       Comments: Abrasions RUE forearm:healing   Skin:     General: Skin is warm and dry.      Capillary Refill: Capillary refill takes less than 2 seconds.   Neurological:      General: No focal deficit present.      Mental Status: He is alert and oriented to person, place, and time. Mental status is at baseline.            Altered Skin Integrity 04/11/23 0939 Left plantar Toe, first #1 Diabetic Ulcer (Active)   04/11/23 0939   Altered Skin Integrity Present on Admission - Did Patient arrive to the hospital with altered skin?: yes   Side: Left   Orientation: plantar   Location: Toe, first   Wound Number: #1   Is this injury device related?: No   Primary Wound Type: Diabetic [facility]   Description of Altered Skin Integrity:    Ankle-Brachial Index: done on 04/25/2023  Lt. DP=1.04, PT=1.05   Pulses: Palpable x 4, doppler Lt. dp=triphasic pt=biphasic    Rt=biphasic x 2   Removal Indication and Assessment:     Wound Outcome:    (Retired) Wound Length (cm):    (Retired) Wound Width (cm):    (Retired) Depth (cm):    Wound Description (Comments):    Removal Indications:    Wound Image   04/25/23 0915   Description of Altered Skin Integrity Full thickness tissue loss. Subcutaneous fat may be visible but bone, tendon or muscle are not exposed 04/25/23 0915   Dressing Appearance Intact;Moist drainage 04/25/23 0915   Drainage Amount Moderate 04/25/23 0915   Drainage Characteristics/Odor Serosanguineous;Yellow 04/25/23 0915   Appearance Pink;Yellow 04/25/23 0915   Tissue loss description Full thickness 04/25/23 0915   Black (%), Wound Tissue Color 0 % 04/25/23 0915   Red (%), Wound Tissue Color 75 % 04/25/23 0915   Yellow (%), Wound Tissue Color 25 % 04/25/23 0915   Periwound Area Intact 04/25/23 0915   Wound Edges Defined 04/25/23 0915   Wound Length (cm) 0.7 cm 04/25/23 0915   Wound Width (cm) 0.9 cm 04/25/23 0915   Wound Depth (cm) 0.6 cm 04/25/23 0915   Wound Volume (cm^3) 0.378 cm^3 04/25/23 0915   Wound Surface Area (cm^2) 0.63 cm^2 04/25/23 0915   Care Cleansed with:;Wound cleanser;Applied: 04/25/23 0915   Dressing Removed;Changed;Collagen;Silver;Absorptive Pad;Rolled gauze 04/25/23 0915       [REMOVED]      Altered Skin Integrity 04/11/23 0938 Right Elbow #2 Skin Tear Partial thickness tissue loss. Shallow open ulcer with a red or pink wound bed, without slough. Intact or Open/Ruptured Serum-filled blister. (Removed)   04/11/23 0938   Altered Skin Integrity Present on Admission - Did Patient arrive to the hospital with altered skin?: yes   Side: Right   Orientation:    Location: Elbow   Wound Number: #2   Is this injury device related?: No   Primary Wound Type: Skin tear   Description of Altered Skin Integrity: Partial thickness tissue loss. Shallow open ulcer with a red or pink wound bed, without slough. Intact or Open/Ruptured Serum-filled blister.   Ankle-Brachial Index:    Pulses:    Removal Indication and  Assessment:    Wound Outcome: Healed   (Retired) Wound Length (cm):    (Retired) Wound Width (cm):    (Retired) Depth (cm):    Wound Description (Comments):    Removal Indications:    Removed 04/25/23 0926   Wound Image   04/25/23 0915   Dressing Appearance Open to air 04/25/23 0915   Drainage Amount None 04/25/23 0915   Appearance Eschar 04/25/23 0915   Tissue loss description Not applicable 04/25/23 0915   Periwound Area Intact 04/25/23 0915   Wound Length (cm) 0.2 cm 04/25/23 0915   Wound Width (cm) 0.2 cm 04/25/23 0915   Wound Depth (cm) 0 cm 04/25/23 0915   Wound Volume (cm^3) 0 cm^3 04/25/23 0915   Wound Surface Area (cm^2) 0.04 cm^2 04/25/23 0915   Care Cleansed with:;Wound cleanser 04/25/23 0915       [REMOVED]      Altered Skin Integrity 04/11/23 0939 Right posterior Arm #3 Skin Tear Partial thickness tissue loss. Shallow open ulcer with a red or pink wound bed, without slough. Intact or Open/Ruptured Serum-filled blister. (Removed)   04/11/23 0939   Altered Skin Integrity Present on Admission - Did Patient arrive to the hospital with altered skin?: yes   Side: Right   Orientation: posterior   Location: Arm   Wound Number: #3   Is this injury device related?: No   Primary Wound Type: Skin tear   Description of Altered Skin Integrity: Partial thickness tissue loss. Shallow open ulcer with a red or pink wound bed, without slough. Intact or Open/Ruptured Serum-filled blister.   Ankle-Brachial Index:    Pulses:    Removal Indication and Assessment:    Wound Outcome: Healed   (Retired) Wound Length (cm):    (Retired) Wound Width (cm):    (Retired) Depth (cm):    Wound Description (Comments):    Removal Indications:    Removed 04/25/23 0927   Wound Image   04/25/23 0915   Dressing Appearance Open to air 04/25/23 0915   Drainage Amount None 04/25/23 0915   Appearance Eschar 04/25/23 0915   Tissue loss description Not applicable 04/25/23 0915   Periwound Area Intact 04/25/23 0915   Wound Edges Undefined 04/25/23  0915   Wound Length (cm) 1.3 cm 04/25/23 0915   Wound Width (cm) 0.3 cm 04/25/23 0915   Wound Depth (cm) 0 cm 04/25/23 0915   Wound Volume (cm^3) 0 cm^3 04/25/23 0915   Wound Surface Area (cm^2) 0.39 cm^2 04/25/23 0915           Assessment:       1. Skin ulcer of left great toe with fat layer exposed    2. Diabetic ulcer of left great toe    3. Diabetic mononeuropathy associated with diabetes mellitus due to underlying condition    4. Self neglect    5. Clear cell carcinoma of right kidney    6. 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 Stage 3b chronic kidney disease    8. Mixed hyperlipidemia          Chronic Left hallux DFU since at least 2021: first clinic visit 3/27/23: recalcitrant  3/27/23 cx Many Enterobacter cloacae complex & many alcaligenes faecalis: rx cipro daily   Neg MRI for osteomyelitis on 4/6/23  Diabetes with neuropathy, hba1c 5.9 jan 2023  Kidney cancer since 2020 , prior right nephrectomy, TKI Cabometyx now being held by DR Alvarado, oncology because of the toe ulcer  Hypertension  Bradycardia  Dyslipidemia  Obesity, bmi 33  Self neglect of chronic wound  CKD  Trip/fall on rolling knee scooter on 4/3/23: abrasions to RUE      XR TOE 2 OR MORE VIEWS LEFT     FINDINGS:  There is no acute fracture or malalignment.  There are no destructive bone changes.  The soft tissues are unremarkable.     Impression:   No acute bony abnormality.      Electronically signed by: Bety Nagy  Date:                                            03/27/2023  Time:                                           10:43      MRI FOOT (FOREFOOT) LEFT W W/O CONTRAST     CLINICAL HISTORY:  Foot swelling, diabetic, osteomyelitis suspected, xray done;  Type 2 diabetes mellitus with foot ulcer     TECHNIQUE:  Axial T2 fat sat, axial T1, coronal PD, coronal T2 fat sat, sagittal T1, sagittal STIR, axial T1 fat sat, and 3 plane T1 fat sat post contrast 1.5 T magnetic resonance imaging was performed through the  left distal midfoot and forefoot.  20 mL Clariscan gadolinium contrast was administered intravenously.     COMPARISON:  None.  Correlation is made with left forefoot/great toe radiographs of 03/27/2023.     FINDINGS:  There is reactive marrow edema throughout the distal phalanx of the great toe with no osteomyelitis.  There is no abscess or drainable fluid collection.  A shallow soft tissue ulcer is noted at the plantar 1st metatarsal at the level of the distal phalanx.  There is no tendon pathology.  There is mild subcutaneous edema and swelling along the dorsal forefoot.  No muscle edema or atrophy.  No septic arthritis or other significant joint space abnormality at the forefoot.  There is mild degenerative arthrosis at the 4th tarsometatarsal articulation with no acute pathology at the midfoot.  Alignment is anatomic and there is no acute or chronic fracture.     Impression:     Reactive marrow edema at the distal phalanx of the great toe with no osteomyelitis or abscess.     Shallow soft tissue ulceration at the plantar great toe at the level of the distal phalanx.     Mild degenerative arthrosis at the 4th tarsometatarsal articulation.        Electronically signed by: Logan Gomez  Date:                                            04/06/2023  Time:                                           13:42  Plan:     Plan of Care:    Ulcer debrided  On cipro daily  Neg Mri for osteomyelitis  Ulcer is chronic and not healing. His chemo is being held by his oncologist because of this long standing DFU. Pt asking for a toe amputation so he can resume chemo. Seeing [facility] this morning (could not find him a podiatrist that takes his insurance)  Wound Care Orders: dressings of  silver alginate to be applied to the wound daily  Offloading:using eliot; fell with scooter  Nutrition: Must have a high protein diet to support wound  healing as allowed by his underlying renal disease. Also rec MVI along with vit C, vit D,  zinc and Eligio  Diabetes: must have a strict diabetic diet, take meds   Return to clinic 1 week

## 2023-04-25 NOTE — PROGRESS NOTES
Surgery Clinic Note     CC: nonhealing left great toe wound    HPI:  63-year-old man with a PMHx of HTN (controlled), diabetes (controlled), hx of CABG, and stage IV renal cell carcinoma (not currently on dialysis) presenting today for evaluation of a nonhealing wound on the plantar surface of his left great toe. The wound has been present for years and has required him to stop chemotherapy 5-6x to allow for wound healing. The wound has always returned once chemo resumed. He sees wound care weekly. When he saw them this morning they referred him to clinic to consider toe amputation. He denies any fevers or erythema around the wound. On 4/4/23 he had a foot MRI which revealed no osteomyelitis at that time, however he reports multiple concerns for osteomyelitis in the past. He is interested in amputation.    PMH:   Past Medical History:   Diagnosis Date    Arthritis of knee 08/13/2013    Cancer     stage IV renal cell carcinoma    Cataract     Coronary artery disease     Diabetes mellitus, type 2     HTN (hypertension) 08/13/2013    Hx of CABG     Hyperlipidemia     Type II or unspecified type diabetes mellitus without mention of complication, not stated as uncontrolled 08/13/2013        PSH:   Past Surgical History:   Procedure Laterality Date    APPENDECTOMY      CATARACT EXTRACTION, BILATERAL          Fam Hx:   Family History   Problem Relation Age of Onset    Heart disease Mother     Heart disease Father     Heart disease Sister     Breast cancer Other 47    Diabetes Neg Hx     Cancer Neg Hx         Social Hx: daily marijuana, does not smoke or drink alcohol    Allergies: Review of patient's allergies indicates:  No Known Allergies     ROS: Negative except above     Current Outpatient Medications on File Prior to Visit   Medication Sig Dispense Refill    amLODIPine (NORVASC) 5 MG tablet Take 5 mg by mouth every morning.      amLODIPine (NORVASC) 5 MG tablet Take 1 tablet by mouth every morning.      aspirin 81 mg  "Cap Take 81 mg by mouth once daily at 6am.      atorvastatin (LIPITOR) 80 MG tablet Take 80 mg by mouth once daily.      atorvastatin (LIPITOR) 80 MG tablet Take 1 tablet by mouth every evening.      azelastine (ASTELIN) 137 mcg (0.1 %) nasal spray SMARTSIG:Both Nares      cabozantinib (CABOMETYX) 20 mg Tab Take 1 tablet (20 mg) by mouth once daily. 30 tablet 6    carvediloL (COREG) 12.5 MG tablet Take 12.5 mg by mouth 2 (two) times daily.      carvediloL (COREG) 12.5 MG tablet Take 1 tablet by mouth 2 (two) times daily.      ciprofloxacin HCl (CIPRO) 500 MG tablet Take 1 tablet (500 mg total) by mouth once daily. 30 tablet 0    desvenlafaxine succinate (PRISTIQ) 100 MG Tb24 Take 100 mg by mouth.      desvenlafaxine succinate (PRISTIQ) 50 MG Tb24 Take 100 mg by mouth once daily.      diclofenac (VOLTAREN) 75 MG EC tablet Take 75 mg by mouth 2 (two) times daily.      diphenoxylate-atropine 2.5-0.025 mg (LOMOTIL) 2.5-0.025 mg per tablet Take 1 tablet by mouth as needed.      finasteride (PROSCAR) 5 mg tablet Take 5 mg by mouth as needed.      hydrOXYzine pamoate (VISTARIL) 25 MG Cap Take 1 capsule (25 mg total) by mouth every 4 (four) hours as needed (itching). 30 capsule 4    hyoscyamine (LEVSIN/SL) 0.125 mg Subl Place 0.125 mg under the tongue every 6 (six) hours as needed.      insulin glargine (LANTUS) 100 unit/mL injection Inject into the skin. Takes 20units      insulin syr/ndl U100 half warner (BD VEO INSULIN SYR, HALF UNIT,) 0.3 mL 31 gauge x 15/64" Syrg USE 1 syringe DAILY      lancets Misc Use TID to check blood sugar. Dx: E11.9, patient is insulin dependent      multivitamin (THERAGRAN) per tablet Take 1 tablet by mouth once daily.      ondansetron (ZOFRAN) 4 MG tablet Take 4 mg by mouth every 6 (six) hours as needed.      pantoprazole (PROTONIX) 40 MG tablet TAKE 1 TABLET BY MOUTH DAILY 30 tablet 6    pregabalin (LYRICA) 100 MG capsule Take 100 mg by mouth.      prochlorperazine (COMPAZINE) 5 MG tablet Take " "5 mg by mouth as needed.      prochlorperazine (COMPAZINE) 5 MG tablet Take 5 mg by mouth every 8 (eight) hours as needed.      QUEtiapine (SEROQUEL) 50 MG tablet Take 150 mg by mouth every evening.      QUEtiapine (SEROQUEL) 50 MG tablet Take 3 tablets by mouth every evening.      sertraline (ZOLOFT) 50 MG tablet Take 50 mg by mouth.      tamsulosin (FLOMAX) 0.4 mg Cap Take 1 capsule (0.4 mg total) by mouth every evening. 30 capsule 6    terbinafine HCL (LAMISIL) 250 mg tablet Take 250 mg by mouth.      tolterodine (DETROL LA) 4 MG 24 hr capsule Take 4 mg by mouth once daily.      traMADoL (ULTRAM) 50 mg tablet Take 50 mg by mouth every 6 (six) hours.      TRUE METRIX GLUCOSE TEST STRIP Strp 3 (three) times daily.      tuberculin,purif.prot.deriv. (TUBERSOL IDRM) Inject 0.1 mLs into the skin.       No current facility-administered medications on file prior to visit.       Physical Exam  /67   Pulse (!) 56   Temp 97.5 °F (36.4 °C) (Oral)   Resp 18   Ht 5' 10" (1.778 m)   Wt 109.1 kg (240 lb 9.6 oz)   SpO2 97%   BMI 34.52 kg/m²   General: NAD, AAO X 3  Extremity: deep ulcer to plantar aspect of left great toe, no purulence         ASSESSMENT/PLAN  63-year-old man with a PMHx of diabetes, hypertension, hx of CABG, and stage IV renal cell carcinoma presenting for consideration for left great toe amputation due to nonhealing wound delaying chemotherapy. At this time it is assumed he will likely have improved healing of an amputation and is a candidate for surgery once he receives clearance from cardiology.     -obtain cardiology clearance  -return to clinic for scheduling      Mag Francisco, MS3       RESIDENT ATTESTATION     I have seen and  evaluated the patient with the student doctor. Agree with above note and the necessary changes have been made. Agree w/ assessment and plan with following changes:     Assessment/Plan:  Patient s/p right radical nephrectomy and lymph node dissection done 9/11/20, 4/6 " lymph nodes positive for metastatic clear cell carcinoma in need of chemo.     Patient with longstanding hx of chronic diabetic ulcer which has required frequent wound care visits and halting of his chemotherapy. Once patient restarts chemo his wound worsens. Patient is amenable to surgery and states he would rather have an amputation then continue months of wound care with high likelihood of recurrence. Patient has hx of CABG in 2009 but no recent cardiac visit. Will need cards clearance  for Ray amputation of Left great toe due to chronic diabetic ulcer. Patient states his Blood glucose is well controlled always between 80-90 w/ last hgb a1c being 5.9%. Pt with adequate kidney function no longer needing dialysis.    Pt w/ palpable pulses, no hx of claudication, non smoker     -Cards clearance   -RTC in 3 weeks for scheduling of ray amp of Left great toe due to chronic non healing diabetic ulcer       Herman Lin MD  PGY-1   [employer] General Surgery

## 2023-04-25 NOTE — PROGRESS NOTES
Patient seen by Dr Lin.  Will fax cardiac risk assessment request form to Dr Srinivasan Cote requesting clearance for surgery.  Pt will f/u in surgery clinic. 3 weeks after clearance.

## 2023-04-26 NOTE — PROGRESS NOTES
I have reviewed the notes, assessments, and/or procedures performed by the resident, I concur with her/his documentation of Ye Olivera.     Estelita Apodaca MD

## 2023-05-02 ENCOUNTER — HOSPITAL ENCOUNTER (OUTPATIENT)
Dept: WOUND CARE | Facility: HOSPITAL | Age: 63
Discharge: HOME | End: 2023-05-02
Attending: EMERGENCY MEDICINE
Payer: MEDICAID

## 2023-05-02 VITALS — WEIGHT: 240 LBS | BODY MASS INDEX: 34.36 KG/M2 | HEIGHT: 70 IN

## 2023-05-02 DIAGNOSIS — C64.1 CLEAR CELL CARCINOMA OF RIGHT KIDNEY: ICD-10-CM

## 2023-05-02 DIAGNOSIS — D50.8 OTHER IRON DEFICIENCY ANEMIA: ICD-10-CM

## 2023-05-02 DIAGNOSIS — R46.89 SELF NEGLECT: ICD-10-CM

## 2023-05-02 DIAGNOSIS — N18.32 STAGE 3B CHRONIC KIDNEY DISEASE: ICD-10-CM

## 2023-05-02 DIAGNOSIS — L97.529 DIABETIC ULCER OF LEFT GREAT TOE: ICD-10-CM

## 2023-05-02 DIAGNOSIS — E11.621 DIABETIC ULCER OF LEFT GREAT TOE: ICD-10-CM

## 2023-05-02 DIAGNOSIS — E78.2 MIXED HYPERLIPIDEMIA: ICD-10-CM

## 2023-05-02 DIAGNOSIS — L97.522 SKIN ULCER OF LEFT GREAT TOE WITH FAT LAYER EXPOSED: Primary | ICD-10-CM

## 2023-05-02 DIAGNOSIS — E08.41 DIABETIC MONONEUROPATHY ASSOCIATED WITH DIABETES MELLITUS DUE TO UNDERLYING CONDITION: ICD-10-CM

## 2023-05-02 DIAGNOSIS — C77.1 MALIGNANT NEOPLASM METASTATIC TO INTRATHORACIC LYMPH NODE: ICD-10-CM

## 2023-05-02 PROCEDURE — 99499 UNLISTED E&M SERVICE: CPT | Mod: ,,, | Performed by: EMERGENCY MEDICINE

## 2023-05-02 PROCEDURE — 11042 DEBRIDEMENT: ICD-10-PCS | Mod: ,,, | Performed by: EMERGENCY MEDICINE

## 2023-05-02 PROCEDURE — 99499 NO LOS: ICD-10-PCS | Mod: ,,, | Performed by: EMERGENCY MEDICINE

## 2023-05-02 PROCEDURE — 27000999 HC MEDICAL RECORD PHOTO DOCUMENTATION

## 2023-05-02 PROCEDURE — 11042 DBRDMT SUBQ TIS 1ST 20SQCM/<: CPT

## 2023-05-02 PROCEDURE — 11042 DBRDMT SUBQ TIS 1ST 20SQCM/<: CPT | Mod: ,,, | Performed by: EMERGENCY MEDICINE

## 2023-05-02 RX ORDER — FUROSEMIDE 20 MG/1
20 TABLET ORAL DAILY
COMMUNITY

## 2023-05-02 NOTE — PROCEDURES
"Debridement    Date/Time: 5/2/2023 8:59 AM  Performed by: Deepa Manriquez MD  Authorized by: Deepa Manriquez MD   Associated wounds:        Altered Skin Integrity 05/02/23 0928 Left plantar Toe, first #1 Diabetic Ulcer  Time out: Immediately prior to procedure a "time out" was called to verify the correct patient, procedure, equipment, support staff and site/side marked as required.    Consent Done?:  Yes (Written)  Local anesthesia used?: Yes    Local anesthetic:  Topical anesthetic    Wound Details:    Location:  Left foot    Location:  Left 1st Toe    Type of Debridement:  Excisional       Length (cm):  0.8       Area (sq cm):  0.64       Width (cm):  0.8       Percent Debrided (%):  100       Depth (cm):  0.7       Total Area Debrided (sq cm):  0.64    Depth of debridement:  Subcutaneous tissue    Tissue debrided:  Dermis, Epidermis and Subcutaneous    Devitalized tissue debrided:  Biofilm and Slough    Instruments:  Curette  Bleeding:  Minimal  Hemostasis Achieved: Yes  Method Used:  Pressure  Patient tolerance:  Patient tolerated the procedure well with no immediate complications  "

## 2023-05-02 NOTE — PATIENT INSTRUCTIONS
Pt seen today by: Dr. Deepa Manriquez    Self care DRESSING INSTRUCTIONS:       Supplies ordered from [facility]          Wound location: Left great toe (plantar)    Cleanse wound with wound cleanser or saline  Apply collagen to wound bed          Cover with exudry or abd pad, wrap with nik and secure with tape.      Return visit: May 9, 2023 at 9:00 am      Wound may have been debrided in clinic: if so, WHAT YOU NEED TO KNOW:    Debridement is the removal of infected, damaged, or dead tissue so a wound can heal properly. Your wound may need more than one debridement. Debridement can cause bleeding, and a small amount of blood is expected.  AFTER A DEBRIDEMENT:    Keep your wound clean and dry. Do not remove the dressing unless instructed.  Follow the wound care orders provided to you or your home health care provider.  If you have pain, take over the counter pain relievers or pain medication if prescribed.  Elevate the wound and limit excessive activity to prevent bleeding and/or swelling in your wound.  If you see blood coming through the dressing, apply gauze and tape over the dressing and hold firm pressure to the wound with your hand for 5-10 minutes continuously, without peeking, to help the bleeding stop.  Contact [facility] wound care team at [phone] or go to the nearest Emergency department if:    You have a fever greater than 101 taken by mouth.  Your pain gets worse or does not go away, even after taking your regular pain medicine.  Your skin around your wound is red, hot, swollen, or draining pus.  You have bleeding that continues to come through the dressing after holding pressure for 10 minutes       The current daily value (%DV) for protein is 50 grams per day and is meant as a general goal for most people. Further increasing your dietary protein intake is very important for wound healing. Typically one needs over 100g of protein per day to help with wound healing needs.  If you are a  dialysis patient or have problems with your kidneys, talk to your Nephrologist about how much protein you can take in with your condition.  Examples of high protein items that can be added to your diet include: eggs, chicken, red meats, almonds, cottage cheese, Greek yogurt, beans, and peanut butter.  Fortified protein bars, shakes and drinks can add 15-30 additional grams of protein per serving.   Also add:   1 daily general multivitamin   Eligio : 1 packet twice daily   Vitamin C : 500mg twice daily   Zinc 220 mg daily  Vit D : once daily    Offloading   Offload your wound. This means to reduce pressure on and around the wound that reduces blood flow to the wound and prevents healing. Your wound care team will discuss specific ways for you to offload your specific wound. Common offloading strategies include:    Turn or reposition every 2 hours or sooner  Use special devices like boots and shoes to lift the wound off of hard surfaces  Padded dressings can reduce wound pressure        Call our [facility] wound clinic for questions/concerns a [phone] .

## 2023-05-02 NOTE — PROGRESS NOTES
"Subjective:       Patient ID: Ye Olivera is a 63 y.o. male.    Chief Complaint: Diabetic Foot Ulcer (Left great toe) and Skin ulcer of left great toe with fat layer exposed       62-year-old WM battling stage IV kidney cancer since 2020. His oncologist team, NP Jackie Boyd on 3/16/23 noted pt's chronic left great toe volar diabetic foot ulcer and how he was not being managed by anyone so she referred him to this clinic. He was on Cabometyx for the cancer. He also has a history of hypertension, diabetes, neuropathy, dyslipidemia, CKD and bradycardia.    I  met him for first time on 3/27/23 where he told me he has had this DFU for at least 2 years.  He reported it would wax and wane, close and open. He saw the wound care clinic at [facility] only twice , last time being over a year ago . Says ulcer has remained open since then but he failed to call them back or do any form of local wound care or even cover it.. really just ignoring it.     He last saw PCP Dr Quevedo in Jan 2023 but says they didn't discuss it (her note does not mention it; she does mention it on a note from Nov 2022).    I noted a deep chronic volar left great toe ulcer down to bone level although not overtly exposed , with odor, maceration and undermining. I was very concerned for osteomyelitis.    I ordered xray , MRI and a culture.  Cx + Many Enterobacter cloacae complex & many alcaligenes faecalis. I had called in cipro and prescribed only daily instead of BID to be precautious of CKD. He was advised to offload and got a rolling knee scooter but fell down and not using it since.  Dr LAN Alvarado is holding his TKI Cabometyx for his cancer, "as it impairs wound healing" per her note.  This DFU has proven to be long standing, chronic and nonhealing. Pt would to proceed with amputation of the toe so he can get back on chemo.  No podiatrist will take his insurance so I referred to [facility] gen surgery and he met them on 4/25/23: plans to " proceed with amputation after cardiac clearance.  He will continue to come here weekly until then for wound maintenance.       Review of Systems   Constitutional: Negative.    HENT: Negative.     Respiratory: Negative.     Cardiovascular: Negative.    Gastrointestinal:  Positive for nausea.   Musculoskeletal: Negative.    Skin:  Positive for wound.   Neurological:  Positive for numbness. Negative for dizziness, seizures, syncope and facial asymmetry.       Objective:      There were no vitals filed for this visit.    @poctglucose@  No results for input(s): POCTGLUCOSE in the last 24 hours.    Physical Exam  Vitals reviewed.   Constitutional:       Appearance: He is obese.   HENT:      Head: Normocephalic and atraumatic.      Mouth/Throat:      Pharynx: Oropharynx is clear.   Cardiovascular:      Pulses:           Dorsalis pedis pulses are 2+ on the right side and 1+ on the left side.      Comments: Hair on legs bilaterally  Pulmonary:      Effort: Pulmonary effort is normal.   Abdominal:      Palpations: Abdomen is soft.   Musculoskeletal:         General: No swelling or tenderness.        Arms:       Right lower leg: No edema.      Left lower leg: No edema.        Feet:       Comments: Abrasions RUE forearm:healing   Skin:     General: Skin is warm and dry.      Capillary Refill: Capillary refill takes less than 2 seconds.   Neurological:      General: No focal deficit present.      Mental Status: He is alert and oriented to person, place, and time. Mental status is at baseline.                  Assessment:       1. Skin ulcer of left great toe with fat layer exposed    2. Diabetic ulcer of left great toe    3. Diabetic mononeuropathy associated with diabetes mellitus due to underlying condition    4. Self neglect    5. Clear cell carcinoma of right kidney    6. 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 Stage 3b chronic kidney disease    8. Mixed hyperlipidemia    9. Other iron deficiency  anemia          Chronic Left hallux DFU since at least 2021: first clinic visit 3/27/23: recalcitrant: met with [facility] gen surgery on 4/25/23: plans for amputation so he can get back on chemo  3/27/23 cx Many Enterobacter cloacae complex & many alcaligenes faecalis: rx cipro daily   Neg MRI for osteomyelitis on 4/6/23  Diabetes with neuropathy, hba1c 5.9 jan 2023  Kidney cancer since 2020 , prior right nephrectomy, TKI Cabometyx now being held by DR Alvarado, oncology because of the toe ulcer  Hypertension  Bradycardia  Dyslipidemia  Obesity, bmi 33  Self neglect of chronic wound  CKD  Trip/fall on rolling knee scooter on 4/3/23: abrasions to RUE      XR TOE 2 OR MORE VIEWS LEFT     FINDINGS:  There is no acute fracture or malalignment.  There are no destructive bone changes.  The soft tissues are unremarkable.     Impression:   No acute bony abnormality.      Electronically signed by: Bety Nagy  Date:                                            03/27/2023  Time:                                           10:43      MRI FOOT (FOREFOOT) LEFT W W/O CONTRAST     CLINICAL HISTORY:  Foot swelling, diabetic, osteomyelitis suspected, xray done;  Type 2 diabetes mellitus with foot ulcer     TECHNIQUE:  Axial T2 fat sat, axial T1, coronal PD, coronal T2 fat sat, sagittal T1, sagittal STIR, axial T1 fat sat, and 3 plane T1 fat sat post contrast 1.5 T magnetic resonance imaging was performed through the left distal midfoot and forefoot.  20 mL Clariscan gadolinium contrast was administered intravenously.     COMPARISON:  None.  Correlation is made with left forefoot/great toe radiographs of 03/27/2023.     FINDINGS:  There is reactive marrow edema throughout the distal phalanx of the great toe with no osteomyelitis.  There is no abscess or drainable fluid collection.  A shallow soft tissue ulcer is noted at the plantar 1st metatarsal at the level of the distal phalanx.  There is no tendon pathology.  There is mild subcutaneous  edema and swelling along the dorsal forefoot.  No muscle edema or atrophy.  No septic arthritis or other significant joint space abnormality at the forefoot.  There is mild degenerative arthrosis at the 4th tarsometatarsal articulation with no acute pathology at the midfoot.  Alignment is anatomic and there is no acute or chronic fracture.     Impression:     Reactive marrow edema at the distal phalanx of the great toe with no osteomyelitis or abscess.     Shallow soft tissue ulceration at the plantar great toe at the level of the distal phalanx.     Mild degenerative arthrosis at the 4th tarsometatarsal articulation.        Electronically signed by: Logan Gomez  Date:                                            04/06/2023  Time:                                           13:42  Plan:     Plan of Care:    Ulcer debrided  On cipro daily  Ulcer is chronic and not healing and most likely will never heal. This ulcer has caused his chemo for his kidney cancer to be put on hold so he wants to have an amputation; plans in place now with [facility] gen surgery after he gets cardiac clearance  Wound Care Orders: dressings of  silver alginate to be applied to the wound daily  Offloading:using eliot; fell with scooter  Nutrition: Must have a high protein diet to support wound  healing as allowed by his underlying renal disease. Also rec MVI along with vit C, vit D, zinc and Eligio  Diabetes: must have a strict diabetic diet, take meds   Return to clinic 1 week

## 2023-05-07 ENCOUNTER — HOSPITAL ENCOUNTER (INPATIENT)
Facility: HOSPITAL | Age: 63
LOS: 10 days | Discharge: HOME HEALTH | DRG: 616 | End: 2023-05-17
Attending: STUDENT IN AN ORGANIZED HEALTH CARE EDUCATION/TRAINING PROGRAM | Admitting: INTERNAL MEDICINE
Payer: MEDICAID

## 2023-05-07 DIAGNOSIS — L97.522 SKIN ULCER OF LEFT GREAT TOE WITH FAT LAYER EXPOSED: ICD-10-CM

## 2023-05-07 DIAGNOSIS — L97.529 DIABETIC ULCER OF LEFT GREAT TOE: ICD-10-CM

## 2023-05-07 DIAGNOSIS — R50.9 FEVER: ICD-10-CM

## 2023-05-07 DIAGNOSIS — N17.9 AKI (ACUTE KIDNEY INJURY): ICD-10-CM

## 2023-05-07 DIAGNOSIS — L08.9 DIABETIC INFECTION OF LEFT FOOT: ICD-10-CM

## 2023-05-07 DIAGNOSIS — R52 PAIN: ICD-10-CM

## 2023-05-07 DIAGNOSIS — R46.89 SELF NEGLECT: ICD-10-CM

## 2023-05-07 DIAGNOSIS — E11.628 DIABETIC FOOT INFECTION: Primary | ICD-10-CM

## 2023-05-07 DIAGNOSIS — E11.621 DIABETIC ULCER OF LEFT GREAT TOE: ICD-10-CM

## 2023-05-07 DIAGNOSIS — E11.628 DIABETIC INFECTION OF LEFT FOOT: ICD-10-CM

## 2023-05-07 DIAGNOSIS — L03.90 CELLULITIS, UNSPECIFIED CELLULITIS SITE: ICD-10-CM

## 2023-05-07 DIAGNOSIS — L08.9 DIABETIC FOOT INFECTION: Primary | ICD-10-CM

## 2023-05-07 LAB
ALBUMIN SERPL-MCNC: 3.6 G/DL (ref 3.4–4.8)
ALBUMIN/GLOB SERPL: 0.7 RATIO (ref 1.1–2)
ALP SERPL-CCNC: 136 UNIT/L (ref 40–150)
ALT SERPL-CCNC: 14 UNIT/L (ref 0–55)
APPEARANCE UR: CLEAR
AST SERPL-CCNC: 15 UNIT/L (ref 5–34)
BACTERIA #/AREA URNS AUTO: NORMAL /HPF
BASOPHILS # BLD AUTO: 0.06 X10(3)/MCL
BASOPHILS NFR BLD AUTO: 0.3 %
BILIRUB UR QL STRIP.AUTO: NEGATIVE MG/DL
BILIRUBIN DIRECT+TOT PNL SERPL-MCNC: 0.5 MG/DL
BUN SERPL-MCNC: 47.5 MG/DL (ref 8.4–25.7)
CALCIUM SERPL-MCNC: 9.8 MG/DL (ref 8.8–10)
CHLORIDE SERPL-SCNC: 100 MMOL/L (ref 98–107)
CO2 SERPL-SCNC: 27 MMOL/L (ref 23–31)
COLOR UR AUTO: YELLOW
CREAT SERPL-MCNC: 2.54 MG/DL (ref 0.73–1.18)
EOSINOPHIL # BLD AUTO: 0.25 X10(3)/MCL (ref 0–0.9)
EOSINOPHIL NFR BLD AUTO: 1.3 %
ERYTHROCYTE [DISTWIDTH] IN BLOOD BY AUTOMATED COUNT: 16.3 % (ref 11.5–17)
GFR SERPLBLD CREATININE-BSD FMLA CKD-EPI: 28 MLS/MIN/1.73/M2
GLOBULIN SER-MCNC: 5 GM/DL (ref 2.4–3.5)
GLUCOSE SERPL-MCNC: 128 MG/DL (ref 82–115)
GLUCOSE UR QL STRIP.AUTO: NEGATIVE MG/DL
HCT VFR BLD AUTO: 47.1 % (ref 42–52)
HGB BLD-MCNC: 14.3 G/DL (ref 14–18)
IMM GRANULOCYTES # BLD AUTO: 0.19 X10(3)/MCL (ref 0–0.04)
IMM GRANULOCYTES NFR BLD AUTO: 1 %
KETONES UR QL STRIP.AUTO: NEGATIVE MG/DL
LACTATE SERPL-SCNC: 1.3 MMOL/L (ref 0.5–2.2)
LEUKOCYTE ESTERASE UR QL STRIP.AUTO: NEGATIVE UNIT/L
LYMPHOCYTES # BLD AUTO: 1.43 X10(3)/MCL (ref 0.6–4.6)
LYMPHOCYTES NFR BLD AUTO: 7.2 %
MCH RBC QN AUTO: 26.9 PG (ref 27–31)
MCHC RBC AUTO-ENTMCNC: 30.4 G/DL (ref 33–36)
MCV RBC AUTO: 88.5 FL (ref 80–94)
MONOCYTES # BLD AUTO: 1.65 X10(3)/MCL (ref 0.1–1.3)
MONOCYTES NFR BLD AUTO: 8.3 %
NEUTROPHILS # BLD AUTO: 16.27 X10(3)/MCL (ref 2.1–9.2)
NEUTROPHILS NFR BLD AUTO: 81.9 %
NITRITE UR QL STRIP.AUTO: NEGATIVE
NRBC BLD AUTO-RTO: 0 %
PH UR STRIP.AUTO: 7 [PH]
PLATELET # BLD AUTO: 182 X10(3)/MCL (ref 130–400)
PMV BLD AUTO: 11.4 FL (ref 7.4–10.4)
POTASSIUM SERPL-SCNC: 4 MMOL/L (ref 3.5–5.1)
PROT SERPL-MCNC: 8.6 GM/DL (ref 5.8–7.6)
PROT UR QL STRIP.AUTO: ABNORMAL MG/DL
RBC # BLD AUTO: 5.32 X10(6)/MCL (ref 4.7–6.1)
RBC #/AREA URNS AUTO: <5 /HPF
RBC UR QL AUTO: NEGATIVE UNIT/L
SODIUM SERPL-SCNC: 140 MMOL/L (ref 136–145)
SP GR UR STRIP.AUTO: 1.01 (ref 1–1.03)
SQUAMOUS #/AREA URNS AUTO: <5 /HPF
UROBILINOGEN UR STRIP-ACNC: 1 MG/DL
WBC # SPEC AUTO: 19.85 X10(3)/MCL (ref 4.5–11.5)
WBC #/AREA URNS AUTO: <5 /HPF

## 2023-05-07 PROCEDURE — 11000001 HC ACUTE MED/SURG PRIVATE ROOM

## 2023-05-07 PROCEDURE — 99285 EMERGENCY DEPT VISIT HI MDM: CPT | Mod: 25

## 2023-05-07 PROCEDURE — 96366 THER/PROPH/DIAG IV INF ADDON: CPT

## 2023-05-07 PROCEDURE — 87040 BLOOD CULTURE FOR BACTERIA: CPT | Performed by: STUDENT IN AN ORGANIZED HEALTH CARE EDUCATION/TRAINING PROGRAM

## 2023-05-07 PROCEDURE — 82962 GLUCOSE BLOOD TEST: CPT

## 2023-05-07 PROCEDURE — 63600175 PHARM REV CODE 636 W HCPCS: Performed by: NURSE PRACTITIONER

## 2023-05-07 PROCEDURE — 96365 THER/PROPH/DIAG IV INF INIT: CPT

## 2023-05-07 PROCEDURE — 83605 ASSAY OF LACTIC ACID: CPT | Performed by: NURSE PRACTITIONER

## 2023-05-07 PROCEDURE — 25000003 PHARM REV CODE 250: Performed by: NURSE PRACTITIONER

## 2023-05-07 PROCEDURE — 81001 URINALYSIS AUTO W/SCOPE: CPT | Performed by: NURSE PRACTITIONER

## 2023-05-07 PROCEDURE — 85025 COMPLETE CBC W/AUTO DIFF WBC: CPT | Performed by: NURSE PRACTITIONER

## 2023-05-07 PROCEDURE — 80053 COMPREHEN METABOLIC PANEL: CPT | Performed by: NURSE PRACTITIONER

## 2023-05-07 RX ORDER — VANCOMYCIN HCL IN 5 % DEXTROSE 1G/250ML
2000 PLASTIC BAG, INJECTION (ML) INTRAVENOUS ONCE
Status: COMPLETED | OUTPATIENT
Start: 2023-05-07 | End: 2023-05-07

## 2023-05-07 RX ORDER — LIDOCAINE HYDROCHLORIDE 20 MG/ML
INJECTION, SOLUTION INFILTRATION; PERINEURAL
Status: DISPENSED
Start: 2023-05-07 | End: 2023-05-08

## 2023-05-07 RX ADMIN — VANCOMYCIN HYDROCHLORIDE 2000 MG: 1 INJECTION, POWDER, LYOPHILIZED, FOR SOLUTION INTRAVENOUS at 05:05

## 2023-05-07 RX ADMIN — SODIUM CHLORIDE, POTASSIUM CHLORIDE, SODIUM LACTATE AND CALCIUM CHLORIDE 2790 ML: 600; 310; 30; 20 INJECTION, SOLUTION INTRAVENOUS at 05:05

## 2023-05-07 NOTE — PROGRESS NOTES
Pharmacokinetic Initial Assessment: IV Vancomycin    Assessment/Plan:    Initiate intravenous vancomycin with loading dose of 2000 mg once with subsequent doses when random concentrations are less than 20 mcg/mL  Desired empiric serum trough concentration is 10 to 20 mcg/mL  Draw vancomycin random level on 05/08 at 16:00.  Pharmacy will continue to follow and monitor vancomycin.      Please contact pharmacy at extension [phone] with any questions regarding this assessment.     Thank you for the consult,   Micheal Mccrary       Patient brief summary:  Ye Olivera is a 63 y.o. male initiated on antimicrobial therapy with IV Vancomycin for treatment of suspected skin & soft tissue infection    Drug Allergies:   Review of patient's allergies indicates:  No Known Allergies    Actual Body Weight:   93kg    Renal Function:   Estimated Creatinine Clearance: 34.1 mL/min (A) (based on SCr of 2.54 mg/dL (H)).,     Dialysis Method (if applicable):  N/A    CBC (last 72 hours):  Recent Labs   Lab Result Units 05/07/23  1607   WBC x10(3)/mcL 19.85*   Hgb g/dL 14.3   Hct % 47.1   Platelet x10(3)/mcL 182   Mono % % 8.3   Eos % % 1.3   Basophil % % 0.3       Metabolic Panel (last 72 hours):  Recent Labs   Lab Result Units 05/07/23  1415 05/07/23  1430   Sodium Level mmol/L 140  --    Potassium Level mmol/L 4.0  --    Chloride mmol/L 100  --    Carbon Dioxide mmol/L 27  --    Glucose Level mg/dL 128*  --    Glucose, UA mg/dL  --  Negative   Blood Urea Nitrogen mg/dL 47.5*  --    Creatinine mg/dL 2.54*  --    Albumin Level g/dL 3.6  --    Bilirubin Total mg/dL 0.5  --    Alkaline Phosphatase unit/L 136  --    Aspartate Aminotransferase unit/L 15  --    Alanine Aminotransferase unit/L 14  --        Drug levels (last 3 results):  No results for input(s): VANCOMYCINRA, VANCORANDOM, VANCOMYCINPE, VANCOPEAK, VANCOMYCINTR, VANCOTROUGH in the last 72 hours.    Microbiologic Results:  Microbiology Results (last 7 days)       Procedure  Component Value Units Date/Time    Blood culture x two cultures. Draw prior to antibiotics. [[id number]]     Order Status: Canceled Specimen: Blood     Blood culture x two cultures. Draw prior to antibiotics. [[id number]]     Order Status: Canceled Specimen: Blood     Blood Culture #2 **CANNOT BE ORDERED STAT** [[id number]] Collected: 05/07/23 1600    Order Status: Sent Specimen: Blood Updated: 05/07/23 1605    Blood Culture #1 **CANNOT BE ORDERED STAT** [[id number]] Collected: 05/07/23 1600    Order Status: Sent Specimen: Blood Updated: 05/07/23 1604    Blood culture x two cultures. Draw prior to antibiotics. [[id number]]     Order Status: Canceled Specimen: Blood     Blood culture x two cultures. Draw prior to antibiotics. [[id number]]     Order Status: Canceled Specimen: Blood

## 2023-05-07 NOTE — FIRST PROVIDER EVALUATION
Medical screening examination initiated.  I have conducted a focused provider triage encounter, findings are as follows:    Brief history of present illness:  64y/o F presents to the ED with c/o drainage from left great toe. Currently in wound care at this time. States fever and erythema that extends up the leg.     There were no vitals filed for this visit.    Pertinent physical exam:  AAA x 3    Brief workup plan:  Laba    Preliminary workup initiated; this workup will be continued and followed by the physician or advanced practice provider that is assigned to the patient when roomed.

## 2023-05-07 NOTE — Clinical Note
Diagnosis: Diabetic foot infection [579129]   Admitting Provider:: DACIA TAM [[id number]]   Future Attending Provider: DACIA TAM [[id number]]   Reason for IP Medical Treatment  (Clinical interventions that can only be accomplished in the IP setting? ) :: IV abx   I certify that Inpatient services for greater than or equal to 2 midnights are medically necessary:: Yes   Plans for Post-Acute care--if anticipated (pick the single best option):: A. No post acute care anticipated at this time   Special Needs:: No Special Needs [1]

## 2023-05-08 ENCOUNTER — ANESTHESIA (OUTPATIENT)
Dept: SURGERY | Facility: HOSPITAL | Age: 63
DRG: 616 | End: 2023-05-08
Payer: MEDICAID

## 2023-05-08 ENCOUNTER — ANESTHESIA EVENT (OUTPATIENT)
Dept: SURGERY | Facility: HOSPITAL | Age: 63
DRG: 616 | End: 2023-05-08
Payer: MEDICAID

## 2023-05-08 ENCOUNTER — TELEPHONE (OUTPATIENT)
Dept: HEMATOLOGY/ONCOLOGY | Facility: CLINIC | Age: 63
End: 2023-05-08
Payer: MEDICAID

## 2023-05-08 PROBLEM — E11.628 DIABETIC FOOT INFECTION: Status: ACTIVE | Noted: 2023-05-08

## 2023-05-08 PROBLEM — L08.9 DIABETIC FOOT INFECTION: Status: ACTIVE | Noted: 2023-05-08

## 2023-05-08 LAB
ALBUMIN SERPL-MCNC: 2.7 G/DL (ref 3.4–4.8)
ALBUMIN/GLOB SERPL: 0.9 RATIO (ref 1.1–2)
ALP SERPL-CCNC: 90 UNIT/L (ref 40–150)
ALT SERPL-CCNC: 10 UNIT/L (ref 0–55)
AST SERPL-CCNC: 11 UNIT/L (ref 5–34)
BASOPHILS # BLD AUTO: 0.05 X10(3)/MCL
BASOPHILS NFR BLD AUTO: 0.4 %
BILIRUBIN DIRECT+TOT PNL SERPL-MCNC: 0.6 MG/DL
BUN SERPL-MCNC: 39.7 MG/DL (ref 8.4–25.7)
CALCIUM SERPL-MCNC: 8.7 MG/DL (ref 8.8–10)
CHLORIDE SERPL-SCNC: 107 MMOL/L (ref 98–107)
CO2 SERPL-SCNC: 25 MMOL/L (ref 23–31)
CREAT SERPL-MCNC: 2 MG/DL (ref 0.73–1.18)
CRP SERPL-MCNC: 178.3 MG/L
EOSINOPHIL # BLD AUTO: 0.23 X10(3)/MCL (ref 0–0.9)
EOSINOPHIL NFR BLD AUTO: 1.8 %
ERYTHROCYTE [DISTWIDTH] IN BLOOD BY AUTOMATED COUNT: 16.2 % (ref 11.5–17)
ERYTHROCYTE [SEDIMENTATION RATE] IN BLOOD: 79 MM/HR (ref 0–15)
EST. AVERAGE GLUCOSE BLD GHB EST-MCNC: 131.2 MG/DL
GFR SERPLBLD CREATININE-BSD FMLA CKD-EPI: 37 MLS/MIN/1.73/M2
GLOBULIN SER-MCNC: 3.1 GM/DL (ref 2.4–3.5)
GLUCOSE SERPL-MCNC: 83 MG/DL (ref 82–115)
HBA1C MFR BLD: 6.2 %
HCT VFR BLD AUTO: 38.4 % (ref 42–52)
HGB BLD-MCNC: 12 G/DL (ref 14–18)
IMM GRANULOCYTES # BLD AUTO: 0.12 X10(3)/MCL (ref 0–0.04)
IMM GRANULOCYTES NFR BLD AUTO: 1 %
LYMPHOCYTES # BLD AUTO: 1.35 X10(3)/MCL (ref 0.6–4.6)
LYMPHOCYTES NFR BLD AUTO: 10.7 %
MAGNESIUM SERPL-MCNC: 2 MG/DL (ref 1.6–2.6)
MCH RBC QN AUTO: 27.1 PG (ref 27–31)
MCHC RBC AUTO-ENTMCNC: 31.3 G/DL (ref 33–36)
MCV RBC AUTO: 86.9 FL (ref 80–94)
MONOCYTES # BLD AUTO: 1.2 X10(3)/MCL (ref 0.1–1.3)
MONOCYTES NFR BLD AUTO: 9.6 %
NEUTROPHILS # BLD AUTO: 9.61 X10(3)/MCL (ref 2.1–9.2)
NEUTROPHILS NFR BLD AUTO: 76.5 %
NRBC BLD AUTO-RTO: 0 %
PHOSPHATE SERPL-MCNC: 3.6 MG/DL (ref 2.3–4.7)
PLATELET # BLD AUTO: 162 X10(3)/MCL (ref 130–400)
PMV BLD AUTO: 11.5 FL (ref 7.4–10.4)
POCT GLUCOSE: 110 MG/DL (ref 70–110)
POCT GLUCOSE: 64 MG/DL (ref 70–110)
POCT GLUCOSE: 71 MG/DL (ref 70–110)
POCT GLUCOSE: 84 MG/DL (ref 70–110)
POTASSIUM SERPL-SCNC: 3.9 MMOL/L (ref 3.5–5.1)
PROT SERPL-MCNC: 5.8 GM/DL (ref 5.8–7.6)
RBC # BLD AUTO: 4.42 X10(6)/MCL (ref 4.7–6.1)
SODIUM SERPL-SCNC: 144 MMOL/L (ref 136–145)
VANCOMYCIN SERPL-MCNC: 15.9 UG/ML (ref 15–20)
WBC # SPEC AUTO: 12.56 X10(3)/MCL (ref 4.5–11.5)

## 2023-05-08 PROCEDURE — 25000003 PHARM REV CODE 250: Performed by: INTERNAL MEDICINE

## 2023-05-08 PROCEDURE — 71000033 HC RECOVERY, INTIAL HOUR: Performed by: PODIATRIST

## 2023-05-08 PROCEDURE — 83735 ASSAY OF MAGNESIUM: CPT | Performed by: NURSE PRACTITIONER

## 2023-05-08 PROCEDURE — D9220A PRA ANESTHESIA: Mod: ANES,,, | Performed by: ANESTHESIOLOGY

## 2023-05-08 PROCEDURE — 36000706: Performed by: PODIATRIST

## 2023-05-08 PROCEDURE — 21400001 HC TELEMETRY ROOM

## 2023-05-08 PROCEDURE — 87205 SMEAR GRAM STAIN: CPT | Performed by: PODIATRIST

## 2023-05-08 PROCEDURE — 25000003 PHARM REV CODE 250: Performed by: NURSE ANESTHETIST, CERTIFIED REGISTERED

## 2023-05-08 PROCEDURE — 63600175 PHARM REV CODE 636 W HCPCS: Performed by: INTERNAL MEDICINE

## 2023-05-08 PROCEDURE — 88305 TISSUE EXAM BY PATHOLOGIST: CPT | Performed by: PODIATRIST

## 2023-05-08 PROCEDURE — 84100 ASSAY OF PHOSPHORUS: CPT | Performed by: NURSE PRACTITIONER

## 2023-05-08 PROCEDURE — 37000008 HC ANESTHESIA 1ST 15 MINUTES: Performed by: PODIATRIST

## 2023-05-08 PROCEDURE — D9220A PRA ANESTHESIA: Mod: CRNA,,, | Performed by: NURSE ANESTHETIST, CERTIFIED REGISTERED

## 2023-05-08 PROCEDURE — D9220A PRA ANESTHESIA: ICD-10-PCS | Mod: CRNA,,, | Performed by: NURSE ANESTHETIST, CERTIFIED REGISTERED

## 2023-05-08 PROCEDURE — 25000003 PHARM REV CODE 250: Performed by: STUDENT IN AN ORGANIZED HEALTH CARE EDUCATION/TRAINING PROGRAM

## 2023-05-08 PROCEDURE — 63600175 PHARM REV CODE 636 W HCPCS: Performed by: NURSE PRACTITIONER

## 2023-05-08 PROCEDURE — 25000003 PHARM REV CODE 250: Performed by: PODIATRIST

## 2023-05-08 PROCEDURE — 80202 ASSAY OF VANCOMYCIN: CPT | Performed by: NURSE PRACTITIONER

## 2023-05-08 PROCEDURE — 27000221 HC OXYGEN, UP TO 24 HOURS

## 2023-05-08 PROCEDURE — 27201423 OPTIME MED/SURG SUP & DEVICES STERILE SUPPLY: Performed by: PODIATRIST

## 2023-05-08 PROCEDURE — 63600175 PHARM REV CODE 636 W HCPCS: Performed by: NURSE ANESTHETIST, CERTIFIED REGISTERED

## 2023-05-08 PROCEDURE — 88311 DECALCIFY TISSUE: CPT

## 2023-05-08 PROCEDURE — 87070 CULTURE OTHR SPECIMN AEROBIC: CPT | Performed by: PODIATRIST

## 2023-05-08 PROCEDURE — 80053 COMPREHEN METABOLIC PANEL: CPT | Performed by: NURSE PRACTITIONER

## 2023-05-08 PROCEDURE — 83036 HEMOGLOBIN GLYCOSYLATED A1C: CPT | Performed by: NURSE PRACTITIONER

## 2023-05-08 PROCEDURE — 25000003 PHARM REV CODE 250: Performed by: NURSE PRACTITIONER

## 2023-05-08 PROCEDURE — 87075 CULTR BACTERIA EXCEPT BLOOD: CPT | Performed by: PODIATRIST

## 2023-05-08 PROCEDURE — 86140 C-REACTIVE PROTEIN: CPT | Performed by: NURSE PRACTITIONER

## 2023-05-08 PROCEDURE — 11000001 HC ACUTE MED/SURG PRIVATE ROOM

## 2023-05-08 PROCEDURE — 85651 RBC SED RATE NONAUTOMATED: CPT | Performed by: NURSE PRACTITIONER

## 2023-05-08 PROCEDURE — 85025 COMPLETE CBC W/AUTO DIFF WBC: CPT | Performed by: NURSE PRACTITIONER

## 2023-05-08 PROCEDURE — 36000707: Performed by: PODIATRIST

## 2023-05-08 PROCEDURE — 63600175 PHARM REV CODE 636 W HCPCS: Performed by: STUDENT IN AN ORGANIZED HEALTH CARE EDUCATION/TRAINING PROGRAM

## 2023-05-08 PROCEDURE — 37000009 HC ANESTHESIA EA ADD 15 MINS: Performed by: PODIATRIST

## 2023-05-08 PROCEDURE — D9220A PRA ANESTHESIA: ICD-10-PCS | Mod: ANES,,, | Performed by: ANESTHESIOLOGY

## 2023-05-08 RX ORDER — MORPHINE SULFATE 4 MG/ML
4 INJECTION, SOLUTION INTRAMUSCULAR; INTRAVENOUS ONCE
Status: COMPLETED | OUTPATIENT
Start: 2023-05-08 | End: 2023-05-08

## 2023-05-08 RX ORDER — ATORVASTATIN CALCIUM 40 MG/1
80 TABLET, FILM COATED ORAL DAILY
Status: DISCONTINUED | OUTPATIENT
Start: 2023-05-08 | End: 2023-05-17 | Stop reason: HOSPADM

## 2023-05-08 RX ORDER — PROCHLORPERAZINE EDISYLATE 5 MG/ML
5 INJECTION INTRAMUSCULAR; INTRAVENOUS EVERY 6 HOURS PRN
Status: DISCONTINUED | OUTPATIENT
Start: 2023-05-08 | End: 2023-05-17 | Stop reason: HOSPADM

## 2023-05-08 RX ORDER — CARVEDILOL 12.5 MG/1
12.5 TABLET ORAL 2 TIMES DAILY
Status: DISCONTINUED | OUTPATIENT
Start: 2023-05-08 | End: 2023-05-17 | Stop reason: HOSPADM

## 2023-05-08 RX ORDER — ONDANSETRON 2 MG/ML
4 INJECTION INTRAMUSCULAR; INTRAVENOUS EVERY 4 HOURS PRN
Status: DISCONTINUED | OUTPATIENT
Start: 2023-05-08 | End: 2023-05-17 | Stop reason: HOSPADM

## 2023-05-08 RX ORDER — BISACODYL 10 MG
10 SUPPOSITORY, RECTAL RECTAL DAILY PRN
Status: DISCONTINUED | OUTPATIENT
Start: 2023-05-08 | End: 2023-05-17 | Stop reason: HOSPADM

## 2023-05-08 RX ORDER — HYDROCODONE BITARTRATE AND ACETAMINOPHEN 7.5; 325 MG/1; MG/1
1 TABLET ORAL EVERY 6 HOURS PRN
Status: DISCONTINUED | OUTPATIENT
Start: 2023-05-08 | End: 2023-05-09

## 2023-05-08 RX ORDER — SODIUM CHLORIDE 0.9 % (FLUSH) 0.9 %
10 SYRINGE (ML) INJECTION
Status: DISCONTINUED | OUTPATIENT
Start: 2023-05-08 | End: 2023-05-17 | Stop reason: HOSPADM

## 2023-05-08 RX ORDER — PROPOFOL 10 MG/ML
VIAL (ML) INTRAVENOUS CONTINUOUS PRN
Status: DISCONTINUED | OUTPATIENT
Start: 2023-05-08 | End: 2023-05-08

## 2023-05-08 RX ORDER — DESVENLAFAXINE SUCCINATE 50 MG/1
100 TABLET, EXTENDED RELEASE ORAL DAILY
Status: DISCONTINUED | OUTPATIENT
Start: 2023-05-08 | End: 2023-05-17 | Stop reason: HOSPADM

## 2023-05-08 RX ORDER — AMLODIPINE BESYLATE 5 MG/1
5 TABLET ORAL EVERY MORNING
Status: DISCONTINUED | OUTPATIENT
Start: 2023-05-08 | End: 2023-05-08

## 2023-05-08 RX ORDER — IBUPROFEN 200 MG
16 TABLET ORAL
Status: DISCONTINUED | OUTPATIENT
Start: 2023-05-08 | End: 2023-05-17 | Stop reason: HOSPADM

## 2023-05-08 RX ORDER — HYDROCODONE BITARTRATE AND ACETAMINOPHEN 5; 325 MG/1; MG/1
1 TABLET ORAL EVERY 6 HOURS PRN
Status: DISCONTINUED | OUTPATIENT
Start: 2023-05-08 | End: 2023-05-09

## 2023-05-08 RX ORDER — LIDOCAINE HYDROCHLORIDE 20 MG/ML
INJECTION, SOLUTION EPIDURAL; INFILTRATION; INTRACAUDAL; PERINEURAL
Status: DISCONTINUED | OUTPATIENT
Start: 2023-05-08 | End: 2023-05-08

## 2023-05-08 RX ORDER — AMLODIPINE BESYLATE 5 MG/1
5 TABLET ORAL EVERY MORNING
Status: DISCONTINUED | OUTPATIENT
Start: 2023-05-08 | End: 2023-05-13

## 2023-05-08 RX ORDER — SODIUM CHLORIDE 9 MG/ML
INJECTION, SOLUTION INTRAVENOUS CONTINUOUS
Status: DISCONTINUED | OUTPATIENT
Start: 2023-05-08 | End: 2023-05-09

## 2023-05-08 RX ORDER — MAG HYDROX/ALUMINUM HYD/SIMETH 200-200-20
30 SUSPENSION, ORAL (FINAL DOSE FORM) ORAL EVERY 4 HOURS PRN
Status: DISCONTINUED | OUTPATIENT
Start: 2023-05-08 | End: 2023-05-17 | Stop reason: HOSPADM

## 2023-05-08 RX ORDER — HYDROCODONE BITARTRATE AND ACETAMINOPHEN 5; 325 MG/1; MG/1
1 TABLET ORAL EVERY 6 HOURS PRN
Status: DISCONTINUED | OUTPATIENT
Start: 2023-05-08 | End: 2023-05-08

## 2023-05-08 RX ORDER — INSULIN ASPART 100 [IU]/ML
1-10 INJECTION, SOLUTION INTRAVENOUS; SUBCUTANEOUS
Status: DISCONTINUED | OUTPATIENT
Start: 2023-05-08 | End: 2023-05-17 | Stop reason: HOSPADM

## 2023-05-08 RX ORDER — PROPOFOL 10 MG/ML
VIAL (ML) INTRAVENOUS
Status: DISCONTINUED | OUTPATIENT
Start: 2023-05-08 | End: 2023-05-08

## 2023-05-08 RX ORDER — ACETAMINOPHEN 325 MG/1
650 TABLET ORAL EVERY 4 HOURS PRN
Status: DISCONTINUED | OUTPATIENT
Start: 2023-05-08 | End: 2023-05-17 | Stop reason: HOSPADM

## 2023-05-08 RX ORDER — ENOXAPARIN SODIUM 100 MG/ML
40 INJECTION SUBCUTANEOUS EVERY 24 HOURS
Status: DISCONTINUED | OUTPATIENT
Start: 2023-05-08 | End: 2023-05-17 | Stop reason: HOSPADM

## 2023-05-08 RX ORDER — IBUPROFEN 200 MG
24 TABLET ORAL
Status: DISCONTINUED | OUTPATIENT
Start: 2023-05-08 | End: 2023-05-17 | Stop reason: HOSPADM

## 2023-05-08 RX ORDER — TAMSULOSIN HYDROCHLORIDE 0.4 MG/1
1 CAPSULE ORAL NIGHTLY
Status: DISCONTINUED | OUTPATIENT
Start: 2023-05-08 | End: 2023-05-17 | Stop reason: HOSPADM

## 2023-05-08 RX ORDER — BUPIVACAINE HYDROCHLORIDE 5 MG/ML
INJECTION, SOLUTION EPIDURAL; INTRACAUDAL
Status: DISCONTINUED | OUTPATIENT
Start: 2023-05-08 | End: 2023-05-08 | Stop reason: HOSPADM

## 2023-05-08 RX ADMIN — HYDROCODONE BITARTRATE AND ACETAMINOPHEN 1 TABLET: 7.5; 325 TABLET ORAL at 11:05

## 2023-05-08 RX ADMIN — TAMSULOSIN HYDROCHLORIDE 0.4 MG: 0.4 CAPSULE ORAL at 11:05

## 2023-05-08 RX ADMIN — LIDOCAINE HYDROCHLORIDE 4 ML: 20 INJECTION, SOLUTION EPIDURAL; INFILTRATION; INTRACAUDAL; PERINEURAL at 09:05

## 2023-05-08 RX ADMIN — AMLODIPINE BESYLATE 5 MG: 5 TABLET ORAL at 08:05

## 2023-05-08 RX ADMIN — PIPERACILLIN AND TAZOBACTAM 4.5 G: 4; .5 INJECTION, POWDER, LYOPHILIZED, FOR SOLUTION INTRAVENOUS; PARENTERAL at 09:05

## 2023-05-08 RX ADMIN — VANCOMYCIN HYDROCHLORIDE 1500 MG: 1.5 INJECTION, POWDER, LYOPHILIZED, FOR SOLUTION INTRAVENOUS at 07:05

## 2023-05-08 RX ADMIN — MORPHINE SULFATE 4 MG: 4 INJECTION INTRAVENOUS at 01:05

## 2023-05-08 RX ADMIN — CARVEDILOL 12.5 MG: 12.5 TABLET, FILM COATED ORAL at 08:05

## 2023-05-08 RX ADMIN — DESVENLAFAXINE 100 MG: 50 TABLET, EXTENDED RELEASE ORAL at 08:05

## 2023-05-08 RX ADMIN — ENOXAPARIN SODIUM 40 MG: 40 INJECTION SUBCUTANEOUS at 04:05

## 2023-05-08 RX ADMIN — ATORVASTATIN CALCIUM 80 MG: 40 TABLET, FILM COATED ORAL at 08:05

## 2023-05-08 RX ADMIN — PIPERACILLIN AND TAZOBACTAM 4.5 G: 4; .5 INJECTION, POWDER, LYOPHILIZED, FOR SOLUTION INTRAVENOUS; PARENTERAL at 12:05

## 2023-05-08 RX ADMIN — SODIUM CHLORIDE, SODIUM GLUCONATE, SODIUM ACETATE, POTASSIUM CHLORIDE AND MAGNESIUM CHLORIDE: 526; 502; 368; 37; 30 INJECTION, SOLUTION INTRAVENOUS at 09:05

## 2023-05-08 RX ADMIN — ACETAMINOPHEN 325MG 650 MG: 325 TABLET ORAL at 01:05

## 2023-05-08 RX ADMIN — PROPOFOL 50 MG: 10 INJECTION, EMULSION INTRAVENOUS at 09:05

## 2023-05-08 RX ADMIN — HYDROCODONE BITARTRATE AND ACETAMINOPHEN 1 TABLET: 5; 325 TABLET ORAL at 04:05

## 2023-05-08 RX ADMIN — SODIUM CHLORIDE: 9 INJECTION, SOLUTION INTRAVENOUS at 01:05

## 2023-05-08 RX ADMIN — ONDANSETRON 4 MG: 2 INJECTION INTRAMUSCULAR; INTRAVENOUS at 08:05

## 2023-05-08 RX ADMIN — DEXTROSE MONOHYDRATE 125 ML: 100 INJECTION, SOLUTION INTRAVENOUS at 05:05

## 2023-05-08 RX ADMIN — QUETIAPINE 150 MG: 100 TABLET ORAL at 11:05

## 2023-05-08 RX ADMIN — HYDROCODONE BITARTRATE AND ACETAMINOPHEN 1 TABLET: 5; 325 TABLET ORAL at 09:05

## 2023-05-08 RX ADMIN — CARVEDILOL 12.5 MG: 12.5 TABLET, FILM COATED ORAL at 11:05

## 2023-05-08 RX ADMIN — PIPERACILLIN AND TAZOBACTAM 4.5 G: 4; .5 INJECTION, POWDER, LYOPHILIZED, FOR SOLUTION INTRAVENOUS; PARENTERAL at 05:05

## 2023-05-08 RX ADMIN — PROPOFOL 100 MCG/KG/MIN: 10 INJECTION, EMULSION INTRAVENOUS at 09:05

## 2023-05-08 NOTE — H&P
[facility] Hospital Medicine   History & Physical Note      Patient Name: Ye Olivera  : 1960  MRN: [id number]  PCP: Ly Quevedo MD  Admitting Service: Hospital Medicine  Attending Physician: Soila Gardner MD  Admission Date: 2023 - IP- Inpatient   Length of Stay: 0  History source: EMR, patient and/or patient's family  Code status: Full    Chief Complaint   Toe Pain (Left great toe wound, pt in wound care, possible amputation. Wound discharge began yesterday, fever, and redness up his leg since yesterday.)      History of Present Illness   Mr. Olivera is a 64 yo male with hx stage IV RCC status post right radical nephrectomy and lymph node dissection in 2020 (not currently on chemo due to non-healing foot wound), HTN, CAD/CABG, IDDM2 with a chronic left great toe planter diabetic ulcer x2 years presents to the ED with complaints of  new area of skin breakdown of left toe with drainage and fever x2 days. He was recently seen by General surgery at [facility] on  for amputation of the left great toe that way he can start receiving chemotherapy again and he is currently awaiting cardiac clearance.  He did undergo an MRI of the foot on  that was negative for osteomyelitis    Upon arrival into the emergency department patient was hemodynamically stable and afebrile.  Laboratory work was remarkable for leukocytosis of 19,500 with a left shift, BUN 47.5, creatinine 2.5 (baseline creatinine 1.8).  He was started on IV vancomycin and Zosyn and is being admitted to the hospitalist service for further management.    ROS   Except as documented, all other systems reviewed and negative     Past Medical History   Stage IV renal cell carcinoma status post right radical nephrectomy and lymph node dissection on 2020  Type 2 diabetes mellitus   CAD status post CABG in   HTN  Past Surgical History   Appendectomy  Bilateral cataract extraction  Right  radical nephrectomy with lymph node dissection in September of 2020  Major depressive disorder    Social History   Denies alcohol, tobacco or illicit drug use    Family History   Reviewed and negative    Allergies   Patient has no known allergies.    Home Medications     Prior to Admission medications    Medication Sig Start Date End Date Taking? Authorizing Provider   amLODIPine (NORVASC) 5 MG tablet Take 5 mg by mouth every morning. 11/28/22   Historical Provider   amLODIPine (NORVASC) 5 MG tablet Take 1 tablet by mouth every morning. 11/28/22 11/28/23  Historical Provider   aspirin 81 mg Cap Take 81 mg by mouth once daily at 6am. 2/22/22   Historical Provider   atorvastatin (LIPITOR) 80 MG tablet Take 80 mg by mouth once daily. 5/4/22   Historical Provider   atorvastatin (LIPITOR) 80 MG tablet Take 1 tablet by mouth every evening. 3/6/23   Historical Provider   azelastine (ASTELIN) 137 mcg (0.1 %) nasal spray SMARTSIG:Both Nares 3/7/23   Historical Provider   cabozantinib (CABOMETYX) 20 mg Tab Take 1 tablet (20 mg) by mouth once daily. 1/4/23   Jackie Boyd, DESIRE   carvediloL (COREG) 12.5 MG tablet Take 12.5 mg by mouth 2 (two) times daily. 10/11/22   Historical Provider   carvediloL (COREG) 12.5 MG tablet Take 1 tablet by mouth 2 (two) times daily. 1/13/23   Historical Provider   desvenlafaxine succinate (PRISTIQ) 100 MG Tb24 Take 100 mg by mouth. 2/27/23   Historical Provider   desvenlafaxine succinate (PRISTIQ) 50 MG Tb24 Take 100 mg by mouth once daily. 5/17/22   Historical Provider   diclofenac (VOLTAREN) 75 MG EC tablet Take 75 mg by mouth 2 (two) times daily. 4/12/23   Historical Provider   diphenoxylate-atropine 2.5-0.025 mg (LOMOTIL) 2.5-0.025 mg per tablet Take 1 tablet by mouth as needed. 9/2/21   Historical Provider   finasteride (PROSCAR) 5 mg tablet Take 5 mg by mouth as needed. 3/24/22   Historical Provider   furosemide (LASIX) 20 MG tablet Take 20 mg by mouth once daily. Pt stated he thinks  "her takes 10mg daily    Historical Provider   hydrOXYzine pamoate (VISTARIL) 25 MG Cap Take 1 capsule (25 mg total) by mouth every 4 (four) hours as needed (itching). 11/1/22   DESIRE Abbott   hyoscyamine (LEVSIN/SL) 0.125 mg Subl Place 0.125 mg under the tongue every 6 (six) hours as needed. 6/24/22   Historical Provider   insulin glargine (LANTUS) 100 unit/mL injection Inject into the skin. Takes 20units 4/28/22   Historical Provider   insulin syr/ndl U100 half warner (BD VEO INSULIN SYR, HALF UNIT,) 0.3 mL 31 gauge x 15/64" Syrg USE 1 syringe DAILY 5/3/22   Historical Provider   lancets Misc Use TID to check blood sugar. Dx: E11.9, patient is insulin dependent 3/24/22   Historical Provider   multivitamin (THERAGRAN) per tablet Take 1 tablet by mouth once daily.    Historical Provider   ondansetron (ZOFRAN) 4 MG tablet Take 4 mg by mouth every 6 (six) hours as needed. 10/14/21   Historical Provider   pantoprazole (PROTONIX) 40 MG tablet TAKE 1 TABLET BY MOUTH DAILY 4/14/23   DESIRE Abbott   pregabalin (LYRICA) 100 MG capsule Take 100 mg by mouth. 1/18/23 1/18/24  Historical Provider   prochlorperazine (COMPAZINE) 5 MG tablet Take 5 mg by mouth as needed. 4/28/22   Historical Provider   prochlorperazine (COMPAZINE) 5 MG tablet Take 5 mg by mouth every 8 (eight) hours as needed. 4/28/22   Historical Provider   QUEtiapine (SEROQUEL) 50 MG tablet Take 150 mg by mouth every evening. 3/23/22   Historical Provider   QUEtiapine (SEROQUEL) 50 MG tablet Take 3 tablets by mouth every evening. 11/28/22   Historical Provider   sertraline (ZOLOFT) 50 MG tablet Take 50 mg by mouth. 10/31/22   Historical Provider   tamsulosin (FLOMAX) 0.4 mg Cap Take 1 capsule (0.4 mg total) by mouth every evening. 9/22/22   DESIRE Abbott   terbinafine HCL (LAMISIL) 250 mg tablet Take 250 mg by mouth. 9/2/21   Historical Provider   tolterodine (DETROL LA) 4 MG 24 hr capsule Take 4 mg by mouth once daily. 6/1/22   " Historical Provider   traMADoL (ULTRAM) 50 mg tablet Take 50 mg by mouth every 6 (six) hours. 5/17/22   Historical Provider   TRUE METRIX GLUCOSE TEST STRIP Strp 3 (three) times daily. 9/19/22   Historical Provider   tuberculin,purif.prot.deriv. (TUBERSOL IDRM) Inject 0.1 mLs into the skin. 8/3/22   Historical Provider          Physical Exam   Vital Signs  Temp:  [98.4 °F (36.9 °C)]   Pulse:  [67-79]   Resp:  [18]   BP: (155-162)/(79-99)   SpO2:  [97 %-98 %]    General: Appears comfortable  HEENT: NC/AT  Neck:  No JVD  Chest: CTABL  CVS: Regular rhythm. Normal S1/S2.  Abdomen: nondistended, normoactive BS, soft and non-tender.  MSK: No obvious deformity or joint swelling  Skin: deep ulcer to plantar aspect of great toe, no purluence and another wound to the lateral aspect of the left great toe with drainage, streaky erythema going up the leg, warm to touch  Neuro: AAOx3, no focal neurological deficit  Psych: Cooperative    Labs     Recent Labs     05/07/23  1607   WBC 19.85*   RBC 5.32   HGB 14.3   HCT 47.1   MCV 88.5   MCH 26.9*   MCHC 30.4*   RDW 16.3        No results for input(s): PROTIME, INR, PTT, D-DIMER, FERRITIN, IRON, TRANS, TIBC, LABIRON, [id number], FOLATE, LDH, HAPTOGLOBIN, RETICCNTAUTO, RETABS, PERIPSMEAREV in the last 72 hours.   Recent Labs     05/07/23  1415      K 4.0   CHLORIDE 100   CO2 27   BUN 47.5*   CREATININE 2.54*   EGFRNORACEVR 28   GLUCOSE 128*   CALCIUM 9.8   ALBUMIN 3.6   GLOBULIN 5.0*   ALKPHOS 136   ALT 14   AST 15   BILITOT 0.5     Recent Labs     05/07/23  1415   LACTIC 1.3     No results for input(s): CPK, TROPONINI in the last 72 hours.       Microbiology Results (last 7 days)       Procedure Component Value Units Date/Time    Blood culture x two cultures. Draw prior to antibiotics. [[id number]]     Order Status: Canceled Specimen: Blood     Blood culture x two cultures. Draw prior to antibiotics. [[id number]]     Order Status: Canceled Specimen: Blood     Blood Culture  #2 **CANNOT BE ORDERED STAT** [[id number]] Collected: 05/07/23 1600    Order Status: Resulted Specimen: Blood Updated: 05/07/23 1605    Blood Culture #1 **CANNOT BE ORDERED STAT** [[id number]] Collected: 05/07/23 1600    Order Status: Resulted Specimen: Blood Updated: 05/07/23 1604    Blood culture x two cultures. Draw prior to antibiotics. [[id number]]     Order Status: Canceled Specimen: Blood     Blood culture x two cultures. Draw prior to antibiotics. [[id number]]     Order Status: Canceled Specimen: Blood            Imaging     X-Ray Chest PA And Lateral   Final Result      No acute cardiopulmonary process identified.         Electronically signed by: Vimal Wolf   Date:    05/07/2023   Time:    14:59      X-Ray Foot Complete Left   Final Result      No acute osseous abnormality identified.         Electronically signed by: Vimal Wolf   Date:    05/07/2023   Time:    15:02        Assessment & Plan   Sepsis 2/2 Left Great Toe Diabetic Wound Infection  - Hx chronic plantar DM Ulcer, awaiting cards clearance for ray amputation  2.  IDDM Type 2  3.  Stage IV RCC s/p right radical nephrectomy/LND  4.  CAD/CABG in 2009  5.   Essential HTN      PLAN:   - BCX2. IV Vancomycin and Zosyn  - Consult Podiatry. NPO after MN  - PRN analgesics   - ISS. Check A1c.   - Home meds reviewed and resumed  - VTE Prophylaxis: Halie DELACRUZ, Keely Miller, FNP-C have discussed this patients case with Dr. Gardner who agrees with the diagnosis and treatment plan.

## 2023-05-08 NOTE — TELEPHONE ENCOUNTER
Cancelled f/u visit tomorrow. Patient currently admitted @ [facility] for diabetic foot infection. States he may have to have toe amputated. He will notify us once he is discharged.

## 2023-05-08 NOTE — ED PROVIDER NOTES
Encounter Date: 5/7/2023    SCRIBE #1 NOTE: I, Mando Cuellar, am scribing for, and in the presence of,  Sid Landaverde IV, MD. I have scribed the following portions of the note - Other sections scribed: HPI,ROS,PE.     History     Chief Complaint   Patient presents with    Toe Pain     Left great toe wound, pt in wound care, possible amputation. Wound discharge began yesterday, fever, and redness up his leg since yesterday.     62 y/o male with PMHx of CAD sp CABG, type 2 DM, HTN, and HLD  presents to ED c/o Left great toe wound onset 3/14. Pt c/o new onset of erythema from foot up to calf since yesterday. Pt reports new wound that formed on the medial side of his great toe. Pt c/o edema to foot and leg, and pain to toe.    Chart Review: Pt seen at wound care for eval for this wound. Planned for amputation of left great toe.    PCP: Ly Quevedo MD    The history is provided by the patient. No  was used.   Review of patient's allergies indicates:  No Known Allergies  Past Medical History:   Diagnosis Date    Arthritis of knee 08/13/2013    Cancer     stage IV renal cell carcinoma    Cataract     Coronary artery disease     Diabetes mellitus, type 2     HTN (hypertension) 08/13/2013    Hx of CABG     Hyperlipidemia     Type II or unspecified type diabetes mellitus without mention of complication, not stated as uncontrolled 08/13/2013     Past Surgical History:   Procedure Laterality Date    APPENDECTOMY      CATARACT EXTRACTION, BILATERAL       Family History   Problem Relation Age of Onset    Heart disease Mother     Heart disease Father     Heart disease Sister     Breast cancer Other 47    Diabetes Neg Hx     Cancer Neg Hx      Social History     Tobacco Use    Smoking status: Former    Smokeless tobacco: Never   Substance Use Topics    Alcohol use: Not Currently    Drug use: Yes     Frequency: 7.0 times per week     Types: Marijuana     Comment: daily     Review of Systems    Constitutional:  Negative for chills and fever.   HENT:  Negative for congestion, rhinorrhea and sore throat.    Eyes:  Negative for visual disturbance.   Respiratory:  Negative for cough and shortness of breath.    Cardiovascular:  Negative for chest pain.   Gastrointestinal:  Negative for abdominal pain, nausea and vomiting.   Genitourinary:  Negative for dysuria and hematuria.   Musculoskeletal:  Negative for joint swelling.   Skin:  Positive for wound. Negative for rash.   Neurological:  Negative for weakness.   Psychiatric/Behavioral:  Negative for confusion.    All other systems reviewed and are negative.    Physical Exam     Initial Vitals [05/07/23 1423]   BP Pulse Resp Temp SpO2   (!) 155/79 79 18 98.4 °F (36.9 °C) 98 %      MAP       --         Physical Exam    Nursing note and vitals reviewed.  Constitutional: He is not diaphoretic. No distress.   Cardiovascular:  Normal rate and regular rhythm.           Pulses:       Dorsalis pedis pulses are 2+ on the left side.   Pulmonary/Chest: No respiratory distress.   Abdominal: Abdomen is soft. He exhibits no distension. There is no abdominal tenderness.   Musculoskeletal:         General: Edema present.      Comments: Pitting edema.      Neurological: He is alert.   Skin: Skin is warm. There is erythema.   Chronic wound to inferior side of great toe. New ulceration to medial aspect of great toe. Erythema from wound to mid-calf. Warm to touch. No drainage.    Psychiatric: He has a normal mood and affect.       ED Course   Procedures  Labs Reviewed   COMPREHENSIVE METABOLIC PANEL - Abnormal; Notable for the following components:       Result Value    Glucose Level 128 (*)     Blood Urea Nitrogen 47.5 (*)     Creatinine 2.54 (*)     Protein Total 8.6 (*)     Globulin 5.0 (*)     Albumin/Globulin Ratio 0.7 (*)     All other components within normal limits   URINALYSIS, REFLEX TO URINE CULTURE - Abnormal; Notable for the following components:    Protein, UA 1+ (*)      All other components within normal limits   CBC WITH DIFFERENTIAL - Abnormal; Notable for the following components:    WBC 19.85 (*)     MCH 26.9 (*)     MCHC 30.4 (*)     MPV 11.4 (*)     Neut # 16.27 (*)     Mono # 1.65 (*)     IG# 0.19 (*)     All other components within normal limits   LACTIC ACID, PLASMA - Normal   URINALYSIS, MICROSCOPIC - Normal   BLOOD CULTURE OLG   BLOOD CULTURE [facility]   CBC W/ AUTO DIFFERENTIAL    Narrative:     The following orders were created for panel order CBC auto differential.  Procedure                               Abnormality         Status                     ---------                               -----------         ------                     CBC with Differential[[id number]]        Abnormal            Final result                 Please view results for these tests on the individual orders.   SEDIMENTATION RATE   C-REACTIVE PROTEIN   CBC W/ AUTO DIFFERENTIAL    Narrative:     The following orders were created for panel order CBC Auto Differential.  Procedure                               Abnormality         Status                     ---------                               -----------         ------                     CBC with Differential[[id number]]                                                         Please view results for these tests on the individual orders.   COMPREHENSIVE METABOLIC PANEL   MAGNESIUM   PHOSPHORUS   HEMOGLOBIN A1C   CBC WITH DIFFERENTIAL          Imaging Results              X-Ray Chest PA And Lateral (Final result)  Result time 05/07/23 14:59:21      Final result by Vimal Wolf MD (05/07/23 14:59:21)                   Impression:      No acute cardiopulmonary process identified.      Electronically signed by: Vimal Wolf  Date:    05/07/2023  Time:    14:59               Narrative:    EXAMINATION:  XR CHEST PA AND LATERAL    CLINICAL HISTORY:  Fever, unspecified    TECHNIQUE:  Two views.    COMPARISON:  August 13,  2013    FINDINGS:  Cardiopericardial silhouette is within normal limits.  Sternotomy changes.  Right lung chronically is of lesser volume.  No acute dense focal or segmental consolidation, congestion, pleural effusion or pneumothorax.                                       X-Ray Foot Complete Left (Final result)  Result time 05/07/23 15:02:34      Final result by Vimal Wolf MD (05/07/23 15:02:34)                   Impression:      No acute osseous abnormality identified.      Electronically signed by: Vimal Wolf  Date:    05/07/2023  Time:    15:02               Narrative:    EXAMINATION:  XR FOOT COMPLETE 3 VIEW LEFT    CLINICAL HISTORY:  Pain.    TECHNIQUE:  Three views    COMPARISON:  March 27, 2023    FINDINGS:  Left foot articular surfaces are unremarkable and there is no intrinsic osseous abnormality.  There is no acute fracture, dislocation or arthritic change.  Position and alignment is satisfactory.  No acute osteolytic destructive changes suggest osteomyelitis..  There is ulceration along the plantar aspect of the great toe.                                       Medications   vancomycin - pharmacy to dose (has no administration in time range)   LIDOcaine HCL 20 mg/ml (2%) 20 mg/mL (2 %) injection (has no administration in time range)   piperacillin-tazobactam (ZOSYN) 4.5 g in dextrose 5 % in water (D5W) 5 % 100 mL IVPB (MB+) (4.5 g Intravenous New Bag 5/8/23 0031)   sodium chloride 0.9% flush 10 mL (has no administration in time range)   trazodone split tablet 25 mg (has no administration in time range)   ondansetron injection 4 mg (has no administration in time range)   prochlorperazine injection Soln 5 mg (has no administration in time range)   bisacodyL suppository 10 mg (has no administration in time range)   acetaminophen tablet 650 mg (has no administration in time range)   aluminum-magnesium hydroxide-simethicone 200-200-20 mg/5 mL suspension 30 mL (has no administration in time range)   0.9%   NaCl infusion ( Intravenous New Bag 5/8/23 0158)   enoxaparin injection 40 mg (has no administration in time range)   acetaminophen tablet 650 mg (has no administration in time range)   HYDROcodone-acetaminophen 5-325 mg per tablet 1 tablet (has no administration in time range)   insulin aspart U-100 injection 1-10 Units (has no administration in time range)   glucose chewable tablet 16 g (has no administration in time range)   glucose chewable tablet 24 g (has no administration in time range)   HYDROcodone-acetaminophen 5-325 mg per tablet 1 tablet (has no administration in time range)   lactated ringers bolus 2,790 mL (0 mLs Intravenous Stopped 5/7/23 1814)   vancomycin in dextrose 5 % 1 gram/250 mL IVPB 2,000 mg (0 mg Intravenous Stopped 5/7/23 1914)   morphine injection 4 mg (4 mg Intravenous Given 5/8/23 0157)              Scribe Attestation:   Scribe #1: I performed the above scribed service and the documentation accurately describes the services I performed. I attest to the accuracy of the note.    Attending Attestation:           Physician Attestation for Scribe:  Physician Attestation Statement for Scribe #1: I, Sid Landaverde IV, MD, reviewed documentation, as scribed by Mando Cuellar in my presence, and it is both accurate and complete.       Medical Decision Making  Problems Addressed:  Cellulitis, unspecified cellulitis site: acute illness or injury that poses a threat to life or bodily functions  Diabetic foot infection: acute illness or injury that poses a threat to life or bodily functions  Fever: acute illness or injury that poses a threat to life or bodily functions      ED assessment:    63-year-old past medical history of diabetes has a chronic wound to his left great toe now has developed a new ulcer and spreading erythema up his leg exam consistent with diabetic wound infection with superimposed cellulitis  Has elevated WBC with a left shift also has a mild JOE  Patient is not systemically ill  at this time but will require broad-spectrum IV antibiotics admission for MRIs and/or podiatry evaluation and intervention  Hospitalist has accepted admission at this time    Differential diagnosis (including but not limited to):   Diabetic foot infection abscess cellulitis osteomyelitis    ED management:   Spectrum IV antibiotics  IV fluids  Admission    My independent radiology interpretation:   XR L foot - no obvious bony erosions or gas     Amount and/or Complexity of Data Reviewed  Independent historian: none  External data reviewed: notes from outside facilities (through [facility]), prior imaging, prescription medications , and previous procedure and OR notes  Summary of data reviewed: Chart review: Pt seen at wound care for eval for this wound. Planned for amputation of left great toe.   Risk and benefits of testing: discussed   Labs: ordered and reviewed  Radiology: ordered and independent interpretation performed (see above or ED course)  Discussion of management or test interpretation with external provider(s): discussed with hospitalist physician   Summary of discussion: will admit     Risk  Decision regarding hospitalization    Critical Care  none    I, Sid Landaverde MD personally performed the history, PE, MDM, and procedures as documented above and agree with the scribe's documentation.          ED Course as of 05/08/23 0230   Mon May 08, 2023   0010 Admitted to hospitalist [MM]      ED Course User Index  [MM] Veronica Chung                 Clinical Impression:   Final diagnoses:  [R52] Pain  [R50.9] Fever  [E11.628, L08.9] Diabetic foot infection (Primary)  [L03.90] Cellulitis, unspecified cellulitis site  [N17.9] JOE (acute kidney injury)        ED Disposition Condition    Admit Stable                Sid Landaverde IV, MD  05/08/23 0231

## 2023-05-08 NOTE — CONSULTS
[facility]                       [address] [place] [address]    PATIENT NAME:       GARRY OLIVERA  YOB: 1960  CSN:                [id number]   MRN:                [id number]  ADMIT DATE:         05/07/2023 14:26:00  PHYSICIAN:          Shashank Wright DPM                            CONSULTATION    DATE OF CONSULT:  05/08/2023 00:00:00    HISTORY OF PRESENT ILLNESS:  Mr. Olivera is a 63-year-old  gentleman   with several comorbidities, who presented to the emergency room with an infected   left great toe.  He has had a chronic wound off and on now for several years on   the plantar aspect of the toe.  Unfortunately, he has been inconsistent with   treatments.  He was recently sent to the Wound Care Clinic here at [facility], was seen Dr. Ma, who had recommended proceeding with amputation   of the toe in an effort to maximize healing and also to allow him to proceed   with further treatments including chemotherapy.  He was seen at the General   Surgery Clinic at [facility] on the 4th for amputation of the toe.  They were awaiting   surgical clearance, which he did receive and they were contemplating the   surgery, but unfortunately got infected resulting in his current presentation.    I have now been asked to see him concerning further evaluation.    PAST MEDICAL HISTORY:  Again notable for renal cell carcinoma, diabetes with   profound neuropathy, coronary disease, and hypertension.    SURGICAL HISTORY:  Coronary bypass graft, status post nephrectomy and lymph node   dissection; cataracts; and appendectomy.    MEDICATIONS:  As per the chart.    ALLERGIES:  NO KNOWN DRUG OR FOOD ALLERGIES.     SOCIAL HISTORY:  Denies tobacco, alcohol, or IV drug abuse.    FAMILY HISTORY:  Noncontributory.    PHYSICAL EXAMINATION:  GENERAL:  Reveals a moderately overweight gentleman, currently lying in  bed,   does not appear to be in any distress.  VITAL SIGNS:  His current vital signs are stable and he is afebrile.  HEART:  Deferred.  LUNGS:  Deferred.  ABDOMEN:  Deferred.  EXTREMITIES:  Vascular wise, he has palpable pedal pulses.  The feet are warm.    There is no cyanosis or rubor.  NEUROLOGIC:  He has definitely got a substantial loss of light touch in both   feet.  Reflexes deferred.  MUSCULOSKELETAL:  There are no gross pedal deformities.  Muscle testing stable.    No contractures noted.  SKIN:  Derm wise, he has a mal perforans punched-out wound on plantar aspect of   the left great toe with profound surrounding hyperkeratosis.  This wound tracks   inferior and then proximal with desquamation and compromised and evolving   necrotic tissue along the medial aspect of the toe extending back almost to the   MPJ region.  This area is boggy.  He has purulence underneath this particular   site that is expressible.  The remainder of foot appears to be unremarkable.    IMAGING DATA:  He did have an x-ray done, which did not reveal any destructive   changes.    MRI has not been read as of yet.  Upon review of the scan, he has definitely got   a soft tissue defect with subcutaneous inflammatory changes within the area   extending down to the level of approximately the bone.  There is some   questionable bony alteration right at the head.  No destructive changes   appreciated.  There is no gas in the tissues that are noted.  No foreign bodies.    LABORATORY DATA:  Labs reviewed.  White cell counts are down to 12 from 19 last   night.  Sed rate elevated at 79.  CRP notably elevated at 178.    ASSESSMENT:    1. Infected neuropathic wound now with expanding abscess, left great toe, with   some findings suggesting some evolving adjacent bony issues.  2. Diabetes with neuropathy.  3. Chronic kidney disease.    PLAN:  The patient instructed as to the findings of physical exam, current   scans.  Again, MRI findings are  equivocal, but he has a progressive infection   now with evolving abscess necrosis medially.  I would agree the patient would be   best suited with an amputation of the digit understanding the strict adherence   to postoperative care.  He is going to require probably a 2-part surgery, open   amputation to control the infection followed by revisional surgery and closure.    The patient is in agreement with that plan.  He has been n.p.o. since last   night.  We are going to go ahead and put him on the schedule for this evening.    I did inform him that it is probably going to be very late before we can get to   this case and he wants to wait.  I reviewed the procedure including risks,   complications, and alternatives.  All questions answered fully and no guarantees   given or implied.  Consents were obtained.        ______________________________  GUZMAN Ng/LAURA  DD:  05/08/2023  Time:  02:41PM  DT:  05/08/2023  Time:  06:39PM  Job #:  [id number]      CONSULTATION

## 2023-05-08 NOTE — PROGRESS NOTES
Pharmacokinetic Assessment Follow Up: IV Vancomycin    Vancomycin serum concentration assessment(s):    The random level was drawn correctly and can be used to guide therapy at this time. The measurement is within the desired definitive target range of 10 to 20 mcg/mL.    Vancomycin Regimen Plan:    Vancomycin 1500 mg x 1 this pm--check random level at 1700 on 5/9/23.    Drug levels (last 3 results):  Recent Labs   Lab Result Units 05/08/23  1614   Vanc Lvl Random ug/ml 15.9          Patient brief summary:  Ye Olivera is a 63 y.o. male initiated on antimicrobial therapy with IV Vancomycin for treatment of skin & soft tissue infection      Drug Allergies:   Review of patient's allergies indicates:  No Known Allergies    Actual Body Weight:   93 kg    Renal Function:   Estimated Creatinine Clearance: 43.3 mL/min (A) (based on SCr of 2 mg/dL (H)).,     Dialysis Method (if applicable):  N/A    CBC (last 72 hours):  Recent Labs   Lab Result Units 05/07/23  1607 05/08/23  0504   WBC x10(3)/mcL 19.85* 12.56*   Hgb g/dL 14.3 12.0*   Hemoglobin A1c %  --  6.2   Hct % 47.1 38.4*   Platelet x10(3)/mcL 182 162   Mono % % 8.3 9.6   Eos % % 1.3 1.8   Basophil % % 0.3 0.4       Metabolic Panel (last 72 hours):  Recent Labs   Lab Result Units 05/07/23  1415 05/07/23  1430 05/08/23  0504   Sodium Level mmol/L 140  --  144   Potassium Level mmol/L 4.0  --  3.9   Chloride mmol/L 100  --  107   Carbon Dioxide mmol/L 27  --  25   Glucose Level mg/dL 128*  --  83   Glucose, UA mg/dL  --  Negative  --    Blood Urea Nitrogen mg/dL 47.5*  --  39.7*   Creatinine mg/dL 2.54*  --  2.00*   Albumin Level g/dL 3.6  --  2.7*   Bilirubin Total mg/dL 0.5  --  0.6   Alkaline Phosphatase unit/L 136  --  90   Aspartate Aminotransferase unit/L 15  --  11   Alanine Aminotransferase unit/L 14  --  10   Magnesium Level mg/dL  --   --  2.00   Phosphorus Level mg/dL  --   --  3.6       Vancomycin Administrations:  vancomycin given in the last 96  hours                     vancomycin in dextrose 5 % 1 gram/250 mL IVPB 2,000 mg (mg) 2,000 mg New Bag 05/07/23 1714                    Microbiologic Results:  Microbiology Results (last 7 days)       Procedure Component Value Units Date/Time    Blood culture x two cultures. Draw prior to antibiotics. [[id number]]     Order Status: Canceled Specimen: Blood     Blood culture x two cultures. Draw prior to antibiotics. [[id number]]     Order Status: Canceled Specimen: Blood     Blood Culture #2 **CANNOT BE ORDERED STAT** [[id number]] Collected: 05/07/23 1600    Order Status: Resulted Specimen: Blood Updated: 05/07/23 1605    Blood Culture #1 **CANNOT BE ORDERED STAT** [[id number]] Collected: 05/07/23 1600    Order Status: Resulted Specimen: Blood Updated: 05/07/23 1604    Blood culture x two cultures. Draw prior to antibiotics. [[id number]]     Order Status: Canceled Specimen: Blood     Blood culture x two cultures. Draw prior to antibiotics. [[id number]]     Order Status: Canceled Specimen: Blood

## 2023-05-09 LAB
ALBUMIN SERPL-MCNC: 2.8 G/DL (ref 3.4–4.8)
ALBUMIN/GLOB SERPL: 0.8 RATIO (ref 1.1–2)
ALP SERPL-CCNC: 95 UNIT/L (ref 40–150)
ALT SERPL-CCNC: 11 UNIT/L (ref 0–55)
AST SERPL-CCNC: 15 UNIT/L (ref 5–34)
BASOPHILS # BLD AUTO: 0.06 X10(3)/MCL
BASOPHILS NFR BLD AUTO: 0.6 %
BILIRUBIN DIRECT+TOT PNL SERPL-MCNC: 0.6 MG/DL
BUN SERPL-MCNC: 33 MG/DL (ref 8.4–25.7)
CALCIUM SERPL-MCNC: 8.7 MG/DL (ref 8.8–10)
CHLORIDE SERPL-SCNC: 107 MMOL/L (ref 98–107)
CO2 SERPL-SCNC: 25 MMOL/L (ref 23–31)
CREAT SERPL-MCNC: 2.14 MG/DL (ref 0.73–1.18)
EOSINOPHIL # BLD AUTO: 0.06 X10(3)/MCL (ref 0–0.9)
EOSINOPHIL NFR BLD AUTO: 0.6 %
ERYTHROCYTE [DISTWIDTH] IN BLOOD BY AUTOMATED COUNT: 16.2 % (ref 11.5–17)
GFR SERPLBLD CREATININE-BSD FMLA CKD-EPI: 34 MLS/MIN/1.73/M2
GLOBULIN SER-MCNC: 3.4 GM/DL (ref 2.4–3.5)
GLUCOSE SERPL-MCNC: 96 MG/DL (ref 82–115)
GRAM STN SPEC: NORMAL
GRAM STN SPEC: NORMAL
HCT VFR BLD AUTO: 41.5 % (ref 42–52)
HGB BLD-MCNC: 12.6 G/DL (ref 14–18)
IMM GRANULOCYTES # BLD AUTO: 0.09 X10(3)/MCL (ref 0–0.04)
IMM GRANULOCYTES NFR BLD AUTO: 0.9 %
LYMPHOCYTES # BLD AUTO: 1.11 X10(3)/MCL (ref 0.6–4.6)
LYMPHOCYTES NFR BLD AUTO: 11.7 %
MAGNESIUM SERPL-MCNC: 1.9 MG/DL (ref 1.6–2.6)
MCH RBC QN AUTO: 27.2 PG (ref 27–31)
MCHC RBC AUTO-ENTMCNC: 30.4 G/DL (ref 33–36)
MCV RBC AUTO: 89.6 FL (ref 80–94)
MONOCYTES # BLD AUTO: 1.16 X10(3)/MCL (ref 0.1–1.3)
MONOCYTES NFR BLD AUTO: 12.2 %
NEUTROPHILS # BLD AUTO: 7 X10(3)/MCL (ref 2.1–9.2)
NEUTROPHILS NFR BLD AUTO: 74 %
NRBC BLD AUTO-RTO: 0 %
PLATELET # BLD AUTO: 163 X10(3)/MCL (ref 130–400)
PMV BLD AUTO: 11.4 FL (ref 7.4–10.4)
POCT GLUCOSE: 105 MG/DL (ref 70–110)
POCT GLUCOSE: 143 MG/DL (ref 70–110)
POCT GLUCOSE: 91 MG/DL (ref 70–110)
POTASSIUM SERPL-SCNC: 4.9 MMOL/L (ref 3.5–5.1)
PROT SERPL-MCNC: 6.2 GM/DL (ref 5.8–7.6)
RBC # BLD AUTO: 4.63 X10(6)/MCL (ref 4.7–6.1)
SODIUM SERPL-SCNC: 145 MMOL/L (ref 136–145)
VANCOMYCIN SERPL-MCNC: 17.2 UG/ML (ref 15–20)
WBC # SPEC AUTO: 9.48 X10(3)/MCL (ref 4.5–11.5)

## 2023-05-09 PROCEDURE — 25000003 PHARM REV CODE 250: Performed by: PODIATRIST

## 2023-05-09 PROCEDURE — 63600175 PHARM REV CODE 636 W HCPCS: Performed by: PODIATRIST

## 2023-05-09 PROCEDURE — 63600175 PHARM REV CODE 636 W HCPCS: Performed by: INTERNAL MEDICINE

## 2023-05-09 PROCEDURE — 21400001 HC TELEMETRY ROOM

## 2023-05-09 PROCEDURE — 80053 COMPREHEN METABOLIC PANEL: CPT | Performed by: PODIATRIST

## 2023-05-09 PROCEDURE — 80202 ASSAY OF VANCOMYCIN: CPT | Performed by: PODIATRIST

## 2023-05-09 PROCEDURE — 25000003 PHARM REV CODE 250: Performed by: INTERNAL MEDICINE

## 2023-05-09 PROCEDURE — 11000001 HC ACUTE MED/SURG PRIVATE ROOM

## 2023-05-09 PROCEDURE — 85025 COMPLETE CBC W/AUTO DIFF WBC: CPT | Performed by: PODIATRIST

## 2023-05-09 PROCEDURE — 83735 ASSAY OF MAGNESIUM: CPT | Performed by: PODIATRIST

## 2023-05-09 RX ORDER — HYDROCODONE BITARTRATE AND ACETAMINOPHEN 10; 325 MG/1; MG/1
1 TABLET ORAL EVERY 4 HOURS PRN
Status: DISCONTINUED | OUTPATIENT
Start: 2023-05-09 | End: 2023-05-17 | Stop reason: HOSPADM

## 2023-05-09 RX ADMIN — PIPERACILLIN AND TAZOBACTAM 4.5 G: 4; .5 INJECTION, POWDER, LYOPHILIZED, FOR SOLUTION INTRAVENOUS; PARENTERAL at 01:05

## 2023-05-09 RX ADMIN — HYDROCODONE BITARTRATE AND ACETAMINOPHEN 1 TABLET: 10; 325 TABLET ORAL at 06:05

## 2023-05-09 RX ADMIN — PIPERACILLIN AND TAZOBACTAM 4.5 G: 4; .5 INJECTION, POWDER, LYOPHILIZED, FOR SOLUTION INTRAVENOUS; PARENTERAL at 06:05

## 2023-05-09 RX ADMIN — CARVEDILOL 12.5 MG: 12.5 TABLET, FILM COATED ORAL at 09:05

## 2023-05-09 RX ADMIN — TAMSULOSIN HYDROCHLORIDE 0.4 MG: 0.4 CAPSULE ORAL at 08:05

## 2023-05-09 RX ADMIN — HYDROCODONE BITARTRATE AND ACETAMINOPHEN 1 TABLET: 7.5; 325 TABLET ORAL at 01:05

## 2023-05-09 RX ADMIN — ALUMINUM HYDROXIDE, MAGNESIUM HYDROXIDE, AND SIMETHICONE 30 ML: 200; 200; 20 SUSPENSION ORAL at 05:05

## 2023-05-09 RX ADMIN — ATORVASTATIN CALCIUM 80 MG: 40 TABLET, FILM COATED ORAL at 09:05

## 2023-05-09 RX ADMIN — HYDROCODONE BITARTRATE AND ACETAMINOPHEN 1 TABLET: 5; 325 TABLET ORAL at 07:05

## 2023-05-09 RX ADMIN — PIPERACILLIN AND TAZOBACTAM 4.5 G: 4; .5 INJECTION, POWDER, LYOPHILIZED, FOR SOLUTION INTRAVENOUS; PARENTERAL at 09:05

## 2023-05-09 RX ADMIN — ENOXAPARIN SODIUM 40 MG: 40 INJECTION SUBCUTANEOUS at 05:05

## 2023-05-09 RX ADMIN — AMLODIPINE BESYLATE 5 MG: 5 TABLET ORAL at 07:05

## 2023-05-09 RX ADMIN — HYDROCODONE BITARTRATE AND ACETAMINOPHEN 1 TABLET: 10; 325 TABLET ORAL at 11:05

## 2023-05-09 RX ADMIN — QUETIAPINE 150 MG: 100 TABLET ORAL at 08:05

## 2023-05-09 RX ADMIN — VANCOMYCIN HYDROCHLORIDE 1500 MG: 1.5 INJECTION, POWDER, LYOPHILIZED, FOR SOLUTION INTRAVENOUS at 08:05

## 2023-05-09 RX ADMIN — DESVENLAFAXINE 100 MG: 50 TABLET, EXTENDED RELEASE ORAL at 09:05

## 2023-05-09 RX ADMIN — CARVEDILOL 12.5 MG: 12.5 TABLET, FILM COATED ORAL at 08:05

## 2023-05-09 NOTE — PROGRESS NOTES
[facility]                       [address] [address]    PATIENT NAME:       GRARY LAWLER  YOB: 1960  CSN:                [id number]   MRN:                [id number]  ADMIT DATE:         05/07/2023 14:26:00  PHYSICIAN:          Shashank Wright DPM                            PROGRESS NOTE    DATE:  05/09/2023 03:25:23    SUBJECTIVE:  The patient was seen today.  He is status post open amputation left   great toe secondary to grossly infected left great toe wound and abscess.    Apparently, he was voicing to the nursing staff and hospitalists that his pains   are not being controlled, but the patient has been able to get up and walk   around his room with no shoe on.    OBJECTIVE:  VITAL SIGNS:  Stable.  He is currently afebrile.    LABORATORY DATA:  Labs reviewed.  White cell count is normalized at 9.4, H and H   12.6 and 41.5.  Intraoperative cultures are pending.  Blood cultures negative   to date.    ASSESSMENT:  Status post open amputation left great toe secondary to grossly   infected digital wound, bony inflammatory change and abscess.    PLAN:  Wounds were irrigated and repacked.  We will continue with current care.    Wait for final cultures before making determinations as closing this particular   wound.  I prefer to have this wound closed rather than him going home and   trying to provide local wound care.  My concerns are going to be compliance.        ______________________________  Shashank Wright DPM    GAS/AQS  DD:  05/09/2023  Time:  03:37PM  DT:  05/09/2023  Time:  04:58PM  Job #:  [id number]      PROGRESS NOTE

## 2023-05-09 NOTE — NURSING
Nurses Note -- 4 Eyes      5/9/2023   12:27 PM      Skin assessed during: Admit      [x] No Altered Skin Integrity Present    []Prevention Measures Documented      [] Yes- Altered Skin Integrity Present or Discovered   [] LDA Added if Not in Epic (Describe Wound)   [] New Altered Skin Integrity was Present on Admit and Documented in LDA   [] Wound Image Taken    Wound Care Consulted? No    Attending Nurse:  Stefanie Peña RN     Second RN/Staff Member:  Ray Turner LPN    Skin intact no pressure injuries. Pt has a surgical incision to Left great toe.

## 2023-05-09 NOTE — TRANSFER OF CARE
"Anesthesia Transfer of Care Note    Patient: Ye Olivera    Procedure(s) Performed: Procedure(s) (LRB):  AMPUTATION, TOE (Left)    Patient location: PACU    Anesthesia Type: general    Transport from OR: Transported from OR on room air with adequate spontaneous ventilation    Post pain: adequate analgesia    Post assessment: no apparent anesthetic complications and tolerated procedure well    Post vital signs: stable    Level of consciousness: lethargic    Nausea/Vomiting: no nausea/vomiting    Complications: none    Transfer of care protocol was followed      Last vitals:   Visit Vitals  BP (!) 154/81   Pulse 75   Temp 36.7 °C (98.1 °F) (Oral)   Resp 20   Ht 5' 10" (1.778 m)   Wt 93 kg (205 lb)   SpO2 (!) 94%   BMI 29.41 kg/m²     "

## 2023-05-09 NOTE — OP NOTE
[facility]                       [address] [place] [address]    PATIENT NAME:      GARRY LAWLER  YOB: 1960  CSN:               [id number]  MRN:               [id number]  ADMIT DATE:        05/07/2023 14:26:00  PHYSICIAN:         Shashank Wright DPM                          OPERATIVE REPORT      DATE OF SURGERY:    05/08/2023 00:00:00    SURGEON:  Shashank Wright DPM    PREOPERATIVE DIAGNOSIS:  Infected left great toe wound with underlying abscess,   bony inflammatory change.    POSTOPERATIVE DIAGNOSIS:  Infected left great toe wound with underlying abscess,   bony inflammatory change.    PROCEDURE:  Open amputation, left great toe.    ANESTHESIA:  Local with MAC.    HEMOSTASIS:  None.    ESTIMATED BLOOD LOSS:  Less than 5 cc.    MATERIALS:  None.    INJECTABLES:  None.    PATHOLOGY:  Amputated toe sent for gross and micro and culture obtained of the   underlying tissues.    COMPLICATIONS:  None.    DESCRIPTION OF PROCEDURE:  The patient was transferred from the floor to   holding, having been n.p.o. past midnight.  History and physical, preoperative   studies reviewed and there were no contraindications noted.  While in holding,   appropriate IVs were started.  The patient was taken to the OR and placed in a   supine position, after which appropriate anesthetics administered.  This was   supplemented with a total of 8 cc of 0.5% Marcaine plain infiltrated in the left   1st metatarsal block.  The extremity was then prepped and draped aseptically.    Attention was then directed to the left great toe where there was a large mal   perforans ulcer on plantar aspect of the toe with evidence of fluctuance,   crepitation, and epithelial blah wound medially on the toe.  We proceeded with   making a fishmouth type incision, excising out this medial necrotic area.  We   extended the incision full-thickness and  the subcutaneous tissues subjacent were   completely liquefactive extending superior and plantarly and also extending   back towards the 1st MPJ.  The toe was dissected out and disarticulated in toto   from the MPJ, removed and placed on the back table.  Tissues were obtained for   culture.  There was still quite a bit of nonviable tissue along the flaps.  We   proceeded with removal of more of the epithelium and subcutaneous tissues using   scalpel and forceps until we got back to good bleeding viable tissue.  At that   point, there was good viability throughout.  The metatarsal head appeared to be   unremarkable.  Distal sesamoids also visualized and were unremarkable.  The   wound was copiously irrigated with normal saline, approximately 250 cc.    Hemostasis was obtained.  Because of the degree of infection, the wound was   packed open and a bulky Johnson dressing was then placed.    The patient tolerated the procedure and anesthesia well, left the OR to recovery   room with vital signs stable and vascular status intact to the left lower   extremity.  Once recovered, the patient will be transferred back to the floor   for postop management.        ______________________________  GUZMAN Ng/LAURA  DD:  05/08/2023  Time:  10:27PM  DT:  05/09/2023  Time:  02:11AM  Job #:  [id number]      OPERATIVE REPORT

## 2023-05-09 NOTE — PROGRESS NOTES
[facility]  Hospital Medicine Progress Note        Chief Complaint: Inpatient Follow-up for Left great toe infection     HPI:   Mr. Olivera is a 62 yo male with hx stage IV RCC status post right radical nephrectomy and lymph node dissection in September of 2020 (not currently on chemo due to non-healing foot wound), HTN, CAD/CABG, IDDM2 with a chronic left great toe planter diabetic ulcer x2 years presents to the ED with complaints of  new area of skin breakdown of left toe with drainage and fever x2 days. He was recently seen by General surgery at [facility] on 04/05 for amputation of the left great toe that way he can start receiving chemotherapy again and he is currently awaiting cardiac clearance.  He did undergo an MRI of the foot on April 4th that was negative for osteomyelitis     Upon arrival into the emergency department patient was hemodynamically stable and afebrile.  Laboratory work was remarkable for leukocytosis of 19,500 with a left shift, BUN 47.5, creatinine 2.5 (baseline creatinine 1.8).  He was started on IV vancomycin and Zosyn and is being admitted to the hospitalist service for further management.    Seen by podiatrist and left 1st toe was amputated.   Interval Hx:   Patient today awake and comfortable. Pain not well controlled. Has been afebrile. No other issues.   No family at bedside.     Objective/physical exam:  General: In no acute distress  Chest: Clear to auscultation bilaterally  Heart: RRR, +S1, S2, no appreciable murmur  Abdomen: Soft, nontender, BS +  MSK: left foot in bandages   Neurologic: Alert and oriented x4, Cranial nerve II-XII intact, Strength 5/5 in all 4 extremities    VITAL SIGNS: 24 HRS MIN & MAX LAST   Temp  Min: 97.8 °F (36.6 °C)  Max: 98.5 °F (36.9 °C) 97.8 °F (36.6 °C)   BP  Min: 107/62  Max: 155/74 126/70   Pulse  Min: 53  Max: 91  (!) 57   Resp  Min: 16  Max: 26 18   SpO2  Min: 89 %  Max: 96 % (!) 94 %       Recent Labs   Lab 05/07/23  3398  05/08/23  0504 05/09/23  0429   WBC 19.85* 12.56* 9.48   RBC 5.32 4.42* 4.63*   HGB 14.3 12.0* 12.6*   HCT 47.1 38.4* 41.5*   MCV 88.5 86.9 89.6   MCH 26.9* 27.1 27.2   MCHC 30.4* 31.3* 30.4*   RDW 16.3 16.2 16.2    162 163   MPV 11.4* 11.5* 11.4*       Recent Labs   Lab 05/07/23  1415 05/08/23  0504 05/09/23  0429    144 145   K 4.0 3.9 4.9   CO2 27 25 25   BUN 47.5* 39.7* 33.0*   CREATININE 2.54* 2.00* 2.14*   CALCIUM 9.8 8.7* 8.7*   MG  --  2.00 1.90   ALBUMIN 3.6 2.7* 2.8*   ALKPHOS 136 90 95   ALT 14 10 11   AST 15 11 15   BILITOT 0.5 0.6 0.6          Microbiology Results (last 7 days)       Procedure Component Value Units Date/Time    Gram Stain [[id number]] Collected: 05/08/23 2214    Order Status: Completed Specimen: Wound from Foot, Left Updated: 05/09/23 0737     GRAM STAIN No WBC seen      Rare Gram positive cocci    Wound Culture [[id number]] Collected: 05/08/23 2214    Order Status: Completed Specimen: Wound from Foot, Left Updated: 05/09/23 0632     Wound Culture No Growth At 24 Hours    Anaerobic Culture [[id number]] Collected: 05/08/23 2214    Order Status: Sent Specimen: Wound from Foot, Left Updated: 05/08/23 2220    Blood Culture #1 **CANNOT BE ORDERED STAT** [[id number]]  (Normal) Collected: 05/07/23 1600    Order Status: Completed Specimen: Blood Updated: 05/08/23 1800     CULTURE, BLOOD ([facility]) No Growth At 24 Hours    Blood Culture #2 **CANNOT BE ORDERED STAT** [[id number]]  (Normal) Collected: 05/07/23 1600    Order Status: Completed Specimen: Blood Updated: 05/08/23 1800     CULTURE, BLOOD ([facility]) No Growth At 24 Hours    Blood culture x two cultures. Draw prior to antibiotics. [[id number]]     Order Status: Canceled Specimen: Blood     Blood culture x two cultures. Draw prior to antibiotics. [[id number]]     Order Status: Canceled Specimen: Blood     Blood culture x two cultures. Draw prior to antibiotics. [[id number]]     Order Status: Canceled Specimen: Blood     Blood culture x two  cultures. Draw prior to antibiotics. [[id number]]     Order Status: Canceled Specimen: Blood              See below for Radiology    Scheduled Med:   amLODIPine  5 mg Oral QAM    atorvastatin  80 mg Oral Daily    carvediloL  12.5 mg Oral BID    desvenlafaxine succinate  100 mg Oral Daily    enoxaparin  40 mg Subcutaneous Daily    piperacillin-tazobactam (ZOSYN) IVPB  4.5 g Intravenous Q8H    QUEtiapine  150 mg Oral QHS    tamsulosin  1 capsule Oral QHS        Continuous Infusions:       PRN Meds:  acetaminophen, acetaminophen, aluminum-magnesium hydroxide-simethicone, bisacodyL, dextrose 10%, dextrose 10%, glucose, glucose, HYDROcodone-acetaminophen, insulin aspart U-100, ondansetron, prochlorperazine, sodium chloride 0.9%, trazodone, vancomycin - pharmacy to dose       Assessment/Plan:  Sepsis 2/2 Left Great Toe Diabetic Wound Infection  - Hx chronic plantar DM Ulcer, awaiting cards clearance for ray amputation  2.  IDDM Type 2  3.  Stage IV RCC s/p right radical nephrectomy/LND  4.  CAD/CABG in 2009  5.   Essential HTN  6. CKD     Plan:  Patient states his pain is not well controlled. Will increase norco to 10 mg q 4 hrs prn   Cont iv zosyn for now   F/U on cultures, He is s/o left 1st toe amputation  F/U by podiatrist   Blood sugars well controlled, cont only sliding scale   Home meds needs to be updated, informed nurse   Reviewed today's labs and WBC 9.48, Hb 12.6, Plt 163, Na 145, K 4.9, Crt 2.14  Avoid NSAIDs     Cont supportive care     Labs in am     VTE prophylaxis: Lovenox     Patient condition:  Fair    Anticipated discharge and Disposition:         All diagnosis and differential diagnosis have been reviewed; assessment and plan has been documented; I have personally reviewed the labs and test results that are presently available; I have reviewed the patients medication list; I have reviewed the consulting providers response and recommendations. I have reviewed or attempted to review medical records based  upon their availability    All of the patient's questions have been  addressed and answered. Patient's is agreeable to the above stated plan. I will continue to monitor closely and make adjustments to medical management as needed.  _____________________________________________________________________    Nutrition Status:    Radiology:  MRI Foot Toes Without Contrast Left  Narrative: EXAMINATION:  MRI FOOT TOES WITHOUT CONTRAST LEFT    CLINICAL HISTORY:  Foot swelling, diabetic, osteomyelitis suspected, xray done;    TECHNIQUE:  Unenhanced, multiplanar, multisequence MR imaging of the left foot was obtained    COMPARISON:  Radiographs left foot used for comparison dated 05/07/2023.    FINDINGS:  The base of the 2nd metatarsal is well aligned with the middle cuneiform.  Trabecular edema and periosteal reaction are present to the tuft of the distal phalanx of the great toe.  Osteomyelitis is considered unlikely.  Bone marrow signal is within normal limits otherwise.  No effusion or articular erosion to suggest septic arthritis.  Lisfranc ligament is intact.  The intermetatarsal ligaments are intact.  The collateral ligaments are intact.    Myositis is present to the medial intrinsic muscles with trace fluid tracking along the intermuscular septa.  Flexor tendonitis is present to the midfoot.  An ulcer is present in the medial aspect of the great toe.  A 2.5 cm x 1.5 cm focus of subcutaneous phlegmon is present in the ulcer base.  At least a portion of this appears to represent a loculated abscess.  Marked subcutaneous edema is present to the forefoot consistent with cellulitis.  No intermetatarsal bursitis or Huff's neuroma.  Impression: An ulcer is present to the medial aspect of the great toe.  A 2.5 cm x 1.5 cm focus of subcutaneous phlegmon is present in the ulcer base and at least a portion of this likely represents a loculated abscess.  This may be confirmed with the addition of IV contrast if additional  imaging is desired it.  No evidence for osteomyelitis or septic arthritis seen to the underlying bone.  Trace edema and periosteal reaction seen to the tuft of the distal phalanx would be an unusual appearance for osteomyelitis and more likely represents reactive edema or hyperemia.    Marked cellulitis seen to the forefoot.  Edema like signal present to the intrinsic muscles is favored to represent myositis.    Electronically signed by: Da De La Vega  Date:    05/08/2023  Time:    15:19      Kristopher Rios MD   05/09/2023

## 2023-05-09 NOTE — ANESTHESIA PROCEDURE NOTES
Intubation    Date/Time: 5/8/2023 10:07 PM  Performed by: Logan Ahumada CRNA  Authorized by: Dennys Raygoza MD     Intubation:     Induction:  Intravenous    Mask Ventilation:  Not attempted    Attempts:  1    Attempted By:  CRNA    Difficult Airway Encountered?: No      Complications:  None    Airway Device:  Supraglottic airway/LMA    Airway Device Size:  4.0    Style/Cuff Inflation:  Cuffed (inflated to minimal occlusive pressure)    Inflation Amount (mL):  20    Placement Verified By:  Capnometry    Complicating Factors:  None    Findings Post-Intubation:  BS equal bilateral and atraumatic/condition of teeth unchanged

## 2023-05-09 NOTE — PROGRESS NOTES
Hospitalist extended care service/ 35 minutes/ dr rodriguez / Him   Chart reviewed and pt examined. Pt with hx of right renal call ca with htn/ dm and a diabetic left foot toe infection with plans for amputation by dr adam late this evening. Continue iv abs's. Notes by podiatry as well  as hospitalist reviewed and pt examined. Agree with plans. Pt npo. Am labs

## 2023-05-09 NOTE — PLAN OF CARE
05/09/23 1620   Discharge Assessment   Assessment Type Discharge Planning Assessment   Confirmed/corrected address, phone number and insurance Yes   Confirmed Demographics Correct on Facesheet   Source of Information patient   Reason For Admission Diabetic Foot Infection   People in Home spouse   Do you expect to return to your current living situation? Yes   Do you have help at home or someone to help you manage your care at home? Yes   Who are your caregiver(s) and their phone number(s)? Padmini spouse   Prior to hospitilization cognitive status: Alert/Oriented   Current cognitive status: Alert/Oriented   Home Layout Able to live on 1st floor   Equipment Currently Used at Home none   Who is going to help you get home at discharge? Padmini spouse   Discharge Plan A Home with family   DME Needed Upon Discharge  other (see comments)  (TBD)   Discharge Plan discussed with: Patient   Discharge Barriers Identified None     Spoke to pt- he states had sx last pm.  He states he lives with spouse Padmini who will provide transportation and assistance at home- pt states was independent prior.  Has a walking boot, w/c, RW and canes at home.  PCP Dr. Nick Stark .  Pharmacy [address] in [place].

## 2023-05-09 NOTE — ANESTHESIA PREPROCEDURE EVALUATION
05/08/2023  Ye Olivera is a 63 y.o., male.      Pre-op Assessment    I have reviewed the Patient Summary Reports.     I have reviewed the Nursing Notes. I have reviewed the NPO Status.   I have reviewed the Medications.     Review of Systems  Anesthesia Hx:  No problems with previous Anesthesia    Social:  Former Smoker    Hematology/Oncology:  Hematology Normal   Oncology Normal     EENT/Dental:EENT/Dental Normal   Cardiovascular:   Hypertension Past MI CAD   PVD    Pulmonary:   Shortness of breath Sleep Apnea    Renal/:   Chronic Renal Disease, CKD    Hepatic/GI:   GERD    Musculoskeletal:  Musculoskeletal Normal    Neurological:  Neurology Normal    Endocrine:   Diabetes    Dermatological:  Skin Normal    Psych:   Psychiatric History          Physical Exam  General: Well nourished, Cooperative, Alert and Oriented    Airway:  Mallampati: II   Mouth Opening: Normal  TM Distance: Normal  Tongue: Normal  Neck ROM: Normal ROM    Dental:  Intact    Chest/Lungs:  Clear to auscultation, Normal Respiratory Rate    Heart:  Rate: Normal  Rhythm: Regular Rhythm        Anesthesia Plan  Type of Anesthesia, risks & benefits discussed:    Anesthesia Type: Gen Natural Airway, MAC  Intra-op Monitoring Plan: Standard ASA Monitors  Post Op Pain Control Plan: multimodal analgesia  Induction:  IV  Informed Consent: Informed consent signed with the Patient and all parties understand the risks and agree with anesthesia plan.  All questions answered.   ASA Score: 3  Day of Surgery Review of History & Physical: I have interviewed and examined the patient. I have reviewed the patient's H&P dated:     Ready For Surgery From Anesthesia Perspective.     .

## 2023-05-09 NOTE — PROGRESS NOTES
Pharmacokinetic Assessment Follow Up: IV Vancomycin    Vancomycin serum concentration assessment(s):    The random level was drawn correctly and can be used to guide therapy at this time. The measurement is within the desired definitive target range of 10 to 20 mcg/mL.    Vancomycin Regimen Plan:    Vancomycin 1500 mg q24h.  Check trough on 5/11/23 at 1900.    Drug levels (last 3 results):  Recent Labs   Lab Result Units 05/08/23  1614 05/09/23  1729   Vanc Lvl Random ug/ml 15.9 17.2       Pharmacy will continue to follow and monitor vancomycin.    Please contact pharmacy at extension [phone] for questions regarding this assessment.    Thank you for the consult,   Leticia Brothers       Patient brief summary:  Ye Olivera is a 63 y.o. male initiated on antimicrobial therapy with IV Vancomycin for treatment of skin & soft tissue infection      Drug Allergies:   Review of patient's allergies indicates:  No Known Allergies    Actual Body Weight:   93 kg    Renal Function:   Estimated Creatinine Clearance: 40.5 mL/min (A) (based on SCr of 2.14 mg/dL (H)).,     Dialysis Method (if applicable):  N/A    CBC (last 72 hours):  Recent Labs   Lab Result Units 05/07/23  1607 05/08/23  0504 05/09/23  0429   WBC x10(3)/mcL 19.85* 12.56* 9.48   Hgb g/dL 14.3 12.0* 12.6*   Hemoglobin A1c %  --  6.2  --    Hct % 47.1 38.4* 41.5*   Platelet x10(3)/mcL 182 162 163   Mono % % 8.3 9.6 12.2   Eos % % 1.3 1.8 0.6   Basophil % % 0.3 0.4 0.6       Metabolic Panel (last 72 hours):  Recent Labs   Lab Result Units 05/07/23  1415 05/07/23  1430 05/08/23  0504 05/09/23  0429   Sodium Level mmol/L 140  --  144 145   Potassium Level mmol/L 4.0  --  3.9 4.9   Chloride mmol/L 100  --  107 107   Carbon Dioxide mmol/L 27  --  25 25   Glucose Level mg/dL 128*  --  83 96   Glucose, UA mg/dL  --  Negative  --   --    Blood Urea Nitrogen mg/dL 47.5*  --  39.7* 33.0*   Creatinine mg/dL 2.54*  --  2.00* 2.14*   Albumin Level g/dL 3.6  --  2.7* 2.8*    Bilirubin Total mg/dL 0.5  --  0.6 0.6   Alkaline Phosphatase unit/L 136  --  90 95   Aspartate Aminotransferase unit/L 15  --  11 15   Alanine Aminotransferase unit/L 14  --  10 11   Magnesium Level mg/dL  --   --  2.00 1.90   Phosphorus Level mg/dL  --   --  3.6  --        Vancomycin Administrations:  vancomycin given in the last 96 hours                     vancomycin 1.5 g in dextrose 5 % 250 mL IVPB (ready to mix) (mg) 1,500 mg New Bag 05/08/23 1942    vancomycin in dextrose 5 % 1 gram/250 mL IVPB 2,000 mg (mg) 2,000 mg New Bag 05/07/23 1714                    Microbiologic Results:  Microbiology Results (last 7 days)       Procedure Component Value Units Date/Time    Blood Culture #1 **CANNOT BE ORDERED STAT** [[id number]]  (Normal) Collected: 05/07/23 1600    Order Status: Completed Specimen: Blood Updated: 05/09/23 1800     CULTURE, BLOOD ([facility]) No Growth At 48 Hours    Blood Culture #2 **CANNOT BE ORDERED STAT** [[id number]]  (Normal) Collected: 05/07/23 1600    Order Status: Completed Specimen: Blood Updated: 05/09/23 1800     CULTURE, BLOOD ([facility]) No Growth At 48 Hours    Gram Stain [[id number]] Collected: 05/08/23 2214    Order Status: Completed Specimen: Wound from Foot, Left Updated: 05/09/23 0737     GRAM STAIN No WBC seen      Rare Gram positive cocci    Wound Culture [[id number]] Collected: 05/08/23 2214    Order Status: Completed Specimen: Wound from Foot, Left Updated: 05/09/23 0632     Wound Culture No Growth At 24 Hours    Anaerobic Culture [[id number]] Collected: 05/08/23 2214    Order Status: Sent Specimen: Wound from Foot, Left Updated: 05/08/23 2220    Blood culture x two cultures. Draw prior to antibiotics. [[id number]]     Order Status: Canceled Specimen: Blood     Blood culture x two cultures. Draw prior to antibiotics. [[id number]]     Order Status: Canceled Specimen: Blood     Blood culture x two cultures. Draw prior to antibiotics. [[id number]]     Order Status: Canceled Specimen: Blood      Blood culture x two cultures. Draw prior to antibiotics. [[id number]]     Order Status: Canceled Specimen: Blood

## 2023-05-09 NOTE — ANESTHESIA POSTPROCEDURE EVALUATION
Anesthesia Post Evaluation    Patient: Ye Olivera    Procedure(s) Performed: Procedure(s) (LRB):  AMPUTATION, TOE (Left)    Final Anesthesia Type: MAC      Patient location during evaluation: PACU  Patient participation: Yes- Able to Participate  Level of consciousness: awake and alert  Post-procedure vital signs: reviewed and stable  Pain management: adequate  Airway patency: patent  MARCELO mitigation strategies: Multimodal analgesia  PONV status at discharge: No PONV  Anesthetic complications: no      Cardiovascular status: blood pressure returned to baseline and hemodynamically stable  Respiratory status: unassisted  Hydration status: euvolemic  Follow-up not needed.          Vitals Value Taken Time   /59 05/09/23 1553   Temp 36.7 °C (98 °F) 05/09/23 1553   Pulse 56 05/09/23 1553   Resp 18 05/09/23 1313   SpO2 95 % 05/09/23 1553         Event Time   Out of Recovery 05/08/2023 23:00:00         Pain/Harriett Score: Pain Rating Prior to Med Admin: 7 (5/9/2023  1:13 PM)  Pain Rating Post Med Admin: 0 (5/9/2023  8:21 AM)  Harriett Score: 10 (5/8/2023 10:55 PM)

## 2023-05-10 LAB
POCT GLUCOSE: 113 MG/DL (ref 70–110)
POCT GLUCOSE: 139 MG/DL (ref 70–110)
PSYCHE PATHOLOGY RESULT: NORMAL

## 2023-05-10 PROCEDURE — 25000003 PHARM REV CODE 250: Performed by: INTERNAL MEDICINE

## 2023-05-10 PROCEDURE — 63600175 PHARM REV CODE 636 W HCPCS: Performed by: PODIATRIST

## 2023-05-10 PROCEDURE — 25000003 PHARM REV CODE 250: Performed by: PODIATRIST

## 2023-05-10 PROCEDURE — 11000001 HC ACUTE MED/SURG PRIVATE ROOM

## 2023-05-10 PROCEDURE — 63600175 PHARM REV CODE 636 W HCPCS: Performed by: INTERNAL MEDICINE

## 2023-05-10 RX ADMIN — CARVEDILOL 12.5 MG: 12.5 TABLET, FILM COATED ORAL at 08:05

## 2023-05-10 RX ADMIN — CARVEDILOL 12.5 MG: 12.5 TABLET, FILM COATED ORAL at 09:05

## 2023-05-10 RX ADMIN — PIPERACILLIN AND TAZOBACTAM 4.5 G: 4; .5 INJECTION, POWDER, LYOPHILIZED, FOR SOLUTION INTRAVENOUS; PARENTERAL at 04:05

## 2023-05-10 RX ADMIN — QUETIAPINE 150 MG: 100 TABLET ORAL at 08:05

## 2023-05-10 RX ADMIN — ENOXAPARIN SODIUM 40 MG: 40 INJECTION SUBCUTANEOUS at 04:05

## 2023-05-10 RX ADMIN — TAMSULOSIN HYDROCHLORIDE 0.4 MG: 0.4 CAPSULE ORAL at 08:05

## 2023-05-10 RX ADMIN — HYDROCODONE BITARTRATE AND ACETAMINOPHEN 1 TABLET: 10; 325 TABLET ORAL at 10:05

## 2023-05-10 RX ADMIN — DESVENLAFAXINE 100 MG: 50 TABLET, EXTENDED RELEASE ORAL at 09:05

## 2023-05-10 RX ADMIN — PIPERACILLIN AND TAZOBACTAM 4.5 G: 4; .5 INJECTION, POWDER, LYOPHILIZED, FOR SOLUTION INTRAVENOUS; PARENTERAL at 09:05

## 2023-05-10 RX ADMIN — AMLODIPINE BESYLATE 5 MG: 5 TABLET ORAL at 06:05

## 2023-05-10 RX ADMIN — ONDANSETRON 4 MG: 2 INJECTION INTRAMUSCULAR; INTRAVENOUS at 04:05

## 2023-05-10 RX ADMIN — ATORVASTATIN CALCIUM 80 MG: 40 TABLET, FILM COATED ORAL at 09:05

## 2023-05-10 RX ADMIN — VANCOMYCIN HYDROCHLORIDE 1500 MG: 1.5 INJECTION, POWDER, LYOPHILIZED, FOR SOLUTION INTRAVENOUS at 08:05

## 2023-05-10 RX ADMIN — PIPERACILLIN AND TAZOBACTAM 4.5 G: 4; .5 INJECTION, POWDER, LYOPHILIZED, FOR SOLUTION INTRAVENOUS; PARENTERAL at 01:05

## 2023-05-10 NOTE — PROGRESS NOTES
[facility]                       [address] [address]    PATIENT NAME:       GARRY LAWLER  YOB: 1960  CSN:                [id number]   MRN:                [id number]  ADMIT DATE:         05/07/2023 14:26:00  PHYSICIAN:          Shashank Wright DPM                            PROGRESS NOTE    DATE:  05/10/2023 00:00:00    SUBJECTIVE:  The patient is seen today.  He is doing well.  Pains have been   controlled.  No fevers, no chills.  No chest pain, no shortness of breath.  No   GI complaints.    PHYSICAL EXAMINATION:  VITAL SIGNS:  Stable and afebrile.    Preliminary intraoperative cultures currently negative at 24 hours.    Surgical site looks good.  It is well approximated.  Margins viable.  No active   bleeding or drainage.  The 1st metatarsal head is clean and viable.  There is no   pus.  No odor.    ASSESSMENT:  Status post open amputation, left great toe secondary to an   infected digit with abscess.    PLAN:  Wounds were irrigated, repacked.  Continue with current care.  May try to   close this wound in several days.  Pending cultures.        ______________________________  Shashank Wright DPM    GAS/AQS  DD:  05/10/2023  Time:  01:52PM  DT:  05/10/2023  Time:  02:01PM  Job #:  [id number]      PROGRESS NOTE

## 2023-05-10 NOTE — PROGRESS NOTES
[facility]  Hospital Medicine Progress Note        Chief Complaint: Inpatient Follow-up for Left great toe infection     HPI:   Mr. Olivera is a 64 yo male with hx stage IV RCC status post right radical nephrectomy and lymph node dissection in September of 2020 (not currently on chemo due to non-healing foot wound), HTN, CAD/CABG, IDDM2 with a chronic left great toe planter diabetic ulcer x2 years presents to the ED with complaints of  new area of skin breakdown of left toe with drainage and fever x2 days. He was recently seen by General surgery at [facility] on 04/05 for amputation of the left great toe that way he can start receiving chemotherapy again and he is currently awaiting cardiac clearance.  He did undergo an MRI of the foot on April 4th that was negative for osteomyelitis     Upon arrival into the emergency department patient was hemodynamically stable and afebrile.  Laboratory work was remarkable for leukocytosis of 19,500 with a left shift, BUN 47.5, creatinine 2.5 (baseline creatinine 1.8).  He was started on IV vancomycin and Zosyn and is being admitted to the hospitalist service for further management.    Seen by podiatrist and left 1st toe was amputated. Local wound care done by podiatrist.   Interval Hx:   Patient today awake and comfortable. Has been afebrile. Pain now well controlled. Eating well.     Objective/physical exam:  General: In no acute distress  Chest: Clear to auscultation bilaterally  Heart: RRR, +S1, S2, no appreciable murmur  Abdomen: Soft, nontender, BS +  MSK: left foot in bandages   Neurologic: Alert and oriented x4, Cranial nerve II-XII intact, Strength 5/5 in all 4 extremities    VITAL SIGNS: 24 HRS MIN & MAX LAST   Temp  Min: 97.7 °F (36.5 °C)  Max: 98.5 °F (36.9 °C) 97.7 °F (36.5 °C)   BP  Min: 103/64  Max: 144/59 130/73   Pulse  Min: 53  Max: 59  (!) 53   Resp  Min: 18  Max: 20 18   SpO2  Min: 92 %  Max: 96 % 96 %       Recent Labs   Lab  05/07/23  1607 05/08/23  0504 05/09/23  0429   WBC 19.85* 12.56* 9.48   RBC 5.32 4.42* 4.63*   HGB 14.3 12.0* 12.6*   HCT 47.1 38.4* 41.5*   MCV 88.5 86.9 89.6   MCH 26.9* 27.1 27.2   MCHC 30.4* 31.3* 30.4*   RDW 16.3 16.2 16.2    162 163   MPV 11.4* 11.5* 11.4*       Recent Labs   Lab 05/07/23  1415 05/08/23  0504 05/09/23  0429    144 145   K 4.0 3.9 4.9   CO2 27 25 25   BUN 47.5* 39.7* 33.0*   CREATININE 2.54* 2.00* 2.14*   CALCIUM 9.8 8.7* 8.7*   MG  --  2.00 1.90   ALBUMIN 3.6 2.7* 2.8*   ALKPHOS 136 90 95   ALT 14 10 11   AST 15 11 15   BILITOT 0.5 0.6 0.6          Microbiology Results (last 7 days)       Procedure Component Value Units Date/Time    Anaerobic Culture [[id number]] Collected: 05/08/23 2214    Order Status: Completed Specimen: Wound from Foot, Left Updated: 05/10/23 0717     Anaerobe Culture No Anaerobes Isolated    Blood Culture #1 **CANNOT BE ORDERED STAT** [[id number]]  (Normal) Collected: 05/07/23 1600    Order Status: Completed Specimen: Blood Updated: 05/09/23 1800     CULTURE, BLOOD ([facility]) No Growth At 48 Hours    Blood Culture #2 **CANNOT BE ORDERED STAT** [[id number]]  (Normal) Collected: 05/07/23 1600    Order Status: Completed Specimen: Blood Updated: 05/09/23 1800     CULTURE, BLOOD ([facility]) No Growth At 48 Hours    Gram Stain [[id number]] Collected: 05/08/23 2214    Order Status: Completed Specimen: Wound from Foot, Left Updated: 05/09/23 0737     GRAM STAIN No WBC seen      Rare Gram positive cocci    Wound Culture [[id number]] Collected: 05/08/23 2214    Order Status: Completed Specimen: Wound from Foot, Left Updated: 05/09/23 0632     Wound Culture No Growth At 24 Hours    Blood culture x two cultures. Draw prior to antibiotics. [[id number]]     Order Status: Canceled Specimen: Blood     Blood culture x two cultures. Draw prior to antibiotics. [[id number]]     Order Status: Canceled Specimen: Blood     Blood culture x two cultures. Draw prior to antibiotics. [[id number]]      Order Status: Canceled Specimen: Blood     Blood culture x two cultures. Draw prior to antibiotics. [[id number]]     Order Status: Canceled Specimen: Blood              See below for Radiology    Scheduled Med:   amLODIPine  5 mg Oral QAM    atorvastatin  80 mg Oral Daily    carvediloL  12.5 mg Oral BID    desvenlafaxine succinate  100 mg Oral Daily    enoxaparin  40 mg Subcutaneous Daily    piperacillin-tazobactam (ZOSYN) IVPB  4.5 g Intravenous Q8H    QUEtiapine  150 mg Oral QHS    tamsulosin  1 capsule Oral QHS    vancomycin (VANCOCIN) IVPB  15 mg/kg Intravenous Q24H        Continuous Infusions:       PRN Meds:  acetaminophen, acetaminophen, aluminum-magnesium hydroxide-simethicone, bisacodyL, dextrose 10%, dextrose 10%, glucose, glucose, HYDROcodone-acetaminophen, insulin aspart U-100, ondansetron, prochlorperazine, sodium chloride 0.9%, trazodone, vancomycin - pharmacy to dose       Assessment/Plan:  Sepsis 2/2 Left Great Toe Diabetic Wound Infection s/p amputation   2.  IDDM Type 2 stable   3.  Stage IV RCC s/p right radical nephrectomy/LND  4.  CAD/CABG in 2009  5.   Essential HTN  6. CKD     Plan:  Patient doing well with no acute issues   Cont iv zosyn + Vanc for now   F/U on final cultures, He is s/o left 1st toe amputation  F/U by podiatrist, cont local wound care   Blood sugars well controlled, cont only sliding scale   Renal functions are stable. Avoid NSAIDs     Cont supportive care     Labs in am     VTE prophylaxis: Lovenox     Patient condition:  Fair    Anticipated discharge and Disposition:   Home when cleared by podiatrist       All diagnosis and differential diagnosis have been reviewed; assessment and plan has been documented; I have personally reviewed the labs and test results that are presently available; I have reviewed the patients medication list; I have reviewed the consulting providers response and recommendations. I have reviewed or attempted to review medical records based upon their  availability    All of the patient's questions have been  addressed and answered. Patient's is agreeable to the above stated plan. I will continue to monitor closely and make adjustments to medical management as needed.  _____________________________________________________________________    Nutrition Status:    Radiology:  MRI Foot Toes Without Contrast Left  Narrative: EXAMINATION:  MRI FOOT TOES WITHOUT CONTRAST LEFT    CLINICAL HISTORY:  Foot swelling, diabetic, osteomyelitis suspected, xray done;    TECHNIQUE:  Unenhanced, multiplanar, multisequence MR imaging of the left foot was obtained    COMPARISON:  Radiographs left foot used for comparison dated 05/07/2023.    FINDINGS:  The base of the 2nd metatarsal is well aligned with the middle cuneiform.  Trabecular edema and periosteal reaction are present to the tuft of the distal phalanx of the great toe.  Osteomyelitis is considered unlikely.  Bone marrow signal is within normal limits otherwise.  No effusion or articular erosion to suggest septic arthritis.  Lisfranc ligament is intact.  The intermetatarsal ligaments are intact.  The collateral ligaments are intact.    Myositis is present to the medial intrinsic muscles with trace fluid tracking along the intermuscular septa.  Flexor tendonitis is present to the midfoot.  An ulcer is present in the medial aspect of the great toe.  A 2.5 cm x 1.5 cm focus of subcutaneous phlegmon is present in the ulcer base.  At least a portion of this appears to represent a loculated abscess.  Marked subcutaneous edema is present to the forefoot consistent with cellulitis.  No intermetatarsal bursitis or Huff's neuroma.  Impression: An ulcer is present to the medial aspect of the great toe.  A 2.5 cm x 1.5 cm focus of subcutaneous phlegmon is present in the ulcer base and at least a portion of this likely represents a loculated abscess.  This may be confirmed with the addition of IV contrast if additional imaging is  desired it.  No evidence for osteomyelitis or septic arthritis seen to the underlying bone.  Trace edema and periosteal reaction seen to the tuft of the distal phalanx would be an unusual appearance for osteomyelitis and more likely represents reactive edema or hyperemia.    Marked cellulitis seen to the forefoot.  Edema like signal present to the intrinsic muscles is favored to represent myositis.    Electronically signed by: Da De La Vega  Date:    05/08/2023  Time:    15:19      Kristopher Rios MD   05/10/2023

## 2023-05-10 NOTE — TELEPHONE ENCOUNTER
Patient currently admitted for diabetic foot infection and great left toe was amputated. Cabometyx still on hold. Will continue to follow

## 2023-05-10 NOTE — PLAN OF CARE
Problem: Adult Inpatient Plan of Care  Goal: Plan of Care Review  Outcome: Ongoing, Progressing  Goal: Absence of Hospital-Acquired Illness or Injury  Outcome: Ongoing, Progressing  Goal: Optimal Comfort and Wellbeing  Outcome: Ongoing, Progressing  Goal: Readiness for Transition of Care  Outcome: Ongoing, Progressing     Problem: Diabetes Comorbidity  Goal: Blood Glucose Level Within Targeted Range  Outcome: Ongoing, Progressing     Problem: Impaired Wound Healing  Goal: Optimal Wound Healing  Outcome: Ongoing, Progressing     Problem: Fall Injury Risk  Goal: Absence of Fall and Fall-Related Injury  Outcome: Ongoing, Progressing

## 2023-05-10 NOTE — PROGRESS NOTES
Inpatient Nutrition Assessment    Admit Date: 5/7/2023   Total duration of encounter: 3 days     Nutrition Recommendation/Prescription     Continue diabetic diet.\    Add Eligio (provides 90 kcal, 2.5 g protein per serving) BID for wound healing.     Add Boost Glucose Control (provides 250 kcal, 14 g protein per serving) BID to supplement oral intake.     Communication of Recommendations: reviewed with patient and EMR    Nutrition Assessment     Malnutrition Assessment/Nutrition-Focused Physical Exam    Malnutrition in the context of acute illness or injury  Degree of Malnutrition: does not meet criteria  Energy Intake: does not meet criteria (<50% for 4 days)  Interpretation of Weight Loss: does not meet criteria  Body Fat:does not meet criteria  Area of Body Fat Loss: does not meet criteria  Muscle Mass Loss: mild depletion  Area of Muscle Mass Loss: temple region - temporalis muscle  Fluid Accumulation: does not meet criteria   Edema: 1+ edema - trace   Reduced  Strength: unable to obtain  A minimum of two characteristics is recommended for diagnosis of either severe or non-severe malnutrition.    Chart Review    Reason Seen: continuous nutrition monitoring    Malnutrition Screening Tool Results   Have you recently lost weight without trying?: No  Have you been eating poorly because of a decreased appetite?: No   MST Score: 0     Diagnosis:  Sepsis 2/2 Diabetic Foot Infection    Relevant Medical History:  stage IV RCC status post right radical nephrectomy and lymph node dissection in September of 2020 (not currently on chemo due to non-healing foot wound), HTN, CAD/CABG, IDDM2 with a chronic left great toe planter diabetic ulcer x2 years    Nutrition-Related Medications: prn insulin aspart  Calorie Containing IV Medications: no significant kcals from medications at this time    Nutrition-Related Labs:  K&Phos WNL, A1C 6.2  5/9/23: BUN 33, Creat 2.14, Ca 8.7, Alb 2.8, HGB/HCT: 12.6/41.5    Diet/PN Order: Diet  "diabetic  Oral Supplement Order: none  Tube Feeding Order: none  Appetite/Oral Intake: poor/25-50% of meals (some meals 75% others 0%)  Factors Affecting Nutritional Intake: abdominal pain, constipation, and gas/belching  Food/Anabaptism/Cultural Preferences: none reported  Food Allergies: none reported    Skin Integrity: incision  Wound(s):    left 1st toe/diabetic foot infection    Comments  5/10/23: Pt reports low appetite and minimal po intake since 10 am on 23. Skipped all meals except breakfast  and lunch  75% of tray. Low intake attributed to nausea, abdominal discomfort, gas, and belching. Emesis on  evening but not since. BM yesterday; at home has constipation, takes two stool softeners every night and has BM every other day. Takes protonix for gas. Pt prepares all meals at home; avoids vegetables (accept potato and carrots). Not open to diet education or making diet changes at this time. Agreeable to oral beverage supplementation. Pt had no additional questions at this time.      Anthropometrics    Height: 5' 10" (177.8 cm)    Last Weight: 108.1 kg (238 lb 5.1 oz) (05/10/23 0527) Weight Method: Bed Scale  BMI (Calculated): 34.2  BMI Classification: obese grade I (BMI 30-34.9)        Ideal Body Weight (IBW), Male: 166 lb     % Ideal Body Weight, Male (lb): 123.49 %                 Usual Body Weight (UBW), k.9 kg (pt unsure, per EMR fluctuate 235-140 lbs)  % Usual Body Weight: 99.47     Usual Weight Provided By: EMR weight history    Wt Readings from Last 5 Encounters:   05/10/23 108.1 kg (238 lb 5.1 oz)   23 108.9 kg (240 lb)   23 106.6 kg (235 lb)   23 109.1 kg (240 lb 9.6 oz)   23 107.5 kg (236 lb 15.9 oz)     Weight Change(s) Since Admission:  Admit Weight: 93 kg (205 lb) (23 1423)  No recent significant wt change    Estimated Needs    Weight Used For Calorie Calculations: 108.1 kg (238 lb 5.1 oz)  Energy Calorie Requirements (kcal): 2258  Energy Need " Method: HetalSt Meléndez (SF 1.2 for sepsis)  Weight Used For Protein Calculations: 108.1 kg (238 lb 5.1 oz)  Protein Requirements: 76 g (0.7 g/kg CBW)     Temp (24hrs), Av.1 °F (36.7 °C), Min:97.7 °F (36.5 °C), Max:98.5 °F (36.9 °C)         Enteral Nutrition    Patient not receiving enteral nutrition at this time.    Parenteral Nutrition    Patient not receiving parenteral nutrition support at this time.    Evaluation of Received Nutrient Intake    Calories: not meeting estimated needs  Protein: not meeting estimated needs    Patient Education    Not applicable. (Patient refused)    Nutrition Diagnosis     PES: Inadequate energy intake related to  nausea, gas, chronic constipation, and low appetite as evidenced by low intake of <50% of estimated energy needs since 23 (skipped all meals except breakfast  and lunch ). (new)    Interventions/Goals     Intervention(s): general/healthful diet and commercial beverage  Goal: Meet greater than 75% of nutritional needs by follow-up. (new)    Monitoring & Evaluation     Dietitian will monitor food and beverage intake, electrolyte/renal panel, and tolerance .  Nutrition Risk/Follow-Up: moderate (follow-up in 3-5 days)   Please consult if re-assessment needed sooner.

## 2023-05-11 LAB
ANION GAP SERPL CALC-SCNC: 13 MEQ/L
BASOPHILS # BLD AUTO: 0.09 X10(3)/MCL
BASOPHILS NFR BLD AUTO: 0.9 %
BUN SERPL-MCNC: 33.5 MG/DL (ref 8.4–25.7)
CALCIUM SERPL-MCNC: 9.5 MG/DL (ref 8.8–10)
CHLORIDE SERPL-SCNC: 102 MMOL/L (ref 98–107)
CO2 SERPL-SCNC: 25 MMOL/L (ref 23–31)
CREAT SERPL-MCNC: 2 MG/DL (ref 0.73–1.18)
CREAT/UREA NIT SERPL: 17
EOSINOPHIL # BLD AUTO: 0.29 X10(3)/MCL (ref 0–0.9)
EOSINOPHIL NFR BLD AUTO: 2.8 %
ERYTHROCYTE [DISTWIDTH] IN BLOOD BY AUTOMATED COUNT: 15.9 % (ref 11.5–17)
GFR SERPLBLD CREATININE-BSD FMLA CKD-EPI: 37 MLS/MIN/1.73/M2
GLUCOSE SERPL-MCNC: 121 MG/DL (ref 82–115)
HCT VFR BLD AUTO: 42.9 % (ref 42–52)
HGB BLD-MCNC: 13.2 G/DL (ref 14–18)
IMM GRANULOCYTES # BLD AUTO: 0.28 X10(3)/MCL (ref 0–0.04)
IMM GRANULOCYTES NFR BLD AUTO: 2.7 %
LYMPHOCYTES # BLD AUTO: 2.11 X10(3)/MCL (ref 0.6–4.6)
LYMPHOCYTES NFR BLD AUTO: 20.3 %
MCH RBC QN AUTO: 26.8 PG (ref 27–31)
MCHC RBC AUTO-ENTMCNC: 30.8 G/DL (ref 33–36)
MCV RBC AUTO: 87 FL (ref 80–94)
MONOCYTES # BLD AUTO: 1.23 X10(3)/MCL (ref 0.1–1.3)
MONOCYTES NFR BLD AUTO: 11.9 %
NEUTROPHILS # BLD AUTO: 6.37 X10(3)/MCL (ref 2.1–9.2)
NEUTROPHILS NFR BLD AUTO: 61.4 %
NRBC BLD AUTO-RTO: 0 %
PLATELET # BLD AUTO: 208 X10(3)/MCL (ref 130–400)
PMV BLD AUTO: 10.8 FL (ref 7.4–10.4)
POCT GLUCOSE: 115 MG/DL (ref 70–110)
POCT GLUCOSE: 132 MG/DL (ref 70–110)
POTASSIUM SERPL-SCNC: 4.3 MMOL/L (ref 3.5–5.1)
RBC # BLD AUTO: 4.93 X10(6)/MCL (ref 4.7–6.1)
SODIUM SERPL-SCNC: 140 MMOL/L (ref 136–145)
VANCOMYCIN TROUGH SERPL-MCNC: 24.3 UG/ML (ref 15–20)
WBC # SPEC AUTO: 10.37 X10(3)/MCL (ref 4.5–11.5)

## 2023-05-11 PROCEDURE — 85025 COMPLETE CBC W/AUTO DIFF WBC: CPT | Performed by: INTERNAL MEDICINE

## 2023-05-11 PROCEDURE — 25000003 PHARM REV CODE 250: Performed by: PODIATRIST

## 2023-05-11 PROCEDURE — 11000001 HC ACUTE MED/SURG PRIVATE ROOM

## 2023-05-11 PROCEDURE — 80202 ASSAY OF VANCOMYCIN: CPT | Performed by: INTERNAL MEDICINE

## 2023-05-11 PROCEDURE — 80048 BASIC METABOLIC PNL TOTAL CA: CPT | Performed by: INTERNAL MEDICINE

## 2023-05-11 PROCEDURE — 25000003 PHARM REV CODE 250: Performed by: INTERNAL MEDICINE

## 2023-05-11 PROCEDURE — 63600175 PHARM REV CODE 636 W HCPCS: Performed by: PODIATRIST

## 2023-05-11 RX ORDER — SODIUM CHLORIDE 9 MG/ML
INJECTION, SOLUTION INTRAVENOUS CONTINUOUS
Status: ACTIVE | OUTPATIENT
Start: 2023-05-11 | End: 2023-05-12

## 2023-05-11 RX ADMIN — CARVEDILOL 12.5 MG: 12.5 TABLET, FILM COATED ORAL at 10:05

## 2023-05-11 RX ADMIN — ATORVASTATIN CALCIUM 80 MG: 40 TABLET, FILM COATED ORAL at 10:05

## 2023-05-11 RX ADMIN — TAMSULOSIN HYDROCHLORIDE 0.4 MG: 0.4 CAPSULE ORAL at 08:05

## 2023-05-11 RX ADMIN — TRAZODONE HYDROCHLORIDE 25 MG: 50 TABLET ORAL at 12:05

## 2023-05-11 RX ADMIN — PIPERACILLIN AND TAZOBACTAM 4.5 G: 4; .5 INJECTION, POWDER, LYOPHILIZED, FOR SOLUTION INTRAVENOUS; PARENTERAL at 10:05

## 2023-05-11 RX ADMIN — SODIUM CHLORIDE: 9 INJECTION, SOLUTION INTRAVENOUS at 11:05

## 2023-05-11 RX ADMIN — CARVEDILOL 12.5 MG: 12.5 TABLET, FILM COATED ORAL at 08:05

## 2023-05-11 RX ADMIN — PIPERACILLIN AND TAZOBACTAM 4.5 G: 4; .5 INJECTION, POWDER, LYOPHILIZED, FOR SOLUTION INTRAVENOUS; PARENTERAL at 12:05

## 2023-05-11 RX ADMIN — ENOXAPARIN SODIUM 40 MG: 40 INJECTION SUBCUTANEOUS at 05:05

## 2023-05-11 RX ADMIN — QUETIAPINE 150 MG: 100 TABLET ORAL at 08:05

## 2023-05-11 RX ADMIN — AMLODIPINE BESYLATE 5 MG: 5 TABLET ORAL at 10:05

## 2023-05-11 RX ADMIN — DESVENLAFAXINE 100 MG: 50 TABLET, EXTENDED RELEASE ORAL at 10:05

## 2023-05-11 RX ADMIN — PIPERACILLIN AND TAZOBACTAM 4.5 G: 4; .5 INJECTION, POWDER, LYOPHILIZED, FOR SOLUTION INTRAVENOUS; PARENTERAL at 05:05

## 2023-05-11 NOTE — PROGRESS NOTES
[facility]  Hospital Medicine Progress Note        Chief Complaint: Inpatient Follow-up    HPI:   63-year-old male with significant history of stage IV renal cell carcinoma status post right nephrectomy, lymph node dissection in September 2020, HTN, CAD status post CABG, type 2 diabetes mellitus with diabetic neuropathy, nonhealing left great toe plantar diabetic ulcer wound which has been present for almost 2 years.  Patient presented to the ED with worsening wound with associated drainage, fever.  Patient has not received any chemotherapy because of the chronic wound infection.  He had seen general surgeon in [facility] for possible amputation so that chemotherapy can be considered.  MRI in April was negative for osteomyelitis.  Patient was hemodynamically stable and afebrile.  Lab significant for leukocytosis, acute on chronic kidney disease.  Patient was initiated on IV antibiotics and was admitted hospitalist medicine service.  Podiatry was consulted.  MRI repeated this hospitalization revealed abscesses with no osteomyelitis.  Patient was taken to OR by Podiatry and he underwent open amputation of the left great toe.    Interval Hx:     Patient seen at bedside.  Comfortably sitting in the couch.  Hemodynamics stable.  Afebrile.  Left foot covered in Ace bandage.  No new complaints.  Objective/physical exam:  General: In no acute distress, afebrile  Chest: Clear to auscultation bilaterally  Heart: S1, S2, no appreciable murmur  Abdomen: Soft, nontender, BS +  MSK:  Left foot covered in Ace bandage   Neurologic: Alert and oriented x4, moving all extremities with good strength     VITAL SIGNS: 24 HRS MIN & MAX LAST   Temp  Min: 97.8 °F (36.6 °C)  Max: 98.7 °F (37.1 °C) 98.7 °F (37.1 °C)   BP  Min: 107/73  Max: 136/72 121/78   Pulse  Min: 54  Max: 77  77   Resp  Min: 17  Max: 19 18   SpO2  Min: 93 %  Max: 95 % 95 %       Recent Labs   Lab 05/11/23  0349   WBC 10.37   RBC 4.93   HGB 13.2*    HCT 42.9   MCV 87.0   MCH 26.8*   MCHC 30.8*   RDW 15.9      MPV 10.8*         Recent Labs   Lab 05/09/23  0429 05/11/23  0348    140   K 4.9 4.3   CO2 25 25   BUN 33.0* 33.5*   CREATININE 2.14* 2.00*   CALCIUM 8.7* 9.5   MG 1.90  --    ALBUMIN 2.8*  --    ALKPHOS 95  --    ALT 11  --    AST 15  --    BILITOT 0.6  --           Microbiology Results (last 7 days)       Procedure Component Value Units Date/Time    Blood Culture #1 **CANNOT BE ORDERED STAT** [[id number]]  (Normal) Collected: 05/07/23 1600    Order Status: Completed Specimen: Blood Updated: 05/10/23 1800     CULTURE, BLOOD ([facility]) No Growth At 72 Hours    Blood Culture #2 **CANNOT BE ORDERED STAT** [[id number]]  (Normal) Collected: 05/07/23 1600    Order Status: Completed Specimen: Blood Updated: 05/10/23 1800     CULTURE, BLOOD ([facility]) No Growth At 72 Hours    Anaerobic Culture [[id number]] Collected: 05/08/23 2214    Order Status: Completed Specimen: Wound from Foot, Left Updated: 05/10/23 0717     Anaerobe Culture No Anaerobes Isolated    Gram Stain [[id number]] Collected: 05/08/23 2214    Order Status: Completed Specimen: Wound from Foot, Left Updated: 05/09/23 0737     GRAM STAIN No WBC seen      Rare Gram positive cocci    Wound Culture [[id number]] Collected: 05/08/23 2214    Order Status: Completed Specimen: Wound from Foot, Left Updated: 05/09/23 0632     Wound Culture No Growth At 24 Hours    Blood culture x two cultures. Draw prior to antibiotics. [[id number]]     Order Status: Canceled Specimen: Blood     Blood culture x two cultures. Draw prior to antibiotics. [[id number]]     Order Status: Canceled Specimen: Blood     Blood culture x two cultures. Draw prior to antibiotics. [[id number]]     Order Status: Canceled Specimen: Blood     Blood culture x two cultures. Draw prior to antibiotics. [[id number]]     Order Status: Canceled Specimen: Blood              Scheduled Med:   amLODIPine  5 mg Oral QAM    atorvastatin  80 mg Oral Daily     carvediloL  12.5 mg Oral BID    desvenlafaxine succinate  100 mg Oral Daily    enoxaparin  40 mg Subcutaneous Daily    piperacillin-tazobactam (ZOSYN) IVPB  4.5 g Intravenous Q8H    QUEtiapine  150 mg Oral QHS    tamsulosin  1 capsule Oral QHS    vancomycin (VANCOCIN) IVPB  15 mg/kg Intravenous Q24H          Assessment/Plan:    Infected chronic left great toe wound with underlying abscess status post open amputation  Sepsis secondary to above  Acute on chronic kidney disease, stage III   Stage IV RCC status post right nephrectomy, lymph node dissection   Essential HTN-stable   Type 2 diabetes mellitus   Diabetic nephropathy  CAD status post CABG   Prophylaxis    Will continue IV Zosyn, vancomycin   MRI was negative for osteomyelitis this hospitalization  Follow-up intraoperative cultures  Wound care per Podiatry  Podiatry prefers wound closure prior to DC given his history of noncompliance   IV fluids-normal saline at 75 cc/hour for acute on chronic kidney disease   No obstructive uropathy   Will have to be cautious with vancomycin  CBG is pretty stable   Continue sliding scale   Continue other home meds-amlodipine, statin, Coreg, desvenlafaxine, Seroquel, tamsulosin  DVT prophylaxis-Lovenox      Olivia Luque MD   05/11/2023

## 2023-05-12 LAB
ABS NEUT (OLG): 8.03 X10(3)/MCL (ref 2.1–9.2)
ALBUMIN SERPL-MCNC: 3.1 G/DL (ref 3.4–4.8)
ALBUMIN/GLOB SERPL: 0.8 RATIO (ref 1.1–2)
ALP SERPL-CCNC: 105 UNIT/L (ref 40–150)
ALT SERPL-CCNC: 18 UNIT/L (ref 0–55)
AST SERPL-CCNC: 25 UNIT/L (ref 5–34)
BACTERIA BLD CULT: NORMAL
BACTERIA BLD CULT: NORMAL
BACTERIA SPEC ANAEROBE CULT: ABNORMAL
BACTERIA SPEC ANAEROBE CULT: ABNORMAL
BACTERIA SPEC CULT: ABNORMAL
BILIRUBIN DIRECT+TOT PNL SERPL-MCNC: 0.6 MG/DL
BUN SERPL-MCNC: 37.8 MG/DL (ref 8.4–25.7)
CALCIUM SERPL-MCNC: 9.8 MG/DL (ref 8.8–10)
CHLORIDE SERPL-SCNC: 107 MMOL/L (ref 98–107)
CO2 SERPL-SCNC: 19 MMOL/L (ref 23–31)
CREAT SERPL-MCNC: 2.15 MG/DL (ref 0.73–1.18)
EOSINOPHIL NFR BLD MANUAL: 0.12 X10(3)/MCL (ref 0–0.9)
EOSINOPHIL NFR BLD MANUAL: 1 %
ERYTHROCYTE [DISTWIDTH] IN BLOOD BY AUTOMATED COUNT: 15.9 % (ref 11.5–17)
GFR SERPLBLD CREATININE-BSD FMLA CKD-EPI: 34 MLS/MIN/1.73/M2
GLOBULIN SER-MCNC: 4 GM/DL (ref 2.4–3.5)
GLUCOSE SERPL-MCNC: 129 MG/DL (ref 82–115)
HCT VFR BLD AUTO: 45.1 % (ref 42–52)
HGB BLD-MCNC: 14.2 G/DL (ref 14–18)
INSTRUMENT WBC (OLG): 12.36 X10(3)/MCL
LYMPHOCYTES NFR BLD MANUAL: 2.6 X10(3)/MCL
LYMPHOCYTES NFR BLD MANUAL: 21 %
MCH RBC QN AUTO: 27 PG (ref 27–31)
MCHC RBC AUTO-ENTMCNC: 31.5 G/DL (ref 33–36)
MCV RBC AUTO: 85.9 FL (ref 80–94)
METAMYELOCYTES NFR BLD MANUAL: 2 %
MONOCYTES NFR BLD MANUAL: 1.73 X10(3)/MCL (ref 0.1–1.3)
MONOCYTES NFR BLD MANUAL: 14 %
NEUTROPHILS NFR BLD MANUAL: 63 %
NRBC BLD AUTO-RTO: 0 %
PLATELET # BLD AUTO: 231 X10(3)/MCL (ref 130–400)
PLATELET # BLD EST: NORMAL 10*3/UL
PMV BLD AUTO: 10.7 FL (ref 7.4–10.4)
POCT GLUCOSE: 116 MG/DL (ref 70–110)
POCT GLUCOSE: 123 MG/DL (ref 70–110)
POCT GLUCOSE: 123 MG/DL (ref 70–110)
POCT GLUCOSE: 134 MG/DL (ref 70–110)
POCT GLUCOSE: 140 MG/DL (ref 70–110)
POTASSIUM SERPL-SCNC: 4.2 MMOL/L (ref 3.5–5.1)
PROT SERPL-MCNC: 7.1 GM/DL (ref 5.8–7.6)
RBC # BLD AUTO: 5.25 X10(6)/MCL (ref 4.7–6.1)
RBC MORPH BLD: NORMAL
SODIUM SERPL-SCNC: 142 MMOL/L (ref 136–145)
VANCOMYCIN SERPL-MCNC: 18.8 UG/ML (ref 15–20)
WBC # SPEC AUTO: 12.36 X10(3)/MCL (ref 4.5–11.5)

## 2023-05-12 PROCEDURE — 85025 COMPLETE CBC W/AUTO DIFF WBC: CPT | Performed by: INTERNAL MEDICINE

## 2023-05-12 PROCEDURE — 63600175 PHARM REV CODE 636 W HCPCS: Performed by: PODIATRIST

## 2023-05-12 PROCEDURE — 25000003 PHARM REV CODE 250: Performed by: PODIATRIST

## 2023-05-12 PROCEDURE — 11000001 HC ACUTE MED/SURG PRIVATE ROOM

## 2023-05-12 PROCEDURE — 63600175 PHARM REV CODE 636 W HCPCS: Performed by: INTERNAL MEDICINE

## 2023-05-12 PROCEDURE — 25000003 PHARM REV CODE 250: Performed by: INTERNAL MEDICINE

## 2023-05-12 PROCEDURE — 85027 COMPLETE CBC AUTOMATED: CPT | Performed by: INTERNAL MEDICINE

## 2023-05-12 PROCEDURE — 80202 ASSAY OF VANCOMYCIN: CPT | Performed by: INTERNAL MEDICINE

## 2023-05-12 PROCEDURE — 80053 COMPREHEN METABOLIC PANEL: CPT | Performed by: INTERNAL MEDICINE

## 2023-05-12 RX ORDER — SODIUM CHLORIDE 9 MG/ML
INJECTION, SOLUTION INTRAVENOUS CONTINUOUS
Status: DISCONTINUED | OUTPATIENT
Start: 2023-05-12 | End: 2023-05-13

## 2023-05-12 RX ORDER — VANCOMYCIN HCL IN 5 % DEXTROSE 1G/250ML
1000 PLASTIC BAG, INJECTION (ML) INTRAVENOUS
Status: DISCONTINUED | OUTPATIENT
Start: 2023-05-12 | End: 2023-05-13

## 2023-05-12 RX ORDER — FINASTERIDE 5 MG/1
5 TABLET, FILM COATED ORAL DAILY
Status: DISCONTINUED | OUTPATIENT
Start: 2023-05-12 | End: 2023-05-17 | Stop reason: HOSPADM

## 2023-05-12 RX ORDER — SERTRALINE HYDROCHLORIDE 50 MG/1
50 TABLET, FILM COATED ORAL DAILY
Status: DISCONTINUED | OUTPATIENT
Start: 2023-05-12 | End: 2023-05-17 | Stop reason: HOSPADM

## 2023-05-12 RX ORDER — PANTOPRAZOLE SODIUM 40 MG/1
40 TABLET, DELAYED RELEASE ORAL DAILY
Status: DISCONTINUED | OUTPATIENT
Start: 2023-05-12 | End: 2023-05-17 | Stop reason: HOSPADM

## 2023-05-12 RX ORDER — TRAMADOL HYDROCHLORIDE 50 MG/1
50 TABLET ORAL EVERY 6 HOURS
Status: DISCONTINUED | OUTPATIENT
Start: 2023-05-12 | End: 2023-05-12

## 2023-05-12 RX ORDER — PREGABALIN 50 MG/1
100 CAPSULE ORAL 2 TIMES DAILY
Status: DISCONTINUED | OUTPATIENT
Start: 2023-05-12 | End: 2023-05-17 | Stop reason: HOSPADM

## 2023-05-12 RX ADMIN — PREGABALIN 100 MG: 50 CAPSULE ORAL at 12:05

## 2023-05-12 RX ADMIN — INSULIN DETEMIR 12 UNITS: 100 INJECTION, SOLUTION SUBCUTANEOUS at 09:05

## 2023-05-12 RX ADMIN — PIPERACILLIN AND TAZOBACTAM 4.5 G: 4; .5 INJECTION, POWDER, LYOPHILIZED, FOR SOLUTION INTRAVENOUS; PARENTERAL at 01:05

## 2023-05-12 RX ADMIN — SERTRALINE HYDROCHLORIDE 50 MG: 50 TABLET ORAL at 12:05

## 2023-05-12 RX ADMIN — SODIUM CHLORIDE: 9 INJECTION, SOLUTION INTRAVENOUS at 12:05

## 2023-05-12 RX ADMIN — QUETIAPINE 150 MG: 100 TABLET ORAL at 09:05

## 2023-05-12 RX ADMIN — FINASTERIDE 5 MG: 5 TABLET, FILM COATED ORAL at 12:05

## 2023-05-12 RX ADMIN — CARVEDILOL 12.5 MG: 12.5 TABLET, FILM COATED ORAL at 09:05

## 2023-05-12 RX ADMIN — AMLODIPINE BESYLATE 5 MG: 5 TABLET ORAL at 06:05

## 2023-05-12 RX ADMIN — PIPERACILLIN AND TAZOBACTAM 4.5 G: 4; .5 INJECTION, POWDER, LYOPHILIZED, FOR SOLUTION INTRAVENOUS; PARENTERAL at 04:05

## 2023-05-12 RX ADMIN — PANTOPRAZOLE SODIUM 40 MG: 40 TABLET, DELAYED RELEASE ORAL at 12:05

## 2023-05-12 RX ADMIN — SODIUM CHLORIDE: 9 INJECTION, SOLUTION INTRAVENOUS at 01:05

## 2023-05-12 RX ADMIN — PREGABALIN 100 MG: 50 CAPSULE ORAL at 09:05

## 2023-05-12 RX ADMIN — THERA TABS 1 TABLET: TAB at 12:05

## 2023-05-12 RX ADMIN — VANCOMYCIN HYDROCHLORIDE 1000 MG: 1 INJECTION, POWDER, LYOPHILIZED, FOR SOLUTION INTRAVENOUS at 09:05

## 2023-05-12 RX ADMIN — TAMSULOSIN HYDROCHLORIDE 0.4 MG: 0.4 CAPSULE ORAL at 09:05

## 2023-05-12 RX ADMIN — ATORVASTATIN CALCIUM 80 MG: 40 TABLET, FILM COATED ORAL at 09:05

## 2023-05-12 RX ADMIN — PIPERACILLIN AND TAZOBACTAM 4.5 G: 4; .5 INJECTION, POWDER, LYOPHILIZED, FOR SOLUTION INTRAVENOUS; PARENTERAL at 10:05

## 2023-05-12 RX ADMIN — ENOXAPARIN SODIUM 40 MG: 40 INJECTION SUBCUTANEOUS at 04:05

## 2023-05-12 RX ADMIN — DESVENLAFAXINE 100 MG: 50 TABLET, EXTENDED RELEASE ORAL at 09:05

## 2023-05-12 NOTE — PROGRESS NOTES
[facility]                       [address] [address]    PATIENT NAME:       GARRY LAWLER  YOB: 1960  CSN:                [id number]   MRN:                [id number]  ADMIT DATE:         05/07/2023 14:26:00  PHYSICIAN:          Shashank Wright DPM                            PROGRESS NOTE    DATE:  05/11/2023 00:00:00    SUBJECTIVE:  The patient was seen today.  He is doing well, not having any   complaints.  No reported fevers or chills.    PHYSICAL EXAMINATION:  VITAL SIGNS:  Stable and he is afebrile.    LABS:  Reviewed.  White cell counts remain normalized to 10.37.    Pulmonary cultures thus far growing out Staphylococcus aureus.  Finals are   pending, but also growing out anaerobes with Peptostreptococcus thus far.    Surgical site is stable.  There is one ecchymotic area along the plantar medial   flap area, but otherwise no pus or odor.  Surrounding tissues are viable.  No   fluctuance or crepitation.    ASSESSMENT:  Status post open amputation of left great toe secondary to   polymicrobial infection abscess.    PLAN:  Wounds were irrigated and then re-bandaged.  Again waiting for final   cultures prior to making a determination as closure.  Continue with current   care.  Reassess tomorrow.        ______________________________  Shashank Wright DPM    GAS/AQS  DD:  05/11/2023  Time:  06:28PM  DT:  05/11/2023  Time:  08:28PM  Job #:  [id number]      PROGRESS NOTE

## 2023-05-12 NOTE — PROGRESS NOTES
[facility]                       [address] [address]    PATIENT NAME:       GARRY LAWLER  YOB: 1960  CSN:                [id number]   MRN:                [id number]  ADMIT DATE:         05/07/2023 14:26:00  PHYSICIAN:          Shashank Wright DPM                            PROGRESS NOTE    DATE:  05/12/2023 00:00:00    SUBJECTIVE:  The patient is seen today, doing well.  No major complaints.  Did   have an episode of loose stools earlier today, but nothing since then.  Did pull   his IV out and put some blood on his dressings.  No other issues.    OBJECTIVE:  Vital signs are stable and currently afebrile.    Labs reviewed.  White cell counts remain around 12.  H and H are stable.  BUN   and creatinine 37.8 and 2.15 respectively.    Updates on cultures:  Intraoperative labs through grew out MSSA, Prevotella and   Peptostreptococcus.    Surgical site continues to improve.  Inflammatory changes are resolved.  Margins   are viable.  No radha necrosis.  Scant serous exudate.  No pus.  No odor.    ASSESSMENT:  Status post open amputation left great toe secondary to   polymicrobial infection abscess.    PLAN:  Wounds were irrigated.  Wounds redressed.  Plans to revise the surgical   site on Monday with attempted closure.  We will continue with current   antibiotics.  ID has been consulted.  Can probably discontinue the vancomycin,   give him Zosyn.        ______________________________  Shashank Wright DPM    GAS/AQS  DD:  05/12/2023  Time:  03:49PM  DT:  05/12/2023  Time:  04:53PM  Job #:  [id number]      PROGRESS NOTE

## 2023-05-12 NOTE — PROGRESS NOTES
Inpatient Nutrition Assessment    Admit Date: 5/7/2023   Total duration of encounter: 5 days     Nutrition Recommendation/Prescription     Continue diabetic diet.\    Continue Eligio (provides 90 kcal, 2.5 g protein per serving) BID for wound healing.     Continue Boost Glucose Control (provides 250 kcal, 14 g protein per serving) BID to supplement oral intake.     Communication of Recommendations: reviewed with patient and EMR    Nutrition Assessment     Malnutrition Assessment/Nutrition-Focused Physical Exam    Malnutrition in the context of acute illness or injury  Degree of Malnutrition: does not meet criteria  Energy Intake: does not meet criteria (<50% for 4 days)  Interpretation of Weight Loss: does not meet criteria  Body Fat:does not meet criteria  Area of Body Fat Loss: does not meet criteria  Muscle Mass Loss: mild depletion  Area of Muscle Mass Loss: temple region - temporalis muscle  Fluid Accumulation: does not meet criteria   Edema: 1+ edema - trace   Reduced  Strength: unable to obtain  A minimum of two characteristics is recommended for diagnosis of either severe or non-severe malnutrition.    Chart Review    Reason Seen: continuous nutrition monitoring    Malnutrition Screening Tool Results   Have you recently lost weight without trying?: No  Have you been eating poorly because of a decreased appetite?: No   MST Score: 0     Diagnosis:  Sepsis 2/2 Diabetic Foot Infection    Relevant Medical History:  stage IV RCC status post right radical nephrectomy and lymph node dissection in September of 2020 (not currently on chemo due to non-healing foot wound), HTN, CAD/CABG, IDDM2 with a chronic left great toe planter diabetic ulcer x2 years    Nutrition-Related Medications: prn insulin aspart  Calorie Containing IV Medications: no significant kcals from medications at this time    Nutrition-Related Labs:  K&Phos WNL, A1C 6.2  5/9/23: BUN 33, Creat 2.14, Ca 8.7, Alb 2.8, HGB/HCT: 12.6/41.5  5/12/23: BUN  "37.8, Cr 2.15, Glu 129, Alb 3.1, A1C 6.2    Diet/PN Order: Diet diabetic  Oral Supplement Order: none  Tube Feeding Order: none  Appetite/Oral Intake: good/% of meals   Factors Affecting Nutritional Intake: constipation  Food/Religion/Cultural Preferences: none reported  Food Allergies: none reported    Skin Integrity: incision (LEFT FOOT)  Wound(s):    left 1st toe/diabetic foot infection    Comments  5/10/23: Pt reports low appetite and minimal po intake since 10 am on 23. Skipped all meals except breakfast  and lunch  75% of tray. Low intake attributed to nausea, abdominal discomfort, gas, and belching. Emesis on  evening but not since. BM yesterday; at home has constipation, takes two stool softeners every night and has BM every other day. Takes protonix for gas. Pt prepares all meals at home; avoids vegetables (accept potato and carrots). Not open to diet education or making diet changes at this time. Agreeable to oral beverage supplementation. Pt had no additional questions at this time.      23: Pt reports good appetite, drinking Boost and Eligio, abdominal pain and nausea significantly better, still w/ slow bowels as is his baseline but they are moving. Discussed diabetic diet and importance of good protein intake for wound healing.     Anthropometrics    Height: 5' 10" (177.8 cm)    Last Weight: 108.1 kg (238 lb 5.1 oz) (05/10/23 0527) Weight Method: Bed Scale  BMI (Calculated): 34.2  BMI Classification: obese grade I (BMI 30-34.9)        Ideal Body Weight (IBW), Male: 166 lb     % Ideal Body Weight, Male (lb): 123.49 %                 Usual Body Weight (UBW), k.9 kg (pt unsure, per EMR fluctuate 235-140 lbs)  % Usual Body Weight: 99.47     Usual Weight Provided By: EMR weight history    Wt Readings from Last 5 Encounters:   05/10/23 108.1 kg (238 lb 5.1 oz)   23 108.9 kg (240 lb)   23 106.6 kg (235 lb)   23 109.1 kg (240 lb 9.6 oz)   23 107.5 kg (236 lb " 15.9 oz)     Weight Change(s) Since Admission:  Admit Weight: 93 kg (205 lb) (23 1423)  No recent significant wt change    Estimated Needs    Weight Used For Calorie Calculations: 108.1 kg (238 lb 5.1 oz)  Energy Calorie Requirements (kcal): 2258  Energy Need Method: [place]-St Jeor (SF 1.2 for sepsis)  Weight Used For Protein Calculations: 108.1 kg (238 lb 5.1 oz)  Protein Requirements: 76 g (0.7 g/kg CBW)     Temp (24hrs), Av.1 °F (36.7 °C), Min:97.6 °F (36.4 °C), Max:98.3 °F (36.8 °C)         Enteral Nutrition    Patient not receiving enteral nutrition at this time.    Parenteral Nutrition    Patient not receiving parenteral nutrition support at this time.    Evaluation of Received Nutrient Intake    Calories: meeting estimated needs  Protein: meeting estimated needs    Patient Education    Not applicable. (Patient refused)    Nutrition Diagnosis     PES: Inadequate energy intake related to  nausea, gas, chronic constipation, and low appetite as evidenced by low intake of <50% of estimated energy needs since 23 (skipped all meals except breakfast  and lunch ). (new)    Interventions/Goals     Intervention(s): general/healthful diet and commercial beverage  Goal: Meet greater than 75% of nutritional needs by follow-up. (new)    Monitoring & Evaluation     Dietitian will monitor food and beverage intake, electrolyte/renal panel, and tolerance .  Nutrition Risk/Follow-Up: moderate (follow-up in 3-5 days)   Please consult if re-assessment needed sooner.

## 2023-05-12 NOTE — PROGRESS NOTES
Pharmacokinetic Assessment Follow Up: IV Vancomycin    Vancomycin serum concentration assessment(s):    The trough level was drawn correctly and can be used to guide therapy at this time. The measurement is above the desired definitive target range of 15 to 20 mcg/mL.    Vancomycin Regimen Plan:    Change regimen to Vancomycin 1000 mg IV every 24 hours with next serum trough concentration measured at 60 minutes prior to 0800 dose on 05/14    Drug levels (last 3 results):  Recent Labs   Lab Result Units 05/09/23  1729 05/11/23  1836 05/12/23  0252   Vanc Lvl Random ug/ml 17.2  --  18.8   Vancomycin Trough ug/ml  --  24.3*  --        Pharmacy will continue to follow and monitor vancomycin.    Please contact pharmacy at extension [phone] for questions regarding this assessment.    Thank you for the consult,   Logan Velazquez       Patient brief summary:  Ye Olivera is a 63 y.o. male initiated on antimicrobial therapy with IV Vancomycin for treatment of skin & soft tissue infection    The patient's current regimen is 1000mg q24h    Drug Allergies:   Review of patient's allergies indicates:  No Known Allergies    Actual Body Weight:   108.1 kg    Renal Function:   Estimated Creatinine Clearance: 43.3 mL/min (A) (based on SCr of 2.15 mg/dL (H)).,     Dialysis Method (if applicable):  N/A    CBC (last 72 hours):  Recent Labs   Lab Result Units 05/11/23  0349 05/12/23  0252   WBC x10(3)/mcL 10.37 12.36*   Hgb g/dL 13.2* 14.2   Hct % 42.9 45.1   Platelet x10(3)/mcL 208 231   Mono % % 11.9  --    Monocyte Man %  --  14   Eos % % 2.8  --    Eos Man %  --  1   Basophil % % 0.9  --        Metabolic Panel (last 72 hours):  Recent Labs   Lab Result Units 05/11/23  0348 05/12/23  0252   Sodium Level mmol/L 140 142   Potassium Level mmol/L 4.3 4.2   Chloride mmol/L 102 107   Carbon Dioxide mmol/L 25 19*   Glucose Level mg/dL 121* 129*   Blood Urea Nitrogen mg/dL 33.5* 37.8*   Creatinine mg/dL 2.00* 2.15*   Albumin Level g/dL  --   3.1*   Bilirubin Total mg/dL  --  0.6   Alkaline Phosphatase unit/L  --  105   Aspartate Aminotransferase unit/L  --  25   Alanine Aminotransferase unit/L  --  18       Vancomycin Administrations:  vancomycin given in the last 96 hours                     vancomycin 1.5 g in dextrose 5 % 250 mL IVPB (ready to mix) (mg) 1,500 mg New Bag 05/10/23 2049     1,500 mg New Bag 05/09/23 2010    vancomycin 1.5 g in dextrose 5 % 250 mL IVPB (ready to mix) (mg) 1,500 mg New Bag 05/08/23 1942                    Microbiologic Results:  Microbiology Results (last 7 days)       Procedure Component Value Units Date/Time    Blood Culture #1 **CANNOT BE ORDERED STAT** [[id number]]  (Normal) Collected: 05/07/23 1600    Order Status: Completed Specimen: Blood Updated: 05/11/23 1801     CULTURE, BLOOD ([facility]) No Growth At 96 Hours    Blood Culture #2 **CANNOT BE ORDERED STAT** [[id number]]  (Normal) Collected: 05/07/23 1600    Order Status: Completed Specimen: Blood Updated: 05/11/23 1801     CULTURE, BLOOD ([facility]) No Growth At 96 Hours    Anaerobic Culture [[id number]]  (Abnormal) Collected: 05/08/23 2214    Order Status: Completed Specimen: Wound from Foot, Left Updated: 05/11/23 1443     Anaerobe Culture Peptostreptococcus sp.     Comment: Anaerobic sensitivity is not usually indicated except on single isolates from sterile body sites.       Wound Culture [[id number]]  (Abnormal) Collected: 05/08/23 2214    Order Status: Completed Specimen: Wound from Foot, Left Updated: 05/11/23 1101     Wound Culture Many Staphylococcus aureus    Gram Stain [[id number]] Collected: 05/08/23 2214    Order Status: Completed Specimen: Wound from Foot, Left Updated: 05/09/23 0737     GRAM STAIN No WBC seen      Rare Gram positive cocci    Blood culture x two cultures. Draw prior to antibiotics. [[id number]]     Order Status: Canceled Specimen: Blood     Blood culture x two cultures. Draw prior to antibiotics. [[id number]]     Order Status: Canceled Specimen: Blood      Blood culture x two cultures. Draw prior to antibiotics. [[id number]]     Order Status: Canceled Specimen: Blood     Blood culture x two cultures. Draw prior to antibiotics. [[id number]]     Order Status: Canceled Specimen: Blood

## 2023-05-12 NOTE — PROGRESS NOTES
[facility]  Hospital Medicine Progress Note        Chief Complaint: Inpatient Follow-up    HPI:   63-year-old male with significant history of stage IV renal cell carcinoma status post right nephrectomy, lymph node dissection in September 2020, HTN, CAD status post CABG, type 2 diabetes mellitus with diabetic neuropathy, nonhealing left great toe plantar diabetic ulcer wound which has been present for almost 2 years.  Patient presented to the ED with worsening wound with associated drainage, fever.  Patient has not received any chemotherapy because of the chronic wound infection.  He had seen general surgeon in [facility] for possible amputation so that chemotherapy can be considered.  MRI in April was negative for osteomyelitis.  Patient was hemodynamically stable and afebrile.  Lab significant for leukocytosis, acute on chronic kidney disease.  Patient was initiated on IV antibiotics and was admitted hospitalist medicine service.  Podiatry was consulted.  MRI repeated this hospitalization revealed abscesses with no osteomyelitis.  Patient was taken to OR by Podiatry and he underwent open amputation of the left great toe.  Awaiting intraoperative cultures.  Zosyn, vancomycin continued.  Podiatric planning for wound closure prior to DC given his history of noncompliance paid ultrasound retroperitoneum to further evaluate acute kidney injury.    Interval Hx:     Patient seen at bedside.  Patient is comfortably sitting at the side of the bed.  Hemodynamics stable.  Afebrile.  No new complaints paid requesting for all of his home medications to be restarted       Objective/physical exam:  General: In no acute distress, afebrile  Chest: Clear to auscultation bilaterally  Heart: S1, S2, no appreciable murmur  Abdomen: Soft, nontender, BS +  MSK:  Left foot covered in Ace bandage   Neurologic: Alert and oriented x4, moving all extremities with good strength     VITAL SIGNS: 24 HRS MIN & MAX LAST    Temp  Min: 98.1 °F (36.7 °C)  Max: 98.5 °F (36.9 °C) 98.3 °F (36.8 °C)   BP  Min: 102/69  Max: 143/87 113/76   Pulse  Min: 62  Max: 93  84   Resp  Min: 18  Max: 20 18   SpO2  Min: 94 %  Max: 97 % 96 %       Recent Labs   Lab 05/12/23  0252   WBC 12.36*   RBC 5.25   HGB 14.2   HCT 45.1   MCV 85.9   MCH 27.0   MCHC 31.5*   RDW 15.9      MPV 10.7*         Recent Labs   Lab 05/09/23  0429 05/11/23  0348 05/12/23  0252      < > 142   K 4.9   < > 4.2   CO2 25   < > 19*   BUN 33.0*   < > 37.8*   CREATININE 2.14*   < > 2.15*   CALCIUM 8.7*   < > 9.8   MG 1.90  --   --    ALBUMIN 2.8*  --  3.1*   ALKPHOS 95  --  105   ALT 11  --  18   AST 15  --  25   BILITOT 0.6  --  0.6    < > = values in this interval not displayed.          Microbiology Results (last 7 days)       Procedure Component Value Units Date/Time    Blood Culture #1 **CANNOT BE ORDERED STAT** [[id number]]  (Normal) Collected: 05/07/23 1600    Order Status: Completed Specimen: Blood Updated: 05/11/23 1801     CULTURE, BLOOD ([facility]) No Growth At 96 Hours    Blood Culture #2 **CANNOT BE ORDERED STAT** [[id number]]  (Normal) Collected: 05/07/23 1600    Order Status: Completed Specimen: Blood Updated: 05/11/23 1801     CULTURE, BLOOD ([facility]) No Growth At 96 Hours    Anaerobic Culture [[id number]]  (Abnormal) Collected: 05/08/23 2214    Order Status: Completed Specimen: Wound from Foot, Left Updated: 05/11/23 1443     Anaerobe Culture Peptostreptococcus sp.     Comment: Anaerobic sensitivity is not usually indicated except on single isolates from sterile body sites.       Wound Culture [[id number]]  (Abnormal) Collected: 05/08/23 2214    Order Status: Completed Specimen: Wound from Foot, Left Updated: 05/11/23 1101     Wound Culture Many Staphylococcus aureus    Gram Stain [[id number]] Collected: 05/08/23 4335    Order Status: Completed Specimen: Wound from Foot, Left Updated: 05/09/23 0737     GRAM STAIN No WBC seen      Rare Gram positive cocci    Blood  culture x two cultures. Draw prior to antibiotics. [[id number]]     Order Status: Canceled Specimen: Blood     Blood culture x two cultures. Draw prior to antibiotics. [[id number]]     Order Status: Canceled Specimen: Blood     Blood culture x two cultures. Draw prior to antibiotics. [[id number]]     Order Status: Canceled Specimen: Blood     Blood culture x two cultures. Draw prior to antibiotics. [[id number]]     Order Status: Canceled Specimen: Blood              Scheduled Med:   amLODIPine  5 mg Oral QAM    atorvastatin  80 mg Oral Daily    carvediloL  12.5 mg Oral BID    desvenlafaxine succinate  100 mg Oral Daily    enoxaparin  40 mg Subcutaneous Daily    piperacillin-tazobactam (ZOSYN) IVPB  4.5 g Intravenous Q8H    QUEtiapine  150 mg Oral QHS    tamsulosin  1 capsule Oral QHS    vancomycin (VANCOCIN) IVPB  1,000 mg Intravenous Q24H          Assessment/Plan:    Infected chronic left great toe wound with underlying abscess status post open amputation  Polymicrobial infection secondary to above-Staph aureus, peptostreptococcus  Sepsis secondary to above  Acute on chronic kidney disease, stage III   Stage IV RCC status post right nephrectomy, lymph node dissection   Essential HTN-stable   Type 2 diabetes mellitus   Diabetic nephropathy  CAD status post CABG   Prophylaxis    Will continue IV Zosyn, vancomycin   Intraoperative cultures-Staph aureus, peptostreptococcus   I will consult Infectious Disease for further recommendations  MRI was negative for osteomyelitis this hospitalization  Wound care per Podiatry  Podiatry prefers wound closure prior to DC given his history of noncompliance   IV fluids-normal saline at 75 cc/hour for acute on chronic kidney disease   No obstructive uropathy   Will have to be cautious with vancomycin  Avoid nephrotoxins  CBG is pretty stable   Continue sliding scale , resume Levemir at low-dose-12 units q.h.s.  Continue other home meds-amlodipine, statin, Coreg, desvenlafaxine, Seroquel,  tamsulosin  Also resumed finasteride, multivitamin, ppi, Lyrica, Seroquel, Zoloft, tamsulosin  DVT prophylaxis-Lovenox    Await Infectious Disease recommendations   Await wound closure by podiatric      Olivia Luque MD   05/12/2023

## 2023-05-13 LAB
ABS NEUT (OLG): 9.46 X10(3)/MCL (ref 2.1–9.2)
ALBUMIN SERPL-MCNC: 2.8 G/DL (ref 3.4–4.8)
ALBUMIN/GLOB SERPL: 0.8 RATIO (ref 1.1–2)
ALP SERPL-CCNC: 89 UNIT/L (ref 40–150)
ALT SERPL-CCNC: 20 UNIT/L (ref 0–55)
AST SERPL-CCNC: 22 UNIT/L (ref 5–34)
BILIRUBIN DIRECT+TOT PNL SERPL-MCNC: 0.4 MG/DL
BUN SERPL-MCNC: 44.7 MG/DL (ref 8.4–25.7)
CALCIUM SERPL-MCNC: 8.7 MG/DL (ref 8.8–10)
CHLORIDE SERPL-SCNC: 111 MMOL/L (ref 98–107)
CO2 SERPL-SCNC: 22 MMOL/L (ref 23–31)
CREAT SERPL-MCNC: 1.94 MG/DL (ref 0.73–1.18)
ERYTHROCYTE [DISTWIDTH] IN BLOOD BY AUTOMATED COUNT: 15.9 % (ref 11.5–17)
GFR SERPLBLD CREATININE-BSD FMLA CKD-EPI: 38 MLS/MIN/1.73/M2
GLOBULIN SER-MCNC: 3.5 GM/DL (ref 2.4–3.5)
GLUCOSE SERPL-MCNC: 125 MG/DL (ref 82–115)
HCT VFR BLD AUTO: 39.9 % (ref 42–52)
HGB BLD-MCNC: 12.4 G/DL (ref 14–18)
INSTRUMENT WBC (OLG): 12.13 X10(3)/MCL
LYMPHOCYTES NFR BLD MANUAL: 1.58 X10(3)/MCL
LYMPHOCYTES NFR BLD MANUAL: 13 %
MCH RBC QN AUTO: 26.8 PG (ref 27–31)
MCHC RBC AUTO-ENTMCNC: 31.1 G/DL (ref 33–36)
MCV RBC AUTO: 86.4 FL (ref 80–94)
METAMYELOCYTES NFR BLD MANUAL: 1 %
MICROCYTES BLD QL SMEAR: ABNORMAL
MONOCYTES NFR BLD MANUAL: 1.09 X10(3)/MCL (ref 0.1–1.3)
MONOCYTES NFR BLD MANUAL: 9 %
MYELOCYTES NFR BLD MANUAL: 5 %
NEUTROPHILS NFR BLD MANUAL: 72 %
NRBC BLD AUTO-RTO: 0 %
NRBC BLD MANUAL-RTO: 0 %
PLATELET # BLD AUTO: 210 X10(3)/MCL (ref 130–400)
PLATELET # BLD EST: NORMAL 10*3/UL
PMV BLD AUTO: 10.7 FL (ref 7.4–10.4)
POCT GLUCOSE: 117 MG/DL (ref 70–110)
POCT GLUCOSE: 129 MG/DL (ref 70–110)
POCT GLUCOSE: 160 MG/DL (ref 70–110)
POTASSIUM SERPL-SCNC: 4 MMOL/L (ref 3.5–5.1)
PROT SERPL-MCNC: 6.3 GM/DL (ref 5.8–7.6)
RBC # BLD AUTO: 4.62 X10(6)/MCL (ref 4.7–6.1)
RBC MORPH BLD: ABNORMAL
SODIUM SERPL-SCNC: 142 MMOL/L (ref 136–145)
WBC # SPEC AUTO: 12.13 X10(3)/MCL (ref 4.5–11.5)

## 2023-05-13 PROCEDURE — 85025 COMPLETE CBC W/AUTO DIFF WBC: CPT | Performed by: INTERNAL MEDICINE

## 2023-05-13 PROCEDURE — 25000003 PHARM REV CODE 250: Performed by: INTERNAL MEDICINE

## 2023-05-13 PROCEDURE — 63600175 PHARM REV CODE 636 W HCPCS: Performed by: PODIATRIST

## 2023-05-13 PROCEDURE — 11000001 HC ACUTE MED/SURG PRIVATE ROOM

## 2023-05-13 PROCEDURE — 25000003 PHARM REV CODE 250: Performed by: PODIATRIST

## 2023-05-13 PROCEDURE — 63600175 PHARM REV CODE 636 W HCPCS: Performed by: INTERNAL MEDICINE

## 2023-05-13 PROCEDURE — 80053 COMPREHEN METABOLIC PANEL: CPT | Performed by: INTERNAL MEDICINE

## 2023-05-13 PROCEDURE — 85027 COMPLETE CBC AUTOMATED: CPT | Performed by: INTERNAL MEDICINE

## 2023-05-13 RX ORDER — AMLODIPINE BESYLATE 5 MG/1
5 TABLET ORAL ONCE
Status: COMPLETED | OUTPATIENT
Start: 2023-05-13 | End: 2023-05-13

## 2023-05-13 RX ORDER — AMLODIPINE BESYLATE 5 MG/1
10 TABLET ORAL EVERY MORNING
Status: DISCONTINUED | OUTPATIENT
Start: 2023-05-14 | End: 2023-05-17 | Stop reason: HOSPADM

## 2023-05-13 RX ORDER — SODIUM CHLORIDE 450 MG/100ML
INJECTION, SOLUTION INTRAVENOUS CONTINUOUS
Status: DISCONTINUED | OUTPATIENT
Start: 2023-05-13 | End: 2023-05-14

## 2023-05-13 RX ADMIN — SERTRALINE HYDROCHLORIDE 50 MG: 50 TABLET ORAL at 08:05

## 2023-05-13 RX ADMIN — AMPICILLIN AND SULBACTAM 3 G: 1; 2 INJECTION, POWDER, FOR SOLUTION INTRAMUSCULAR; INTRAVENOUS at 08:05

## 2023-05-13 RX ADMIN — SODIUM CHLORIDE: 4.5 INJECTION, SOLUTION INTRAVENOUS at 11:05

## 2023-05-13 RX ADMIN — AMLODIPINE BESYLATE 5 MG: 5 TABLET ORAL at 01:05

## 2023-05-13 RX ADMIN — FINASTERIDE 5 MG: 5 TABLET, FILM COATED ORAL at 08:05

## 2023-05-13 RX ADMIN — AMLODIPINE BESYLATE 5 MG: 5 TABLET ORAL at 08:05

## 2023-05-13 RX ADMIN — INSULIN DETEMIR 12 UNITS: 100 INJECTION, SOLUTION SUBCUTANEOUS at 09:05

## 2023-05-13 RX ADMIN — ENOXAPARIN SODIUM 40 MG: 40 INJECTION SUBCUTANEOUS at 05:05

## 2023-05-13 RX ADMIN — ATORVASTATIN CALCIUM 80 MG: 40 TABLET, FILM COATED ORAL at 08:05

## 2023-05-13 RX ADMIN — AMPICILLIN AND SULBACTAM 3 G: 1; 2 INJECTION, POWDER, FOR SOLUTION INTRAMUSCULAR; INTRAVENOUS at 01:05

## 2023-05-13 RX ADMIN — THERA TABS 1 TABLET: TAB at 08:05

## 2023-05-13 RX ADMIN — PIPERACILLIN AND TAZOBACTAM 4.5 G: 4; .5 INJECTION, POWDER, LYOPHILIZED, FOR SOLUTION INTRAVENOUS; PARENTERAL at 01:05

## 2023-05-13 RX ADMIN — TAMSULOSIN HYDROCHLORIDE 0.4 MG: 0.4 CAPSULE ORAL at 09:05

## 2023-05-13 RX ADMIN — PREGABALIN 100 MG: 50 CAPSULE ORAL at 09:05

## 2023-05-13 RX ADMIN — DESVENLAFAXINE 100 MG: 50 TABLET, EXTENDED RELEASE ORAL at 08:05

## 2023-05-13 RX ADMIN — PANTOPRAZOLE SODIUM 40 MG: 40 TABLET, DELAYED RELEASE ORAL at 08:05

## 2023-05-13 RX ADMIN — AMPICILLIN AND SULBACTAM 3 G: 1; 2 INJECTION, POWDER, FOR SOLUTION INTRAMUSCULAR; INTRAVENOUS at 02:05

## 2023-05-13 RX ADMIN — CARVEDILOL 12.5 MG: 12.5 TABLET, FILM COATED ORAL at 08:05

## 2023-05-13 RX ADMIN — CARVEDILOL 12.5 MG: 12.5 TABLET, FILM COATED ORAL at 09:05

## 2023-05-13 RX ADMIN — QUETIAPINE 150 MG: 100 TABLET ORAL at 09:05

## 2023-05-13 NOTE — CONSULTS
Infectious Diseases Consultation       Inpatient consult to Infectious Diseases  Consult performed by: Paulino Miller MD  Consult ordered by: Olivia Luque MD      HPI:   63-year-old male with past medical history of HTN, CAD, with CABG, stage IV renal cell carcinoma s/p right nephrectomy diabetes type 2, diabetic neuropathy, nonhealing left great toe plantar ulcer, which has been present for about 2 years, is admitted to [facility] on 05/07/2023 presenting through the ED with worsening wound, associated drainage and fevers.  He was evaluated and noted to have no fevers but did have leukocytosis of 19.85, abnormal renal function with creatinine of 2.54, .3 ESR 79.  Urinalysis is not impressive.  He was seen by Podiatry with impression of infected left great toe with abscess and taken to surgery on 05/08 for open amputation of the left great toe with cultures showing MSSA, peptostreptococcus and prevotella.  Podiatry plans return to OR on 05/15 for possible wound closure.  He is on antibiotic coverage with vancomycin and Zosyn.    Past Medical and Surgical History  Allergies :   Patient has no known allergies.    Medical :   He has a past medical history of Arthritis of knee (08/13/2013), Cancer, Cataract, Coronary artery disease, Diabetes mellitus, type 2, HTN (hypertension) (08/13/2013), CABG, Hyperlipidemia, and Type II or unspecified type diabetes mellitus without mention of complication, not stated as uncontrolled (08/13/2013).    Surgical :   He has a past surgical history that includes Appendectomy; Cataract extraction, bilateral; and Toe amputation (Left, 5/8/2023).     Family History  His family history includes Breast cancer (age of onset: 47) in an other family member; Heart disease in his father, mother, and sister.    Social History  He reports that he has quit smoking. He has never used smokeless tobacco. He reports that he does not currently use alcohol. He  "reports current drug use. Frequency: 7.00 times per week. Drug: Marijuana.     Review of Systems   Constitutional:  Positive for malaise/fatigue.   HENT: Negative.     Respiratory: Negative.     Gastrointestinal: Negative.    Genitourinary: Negative.    Musculoskeletal: Negative.    Skin:         Left great toe wound   Endo/Heme/Allergies: Negative.    Psychiatric/Behavioral: Negative.     All other Systems review done and negative.  Objective   Physical Exam  Vitals reviewed.   Constitutional:       General: He is not in acute distress.     Appearance: He is obese.   HENT:      Head: Normocephalic and atraumatic.   Eyes:      Pupils: Pupils are equal, round, and reactive to light.   Cardiovascular:      Rate and Rhythm: Normal rate and regular rhythm.      Heart sounds: Normal heart sounds.   Pulmonary:      Effort: Pulmonary effort is normal. No respiratory distress.      Breath sounds: Normal breath sounds.   Abdominal:      General: Bowel sounds are normal. There is no distension.      Palpations: Abdomen is soft.      Tenderness: There is no abdominal tenderness.   Genitourinary:     Comments: No suprapubic tenderness  Musculoskeletal:         General: No deformity.      Cervical back: Neck supple.      Comments: Amputated left great toe   Skin:     Findings: No rash.      Comments: Left foot surgical site is bandaged   Neurological:      Mental Status: He is alert and oriented to person, place, and time.   Psychiatric:      Comments: Calm and cooperative     VITAL SIGNS: 24 HR MIN & MAX LAST    Temp  Min: 97.6 °F (36.4 °C)  Max: 98.3 °F (36.8 °C)  98.1 °F (36.7 °C)        BP  Min: 102/69  Max: 148/78  (!) 148/78     Pulse  Min: 69  Max: 93  69     Resp  Min: 16  Max: 18  18    SpO2  Min: 95 %  Max: 96 %  95 %      HT: 5' 10" (177.8 cm)  WT: 108.1 kg (238 lb 5.1 oz)  BMI: 34.2     Recent Results (from the past 24 hour(s))   Comprehensive Metabolic Panel    Collection Time: 05/12/23  2:52 AM   Result Value Ref " Range    Sodium Level 142 136 - 145 mmol/L    Potassium Level 4.2 3.5 - 5.1 mmol/L    Chloride 107 98 - 107 mmol/L    Carbon Dioxide 19 (L) 23 - 31 mmol/L    Glucose Level 129 (H) 82 - 115 mg/dL    Blood Urea Nitrogen 37.8 (H) 8.4 - 25.7 mg/dL    Creatinine 2.15 (H) 0.73 - 1.18 mg/dL    Calcium Level Total 9.8 8.8 - 10.0 mg/dL    Protein Total 7.1 5.8 - 7.6 gm/dL    Albumin Level 3.1 (L) 3.4 - 4.8 g/dL    Globulin 4.0 (H) 2.4 - 3.5 gm/dL    Albumin/Globulin Ratio 0.8 (L) 1.1 - 2.0 ratio    Bilirubin Total 0.6 <=1.5 mg/dL    Alkaline Phosphatase 105 40 - 150 unit/L    Alanine Aminotransferase 18 0 - 55 unit/L    Aspartate Aminotransferase 25 5 - 34 unit/L    eGFR 34 mls/min/1.73/m2   CBC with Differential    Collection Time: 05/12/23  2:52 AM   Result Value Ref Range    WBC 12.36 (H) 4.50 - 11.50 x10(3)/mcL    RBC 5.25 4.70 - 6.10 x10(6)/mcL    Hgb 14.2 14.0 - 18.0 g/dL    Hct 45.1 42.0 - 52.0 %    MCV 85.9 80.0 - 94.0 fL    MCH 27.0 27.0 - 31.0 pg    MCHC 31.5 (L) 33.0 - 36.0 g/dL    RDW 15.9 11.5 - 17.0 %    Platelet 231 130 - 400 x10(3)/mcL    MPV 10.7 (H) 7.4 - 10.4 fL    NRBC% 0.0 %   Vancomycin, Random    Collection Time: 05/12/23  2:52 AM   Result Value Ref Range    Vanc Lvl Random 18.8 15.0 - 20.0 ug/ml   Manual Differential    Collection Time: 05/12/23  2:52 AM   Result Value Ref Range    Neut Man 63 %    Lymph Man 21 %    Monocyte Man 14 %    Eos Man 1 %    [place] Man 2 %    Instr WBC 12.36 x10(3)/mcL    Abs Mono 1.7304 (H) 0.1 - 1.3 x10(3)/mcL    Abs Eos  0.1236 0 - 0.9 x10(3)/mcL    Abs Lymp 2.5956 0.6 - 4.6 x10(3)/mcL    Abs Neut 8.034 2.1 - 9.2 x10(3)/mcL    RBC Morph Normal Normal    Platelet Est Normal Normal, Adequate   POCT glucose    Collection Time: 05/12/23  4:38 AM   Result Value Ref Range    POCT Glucose 123 (H) 70 - 110 mg/dL   POCT glucose    Collection Time: 05/12/23 12:07 PM   Result Value Ref Range    POCT Glucose 134 (H) 70 - 110 mg/dL   POCT glucose    Collection Time: 05/12/23  4:37 PM    Result Value Ref Range    POCT Glucose 123 (H) 70 - 110 mg/dL   POCT glucose    Collection Time: 05/12/23  8:26 PM   Result Value Ref Range    POCT Glucose 140 (H) 70 - 110 mg/dL       Imaging  Imaging Results              MRI Foot Toes Without Contrast Left (Final result)  Result time 05/08/23 15:19:15      Final result by Da De La Vega MD (05/08/23 15:19:15)                   Impression:      An ulcer is present to the medial aspect of the great toe.  A 2.5 cm x 1.5 cm focus of subcutaneous phlegmon is present in the ulcer base and at least a portion of this likely represents a loculated abscess.  This may be confirmed with the addition of IV contrast if additional imaging is desired it.  No evidence for osteomyelitis or septic arthritis seen to the underlying bone.  Trace edema and periosteal reaction seen to the tuft of the distal phalanx would be an unusual appearance for osteomyelitis and more likely represents reactive edema or hyperemia.    Marked cellulitis seen to the forefoot.  Edema like signal present to the intrinsic muscles is favored to represent myositis.      Electronically signed by: Da De La Vega  Date:    05/08/2023  Time:    15:19               Narrative:    EXAMINATION:  MRI FOOT TOES WITHOUT CONTRAST LEFT    CLINICAL HISTORY:  Foot swelling, diabetic, osteomyelitis suspected, xray done;    TECHNIQUE:  Unenhanced, multiplanar, multisequence MR imaging of the left foot was obtained    COMPARISON:  Radiographs left foot used for comparison dated 05/07/2023.    FINDINGS:  The base of the 2nd metatarsal is well aligned with the middle cuneiform.  Trabecular edema and periosteal reaction are present to the tuft of the distal phalanx of the great toe.  Osteomyelitis is considered unlikely.  Bone marrow signal is within normal limits otherwise.  No effusion or articular erosion to suggest septic arthritis.  Lisfranc ligament is intact.  The intermetatarsal ligaments are intact.  The collateral  ligaments are intact.    Myositis is present to the medial intrinsic muscles with trace fluid tracking along the intermuscular septa.  Flexor tendonitis is present to the midfoot.  An ulcer is present in the medial aspect of the great toe.  A 2.5 cm x 1.5 cm focus of subcutaneous phlegmon is present in the ulcer base.  At least a portion of this appears to represent a loculated abscess.  Marked subcutaneous edema is present to the forefoot consistent with cellulitis.  No intermetatarsal bursitis or Huff's neuroma.                                       X-Ray Chest PA And Lateral (Final result)  Result time 05/07/23 14:59:21      Final result by Vimal Wolf MD (05/07/23 14:59:21)                   Impression:      No acute cardiopulmonary process identified.      Electronically signed by: Vimal Wolf  Date:    05/07/2023  Time:    14:59               Narrative:    EXAMINATION:  XR CHEST PA AND LATERAL    CLINICAL HISTORY:  Fever, unspecified    TECHNIQUE:  Two views.    COMPARISON:  August 13, 2013    FINDINGS:  Cardiopericardial silhouette is within normal limits.  Sternotomy changes.  Right lung chronically is of lesser volume.  No acute dense focal or segmental consolidation, congestion, pleural effusion or pneumothorax.                                       X-Ray Foot Complete Left (Final result)  Result time 05/07/23 15:02:34      Final result by Vimal Wolf MD (05/07/23 15:02:34)                   Impression:      No acute osseous abnormality identified.      Electronically signed by: Vimal Wolf  Date:    05/07/2023  Time:    15:02               Narrative:    EXAMINATION:  XR FOOT COMPLETE 3 VIEW LEFT    CLINICAL HISTORY:  Pain.    TECHNIQUE:  Three views    COMPARISON:  March 27, 2023    FINDINGS:  Left foot articular surfaces are unremarkable and there is no intrinsic osseous abnormality.  There is no acute fracture, dislocation or arthritic change.  Position and alignment is satisfactory.  No  acute osteolytic destructive changes suggest osteomyelitis..  There is ulceration along the plantar aspect of the great toe.                                       Impression  1. Sepsis with leukocytosis  2.  Left great toe polymicrobial wound infection with abscess/phlegmon        -MSSA, peptostreptococcus, prevotella  3.  Acute kidney injury/chronic kidney disease   4.  Diabetes type 2  5. Hx of Renal cell carcinoma s/p right nephrectomy  6.  Morbid obesity     Recommendations  I agree with the management of this patient.  History of diabetes with diabetic neuropathy and longstanding left great toe diabetic ulcer which had worsened, presenting with sepsis syndrome with severe leukocytosis.  He was seen by Podiatry and is status post source control by way of amputation of the left great toe with cultures yielding polymicrobial diego as noted.  We will optimize antibiotic regimen with discontinuation of vancomycin and Zosyn, avoiding regimens potential nephrotoxicity we will cover with Unasyn.  We will continue foot/wound care per Podiatry with plan to return to OR for attempt at wound closure noted.  Weight loss is encouraged.  Case is discussed at length with patient, questions solicited and answered.  I have also discussed the case with nursing staff.  I would like to thank the team very much for the opportunity to assist in the care of this patient.

## 2023-05-13 NOTE — PROGRESS NOTES
[facility] Hospital Medicine Progress Note        Chief Complaint: Inpatient Follow-up    HPI:   63-year-old male with significant history of stage IV renal cell carcinoma status post right nephrectomy, lymph node dissection in September 2020, HTN, CAD status post CABG, type 2 diabetes mellitus with diabetic neuropathy, nonhealing left great toe plantar diabetic ulcer wound which has been present for almost 2 years.  Patient presented to the ED with worsening wound with associated drainage, fever.  Patient has not received any chemotherapy because of the chronic wound infection.  He had seen general surgeon in [facility] for possible amputation so that chemotherapy can be considered.  MRI in April was negative for osteomyelitis.  Patient was hemodynamically stable and afebrile.  Lab significant for leukocytosis, acute on chronic kidney disease.  Patient was initiated on IV antibiotics and was admitted hospitalist medicine service.  Podiatry was consulted.  MRI repeated this hospitalization revealed abscesses with no osteomyelitis.  Patient was taken to OR by Podiatry and he underwent open amputation of the left great toe.  Awaiting intraoperative cultures.  Zosyn, vancomycin continued.  Podiatric planning for wound closure prior to DC given his history of noncompliance ,  ultrasound retroperitoneum to further evaluate acute kidney injury.  No obstructive uropathy.  IV fluids continued.  Renal function slowly recovering.  Intraoperative cultures with polymicrobial growth.  Infectious Disease consulted on 05/12.    Interval Hx:     Patient seen at bedside.  Patient is concerned that his wife is in the hospital with heart attack.  BP accelerated.  Otherwise hemodynamics stable.  No new complaints.  Patient is afebrile.    Objective/physical exam:  General: In no acute distress, afebrile  Chest: Clear to auscultation bilaterally  Heart: S1, S2, no appreciable murmur  Abdomen: Soft, nontender, BS  +  MSK:  Left foot covered in Ace bandage   Neurologic: Alert and oriented x4, moving all extremities with good strength     VITAL SIGNS: 24 HRS MIN & MAX LAST   Temp  Min: 97.6 °F (36.4 °C)  Max: 98.7 °F (37.1 °C) 97.8 °F (36.6 °C)   BP  Min: 90/54  Max: 161/91 (!) 161/91   Pulse  Min: 62  Max: 92  81   Resp  Min: 16  Max: 18 17   SpO2  Min: 94 %  Max: 98 % 98 %       Recent Labs   Lab 05/12/23  0252   WBC 12.36*   RBC 5.25   HGB 14.2   HCT 45.1   MCV 85.9   MCH 27.0   MCHC 31.5*   RDW 15.9      MPV 10.7*         Recent Labs   Lab 05/09/23  0429 05/11/23  0348 05/13/23  0626      < > 142   K 4.9   < > 4.0   CO2 25   < > 22*   BUN 33.0*   < > 44.7*   CREATININE 2.14*   < > 1.94*   CALCIUM 8.7*   < > 8.7*   MG 1.90  --   --    ALBUMIN 2.8*   < > 2.8*   ALKPHOS 95   < > 89   ALT 11   < > 20   AST 15   < > 22   BILITOT 0.6   < > 0.4    < > = values in this interval not displayed.          Microbiology Results (last 7 days)       Procedure Component Value Units Date/Time    Blood Culture #1 **CANNOT BE ORDERED STAT** [[id number]]  (Normal) Collected: 05/07/23 1600    Order Status: Completed Specimen: Blood Updated: 05/12/23 1800     CULTURE, BLOOD ([facility]) No Growth at 5 days    Blood Culture #2 **CANNOT BE ORDERED STAT** [[id number]]  (Normal) Collected: 05/07/23 1600    Order Status: Completed Specimen: Blood Updated: 05/12/23 1800     CULTURE, BLOOD ([facility]) No Growth at 5 days    Anaerobic Culture [[id number]]  (Abnormal) Collected: 05/08/23 2214    Order Status: Completed Specimen: Wound from Foot, Left Updated: 05/12/23 1303     Anaerobe Culture Peptostreptococcus sp.      Prevotella bivia     Comment: Anaerobic sensitivity is not usually indicated except on single isolates from sterile body sites.       Wound Culture [[id number]]  (Abnormal)  (Susceptibility) Collected: 05/08/23 7691    Order Status: Completed Specimen: Wound from Foot, Left Updated: 05/12/23 1220     Wound Culture Many Methicillin Sensitive  Staphylococcus aureus    Gram Stain [[id number]] Collected: 05/08/23 8414    Order Status: Completed Specimen: Wound from Foot, Left Updated: 05/09/23 0744     GRAM STAIN No WBC seen      Rare Gram positive cocci    Blood culture x two cultures. Draw prior to antibiotics. [[id number]]     Order Status: Canceled Specimen: Blood     Blood culture x two cultures. Draw prior to antibiotics. [[id number]]     Order Status: Canceled Specimen: Blood     Blood culture x two cultures. Draw prior to antibiotics. [[id number]]     Order Status: Canceled Specimen: Blood     Blood culture x two cultures. Draw prior to antibiotics. [[id number]]     Order Status: Canceled Specimen: Blood              Scheduled Med:   amLODIPine  5 mg Oral QAM    ampicillin-sulbactim (UNASYN) IVPB  3 g Intravenous Q6H    atorvastatin  80 mg Oral Daily    carvediloL  12.5 mg Oral BID    desvenlafaxine succinate  100 mg Oral Daily    enoxaparin  40 mg Subcutaneous Daily    finasteride  5 mg Oral Daily    insulin detemir U-100  12 Units Subcutaneous QHS    multivitamin  1 tablet Oral Daily    pantoprazole  40 mg Oral Daily    pregabalin  100 mg Oral BID    QUEtiapine  150 mg Oral QHS    sertraline  50 mg Oral Daily    tamsulosin  1 capsule Oral QHS          Assessment/Plan:    Infected chronic left great toe wound with underlying abscess status post open amputation  Polymicrobial infection secondary to above-MSSA, prevotella, peptostreptococcus, Alcaligenes, Enterobacter  Sepsis secondary to above  Acute on chronic kidney disease, stage III -slowly improving  Stage IV RCC status post right nephrectomy, lymph node dissection   Essential HTN-accelerated   Type 2 diabetes mellitus -stable  Diabetic nephropathy  CAD status post CABG   Prophylaxis    Cultures with polymicrobial growth-MSSA, prevotella, peptostreptococcus, Alcaligenes, Enterobacter   I will DC vancomycin and keep him on Zosyn  ID consulted 5/12, awaiting recommendations  Podiatry on board   Might  plan for wound closure possibly this Monday  MRI was negative for osteomyelitis this hospitalization  Renal function slowly improving on IV fluids, I switched him to half normal, will keep for 24 more hours  Vanc DCed  No obstructive uropathy   Avoid nephrotoxins  CBG is pretty stable   Continue sliding scale , resume Levemir at low-dose-12 units q.h.s.  Continue other home meds-amlodipine, statin, Coreg, desvenlafaxine, Seroquel, tamsulosin  Also continue finasteride, multivitamin, ppi, Lyrica, Seroquel, Zoloft, tamsulosin  DVT prophylaxis-Lovenox    Await Infectious Disease recommendations   Await wound closure by podiatric on Monday      Olivia Luque MD   05/13/2023

## 2023-05-14 LAB
ABS NEUT (OLG): 8.39 X10(3)/MCL (ref 2.1–9.2)
ALBUMIN SERPL-MCNC: 2.8 G/DL (ref 3.4–4.8)
ALBUMIN/GLOB SERPL: 0.8 RATIO (ref 1.1–2)
ALP SERPL-CCNC: 94 UNIT/L (ref 40–150)
ALT SERPL-CCNC: 18 UNIT/L (ref 0–55)
AST SERPL-CCNC: 21 UNIT/L (ref 5–34)
BASOPHILS NFR BLD MANUAL: 0.12 X10(3)/MCL (ref 0–0.2)
BASOPHILS NFR BLD MANUAL: 1 %
BILIRUBIN DIRECT+TOT PNL SERPL-MCNC: 0.3 MG/DL
BUN SERPL-MCNC: 42.5 MG/DL (ref 8.4–25.7)
CALCIUM SERPL-MCNC: 8.6 MG/DL (ref 8.8–10)
CHLORIDE SERPL-SCNC: 111 MMOL/L (ref 98–107)
CO2 SERPL-SCNC: 19 MMOL/L (ref 23–31)
CREAT SERPL-MCNC: 1.65 MG/DL (ref 0.73–1.18)
EOSINOPHIL NFR BLD MANUAL: 0.12 X10(3)/MCL (ref 0–0.9)
EOSINOPHIL NFR BLD MANUAL: 1 %
ERYTHROCYTE [DISTWIDTH] IN BLOOD BY AUTOMATED COUNT: 15.9 % (ref 11.5–17)
GFR SERPLBLD CREATININE-BSD FMLA CKD-EPI: 46 MLS/MIN/1.73/M2
GLOBULIN SER-MCNC: 3.3 GM/DL (ref 2.4–3.5)
GLUCOSE SERPL-MCNC: 119 MG/DL (ref 82–115)
HCT VFR BLD AUTO: 36.5 % (ref 42–52)
HGB BLD-MCNC: 11.3 G/DL (ref 14–18)
INSTRUMENT WBC (OLG): 12.16 X10(3)/MCL
LYMPHOCYTES NFR BLD MANUAL: 2.68 X10(3)/MCL
LYMPHOCYTES NFR BLD MANUAL: 22 %
MCH RBC QN AUTO: 27 PG (ref 27–31)
MCHC RBC AUTO-ENTMCNC: 31 G/DL (ref 33–36)
MCV RBC AUTO: 87.1 FL (ref 80–94)
MONOCYTES NFR BLD MANUAL: 0.97 X10(3)/MCL (ref 0.1–1.3)
MONOCYTES NFR BLD MANUAL: 8 %
MYELOCYTES NFR BLD MANUAL: 4 %
NEUTROPHILS NFR BLD MANUAL: 65 %
NRBC BLD AUTO-RTO: 0 %
NRBC BLD MANUAL-RTO: 0 %
PLATELET # BLD AUTO: 199 X10(3)/MCL (ref 130–400)
PLATELET # BLD EST: NORMAL 10*3/UL
PMV BLD AUTO: 10.8 FL (ref 7.4–10.4)
POCT GLUCOSE: 124 MG/DL (ref 70–110)
POCT GLUCOSE: 141 MG/DL (ref 70–110)
POCT GLUCOSE: 152 MG/DL (ref 70–110)
POCT GLUCOSE: 155 MG/DL (ref 70–110)
POLYCHROMASIA BLD QL SMEAR: ABNORMAL
POTASSIUM SERPL-SCNC: 3.9 MMOL/L (ref 3.5–5.1)
PROT SERPL-MCNC: 6.1 GM/DL (ref 5.8–7.6)
RBC # BLD AUTO: 4.19 X10(6)/MCL (ref 4.7–6.1)
RBC MORPH BLD: NORMAL
SODIUM SERPL-SCNC: 140 MMOL/L (ref 136–145)
WBC # SPEC AUTO: 12.16 X10(3)/MCL (ref 4.5–11.5)

## 2023-05-14 PROCEDURE — 25000003 PHARM REV CODE 250: Performed by: PODIATRIST

## 2023-05-14 PROCEDURE — 11000001 HC ACUTE MED/SURG PRIVATE ROOM

## 2023-05-14 PROCEDURE — 85027 COMPLETE CBC AUTOMATED: CPT | Performed by: INTERNAL MEDICINE

## 2023-05-14 PROCEDURE — 63600175 PHARM REV CODE 636 W HCPCS: Performed by: INTERNAL MEDICINE

## 2023-05-14 PROCEDURE — 25000003 PHARM REV CODE 250: Performed by: INTERNAL MEDICINE

## 2023-05-14 PROCEDURE — 80053 COMPREHEN METABOLIC PANEL: CPT | Performed by: INTERNAL MEDICINE

## 2023-05-14 PROCEDURE — 63600175 PHARM REV CODE 636 W HCPCS: Performed by: PODIATRIST

## 2023-05-14 RX ORDER — SODIUM CHLORIDE 450 MG/100ML
INJECTION, SOLUTION INTRAVENOUS CONTINUOUS
Status: ACTIVE | OUTPATIENT
Start: 2023-05-14 | End: 2023-05-15

## 2023-05-14 RX ADMIN — AMPICILLIN AND SULBACTAM 3 G: 1; 2 INJECTION, POWDER, FOR SOLUTION INTRAMUSCULAR; INTRAVENOUS at 08:05

## 2023-05-14 RX ADMIN — AMLODIPINE BESYLATE 10 MG: 5 TABLET ORAL at 06:05

## 2023-05-14 RX ADMIN — PREGABALIN 100 MG: 50 CAPSULE ORAL at 08:05

## 2023-05-14 RX ADMIN — QUETIAPINE 150 MG: 100 TABLET ORAL at 08:05

## 2023-05-14 RX ADMIN — FINASTERIDE 5 MG: 5 TABLET, FILM COATED ORAL at 08:05

## 2023-05-14 RX ADMIN — THERA TABS 1 TABLET: TAB at 08:05

## 2023-05-14 RX ADMIN — INSULIN DETEMIR 12 UNITS: 100 INJECTION, SOLUTION SUBCUTANEOUS at 08:05

## 2023-05-14 RX ADMIN — TAMSULOSIN HYDROCHLORIDE 0.4 MG: 0.4 CAPSULE ORAL at 08:05

## 2023-05-14 RX ADMIN — PANTOPRAZOLE SODIUM 40 MG: 40 TABLET, DELAYED RELEASE ORAL at 08:05

## 2023-05-14 RX ADMIN — SERTRALINE HYDROCHLORIDE 50 MG: 50 TABLET ORAL at 08:05

## 2023-05-14 RX ADMIN — ACETAMINOPHEN 325MG 650 MG: 325 TABLET ORAL at 07:05

## 2023-05-14 RX ADMIN — CARVEDILOL 12.5 MG: 12.5 TABLET, FILM COATED ORAL at 08:05

## 2023-05-14 RX ADMIN — SODIUM CHLORIDE: 4.5 INJECTION, SOLUTION INTRAVENOUS at 03:05

## 2023-05-14 RX ADMIN — ENOXAPARIN SODIUM 40 MG: 40 INJECTION SUBCUTANEOUS at 05:05

## 2023-05-14 RX ADMIN — SODIUM CHLORIDE: 4.5 INJECTION, SOLUTION INTRAVENOUS at 09:05

## 2023-05-14 RX ADMIN — DESVENLAFAXINE 100 MG: 50 TABLET, EXTENDED RELEASE ORAL at 08:05

## 2023-05-14 RX ADMIN — AMPICILLIN AND SULBACTAM 3 G: 1; 2 INJECTION, POWDER, FOR SOLUTION INTRAMUSCULAR; INTRAVENOUS at 02:05

## 2023-05-14 RX ADMIN — AMPICILLIN AND SULBACTAM 3 G: 1; 2 INJECTION, POWDER, FOR SOLUTION INTRAMUSCULAR; INTRAVENOUS at 03:05

## 2023-05-14 RX ADMIN — ATORVASTATIN CALCIUM 80 MG: 40 TABLET, FILM COATED ORAL at 08:05

## 2023-05-14 NOTE — PROGRESS NOTES
[facility] Hospital Medicine Progress Note        Chief Complaint: Inpatient Follow-up    HPI:   63-year-old male with significant history of stage IV renal cell carcinoma status post right nephrectomy, lymph node dissection in September 2020, HTN, CAD status post CABG, type 2 diabetes mellitus with diabetic neuropathy, nonhealing left great toe plantar diabetic ulcer wound which has been present for almost 2 years.  Patient presented to the ED with worsening wound with associated drainage, fever.  Patient has not received any chemotherapy because of the chronic wound infection.  He had seen general surgeon in [facility] for possible amputation so that chemotherapy can be considered.  MRI in April was negative for osteomyelitis.  Patient was hemodynamically stable and afebrile.  Lab significant for leukocytosis, acute on chronic kidney disease.  Patient was initiated on IV antibiotics and was admitted hospitalist medicine service.  Podiatry was consulted.  MRI repeated this hospitalization revealed abscesses with no osteomyelitis.  Patient was taken to OR by Podiatry and he underwent open amputation of the left great toe.  Awaiting intraoperative cultures.  Zosyn, vancomycin continued.  Podiatric planning for wound closure prior to DC given his history of noncompliance ,  ultrasound retroperitoneum to further evaluate acute kidney injury.  No obstructive uropathy.  IV fluids continued.  Renal function slowly recovering.  Intraoperative cultures with polymicrobial growth.  Infectious Disease consulted on 05/12.  Awaiting recommendations , renal function improving on IV fluids which was continued. deescalated to just Zosyn as of 5/13.    Interval Hx:     Patient seen at bedside.  Comfortably laying in bed.  BP is labile with occasional acceleration.  Hemodynamics otherwise stable.  Afebrile.  No new complaints, patient is worried about his wife who most likely has RCC too    Objective/physical  exam:  General: In no acute distress, afebrile  Chest: Clear to auscultation bilaterally  Heart: S1, S2, no appreciable murmur  Abdomen: Soft, nontender, BS +  MSK:  Left foot covered in Ace bandage   Neurologic: Alert and oriented x4, moving all extremities with good strength     VITAL SIGNS: 24 HRS MIN & MAX LAST   Temp  Min: 97.9 °F (36.6 °C)  Max: 98.9 °F (37.2 °C) 97.9 °F (36.6 °C)   BP  Min: 103/54  Max: 155/81 134/76   Pulse  Min: 61  Max: 81  61   Resp  Min: 17  Max: 17 17   SpO2  Min: 96 %  Max: 98 % 98 %       Recent Labs   Lab 05/14/23  0420   WBC 12.16*   RBC 4.19*   HGB 11.3*   HCT 36.5*   MCV 87.1   MCH 27.0   MCHC 31.0*   RDW 15.9      MPV 10.8*         Recent Labs   Lab 05/09/23  0429 05/11/23  0348 05/14/23  0420      < > 140   K 4.9   < > 3.9   CO2 25   < > 19*   BUN 33.0*   < > 42.5*   CREATININE 2.14*   < > 1.65*   CALCIUM 8.7*   < > 8.6*   MG 1.90  --   --    ALBUMIN 2.8*   < > 2.8*   ALKPHOS 95   < > 94   ALT 11   < > 18   AST 15   < > 21   BILITOT 0.6   < > 0.3    < > = values in this interval not displayed.          Microbiology Results (last 7 days)       Procedure Component Value Units Date/Time    Blood Culture #1 **CANNOT BE ORDERED STAT** [[id number]]  (Normal) Collected: 05/07/23 1600    Order Status: Completed Specimen: Blood Updated: 05/12/23 1800     CULTURE, BLOOD ([facility]) No Growth at 5 days    Blood Culture #2 **CANNOT BE ORDERED STAT** [[id number]]  (Normal) Collected: 05/07/23 1600    Order Status: Completed Specimen: Blood Updated: 05/12/23 1800     CULTURE, BLOOD ([facility]) No Growth at 5 days    Anaerobic Culture [[id number]]  (Abnormal) Collected: 05/08/23 2214    Order Status: Completed Specimen: Wound from Foot, Left Updated: 05/12/23 1303     Anaerobe Culture Peptostreptococcus sp.      Prevotella bivia     Comment: Anaerobic sensitivity is not usually indicated except on single isolates from sterile body sites.       Wound Culture [[id number]]  (Abnormal)   (Susceptibility) Collected: 05/08/23 2214    Order Status: Completed Specimen: Wound from Foot, Left Updated: 05/12/23 1220     Wound Culture Many Methicillin Sensitive Staphylococcus aureus    Gram Stain [[id number]] Collected: 05/08/23 2214    Order Status: Completed Specimen: Wound from Foot, Left Updated: 05/09/23 0737     GRAM STAIN No WBC seen      Rare Gram positive cocci    Blood culture x two cultures. Draw prior to antibiotics. [[id number]]     Order Status: Canceled Specimen: Blood     Blood culture x two cultures. Draw prior to antibiotics. [[id number]]     Order Status: Canceled Specimen: Blood     Blood culture x two cultures. Draw prior to antibiotics. [[id number]]     Order Status: Canceled Specimen: Blood     Blood culture x two cultures. Draw prior to antibiotics. [[id number]]     Order Status: Canceled Specimen: Blood              Scheduled Med:   amLODIPine  10 mg Oral QAM    ampicillin-sulbactim (UNASYN) IVPB  3 g Intravenous Q6H    atorvastatin  80 mg Oral Daily    carvediloL  12.5 mg Oral BID    desvenlafaxine succinate  100 mg Oral Daily    enoxaparin  40 mg Subcutaneous Daily    finasteride  5 mg Oral Daily    insulin detemir U-100  12 Units Subcutaneous QHS    multivitamin  1 tablet Oral Daily    pantoprazole  40 mg Oral Daily    pregabalin  100 mg Oral BID    QUEtiapine  150 mg Oral QHS    sertraline  50 mg Oral Daily    tamsulosin  1 capsule Oral QHS          Assessment/Plan:    Infected chronic left great toe wound with underlying abscess status post open amputation  Polymicrobial infection secondary to above-MSSA, prevotella, peptostreptococcus, Alcaligenes, Enterobacter  Sepsis secondary to above  Acute on chronic kidney disease, stage III -slowly improving  Stage IV RCC status post right nephrectomy, lymph node dissection   Essential HTN-labile   Type 2 diabetes mellitus -stable  Diabetic nephropathy  CAD status post CABG   Prophylaxis    Cultures with polymicrobial growth-MSSA,  prevotella, peptostreptococcus, Alcaligenes, Enterobacter   Vancomycin was discontinued 5/13   Infectious Disease switched Zosyn to Unasyn as it is more renal friendly  Podiatry on board   Wound closure planned for tomorrow  Will keep NPO after midnight  MRI was negative for osteomyelitis this hospitalization  Antibiotic course and duration to be determined by Infectious Disease  Renal function slowly improving on IV fluids, I will keep half-normal for 24 more hours   No obstructive uropathy   Avoid nephrotoxins  CBG is pretty stable   Continue sliding scale , resume Levemir at low-dose-12 units q.h.s.  Continue other home meds-amlodipine, statin, Coreg, desvenlafaxine, Seroquel, tamsulosin  Also continue finasteride, multivitamin, ppi, Lyrica, Seroquel, Zoloft, tamsulosin  BP with occasional acceleration, closely monitor and modify meds if needed  DVT prophylaxis-Lovenox    Wound closure by podiatric tomorrow   Antibiotic course and duration to be determined by Infectious Disease        Olivia Luque MD   05/14/2023

## 2023-05-14 NOTE — PROGRESS NOTES
[facility]                       [address] [place] [id number]    PATIENT NAME:       GARRY LAWLER  YOB: 1960  CSN:                [id number]   MRN:                [id number]  ADMIT DATE:         05/07/2023 14:26:00  PHYSICIAN:          Shashank Wright DPM                            PROGRESS NOTE    DATE:      SUBJECTIVE:  The patient was seen today, doing well, no major complaints.  No   reported fevers or chills.  No chest pain, shortness of breath.    OBJECTIVE:  VITAL SIGNS:  Stable and afebrile.  EXTREMITIES:  Wound area continues to get better daily.  Increasing granulation   tissue throughout.  Margins are viable.  Scant serous exudate.    ASSESSMENT:  Status post open amputation, left great toe, currently doing well.    PLAN:  The patient has been put on the schedule for tomorrow for debridement and   closure of the wound in an effort to maximize healing potential.  Reviewed the   inherent risks and complications associated with such.  The alternatives would   be simply antibiotics and wound care, but I think from a compliance standpoint,   it is probably better for him to be closed.  We will continue with all other   care.  We will hold him n.p.o. past midnight.        ______________________________  Shashank Wright DPM    GAS/AQS  DD:  05/14/2023  Time:  07:46AM  DT:  05/14/2023  Time:  07:54AM  Job #:  [id number]      PROGRESS NOTE

## 2023-05-14 NOTE — PLAN OF CARE
Problem: Adult Inpatient Plan of Care  Goal: Plan of Care Review  Outcome: Ongoing, Progressing  Flowsheets (Taken 5/13/2023 4906)  Plan of Care Reviewed With: patient  Goal: Patient-Specific Goal (Individualized)  Outcome: Ongoing, Progressing  Goal: Absence of Hospital-Acquired Illness or Injury  Outcome: Ongoing, Progressing  Goal: Optimal Comfort and Wellbeing  Outcome: Ongoing, Progressing  Goal: Readiness for Transition of Care  Outcome: Ongoing, Progressing     Problem: Diabetes Comorbidity  Goal: Blood Glucose Level Within Targeted Range  Outcome: Ongoing, Progressing     Problem: Impaired Wound Healing  Goal: Optimal Wound Healing  Outcome: Ongoing, Progressing     Problem: Fall Injury Risk  Goal: Absence of Fall and Fall-Related Injury  Outcome: Ongoing, Progressing

## 2023-05-15 ENCOUNTER — ANESTHESIA (OUTPATIENT)
Dept: SURGERY | Facility: HOSPITAL | Age: 63
DRG: 616 | End: 2023-05-15
Payer: MEDICAID

## 2023-05-15 ENCOUNTER — ANESTHESIA EVENT (OUTPATIENT)
Dept: SURGERY | Facility: HOSPITAL | Age: 63
DRG: 616 | End: 2023-05-15
Payer: MEDICAID

## 2023-05-15 LAB
ABS NEUT (OLG): 7.85 X10(3)/MCL (ref 2.1–9.2)
ALBUMIN SERPL-MCNC: 2.8 G/DL (ref 3.4–4.8)
ALBUMIN/GLOB SERPL: 0.8 RATIO (ref 1.1–2)
ALP SERPL-CCNC: 99 UNIT/L (ref 40–150)
ALT SERPL-CCNC: 18 UNIT/L (ref 0–55)
ANISOCYTOSIS BLD QL SMEAR: ABNORMAL
AST SERPL-CCNC: 18 UNIT/L (ref 5–34)
BILIRUBIN DIRECT+TOT PNL SERPL-MCNC: 0.3 MG/DL
BUN SERPL-MCNC: 37.8 MG/DL (ref 8.4–25.7)
CALCIUM SERPL-MCNC: 8.8 MG/DL (ref 8.8–10)
CHLORIDE SERPL-SCNC: 111 MMOL/L (ref 98–107)
CO2 SERPL-SCNC: 22 MMOL/L (ref 23–31)
CREAT SERPL-MCNC: 1.63 MG/DL (ref 0.73–1.18)
EOSINOPHIL NFR BLD MANUAL: 0.76 X10(3)/MCL (ref 0–0.9)
EOSINOPHIL NFR BLD MANUAL: 6 %
ERYTHROCYTE [DISTWIDTH] IN BLOOD BY AUTOMATED COUNT: 15.9 % (ref 11.5–17)
GFR SERPLBLD CREATININE-BSD FMLA CKD-EPI: 47 MLS/MIN/1.73/M2
GLOBULIN SER-MCNC: 3.4 GM/DL (ref 2.4–3.5)
GLUCOSE SERPL-MCNC: 116 MG/DL (ref 82–115)
HCT VFR BLD AUTO: 36.2 % (ref 42–52)
HGB BLD-MCNC: 11.4 G/DL (ref 14–18)
INSTRUMENT WBC (OLG): 12.66 X10(3)/MCL
LYMPHOCYTES NFR BLD MANUAL: 28 %
LYMPHOCYTES NFR BLD MANUAL: 3.54 X10(3)/MCL
MCH RBC QN AUTO: 27.1 PG (ref 27–31)
MCHC RBC AUTO-ENTMCNC: 31.5 G/DL (ref 33–36)
MCV RBC AUTO: 86.2 FL (ref 80–94)
METAMYELOCYTES NFR BLD MANUAL: 1 %
MICROCYTES BLD QL SMEAR: ABNORMAL
MONOCYTES NFR BLD MANUAL: 0.51 X10(3)/MCL (ref 0.1–1.3)
MONOCYTES NFR BLD MANUAL: 4 %
NEUTROPHILS NFR BLD MANUAL: 61 %
NRBC BLD AUTO-RTO: 0 %
PLATELET # BLD AUTO: 210 X10(3)/MCL (ref 130–400)
PLATELET # BLD EST: NORMAL 10*3/UL
PMV BLD AUTO: 10.8 FL (ref 7.4–10.4)
POCT GLUCOSE: 118 MG/DL (ref 70–110)
POCT GLUCOSE: 123 MG/DL (ref 70–110)
POCT GLUCOSE: 238 MG/DL (ref 70–110)
POTASSIUM SERPL-SCNC: 3.8 MMOL/L (ref 3.5–5.1)
PROT SERPL-MCNC: 6.2 GM/DL (ref 5.8–7.6)
RBC # BLD AUTO: 4.2 X10(6)/MCL (ref 4.7–6.1)
RBC MORPH BLD: ABNORMAL
SODIUM SERPL-SCNC: 142 MMOL/L (ref 136–145)
WBC # SPEC AUTO: 12.66 X10(3)/MCL (ref 4.5–11.5)

## 2023-05-15 PROCEDURE — 37000008 HC ANESTHESIA 1ST 15 MINUTES: Performed by: PODIATRIST

## 2023-05-15 PROCEDURE — 85025 COMPLETE CBC W/AUTO DIFF WBC: CPT | Performed by: INTERNAL MEDICINE

## 2023-05-15 PROCEDURE — 63600175 PHARM REV CODE 636 W HCPCS: Performed by: PODIATRIST

## 2023-05-15 PROCEDURE — 25000003 PHARM REV CODE 250: Performed by: PODIATRIST

## 2023-05-15 PROCEDURE — D9220A PRA ANESTHESIA: Mod: ANES,,, | Performed by: ANESTHESIOLOGY

## 2023-05-15 PROCEDURE — D9220A PRA ANESTHESIA: ICD-10-PCS | Mod: ANES,,, | Performed by: ANESTHESIOLOGY

## 2023-05-15 PROCEDURE — 37000009 HC ANESTHESIA EA ADD 15 MINS: Performed by: PODIATRIST

## 2023-05-15 PROCEDURE — 11000001 HC ACUTE MED/SURG PRIVATE ROOM

## 2023-05-15 PROCEDURE — 63600175 PHARM REV CODE 636 W HCPCS: Performed by: INTERNAL MEDICINE

## 2023-05-15 PROCEDURE — 36000707: Performed by: PODIATRIST

## 2023-05-15 PROCEDURE — 85027 COMPLETE CBC AUTOMATED: CPT | Performed by: INTERNAL MEDICINE

## 2023-05-15 PROCEDURE — 80053 COMPREHEN METABOLIC PANEL: CPT | Performed by: INTERNAL MEDICINE

## 2023-05-15 PROCEDURE — 36000706: Performed by: PODIATRIST

## 2023-05-15 PROCEDURE — 25000003 PHARM REV CODE 250: Performed by: INTERNAL MEDICINE

## 2023-05-15 PROCEDURE — D9220A PRA ANESTHESIA: ICD-10-PCS | Mod: CRNA,,, | Performed by: NURSE ANESTHETIST, CERTIFIED REGISTERED

## 2023-05-15 PROCEDURE — 63600175 PHARM REV CODE 636 W HCPCS: Performed by: NURSE ANESTHETIST, CERTIFIED REGISTERED

## 2023-05-15 PROCEDURE — 25000003 PHARM REV CODE 250: Performed by: NURSE ANESTHETIST, CERTIFIED REGISTERED

## 2023-05-15 PROCEDURE — D9220A PRA ANESTHESIA: Mod: CRNA,,, | Performed by: NURSE ANESTHETIST, CERTIFIED REGISTERED

## 2023-05-15 RX ORDER — PROPOFOL 10 MG/ML
VIAL (ML) INTRAVENOUS CONTINUOUS PRN
Status: DISCONTINUED | OUTPATIENT
Start: 2023-05-15 | End: 2023-05-15

## 2023-05-15 RX ORDER — BUPIVACAINE HYDROCHLORIDE 5 MG/ML
INJECTION, SOLUTION EPIDURAL; INTRACAUDAL
Status: DISCONTINUED | OUTPATIENT
Start: 2023-05-15 | End: 2023-05-15 | Stop reason: HOSPADM

## 2023-05-15 RX ORDER — HYDROMORPHONE HYDROCHLORIDE 2 MG/ML
0.2 INJECTION, SOLUTION INTRAMUSCULAR; INTRAVENOUS; SUBCUTANEOUS EVERY 5 MIN PRN
Status: CANCELLED | OUTPATIENT
Start: 2023-05-15

## 2023-05-15 RX ORDER — ONDANSETRON 2 MG/ML
4 INJECTION INTRAMUSCULAR; INTRAVENOUS ONCE AS NEEDED
Status: CANCELLED | OUTPATIENT
Start: 2023-05-15 | End: 2034-10-11

## 2023-05-15 RX ORDER — LIDOCAINE HYDROCHLORIDE 20 MG/ML
INJECTION, SOLUTION EPIDURAL; INFILTRATION; INTRACAUDAL; PERINEURAL
Status: DISCONTINUED | OUTPATIENT
Start: 2023-05-15 | End: 2023-05-15

## 2023-05-15 RX ADMIN — AMPICILLIN AND SULBACTAM 3 G: 1; 2 INJECTION, POWDER, FOR SOLUTION INTRAMUSCULAR; INTRAVENOUS at 11:05

## 2023-05-15 RX ADMIN — AMLODIPINE BESYLATE 10 MG: 5 TABLET ORAL at 06:05

## 2023-05-15 RX ADMIN — INSULIN DETEMIR 12 UNITS: 100 INJECTION, SOLUTION SUBCUTANEOUS at 08:05

## 2023-05-15 RX ADMIN — TRAZODONE HYDROCHLORIDE 25 MG: 50 TABLET ORAL at 08:05

## 2023-05-15 RX ADMIN — CARVEDILOL 12.5 MG: 12.5 TABLET, FILM COATED ORAL at 09:05

## 2023-05-15 RX ADMIN — SODIUM CHLORIDE, SODIUM GLUCONATE, SODIUM ACETATE, POTASSIUM CHLORIDE AND MAGNESIUM CHLORIDE: 526; 502; 368; 37; 30 INJECTION, SOLUTION INTRAVENOUS at 01:05

## 2023-05-15 RX ADMIN — TAMSULOSIN HYDROCHLORIDE 0.4 MG: 0.4 CAPSULE ORAL at 08:05

## 2023-05-15 RX ADMIN — QUETIAPINE 150 MG: 100 TABLET ORAL at 08:05

## 2023-05-15 RX ADMIN — ENOXAPARIN SODIUM 40 MG: 40 INJECTION SUBCUTANEOUS at 05:05

## 2023-05-15 RX ADMIN — CARVEDILOL 12.5 MG: 12.5 TABLET, FILM COATED ORAL at 08:05

## 2023-05-15 RX ADMIN — AMPICILLIN AND SULBACTAM 3 G: 1; 2 INJECTION, POWDER, FOR SOLUTION INTRAMUSCULAR; INTRAVENOUS at 05:05

## 2023-05-15 RX ADMIN — AMPICILLIN AND SULBACTAM 3 G: 1; 2 INJECTION, POWDER, FOR SOLUTION INTRAMUSCULAR; INTRAVENOUS at 03:05

## 2023-05-15 RX ADMIN — LIDOCAINE HYDROCHLORIDE 50 MG: 20 INJECTION, SOLUTION EPIDURAL; INFILTRATION; INTRACAUDAL; PERINEURAL at 02:05

## 2023-05-15 RX ADMIN — PREGABALIN 100 MG: 50 CAPSULE ORAL at 08:05

## 2023-05-15 RX ADMIN — PROPOFOL 100 MCG/KG/MIN: 10 INJECTION, EMULSION INTRAVENOUS at 02:05

## 2023-05-15 RX ADMIN — INSULIN ASPART 2 UNITS: 100 INJECTION, SOLUTION INTRAVENOUS; SUBCUTANEOUS at 08:05

## 2023-05-15 NOTE — TRANSFER OF CARE
"Anesthesia Transfer of Care Note    Patient: Ye Olivera    Procedure(s) Performed: Procedure(s) (LRB):  DEBRIDEMENT, FOOT (Left)    Patient location: OPS    Anesthesia Type: MAC    Transport from OR: Transported from OR on room air with adequate spontaneous ventilation    Post pain: adequate analgesia    Post assessment: no apparent anesthetic complications    Post vital signs: stable    Level of consciousness: awake and oriented    Nausea/Vomiting: no nausea/vomiting    Complications: none    Transfer of care protocol was followed      Last vitals:   Visit Vitals  /67   Pulse 62   Temp 37.1 °C (98.8 °F) (Oral)   Resp 18   Ht 5' 10" (1.778 m)   Wt 104.8 kg (231 lb)   SpO2 99%   BMI 33.15 kg/m²     "

## 2023-05-15 NOTE — ANESTHESIA POSTPROCEDURE EVALUATION
Anesthesia Post Evaluation    Patient: Ye Olivera    Procedure(s) Performed: Procedure(s) (LRB):  DEBRIDEMENT, FOOT (Left)    Final Anesthesia Type: general      Patient location during evaluation: med/surg floor  Patient participation: Yes- Able to Participate  Level of consciousness: oriented and awake  Post-procedure vital signs: reviewed and stable  Pain management: adequate  Airway patency: patent    PONV status at discharge: No PONV  Anesthetic complications: no      Cardiovascular status: stable and hemodynamically stable  Respiratory status: spontaneous ventilation and unassisted  Hydration status: euvolemic  Follow-up not needed.  Comments: [facility]          Vitals Value Taken Time   /67 05/15/23    Temp 37.1 05/15/23    Pulse 62 05/15/23    Resp 18 05/15/23    SpO2 99 05/15/23          No case tracking events are documented in the log.      Pain/Harriett Score: Pain Rating Prior to Med Admin: 3 (5/14/2023  7:12 PM)  Pain Rating Post Med Admin: 3 (5/14/2023  8:12 PM)

## 2023-05-15 NOTE — ANESTHESIA PREPROCEDURE EVALUATION
05/15/2023  Ye Olivera is a 63 y.o., male , who presents for the following:    Procedure: DEBRIDEMENT, FOOT (Left: Foot)   Anesthesia type: Gen Natural Airway   Diagnosis:        Diabetic infection of left foot [E11.628, L08.9]       Diabetic ulcer of left great toe [E11.621, L97.529]   Pre-op diagnosis:        Diabetic infection of left foot [E11.628, L08.9]       Diabetic ulcer of left great toe [E11.621, L97.529]   Location: [facility] OR  / [facility] OR   Surgeons: Shashank Wright DPM          HPI:   63-year-old male with past medical history of HTN, CAD, with CABG, stage IV renal cell carcinoma s/p right nephrectomy diabetes type 2, diabetic neuropathy, nonhealing left great toe plantar ulcer, which has been present for about 2 years, is admitted to [facility] on 05/07/2023 presenting through the ED with worsening wound, associated drainage and fevers.  He was evaluated and noted to have no fevers but did have leukocytosis of 19.85, abnormal renal function with creatinine of 2.54, .3 ESR 79.  Urinalysis is not impressive.  He was seen by Podiatry with impression of infected left great toe with abscess and taken to surgery on 05/08 for open amputation of the left great toe with cultures showing MSSA, peptostreptococcus and prevotella.  Podiatry plans return to OR on 05/15 for possible wound closure.  He is on antibiotic coverage with vancomycin and Zosyn    Associated Diagnoses:  1. Sepsis with leukocytosis  2.  Left great toe polymicrobial wound infection with abscess/phlegmon        -MSSA, peptostreptococcus, prevotella  3.  Acute kidney injury/chronic kidney disease   4.  Diabetes type 2  5. Hx of Renal cell carcinoma s/p right nephrectomy  6.  Morbid obesity       Pre-op Assessment    I have reviewed the Patient Summary Reports.     I have reviewed the Nursing  Notes. I have reviewed the NPO Status.   I have reviewed the Medications.     Review of Systems  Anesthesia Hx:  No problems with previous Anesthesia  Denies Family Hx of Anesthesia complications.   Denies Personal Hx of Anesthesia complications.   Social:  Former Smoker    Cardiovascular:   Hypertension Past MI CAD      Pulmonary:   Shortness of breath Sleep Apnea, CPAP    Renal/:   Chronic Renal Disease, CKD    Hepatic/GI:   GERD    Endocrine:   Diabetes  Obesity / BMI > 30  Psych:   depression          Physical Exam  General: Alert and Oriented    Airway:  Mallampati: II   Mouth Opening: Normal  TM Distance: Normal  Tongue: Normal  Neck ROM: Normal ROM    Dental:Poor Dentition / missing several molars  Chest/Lungs:  Normal Respiratory Rate    Heart:  Rate: Normal  Rhythm: Regular Rhythm        Anesthesia Plan  Type of Anesthesia, risks & benefits discussed:    Anesthesia Type: Gen Natural Airway  Intra-op Monitoring Plan: Standard ASA Monitors  Post Op Pain Control Plan: IV/PO Opioids PRN  Induction:  IV  Airway Plan: Direct  Informed Consent: Informed consent signed with the Patient and all parties understand the risks and agree with anesthesia plan.  All questions answered. Patient consented to blood products? No  ASA Score: 3  Day of Surgery Review of History & Physical: H&P Update referred to the surgeon/provider.  Anesthesia Plan Notes: Nasal cannula vs facemask supplemental oxygenation   For patients with MARCELO/obesity, may consider SuperNoval Nasal CPAP      Ready For Surgery From Anesthesia Perspective.     .

## 2023-05-15 NOTE — PLAN OF CARE
Problem: Adult Inpatient Plan of Care  Goal: Plan of Care Review  Outcome: Ongoing, Progressing  Flowsheets (Taken 5/15/2023 0142)  Plan of Care Reviewed With: patient  Goal: Patient-Specific Goal (Individualized)  Outcome: Ongoing, Progressing  Goal: Absence of Hospital-Acquired Illness or Injury  Outcome: Ongoing, Progressing  Goal: Optimal Comfort and Wellbeing  Outcome: Ongoing, Progressing  Goal: Readiness for Transition of Care  Outcome: Ongoing, Progressing     Problem: Diabetes Comorbidity  Goal: Blood Glucose Level Within Targeted Range  Outcome: Ongoing, Progressing     Problem: Impaired Wound Healing  Goal: Optimal Wound Healing  Outcome: Ongoing, Progressing     Problem: Fall Injury Risk  Goal: Absence of Fall and Fall-Related Injury  Outcome: Ongoing, Progressing

## 2023-05-15 NOTE — PROGRESS NOTES
[facility]  Hospital Medicine Progress Note        Chief Complaint: Inpatient Follow-up    HPI:   63-year-old male with significant history of stage IV renal cell carcinoma status post right nephrectomy, lymph node dissection in September 2020, HTN, CAD status post CABG, type 2 diabetes mellitus with diabetic neuropathy, nonhealing left great toe plantar diabetic ulcer wound which has been present for almost 2 years.  Patient presented to the ED with worsening wound with associated drainage, fever.  Patient has not received any chemotherapy because of the chronic wound infection.  He had seen general surgeon in [facility] for possible amputation so that chemotherapy can be considered.  MRI in April was negative for osteomyelitis.  Patient was hemodynamically stable and afebrile.  Lab significant for leukocytosis, acute on chronic kidney disease.  Patient was initiated on IV antibiotics and was admitted hospitalist medicine service.  Podiatry was consulted.  MRI repeated this hospitalization revealed abscesses with no osteomyelitis.  Patient was taken to OR by Podiatry and he underwent open amputation of the left great toe.  Awaiting intraoperative cultures.  Zosyn, vancomycin continued.  Podiatric planning for wound closure prior to DC given his history of noncompliance ,  ultrasound retroperitoneum to further evaluate acute kidney injury.  No obstructive uropathy.  IV fluids continued.  Renal function slowly recovering.  Intraoperative cultures with polymicrobial growth.  Infectious Disease consulted on 05/12.  Awaiting recommendations , renal function improving on IV fluids which was continued. deescalated to just Zosyn as of 5/13.    Interval Hx:   Patient seen and examined plan for debridement and closure of the wound today patient is asking if he can be discharged tomorrow did discuss with him that we will have to check with Podiatry    Objective/physical exam:  General: In no acute  distress, afebrile  Chest: Clear to auscultation bilaterally  Heart: S1, S2, no appreciable murmur  Abdomen: Soft, nontender, BS +  MSK:  Left foot covered in Ace bandage   Neurologic: Alert and oriented x4, moving all extremities with good strength     VITAL SIGNS: 24 HRS MIN & MAX LAST   Temp  Min: 97.8 °F (36.6 °C)  Max: 98.9 °F (37.2 °C) 97.8 °F (36.6 °C)   BP  Min: 106/66  Max: 147/80 126/72   Pulse  Min: 60  Max: 76  72   Resp  Min: 18  Max: 18 18   SpO2  Min: 95 %  Max: 98 % 97 %       Recent Labs   Lab 05/15/23  0412   WBC 12.66*   RBC 4.20*   HGB 11.4*   HCT 36.2*   MCV 86.2   MCH 27.1   MCHC 31.5*   RDW 15.9      MPV 10.8*         Recent Labs   Lab 05/09/23  0429 05/11/23  0348 05/15/23  0412      < > 142   K 4.9   < > 3.8   CO2 25   < > 22*   BUN 33.0*   < > 37.8*   CREATININE 2.14*   < > 1.63*   CALCIUM 8.7*   < > 8.8   MG 1.90  --   --    ALBUMIN 2.8*   < > 2.8*   ALKPHOS 95   < > 99   ALT 11   < > 18   AST 15   < > 18   BILITOT 0.6   < > 0.3    < > = values in this interval not displayed.          Microbiology Results (last 7 days)       Procedure Component Value Units Date/Time    Blood Culture #1 **CANNOT BE ORDERED STAT** [[id number]]  (Normal) Collected: 05/07/23 1600    Order Status: Completed Specimen: Blood Updated: 05/12/23 1800     CULTURE, BLOOD ([facility]) No Growth at 5 days    Blood Culture #2 **CANNOT BE ORDERED STAT** [[id number]]  (Normal) Collected: 05/07/23 1600    Order Status: Completed Specimen: Blood Updated: 05/12/23 1800     CULTURE, BLOOD ([facility]) No Growth at 5 days    Anaerobic Culture [[id number]]  (Abnormal) Collected: 05/08/23 2214    Order Status: Completed Specimen: Wound from Foot, Left Updated: 05/12/23 1303     Anaerobe Culture Peptostreptococcus sp.      Prevotella bivia     Comment: Anaerobic sensitivity is not usually indicated except on single isolates from sterile body sites.       Wound Culture [[id number]]  (Abnormal)  (Susceptibility) Collected: 05/08/23 3413     Order Status: Completed Specimen: Wound from Foot, Left Updated: 05/12/23 1220     Wound Culture Many Methicillin Sensitive Staphylococcus aureus    Gram Stain [[id number]] Collected: 05/08/23 2214    Order Status: Completed Specimen: Wound from Foot, Left Updated: 05/09/23 0737     GRAM STAIN No WBC seen      Rare Gram positive cocci             Scheduled Med:   amLODIPine  10 mg Oral QAM    ampicillin-sulbactim (UNASYN) IVPB  3 g Intravenous Q6H    atorvastatin  80 mg Oral Daily    carvediloL  12.5 mg Oral BID    desvenlafaxine succinate  100 mg Oral Daily    enoxaparin  40 mg Subcutaneous Daily    finasteride  5 mg Oral Daily    insulin detemir U-100  12 Units Subcutaneous QHS    multivitamin  1 tablet Oral Daily    pantoprazole  40 mg Oral Daily    pregabalin  100 mg Oral BID    QUEtiapine  150 mg Oral QHS    sertraline  50 mg Oral Daily    tamsulosin  1 capsule Oral QHS          Assessment/Plan:    Infected chronic left great toe wound with underlying abscess status post open amputation  Polymicrobial infection secondary to above-MSSA, prevotella, peptostreptococcus, Alcaligenes, Enterobacter  Sepsis secondary to above  Acute on chronic kidney disease, stage III -slowly improving  Stage IV RCC status post right nephrectomy, lymph node dissection   Essential HTN-labile   Type 2 diabetes mellitus -stable  Diabetic nephropathy  CAD status post CABG       Plan for debridement and closure of the wound  On Unasyn as per Infectious Diseases will need to check with ID to see for how long we need to continue antibiotics  Cultures with polymicrobial growth-MSSA, prevotella, peptostreptococcus, Alcaligenes, Enterobacter   Vancomycin was discontinued 5/13   MRI was negative for osteomyelitis this hospitalization  Creatinine is improving this morning it is 1.63 continue with IV fluids repeat blood work in a.m. avoid nephrotoxic medications  Continue sliding scale , resume Levemir at low-dose-12 units q.h.s.  Continue  other home meds-amlodipine, statin, Coreg, desvenlafaxine, Seroquel, tamsulosin  Also continue finasteride, multivitamin, ppi, Lyrica, Seroquel, Zoloft, tamsulosin        DVT prophylaxis-Lovenox      Sofya Trejo MD   05/15/2023

## 2023-05-16 LAB
ANION GAP SERPL CALC-SCNC: 14 MEQ/L
BASOPHILS # BLD AUTO: 0.13 X10(3)/MCL
BASOPHILS NFR BLD AUTO: 0.6 %
BUN SERPL-MCNC: 39.1 MG/DL (ref 8.4–25.7)
CALCIUM SERPL-MCNC: 9 MG/DL (ref 8.8–10)
CHLORIDE SERPL-SCNC: 110 MMOL/L (ref 98–107)
CO2 SERPL-SCNC: 17 MMOL/L (ref 23–31)
CREAT SERPL-MCNC: 1.76 MG/DL (ref 0.73–1.18)
CREAT/UREA NIT SERPL: 22
EOSINOPHIL # BLD AUTO: 0 X10(3)/MCL (ref 0–0.9)
EOSINOPHIL NFR BLD AUTO: 0 %
ERYTHROCYTE [DISTWIDTH] IN BLOOD BY AUTOMATED COUNT: 15.9 % (ref 11.5–17)
GFR SERPLBLD CREATININE-BSD FMLA CKD-EPI: 43 MLS/MIN/1.73/M2
GLUCOSE SERPL-MCNC: 133 MG/DL (ref 82–115)
HCT VFR BLD AUTO: 36.1 % (ref 42–52)
HGB BLD-MCNC: 11.4 G/DL (ref 14–18)
IMM GRANULOCYTES # BLD AUTO: 0.86 X10(3)/MCL (ref 0–0.04)
IMM GRANULOCYTES NFR BLD AUTO: 4.3 %
LYMPHOCYTES # BLD AUTO: 1.83 X10(3)/MCL (ref 0.6–4.6)
LYMPHOCYTES NFR BLD AUTO: 9.1 %
MCH RBC QN AUTO: 27.1 PG (ref 27–31)
MCHC RBC AUTO-ENTMCNC: 31.6 G/DL (ref 33–36)
MCV RBC AUTO: 85.7 FL (ref 80–94)
MONOCYTES # BLD AUTO: 1.11 X10(3)/MCL (ref 0.1–1.3)
MONOCYTES NFR BLD AUTO: 5.5 %
NEUTROPHILS # BLD AUTO: 16.11 X10(3)/MCL (ref 2.1–9.2)
NEUTROPHILS NFR BLD AUTO: 80.5 %
NRBC BLD AUTO-RTO: 0 %
PLATELET # BLD AUTO: 255 X10(3)/MCL (ref 130–400)
PMV BLD AUTO: 10.9 FL (ref 7.4–10.4)
POCT GLUCOSE: 113 MG/DL (ref 70–110)
POCT GLUCOSE: 122 MG/DL (ref 70–110)
POCT GLUCOSE: 136 MG/DL (ref 70–110)
POCT GLUCOSE: 169 MG/DL (ref 70–110)
POTASSIUM SERPL-SCNC: 4.1 MMOL/L (ref 3.5–5.1)
RBC # BLD AUTO: 4.21 X10(6)/MCL (ref 4.7–6.1)
SODIUM SERPL-SCNC: 141 MMOL/L (ref 136–145)
WBC # SPEC AUTO: 20.04 X10(3)/MCL (ref 4.5–11.5)

## 2023-05-16 PROCEDURE — 63600175 PHARM REV CODE 636 W HCPCS: Performed by: PODIATRIST

## 2023-05-16 PROCEDURE — 25000003 PHARM REV CODE 250: Performed by: PODIATRIST

## 2023-05-16 PROCEDURE — 80048 BASIC METABOLIC PNL TOTAL CA: CPT | Performed by: INTERNAL MEDICINE

## 2023-05-16 PROCEDURE — 85025 COMPLETE CBC W/AUTO DIFF WBC: CPT | Performed by: INTERNAL MEDICINE

## 2023-05-16 PROCEDURE — 11000001 HC ACUTE MED/SURG PRIVATE ROOM

## 2023-05-16 RX ADMIN — DESVENLAFAXINE 100 MG: 50 TABLET, EXTENDED RELEASE ORAL at 08:05

## 2023-05-16 RX ADMIN — FINASTERIDE 5 MG: 5 TABLET, FILM COATED ORAL at 08:05

## 2023-05-16 RX ADMIN — CARVEDILOL 12.5 MG: 12.5 TABLET, FILM COATED ORAL at 08:05

## 2023-05-16 RX ADMIN — THERA TABS 1 TABLET: TAB at 08:05

## 2023-05-16 RX ADMIN — PREGABALIN 100 MG: 50 CAPSULE ORAL at 08:05

## 2023-05-16 RX ADMIN — INSULIN DETEMIR 12 UNITS: 100 INJECTION, SOLUTION SUBCUTANEOUS at 08:05

## 2023-05-16 RX ADMIN — ENOXAPARIN SODIUM 40 MG: 40 INJECTION SUBCUTANEOUS at 04:05

## 2023-05-16 RX ADMIN — ATORVASTATIN CALCIUM 80 MG: 40 TABLET, FILM COATED ORAL at 08:05

## 2023-05-16 RX ADMIN — PANTOPRAZOLE SODIUM 40 MG: 40 TABLET, DELAYED RELEASE ORAL at 08:05

## 2023-05-16 RX ADMIN — SERTRALINE HYDROCHLORIDE 50 MG: 50 TABLET ORAL at 08:05

## 2023-05-16 RX ADMIN — AMPICILLIN AND SULBACTAM 3 G: 1; 2 INJECTION, POWDER, FOR SOLUTION INTRAMUSCULAR; INTRAVENOUS at 06:05

## 2023-05-16 RX ADMIN — AMLODIPINE BESYLATE 10 MG: 5 TABLET ORAL at 06:05

## 2023-05-16 RX ADMIN — TAMSULOSIN HYDROCHLORIDE 0.4 MG: 0.4 CAPSULE ORAL at 08:05

## 2023-05-16 RX ADMIN — AMPICILLIN AND SULBACTAM 3 G: 1; 2 INJECTION, POWDER, FOR SOLUTION INTRAMUSCULAR; INTRAVENOUS at 12:05

## 2023-05-16 RX ADMIN — QUETIAPINE 150 MG: 100 TABLET ORAL at 08:05

## 2023-05-16 NOTE — PROGRESS NOTES
[facility] Hospital Medicine Progress Note        Chief Complaint: Inpatient Follow-up    HPI:   63-year-old male with significant history of stage IV renal cell carcinoma status post right nephrectomy, lymph node dissection in September 2020, HTN, CAD status post CABG, type 2 diabetes mellitus with diabetic neuropathy, nonhealing left great toe plantar diabetic ulcer wound which has been present for almost 2 years.  Patient presented to the ED with worsening wound with associated drainage, fever.  Patient has not received any chemotherapy because of the chronic wound infection.  He had seen general surgeon in [facility] for possible amputation so that chemotherapy can be considered.  MRI in April was negative for osteomyelitis.  Patient was hemodynamically stable and afebrile.  Lab significant for leukocytosis, acute on chronic kidney disease.  Patient was initiated on IV antibiotics and was admitted hospitalist medicine service.  Podiatry was consulted.  MRI repeated this hospitalization revealed abscesses with no osteomyelitis.  Patient was taken to OR by Podiatry and he underwent open amputation of the left great toe.  Awaiting intraoperative cultures.  Zosyn, vancomycin continued.  Podiatric planning for wound closure prior to DC given his history of noncompliance ,  ultrasound retroperitoneum to further evaluate acute kidney injury.  No obstructive uropathy.  IV fluids continued.  Renal function slowly recovering.  Intraoperative cultures with polymicrobial growth.  Infectious Disease consulted on 05/12.  Awaiting recommendations , renal function improving on IV fluids which was continued. deescalated to just Unasyn now patient is status post debridement and closure of the wound done on May 15th.  White cell count went up to 20,000 this morning     Interval Hx:   Patient seen and examined status post debridement and closure requesting to go home today did discuss the blood work that his  white cell count trended up patient has been afebrile  Objective/physical exam:  General: In no acute distress, afebrile  Chest: Clear to auscultation bilaterally  Heart: S1, S2, no appreciable murmur  Abdomen: Soft, nontender, BS +  MSK:  Left foot covered in Ace bandage   Neurologic: Alert and oriented x4, moving all extremities with good strength     VITAL SIGNS: 24 HRS MIN & MAX LAST   Temp  Min: 97.7 °F (36.5 °C)  Max: 98.8 °F (37.1 °C) 98.7 °F (37.1 °C)   BP  Min: 125/84  Max: 148/80 125/84   Pulse  Min: 62  Max: 94  79   Resp  Min: 18  Max: 18 18   SpO2  Min: 95 %  Max: 99 % 96 %       Recent Labs   Lab 05/16/23  0425   WBC 20.04*   RBC 4.21*   HGB 11.4*   HCT 36.1*   MCV 85.7   MCH 27.1   MCHC 31.6*   RDW 15.9      MPV 10.9*         Recent Labs   Lab 05/15/23  0412 05/16/23  0425    141   K 3.8 4.1   CO2 22* 17*   BUN 37.8* 39.1*   CREATININE 1.63* 1.76*   CALCIUM 8.8 9.0   ALBUMIN 2.8*  --    ALKPHOS 99  --    ALT 18  --    AST 18  --    BILITOT 0.3  --           Microbiology Results (last 7 days)       Procedure Component Value Units Date/Time    Blood Culture #1 **CANNOT BE ORDERED STAT** [[id number]]  (Normal) Collected: 05/07/23 1600    Order Status: Completed Specimen: Blood Updated: 05/12/23 1800     CULTURE, BLOOD ([facility]) No Growth at 5 days    Blood Culture #2 **CANNOT BE ORDERED STAT** [[id number]]  (Normal) Collected: 05/07/23 1600    Order Status: Completed Specimen: Blood Updated: 05/12/23 1800     CULTURE, BLOOD ([facility]) No Growth at 5 days    Anaerobic Culture [[id number]]  (Abnormal) Collected: 05/08/23 3974    Order Status: Completed Specimen: Wound from Foot, Left Updated: 05/12/23 1303     Anaerobe Culture Peptostreptococcus sp.      Prevotella bivia     Comment: Anaerobic sensitivity is not usually indicated except on single isolates from sterile body sites.       Wound Culture [[id number]]  (Abnormal)  (Susceptibility) Collected: 05/08/23 2239    Order Status: Completed Specimen:  Wound from Foot, Left Updated: 05/12/23 1220     Wound Culture Many Methicillin Sensitive Staphylococcus aureus             Scheduled Med:   amLODIPine  10 mg Oral QAM    ampicillin-sulbactim (UNASYN) IVPB  3 g Intravenous Q6H    atorvastatin  80 mg Oral Daily    carvediloL  12.5 mg Oral BID    desvenlafaxine succinate  100 mg Oral Daily    enoxaparin  40 mg Subcutaneous Daily    finasteride  5 mg Oral Daily    insulin detemir U-100  12 Units Subcutaneous QHS    multivitamin  1 tablet Oral Daily    pantoprazole  40 mg Oral Daily    pregabalin  100 mg Oral BID    QUEtiapine  150 mg Oral QHS    sertraline  50 mg Oral Daily    tamsulosin  1 capsule Oral QHS          Assessment/Plan:    Infected chronic left great toe wound with underlying abscess status post open amputation  Polymicrobial infection secondary to above-MSSA, prevotella, peptostreptococcus, Alcaligenes, Enterobacter  Sepsis secondary to above  Acute on chronic kidney disease, stage III -slowly improving  Stage IV RCC status post right nephrectomy, lymph node dissection   Essential HTN-labile   Type 2 diabetes mellitus -stable  Diabetic nephropathy  CAD status post CABG     Status post debridement and closure of the wound on Unasyn will check with Infectious Diseases if we can switch to p.o. at this time I will continue with IV Unasyn since white cell count went up [id number] can we just stress-induced  Patient was upset that he can not go home did discuss with him that his white cell count has gone up so we want to make sure it is trending down and we are also waiting for ID is recommendations he verbalized understanding  Will await podiatry is recommendations to  Cultures with polymicrobial growth-MSSA, prevotella, peptostreptococcus, Alcaligenes, Enterobacter   Vancomycin was discontinued 5/13   MRI was negative for osteomyelitis this hospitalization  Continue sliding scale , resume Levemir at low-dose-12 units q.h.s.  Continue other home meds-amlodipine,  statin, Coreg, desvenlafaxine, Seroquel, tamsulosin  Also continue finasteride, multivitamin, ppi, Lyrica, Seroquel, Zoloft, tamsulosin        DVT prophylaxis-Lovenox      Sofya Trejo MD   05/16/2023

## 2023-05-16 NOTE — PROGRESS NOTES
[facility]                       [address] [address]    PATIENT NAME:       GARRY LAWLER  YOB: 1960  CSN:                [id number]   MRN:                [id number]  ADMIT DATE:         05/07/2023 14:26:00  PHYSICIAN:          Shashank Wright DPM                            PROGRESS NOTE    DATE:  05/16/2023 00:00:00    SUBJECTIVE:  The patient was seen today status post revisional debridement and   closure of his foot wound.  He is currently doing well.  He has voicing no   complaints.  He does have a little bit of some loose stools.  Remains on   antibiotics.    PHYSICAL EXAMINATION:  VITAL SIGNS:  Stable.  He is afebrile.    White cell counts have bumped up to 20.    The surgical site actually looks great.  Well approximated.  No signs of   compromise.  No inflammatory changes.  No drainage.    ASSESSMENT:  Status post revisional debridement and closure left foot wound,   currently doing well.    PLAN:  Dressings were placed.  Current white cell counts definitely do not   correlate with the clinical appearance of his foot.  Concerned whether or not   this is actually a real value or not.  He is scheduled for labs in the morning.    ID is on the case.  We will continue with all other care for now.        ______________________________  Shashank Wright DPM    GAS/AQS  DD:  05/16/2023  Time:  02:32PM  DT:  05/16/2023  Time:  02:52PM  Job #:  [id number]      PROGRESS NOTE

## 2023-05-16 NOTE — OP NOTE
[facility]                       [address]                      [place] [address]    PATIENT NAME:      GARRY LAWLER  YOB: 1960  CSN:               [id number]  MRN:               [id number]  ADMIT DATE:        05/07/2023 14:26:00  PHYSICIAN:         Shashank Wright DPM                          OPERATIVE REPORT      DATE OF SURGERY:    05/15/2023 00:00:00    SURGEON:  Shashank Wright DPM    PREOPERATIVE DIAGNOSIS:  Left foot wound status post open great toe amputation.    POSTOPERATIVE DIAGNOSIS:  Left foot wound status post open great toe amputation.    PROCEDURE:  Excisional debridement and closure of left foot wound.    ANESTHESIA:  Local with MAC.    HEMOSTASIS:  None.    ESTIMATED BLOOD LOSS:  Scant.    MATERIALS:  None.    INJECTABLES:  None.    PATHOLOGY:  Debridement products.    COMPLICATIONS:  None.    DESCRIPTION OF PROCEDURE:  The patient was transferred from the floor to   holding, having been n.p.o. past midnight.  History and physical, preop studies   reviewed and there were no contraindications noted.  While in holding,   appropriate IVs were started.  The patient was taken to the OR and placed in the   supine position, after which appropriate anesthetics administered.  This was   supplemented with a total of approximately 8 mL of 0.5% Marcaine plain   infiltrated in the left 1st metatarsal block.  The extremity was then prepped   and draped aseptically.    Attention was directed to the left foot where there was a clean, viable,   granular wound with viable margins at the previous open great toe amputation   site.  There were no inflammatory changes, no drainage, no fluctuance or   crepitation.  We incised circumferentially around the wound with clean margins,   extending full-thickness through the subcutaneous layer all the way down to the   level of the metatarsal head.  Metatarsal head was evaluated, was  completely   unremarkable and viable.  There was no purulence or signs of active infection at   any site.  No necrosis.  Long flexor extensor tendons were identified and cut   proximal.  Wounds were copiously irrigated with normal saline.  Hemostasis   obtained.  Wound was easily closed in a single layer utilizing 2-0 nylon in   simple interrupted-type sutures.  There was no tension on the skin margins, no   active bleeding.  Sterile dressings were then placed.    The patient tolerated the procedure and anesthesia well, left the OR to recovery   room with vital signs stable and vascular status intact to left lower   extremity.  Once recovered, the patient will be transferred back to the floor   for postop management.        ______________________________  GUZMAN Ng/LAURA  DD:  05/15/2023  Time:  02:43PM  DT:  05/15/2023  Time:  08:36PM  Job #:  [id number]      OPERATIVE REPORT

## 2023-05-17 ENCOUNTER — TELEPHONE (OUTPATIENT)
Dept: HEMATOLOGY/ONCOLOGY | Facility: CLINIC | Age: 63
End: 2023-05-17
Payer: MEDICAID

## 2023-05-17 VITALS
HEART RATE: 67 BPM | HEIGHT: 70 IN | BODY MASS INDEX: 32.98 KG/M2 | OXYGEN SATURATION: 100 % | TEMPERATURE: 98 F | WEIGHT: 230.38 LBS | DIASTOLIC BLOOD PRESSURE: 71 MMHG | SYSTOLIC BLOOD PRESSURE: 141 MMHG | RESPIRATION RATE: 18 BRPM

## 2023-05-17 LAB
ANION GAP SERPL CALC-SCNC: 10 MEQ/L
BASOPHILS # BLD AUTO: 0.1 X10(3)/MCL
BASOPHILS NFR BLD AUTO: 0.7 %
BUN SERPL-MCNC: 42.9 MG/DL (ref 8.4–25.7)
CALCIUM SERPL-MCNC: 8.7 MG/DL (ref 8.8–10)
CHLORIDE SERPL-SCNC: 110 MMOL/L (ref 98–107)
CO2 SERPL-SCNC: 22 MMOL/L (ref 23–31)
CREAT SERPL-MCNC: 1.76 MG/DL (ref 0.73–1.18)
CREAT/UREA NIT SERPL: 24
EOSINOPHIL # BLD AUTO: 0.24 X10(3)/MCL (ref 0–0.9)
EOSINOPHIL NFR BLD AUTO: 1.7 %
ERYTHROCYTE [DISTWIDTH] IN BLOOD BY AUTOMATED COUNT: 16.1 % (ref 11.5–17)
GFR SERPLBLD CREATININE-BSD FMLA CKD-EPI: 43 MLS/MIN/1.73/M2
GLUCOSE SERPL-MCNC: 99 MG/DL (ref 82–115)
HCT VFR BLD AUTO: 35.6 % (ref 42–52)
HGB BLD-MCNC: 11.1 G/DL (ref 14–18)
IMM GRANULOCYTES # BLD AUTO: 0.56 X10(3)/MCL (ref 0–0.04)
IMM GRANULOCYTES NFR BLD AUTO: 3.9 %
LYMPHOCYTES # BLD AUTO: 3 X10(3)/MCL (ref 0.6–4.6)
LYMPHOCYTES NFR BLD AUTO: 20.7 %
MCH RBC QN AUTO: 26.9 PG (ref 27–31)
MCHC RBC AUTO-ENTMCNC: 31.2 G/DL (ref 33–36)
MCV RBC AUTO: 86.4 FL (ref 80–94)
MONOCYTES # BLD AUTO: 1.23 X10(3)/MCL (ref 0.1–1.3)
MONOCYTES NFR BLD AUTO: 8.5 %
NEUTROPHILS # BLD AUTO: 9.35 X10(3)/MCL (ref 2.1–9.2)
NEUTROPHILS NFR BLD AUTO: 64.5 %
NRBC BLD AUTO-RTO: 0 %
PLATELET # BLD AUTO: 235 X10(3)/MCL (ref 130–400)
PMV BLD AUTO: 10.7 FL (ref 7.4–10.4)
POCT GLUCOSE: 102 MG/DL (ref 70–110)
POTASSIUM SERPL-SCNC: 4.1 MMOL/L (ref 3.5–5.1)
RBC # BLD AUTO: 4.12 X10(6)/MCL (ref 4.7–6.1)
SODIUM SERPL-SCNC: 142 MMOL/L (ref 136–145)
WBC # SPEC AUTO: 14.48 X10(3)/MCL (ref 4.5–11.5)

## 2023-05-17 PROCEDURE — 80048 BASIC METABOLIC PNL TOTAL CA: CPT | Performed by: INTERNAL MEDICINE

## 2023-05-17 PROCEDURE — 25000003 PHARM REV CODE 250: Performed by: PODIATRIST

## 2023-05-17 PROCEDURE — 85025 COMPLETE CBC W/AUTO DIFF WBC: CPT | Performed by: INTERNAL MEDICINE

## 2023-05-17 PROCEDURE — 63600175 PHARM REV CODE 636 W HCPCS: Performed by: PODIATRIST

## 2023-05-17 RX ORDER — AMOXICILLIN AND CLAVULANATE POTASSIUM 875; 125 MG/1; MG/1
1 TABLET, FILM COATED ORAL EVERY 12 HOURS
Qty: 10 TABLET | Refills: 0 | Status: SHIPPED | OUTPATIENT
Start: 2023-05-17 | End: 2023-05-17 | Stop reason: SDUPTHER

## 2023-05-17 RX ORDER — AMLODIPINE BESYLATE 10 MG/1
10 TABLET ORAL EVERY MORNING
Qty: 30 TABLET | Refills: 11 | Status: SHIPPED | OUTPATIENT
Start: 2023-05-18 | End: 2024-05-17

## 2023-05-17 RX ORDER — AMOXICILLIN AND CLAVULANATE POTASSIUM 875; 125 MG/1; MG/1
1 TABLET, FILM COATED ORAL EVERY 12 HOURS
Qty: 14 TABLET | Refills: 0 | Status: SHIPPED | OUTPATIENT
Start: 2023-05-17 | End: 2023-05-24

## 2023-05-17 RX ADMIN — FINASTERIDE 5 MG: 5 TABLET, FILM COATED ORAL at 09:05

## 2023-05-17 RX ADMIN — AMPICILLIN AND SULBACTAM 3 G: 1; 2 INJECTION, POWDER, FOR SOLUTION INTRAMUSCULAR; INTRAVENOUS at 12:05

## 2023-05-17 RX ADMIN — AMLODIPINE BESYLATE 10 MG: 5 TABLET ORAL at 09:05

## 2023-05-17 RX ADMIN — PREGABALIN 100 MG: 50 CAPSULE ORAL at 09:05

## 2023-05-17 RX ADMIN — THERA TABS 1 TABLET: TAB at 09:05

## 2023-05-17 RX ADMIN — PANTOPRAZOLE SODIUM 40 MG: 40 TABLET, DELAYED RELEASE ORAL at 09:05

## 2023-05-17 RX ADMIN — DESVENLAFAXINE 100 MG: 50 TABLET, EXTENDED RELEASE ORAL at 09:05

## 2023-05-17 RX ADMIN — ATORVASTATIN CALCIUM 80 MG: 40 TABLET, FILM COATED ORAL at 09:05

## 2023-05-17 RX ADMIN — SERTRALINE HYDROCHLORIDE 50 MG: 50 TABLET ORAL at 09:05

## 2023-05-17 RX ADMIN — AMPICILLIN AND SULBACTAM 3 G: 1; 2 INJECTION, POWDER, FOR SOLUTION INTRAMUSCULAR; INTRAVENOUS at 11:05

## 2023-05-17 RX ADMIN — AMPICILLIN AND SULBACTAM 3 G: 1; 2 INJECTION, POWDER, FOR SOLUTION INTRAMUSCULAR; INTRAVENOUS at 05:05

## 2023-05-17 RX ADMIN — CARVEDILOL 12.5 MG: 12.5 TABLET, FILM COATED ORAL at 09:05

## 2023-05-17 NOTE — NURSING
"Family Medicine Telephone Visit Note           Telephone Visit Consent   Patient was verbally read the following and verbal consent was obtained.  \"I understand that I may revoke this request for a phone visit at any time.  This consent will automatically  3 months from the signed date and time.\"    Name person giving consent:  Patient   Date verbal consent given:  3/19/2020  Time verbal consent given:  11:00 AM      Chief Complaint   Patient presents with     Consult     smoking cessation. burning when using patches-maybe dosage is too high per wife     Cough     f/u on bronchitis     Current Outpatient Medications   Medication Sig Dispense Refill     aspirin (ASA) 81 MG tablet Take 1 tablet (81 mg) by mouth daily 90 tablet 1     atorvastatin (LIPITOR) 40 MG tablet Take 1 tablet (40 mg) by mouth daily 30 tablet 0     carbamide peroxide (DEBROX) 6.5 % otic solution Place 5 drops into the right ear daily as needed for other (ear fullness) 14.8 mL 1     nicotine (NICODERM CQ) 14 MG/24HR 24 hr patch Place 1 patch onto the skin every 24 hours 30 patch 1     ofloxacin (FLOXIN) 0.3 % otic solution Place 5 drops into both ears daily For 10 days 5 mL 0     umeclidinium (INCRUSE ELLIPTA) 62.5 MCG/INH inhaler Inhale 1 puff into the lungs daily 1 Inhaler 2     Allergies   Allergen Reactions     Latex Rash     Penicillins Rash     rash               HPI   Patients name: Zafar  Appointment start time:  11:10 AM      Concern: ear problems   Description of the problem : History of ear infections throughout for many years in both in ears.  Insidious trouble with hearing, especially in crowded areas.  \"Lots of yellow stuff\" in ears, noticed when cleaning ears.  Episodic.  No pain or fevers.  Intermittent itching and burning.  History of eczema when younger.  Seen on 3/6 for this - drops have helped (Decadron and Floxin).         Concern: smoking cessation   Description of the problem : 2-3 Cigs per day now.  Smokes after " Pt DC per MD order. PIV removed and covered with gauze and tape. Prescriptions sent to preferred pharmacy. F/U appt made and placed in Dc folder. Pt transported downstairs via hospital transport to await private vehicle. Pt in stable condition.    meals or stressful times.  Not smoking at nighttime.  Playing [employer] and listening to classic rock helps with stress.  Patch causes some irritation, but no rash.  Okay with patches still.  Bothersome to wear at night though.  Tried gum before and didn't like it.  Not sure about setting a quit time yet.         Adherence  Medication side effects: yes: skin irritation at site, but no rash  How often is a medication missed? About 1-3 times per week     used via [employer] or similar service.  number: N/a        Assessment and Plan   Zafar was seen today for consult and cough.    Diagnoses and all orders for this visit:    Infective otitis externa, bilateral  Eczema of both external ears  -Likely underlying eczema of ear canals leading to repeat infections.  Will extend abx-steroid drops, and then switch patient to maintenance steroid drops.     neomycin-polymyxin-hydrocortisone (CORTISPORIN) 3.5-87565-4 otic solution; Place 3 drops into both ears 4 times daily    Encounter for smoking cessation counseling  -Counseling provided - no quit date yet.  Engaging with nicotine supplementation.  Healthy coping habits.  Will recheck back in in 4 weeks      Refilled medications that would be required in the next 3 months.     After Visit Information:  Will print and mail AVS     Appointment end time: 11:42 AM  This is a telephone visit that took 32 minutes.    Clinician location:  [facility]

## 2023-05-17 NOTE — PROGRESS NOTES
Inpatient Nutrition Assessment    Admit Date: 5/7/2023   Total duration of encounter: 10 days     Nutrition Recommendation/Prescription     Continue diabetic diet.\    Continue Eligio (provides 90 kcal, 2.5 g protein per serving) BID for wound healing.     Continue Boost Glucose Control (provides 250 kcal, 14 g protein per serving) BID to supplement oral intake.     Communication of Recommendations: reviewed with patient and EMR    Nutrition Assessment     Malnutrition Assessment/Nutrition-Focused Physical Exam    Malnutrition in the context of acute illness or injury  Degree of Malnutrition: does not meet criteria  Energy Intake: does not meet criteria (<50% for 4 days)  Interpretation of Weight Loss: does not meet criteria  Body Fat:does not meet criteria  Area of Body Fat Loss: does not meet criteria  Muscle Mass Loss: mild depletion  Area of Muscle Mass Loss: temple region - temporalis muscle  Fluid Accumulation: does not meet criteria   Edema: 1+ edema - trace   Reduced  Strength: unable to obtain  A minimum of two characteristics is recommended for diagnosis of either severe or non-severe malnutrition.    Chart Review    Reason Seen: continuous nutrition monitoring    Malnutrition Screening Tool Results   Have you recently lost weight without trying?: No  Have you been eating poorly because of a decreased appetite?: No   MST Score: 0     Diagnosis:  Sepsis 2/2 Diabetic Foot Infection    Relevant Medical History:  stage IV RCC status post right radical nephrectomy and lymph node dissection in September of 2020 (not currently on chemo due to non-healing foot wound), HTN, CAD/CABG, IDDM2 with a chronic left great toe planter diabetic ulcer x2 years    Nutrition-Related Medications: prn insulin aspart  Calorie Containing IV Medications: no significant kcals from medications at this time    Nutrition-Related Labs:  K&Phos WNL, A1C 6.2  5/9/23: BUN 33, Creat 2.14, Ca 8.7, Alb 2.8, HGB/HCT: 12.6/41.5  5/12/23: BUN  "37.8, Cr 2.15, Glu 129, Alb 3.1, A1C 6.2    Diet/PN Order: Diet diabetic  Diet diabetic  Oral Supplement Order: none  Tube Feeding Order: none  Appetite/Oral Intake: good/% of meals   Factors Affecting Nutritional Intake: constipation  Food/Rastafarian/Cultural Preferences: none reported  Food Allergies: none reported    Skin Integrity: wound, incision  Wound(s):    left 1st toe/diabetic foot infection    Comments  5/10/23: Pt reports low appetite and minimal po intake since 10 am on 23. Skipped all meals except breakfast  and lunch  75% of tray. Low intake attributed to nausea, abdominal discomfort, gas, and belching. Emesis on  evening but not since. BM yesterday; at home has constipation, takes two stool softeners every night and has BM every other day. Takes protonix for gas. Pt prepares all meals at home; avoids vegetables (accept potato and carrots). Not open to diet education or making diet changes at this time. Agreeable to oral beverage supplementation. Pt had no additional questions at this time.      23: Pt reports good appetite, drinking Boost and Eligio, abdominal pain and nausea significantly better, still w/ slow bowels as is his baseline but they are moving. Discussed diabetic diet and importance of good protein intake for wound healing.     23: Pt reports good appetite, eating % of meals. Drinking 100% of eligio and boost. Reports abdominal pain and nausea resolved. Some constipation. Receptive to diet ed discussion.     Anthropometrics    Height: 5' 10" (177.8 cm)    Last Weight: 104.5 kg (230 lb 6.1 oz) (23 0600) Weight Method: Bed Scale  BMI (Calculated): 33.1  BMI Classification: obese grade I (BMI 30-34.9)        Ideal Body Weight (IBW), Male: 166 lb     % Ideal Body Weight, Male (lb): 123.49 %                 Usual Body Weight (UBW), k.9 kg (pt unsure, per EMR fluctuate 235-140 lbs)  % Usual Body Weight: 99.47     Usual Weight Provided By: EMR weight " history    Wt Readings from Last 5 Encounters:   23 104.5 kg (230 lb 6.1 oz)   23 108.9 kg (240 lb)   23 106.6 kg (235 lb)   23 109.1 kg (240 lb 9.6 oz)   23 107.5 kg (236 lb 15.9 oz)     Weight Change(s) Since Admission:  Admit Weight: 93 kg (205 lb) (23 1423)  No recent significant wt change    Estimated Needs    Weight Used For Calorie Calculations: 108.1 kg (238 lb 5.1 oz)  Energy Calorie Requirements (kcal): 2258  Energy Need Method: Vincent-St Jeor (SF 1.2 for sepsis)  Weight Used For Protein Calculations: 108.1 kg (238 lb 5.1 oz)  Protein Requirements: 76 g (0.7 g/kg CBW)     Temp (24hrs), Av.1 °F (36.7 °C), Min:97.5 °F (36.4 °C), Max:99.7 °F (37.6 °C)         Enteral Nutrition    Patient not receiving enteral nutrition at this time.    Parenteral Nutrition    Patient not receiving parenteral nutrition support at this time.    Evaluation of Received Nutrient Intake    Calories: meeting estimated needs  Protein: meeting estimated needs    Patient Education    Education Provided: diabetic diet and protein for wound healing and fiber for constipation  Teaching Method: explanation  Comprehension: verbalizes understanding  Barriers to Learning: none evident  Expected Compliance: fair  Comments: All questions were answered and dietitian's contact information was provided.  (Patient refused)    Nutrition Diagnosis     PES: Inadequate energy intake related to  nausea, gas, chronic constipation, and low appetite as evidenced by low intake of <50% of estimated energy needs 23-5/10/23 (skipped all meals except breakfast  and lunch ). (resolved) Pt now eating % of meals and supplements.     Interventions/Goals     Intervention(s): general/healthful diet and commercial beverage  Goal: Meet greater than 75% of nutritional needs by follow-up. (goal met)    Monitoring & Evaluation     Dietitian will monitor food and beverage intake, electrolyte/renal panel, and  tolerance .  Nutrition Risk/Follow-Up: moderate (follow-up in 3-5 days)   Please consult if re-assessment needed sooner.

## 2023-05-17 NOTE — DISCHARGE SUMMARY
[facility]  Hospital Medicine Discharge Summary    Admit Date: 5/7/2023  Discharge Date and Time: 5/17/2023  Discharging Physician: Matteo Fuentes MD.  Consults: podiatry, ID     Discharge Diagnoses:  Infected chronic left great toe wound with underlying abscess status post open amputation  Polymicrobial infection secondary to above-MSSA, prevotella, peptostreptococcus, Alcaligenes, Enterobacter  Sepsis secondary to above  Acute on chronic kidney disease, stage III -slowly improving  Stage IV RCC status post right nephrectomy, lymph node dissection   Essential HTN-labile   Type 2 diabetes mellitus -stable  Diabetic nephropathy  CAD status post CABG    Hospital Course:   A 63-year-old male with significant history of stage IV renal cell carcinoma status post right nephrectomy, lymph node dissection in September 2020, HTN, CAD status post CABG, type 2 diabetes mellitus with diabetic neuropathy, nonhealing left great toe plantar diabetic ulcer wound which has been present for almost 2 years.  Patient presented to the ED with worsening wound with associated drainage, fever.  Patient has not received any chemotherapy because of the chronic wound infection.  He had seen general surgeon in [facility] for possible amputation so that chemotherapy can be considered.  MRI in April was negative for osteomyelitis.  Patient was hemodynamically stable and afebrile.  Lab significant for leukocytosis, acute on chronic kidney disease.  Patient was initiated on IV antibiotics and was admitted hospitalist medicine service.  Podiatry was consulted.  MRI repeated this hospitalization revealed abscesses with no osteomyelitis.  Patient was taken to OR by Podiatry and he underwent open amputation of the left great toe.  Awaiting intraoperative cultures.  Zosyn, vancomycin continued.  Podiatric planning for wound closure prior to DC given his history of noncompliance ,  ultrasound retroperitoneum to further evaluate acute  kidney injury.  No obstructive uropathy.  IV fluids continued.  Renal function slowly recovering.  Intraoperative cultures with polymicrobial growth.  Infectious Disease consulted on 05/12.  Awaiting recommendations , renal function improving on IV fluids which was continued. deescalated to just Unasyn now patient is status post debridement and closure of the wound done on May 15th.  White cell count went up to 20,000 this morning     Cultures are growing peptostreptococcus, prevotella be via, MSSA in the wound.  Blood cultures with no growth.    Patient received 10 days of antibiotics in total, due to high risk will continue 5 more days in discharge.  MRI was negative for osteomyelitis this hospitalization  Patient states his sugars are controlled at home will continue his home regiment in discharge.  Continue other home meds-amlodipine, statin, Coreg, desvenlafaxine, Seroquel, tamsulosin  Also continue finasteride, multivitamin, ppi, Lyrica, Seroquel, Zoloft, tamsulosin  Follow with PCP and Podiatry.    Objective/physical exam:  General: In no acute distress, afebrile  Chest: Clear to auscultation bilaterally  Heart: S1, S2, no appreciable murmur  Abdomen: Soft, nontender, BS +  MSK:  Left foot covered in Ace bandage   Neurologic: Alert and oriented x4, moving all extremities with good strength     Vitals:  VITAL SIGNS: 24 HRS MIN & MAX LAST   Temp  Min: 97.5 °F (36.4 °C)  Max: 99.7 °F (37.6 °C) 99.7 °F (37.6 °C)   BP  Min: 111/59  Max: 163/82 (!) 159/89   Pulse  Min: 64  Max: 81  81   Resp  Min: 18  Max: 20 18   SpO2  Min: 96 %  Max: 98 % 96 %         Procedures Performed: No admission procedures for hospital encounter.     Significant Diagnostic Studies: See Full reports for all details    Recent Labs   Lab 05/15/23  0412 05/16/23  0425 05/17/23  0536   WBC 12.66* 20.04* 14.48*   RBC 4.20* 4.21* 4.12*   HGB 11.4* 11.4* 11.1*   HCT 36.2* 36.1* 35.6*   MCV 86.2 85.7 86.4   MCH 27.1 27.1 26.9*   MCHC 31.5* 31.6*  31.2*   RDW 15.9 15.9 16.1    255 235   MPV 10.8* 10.9* 10.7*       Recent Labs   Lab 05/13/23  0626 05/14/23  0420 05/15/23  0412 05/16/23  0425 05/17/23  0536    140 142 141 142   K 4.0 3.9 3.8 4.1 4.1   CO2 22* 19* 22* 17* 22*   BUN 44.7* 42.5* 37.8* 39.1* 42.9*   CREATININE 1.94* 1.65* 1.63* 1.76* 1.76*   CALCIUM 8.7* 8.6* 8.8 9.0 8.7*   ALBUMIN 2.8* 2.8* 2.8*  --   --    ALKPHOS 89 94 99  --   --    ALT 20 18 18  --   --    AST 22 21 18  --   --    BILITOT 0.4 0.3 0.3  --   --         Microbiology Results (last 7 days)       Procedure Component Value Units Date/Time    Blood Culture #1 **CANNOT BE ORDERED STAT** [[id number]]  (Normal) Collected: 05/07/23 1600    Order Status: Completed Specimen: Blood Updated: 05/12/23 1800     CULTURE, BLOOD ([facility]) No Growth at 5 days    Blood Culture #2 **CANNOT BE ORDERED STAT** [[id number]]  (Normal) Collected: 05/07/23 1600    Order Status: Completed Specimen: Blood Updated: 05/12/23 1800     CULTURE, BLOOD ([facility]) No Growth at 5 days    Anaerobic Culture [[id number]]  (Abnormal) Collected: 05/08/23 2214    Order Status: Completed Specimen: Wound from Foot, Left Updated: 05/12/23 1303     Anaerobe Culture Peptostreptococcus sp.      Prevotella bivia     Comment: Anaerobic sensitivity is not usually indicated except on single isolates from sterile body sites.       Wound Culture [[id number]]  (Abnormal)  (Susceptibility) Collected: 05/08/23 2214    Order Status: Completed Specimen: Wound from Foot, Left Updated: 05/12/23 1220     Wound Culture Many Methicillin Sensitive Staphylococcus aureus             US Retroperitoneal Complete  Narrative: EXAMINATION:  US RETROPERITONEAL COMPLETE    CLINICAL HISTORY:  babak;    COMPARISON:  None.    FINDINGS:  Grayscale and color Doppler sonographic evaluation of the kidneys and urinary bladder.    Right kidney absent.    Left kidney measures 13 cm in length.  Grossly normal renal parenchymal echogenicity.  No hydronephrosis.    The  urinary bladder is not well distended.  Impression: 1. Status post right nephrectomy.  2. No significant sonographic abnormality left kidney    Electronically signed by: Saul Garcia  Date:    05/11/2023  Time:    09:56         Medication List        START taking these medications      amoxicillin-clavulanate 875-125mg 875-125 mg per tablet  Commonly known as: AUGMENTIN  Take 1 tablet by mouth every 12 (twelve) hours. for 5 days            CHANGE how you take these medications      amLODIPine 10 MG tablet  Commonly known as: NORVASC  Take 1 tablet (10 mg total) by mouth every morning.  Start taking on: May 18, 2023  What changed:   medication strength  how much to take  Another medication with the same name was removed. Continue taking this medication, and follow the directions you see here.     atorvastatin 80 MG tablet  Commonly known as: LIPITOR  What changed: Another medication with the same name was removed. Continue taking this medication, and follow the directions you see here.     carvediloL 12.5 MG tablet  Commonly known as: COREG  What changed: Another medication with the same name was removed. Continue taking this medication, and follow the directions you see here.     desvenlafaxine succinate 100 MG Tb24  Commonly known as: PRISTIQ  What changed: Another medication with the same name was removed. Continue taking this medication, and follow the directions you see here.     prochlorperazine 5 MG tablet  Commonly known as: COMPAZINE  What changed: Another medication with the same name was removed. Continue taking this medication, and follow the directions you see here.     QUEtiapine 50 MG tablet  Commonly known as: SEROQUEL  What changed: Another medication with the same name was removed. Continue taking this medication, and follow the directions you see here.            CONTINUE taking these medications      aspirin 81 mg Cap     azelastine 137 mcg (0.1 %) nasal spray  Commonly known as: ASTELIN     BD  "VEO INSULIN SYR (HALF UNIT) 0.3 mL 31 gauge x 15/64" Syrg  Generic drug: insulin syr/ndl U100 half warner     CABOMETYX 20 mg Tab  Generic drug: cabozantinib  Take 1 tablet (20 mg) by mouth once daily.     diphenoxylate-atropine 2.5-0.025 mg 2.5-0.025 mg per tablet  Commonly known as: LOMOTIL     finasteride 5 mg tablet  Commonly known as: PROSCAR     furosemide 20 MG tablet  Commonly known as: LASIX     hydrOXYzine pamoate 25 MG Cap  Commonly known as: VISTARIL  Take 1 capsule (25 mg total) by mouth every 4 (four) hours as needed (itching).     hyoscyamine 0.125 mg Subl  Commonly known as: LEVSIN/SL     insulin glargine 100 unit/mL injection  Commonly known as: Lantus     lancets Misc     multivitamin per tablet  Commonly known as: THERAGRAN     pantoprazole 40 MG tablet  Commonly known as: PROTONIX  TAKE 1 TABLET BY MOUTH DAILY     pregabalin 100 MG capsule  Commonly known as: LYRICA     sertraline 50 MG tablet  Commonly known as: ZOLOFT     tamsulosin 0.4 mg Cap  Commonly known as: FLOMAX  Take 1 capsule (0.4 mg total) by mouth every evening.     tolterodine 4 MG 24 hr capsule  Commonly known as: DETROL LA     traMADoL 50 mg tablet  Commonly known as: ULTRAM     TRUE METRIX GLUCOSE TEST STRIP Strp  Generic drug: blood sugar diagnostic     TUBERSOL IDRM            STOP taking these medications      diclofenac 75 MG EC tablet  Commonly known as: VOLTAREN     ondansetron 4 MG tablet  Commonly known as: ZOFRAN     terbinafine  mg tablet  Commonly known as: LAMISIL               Where to Get Your Medications        These medications were sent to [facility] - [facility]  [address]  [address] [place] [address]      Phone: [phone]   amLODIPine 10 MG tablet  amoxicillin-clavulanate 875-125mg 875-125 mg per tablet          Explained in detail to the patient about the discharge plan, medications, and follow-up visits. Pt understands and agrees with the treatment plan  Discharge " Disposition: Home or Self Care   Discharged Condition: stable  Diet-   Dietary Orders (From admission, onward)       Start     Ordered    05/15/23 1442  Diet diabetic  Diet effective now         05/15/23 1441    05/10/23 1528  Dietary nutrition supplements Eligio - Fruit Punch; BID  Continuous        Question Answer Comment   Select PO Supplement: Eligio - Fruit Punch    Frequency: BID        05/10/23 1528    05/10/23 1528  Dietary nutrition supplements Boost Glucose Control Chocolate; BID  Continuous        Question Answer Comment   Select PO Supplement: Boost Glucose Control Chocolate    Frequency: BID        05/10/23 1528                   Medications Per DC med rec  Activities as tolerated   Follow-up Information       Ly Quevedo MD Follow up in 1 week(s).    Specialty: Internal Medicine  Contact information:  [address] [facility] [address]               Shashank Wright DPM Follow up in 1 day(s).    Specialty: Podiatry  Contact information:  [address]  [address]  [facility] [address] [phone]                           For further questions contact hospitalist office    Discharge time 33 minutes    For worsening symptoms, chest pain, shortness of breath, increased abdominal pain, high grade fever, stroke or stroke like symptoms, immediately go to the nearest Emergency Room or call 911 as soon as possible.      Matteo Ivan M.D, on 5/17/2023. at 2:25 PM.

## 2023-05-18 ENCOUNTER — PATIENT OUTREACH (OUTPATIENT)
Dept: ADMINISTRATIVE | Facility: CLINIC | Age: 63
End: 2023-05-18
Payer: MEDICAID

## 2023-05-18 NOTE — PROGRESS NOTES
C3 nurse spoke with Ye Olivera for a TCC post hospital discharge follow up call. The patient has a scheduled [facility] appointment with Ly Quevedo MD (Internal Medicine)6/5/23 @ 9:00 and Shashank Wrigth DPM (Podiatry) on 5/24/2023; [id number].

## 2023-05-22 NOTE — PROGRESS NOTES
Subjective:       Patient ID: Ye Olivera is a 63 y.o. male.    Urologist: Dr. Christopher Villasenor  PCP: Dr. Ly Qeuvedo  Nephrologist: Dr. Jenae Le    Renal Cell Carcinoma Stage IV([id number]) Mediastinal lymph nodes--Diagnosed 20  Biopsy/pathology:  Upper EUS done 9/3/20--large cluster of malignant appearing lymph nodes in subcarinal mediastinum causing extrinsic compression in middle 1/3 and thoracic esophagus biopsy c/w metastatic carcinoma, c/w renal cell carcinoma CD10+, PAX -8+, erythematous mucosa in antrum biopsy c/w mild chronic gastritis, inactive, normal duodenum, no pathology in CBD or left lobe of liver or pancreas. CARIS results- MSI stable, Mismatch repair status proficient. Tumor burden low. Negative for: PD-L1, NTRK1/2/3, BAP1, FH, MET, PBRM1, SDHA. Pathologic variants identified: KDMSC (Exon 13 and 11) and VHL. Immunohistochemistry results: MLH1, MSH2, MSH6, and PM52 positive. PD-L1 negative.   Surgery/pathology:  Right radical nephrectomy and lymph node dissection done 20--clear cell renal cell carcinoma measures 9.2X8C8.7cm, Grade 3, margins clear, tumor limited to kidney, 4/6 lymph nodes positive for metastatic clear cell carcinoma.   Imagin. CT A/P w/o contrast [facility] 20--bulky right renal mass, midpole, measuring 10cm with evidence of internal necrosis and calcifications, primary mass does not extend outside the perinephric space, associated right perinephric edema, multiple enlarged retroperitoneal lymph nodes at level of the kidneys and inferior retroperitoneum, one measures 2.9X1.4cm, additional node measures 2.1X1.8cm, additional node measures 1.8X1.2cm, inferior retroperitoneal node at level of inferior pole right kidney measures 1.8X1.4cm, no convincing evidence of renal vein involvement, no right adrenal gland involvement, no disease apparent outside of abdomen.  2. CT A/P w/ and w/o contrast [facility] 20--large right renal mass measures 10.6X9.5X7.4cm, with  enlarged retroperitoneal nodes, largest measures 2.7cm.  3. CT chest [facility] 8/28/20--solitary left supraclavicular node, small and not overtly concerning, heterogeneous retroesophageal mass at the level of the tariq c/w metastatic deposit, no other evidence for metastatic disease.  4. MRI brain w/ and w/o contrast [facility] 8/28/20--no evidence for intracranial metastatic disease.  5. CT C/A/P done at [facility] 10/28/20--now appears to be 2 adjacent masses at the level of the tariq, measuring in aggregate 6.2X2.7cm as compared with 5.2X2.1cm, subcentimeter mediastinal and hilar nodes are seen, large intermediate density structure at the right renal fossa extending to pelvic brim, most likely post-operative hematoma or seroma, resolution of previously seen retroperitoneal adenopathy.  6. CT C/A/P w&w/o at [facility] on 12/23/20--Interval enlargement of the retro-esophageal mass with mild interval increase in size of multiple paraesophageal and prevascular lymph nodes compatible with progression of disease. Interval decrease in size of the right retroperitoneal postop seroma or hematoma. No evidence of metastatic disease below the diaphragm. Prior right nephrectomy and adrenalectomy. Chololithiasis and stable atherosclerosis.   7. CT C/A/P w/o contrast done at [facility] on 4/5/21--interval decrease in size of multiple prevascular, subcarinal and para-esophageal lymph nodes c/w response to treatment, no new pulmonary nodule or lymph node, no evidence for metastatic disease below the diaphragm, interval decrease in size of the post-operative seromas adjacent to right psoas muscle.  8. CT C/A/P w/o contrast done at [facility] on 7/7/21--stable appearance of previously identified prevascular, subcarinal, and paraesophageal nodes, there are an increased number of sub-centimeter nodes in the aortopulmonary window and precarinal region, nonspecific in appearance, no other evidence of local recurrence or metastatic disease.   9. MRI cervical spine w/ and w/o  contrast done at [facility] 8/4/21--no metastatic disease, multilevel spondylotic changes, no acute abnormality.   10. MRI brain w/ and w/o contrast 8/4/21--normal maturation of the previously seen lacunar infarct at the left centrum semiovale, no new abnormalities, specifically, no evidence of intracranial metastatic disease.   11. CT C/A/P w/o contrast done at [facility] 10/22/21--stable reactive mediastinal adenopathy, interval resection of right kidney and adrenal gland with post-surgical changes but no evidence of metastatic disease.   12. CT C/A/P w/o contrast done at [facility] 1/26/22--4 new pulmonary nodules larges LLL 14mm, 9mm LLL, 5mm LLL, 5mm RML, with enlarging prevascular lymph node now measures 18mm, previous 8mm, concerning for metastatic disease, new mesenteric implant in LLQ measures 11mm, enlarging retroperitoneal LN now measures 21W13io, previous 14X8mm concerning for progression disease, cholelithiasis.   13. MRI brain w/ and w/o  contrast done at [facility] 3/31/22--no acute intracranial abnormality.   14. CT C/A/P w/o contrast done at [facility] 4/5/22--progressive retroperitoneal and mediastinal adenopathy, with development of hilar adenopathy, increase in size and number of pulmonary nodules.   15. CT done at [facility] showed large hematoma 14cm in bladder, results not available.  16. CT head w/o contrast done at [facility] 7/31/22--No intracranial hemorrhage or acute traumatic brain parenchymal injury evident by CT.     17. CT C/A/P w/o contrast done at [facility] 8/29/22--mixed response to treatment, worsening mediastinal adenopathy, stable pulmonary nodules, improved retroperitoneal lymph node, enlarging pericaval lymph node.  18. CT C/A/P w/o contrast at [facility] 11/18/22--Interval decreased in mediastinal adenopathy, pulmonary nodules and portacaval nodes, c/w treatment response.   19. CT C/A/P w/ contrast at [facility] 2/8/23--stable mediastinal lymph nodes, enlarging left lower lobe pulmonary nodule, enlarging retroperitoneal lymph nodes.  "    Treatment History:   1. IL2 started on 11/2/20. 2nd cycle started 11/16/20 - stopped after 7 days due to acute renal failure, volume overload and confusion. Stopped due to progression.  2. Keytruda/Inlyta 1/8/21--1/31/22 (stopped due to progression, autoimmune hepatitis).  3. Prednisone 80mg daily 1/31/22--weaned and stopped 4/14/22.    Hospitalization:   [facility] 7/2022 14cm hematoma s/p TURP causing hydronephrosis, JOE and patient required initiation of dialysis, UTI, Covid infection.     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     Treatment Plan:  Cabometyx 60mg daily started 4/7/22  Decrease dose to 40mg daily 4/28/22 due to side effects.  HOLD on 5/19/22 until groin rash and abscess heal/also underwent TURP on 6/23/22  Restarted 7/6/22--held 7/16/22 for hospitalization/bladder hematoma  Cabometyx 40mg daily restarted 9/1/22. On hold since 10/24/22 for hand/foot syndrome.   Restarted on 11/9/22, held 11/28/22 for hand-foot syndrome, foot wound.   Dose decreased to 20mg daily. Restarted on 1/5/23. Stopped on 3/17/23 due to diabetic foot wound.   Restarted 6/1/23    Chief Complaint: Other Misc (Pt reports hemorrhoids. Pt also states that he had L big toe amputated.  Dr. Shashank Wright did the sx and states that the pt can restart tx.)    HPI   Patient presents for follow-up of renal cell carcinoma. He underwent toe amputation on 5/8/23 with Dr. Wright and was hospitalized at the time. He is otherwise doing well. He has been cleared to restart the medication, and his sutures will come out next week. He has not had a CT since 2/2023. He jokes and says he is joining the mob and his name will be "[employer]"!!     Past Medical History:   Diagnosis Date    Arthritis of knee 08/13/2013    Cancer     stage IV renal cell carcinoma    Cataract     Coronary artery disease     Diabetes mellitus, type 2     HTN (hypertension) 08/13/2013    Hx of CABG     " "Hyperlipidemia     Type II or unspecified type diabetes mellitus without mention of complication, not stated as uncontrolled 08/13/2013      Review of patient's allergies indicates:  No Known Allergies     Current Outpatient Medications:     amLODIPine (NORVASC) 10 MG tablet, Take 1 tablet (10 mg total) by mouth every morning., Disp: 30 tablet, Rfl: 11    aspirin 81 mg Cap, Take 81 mg by mouth once daily at 6am., Disp: , Rfl:     atorvastatin (LIPITOR) 80 MG tablet, Take 80 mg by mouth once daily., Disp: , Rfl:     azelastine (ASTELIN) 137 mcg (0.1 %) nasal spray, SMARTSIG:Both Nares, Disp: , Rfl:     carvediloL (COREG) 12.5 MG tablet, Take 1 tablet by mouth 2 (two) times daily., Disp: , Rfl:     desvenlafaxine succinate (PRISTIQ) 100 MG Tb24, Take 100 mg by mouth., Disp: , Rfl:     diphenoxylate-atropine 2.5-0.025 mg (LOMOTIL) 2.5-0.025 mg per tablet, Take 1 tablet by mouth as needed., Disp: , Rfl:     docusate sodium 100 mg capsule, Take by mouth once daily., Disp: , Rfl:     finasteride (PROSCAR) 5 mg tablet, Take 5 mg by mouth as needed., Disp: , Rfl:     furosemide (LASIX) 20 MG tablet, Take 20 mg by mouth once daily. Pt stated he thinks her takes 10mg daily, Disp: , Rfl:     hydrocortisone 2.5 % cream, APPLY TO THE AFFECTED AREA(S) TWICE DAILY as needed for SKIN IRRITATION FOR 14 DAYS, Disp: , Rfl:     insulin glargine (LANTUS) 100 unit/mL injection, Inject into the skin. Takes 20units, Disp: , Rfl:     insulin syr/ndl U100 half warner (BD VEO INSULIN SYR, HALF UNIT,) 0.3 mL 31 gauge x 15/64" Syrg, USE 1 syringe DAILY, Disp: , Rfl:     lancets Misc, Use TID to check blood sugar. Dx: E11.9, patient is insulin dependent, Disp: , Rfl:     multivitamin (THERAGRAN) per tablet, Take 1 tablet by mouth once daily., Disp: , Rfl:     pantoprazole (PROTONIX) 40 MG tablet, TAKE 1 TABLET BY MOUTH DAILY, Disp: 30 tablet, Rfl: 6    pregabalin (LYRICA) 100 MG capsule, Take 100 mg by mouth., Disp: , Rfl:     prochlorperazine " (COMPAZINE) 5 MG tablet, Take 5 mg by mouth every 8 (eight) hours as needed., Disp: , Rfl:     QUEtiapine (SEROQUEL) 50 MG tablet, Take 3 tablets by mouth every evening., Disp: , Rfl:     tamsulosin (FLOMAX) 0.4 mg Cap, Take 1 capsule (0.4 mg total) by mouth every evening., Disp: 30 capsule, Rfl: 6    tolterodine (DETROL LA) 4 MG 24 hr capsule, Take 4 mg by mouth once daily., Disp: , Rfl:     traMADoL (ULTRAM) 50 mg tablet, Take 50 mg by mouth every 6 (six) hours., Disp: , Rfl:     TRUE METRIX GLUCOSE TEST STRIP Strp, 3 (three) times daily., Disp: , Rfl:     cabozantinib (CABOMETYX) 20 mg Tab, Take 1 tablet (20 mg) by mouth once daily. (Patient not taking: Reported on 5/18/2023.), Disp: 30 tablet, Rfl: 6    hydrOXYzine pamoate (VISTARIL) 25 MG Cap, Take 1 capsule (25 mg total) by mouth every 4 (four) hours as needed (itching). (Patient not taking: Reported on 6/1/2023), Disp: 30 capsule, Rfl: 4    hyoscyamine (LEVSIN/SL) 0.125 mg Subl, Place 0.125 mg under the tongue every 6 (six) hours as needed., Disp: , Rfl:     sertraline (ZOLOFT) 50 MG tablet, Take 50 mg by mouth., Disp: , Rfl:     tuberculin,purif.prot.deriv. (TUBERSOL IDRM), Inject 0.1 mLs into the skin., Disp: , Rfl:      Review of Systems   Constitutional:  Positive for fatigue. Negative for appetite change and unexpected weight change.   HENT:  Negative for mouth sores.    Respiratory:  Negative for cough and shortness of breath.    Cardiovascular:  Negative for chest pain.   Gastrointestinal:  Negative for abdominal pain and diarrhea.   Genitourinary:  Negative for frequency.   Musculoskeletal:  Negative for back pain.   Integumentary:         S/p left great toe amputation   Neurological:  Negative for headaches.   Hematological:  Negative for adenopathy.   Psychiatric/Behavioral:  The patient is not nervous/anxious.      Vitals:    06/01/23 1017   BP: 112/74   Pulse: (!) 55   Resp: 14   Temp: 98 °F (36.7 °C)       Wt Readings from Last 3 Encounters:    06/01/23 1017 107.8 kg (237 lb 11.2 oz)   05/16/23 0600 104.5 kg (230 lb 6.1 oz)   05/15/23 0600 104.8 kg (231 lb)   05/14/23 0600 102.9 kg (226 lb 14.4 oz)   05/10/23 0527 108.1 kg (238 lb 5.1 oz)   05/07/23 1423 93 kg (205 lb)   05/02/23 0957 108.9 kg (240 lb)     Physical Exam  Constitutional:       Appearance: Normal appearance.   HENT:      Head: Normocephalic and atraumatic.      Nose: Nose normal.   Eyes:      General: Lids are normal. Vision grossly intact.      Extraocular Movements: Extraocular movements intact.      Conjunctiva/sclera: Conjunctivae normal.   Cardiovascular:      Rate and Rhythm: Normal rate and regular rhythm.      Heart sounds: Normal heart sounds.      Comments: Bradycardia present, on Coreg  Pulmonary:      Effort: Pulmonary effort is normal.      Breath sounds: Normal breath sounds.   Chest:      Comments: HD access removed to right upper CW  Abdominal:      General: Abdomen is protuberant. Bowel sounds are normal. There is no distension.      Palpations: Abdomen is soft.      Tenderness: There is no abdominal tenderness.   Musculoskeletal:         General: Normal range of motion.      Cervical back: Neck supple.      Right lower leg: No edema.      Left lower leg: No edema.   Feet:      Comments: Left great toe s/p ampuation, with sutures in place, some redness around sutures  Skin:     General: Skin is warm and dry.   Neurological:      General: No focal deficit present.      Mental Status: He is alert and oriented to person, place, and time.   Psychiatric:         Attention and Perception: Attention normal.         Mood and Affect: Mood normal.         Speech: Speech normal.         Behavior: Behavior normal. Behavior is cooperative.       Outside labs at [facility] on 4/5/23: CBC with WBC 13.28, Hgb of 13.8/43.2, platelets 192, CMP with BUN 34 and creat 2.13, alk phos 124. Otherwise good.   Assessment:        1. Clear cell carcinoma of right kidney      Plan:         Patient with large  right renal mass and retroperitoneal adenopathy diagnosed by CT in 7/2020.  CT chest showed mediastinal fanny mass and biopsy done via EUS + for metastatic renal cell carcinoma.  Patient s/p radical right nephrectomy done 9/11/20. Pathology showed 9.2cm clear cell renal cell carcinoma, margins clear with 4/6 lymph nodes positive.  Post-operative course complicated by hematoma requiring blood transfusion.  CARIS results- MSI stable, Mismatch repair status proficient. Tumor burden low. Negative for: PD-L1, NTRK1/2/3, BAP1, FH, MET, PBRM1, SDHA. Pathologic variants identified: KDMSC (Exon 13 and 11) and VHL. Immunohistochemistry results: MLH1, MSH2, MSH6, and PM52 positive. PD-L1 negative.     Referred to Dr. Haja Sal for evaluation for IL-2. Explained this is only treatment to be shown to achieve long-term remissions in stage IV disease and patient has limited disease at this time.  Patient started 1st 5 days of IL-2 on 11/2/20.   Started his 2nd round on 11/16/20 and it was stopped on 11/22/20 due to acute renal failure, volume overload and agitation.   CT scan on 12/23/20 at [facility] revealed progression of his disease. Dr. Sal contacted patient and discussed the results. He also reached out to Dr. Alvarado to notify her that the patient will not be returning.   Started Keytruda 200mg every 3 weeks on 1/8/21 + Inlyta 5mg oral BID on 1/11/21.  CT C/A/P done at [facility] on 10/22/21 showed stable mild reactive mediastinal nodes, otherwise stable.  Patient continued on treatment s/p cycle 18 Keytruda given 1/10/22.  Labs on 1/10/22 showed elevated LFTs. Inlyta held.  CT C/A/P done at [facility] on 1/26/22 showed unfortunately disease progression with new pulmonary nodules, enlargement in mediastinal node, new mesenteric nodule and retroperitoneal adenopathy.  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 Hepatitis panel negative. Keytruda stopped. Started on Prednisone 80mg daily on  1/31/22.  Patient had suicidal ideation and severe depression during visit in 3/2022, seen by psychiatry and meds adjusted, much improved.  CT C/A/P from [facility] on 4/5/22 showed progressive disease.  Treatment change recommended to Cabometyx 60mg daily per NCCN guidelines (Category 1) for subsequent treatment. Started on 4/7/22.  Prednisone weaned and stopped 4/14/22.  Patient was having multiple side effects (n/v, diarrhea, mouth sores) from Cabometyx.  Reduced dose to 40mg on 4/28/22. Held on 5/19/22 due to cellulitis/rashes/abscesses.      Continued to hold s/p TURP on 6/23/22 and restarted 7/6/22.  Patient hospitalized at [facility] 7/16/22 for large bladder hematoma s/p clot evacuation X 2. Cabometyx stopped again.   Repeat CT C/A/P done at [facility] on 8/29/22 showed mixed findings, but mostly disease progression in fanny metastases in mediastinum, which is to be expected since he has been off treatment.   Restarted back on Cabometyx (lower dose 40mg) on 9/1/22 although it has not been studied in dialysis patients, but no dosage adjustment is usually required.  Held on 10/24/22 for Hand-foot syndrome, restarted 11/9/22.   CT done at [facility] on 11/18/22 with disease improvement.  Cabometyx held again for hand-foot syndrome and wound on left toe 11/28/22.  Restarted Cabometyx on 1/5/23 at 20mg daily.    CT C/A/P at [facility] done on 2/8/23 showed mixed findings, stable mediastinal lymph nodes, enlargement in LLL nodule, and enlargement in abdominal lymph nodes.  Given that the findings are mixed and that he has not been on Cabometyx consistently, recommended to continue with current treatment and repeat CT in 3 months.   Cabometyx held on 3/17/23 due to worsening diabetic foot wound.  S/p left great toe amputation on 5/8/23.  Now healing and cleared by podiatry to restart Cabometyx.    Restart Cabometyx at 20mg daily.   RTC 3 weeks for follow-up with repeat labs  and will also order repeat  baseline CT C/A/P w/o contrast to be done at [facility].     Looking ahead to future treatment options, would try again with immunotherapy, single agent Opdivo. He never really failed Keytruda, just developed autoimmune hepatitis, so this would need to be monitored closely.     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 Referred for genetic testing which was done on 3/16/23 and negative.      Continue Compazine for nausea.   Continue Norco for joint pain.   Continue Vistaril for chronic pruritis.     All questions answered at this time.     Elizabeth Alvarado MD

## 2023-06-01 ENCOUNTER — OFFICE VISIT (OUTPATIENT)
Dept: HEMATOLOGY/ONCOLOGY | Facility: CLINIC | Age: 63
End: 2023-06-01
Payer: MEDICAID

## 2023-06-01 VITALS
RESPIRATION RATE: 14 BRPM | SYSTOLIC BLOOD PRESSURE: 112 MMHG | HEIGHT: 70 IN | TEMPERATURE: 98 F | BODY MASS INDEX: 34.03 KG/M2 | WEIGHT: 237.69 LBS | HEART RATE: 55 BPM | OXYGEN SATURATION: 94 % | DIASTOLIC BLOOD PRESSURE: 74 MMHG

## 2023-06-01 DIAGNOSIS — C64.1 CLEAR CELL CARCINOMA OF RIGHT KIDNEY: Primary | ICD-10-CM

## 2023-06-01 PROCEDURE — 99213 OFFICE O/P EST LOW 20 MIN: CPT | Mod: PBBFAC | Performed by: INTERNAL MEDICINE

## 2023-06-01 PROCEDURE — 99999 PR PBB SHADOW E&M-EST. PATIENT-LVL III: CPT | Mod: PBBFAC,,, | Performed by: INTERNAL MEDICINE

## 2023-06-01 PROCEDURE — 1111F PR DISCHARGE MEDS RECONCILED W/ CURRENT OUTPATIENT MED LIST: ICD-10-PCS | Mod: CPTII,,, | Performed by: INTERNAL MEDICINE

## 2023-06-01 PROCEDURE — 1159F PR MEDICATION LIST DOCUMENTED IN MEDICAL RECORD: ICD-10-PCS | Mod: CPTII,,, | Performed by: INTERNAL MEDICINE

## 2023-06-01 PROCEDURE — 3078F DIAST BP <80 MM HG: CPT | Mod: CPTII,,, | Performed by: INTERNAL MEDICINE

## 2023-06-01 PROCEDURE — 3008F PR BODY MASS INDEX (BMI) DOCUMENTED: ICD-10-PCS | Mod: CPTII,,, | Performed by: INTERNAL MEDICINE

## 2023-06-01 PROCEDURE — 3074F PR MOST RECENT SYSTOLIC BLOOD PRESSURE < 130 MM HG: ICD-10-PCS | Mod: CPTII,,, | Performed by: INTERNAL MEDICINE

## 2023-06-01 PROCEDURE — 1160F PR REVIEW ALL MEDS BY PRESCRIBER/CLIN PHARMACIST DOCUMENTED: ICD-10-PCS | Mod: CPTII,,, | Performed by: INTERNAL MEDICINE

## 2023-06-01 PROCEDURE — 99215 PR OFFICE/OUTPT VISIT, EST, LEVL V, 40-54 MIN: ICD-10-PCS | Mod: S$PBB,,, | Performed by: INTERNAL MEDICINE

## 2023-06-01 PROCEDURE — 3078F PR MOST RECENT DIASTOLIC BLOOD PRESSURE < 80 MM HG: ICD-10-PCS | Mod: CPTII,,, | Performed by: INTERNAL MEDICINE

## 2023-06-01 PROCEDURE — 99999 PR PBB SHADOW E&M-EST. PATIENT-LVL III: ICD-10-PCS | Mod: PBBFAC,,, | Performed by: INTERNAL MEDICINE

## 2023-06-01 PROCEDURE — 99215 OFFICE O/P EST HI 40 MIN: CPT | Mod: S$PBB,,, | Performed by: INTERNAL MEDICINE

## 2023-06-01 PROCEDURE — 1111F DSCHRG MED/CURRENT MED MERGE: CPT | Mod: CPTII,,, | Performed by: INTERNAL MEDICINE

## 2023-06-01 PROCEDURE — 1159F MED LIST DOCD IN RCRD: CPT | Mod: CPTII,,, | Performed by: INTERNAL MEDICINE

## 2023-06-01 PROCEDURE — 3008F BODY MASS INDEX DOCD: CPT | Mod: CPTII,,, | Performed by: INTERNAL MEDICINE

## 2023-06-01 PROCEDURE — 3074F SYST BP LT 130 MM HG: CPT | Mod: CPTII,,, | Performed by: INTERNAL MEDICINE

## 2023-06-01 PROCEDURE — 1160F RVW MEDS BY RX/DR IN RCRD: CPT | Mod: CPTII,,, | Performed by: INTERNAL MEDICINE

## 2023-06-01 RX ORDER — ASPIRIN 81 MG
TABLET, DELAYED RELEASE (ENTERIC COATED) ORAL DAILY
COMMUNITY

## 2023-06-01 RX ORDER — HYDROCORTISONE 25 MG/G
CREAM TOPICAL
COMMUNITY
Start: 2022-10-18

## 2023-06-15 RX ORDER — ALLOPURINOL 300 MG/1
200 TABLET ORAL DAILY
COMMUNITY

## 2023-06-15 NOTE — TELEPHONE ENCOUNTER
Specialty Pharmacy - Refill Coordination    Specialty Medication Orders Linked to Encounter      Flowsheet Row Most Recent Value   Medication #1 cabozantinib (CABOMETYX) 20 mg Tab (Order#[id number], Rx#[id number])            Refill Questions - Documented Responses      Flowsheet Row Most Recent Value   Patient Availability and HIPAA Verification    Does patient want to proceed with activity? Yes   HIPAA/medical authority confirmed? Yes   Relationship to patient of person spoken to? Self   Refill Screening Questions    Changes to allergies? No   Changes to medications? Yes  [allopurinol - no DDI]   New conditions since last clinic visit? No   Unplanned office visit, urgent care, ED, or hospital admission in the last 4 weeks? Yes  [finished 10 day course of abx]   How does patient/caregiver feel medication is working? Good   Financial problems or insurance changes? No   How many doses of your specialty medications were missed in the last 4 weeks? > 5  [Cabometyx was being held for diabetic foot infection]   Why were doses missed? Other (comment)  [Cabometyx was being held for diabetic foot infection]   Would patient like to speak to a pharmacist? Yes   When does the patient need to receive the medication? 06/24/23   Refill Delivery Questions    How will the patient receive the medication? MEDRx   When does the patient need to receive the medication? 06/24/23   Shipping Address Home   Address in [facility] confirmed and updated if neccessary? Yes   Expected Copay ($) 0   Is the patient able to afford the medication copay? Yes   Payment Method zero copay   Days supply of Refill 30   Supplies needed? No supplies needed   Refill activity completed? Yes   Refill activity plan Refill scheduled   Shipment/Pickup Date: 06/20/23            Current Outpatient Medications   Medication Sig    allopurinoL (ZYLOPRIM) 300 MG tablet Take 300 mg by mouth once daily.    amLODIPine (NORVASC) 10 MG tablet Take 1 tablet (10 mg  "total) by mouth every morning.    aspirin 81 mg Cap Take 81 mg by mouth once daily at 6am.    atorvastatin (LIPITOR) 80 MG tablet Take 80 mg by mouth once daily.    azelastine (ASTELIN) 137 mcg (0.1 %) nasal spray SMARTSIG:Both Nares    cabozantinib (CABOMETYX) 20 mg Tab Take 1 tablet (20 mg) by mouth once daily. (Patient not taking: Reported on 5/18/2023.)    carvediloL (COREG) 12.5 MG tablet Take 1 tablet by mouth 2 (two) times daily.    desvenlafaxine succinate (PRISTIQ) 100 MG Tb24 Take 100 mg by mouth.    diphenoxylate-atropine 2.5-0.025 mg (LOMOTIL) 2.5-0.025 mg per tablet Take 1 tablet by mouth as needed.    docusate sodium 100 mg capsule Take by mouth once daily.    finasteride (PROSCAR) 5 mg tablet Take 5 mg by mouth as needed.    furosemide (LASIX) 20 MG tablet Take 20 mg by mouth once daily. Pt stated he thinks her takes 10mg daily    hydrocortisone 2.5 % cream APPLY TO THE AFFECTED AREA(S) TWICE DAILY as needed for SKIN IRRITATION FOR 14 DAYS    hydrOXYzine pamoate (VISTARIL) 25 MG Cap Take 1 capsule (25 mg total) by mouth every 4 (four) hours as needed (itching). (Patient not taking: Reported on 6/1/2023)    hyoscyamine (LEVSIN/SL) 0.125 mg Subl Place 0.125 mg under the tongue every 6 (six) hours as needed.    insulin glargine (LANTUS) 100 unit/mL injection Inject into the skin. Takes 20units    insulin syr/ndl U100 half warner (BD VEO INSULIN SYR, HALF UNIT,) 0.3 mL 31 gauge x 15/64" Syrg USE 1 syringe DAILY    lancets Misc Use TID to check blood sugar. Dx: E11.9, patient is insulin dependent    multivitamin (THERAGRAN) per tablet Take 1 tablet by mouth once daily.    pantoprazole (PROTONIX) 40 MG tablet TAKE 1 TABLET BY MOUTH DAILY    pregabalin (LYRICA) 100 MG capsule Take 100 mg by mouth.    prochlorperazine (COMPAZINE) 5 MG tablet Take 5 mg by mouth every 8 (eight) hours as needed.    QUEtiapine (SEROQUEL) 50 MG tablet Take 3 tablets by mouth every evening.    sertraline (ZOLOFT) 50 MG tablet Take 50 " mg by mouth.    tamsulosin (FLOMAX) 0.4 mg Cap Take 1 capsule (0.4 mg total) by mouth every evening.    tolterodine (DETROL LA) 4 MG 24 hr capsule Take 4 mg by mouth once daily.    traMADoL (ULTRAM) 50 mg tablet Take 50 mg by mouth every 6 (six) hours.    TRUE METRIX GLUCOSE TEST STRIP Strp 3 (three) times daily.    tuberculin,purif.prot.deriv. (TUBERSOL IDRM) Inject 0.1 mLs into the skin.   Last reviewed on 6/15/2023  9:11 AM by Cherelle Rey PharmD    Review of patient's allergies indicates:  No Known Allergies Last reviewed on  6/15/2023 9:11 AM by Cherelle Rey      Tasks added this encounter   No tasks added.   Tasks due within next 3 months   6/27/2023 - Clinical Assessment (6 month recurrence)  6/15/2023 - Refill Coordination Outreach (1 time occurrence)     Cherelle Rey, PharmD  [facility]  [address] [place] [id number]  Phone: [phone]  Fax: [phone]

## 2023-06-19 ENCOUNTER — TELEPHONE (OUTPATIENT)
Dept: HEMATOLOGY/ONCOLOGY | Facility: CLINIC | Age: 63
End: 2023-06-19
Payer: MEDICAID

## 2023-06-19 NOTE — TELEPHONE ENCOUNTER
Patient left message wanting to know status of scans. Voice message forwarded to radiology scheduling team.

## 2023-06-19 NOTE — PROGRESS NOTES
Subjective:       Patient ID: Ye Olivera is a 63 y.o. male.    Urologist: Dr. Christopher Villasenor  PCP: Dr. Ly Quevedo  Nephrologist: Dr. Jenae Le    Renal Cell Carcinoma Stage IV([id number]) Mediastinal lymph nodes--Diagnosed 20  Biopsy/pathology:  Upper EUS done 9/3/20--large cluster of malignant appearing lymph nodes in subcarinal mediastinum causing extrinsic compression in middle 1/3 and thoracic esophagus biopsy c/w metastatic carcinoma, c/w renal cell carcinoma CD10+, PAX -8+, erythematous mucosa in antrum biopsy c/w mild chronic gastritis, inactive, normal duodenum, no pathology in CBD or left lobe of liver or pancreas. CARIS results- MSI stable, Mismatch repair status proficient. Tumor burden low. Negative for: PD-L1, NTRK1/2/3, BAP1, FH, MET, PBRM1, SDHA. Pathologic variants identified: KDMSC (Exon 13 and 11) and VHL. Immunohistochemistry results: MLH1, MSH2, MSH6, and PM52 positive. PD-L1 negative.   Surgery/pathology:  Right radical nephrectomy and lymph node dissection done 20--clear cell renal cell carcinoma measures 9.2X8C8.7cm, Grade 3, margins clear, tumor limited to kidney, 4/6 lymph nodes positive for metastatic clear cell carcinoma.   Imagin. CT A/P w/o contrast [facility] 20--bulky right renal mass, midpole, measuring 10cm with evidence of internal necrosis and calcifications, primary mass does not extend outside the perinephric space, associated right perinephric edema, multiple enlarged retroperitoneal lymph nodes at level of the kidneys and inferior retroperitoneum, one measures 2.9X1.4cm, additional node measures 2.1X1.8cm, additional node measures 1.8X1.2cm, inferior retroperitoneal node at level of inferior pole right kidney measures 1.8X1.4cm, no convincing evidence of renal vein involvement, no right adrenal gland involvement, no disease apparent outside of abdomen.  2. CT A/P w/ and w/o contrast [facility] 20--large right renal mass measures 10.6X9.5X7.4cm, with  enlarged retroperitoneal nodes, largest measures 2.7cm.  3. CT chest [facility] 8/28/20--solitary left supraclavicular node, small and not overtly concerning, heterogeneous retroesophageal mass at the level of the tariq c/w metastatic deposit, no other evidence for metastatic disease.  4. MRI brain w/ and w/o contrast [facility] 8/28/20--no evidence for intracranial metastatic disease.  5. CT C/A/P done at [facility] 10/28/20--now appears to be 2 adjacent masses at the level of the tariq, measuring in aggregate 6.2X2.7cm as compared with 5.2X2.1cm, subcentimeter mediastinal and hilar nodes are seen, large intermediate density structure at the right renal fossa extending to pelvic brim, most likely post-operative hematoma or seroma, resolution of previously seen retroperitoneal adenopathy.  6. CT C/A/P w&w/o at [facility] on 12/23/20--Interval enlargement of the retro-esophageal mass with mild interval increase in size of multiple paraesophageal and prevascular lymph nodes compatible with progression of disease. Interval decrease in size of the right retroperitoneal postop seroma or hematoma. No evidence of metastatic disease below the diaphragm. Prior right nephrectomy and adrenalectomy. Chololithiasis and stable atherosclerosis.   7. CT C/A/P w/o contrast done at [facility] on 4/5/21--interval decrease in size of multiple prevascular, subcarinal and para-esophageal lymph nodes c/w response to treatment, no new pulmonary nodule or lymph node, no evidence for metastatic disease below the diaphragm, interval decrease in size of the post-operative seromas adjacent to right psoas muscle.  8. CT C/A/P w/o contrast done at [facility] on 7/7/21--stable appearance of previously identified prevascular, subcarinal, and paraesophageal nodes, there are an increased number of sub-centimeter nodes in the aortopulmonary window and precarinal region, nonspecific in appearance, no other evidence of local recurrence or metastatic disease.   9. MRI cervical spine w/ and w/o  contrast done at [facility] [place] 8/4/21--no metastatic disease, multilevel spondylotic changes, no acute abnormality.   10. MRI brain w/ and w/o contrast 8/4/21--normal maturation of the previously seen lacunar infarct at the left centrum semiovale, no new abnormalities, specifically, no evidence of intracranial metastatic disease.   11. CT C/A/P w/o contrast done at [facility] [place] 10/22/21--stable reactive mediastinal adenopathy, interval resection of right kidney and adrenal gland with post-surgical changes but no evidence of metastatic disease.   12. CT C/A/P w/o contrast done at [facility] [place] 1/26/22--4 new pulmonary nodules larges LLL 14mm, 9mm LLL, 5mm LLL, 5mm RML, with enlarging prevascular lymph node now measures 18mm, previous 8mm, concerning for metastatic disease, new mesenteric implant in LLQ measures 11mm, enlarging retroperitoneal LN now measures [id number], previous 14X8mm concerning for progression disease, cholelithiasis.   13. MRI brain w/ and w/o  contrast done at [facility] [place] 3/31/22--no acute intracranial abnormality.   14. CT C/A/P w/o contrast done at [facility] [place] 4/5/22--progressive retroperitoneal and mediastinal adenopathy, with development of hilar adenopathy, increase in size and number of pulmonary nodules.   15. CT done at [facility] [place] showed large hematoma 14cm in bladder, results not available.  16. CT head w/o contrast done at [facility] 7/31/22--No intracranial hemorrhage or acute traumatic brain parenchymal injury evident by CT.     17. CT C/A/P w/o contrast done at [facility] [place] 8/29/22--mixed response to treatment, worsening mediastinal adenopathy, stable pulmonary nodules, improved retroperitoneal lymph node, enlarging pericaval lymph node.  18. CT C/A/P w/o contrast at [facility] [place] 11/18/22--Interval decreased in mediastinal adenopathy, pulmonary nodules and portacaval nodes, c/w treatment response.   19. CT C/A/P w/ contrast at [facility] [place] 2/8/23--stable mediastinal lymph nodes, enlarging left lower lobe pulmonary nodule, enlarging retroperitoneal lymph nodes.   20. CT  C/A/P w/o contrast at [facility] done 6/20/23--minimally enlarged mediastinal lymph nodes, otherwise no other significant changes, pericaval nodes unchanged.     Treatment History:   1. IL2 started on 11/2/20. 2nd cycle started 11/16/20 - stopped after 7 days due to acute renal failure, volume overload and confusion. Stopped due to progression.  2. Keytruda/Inlyta 1/8/21--1/31/22 (stopped due to progression, autoimmune hepatitis).  3. Prednisone 80mg daily 1/31/22--weaned and stopped 4/14/22.    Hospitalization:   [facility] 7/2022 14cm hematoma s/p TURP causing hydronephrosis, JOE and patient required initiation of dialysis, UTI, Covid infection.     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     Treatment Plan:  Cabometyx 60mg daily started 4/7/22  Decrease dose to 40mg daily 4/28/22 due to side effects.  HOLD on 5/19/22 until groin rash and abscess heal/also underwent TURP on 6/23/22  Restarted 7/6/22--held 7/16/22 for hospitalization/bladder hematoma  Cabometyx 40mg daily restarted 9/1/22. On hold since 10/24/22 for hand/foot syndrome.   Restarted on 11/9/22, held 11/28/22 for hand-foot syndrome, foot wound.   Dose decreased to 20mg daily. Restarted on 1/5/23. Stopped on 3/17/23 due to diabetic foot wound.   Restarted 6/1/23    Chief Complaint: Other Misc (Pt reports no new concerns today.)    HPI   Patient presents for follow-up of renal cell carcinoma. He is doing well on the 20mg Cabometyx. He has not noticed any side effects. However, he is having some drainage from his previous toe amputation. Dr. Wright cultured it and he will see him next week. He is not currently on any antibiotics. Recent CT only with minimal progression in mediastinal nodes and I went over results with patient.     Past Medical History:   Diagnosis Date    Arthritis of knee 08/13/2013    Cancer     stage IV renal cell carcinoma    Cataract     Coronary artery disease     Diabetes mellitus,  type 2     HTN (hypertension) 08/13/2013    Hx of CABG     Hyperlipidemia     Type II or unspecified type diabetes mellitus without mention of complication, not stated as uncontrolled 08/13/2013      Review of patient's allergies indicates:  No Known Allergies     Current Outpatient Medications:     allopurinoL (ZYLOPRIM) 300 MG tablet, Take 300 mg by mouth once daily., Disp: , Rfl:     amLODIPine (NORVASC) 10 MG tablet, Take 1 tablet (10 mg total) by mouth every morning., Disp: 30 tablet, Rfl: 11    aspirin 81 mg Cap, Take 81 mg by mouth once daily at 6am., Disp: , Rfl:     atorvastatin (LIPITOR) 80 MG tablet, Take 80 mg by mouth once daily., Disp: , Rfl:     azelastine (ASTELIN) 137 mcg (0.1 %) nasal spray, SMARTSIG:Both Nares, Disp: , Rfl:     cabozantinib (CABOMETYX) 20 mg Tab, Take 1 tablet (20 mg) by mouth once daily., Disp: 30 tablet, Rfl: 6    carvediloL (COREG) 12.5 MG tablet, Take 1 tablet by mouth 2 (two) times daily., Disp: , Rfl:     desvenlafaxine succinate (PRISTIQ) 100 MG Tb24, Take 100 mg by mouth., Disp: , Rfl:     diphenoxylate-atropine 2.5-0.025 mg (LOMOTIL) 2.5-0.025 mg per tablet, Take 1 tablet by mouth as needed., Disp: , Rfl:     docusate sodium 100 mg capsule, Take by mouth once daily., Disp: , Rfl:     finasteride (PROSCAR) 5 mg tablet, Take 5 mg by mouth as needed., Disp: , Rfl:     furosemide (LASIX) 20 MG tablet, Take 20 mg by mouth once daily. Pt stated he thinks her takes 10mg daily, Disp: , Rfl:     hydrocortisone 2.5 % cream, APPLY TO THE AFFECTED AREA(S) TWICE DAILY as needed for SKIN IRRITATION FOR 14 DAYS, Disp: , Rfl:     hydrOXYzine pamoate (VISTARIL) 25 MG Cap, Take 1 capsule (25 mg total) by mouth every 4 (four) hours as needed (itching)., Disp: 30 capsule, Rfl: 4    hyoscyamine (LEVSIN/SL) 0.125 mg Subl, Place 0.125 mg under the tongue every 6 (six) hours as needed., Disp: , Rfl:     insulin glargine (LANTUS) 100 unit/mL injection, Inject into the skin. Takes 20units, Disp: ,  "Rfl:     insulin syr/ndl U100 half warner (BD VEO INSULIN SYR, HALF UNIT,) 0.3 mL 31 gauge x 15/64" Syrg, USE 1 syringe DAILY, Disp: , Rfl:     lancets Misc, Use TID to check blood sugar. Dx: E11.9, patient is insulin dependent, Disp: , Rfl:     multivitamin (THERAGRAN) per tablet, Take 1 tablet by mouth once daily., Disp: , Rfl:     pantoprazole (PROTONIX) 40 MG tablet, TAKE 1 TABLET BY MOUTH DAILY, Disp: 30 tablet, Rfl: 6    pregabalin (LYRICA) 100 MG capsule, Take 100 mg by mouth., Disp: , Rfl:     prochlorperazine (COMPAZINE) 5 MG tablet, Take 5 mg by mouth every 8 (eight) hours as needed., Disp: , Rfl:     QUEtiapine (SEROQUEL) 50 MG tablet, Take 3 tablets by mouth every evening., Disp: , Rfl:     tamsulosin (FLOMAX) 0.4 mg Cap, Take 1 capsule (0.4 mg total) by mouth every evening., Disp: 30 capsule, Rfl: 6    tolterodine (DETROL LA) 4 MG 24 hr capsule, Take 4 mg by mouth once daily., Disp: , Rfl:     traMADoL (ULTRAM) 50 mg tablet, Take 50 mg by mouth every 6 (six) hours., Disp: , Rfl:     TRUE METRIX GLUCOSE TEST STRIP Strp, 3 (three) times daily., Disp: , Rfl:     sertraline (ZOLOFT) 50 MG tablet, Take 50 mg by mouth., Disp: , Rfl:     tuberculin,purif.prot.deriv. (TUBERSOL IDRM), Inject 0.1 mLs into the skin., Disp: , Rfl:      Review of Systems   Constitutional:  Positive for fatigue. Negative for appetite change and unexpected weight change.   HENT:  Negative for mouth sores.    Respiratory:  Negative for cough and shortness of breath.    Cardiovascular:  Negative for chest pain.   Gastrointestinal:  Negative for abdominal pain and diarrhea.   Genitourinary:  Negative for frequency.   Musculoskeletal:  Negative for back pain.   Integumentary:         S/p left great toe amputation   Neurological:  Negative for headaches.   Hematological:  Negative for adenopathy.   Psychiatric/Behavioral:  The patient is not nervous/anxious.      Vitals:    06/26/23 1119   BP: 129/82   Pulse: 64   Resp: 14   Temp: 97.6 °F " (36.4 °C)         Wt Readings from Last 3 Encounters:   06/26/23 1119 104.4 kg (230 lb 1.6 oz)   06/01/23 1017 107.8 kg (237 lb 11.2 oz)   05/16/23 0600 104.5 kg (230 lb 6.1 oz)   05/15/23 0600 104.8 kg (231 lb)   05/14/23 0600 102.9 kg (226 lb 14.4 oz)   05/10/23 0527 108.1 kg (238 lb 5.1 oz)   05/07/23 1423 93 kg (205 lb)     Physical Exam  Constitutional:       Appearance: Normal appearance.   HENT:      Head: Normocephalic and atraumatic.      Nose: Nose normal.   Eyes:      General: Lids are normal. Vision grossly intact.      Extraocular Movements: Extraocular movements intact.      Conjunctiva/sclera: Conjunctivae normal.   Cardiovascular:      Rate and Rhythm: Normal rate and regular rhythm.      Heart sounds: Normal heart sounds.      Comments: Bradycardia present, on Coreg  Pulmonary:      Effort: Pulmonary effort is normal.      Breath sounds: Normal breath sounds.   Chest:      Comments: HD access removed to right upper CW  Abdominal:      General: Abdomen is protuberant. Bowel sounds are normal. There is no distension.      Palpations: Abdomen is soft.      Tenderness: There is no abdominal tenderness.   Musculoskeletal:         General: Normal range of motion.      Cervical back: Neck supple.      Right lower leg: No edema.      Left lower leg: No edema.   Feet:      Comments: Left great toe s/p ampuation, not examined today  Skin:     General: Skin is warm and dry.   Neurological:      General: No focal deficit present.      Mental Status: He is alert and oriented to person, place, and time.   Psychiatric:         Attention and Perception: Attention normal.         Mood and Affect: Mood normal.         Speech: Speech normal.         Behavior: Behavior normal. Behavior is cooperative.       Outside labs at [facility] on 4/5/23: CBC with WBC 8.11, Hgb of 11.9/38.6, platelets 303, CMP with BUN 25 and creat 1.87, alk phos 181, albumin 2.6. Otherwise good.   Assessment:        1. Clear cell carcinoma of right  kidney        Plan:         Patient with large right renal mass and retroperitoneal adenopathy diagnosed by CT in 7/2020.  CT chest showed mediastinal fanny mass and biopsy done via EUS + for metastatic renal cell carcinoma.  Patient s/p radical right nephrectomy done 9/11/20. Pathology showed 9.2cm clear cell renal cell carcinoma, margins clear with 4/6 lymph nodes positive.  Post-operative course complicated by hematoma requiring blood transfusion.  CARIS results- MSI stable, Mismatch repair status proficient. Tumor burden low. Negative for: PD-L1, NTRK1/2/3, BAP1, FH, MET, PBRM1, SDHA. Pathologic variants identified: KDMSC (Exon 13 and 11) and VHL. Immunohistochemistry results: MLH1, MSH2, MSH6, and PM52 positive. PD-L1 negative.     Referred to Dr. Haja Sal for evaluation for IL-2. Explained this is only treatment to be shown to achieve long-term remissions in stage IV disease and patient has limited disease at this time.  Patient started 1st 5 days of IL-2 on 11/2/20.   Started his 2nd round on 11/16/20 and it was stopped on 11/22/20 due to acute renal failure, volume overload and agitation.   CT scan on 12/23/20 at [facility] revealed progression of his disease. Dr. Sal contacted patient and discussed the results. He also reached out to Dr. Alvarado to notify her that the patient will not be returning.   Started Keytruda 200mg every 3 weeks on 1/8/21 + Inlyta 5mg oral BID on 1/11/21.  CT C/A/P done at [facility] on 10/22/21 showed stable mild reactive mediastinal nodes, otherwise stable.  Patient continued on treatment s/p cycle 18 Keytruda given 1/10/22.  Labs on 1/10/22 showed elevated LFTs. Inlyta held.  CT C/A/P done at [facility] on 1/26/22 showed unfortunately disease progression with new pulmonary nodules, enlargement in mediastinal node, new mesenteric nodule and retroperitoneal adenopathy.  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 Hepatitis panel negative. Keytruda stopped.  Started on Prednisone 80mg daily on 1/31/22.  Patient had suicidal ideation and severe depression during visit in 3/2022, seen by psychiatry and meds adjusted, much improved.  CT C/A/P from [facility] on 4/5/22 showed progressive disease.  Treatment change recommended to Cabometyx 60mg daily per NCCN guidelines (Category 1) for subsequent treatment. Started on 4/7/22.  Prednisone weaned and stopped 4/14/22.  Patient was having multiple side effects (n/v, diarrhea, mouth sores) from Cabometyx.  Reduced dose to 40mg on 4/28/22. Held on 5/19/22 due to cellulitis/rashes/abscesses.      Continued to hold s/p TURP on 6/23/22 and restarted 7/6/22.  Patient hospitalized at [facility] 7/16/22 for large bladder hematoma s/p clot evacuation X 2. Cabometyx stopped again.   Repeat CT C/A/P done at [facility] on 8/29/22 showed mixed findings, but mostly disease progression in fanny metastases in mediastinum, which is to be expected since he has been off treatment.   Restarted back on Cabometyx (lower dose 40mg) on 9/1/22 although it has not been studied in dialysis patients, but no dosage adjustment is usually required.  Held on 10/24/22 for Hand-foot syndrome, restarted 11/9/22.   CT done at [facility] on 11/18/22 with disease improvement.  Cabometyx held again for hand-foot syndrome and wound on left toe 11/28/22.  Restarted Cabometyx on 1/5/23 at 20mg daily.    CT C/A/P at [facility] done on 2/8/23 showed mixed findings, stable mediastinal lymph nodes, enlargement in LLL nodule, and enlargement in abdominal lymph nodes.  Given that the findings are mixed and that he has not been on Cabometyx consistently, recommended to continue with current treatment and repeat CT in 3 months.   Cabometyx held on 3/17/23 due to worsening diabetic foot wound.  S/p left great toe amputation on 5/8/23.  Now healing and cleared by podiatry to restart Cabometyx which was restarted on 6/1/23 Cabometyx 20mg daily.  Repeat CT C/A/P done  6/20/23 with only minimal enlargement in mediastinal nodes, good considering he was off treatment for sometime. This will serve as new baseline.     Recent labs with slightly worse anemia, renal insufficiency stable.     Continue Cabometyx. Watch foot wound closely and if worsens, patient knows to hold Cabometyx. He has close follow-up with podiatry.  RTC 3 weeks for follow-up with repeat labs to be done in [place].    Looking ahead to future treatment options, would try again with immunotherapy, single agent Opdivo. He never really failed Keytruda, just developed autoimmune hepatitis, so this would need to be monitored closely.     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 Referred for genetic testing which was done on 3/16/23 and negative.      Continue Compazine for nausea.   Continue Norco for joint pain.   Continue Vistaril for chronic pruritis.     All questions answered at this time.     Elizabeth Alvarado MD

## 2023-06-26 ENCOUNTER — OFFICE VISIT (OUTPATIENT)
Dept: HEMATOLOGY/ONCOLOGY | Facility: CLINIC | Age: 63
End: 2023-06-26
Payer: MEDICAID

## 2023-06-26 VITALS
TEMPERATURE: 98 F | BODY MASS INDEX: 32.95 KG/M2 | HEIGHT: 70 IN | HEART RATE: 64 BPM | RESPIRATION RATE: 14 BRPM | DIASTOLIC BLOOD PRESSURE: 82 MMHG | SYSTOLIC BLOOD PRESSURE: 129 MMHG | OXYGEN SATURATION: 96 % | WEIGHT: 230.13 LBS

## 2023-06-26 DIAGNOSIS — C64.1 CLEAR CELL CARCINOMA OF RIGHT KIDNEY: Primary | ICD-10-CM

## 2023-06-26 PROCEDURE — 99999 PR PBB SHADOW E&M-EST. PATIENT-LVL III: CPT | Mod: PBBFAC,,, | Performed by: INTERNAL MEDICINE

## 2023-06-26 PROCEDURE — 1160F PR REVIEW ALL MEDS BY PRESCRIBER/CLIN PHARMACIST DOCUMENTED: ICD-10-PCS | Mod: CPTII,,, | Performed by: INTERNAL MEDICINE

## 2023-06-26 PROCEDURE — 99213 OFFICE O/P EST LOW 20 MIN: CPT | Mod: PBBFAC | Performed by: INTERNAL MEDICINE

## 2023-06-26 PROCEDURE — 1159F PR MEDICATION LIST DOCUMENTED IN MEDICAL RECORD: ICD-10-PCS | Mod: CPTII,,, | Performed by: INTERNAL MEDICINE

## 2023-06-26 PROCEDURE — 3079F DIAST BP 80-89 MM HG: CPT | Mod: CPTII,,, | Performed by: INTERNAL MEDICINE

## 2023-06-26 PROCEDURE — 99215 PR OFFICE/OUTPT VISIT, EST, LEVL V, 40-54 MIN: ICD-10-PCS | Mod: S$PBB,,, | Performed by: INTERNAL MEDICINE

## 2023-06-26 PROCEDURE — 3079F PR MOST RECENT DIASTOLIC BLOOD PRESSURE 80-89 MM HG: ICD-10-PCS | Mod: CPTII,,, | Performed by: INTERNAL MEDICINE

## 2023-06-26 PROCEDURE — 1159F MED LIST DOCD IN RCRD: CPT | Mod: CPTII,,, | Performed by: INTERNAL MEDICINE

## 2023-06-26 PROCEDURE — 3008F PR BODY MASS INDEX (BMI) DOCUMENTED: ICD-10-PCS | Mod: CPTII,,, | Performed by: INTERNAL MEDICINE

## 2023-06-26 PROCEDURE — 99215 OFFICE O/P EST HI 40 MIN: CPT | Mod: S$PBB,,, | Performed by: INTERNAL MEDICINE

## 2023-06-26 PROCEDURE — 1160F RVW MEDS BY RX/DR IN RCRD: CPT | Mod: CPTII,,, | Performed by: INTERNAL MEDICINE

## 2023-06-26 PROCEDURE — 99999 PR PBB SHADOW E&M-EST. PATIENT-LVL III: ICD-10-PCS | Mod: PBBFAC,,, | Performed by: INTERNAL MEDICINE

## 2023-06-26 PROCEDURE — 3008F BODY MASS INDEX DOCD: CPT | Mod: CPTII,,, | Performed by: INTERNAL MEDICINE

## 2023-06-26 PROCEDURE — 3074F SYST BP LT 130 MM HG: CPT | Mod: CPTII,,, | Performed by: INTERNAL MEDICINE

## 2023-06-26 PROCEDURE — 3074F PR MOST RECENT SYSTOLIC BLOOD PRESSURE < 130 MM HG: ICD-10-PCS | Mod: CPTII,,, | Performed by: INTERNAL MEDICINE

## 2023-07-06 DIAGNOSIS — N18.4 ANEMIA IN STAGE 4 CHRONIC KIDNEY DISEASE: ICD-10-CM

## 2023-07-06 DIAGNOSIS — D63.1 ANEMIA IN STAGE 4 CHRONIC KIDNEY DISEASE: ICD-10-CM

## 2023-07-06 DIAGNOSIS — N18.4 CHRONIC KIDNEY DISEASE, STAGE IV (SEVERE): ICD-10-CM

## 2023-07-06 DIAGNOSIS — C64.1 CLEAR CELL CARCINOMA OF RIGHT KIDNEY: ICD-10-CM

## 2023-07-06 RX ORDER — TAMSULOSIN HYDROCHLORIDE 0.4 MG/1
CAPSULE ORAL
Qty: 30 CAPSULE | Refills: 6 | Status: SHIPPED | OUTPATIENT
Start: 2023-07-06 | End: 2024-01-02

## 2023-07-11 ENCOUNTER — SPECIALTY PHARMACY (OUTPATIENT)
Dept: PHARMACY | Facility: CLINIC | Age: 63
End: 2023-07-11
Payer: MEDICARE

## 2023-07-11 DIAGNOSIS — C64.1 MALIGNANT NEOPLASM OF RIGHT KIDNEY, EXCEPT RENAL PELVIS: Primary | ICD-10-CM

## 2023-07-11 NOTE — TELEPHONE ENCOUNTER
Specialty Pharmacy - Clinical Reassessment    Specialty Medication Orders Linked to Encounter      Flowsheet Row Most Recent Value   Medication #1 cabozantinib (CABOMETYX) 20 mg Tab (Order#[id number], Rx#[id number])          Patient Diagnosis   C64.1 - Clear cell carcinoma of right kidney    Ye Olivera is a 63 y.o. male, who is followed by the specialty pharmacy service for management and education of his Cabometyx.  He has been on therapy with Cabometyx for 17 months.  I have reviewed his electronic medical record and current medication list and determined that specialty medication adjustment Is not needed at this time.    Patient has experienced adverse events such as HFS, unhealed wound, and is managed in the following way(s): holding doses, dose reduction.  He Is not adherent reporting >12 missed doses since last review.  Adherence has been encouraged with the following mechanism(s): proactive monthly refill calls.  He is on target to meet goals of therapy and will continue treatment.        6/15/2023 2/28/2023 2/27/2023 1/30/2023 1/5/2023   Follow Up Review   # of missed doses > 5 4 3 0    Reason Other (comment)  Wanted to avoid side effects     New Medications? Yes No No No    New Conditions? No No No No    New Allergies? No No No No    Med Effective? Good Good Good Good    Missed activities?     No   Urgent Care? Yes No No No    Requested Pharmacist? Yes No No No          Therapy is appropriate to continue.    Therapy is effective: Yes  Global Physical Health: 12  Global Mental Health: 12  Recommendations: none at this time.  Review Method: Patient Contact    Tasks added this encounter   No tasks added.   Tasks due within next 3 months   7/4/2023 - Clinical Assessment (6 month recurrence)  7/13/2023 - Refill Coordination Outreach (1 time occurrence)     Cherelle Rey, PharmD  [facility]  [address]  [facility] [address]  Phone: [phone]  Fax:  [phone]

## 2023-07-11 NOTE — PROGRESS NOTES
Subjective:       Patient ID: Ye Olivera is a 63 y.o. male.    Urologist: Dr. Christopher Villasenor  PCP: Dr. Ly Quevedo  Nephrologist: Dr. Jenae Le    Renal Cell Carcinoma Stage IV(C2rG5L5) Mediastinal lymph nodes--Diagnosed 20  Biopsy/pathology:  Upper EUS done 9/3/20--large cluster of malignant appearing lymph nodes in subcarinal mediastinum causing extrinsic compression in middle 1/3 and thoracic esophagus biopsy c/w metastatic carcinoma, c/w renal cell carcinoma CD10+, PAX -8+, erythematous mucosa in antrum biopsy c/w mild chronic gastritis, inactive, normal duodenum, no pathology in CBD or left lobe of liver or pancreas. CARIS results- MSI stable, Mismatch repair status proficient. Tumor burden low. Negative for: PD-L1, NTRK1/2/3, BAP1, FH, MET, PBRM1, SDHA. Pathologic variants identified: KDMSC (Exon 13 and 11) and VHL. Immunohistochemistry results: MLH1, MSH2, MSH6, and PM52 positive. PD-L1 negative.   Surgery/pathology:  Right radical nephrectomy and lymph node dissection done 20--clear cell renal cell carcinoma measures 9.2X8C8.7cm, Grade 3, margins clear, tumor limited to kidney, 4/6 lymph nodes positive for metastatic clear cell carcinoma.   Imagin. CT A/P w/o contrast [facility] 20--bulky right renal mass, midpole, measuring 10cm with evidence of internal necrosis and calcifications, primary mass does not extend outside the perinephric space, associated right perinephric edema, multiple enlarged retroperitoneal lymph nodes at level of the kidneys and inferior retroperitoneum, one measures 2.9X1.4cm, additional node measures 2.1X1.8cm, additional node measures 1.8X1.2cm, inferior retroperitoneal node at level of inferior pole right kidney measures 1.8X1.4cm, no convincing evidence of renal vein involvement, no right adrenal gland involvement, no disease apparent outside of abdomen.  2. CT A/P w/ and w/o contrast [facility] 20--large right renal mass measures 10.6X9.5X7.4cm, with  enlarged retroperitoneal nodes, largest measures 2.7cm.  3. CT chest [facility] 8/28/20--solitary left supraclavicular node, small and not overtly concerning, heterogeneous retroesophageal mass at the level of the tariq c/w metastatic deposit, no other evidence for metastatic disease.  4. MRI brain w/ and w/o contrast [facility] 8/28/20--no evidence for intracranial metastatic disease.  5. CT C/A/P done at [facility] 10/28/20--now appears to be 2 adjacent masses at the level of the tariq, measuring in aggregate 6.2X2.7cm as compared with 5.2X2.1cm, subcentimeter mediastinal and hilar nodes are seen, large intermediate density structure at the right renal fossa extending to pelvic brim, most likely post-operative hematoma or seroma, resolution of previously seen retroperitoneal adenopathy.  6. CT C/A/P w&w/o at [facility] on 12/23/20--Interval enlargement of the retro-esophageal mass with mild interval increase in size of multiple paraesophageal and prevascular lymph nodes compatible with progression of disease. Interval decrease in size of the right retroperitoneal postop seroma or hematoma. No evidence of metastatic disease below the diaphragm. Prior right nephrectomy and adrenalectomy. Chololithiasis and stable atherosclerosis.   7. CT C/A/P w/o contrast done at [facility] on 4/5/21--interval decrease in size of multiple prevascular, subcarinal and para-esophageal lymph nodes c/w response to treatment, no new pulmonary nodule or lymph node, no evidence for metastatic disease below the diaphragm, interval decrease in size of the post-operative seromas adjacent to right psoas muscle.  8. CT C/A/P w/o contrast done at [facility] on 7/7/21--stable appearance of previously identified prevascular, subcarinal, and paraesophageal nodes, there are an increased number of sub-centimeter nodes in the aortopulmonary window and precarinal region, nonspecific in appearance, no other evidence of local recurrence or metastatic disease.   9. MRI cervical spine w/ and w/o  contrast done at [facility] 8/4/21--no metastatic disease, multilevel spondylotic changes, no acute abnormality.   10. MRI brain w/ and w/o contrast 8/4/21--normal maturation of the previously seen lacunar infarct at the left centrum semiovale, no new abnormalities, specifically, no evidence of intracranial metastatic disease.   11. CT C/A/P w/o contrast done at [facility] 10/22/21--stable reactive mediastinal adenopathy, interval resection of right kidney and adrenal gland with post-surgical changes but no evidence of metastatic disease.   12. CT C/A/P w/o contrast done at [facility] 1/26/22--4 new pulmonary nodules larges LLL 14mm, 9mm LLL, 5mm LLL, 5mm RML, with enlarging prevascular lymph node now measures 18mm, previous 8mm, concerning for metastatic disease, new mesenteric implant in LLQ measures 11mm, enlarging retroperitoneal LN now measures 46X37oh, previous 14X8mm concerning for progression disease, cholelithiasis.   13. MRI brain w/ and w/o  contrast done at [facility] 3/31/22--no acute intracranial abnormality.   14. CT C/A/P w/o contrast done at [facility] 4/5/22--progressive retroperitoneal and mediastinal adenopathy, with development of hilar adenopathy, increase in size and number of pulmonary nodules.   15. CT done at [facility] showed large hematoma 14cm in bladder, results not available.  16. CT head w/o contrast done at [facility] 7/31/22--No intracranial hemorrhage or acute traumatic brain parenchymal injury evident by CT.     17. CT C/A/P w/o contrast done at [facility] 8/29/22--mixed response to treatment, worsening mediastinal adenopathy, stable pulmonary nodules, improved retroperitoneal lymph node, enlarging pericaval lymph node.  18. CT C/A/P w/o contrast at [facility] 11/18/22--Interval decreased in mediastinal adenopathy, pulmonary nodules and portacaval nodes, c/w treatment response.   19. CT C/A/P w/ contrast at [facility] 2/8/23--stable mediastinal lymph nodes, enlarging left lower lobe pulmonary nodule, enlarging retroperitoneal lymph nodes.   20. CT  C/A/P w/o contrast at [facility] done 6/20/23--minimally enlarged mediastinal lymph nodes, otherwise no other significant changes, pericaval nodes unchanged.     Treatment History:   1. IL2 started on 11/2/20. 2nd cycle started 11/16/20 - stopped after 7 days due to acute renal failure, volume overload and confusion. Stopped due to progression.  2. Keytruda/Inlyta 1/8/21--1/31/22 (stopped due to progression, autoimmune hepatitis).  3. Prednisone 80mg daily 1/31/22--weaned and stopped 4/14/22.    Hospitalization:   [facility] 7/2022 14cm hematoma s/p TURP causing hydronephrosis, JOE and patient required initiation of dialysis, UTI, Covid infection.     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     Treatment Plan:  Cabometyx 60mg daily started 4/7/22  Decrease dose to 40mg daily 4/28/22 due to side effects.  HOLD on 5/19/22 until groin rash and abscess heal/also underwent TURP on 6/23/22  Restarted 7/6/22--held 7/16/22 for hospitalization/bladder hematoma  Cabometyx 40mg daily restarted 9/1/22. On hold since 10/24/22 for hand/foot syndrome.   Restarted on 11/9/22, held 11/28/22 for hand-foot syndrome, foot wound.   Dose decreased to 20mg daily. Restarted on 1/5/23. Stopped on 3/17/23 due to diabetic foot wound.   Restarted 6/1/23    Chief Complaint: Other Misc (Pt reports gout in his L elbow and wrist.), Gout, and Diarrhea    HPI   Patient presents for follow-up of renal cell carcinoma. He continues on Cabometyx 20mg daily. Reports also having diarrhea, controlled with Lomotil, but also causes constipation. He had a gout flare in his left elbow, now on colchicine. Also reports having a small lesion near incision from his toe amputation and is on antibiotics. The podiatrist is monitoring closely.     Past Medical History:   Diagnosis Date    Arthritis of knee 08/13/2013    Cancer     stage IV renal cell carcinoma    Cataract     Coronary artery disease     Diabetes mellitus,  type 2     HTN (hypertension) 08/13/2013    Hx of CABG     Hyperlipidemia     Type II or unspecified type diabetes mellitus without mention of complication, not stated as uncontrolled 08/13/2013      Review of patient's allergies indicates:  No Known Allergies     Current Outpatient Medications:     amLODIPine (NORVASC) 10 MG tablet, Take 1 tablet (10 mg total) by mouth every morning., Disp: 30 tablet, Rfl: 11    aspirin 81 mg Cap, Take 81 mg by mouth once daily at 6am., Disp: , Rfl:     atorvastatin (LIPITOR) 80 MG tablet, Take 80 mg by mouth once daily., Disp: , Rfl:     azelastine (ASTELIN) 137 mcg (0.1 %) nasal spray, SMARTSIG:Both Nares, Disp: , Rfl:     cabozantinib (CABOMETYX) 20 mg Tab, Take 1 tablet (20 mg) by mouth once daily., Disp: 30 tablet, Rfl: 6    carvediloL (COREG) 12.5 MG tablet, Take 1 tablet by mouth 2 (two) times daily., Disp: , Rfl:     colchicine (COLCRYS) 0.6 mg tablet, Take 2 tablets by mouth once, then take 1 tablet 1 hour later. Max 1.8 mg/day. Then take 1 tablets by mouth daily until 2 days after flare resolves., Disp: , Rfl:     desvenlafaxine succinate (PRISTIQ) 100 MG Tb24, Take 100 mg by mouth., Disp: , Rfl:     diphenoxylate-atropine 2.5-0.025 mg (LOMOTIL) 2.5-0.025 mg per tablet, Take 1 tablet by mouth as needed., Disp: , Rfl:     docusate sodium 100 mg capsule, Take by mouth once daily., Disp: , Rfl:     finasteride (PROSCAR) 5 mg tablet, Take 5 mg by mouth as needed., Disp: , Rfl:     furosemide (LASIX) 20 MG tablet, Take 20 mg by mouth once daily. Pt stated he thinks her takes 10mg daily, Disp: , Rfl:     hydrOXYzine pamoate (VISTARIL) 25 MG Cap, Take 1 capsule (25 mg total) by mouth every 4 (four) hours as needed (itching)., Disp: 30 capsule, Rfl: 4    hyoscyamine (LEVSIN/SL) 0.125 mg Subl, Place 0.125 mg under the tongue every 6 (six) hours as needed., Disp: , Rfl:     insulin glargine (LANTUS) 100 unit/mL injection, Inject into the skin. Takes 20units, Disp: , Rfl:      "insulin syr/ndl U100 half warner (BD VEO INSULIN SYR, HALF UNIT,) 0.3 mL 31 gauge x 15/64" Syrg, USE 1 syringe DAILY, Disp: , Rfl:     lancets Misc, Use TID to check blood sugar. Dx: E11.9, patient is insulin dependent, Disp: , Rfl:     multivitamin (THERAGRAN) per tablet, Take 1 tablet by mouth once daily., Disp: , Rfl:     pantoprazole (PROTONIX) 40 MG tablet, TAKE 1 TABLET BY MOUTH DAILY, Disp: 30 tablet, Rfl: 6    pregabalin (LYRICA) 100 MG capsule, Take 100 mg by mouth., Disp: , Rfl:     prochlorperazine (COMPAZINE) 5 MG tablet, Take 5 mg by mouth every 8 (eight) hours as needed., Disp: , Rfl:     QUEtiapine (SEROQUEL) 50 MG tablet, Take 3 tablets by mouth every evening., Disp: , Rfl:     tamsulosin (FLOMAX) 0.4 mg Cap, TAKE 1 CAPSULE BY MOUTH EVERY EVENING, Disp: 30 capsule, Rfl: 6    tolterodine (DETROL LA) 4 MG 24 hr capsule, Take 4 mg by mouth once daily., Disp: , Rfl:     traMADoL (ULTRAM) 50 mg tablet, Take 50 mg by mouth every 6 (six) hours., Disp: , Rfl:     TRUE METRIX GLUCOSE TEST STRIP Strp, 3 (three) times daily., Disp: , Rfl:     tuberculin,purif.prot.deriv. (TUBERSOL IDRM), Inject 0.1 mLs into the skin., Disp: , Rfl:     allopurinoL (ZYLOPRIM) 300 MG tablet, Take 300 mg by mouth once daily., Disp: , Rfl:     hydrocortisone 2.5 % cream, APPLY TO THE AFFECTED AREA(S) TWICE DAILY as needed for SKIN IRRITATION FOR 14 DAYS, Disp: , Rfl:     sertraline (ZOLOFT) 50 MG tablet, Take 50 mg by mouth., Disp: , Rfl:      Review of Systems   Constitutional:  Positive for fatigue. Negative for appetite change and unexpected weight change.   HENT:  Negative for mouth sores.    Respiratory:  Negative for cough and shortness of breath.    Cardiovascular:  Negative for chest pain.   Gastrointestinal:  Positive for diarrhea. Negative for abdominal pain.   Genitourinary:  Negative for frequency.   Musculoskeletal:  Negative for back pain.        +gout flare in left elbow   Integumentary:         S/p left great toe " amputation, small new lesion on Abx   Neurological:  Negative for headaches.   Hematological:  Negative for adenopathy.   Psychiatric/Behavioral:  The patient is not nervous/anxious.      Vitals:    07/17/23 1049   BP: 117/64   Pulse: 71   Resp: 14   Temp: 98.4 °F (36.9 °C)       Wt Readings from Last 3 Encounters:   07/17/23 1049 106.8 kg (235 lb 8 oz)   06/26/23 1119 104.4 kg (230 lb 1.6 oz)   06/01/23 1017 107.8 kg (237 lb 11.2 oz)     Physical Exam  Constitutional:       Appearance: Normal appearance.   HENT:      Head: Normocephalic and atraumatic.      Nose: Nose normal.   Eyes:      General: Lids are normal. Vision grossly intact.      Extraocular Movements: Extraocular movements intact.      Conjunctiva/sclera: Conjunctivae normal.   Cardiovascular:      Rate and Rhythm: Normal rate and regular rhythm.      Heart sounds: Normal heart sounds.   Pulmonary:      Effort: Pulmonary effort is normal.      Breath sounds: Normal breath sounds.   Chest:      Comments: HD access removed to right upper CW  Abdominal:      General: Abdomen is protuberant. Bowel sounds are normal. There is no distension.      Palpations: Abdomen is soft.      Tenderness: There is no abdominal tenderness.   Musculoskeletal:         General: Normal range of motion.      Cervical back: Neck supple.      Right lower leg: No edema.      Left lower leg: No edema.   Feet:      Comments: Left great toe s/p ampuation, not examined today  Skin:     General: Skin is warm and dry.   Neurological:      General: No focal deficit present.      Mental Status: He is alert and oriented to person, place, and time.   Psychiatric:         Attention and Perception: Attention normal.         Mood and Affect: Mood normal.         Speech: Speech normal.         Behavior: Behavior normal. Behavior is cooperative.       Outside labs in [facility] on 7/13/23: CBC with WBC 9.54, Hgb of 12/38.9, platelets 247, CMP with poatssium 3.4, BUN 30 and creat 2.19,  glucose 124, alk phos 169, albumin 2.8. Otherwise good.     Assessment:        1. Clear cell carcinoma of right kidney      Plan:         Patient with large right renal mass and retroperitoneal adenopathy diagnosed by CT in 7/2020.  CT chest showed mediastinal fanny mass and biopsy done via EUS + for metastatic renal cell carcinoma.  Patient s/p radical right nephrectomy done 9/11/20. Pathology showed 9.2cm clear cell renal cell carcinoma, margins clear with 4/6 lymph nodes positive.  Post-operative course complicated by hematoma requiring blood transfusion.  CARIS results- MSI stable, Mismatch repair status proficient. Tumor burden low. Negative for: PD-L1, NTRK1/2/3, BAP1, FH, MET, PBRM1, SDHA. Pathologic variants identified: KDMSC (Exon 13 and 11) and VHL. Immunohistochemistry results: MLH1, MSH2, MSH6, and PM52 positive. PD-L1 negative.     Referred to Dr. Haja Sal for evaluation for IL-2. Explained this is only treatment to be shown to achieve long-term remissions in stage IV disease and patient has limited disease at this time.  Patient started 1st 5 days of IL-2 on 11/2/20.   Started his 2nd round on 11/16/20 and it was stopped on 11/22/20 due to acute renal failure, volume overload and agitation.   CT scan on 12/23/20 at [facility] revealed progression of his disease. Dr. Sal contacted patient and discussed the results. He also reached out to Dr. Alvarado to notify her that the patient will not be returning.   Started Keytruda 200mg every 3 weeks on 1/8/21 + Inlyta 5mg oral BID on 1/11/21.  CT C/A/P done at [facility] on 10/22/21 showed stable mild reactive mediastinal nodes, otherwise stable.  Patient continued on treatment s/p cycle 18 Keytruda given 1/10/22.  Labs on 1/10/22 showed elevated LFTs. Inlyta held.  CT C/A/P done at [facility] on 1/26/22 showed unfortunately disease progression with new pulmonary nodules, enlargement in mediastinal node, new mesenteric nodule and retroperitoneal adenopathy.  LFTs  continued increasing, autoimmune hepatitis from Keytruda suspected. Hepatitis panel negative. Keytruda stopped. Started on Prednisone 80mg daily on 1/31/22.  Patient had suicidal ideation and severe depression during visit in 3/2022, seen by psychiatry and meds adjusted, much improved.  CT C/A/P from [facility] on 4/5/22 showed progressive disease.  Treatment change recommended to Cabometyx 60mg daily per NCCN guidelines (Category 1) for subsequent treatment. Started on 4/7/22.  Prednisone weaned and stopped 4/14/22.  Patient was having multiple side effects (n/v, diarrhea, mouth sores) from Cabometyx.  Reduced dose to 40mg on 4/28/22. Held on 5/19/22 due to cellulitis/rashes/abscesses.      Continued to hold s/p TURP on 6/23/22 and restarted 7/6/22.  Patient hospitalized at [facility] 7/16/22 for large bladder hematoma s/p clot evacuation X 2. Cabometyx stopped again.   Repeat CT C/A/P done at [facility] on 8/29/22 showed mixed findings, but mostly disease progression in fanny metastases in mediastinum, which is to be expected since he has been off treatment.   Restarted back on Cabometyx (lower dose 40mg) on 9/1/22 although it has not been studied in dialysis patients, but no dosage adjustment is usually required.  Held on 10/24/22 for Hand-foot syndrome, restarted 11/9/22.   CT done at [facility] on 11/18/22 with disease improvement.  Cabometyx held again for hand-foot syndrome and wound on left toe 11/28/22.  Restarted Cabometyx on 1/5/23 at 20mg daily.    CT C/A/P at [facility] done on 2/8/23 showed mixed findings, stable mediastinal lymph nodes, enlargement in LLL nodule, and enlargement in abdominal lymph nodes.  Given that the findings are mixed and that he has not been on Cabometyx consistently, recommended to continue with current treatment and repeat CT in 3 months.   Cabometyx held on 3/17/23 due to worsening diabetic foot wound.  S/p left great toe amputation on 5/8/23, healed and cleared by  podiatry to restart Cabometyx which was restarted on 6/1/23 Cabometyx 20mg daily.    Repeat CT C/A/P done 6/20/23 with only minimal enlargement in mediastinal nodes, good considering he was off treatment for sometime. This will serve as new baseline.     Recent labs with anemia improved, renal insufficiency slightly worse, labs otherwise okay. Albumin improved form 2.6 to 2.8 and alk phos also improved.     Continue Cabometyx. Watch foot wound closely and if worsens, patient knows to hold Cabometyx. He has close follow-up with podiatry.    RTC 3 weeks for follow-up with repeat labs to be done in [place].  Plan to repeat CT C/A/P w/o contrast in [place] in 9/2023.     Looking ahead to future treatment options, would try again with immunotherapy, single agent Opdivo. He never really failed Keytruda, just developed autoimmune hepatitis, so this would need to be monitored closely.     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 Referred for genetic testing which was done on 3/16/23 and negative.      Continue Compazine for nausea.   Continue Norco for joint pain.   Continue Vistaril for chronic pruritis.     All questions answered at this time.     Elizabeth Alvarado MD

## 2023-07-13 ENCOUNTER — SPECIALTY PHARMACY (OUTPATIENT)
Dept: PHARMACY | Facility: CLINIC | Age: 63
End: 2023-07-13
Payer: MEDICARE

## 2023-07-13 NOTE — TELEPHONE ENCOUNTER
Specialty Pharmacy - Refill Coordination    Specialty Medication Orders Linked to Encounter      Flowsheet Row Most Recent Value   Medication #1 cabozantinib (CABOMETYX) 20 mg Tab (Order#[id number], Rx#[id number])            Refill Questions - Documented Responses      Flowsheet Row Most Recent Value   Patient Availability and HIPAA Verification    Does patient want to proceed with activity? Yes   HIPAA/medical authority confirmed? Yes   Relationship to patient of person spoken to? Self   Refill Screening Questions    Changes to allergies? No   Changes to medications? No   New conditions since last clinic visit? No   Unplanned office visit, urgent care, ED, or hospital admission in the last 4 weeks? No   How does patient/caregiver feel medication is working? Good   Financial problems or insurance changes? No   How many doses of your specialty medications were missed in the last 4 weeks? 0   Would patient like to speak to a pharmacist? No   When does the patient need to receive the medication? 07/20/23   Refill Delivery Questions    How will the patient receive the medication? [facility]   When does the patient need to receive the medication? 07/20/23   Shipping Address Home   Address in [facility] confirmed and updated if neccessary? Yes   Expected Copay ($) 0   Is the patient able to afford the medication copay? Yes   Payment Method zero copay   Days supply of Refill 30   Supplies needed? No supplies needed   Refill activity completed? Yes   Refill activity plan Refill scheduled   Shipment/Pickup Date: 07/14/23            Current Outpatient Medications   Medication Sig    allopurinoL (ZYLOPRIM) 300 MG tablet Take 300 mg by mouth once daily.    amLODIPine (NORVASC) 10 MG tablet Take 1 tablet (10 mg total) by mouth every morning.    aspirin 81 mg Cap Take 81 mg by mouth once daily at 6am.    atorvastatin (LIPITOR) 80 MG tablet Take 80 mg by mouth once daily.    azelastine (ASTELIN) 137 mcg (0.1 %) nasal spray  "SMARTSIG:Both Nares    cabozantinib (CABOMETYX) 20 mg Tab Take 1 tablet (20 mg) by mouth once daily.    carvediloL (COREG) 12.5 MG tablet Take 1 tablet by mouth 2 (two) times daily.    desvenlafaxine succinate (PRISTIQ) 100 MG Tb24 Take 100 mg by mouth.    diphenoxylate-atropine 2.5-0.025 mg (LOMOTIL) 2.5-0.025 mg per tablet Take 1 tablet by mouth as needed.    docusate sodium 100 mg capsule Take by mouth once daily.    finasteride (PROSCAR) 5 mg tablet Take 5 mg by mouth as needed.    furosemide (LASIX) 20 MG tablet Take 20 mg by mouth once daily. Pt stated he thinks her takes 10mg daily    hydrocortisone 2.5 % cream APPLY TO THE AFFECTED AREA(S) TWICE DAILY as needed for SKIN IRRITATION FOR 14 DAYS    hydrOXYzine pamoate (VISTARIL) 25 MG Cap Take 1 capsule (25 mg total) by mouth every 4 (four) hours as needed (itching).    hyoscyamine (LEVSIN/SL) 0.125 mg Subl Place 0.125 mg under the tongue every 6 (six) hours as needed.    insulin glargine (LANTUS) 100 unit/mL injection Inject into the skin. Takes 20units    insulin syr/ndl U100 half warner (BD VEO INSULIN SYR, HALF UNIT,) 0.3 mL 31 gauge x 15/64" Syrg USE 1 syringe DAILY    lancets Misc Use TID to check blood sugar. Dx: E11.9, patient is insulin dependent    multivitamin (THERAGRAN) per tablet Take 1 tablet by mouth once daily.    pantoprazole (PROTONIX) 40 MG tablet TAKE 1 TABLET BY MOUTH DAILY    pregabalin (LYRICA) 100 MG capsule Take 100 mg by mouth.    prochlorperazine (COMPAZINE) 5 MG tablet Take 5 mg by mouth every 8 (eight) hours as needed.    QUEtiapine (SEROQUEL) 50 MG tablet Take 3 tablets by mouth every evening.    sertraline (ZOLOFT) 50 MG tablet Take 50 mg by mouth.    tamsulosin (FLOMAX) 0.4 mg Cap TAKE 1 CAPSULE BY MOUTH EVERY EVENING    tolterodine (DETROL LA) 4 MG 24 hr capsule Take 4 mg by mouth once daily.    traMADoL (ULTRAM) 50 mg tablet Take 50 mg by mouth every 6 (six) hours.    TRUE METRIX GLUCOSE TEST STRIP Strp 3 (three) times daily.    " tuberculin,purif.prot.deriv. (TUBERSOL IDRM) Inject 0.1 mLs into the skin.   Last reviewed on 7/11/2023  1:46 PM by Cherelle Rey PharmD    Review of patient's allergies indicates:  No Known Allergies Last reviewed on  7/11/2023 1:46 PM by Cherelle Rey      Tasks added this encounter   No tasks added.   Tasks due within next 3 months   7/13/2023 - Refill Coordination Outreach (1 time occurrence)     [facility] Hendricks - Specialty Pharmacy  [address] [place] [id number]  Phone: [phone]  Fax: [phone]

## 2023-07-17 ENCOUNTER — OFFICE VISIT (OUTPATIENT)
Dept: HEMATOLOGY/ONCOLOGY | Facility: CLINIC | Age: 63
End: 2023-07-17
Payer: MEDICARE

## 2023-07-17 VITALS
TEMPERATURE: 98 F | BODY MASS INDEX: 33.71 KG/M2 | HEIGHT: 70 IN | WEIGHT: 235.5 LBS | HEART RATE: 71 BPM | OXYGEN SATURATION: 98 % | SYSTOLIC BLOOD PRESSURE: 117 MMHG | RESPIRATION RATE: 14 BRPM | DIASTOLIC BLOOD PRESSURE: 64 MMHG

## 2023-07-17 DIAGNOSIS — C64.1 CLEAR CELL CARCINOMA OF RIGHT KIDNEY: Primary | ICD-10-CM

## 2023-07-17 PROCEDURE — 3078F DIAST BP <80 MM HG: CPT | Mod: CPTII,S$GLB,, | Performed by: INTERNAL MEDICINE

## 2023-07-17 PROCEDURE — 3078F PR MOST RECENT DIASTOLIC BLOOD PRESSURE < 80 MM HG: ICD-10-PCS | Mod: CPTII,S$GLB,, | Performed by: INTERNAL MEDICINE

## 2023-07-17 PROCEDURE — 1160F RVW MEDS BY RX/DR IN RCRD: CPT | Mod: CPTII,S$GLB,, | Performed by: INTERNAL MEDICINE

## 2023-07-17 PROCEDURE — 1159F PR MEDICATION LIST DOCUMENTED IN MEDICAL RECORD: ICD-10-PCS | Mod: CPTII,S$GLB,, | Performed by: INTERNAL MEDICINE

## 2023-07-17 PROCEDURE — 99215 PR OFFICE/OUTPT VISIT, EST, LEVL V, 40-54 MIN: ICD-10-PCS | Mod: S$GLB,,, | Performed by: INTERNAL MEDICINE

## 2023-07-17 PROCEDURE — 1159F MED LIST DOCD IN RCRD: CPT | Mod: CPTII,S$GLB,, | Performed by: INTERNAL MEDICINE

## 2023-07-17 PROCEDURE — 3008F PR BODY MASS INDEX (BMI) DOCUMENTED: ICD-10-PCS | Mod: CPTII,S$GLB,, | Performed by: INTERNAL MEDICINE

## 2023-07-17 PROCEDURE — 99999 PR PBB SHADOW E&M-EST. PATIENT-LVL V: ICD-10-PCS | Mod: PBBFAC,,, | Performed by: INTERNAL MEDICINE

## 2023-07-17 PROCEDURE — 99999 PR PBB SHADOW E&M-EST. PATIENT-LVL V: CPT | Mod: PBBFAC,,, | Performed by: INTERNAL MEDICINE

## 2023-07-17 PROCEDURE — 3044F HG A1C LEVEL LT 7.0%: CPT | Mod: CPTII,S$GLB,, | Performed by: INTERNAL MEDICINE

## 2023-07-17 PROCEDURE — 3044F PR MOST RECENT HEMOGLOBIN A1C LEVEL <7.0%: ICD-10-PCS | Mod: CPTII,S$GLB,, | Performed by: INTERNAL MEDICINE

## 2023-07-17 PROCEDURE — 3074F SYST BP LT 130 MM HG: CPT | Mod: CPTII,S$GLB,, | Performed by: INTERNAL MEDICINE

## 2023-07-17 PROCEDURE — 3074F PR MOST RECENT SYSTOLIC BLOOD PRESSURE < 130 MM HG: ICD-10-PCS | Mod: CPTII,S$GLB,, | Performed by: INTERNAL MEDICINE

## 2023-07-17 PROCEDURE — 1160F PR REVIEW ALL MEDS BY PRESCRIBER/CLIN PHARMACIST DOCUMENTED: ICD-10-PCS | Mod: CPTII,S$GLB,, | Performed by: INTERNAL MEDICINE

## 2023-07-17 PROCEDURE — 3008F BODY MASS INDEX DOCD: CPT | Mod: CPTII,S$GLB,, | Performed by: INTERNAL MEDICINE

## 2023-07-17 PROCEDURE — 99215 OFFICE O/P EST HI 40 MIN: CPT | Mod: S$GLB,,, | Performed by: INTERNAL MEDICINE

## 2023-07-17 RX ORDER — COLCHICINE 0.6 MG/1
TABLET ORAL
COMMUNITY
Start: 2023-07-17

## 2023-07-31 PROBLEM — Z89.412 STATUS POST AMPUTATION OF LEFT GREAT TOE: Status: ACTIVE | Noted: 2023-06-21

## 2023-07-31 NOTE — PROGRESS NOTES
Subjective:       Patient ID: Ye Olivera is a 63 y.o. male.    Urologist: Dr. Christopher Villasenor  PCP: Dr. Ly Quevedo  Nephrologist: Dr. Jenae Le    Renal Cell Carcinoma Stage IV([id number]) Mediastinal lymph nodes--Diagnosed 20  Biopsy/pathology:  Upper EUS done 9/3/20--large cluster of malignant appearing lymph nodes in subcarinal mediastinum causing extrinsic compression in middle 1/3 and thoracic esophagus biopsy c/w metastatic carcinoma, c/w renal cell carcinoma CD10+, PAX -8+, erythematous mucosa in antrum biopsy c/w mild chronic gastritis, inactive, normal duodenum, no pathology in CBD or left lobe of liver or pancreas. CARIS results- MSI stable, Mismatch repair status proficient. Tumor burden low. Negative for: PD-L1, NTRK1/2/3, BAP1, FH, MET, PBRM1, SDHA. Pathologic variants identified: KDMSC (Exon 13 and 11) and VHL. Immunohistochemistry results: MLH1, MSH2, MSH6, and PM52 positive. PD-L1 negative.   Surgery/pathology:  Right radical nephrectomy and lymph node dissection done 20--clear cell renal cell carcinoma measures 9.2X8C8.7cm, Grade 3, margins clear, tumor limited to kidney, 4/6 lymph nodes positive for metastatic clear cell carcinoma.   Imagin. CT A/P w/o contrast [facility] 20--bulky right renal mass, midpole, measuring 10cm with evidence of internal necrosis and calcifications, primary mass does not extend outside the perinephric space, associated right perinephric edema, multiple enlarged retroperitoneal lymph nodes at level of the kidneys and inferior retroperitoneum, one measures 2.9X1.4cm, additional node measures 2.1X1.8cm, additional node measures 1.8X1.2cm, inferior retroperitoneal node at level of inferior pole right kidney measures 1.8X1.4cm, no convincing evidence of renal vein involvement, no right adrenal gland involvement, no disease apparent outside of abdomen.  2. CT A/P w/ and w/o contrast [facility] 20--large right renal mass measures 10.6X9.5X7.4cm, with  enlarged retroperitoneal nodes, largest measures 2.7cm.  3. CT chest [facility] 8/28/20--solitary left supraclavicular node, small and not overtly concerning, heterogeneous retroesophageal mass at the level of the tariq c/w metastatic deposit, no other evidence for metastatic disease.  4. MRI brain w/ and w/o contrast [facility] 8/28/20--no evidence for intracranial metastatic disease.  5. CT C/A/P done at [facility] 10/28/20--now appears to be 2 adjacent masses at the level of the tariq, measuring in aggregate 6.2X2.7cm as compared with 5.2X2.1cm, subcentimeter mediastinal and hilar nodes are seen, large intermediate density structure at the right renal fossa extending to pelvic brim, most likely post-operative hematoma or seroma, resolution of previously seen retroperitoneal adenopathy.  6. CT C/A/P w&w/o at [facility] on 12/23/20--Interval enlargement of the retro-esophageal mass with mild interval increase in size of multiple paraesophageal and prevascular lymph nodes compatible with progression of disease. Interval decrease in size of the right retroperitoneal postop seroma or hematoma. No evidence of metastatic disease below the diaphragm. Prior right nephrectomy and adrenalectomy. Chololithiasis and stable atherosclerosis.   7. CT C/A/P w/o contrast done at [facility] on 4/5/21--interval decrease in size of multiple prevascular, subcarinal and para-esophageal lymph nodes c/w response to treatment, no new pulmonary nodule or lymph node, no evidence for metastatic disease below the diaphragm, interval decrease in size of the post-operative seromas adjacent to right psoas muscle.  8. CT C/A/P w/o contrast done at [facility] on 7/7/21--stable appearance of previously identified prevascular, subcarinal, and paraesophageal nodes, there are an increased number of sub-centimeter nodes in the aortopulmonary window and precarinal region, nonspecific in appearance, no other evidence of local recurrence or metastatic disease.   9. MRI cervical spine w/ and w/o  contrast done at [facility] 8/4/21--no metastatic disease, multilevel spondylotic changes, no acute abnormality.   10. MRI brain w/ and w/o contrast 8/4/21--normal maturation of the previously seen lacunar infarct at the left centrum semiovale, no new abnormalities, specifically, no evidence of intracranial metastatic disease.   11. CT C/A/P w/o contrast done at [facility] 10/22/21--stable reactive mediastinal adenopathy, interval resection of right kidney and adrenal gland with post-surgical changes but no evidence of metastatic disease.   12. CT C/A/P w/o contrast done at [facility] 1/26/22--4 new pulmonary nodules larges LLL 14mm, 9mm LLL, 5mm LLL, 5mm RML, with enlarging prevascular lymph node now measures 18mm, previous 8mm, concerning for metastatic disease, new mesenteric implant in LLQ measures 11mm, enlarging retroperitoneal LN now measures [id number], previous 14X8mm concerning for progression disease, cholelithiasis.   13. MRI brain w/ and w/o  contrast done at [facility] 3/31/22--no acute intracranial abnormality.   14. CT C/A/P w/o contrast done at [facility] 4/5/22--progressive retroperitoneal and mediastinal adenopathy, with development of hilar adenopathy, increase in size and number of pulmonary nodules.   15. CT done at [facility] showed large hematoma 14cm in bladder, results not available.  16. CT head w/o contrast done at [facility] 7/31/22--No intracranial hemorrhage or acute traumatic brain parenchymal injury evident by CT.     17. CT C/A/P w/o contrast done at [facility] 8/29/22--mixed response to treatment, worsening mediastinal adenopathy, stable pulmonary nodules, improved retroperitoneal lymph node, enlarging pericaval lymph node.  18. CT C/A/P w/o contrast at [facility] 11/18/22--Interval decreased in mediastinal adenopathy, pulmonary nodules and portacaval nodes, c/w treatment response.   19. CT C/A/P w/ contrast at [facility] 2/8/23--stable mediastinal lymph nodes, enlarging left lower lobe pulmonary nodule, enlarging retroperitoneal lymph nodes.   20. CT  C/A/P w/o contrast at [facility] done 6/20/23--minimally enlarged mediastinal lymph nodes, otherwise no other significant changes, pericaval nodes unchanged.     Treatment History:   1. IL2 started on 11/2/20. 2nd cycle started 11/16/20 - stopped after 7 days due to acute renal failure, volume overload and confusion. Stopped due to progression.  2. Keytruda/Inlyta 1/8/21--1/31/22 (stopped due to progression, autoimmune hepatitis).  3. Prednisone 80mg daily 1/31/22--weaned and stopped 4/14/22.    Hospitalization:   [facility] 7/2022 14cm hematoma s/p TURP causing hydronephrosis, JOE and patient required initiation of dialysis, UTI, Covid infection.     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     Treatment Plan:  Cabometyx 60mg daily started 4/7/22  Decrease dose to 40mg daily 4/28/22 due to side effects.  HOLD on 5/19/22 until groin rash and abscess heal/also underwent TURP on 6/23/22  Restarted 7/6/22--held 7/16/22 for hospitalization/bladder hematoma  Cabometyx 40mg daily restarted 9/1/22. On hold since 10/24/22 for hand/foot syndrome.   Restarted on 11/9/22, held 11/28/22 for hand-foot syndrome, foot wound.   Dose decreased to 20mg daily. Restarted on 1/5/23. Stopped on 3/17/23 due to diabetic foot wound.   Restarted 6/1/23    Chief Complaint: Fatigue    HPI   Patient presents for follow-up of renal cell carcinoma. He continues on Cabometyx 20mg daily. Reports  having diarrhea, controlled with Lomotil, but also causes constipation. He had a gout flare in his left elbow, now on colchicine and allopurinol. Also reports having a small lesion near incision from his toe amputation and is on antibiotics. The podiatrist is monitoring closely. States it is very slow to heal. No other problems reported.     Past Medical History:   Diagnosis Date    Arthritis of knee 08/13/2013    Cancer     stage IV renal cell carcinoma    Cataract     Coronary artery disease     Diabetes  mellitus, type 2     HTN (hypertension) 08/13/2013    Hx of CABG     Hyperlipidemia     Type II or unspecified type diabetes mellitus without mention of complication, not stated as uncontrolled 08/13/2013      Review of patient's allergies indicates:  No Known Allergies     Current Outpatient Medications:     allopurinoL (ZYLOPRIM) 300 MG tablet, Take 300 mg by mouth once daily., Disp: , Rfl:     amLODIPine (NORVASC) 10 MG tablet, Take 1 tablet (10 mg total) by mouth every morning., Disp: 30 tablet, Rfl: 11    aspirin 81 mg Cap, Take 81 mg by mouth once daily at 6am., Disp: , Rfl:     atorvastatin (LIPITOR) 80 MG tablet, Take 80 mg by mouth once daily., Disp: , Rfl:     azelastine (ASTELIN) 137 mcg (0.1 %) nasal spray, SMARTSIG:Both Nares, Disp: , Rfl:     cabozantinib (CABOMETYX) 20 mg Tab, Take 1 tablet (20 mg) by mouth once daily., Disp: 30 tablet, Rfl: 6    carvediloL (COREG) 12.5 MG tablet, Take 1 tablet by mouth 2 (two) times daily., Disp: , Rfl:     colchicine (COLCRYS) 0.6 mg tablet, Take 2 tablets by mouth once, then take 1 tablet 1 hour later. Max 1.8 mg/day. Then take 1 tablets by mouth daily until 2 days after flare resolves., Disp: , Rfl:     desvenlafaxine succinate (PRISTIQ) 100 MG Tb24, Take 100 mg by mouth., Disp: , Rfl:     diphenoxylate-atropine 2.5-0.025 mg (LOMOTIL) 2.5-0.025 mg per tablet, Take 1 tablet by mouth as needed., Disp: , Rfl:     docusate sodium 100 mg capsule, Take by mouth once daily., Disp: , Rfl:     finasteride (PROSCAR) 5 mg tablet, Take 5 mg by mouth as needed., Disp: , Rfl:     furosemide (LASIX) 20 MG tablet, Take 20 mg by mouth once daily. Pt stated he thinks her takes 10mg daily, Disp: , Rfl:     hydrocortisone 2.5 % cream, APPLY TO THE AFFECTED AREA(S) TWICE DAILY as needed for SKIN IRRITATION FOR 14 DAYS, Disp: , Rfl:     hydrOXYzine pamoate (VISTARIL) 25 MG Cap, Take 1 capsule (25 mg total) by mouth every 4 (four) hours as needed (itching)., Disp: 30 capsule, Rfl: 4     "hyoscyamine (LEVSIN/SL) 0.125 mg Subl, Place 0.125 mg under the tongue every 6 (six) hours as needed., Disp: , Rfl:     insulin glargine (LANTUS) 100 unit/mL injection, Inject into the skin. Takes 20units, Disp: , Rfl:     insulin syr/ndl U100 half warner (BD VEO INSULIN SYR, HALF UNIT,) 0.3 mL 31 gauge x 15/64" Syrg, USE 1 syringe DAILY, Disp: , Rfl:     lancets Misc, Use TID to check blood sugar. Dx: E11.9, patient is insulin dependent, Disp: , Rfl:     multivitamin (THERAGRAN) per tablet, Take 1 tablet by mouth once daily., Disp: , Rfl:     pantoprazole (PROTONIX) 40 MG tablet, TAKE 1 TABLET BY MOUTH DAILY, Disp: 30 tablet, Rfl: 6    pregabalin (LYRICA) 100 MG capsule, Take 100 mg by mouth., Disp: , Rfl:     prochlorperazine (COMPAZINE) 5 MG tablet, Take 5 mg by mouth every 8 (eight) hours as needed., Disp: , Rfl:     QUEtiapine (SEROQUEL) 50 MG tablet, Take 3 tablets by mouth every evening., Disp: , Rfl:     sertraline (ZOLOFT) 50 MG tablet, Take 50 mg by mouth., Disp: , Rfl:     tamsulosin (FLOMAX) 0.4 mg Cap, TAKE 1 CAPSULE BY MOUTH EVERY EVENING, Disp: 30 capsule, Rfl: 6    tolterodine (DETROL LA) 4 MG 24 hr capsule, Take 4 mg by mouth once daily., Disp: , Rfl:     traMADoL (ULTRAM) 50 mg tablet, Take 50 mg by mouth every 6 (six) hours., Disp: , Rfl:     TRUE METRIX GLUCOSE TEST STRIP Strp, 3 (three) times daily., Disp: , Rfl:     tuberculin,purif.prot.deriv. (TUBERSOL IDRM), Inject 0.1 mLs into the skin., Disp: , Rfl:      Review of Systems   Constitutional:  Positive for fatigue. Negative for appetite change and unexpected weight change.   HENT:  Negative for mouth sores.    Respiratory:  Negative for cough and shortness of breath.    Cardiovascular:  Negative for chest pain.   Gastrointestinal:  Positive for diarrhea. Negative for abdominal pain.   Genitourinary:  Negative for frequency.   Musculoskeletal:  Negative for back pain.        +gout flare in left elbow   Integumentary:         S/p left great toe " amputation, small new lesion on Abx   Neurological:  Negative for headaches.   Hematological:  Negative for adenopathy.   Psychiatric/Behavioral:  The patient is not nervous/anxious.      Vitals:    08/07/23 0851   BP: 106/67   Pulse: (!) 59   Resp: 14   Temp: 97.6 °F (36.4 °C)     Wt Readings from Last 3 Encounters:   08/07/23 0851 106.2 kg (234 lb 1.6 oz)   07/17/23 1049 106.8 kg (235 lb 8 oz)   06/26/23 1119 104.4 kg (230 lb 1.6 oz)     Physical Exam  Constitutional:       Appearance: Normal appearance.   HENT:      Head: Normocephalic and atraumatic.      Nose: Nose normal.   Eyes:      General: Lids are normal. Vision grossly intact.      Extraocular Movements: Extraocular movements intact.      Conjunctiva/sclera: Conjunctivae normal.   Cardiovascular:      Rate and Rhythm: Normal rate and regular rhythm.      Heart sounds: Normal heart sounds.   Pulmonary:      Effort: Pulmonary effort is normal.      Breath sounds: Normal breath sounds.   Chest:      Comments: HD access removed to right upper CW  Abdominal:      General: Abdomen is protuberant. Bowel sounds are normal. There is no distension.      Palpations: Abdomen is soft.      Tenderness: There is no abdominal tenderness.   Musculoskeletal:         General: Normal range of motion.      Cervical back: Neck supple.      Right lower leg: No edema.      Left lower leg: No edema.   Feet:      Comments: Left great toe s/p ampuation, not examined today  Skin:     General: Skin is warm and dry.   Neurological:      General: No focal deficit present.      Mental Status: He is alert and oriented to person, place, and time.   Psychiatric:         Attention and Perception: Attention normal.         Mood and Affect: Mood normal.         Speech: Speech normal.         Behavior: Behavior normal. Behavior is cooperative.         Outside labs in [facility] on 7/29/23: CBC with WBC 9.86, Hgb of 13.6/44.7, platelets 263, CMP with poatssium 3.7, BUN 28 and creat  2.23, glucose 121, alk phos 165, albumin 3.0. Uric acid 10.4. Otherwise good.     Assessment:        1. Clear cell carcinoma of right kidney    2. 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 Anemia in stage 3b chronic kidney disease    4. Other iron deficiency anemia    5. Stage 4 chronic kidney disease      Plan:         Patient with large right renal mass and retroperitoneal adenopathy diagnosed by CT in 7/2020.  CT chest showed mediastinal fanny mass and biopsy done via EUS + for metastatic renal cell carcinoma.  Patient s/p radical right nephrectomy done 9/11/20. Pathology showed 9.2cm clear cell renal cell carcinoma, margins clear with 4/6 lymph nodes positive.  Post-operative course complicated by hematoma requiring blood transfusion.  CARIS results- MSI stable, Mismatch repair status proficient. Tumor burden low. Negative for: PD-L1, NTRK1/2/3, BAP1, FH, MET, PBRM1, SDHA. Pathologic variants identified: KDMSC (Exon 13 and 11) and VHL. Immunohistochemistry results: MLH1, MSH2, MSH6, and PM52 positive. PD-L1 negative.     Referred to Dr. Haja Sal for evaluation for IL-2. Explained this is only treatment to be shown to achieve long-term remissions in stage IV disease and patient has limited disease at this time.  Patient started 1st 5 days of IL-2 on 11/2/20.   Started his 2nd round on 11/16/20 and it was stopped on 11/22/20 due to acute renal failure, volume overload and agitation.   CT scan on 12/23/20 at [facility] revealed progression of his disease. Dr. Sal contacted patient and discussed the results. He also reached out to Dr. Alvarado to notify her that the patient will not be returning.   Started Keytruda 200mg every 3 weeks on 1/8/21 + Inlyta 5mg oral BID on 1/11/21.  CT C/A/P done at [facility] on 10/22/21 showed stable mild reactive mediastinal nodes, otherwise stable.  Patient continued on treatment s/p cycle 18 Keytruda given 1/10/22.  Labs on 1/10/22 showed elevated LFTs. Inlyta  held.  CT C/A/P done at [facility] on 1/26/22 showed unfortunately disease progression with new pulmonary nodules, enlargement in mediastinal node, new mesenteric nodule and retroperitoneal adenopathy.  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 Hepatitis panel negative. Keytruda stopped. Started on Prednisone 80mg daily on 1/31/22.  Patient had suicidal ideation and severe depression during visit in 3/2022, seen by psychiatry and meds adjusted, much improved.  CT C/A/P from [facility] on 4/5/22 showed progressive disease.  Treatment change recommended to Cabometyx 60mg daily per NCCN guidelines (Category 1) for subsequent treatment. Started on 4/7/22.  Prednisone weaned and stopped 4/14/22.  Patient was having multiple side effects (n/v, diarrhea, mouth sores) from Cabometyx.  Reduced dose to 40mg on 4/28/22. Held on 5/19/22 due to cellulitis/rashes/abscesses.      Continued to hold s/p TURP on 6/23/22 and restarted 7/6/22.  Patient hospitalized at [facility] 7/16/22 for large bladder hematoma s/p clot evacuation X 2. Cabometyx stopped again.   Repeat CT C/A/P done at [facility] on 8/29/22 showed mixed findings, but mostly disease progression in fanny metastases in mediastinum, which is to be expected since he has been off treatment.   Restarted back on Cabometyx (lower dose 40mg) on 9/1/22 although it has not been studied in dialysis patients, but no dosage adjustment is usually required.  Held on 10/24/22 for Hand-foot syndrome, restarted 11/9/22.   CT done at [facility] on 11/18/22 with disease improvement.  Cabometyx held again for hand-foot syndrome and wound on left toe 11/28/22.  Restarted Cabometyx on 1/5/23 at 20mg daily.    CT C/A/P at [facility] done on 2/8/23 showed mixed findings, stable mediastinal lymph nodes, enlargement in LLL nodule, and enlargement in abdominal lymph nodes.  Given that the findings are mixed and that he has not been on Cabometyx consistently,  recommended to continue with current treatment and repeat CT in 3 months.   Cabometyx held on 3/17/23 due to worsening diabetic foot wound.  S/p left great toe amputation on 5/8/23, healed and cleared by podiatry to restart Cabometyx which was restarted on 6/1/23 Cabometyx 20mg daily.    Repeat CT C/A/P done 6/20/23 with only minimal enlargement in mediastinal nodes, good considering he was off treatment for sometime. This will serve as new baseline.   Recent labs with anemia improved, renal insufficiency stable, labs otherwise okay.     Continue Cabometyx. Watch foot wound closely and if worsens, patient knows to hold Cabometyx. He has close follow-up with podiatry.  RTC 3 weeks for follow-up with repeat labs to be done in [place].  Plan to repeat CT C/A/P w/o contrast in [place] at the end of September. Will order at next appointment.     Looking ahead to future treatment options, would try again with immunotherapy, single agent Opdivo. He never really failed Keytruda, just developed autoimmune hepatitis, so this would need to be monitored closely.     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 Referred for genetic testing which was done on 3/16/23 and negative.    Continue Compazine for nausea.   Continue Norco for joint pain.   Continue Vistaril for chronic pruritis.     All questions answered at this time.      Jackie Boyd, REYES

## 2023-08-07 ENCOUNTER — OFFICE VISIT (OUTPATIENT)
Dept: HEMATOLOGY/ONCOLOGY | Facility: CLINIC | Age: 63
End: 2023-08-07
Payer: MEDICARE

## 2023-08-07 VITALS
WEIGHT: 234.13 LBS | HEIGHT: 70 IN | HEART RATE: 59 BPM | RESPIRATION RATE: 14 BRPM | DIASTOLIC BLOOD PRESSURE: 67 MMHG | BODY MASS INDEX: 33.52 KG/M2 | SYSTOLIC BLOOD PRESSURE: 106 MMHG | TEMPERATURE: 98 F | OXYGEN SATURATION: 96 %

## 2023-08-07 DIAGNOSIS — N18.4 STAGE 4 CHRONIC KIDNEY DISEASE: ICD-10-CM

## 2023-08-07 DIAGNOSIS — D63.1 ANEMIA IN STAGE 3B CHRONIC KIDNEY DISEASE: ICD-10-CM

## 2023-08-07 DIAGNOSIS — D50.8 OTHER IRON DEFICIENCY ANEMIA: ICD-10-CM

## 2023-08-07 DIAGNOSIS — C64.1 CLEAR CELL CARCINOMA OF RIGHT KIDNEY: Primary | ICD-10-CM

## 2023-08-07 DIAGNOSIS — N18.32 ANEMIA IN STAGE 3B CHRONIC KIDNEY DISEASE: ICD-10-CM

## 2023-08-07 DIAGNOSIS — C77.1 MALIGNANT NEOPLASM METASTATIC TO INTRATHORACIC LYMPH NODE: ICD-10-CM

## 2023-08-07 PROBLEM — F32.1 CURRENT MODERATE EPISODE OF MAJOR DEPRESSIVE DISORDER WITHOUT PRIOR EPISODE: Status: ACTIVE | Noted: 2021-03-03

## 2023-08-07 PROCEDURE — 3078F DIAST BP <80 MM HG: CPT | Mod: CPTII,S$GLB,, | Performed by: NURSE PRACTITIONER

## 2023-08-07 PROCEDURE — 3044F PR MOST RECENT HEMOGLOBIN A1C LEVEL <7.0%: ICD-10-PCS | Mod: CPTII,S$GLB,, | Performed by: NURSE PRACTITIONER

## 2023-08-07 PROCEDURE — 3008F BODY MASS INDEX DOCD: CPT | Mod: CPTII,S$GLB,, | Performed by: NURSE PRACTITIONER

## 2023-08-07 PROCEDURE — 3074F SYST BP LT 130 MM HG: CPT | Mod: CPTII,S$GLB,, | Performed by: NURSE PRACTITIONER

## 2023-08-07 PROCEDURE — 3044F HG A1C LEVEL LT 7.0%: CPT | Mod: CPTII,S$GLB,, | Performed by: NURSE PRACTITIONER

## 2023-08-07 PROCEDURE — 1160F RVW MEDS BY RX/DR IN RCRD: CPT | Mod: CPTII,S$GLB,, | Performed by: NURSE PRACTITIONER

## 2023-08-07 PROCEDURE — 3074F PR MOST RECENT SYSTOLIC BLOOD PRESSURE < 130 MM HG: ICD-10-PCS | Mod: CPTII,S$GLB,, | Performed by: NURSE PRACTITIONER

## 2023-08-07 PROCEDURE — 99215 PR OFFICE/OUTPT VISIT, EST, LEVL V, 40-54 MIN: ICD-10-PCS | Mod: S$GLB,,, | Performed by: NURSE PRACTITIONER

## 2023-08-07 PROCEDURE — 1159F PR MEDICATION LIST DOCUMENTED IN MEDICAL RECORD: ICD-10-PCS | Mod: CPTII,S$GLB,, | Performed by: NURSE PRACTITIONER

## 2023-08-07 PROCEDURE — 99215 OFFICE O/P EST HI 40 MIN: CPT | Mod: S$GLB,,, | Performed by: NURSE PRACTITIONER

## 2023-08-07 PROCEDURE — 1160F PR REVIEW ALL MEDS BY PRESCRIBER/CLIN PHARMACIST DOCUMENTED: ICD-10-PCS | Mod: CPTII,S$GLB,, | Performed by: NURSE PRACTITIONER

## 2023-08-07 PROCEDURE — 3008F PR BODY MASS INDEX (BMI) DOCUMENTED: ICD-10-PCS | Mod: CPTII,S$GLB,, | Performed by: NURSE PRACTITIONER

## 2023-08-07 PROCEDURE — 3078F PR MOST RECENT DIASTOLIC BLOOD PRESSURE < 80 MM HG: ICD-10-PCS | Mod: CPTII,S$GLB,, | Performed by: NURSE PRACTITIONER

## 2023-08-07 PROCEDURE — 99999 PR PBB SHADOW E&M-EST. PATIENT-LVL V: ICD-10-PCS | Mod: PBBFAC,,, | Performed by: NURSE PRACTITIONER

## 2023-08-07 PROCEDURE — 1159F MED LIST DOCD IN RCRD: CPT | Mod: CPTII,S$GLB,, | Performed by: NURSE PRACTITIONER

## 2023-08-07 PROCEDURE — 99999 PR PBB SHADOW E&M-EST. PATIENT-LVL V: CPT | Mod: PBBFAC,,, | Performed by: NURSE PRACTITIONER

## 2023-08-10 ENCOUNTER — SPECIALTY PHARMACY (OUTPATIENT)
Dept: PHARMACY | Facility: CLINIC | Age: 63
End: 2023-08-10
Payer: MEDICARE

## 2023-08-10 NOTE — TELEPHONE ENCOUNTER
Specialty Pharmacy - Refill Coordination    Specialty Medication Orders Linked to Encounter      Flowsheet Row Most Recent Value   Medication #1 cabozantinib (CABOMETYX) 20 mg Tab (Order#[id number], Rx#[id number])            Refill Questions - Documented Responses      Flowsheet Row Most Recent Value   Patient Availability and HIPAA Verification    Does patient want to proceed with activity? Yes   HIPAA/medical authority confirmed? Yes   Relationship to patient of person spoken to? Self   Refill Screening Questions    Changes to allergies? No   Changes to medications? No   New conditions since last clinic visit? No   Unplanned office visit, urgent care, ED, or hospital admission in the last 4 weeks? No   How does patient/caregiver feel medication is working? Good   Financial problems or insurance changes? No   How many doses of your specialty medications were missed in the last 4 weeks? 0   Would patient like to speak to a pharmacist? No   When does the patient need to receive the medication? 08/17/23   Refill Delivery Questions    How will the patient receive the medication? [facility]   When does the patient need to receive the medication? 08/17/23   Shipping Address Home   Address in [facility] confirmed and updated if neccessary? Yes   Expected Copay ($) 0   Is the patient able to afford the medication copay? Yes   Payment Method zero copay   Days supply of Refill 30   Supplies needed? No supplies needed   Refill activity completed? Yes   Refill activity plan Refill scheduled   Shipment/Pickup Date: 08/14/23            Current Outpatient Medications   Medication Sig    allopurinoL (ZYLOPRIM) 300 MG tablet Take 300 mg by mouth once daily.    amLODIPine (NORVASC) 10 MG tablet Take 1 tablet (10 mg total) by mouth every morning.    aspirin 81 mg Cap Take 81 mg by mouth once daily at 6am.    atorvastatin (LIPITOR) 80 MG tablet Take 80 mg by mouth once daily.    azelastine (ASTELIN) 137 mcg (0.1 %) nasal spray  "SMARTSIG:Both Nares    cabozantinib (CABOMETYX) 20 mg Tab Take 1 tablet (20 mg) by mouth once daily.    carvediloL (COREG) 12.5 MG tablet Take 1 tablet by mouth 2 (two) times daily.    colchicine (COLCRYS) 0.6 mg tablet Take 2 tablets by mouth once, then take 1 tablet 1 hour later. Max 1.8 mg/day. Then take 1 tablets by mouth daily until 2 days after flare resolves.    desvenlafaxine succinate (PRISTIQ) 100 MG Tb24 Take 100 mg by mouth.    diphenoxylate-atropine 2.5-0.025 mg (LOMOTIL) 2.5-0.025 mg per tablet Take 1 tablet by mouth as needed.    docusate sodium 100 mg capsule Take by mouth once daily.    finasteride (PROSCAR) 5 mg tablet Take 5 mg by mouth as needed.    furosemide (LASIX) 20 MG tablet Take 20 mg by mouth once daily. Pt stated he thinks her takes 10mg daily    hydrocortisone 2.5 % cream APPLY TO THE AFFECTED AREA(S) TWICE DAILY as needed for SKIN IRRITATION FOR 14 DAYS    hydrOXYzine pamoate (VISTARIL) 25 MG Cap Take 1 capsule (25 mg total) by mouth every 4 (four) hours as needed (itching).    hyoscyamine (LEVSIN/SL) 0.125 mg Subl Place 0.125 mg under the tongue every 6 (six) hours as needed.    insulin glargine (LANTUS) 100 unit/mL injection Inject into the skin. Takes 20units    insulin syr/ndl U100 half warner (BD VEO INSULIN SYR, HALF UNIT,) 0.3 mL 31 gauge x 15/64" Syrg USE 1 syringe DAILY    lancets Misc Use TID to check blood sugar. Dx: E11.9, patient is insulin dependent    multivitamin (THERAGRAN) per tablet Take 1 tablet by mouth once daily.    pantoprazole (PROTONIX) 40 MG tablet TAKE 1 TABLET BY MOUTH DAILY    pregabalin (LYRICA) 100 MG capsule Take 100 mg by mouth.    prochlorperazine (COMPAZINE) 5 MG tablet Take 5 mg by mouth every 8 (eight) hours as needed.    QUEtiapine (SEROQUEL) 50 MG tablet Take 3 tablets by mouth every evening.    sertraline (ZOLOFT) 50 MG tablet Take 50 mg by mouth.    tamsulosin (FLOMAX) 0.4 mg Cap TAKE 1 CAPSULE BY MOUTH EVERY EVENING    tolterodine (DETROL LA) 4 " MG 24 hr capsule Take 4 mg by mouth once daily.    traMADoL (ULTRAM) 50 mg tablet Take 50 mg by mouth every 6 (six) hours.    TRUE METRIX GLUCOSE TEST STRIP Strp 3 (three) times daily.    tuberculin,purif.prot.deriv. (TUBERSOL IDRM) Inject 0.1 mLs into the skin.   Last reviewed on 8/7/2023  8:58 AM by Jackie Boyd, FNP    Review of patient's allergies indicates:  No Known Allergies Last reviewed on  8/7/2023 8:58 AM by Jackie Boyd      Tasks added this encounter   No tasks added.   Tasks due within next 3 months   8/10/2023 - Refill Coordination Outreach (1 time occurrence)     [facility]  [address] [facility] [address]  Phone: [phone]  Fax: [phone]

## 2023-08-11 NOTE — PROGRESS NOTES
[facility]                       [address] [address]    PATIENT NAME:       GARRY LAWLER  YOB: 1960  CSN:                [id number]   MRN:                [id number]  ADMIT DATE:         05/07/2023 14:26:00  PHYSICIAN:          Shashank Wright DPM                            PROGRESS NOTE    DATE:  05/13/2023 00:00:00    SUBJECTIVE:  The patient is seen today, doing well.  No major issues.  ID saw   the patient last night.  Switched him from Zosyn to Unasyn.  No other issues.    OBJECTIVE:  Vital signs are stable and afebrile.    Wound is clean, viable.  No pus.  No odor.  Scant exudate.  Already having some   contracture at the wound margins.  No compromise to the articular surface.    ASSESSMENT:  Status post open amputation left great toe, currently doing well.    PLAN:  Continue with current care.  Plan on debridement and closure of foot on   Monday.        ______________________________  Shashank Wright DPM    GAS/AQS  DD:  05/13/2023  Time:  11:31AM  DT:  05/13/2023  Time:  02:14PM  Job #:  [id number]      PROGRESS NOTE       This [facility] message has been forwarded to you from a monitored pool.  Please do not reply to this message.  All responses should be sent directly to the patient.

## 2023-08-28 NOTE — PROGRESS NOTES
Subjective:       Patient ID: Ye Olivera is a 63 y.o. male.    Urologist: Dr. Christopher Villasenor  PCP: Dr. Ly Quevedo  Nephrologist: Dr. Jenae Le    Renal Cell Carcinoma Stage IV([id number]) Mediastinal lymph nodes--Diagnosed 20  Biopsy/pathology:  Upper EUS done 9/3/20--large cluster of malignant appearing lymph nodes in subcarinal mediastinum causing extrinsic compression in middle 1/3 and thoracic esophagus biopsy c/w metastatic carcinoma, c/w renal cell carcinoma CD10+, PAX -8+, erythematous mucosa in antrum biopsy c/w mild chronic gastritis, inactive, normal duodenum, no pathology in CBD or left lobe of liver or pancreas. CARIS results- MSI stable, Mismatch repair status proficient. Tumor burden low. Negative for: PD-L1, NTRK1/2/3, BAP1, FH, MET, PBRM1, SDHA. Pathologic variants identified: KDMSC (Exon 13 and 11) and VHL. Immunohistochemistry results: MLH1, MSH2, MSH6, and PM52 positive. PD-L1 negative.   Surgery/pathology:  Right radical nephrectomy and lymph node dissection done 20--clear cell renal cell carcinoma measures 9.2X8C8.7cm, Grade 3, margins clear, tumor limited to kidney, 4/6 lymph nodes positive for metastatic clear cell carcinoma.   Imagin. CT A/P w/o contrast [facility] 20--bulky right renal mass, midpole, measuring 10cm with evidence of internal necrosis and calcifications, primary mass does not extend outside the perinephric space, associated right perinephric edema, multiple enlarged retroperitoneal lymph nodes at level of the kidneys and inferior retroperitoneum, one measures 2.9X1.4cm, additional node measures 2.1X1.8cm, additional node measures 1.8X1.2cm, inferior retroperitoneal node at level of inferior pole right kidney measures 1.8X1.4cm, no convincing evidence of renal vein involvement, no right adrenal gland involvement, no disease apparent outside of abdomen.  2. CT A/P w/ and w/o contrast [facility] 20--large right renal mass measures 10.6X9.5X7.4cm, with  enlarged retroperitoneal nodes, largest measures 2.7cm.  3. CT chest [facility] 8/28/20--solitary left supraclavicular node, small and not overtly concerning, heterogeneous retroesophageal mass at the level of the tariq c/w metastatic deposit, no other evidence for metastatic disease.  4. MRI brain w/ and w/o contrast [facility] 8/28/20--no evidence for intracranial metastatic disease.  5. CT C/A/P done at [facility] 10/28/20--now appears to be 2 adjacent masses at the level of the tariq, measuring in aggregate 6.2X2.7cm as compared with 5.2X2.1cm, subcentimeter mediastinal and hilar nodes are seen, large intermediate density structure at the right renal fossa extending to pelvic brim, most likely post-operative hematoma or seroma, resolution of previously seen retroperitoneal adenopathy.  6. CT C/A/P w&w/o at [facility] on 12/23/20--Interval enlargement of the retro-esophageal mass with mild interval increase in size of multiple paraesophageal and prevascular lymph nodes compatible with progression of disease. Interval decrease in size of the right retroperitoneal postop seroma or hematoma. No evidence of metastatic disease below the diaphragm. Prior right nephrectomy and adrenalectomy. Chololithiasis and stable atherosclerosis.   7. CT C/A/P w/o contrast done at [facility] on 4/5/21--interval decrease in size of multiple prevascular, subcarinal and para-esophageal lymph nodes c/w response to treatment, no new pulmonary nodule or lymph node, no evidence for metastatic disease below the diaphragm, interval decrease in size of the post-operative seromas adjacent to right psoas muscle.  8. CT C/A/P w/o contrast done at [facility] on 7/7/21--stable appearance of previously identified prevascular, subcarinal, and paraesophageal nodes, there are an increased number of sub-centimeter nodes in the aortopulmonary window and precarinal region, nonspecific in appearance, no other evidence of local recurrence or metastatic disease.   9. MRI cervical spine w/ and w/o  contrast done at [facility] 8/4/21--no metastatic disease, multilevel spondylotic changes, no acute abnormality.   10. MRI brain w/ and w/o contrast 8/4/21--normal maturation of the previously seen lacunar infarct at the left centrum semiovale, no new abnormalities, specifically, no evidence of intracranial metastatic disease.   11. CT C/A/P w/o contrast done at [facility] 10/22/21--stable reactive mediastinal adenopathy, interval resection of right kidney and adrenal gland with post-surgical changes but no evidence of metastatic disease.   12. CT C/A/P w/o contrast done at [facility] 1/26/22--4 new pulmonary nodules larges LLL 14mm, 9mm LLL, 5mm LLL, 5mm RML, with enlarging prevascular lymph node now measures 18mm, previous 8mm, concerning for metastatic disease, new mesenteric implant in LLQ measures 11mm, enlarging retroperitoneal LN now measures [id number], previous 14X8mm concerning for progression disease, cholelithiasis.   13. MRI brain w/ and w/o  contrast done at [facility] 3/31/22--no acute intracranial abnormality.   14. CT C/A/P w/o contrast done at [facility] 4/5/22--progressive retroperitoneal and mediastinal adenopathy, with development of hilar adenopathy, increase in size and number of pulmonary nodules.   15. CT done at [facility] showed large hematoma 14cm in bladder, results not available.  16. CT head w/o contrast done at [facility] 7/31/22--No intracranial hemorrhage or acute traumatic brain parenchymal injury evident by CT.     17. CT C/A/P w/o contrast done at [facility] 8/29/22--mixed response to treatment, worsening mediastinal adenopathy, stable pulmonary nodules, improved retroperitoneal lymph node, enlarging pericaval lymph node.  18. CT C/A/P w/o contrast at [facility] 11/18/22--Interval decreased in mediastinal adenopathy, pulmonary nodules and portacaval nodes, c/w treatment response.   19. CT C/A/P w/ contrast at [facility] 2/8/23--stable mediastinal lymph nodes, enlarging left lower lobe pulmonary nodule, enlarging retroperitoneal lymph nodes.   20. CT  C/A/P w/o contrast at [facility] done 6/20/23--minimally enlarged mediastinal lymph nodes, otherwise no other significant changes, pericaval nodes unchanged.     Treatment History:   1. IL2 started on 11/2/20. 2nd cycle started 11/16/20 - stopped after 7 days due to acute renal failure, volume overload and confusion. Stopped due to progression.  2. Keytruda/Inlyta 1/8/21--1/31/22 (stopped due to progression, autoimmune hepatitis).  3. Prednisone 80mg daily 1/31/22--weaned and stopped 4/14/22.    Hospitalization:   [facility] 7/2022 14cm hematoma s/p TURP causing hydronephrosis, JEO and patient required initiation of dialysis, UTI, Covid infection.     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     Treatment Plan:  Cabometyx 60mg daily started 4/7/22  Decrease dose to 40mg daily 4/28/22 due to side effects.  HOLD on 5/19/22 until groin rash and abscess heal/also underwent TURP on 6/23/22  Restarted 7/6/22--held 7/16/22 for hospitalization/bladder hematoma  Cabometyx 40mg daily restarted 9/1/22. On hold since 10/24/22 for hand/foot syndrome.   Restarted on 11/9/22, held 11/28/22 for hand-foot syndrome, foot wound.   Dose decreased to 20mg daily. Restarted on 1/5/23. Stopped on 3/17/23 due to diabetic foot wound.   Restarted 6/1/23    Chief Complaint: Peripheral Neuropathy, Pain (Pt reports pain in both feet.), and Other Misc    HPI   Patient presents for follow-up of renal cell carcinoma. He continues on Cabometyx 20mg daily. Reports still having some mild diarrhea at times, but controlled. He is c/o some pain to his feet, but no skin changes. He thinks it is diabetic neuropathy. He has still a very small scab from his toe amputation, but is no longer an open wound and is improved.     Past Medical History:   Diagnosis Date    Arthritis of knee 08/13/2013    Cancer     stage IV renal cell carcinoma    Cataract     Coronary artery disease     Diabetes mellitus, type 2     HTN  (hypertension) 08/13/2013    Hx of CABG     Hyperlipidemia     Type II or unspecified type diabetes mellitus without mention of complication, not stated as uncontrolled 08/13/2013      Review of patient's allergies indicates:  No Known Allergies     Current Outpatient Medications:     allopurinoL (ZYLOPRIM) 300 MG tablet, Take 300 mg by mouth once daily., Disp: , Rfl:     amLODIPine (NORVASC) 10 MG tablet, Take 1 tablet (10 mg total) by mouth every morning., Disp: 30 tablet, Rfl: 11    aspirin 81 mg Cap, Take 81 mg by mouth once daily at 6am., Disp: , Rfl:     atorvastatin (LIPITOR) 80 MG tablet, Take 80 mg by mouth once daily., Disp: , Rfl:     azelastine (ASTELIN) 137 mcg (0.1 %) nasal spray, SMARTSIG:Both Nares, Disp: , Rfl:     cabozantinib (CABOMETYX) 20 mg Tab, Take 1 tablet (20 mg) by mouth once daily., Disp: 30 tablet, Rfl: 6    carvediloL (COREG) 12.5 MG tablet, Take 1 tablet by mouth 2 (two) times daily., Disp: , Rfl:     colchicine (COLCRYS) 0.6 mg tablet, Take 2 tablets by mouth once, then take 1 tablet 1 hour later. Max 1.8 mg/day. Then take 1 tablets by mouth daily until 2 days after flare resolves., Disp: , Rfl:     desvenlafaxine succinate (PRISTIQ) 100 MG Tb24, Take 100 mg by mouth., Disp: , Rfl:     diphenoxylate-atropine 2.5-0.025 mg (LOMOTIL) 2.5-0.025 mg per tablet, Take 1 tablet by mouth as needed., Disp: , Rfl:     docusate sodium 100 mg capsule, Take by mouth once daily., Disp: , Rfl:     finasteride (PROSCAR) 5 mg tablet, Take 5 mg by mouth as needed., Disp: , Rfl:     furosemide (LASIX) 20 MG tablet, Take 20 mg by mouth once daily. Pt stated he thinks her takes 10mg daily, Disp: , Rfl:     hydrocortisone 2.5 % cream, APPLY TO THE AFFECTED AREA(S) TWICE DAILY as needed for SKIN IRRITATION FOR 14 DAYS, Disp: , Rfl:     hydrOXYzine pamoate (VISTARIL) 25 MG Cap, Take 1 capsule (25 mg total) by mouth every 4 (four) hours as needed (itching)., Disp: 30 capsule, Rfl: 4    hyoscyamine (LEVSIN/SL)  "0.125 mg Subl, Place 0.125 mg under the tongue every 6 (six) hours as needed., Disp: , Rfl:     insulin glargine (LANTUS) 100 unit/mL injection, Inject into the skin. Takes 20units, Disp: , Rfl:     insulin syr/ndl U100 half warner (BD VEO INSULIN SYR, HALF UNIT,) 0.3 mL 31 gauge x 15/64" Syrg, USE 1 syringe DAILY, Disp: , Rfl:     lancets Misc, Use TID to check blood sugar. Dx: E11.9, patient is insulin dependent, Disp: , Rfl:     multivitamin (THERAGRAN) per tablet, Take 1 tablet by mouth once daily., Disp: , Rfl:     pantoprazole (PROTONIX) 40 MG tablet, TAKE 1 TABLET BY MOUTH DAILY, Disp: 30 tablet, Rfl: 6    pregabalin (LYRICA) 100 MG capsule, Take 100 mg by mouth., Disp: , Rfl:     prochlorperazine (COMPAZINE) 5 MG tablet, Take 5 mg by mouth every 8 (eight) hours as needed., Disp: , Rfl:     QUEtiapine (SEROQUEL) 50 MG tablet, Take 3 tablets by mouth every evening., Disp: , Rfl:     tamsulosin (FLOMAX) 0.4 mg Cap, TAKE 1 CAPSULE BY MOUTH EVERY EVENING, Disp: 30 capsule, Rfl: 6    tolterodine (DETROL LA) 4 MG 24 hr capsule, Take 4 mg by mouth once daily., Disp: , Rfl:     traMADoL (ULTRAM) 50 mg tablet, Take 50 mg by mouth every 6 (six) hours., Disp: , Rfl:     TRUE METRIX GLUCOSE TEST STRIP Strp, 3 (three) times daily., Disp: , Rfl:     tuberculin,purif.prot.deriv. (TUBERSOL IDRM), Inject 0.1 mLs into the skin., Disp: , Rfl:     sertraline (ZOLOFT) 50 MG tablet, Take 50 mg by mouth., Disp: , Rfl:      Review of Systems   Constitutional:  Positive for fatigue. Negative for appetite change and unexpected weight change.   HENT:  Negative for mouth sores.    Respiratory:  Negative for cough and shortness of breath.    Cardiovascular:  Negative for chest pain.   Gastrointestinal:  Positive for diarrhea. Negative for abdominal pain.   Genitourinary:  Negative for frequency.   Musculoskeletal:  Negative for back pain.   Integumentary:         S/p left great toe amputation, improved   Neurological:  Negative for " headaches.   Hematological:  Negative for adenopathy.   Psychiatric/Behavioral:  The patient is not nervous/anxious.      Vitals:    08/29/23 0924   BP: 114/70   Pulse: (!) 58   Resp: 14   Temp: 97.6 °F (36.4 °C)       Wt Readings from Last 3 Encounters:   08/29/23 0924 105.9 kg (233 lb 8 oz)   08/07/23 0851 106.2 kg (234 lb 1.6 oz)   07/17/23 1049 106.8 kg (235 lb 8 oz)     Physical Exam  Constitutional:       Appearance: Normal appearance.   HENT:      Head: Normocephalic and atraumatic.      Nose: Nose normal.   Eyes:      General: Lids are normal. Vision grossly intact.      Extraocular Movements: Extraocular movements intact.      Conjunctiva/sclera: Conjunctivae normal.   Cardiovascular:      Rate and Rhythm: Normal rate and regular rhythm.      Heart sounds: Normal heart sounds.   Pulmonary:      Effort: Pulmonary effort is normal.      Breath sounds: Normal breath sounds.   Chest:      Comments: HD access removed to right upper CW  Abdominal:      General: Abdomen is protuberant. Bowel sounds are normal. There is no distension.      Palpations: Abdomen is soft.      Tenderness: There is no abdominal tenderness.   Musculoskeletal:         General: Normal range of motion.      Cervical back: Neck supple.      Right lower leg: No edema.      Left lower leg: No edema.   Feet:      Comments: Left great toe s/p ampuation, wound healed, very small scab, no signs infection  Skin:     General: Skin is warm and dry.   Neurological:      General: No focal deficit present.      Mental Status: He is alert and oriented to person, place, and time.   Psychiatric:         Attention and Perception: Attention normal.         Mood and Affect: Mood normal.         Speech: Speech normal.         Behavior: Behavior normal. Behavior is cooperative.         Outside labs in [facility] on 8/28/23: CBC with WBC 9.11, Hgb of 12.8/42.7, platelets 232, CMP with poatssium 3.2, BUN 28 and creat 2.16, glucose 137, alk phos 142,  albumin 2.7. Otherwise good.     Assessment:        1. Clear cell carcinoma of right kidney        Plan:         Patient with large right renal mass and retroperitoneal adenopathy diagnosed by CT in 7/2020.  CT chest showed mediastinal fanny mass and biopsy done via EUS + for metastatic renal cell carcinoma.  Patient s/p radical right nephrectomy done 9/11/20. Pathology showed 9.2cm clear cell renal cell carcinoma, margins clear with 4/6 lymph nodes positive.  Post-operative course complicated by hematoma requiring blood transfusion.  CARIS results- MSI stable, Mismatch repair status proficient. Tumor burden low. Negative for: PD-L1, NTRK1/2/3, BAP1, FH, MET, PBRM1, SDHA. Pathologic variants identified: KDMSC (Exon 13 and 11) and VHL. Immunohistochemistry results: MLH1, MSH2, MSH6, and PM52 positive. PD-L1 negative.     Referred to Dr. Haja Sal for evaluation for IL-2. Explained this is only treatment to be shown to achieve long-term remissions in stage IV disease and patient has limited disease at this time.  Patient started 1st 5 days of IL-2 on 11/2/20.   Started his 2nd round on 11/16/20 and it was stopped on 11/22/20 due to acute renal failure, volume overload and agitation.   CT scan on 12/23/20 at [facility] revealed progression of his disease. Dr. Sal contacted patient and discussed the results. He also reached out to Dr. Alvarado to notify her that the patient will not be returning.   Started Keytruda 200mg every 3 weeks on 1/8/21 + Inlyta 5mg oral BID on 1/11/21.  CT C/A/P done at [facility] on 10/22/21 showed stable mild reactive mediastinal nodes, otherwise stable.  Patient continued on treatment s/p cycle 18 Keytruda given 1/10/22.  Labs on 1/10/22 showed elevated LFTs. Inlyta held.  CT C/A/P done at [facility] on 1/26/22 showed unfortunately disease progression with new pulmonary nodules, enlargement in mediastinal node, new mesenteric nodule and retroperitoneal adenopathy.  LFTs continued increasing,  autoimmune hepatitis from Keytruda suspected. Hepatitis panel negative. Keytruda stopped. Started on Prednisone 80mg daily on 1/31/22.  Patient had suicidal ideation and severe depression during visit in 3/2022, seen by psychiatry and meds adjusted, much improved.  CT C/A/P from [facility] on 4/5/22 showed progressive disease.  Treatment change recommended to Cabometyx 60mg daily per NCCN guidelines (Category 1) for subsequent treatment. Started on 4/7/22.  Prednisone weaned and stopped 4/14/22.  Patient was having multiple side effects (n/v, diarrhea, mouth sores) from Cabometyx.  Reduced dose to 40mg on 4/28/22. Held on 5/19/22 due to cellulitis/rashes/abscesses.      Continued to hold s/p TURP on 6/23/22 and restarted 7/6/22.  Patient hospitalized at [facility] 7/16/22 for large bladder hematoma s/p clot evacuation X 2. Cabometyx stopped again.   Repeat CT C/A/P done at [facility] on 8/29/22 showed mixed findings, but mostly disease progression in fanny metastases in mediastinum, which is to be expected since he has been off treatment.   Restarted back on Cabometyx (lower dose 40mg) on 9/1/22 although it has not been studied in dialysis patients, but no dosage adjustment is usually required.  Held on 10/24/22 for Hand-foot syndrome, restarted 11/9/22.   CT done at [facility] on 11/18/22 with disease improvement.  Cabometyx held again for hand-foot syndrome and wound on left toe 11/28/22.  Restarted Cabometyx on 1/5/23 at 20mg daily.    CT C/A/P at [facility] done on 2/8/23 showed mixed findings, stable mediastinal lymph nodes, enlargement in LLL nodule, and enlargement in abdominal lymph nodes.  Given that the findings are mixed and that he has not been on Cabometyx consistently, recommended to continue with current treatment and repeat CT in 3 months.   Cabometyx held on 3/17/23 due to worsening diabetic foot wound.  S/p left great toe amputation on 5/8/23, healed and cleared by podiatry to restart  Cabometyx which was restarted on 6/1/23 Cabometyx 20mg daily.    Repeat CT C/A/P done 6/20/23 with only minimal enlargement in mediastinal nodes, good considering he was off treatment for sometime. This will serve as new baseline.     Recent labs with normal CBC, renal insufficiency stable to improved, albumin slightly lower, potassium slightly low and he will increase in diet, labs otherwise okay.     Continue Cabometyx. Foot wound is much better and he continues follow-up with Dr. Wright.    RTC 4 weeks for follow-up with repeat labs to be done in [place].  Repeat CT C/A/P w/o contrast in [place] before RTC for restaging.     Looking ahead to future treatment options, would try again with immunotherapy, single agent Opdivo. He never really failed Keytruda, just developed autoimmune hepatitis, so this would need to be monitored closely.     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 Referred for genetic testing which was done on 3/16/23 and negative.    Continue Compazine for nausea.   Continue Norco for joint pain.   Continue Vistaril for chronic pruritis.     All questions answered at this time.      Elizabeth Alvarado MD

## 2023-08-29 ENCOUNTER — OFFICE VISIT (OUTPATIENT)
Dept: HEMATOLOGY/ONCOLOGY | Facility: CLINIC | Age: 63
End: 2023-08-29
Payer: MEDICARE

## 2023-08-29 VITALS
BODY MASS INDEX: 33.43 KG/M2 | SYSTOLIC BLOOD PRESSURE: 114 MMHG | RESPIRATION RATE: 14 BRPM | HEART RATE: 58 BPM | WEIGHT: 233.5 LBS | OXYGEN SATURATION: 97 % | HEIGHT: 70 IN | TEMPERATURE: 98 F | DIASTOLIC BLOOD PRESSURE: 70 MMHG

## 2023-08-29 DIAGNOSIS — C64.1 CLEAR CELL CARCINOMA OF RIGHT KIDNEY: Primary | ICD-10-CM

## 2023-08-29 PROCEDURE — 3078F PR MOST RECENT DIASTOLIC BLOOD PRESSURE < 80 MM HG: ICD-10-PCS | Mod: CPTII,S$GLB,, | Performed by: INTERNAL MEDICINE

## 2023-08-29 PROCEDURE — 3008F BODY MASS INDEX DOCD: CPT | Mod: CPTII,S$GLB,, | Performed by: INTERNAL MEDICINE

## 2023-08-29 PROCEDURE — 3044F PR MOST RECENT HEMOGLOBIN A1C LEVEL <7.0%: ICD-10-PCS | Mod: CPTII,S$GLB,, | Performed by: INTERNAL MEDICINE

## 2023-08-29 PROCEDURE — 3074F PR MOST RECENT SYSTOLIC BLOOD PRESSURE < 130 MM HG: ICD-10-PCS | Mod: CPTII,S$GLB,, | Performed by: INTERNAL MEDICINE

## 2023-08-29 PROCEDURE — 1160F RVW MEDS BY RX/DR IN RCRD: CPT | Mod: CPTII,S$GLB,, | Performed by: INTERNAL MEDICINE

## 2023-08-29 PROCEDURE — 99999 PR PBB SHADOW E&M-EST. PATIENT-LVL V: ICD-10-PCS | Mod: PBBFAC,,, | Performed by: INTERNAL MEDICINE

## 2023-08-29 PROCEDURE — 3078F DIAST BP <80 MM HG: CPT | Mod: CPTII,S$GLB,, | Performed by: INTERNAL MEDICINE

## 2023-08-29 PROCEDURE — 1160F PR REVIEW ALL MEDS BY PRESCRIBER/CLIN PHARMACIST DOCUMENTED: ICD-10-PCS | Mod: CPTII,S$GLB,, | Performed by: INTERNAL MEDICINE

## 2023-08-29 PROCEDURE — 99999 PR PBB SHADOW E&M-EST. PATIENT-LVL V: CPT | Mod: PBBFAC,,, | Performed by: INTERNAL MEDICINE

## 2023-08-29 PROCEDURE — 1159F PR MEDICATION LIST DOCUMENTED IN MEDICAL RECORD: ICD-10-PCS | Mod: CPTII,S$GLB,, | Performed by: INTERNAL MEDICINE

## 2023-08-29 PROCEDURE — 99215 OFFICE O/P EST HI 40 MIN: CPT | Mod: S$GLB,,, | Performed by: INTERNAL MEDICINE

## 2023-08-29 PROCEDURE — 3074F SYST BP LT 130 MM HG: CPT | Mod: CPTII,S$GLB,, | Performed by: INTERNAL MEDICINE

## 2023-08-29 PROCEDURE — 3008F PR BODY MASS INDEX (BMI) DOCUMENTED: ICD-10-PCS | Mod: CPTII,S$GLB,, | Performed by: INTERNAL MEDICINE

## 2023-08-29 PROCEDURE — 99215 PR OFFICE/OUTPT VISIT, EST, LEVL V, 40-54 MIN: ICD-10-PCS | Mod: S$GLB,,, | Performed by: INTERNAL MEDICINE

## 2023-08-29 PROCEDURE — 3044F HG A1C LEVEL LT 7.0%: CPT | Mod: CPTII,S$GLB,, | Performed by: INTERNAL MEDICINE

## 2023-08-29 PROCEDURE — 1159F MED LIST DOCD IN RCRD: CPT | Mod: CPTII,S$GLB,, | Performed by: INTERNAL MEDICINE

## 2023-09-19 NOTE — PROGRESS NOTES
Subjective:       Patient ID: Ye Olivera is a 63 y.o. male.    Urologist: Dr. Christopher Villasenor  PCP: Dr. Ly Quevedo  Nephrologist: Dr. Jenae Le    Renal Cell Carcinoma Stage IV([id number]) Mediastinal lymph nodes--Diagnosed 20  Biopsy/pathology:  Upper EUS done 9/3/20--large cluster of malignant appearing lymph nodes in subcarinal mediastinum causing extrinsic compression in middle 1/3 and thoracic esophagus biopsy c/w metastatic carcinoma, c/w renal cell carcinoma CD10+, PAX -8+, erythematous mucosa in antrum biopsy c/w mild chronic gastritis, inactive, normal duodenum, no pathology in CBD or left lobe of liver or pancreas. CARIS results- MSI stable, Mismatch repair status proficient. Tumor burden low. Negative for: PD-L1, NTRK1/2/3, BAP1, FH, MET, PBRM1, SDHA. Pathologic variants identified: KDMSC (Exon 13 and 11) and VHL. Immunohistochemistry results: MLH1, MSH2, MSH6, and PM52 positive. PD-L1 negative.   Surgery/pathology:  Right radical nephrectomy and lymph node dissection done 20--clear cell renal cell carcinoma measures 9.2X8C8.7cm, Grade 3, margins clear, tumor limited to kidney, 4/6 lymph nodes positive for metastatic clear cell carcinoma.   Imagin. CT A/P w/o contrast [facility] 20--bulky right renal mass, midpole, measuring 10cm with evidence of internal necrosis and calcifications, primary mass does not extend outside the perinephric space, associated right perinephric edema, multiple enlarged retroperitoneal lymph nodes at level of the kidneys and inferior retroperitoneum, one measures 2.9X1.4cm, additional node measures 2.1X1.8cm, additional node measures 1.8X1.2cm, inferior retroperitoneal node at level of inferior pole right kidney measures 1.8X1.4cm, no convincing evidence of renal vein involvement, no right adrenal gland involvement, no disease apparent outside of abdomen.  2. CT A/P w/ and w/o contrast [facility] 20--large right renal mass measures 10.6X9.5X7.4cm, with  enlarged retroperitoneal nodes, largest measures 2.7cm.  3. CT chest [facility] 8/28/20--solitary left supraclavicular node, small and not overtly concerning, heterogeneous retroesophageal mass at the level of the tariq c/w metastatic deposit, no other evidence for metastatic disease.  4. MRI brain w/ and w/o contrast [facility] 8/28/20--no evidence for intracranial metastatic disease.  5. CT C/A/P done at [facility] 10/28/20--now appears to be 2 adjacent masses at the level of the tariq, measuring in aggregate 6.2X2.7cm as compared with 5.2X2.1cm, subcentimeter mediastinal and hilar nodes are seen, large intermediate density structure at the right renal fossa extending to pelvic brim, most likely post-operative hematoma or seroma, resolution of previously seen retroperitoneal adenopathy.  6. CT C/A/P w&w/o at [facility] on 12/23/20--Interval enlargement of the retro-esophageal mass with mild interval increase in size of multiple paraesophageal and prevascular lymph nodes compatible with progression of disease. Interval decrease in size of the right retroperitoneal postop seroma or hematoma. No evidence of metastatic disease below the diaphragm. Prior right nephrectomy and adrenalectomy. Chololithiasis and stable atherosclerosis.   7. CT C/A/P w/o contrast done at [facility] on 4/5/21--interval decrease in size of multiple prevascular, subcarinal and para-esophageal lymph nodes c/w response to treatment, no new pulmonary nodule or lymph node, no evidence for metastatic disease below the diaphragm, interval decrease in size of the post-operative seromas adjacent to right psoas muscle.  8. CT C/A/P w/o contrast done at [facility] on 7/7/21--stable appearance of previously identified prevascular, subcarinal, and paraesophageal nodes, there are an increased number of sub-centimeter nodes in the aortopulmonary window and precarinal region, nonspecific in appearance, no other evidence of local recurrence or metastatic disease.   9. MRI cervical spine w/ and w/o  contrast done at [facility] 8/4/21--no metastatic disease, multilevel spondylotic changes, no acute abnormality.   10. MRI brain w/ and w/o contrast 8/4/21--normal maturation of the previously seen lacunar infarct at the left centrum semiovale, no new abnormalities, specifically, no evidence of intracranial metastatic disease.   11. CT C/A/P w/o contrast done at [facility] 10/22/21--stable reactive mediastinal adenopathy, interval resection of right kidney and adrenal gland with post-surgical changes but no evidence of metastatic disease.   12. CT C/A/P w/o contrast done at [facility] 1/26/22--4 new pulmonary nodules larges LLL 14mm, 9mm LLL, 5mm LLL, 5mm RML, with enlarging prevascular lymph node now measures 18mm, previous 8mm, concerning for metastatic disease, new mesenteric implant in LLQ measures 11mm, enlarging retroperitoneal LN now measures [id number], previous 14X8mm concerning for progression disease, cholelithiasis.   13. MRI brain w/ and w/o  contrast done at [facility] 3/31/22--no acute intracranial abnormality.   14. CT C/A/P w/o contrast done at [facility] 4/5/22--progressive retroperitoneal and mediastinal adenopathy, with development of hilar adenopathy, increase in size and number of pulmonary nodules.   15. CT done at [facility] showed large hematoma 14cm in bladder, results not available.  16. CT head w/o contrast done at [facility] 7/31/22--No intracranial hemorrhage or acute traumatic brain parenchymal injury evident by CT.     17. CT C/A/P w/o contrast done at [facility] 8/29/22--mixed response to treatment, worsening mediastinal adenopathy, stable pulmonary nodules, improved retroperitoneal lymph node, enlarging pericaval lymph node.  18. CT C/A/P w/o contrast at [facility] 11/18/22--Interval decreased in mediastinal adenopathy, pulmonary nodules and portacaval nodes, c/w treatment response.   19. CT C/A/P w/ contrast at [facility] 2/8/23--stable mediastinal lymph nodes, enlarging left lower lobe pulmonary nodule, enlarging retroperitoneal lymph nodes.   20. CT  C/A/P w/o contrast at [facility] done 6/20/23--minimally enlarged mediastinal lymph nodes, otherwise no other significant changes, pericaval nodes unchanged.     Treatment History:   1. IL2 started on 11/2/20. 2nd cycle started 11/16/20 - stopped after 7 days due to acute renal failure, volume overload and confusion. Stopped due to progression.  2. Keytruda/Inlyta 1/8/21--1/31/22 (stopped due to progression, autoimmune hepatitis).  3. Prednisone 80mg daily 1/31/22--weaned and stopped 4/14/22.    Hospitalization:   [facility] 7/2022 14cm hematoma s/p TURP causing hydronephrosis, JOE and patient required initiation of dialysis, UTI, Covid infection.     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     Treatment Plan:  Cabometyx 60mg daily started 4/7/22  Decrease dose to 40mg daily 4/28/22 due to side effects.  HOLD on 5/19/22 until groin rash and abscess heal/also underwent TURP on 6/23/22  Restarted 7/6/22--held 7/16/22 for hospitalization/bladder hematoma  Cabometyx 40mg daily restarted 9/1/22. On hold since 10/24/22 for hand/foot syndrome.   Restarted on 11/9/22, held 11/28/22 for hand-foot syndrome, foot wound.   Dose decreased to 20mg daily. Restarted on 1/5/23. Stopped on 3/17/23 due to diabetic foot wound.   Restarted 6/1/23    Chief Complaint: Other Misc (Pt states that he went to [facility] last week bc the tip of his penis was bleeding. States dx was a yeast infection. ), Nausea (Pt reports increased nausea, loss of appetite,), Diarrhea, Dizziness, Shortness of Breath, and Peripheral Neuropathy    HPI   Patient presents for follow-up of renal cell carcinoma. He continues on Cabometyx 20mg daily. Reports still having some diarrhea and has had increasing nausea and loss of appetite. He has lost 9lbs since last visit. Patient also went to ED with bleeding from tip of penis and was given a salve and Abx and dx with yeast infection, but it is not better. He is scheduled to see  Dr. Villasenor next week. Patient is having more fatigue as well. He was supposed to have CT scan done, and it was ordered, but did not get scheduled.     Past Medical History:   Diagnosis Date    Arthritis of knee 08/13/2013    Cancer     stage IV renal cell carcinoma    Cataract     Coronary artery disease     Diabetes mellitus, type 2     HTN (hypertension) 08/13/2013    Hx of CABG     Hyperlipidemia     Type II or unspecified type diabetes mellitus without mention of complication, not stated as uncontrolled 08/13/2013      Review of patient's allergies indicates:  No Known Allergies     Current Outpatient Medications:     allopurinoL (ZYLOPRIM) 300 MG tablet, Take 300 mg by mouth once daily., Disp: , Rfl:     amLODIPine (NORVASC) 10 MG tablet, Take 1 tablet (10 mg total) by mouth every morning., Disp: 30 tablet, Rfl: 11    aspirin 81 mg Cap, Take 81 mg by mouth once daily at 6am., Disp: , Rfl:     atorvastatin (LIPITOR) 80 MG tablet, Take 80 mg by mouth once daily., Disp: , Rfl:     azelastine (ASTELIN) 137 mcg (0.1 %) nasal spray, SMARTSIG:Both Nares, Disp: , Rfl:     cabozantinib (CABOMETYX) 20 mg Tab, Take 1 tablet (20 mg) by mouth once daily., Disp: 30 tablet, Rfl: 6    colchicine (COLCRYS) 0.6 mg tablet, Take 2 tablets by mouth once, then take 1 tablet 1 hour later. Max 1.8 mg/day. Then take 1 tablets by mouth daily until 2 days after flare resolves., Disp: , Rfl:     desvenlafaxine succinate (PRISTIQ) 100 MG Tb24, Take 100 mg by mouth., Disp: , Rfl:     diphenoxylate-atropine 2.5-0.025 mg (LOMOTIL) 2.5-0.025 mg per tablet, Take 1 tablet by mouth as needed., Disp: , Rfl:     docusate sodium 100 mg capsule, Take by mouth once daily., Disp: , Rfl:     finasteride (PROSCAR) 5 mg tablet, Take 5 mg by mouth as needed., Disp: , Rfl:     furosemide (LASIX) 20 MG tablet, Take 20 mg by mouth once daily. Pt stated he thinks her takes 10mg daily, Disp: , Rfl:     hydrocortisone 2.5 % cream, APPLY TO THE AFFECTED AREA(S)  "TWICE DAILY as needed for SKIN IRRITATION FOR 14 DAYS, Disp: , Rfl:     hydrOXYzine pamoate (VISTARIL) 25 MG Cap, Take 1 capsule (25 mg total) by mouth every 4 (four) hours as needed (itching)., Disp: 30 capsule, Rfl: 4    hyoscyamine (LEVSIN/SL) 0.125 mg Subl, Place 0.125 mg under the tongue every 6 (six) hours as needed., Disp: , Rfl:     insulin glargine (LANTUS) 100 unit/mL injection, Inject into the skin. Takes 20units, Disp: , Rfl:     insulin syr/ndl U100 half warner (BD VEO INSULIN SYR, HALF UNIT,) 0.3 mL 31 gauge x 15/64" Syrg, USE 1 syringe DAILY, Disp: , Rfl:     lancets Misc, Use TID to check blood sugar. Dx: E11.9, patient is insulin dependent, Disp: , Rfl:     multivitamin (THERAGRAN) per tablet, Take 1 tablet by mouth once daily., Disp: , Rfl:     pantoprazole (PROTONIX) 40 MG tablet, TAKE 1 TABLET BY MOUTH DAILY, Disp: 30 tablet, Rfl: 6    pregabalin (LYRICA) 100 MG capsule, Take 100 mg by mouth., Disp: , Rfl:     prochlorperazine (COMPAZINE) 5 MG tablet, Take 5 mg by mouth every 8 (eight) hours as needed., Disp: , Rfl:     QUEtiapine (SEROQUEL) 50 MG tablet, Take 3 tablets by mouth every evening., Disp: , Rfl:     tamsulosin (FLOMAX) 0.4 mg Cap, TAKE 1 CAPSULE BY MOUTH EVERY EVENING, Disp: 30 capsule, Rfl: 6    tolterodine (DETROL LA) 4 MG 24 hr capsule, Take 4 mg by mouth once daily., Disp: , Rfl:     traMADoL (ULTRAM) 50 mg tablet, Take 50 mg by mouth every 6 (six) hours., Disp: , Rfl:     TRUE METRIX GLUCOSE TEST STRIP Strp, 3 (three) times daily., Disp: , Rfl:     tuberculin,purif.prot.deriv. (TUBERSOL IDRM), Inject 0.1 mLs into the skin., Disp: , Rfl:     carvediloL (COREG) 12.5 MG tablet, Take 1 tablet by mouth 2 (two) times daily., Disp: , Rfl:     sertraline (ZOLOFT) 50 MG tablet, Take 50 mg by mouth., Disp: , Rfl:      Review of Systems   Constitutional:  Positive for appetite change, fatigue and unexpected weight change.   HENT:  Negative for mouth sores.    Respiratory:  Negative for cough " and shortness of breath.    Cardiovascular:  Negative for chest pain.   Gastrointestinal:  Positive for diarrhea and nausea. Negative for abdominal pain.   Genitourinary:  Negative for frequency.   Musculoskeletal:  Negative for back pain.   Integumentary:         S/p left great toe amputation, improved   Neurological:  Negative for headaches.   Hematological:  Negative for adenopathy.   Psychiatric/Behavioral:  The patient is not nervous/anxious.      Vitals:    09/26/23 1117   BP: 127/89   Pulse: 75   Resp: 14   Temp: 98.4 °F (36.9 °C)         Wt Readings from Last 3 Encounters:   09/26/23 1117 101.7 kg (224 lb 4.8 oz)   08/29/23 0924 105.9 kg (233 lb 8 oz)   08/07/23 0851 106.2 kg (234 lb 1.6 oz)     Physical Exam  Constitutional:       Appearance: Normal appearance.   HENT:      Head: Normocephalic and atraumatic.      Nose: Nose normal.   Eyes:      General: Lids are normal. Vision grossly intact.      Extraocular Movements: Extraocular movements intact.      Conjunctiva/sclera: Conjunctivae normal.   Cardiovascular:      Rate and Rhythm: Normal rate and regular rhythm.      Heart sounds: Normal heart sounds.   Pulmonary:      Effort: Pulmonary effort is normal.      Breath sounds: Normal breath sounds.   Chest:      Comments: HD access removed to right upper CW  Abdominal:      General: Abdomen is protuberant. Bowel sounds are normal. There is no distension.      Palpations: Abdomen is soft.      Tenderness: There is no abdominal tenderness.   Musculoskeletal:         General: Normal range of motion.      Cervical back: Neck supple.      Right lower leg: No edema.      Left lower leg: No edema.   Feet:      Comments: Left great toe s/p ampuation, wound healed, very small scab, no signs infection  Skin:     General: Skin is warm and dry.   Neurological:      General: No focal deficit present.      Mental Status: He is alert and oriented to person, place, and time.   Psychiatric:         Attention and  Perception: Attention normal.         Mood and Affect: Mood normal.         Speech: Speech normal.         Behavior: Behavior normal. Behavior is cooperative.         Outside labs in [facility] on 8/28/23: CBC with WBC 9.52, Hgb of 14.2/45.1, platelets 284, CMP with poatssium 3.0, BUN 29 and creat 1.9, glucose 154, alk phos 178, albumin 2.9. Otherwise good.     Assessment:        1. Clear cell carcinoma of right kidney    2. Malignant neoplasm metastatic to intrathoracic lymph node      Plan:         Patient with large right renal mass and retroperitoneal adenopathy diagnosed by CT in 7/2020.  CT chest showed mediastinal fanny mass and biopsy done via EUS + for metastatic renal cell carcinoma.  Patient s/p radical right nephrectomy done 9/11/20. Pathology showed 9.2cm clear cell renal cell carcinoma, margins clear with 4/6 lymph nodes positive.  Post-operative course complicated by hematoma requiring blood transfusion.  CARIS results- MSI stable, Mismatch repair status proficient. Tumor burden low. Negative for: PD-L1, NTRK1/2/3, BAP1, FH, MET, PBRM1, SDHA. Pathologic variants identified: KDMSC (Exon 13 and 11) and VHL. Immunohistochemistry results: MLH1, MSH2, MSH6, and PM52 positive. PD-L1 negative.     Referred to Dr. Haja Sal for evaluation for IL-2. Explained this is only treatment to be shown to achieve long-term remissions in stage IV disease and patient has limited disease at this time.  Patient started 1st 5 days of IL-2 on 11/2/20.   Started his 2nd round on 11/16/20 and it was stopped on 11/22/20 due to acute renal failure, volume overload and agitation.   CT scan on 12/23/20 at [facility] revealed progression of his disease. Dr. Sal contacted patient and discussed the results. He also reached out to Dr. Alvarado to notify her that the patient will not be returning.   Started Keytruda 200mg every 3 weeks on 1/8/21 + Inlyta 5mg oral BID on 1/11/21.  CT C/A/P done at [facility] on 10/22/21 showed  stable mild reactive mediastinal nodes, otherwise stable.  Patient continued on treatment s/p cycle 18 Keytruda given 1/10/22.  Labs on 1/10/22 showed elevated LFTs. Inlyta held.  CT C/A/P done at [facility] on 1/26/22 showed unfortunately disease progression with new pulmonary nodules, enlargement in mediastinal node, new mesenteric nodule and retroperitoneal adenopathy.  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 Hepatitis panel negative. Keytruda stopped. Started on Prednisone 80mg daily on 1/31/22.  Patient had suicidal ideation and severe depression during visit in 3/2022, seen by psychiatry and meds adjusted, much improved.  CT C/A/P from [facility] on 4/5/22 showed progressive disease.  Treatment change recommended to Cabometyx 60mg daily per NCCN guidelines (Category 1) for subsequent treatment. Started on 4/7/22.  Prednisone weaned and stopped 4/14/22.  Patient was having multiple side effects (n/v, diarrhea, mouth sores) from Cabometyx.  Reduced dose to 40mg on 4/28/22. Held on 5/19/22 due to cellulitis/rashes/abscesses.      Continued to hold s/p TURP on 6/23/22 and restarted 7/6/22.  Patient hospitalized at [facility] 7/16/22 for large bladder hematoma s/p clot evacuation X 2. Cabometyx stopped again.   Repeat CT C/A/P done at [facility] on 8/29/22 showed mixed findings, but mostly disease progression in fanny metastases in mediastinum, which is to be expected since he has been off treatment.   Restarted back on Cabometyx (lower dose 40mg) on 9/1/22 although it has not been studied in dialysis patients, but no dosage adjustment is usually required.  Held on 10/24/22 for Hand-foot syndrome, restarted 11/9/22.   CT done at [facility] on 11/18/22 with disease improvement.  Cabometyx held again for hand-foot syndrome and wound on left toe 11/28/22.  Restarted Cabometyx on 1/5/23 at 20mg daily.    CT C/A/P at [facility] done on 2/8/23 showed mixed findings, stable mediastinal  lymph nodes, enlargement in LLL nodule, and enlargement in abdominal lymph nodes.  Given that the findings are mixed and that he has not been on Cabometyx consistently, recommended to continue with current treatment and repeat CT in 3 months.   Cabometyx held on 3/17/23 due to worsening diabetic foot wound.  S/p left great toe amputation on 5/8/23, healed and cleared by podiatry to restart Cabometyx which was restarted on 6/1/23 Cabometyx 20mg daily.    Repeat CT C/A/P done 6/20/23 with only minimal enlargement in mediastinal nodes, good considering he was off treatment for sometime. This will serve as new baseline.     Recent labs with normal CBC, renal insufficiency stable to improved, albumin improved, potassium still low, likely from his diarrhea, Alk phos more elevated, labs otherwise okay.     Continue Cabometyx. Foot wound is much better and he continues follow-up with Dr. Wright.  CT was ordered but did not get scheduled.    RTC 3 weeks for follow-up with repeat labs to be done in [place].  Repeat CT C/A/P w/o contrast in [place] before RTC for restaging.     Looking ahead to future treatment options, would try again with immunotherapy, single agent Opdivo. He never really failed Keytruda, just developed autoimmune hepatitis, so this would need to be monitored closely.     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 Referred for genetic testing which was done on 3/16/23 and negative.    Continue Compazine for nausea.   Continue Norco for joint pain.   Continue Vistaril for chronic pruritis.     All questions answered at this time.      Elizabeth Alvarado MD

## 2023-09-26 ENCOUNTER — OFFICE VISIT (OUTPATIENT)
Dept: HEMATOLOGY/ONCOLOGY | Facility: CLINIC | Age: 63
End: 2023-09-26
Payer: MEDICARE

## 2023-09-26 VITALS
HEART RATE: 75 BPM | RESPIRATION RATE: 14 BRPM | OXYGEN SATURATION: 95 % | WEIGHT: 224.31 LBS | BODY MASS INDEX: 32.11 KG/M2 | TEMPERATURE: 98 F | DIASTOLIC BLOOD PRESSURE: 89 MMHG | HEIGHT: 70 IN | SYSTOLIC BLOOD PRESSURE: 127 MMHG

## 2023-09-26 DIAGNOSIS — C77.1 MALIGNANT NEOPLASM METASTATIC TO INTRATHORACIC LYMPH NODE: ICD-10-CM

## 2023-09-26 DIAGNOSIS — C64.1 CLEAR CELL CARCINOMA OF RIGHT KIDNEY: Primary | ICD-10-CM

## 2023-09-26 PROCEDURE — 3044F HG A1C LEVEL LT 7.0%: CPT | Mod: CPTII,S$GLB,, | Performed by: INTERNAL MEDICINE

## 2023-09-26 PROCEDURE — 1159F PR MEDICATION LIST DOCUMENTED IN MEDICAL RECORD: ICD-10-PCS | Mod: CPTII,S$GLB,, | Performed by: INTERNAL MEDICINE

## 2023-09-26 PROCEDURE — 3008F BODY MASS INDEX DOCD: CPT | Mod: CPTII,S$GLB,, | Performed by: INTERNAL MEDICINE

## 2023-09-26 PROCEDURE — 99215 PR OFFICE/OUTPT VISIT, EST, LEVL V, 40-54 MIN: ICD-10-PCS | Mod: S$GLB,,, | Performed by: INTERNAL MEDICINE

## 2023-09-26 PROCEDURE — 1159F MED LIST DOCD IN RCRD: CPT | Mod: CPTII,S$GLB,, | Performed by: INTERNAL MEDICINE

## 2023-09-26 PROCEDURE — 3044F PR MOST RECENT HEMOGLOBIN A1C LEVEL <7.0%: ICD-10-PCS | Mod: CPTII,S$GLB,, | Performed by: INTERNAL MEDICINE

## 2023-09-26 PROCEDURE — 1160F PR REVIEW ALL MEDS BY PRESCRIBER/CLIN PHARMACIST DOCUMENTED: ICD-10-PCS | Mod: CPTII,S$GLB,, | Performed by: INTERNAL MEDICINE

## 2023-09-26 PROCEDURE — 3079F DIAST BP 80-89 MM HG: CPT | Mod: CPTII,S$GLB,, | Performed by: INTERNAL MEDICINE

## 2023-09-26 PROCEDURE — 3074F SYST BP LT 130 MM HG: CPT | Mod: CPTII,S$GLB,, | Performed by: INTERNAL MEDICINE

## 2023-09-26 PROCEDURE — 3074F PR MOST RECENT SYSTOLIC BLOOD PRESSURE < 130 MM HG: ICD-10-PCS | Mod: CPTII,S$GLB,, | Performed by: INTERNAL MEDICINE

## 2023-09-26 PROCEDURE — 3008F PR BODY MASS INDEX (BMI) DOCUMENTED: ICD-10-PCS | Mod: CPTII,S$GLB,, | Performed by: INTERNAL MEDICINE

## 2023-09-26 PROCEDURE — 99999 PR PBB SHADOW E&M-EST. PATIENT-LVL V: ICD-10-PCS | Mod: PBBFAC,,, | Performed by: INTERNAL MEDICINE

## 2023-09-26 PROCEDURE — 3079F PR MOST RECENT DIASTOLIC BLOOD PRESSURE 80-89 MM HG: ICD-10-PCS | Mod: CPTII,S$GLB,, | Performed by: INTERNAL MEDICINE

## 2023-09-26 PROCEDURE — 99999 PR PBB SHADOW E&M-EST. PATIENT-LVL V: CPT | Mod: PBBFAC,,, | Performed by: INTERNAL MEDICINE

## 2023-09-26 PROCEDURE — 99215 OFFICE O/P EST HI 40 MIN: CPT | Mod: S$GLB,,, | Performed by: INTERNAL MEDICINE

## 2023-09-26 PROCEDURE — 1160F RVW MEDS BY RX/DR IN RCRD: CPT | Mod: CPTII,S$GLB,, | Performed by: INTERNAL MEDICINE

## 2023-09-26 RX ORDER — ONDANSETRON HYDROCHLORIDE 8 MG/1
8 TABLET, FILM COATED ORAL EVERY 8 HOURS PRN
Qty: 30 TABLET | Refills: 2 | Status: SHIPPED | OUTPATIENT
Start: 2023-09-26 | End: 2024-09-25

## 2023-10-02 DIAGNOSIS — C64.1 CLEAR CELL CARCINOMA OF RIGHT KIDNEY: ICD-10-CM

## 2023-10-02 DIAGNOSIS — C77.1 MALIGNANT NEOPLASM METASTATIC TO INTRATHORACIC LYMPH NODE: ICD-10-CM

## 2023-10-10 NOTE — PROGRESS NOTES
Subjective:       Patient ID: Ye Olivera is a 63 y.o. male.    Urologist: Dr. Christopher Villasenor  PCP: Dr. Ly Quevedo  Nephrologist: Dr. Jenae Le    Renal Cell Carcinoma Stage IV([id number]) Mediastinal lymph nodes--Diagnosed 20  Biopsy/pathology:  Upper EUS done 9/3/20--large cluster of malignant appearing lymph nodes in subcarinal mediastinum causing extrinsic compression in middle 1/3 and thoracic esophagus biopsy c/w metastatic carcinoma, c/w renal cell carcinoma CD10+, PAX -8+, erythematous mucosa in antrum biopsy c/w mild chronic gastritis, inactive, normal duodenum, no pathology in CBD or left lobe of liver or pancreas. CARIS results- MSI stable, Mismatch repair status proficient. Tumor burden low. Negative for: PD-L1, NTRK1/2/3, BAP1, FH, MET, PBRM1, SDHA. Pathologic variants identified: KDMSC (Exon 13 and 11) and VHL. Immunohistochemistry results: MLH1, MSH2, MSH6, and PM52 positive. PD-L1 negative.   Surgery/pathology:  Right radical nephrectomy and lymph node dissection done 20--clear cell renal cell carcinoma measures 9.2X8C8.7cm, Grade 3, margins clear, tumor limited to kidney, 4/6 lymph nodes positive for metastatic clear cell carcinoma.   Imagin. CT A/P w/o contrast [facility] 20--bulky right renal mass, midpole, measuring 10cm with evidence of internal necrosis and calcifications, primary mass does not extend outside the perinephric space, associated right perinephric edema, multiple enlarged retroperitoneal lymph nodes at level of the kidneys and inferior retroperitoneum, one measures 2.9X1.4cm, additional node measures 2.1X1.8cm, additional node measures 1.8X1.2cm, inferior retroperitoneal node at level of inferior pole right kidney measures 1.8X1.4cm, no convincing evidence of renal vein involvement, no right adrenal gland involvement, no disease apparent outside of abdomen.  2. CT A/P w/ and w/o contrast [facility] 20--large right renal mass measures 10.6X9.5X7.4cm, with  enlarged retroperitoneal nodes, largest measures 2.7cm.  3. CT chest [facility] 8/28/20--solitary left supraclavicular node, small and not overtly concerning, heterogeneous retroesophageal mass at the level of the tariq c/w metastatic deposit, no other evidence for metastatic disease.  4. MRI brain w/ and w/o contrast [facility] 8/28/20--no evidence for intracranial metastatic disease.  5. CT C/A/P done at [facility] 10/28/20--now appears to be 2 adjacent masses at the level of the tariq, measuring in aggregate 6.2X2.7cm as compared with 5.2X2.1cm, subcentimeter mediastinal and hilar nodes are seen, large intermediate density structure at the right renal fossa extending to pelvic brim, most likely post-operative hematoma or seroma, resolution of previously seen retroperitoneal adenopathy.  6. CT C/A/P w&w/o at [facility] on 12/23/20--Interval enlargement of the retro-esophageal mass with mild interval increase in size of multiple paraesophageal and prevascular lymph nodes compatible with progression of disease. Interval decrease in size of the right retroperitoneal postop seroma or hematoma. No evidence of metastatic disease below the diaphragm. Prior right nephrectomy and adrenalectomy. Chololithiasis and stable atherosclerosis.   7. CT C/A/P w/o contrast done at [facility] on 4/5/21--interval decrease in size of multiple prevascular, subcarinal and para-esophageal lymph nodes c/w response to treatment, no new pulmonary nodule or lymph node, no evidence for metastatic disease below the diaphragm, interval decrease in size of the post-operative seromas adjacent to right psoas muscle.  8. CT C/A/P w/o contrast done at [facility] on 7/7/21--stable appearance of previously identified prevascular, subcarinal, and paraesophageal nodes, there are an increased number of sub-centimeter nodes in the aortopulmonary window and precarinal region, nonspecific in appearance, no other evidence of local recurrence or metastatic disease.   9. MRI cervical spine w/ and w/o  contrast done at [facility] 8/4/21--no metastatic disease, multilevel spondylotic changes, no acute abnormality.   10. MRI brain w/ and w/o contrast 8/4/21--normal maturation of the previously seen lacunar infarct at the left centrum semiovale, no new abnormalities, specifically, no evidence of intracranial metastatic disease.   11. CT C/A/P w/o contrast done at [facility] 10/22/21--stable reactive mediastinal adenopathy, interval resection of right kidney and adrenal gland with post-surgical changes but no evidence of metastatic disease.   12. CT C/A/P w/o contrast done at [facility] 1/26/22--4 new pulmonary nodules larges LLL 14mm, 9mm LLL, 5mm LLL, 5mm RML, with enlarging prevascular lymph node now measures 18mm, previous 8mm, concerning for metastatic disease, new mesenteric implant in LLQ measures 11mm, enlarging retroperitoneal LN now measures [id number], previous 14X8mm concerning for progression disease, cholelithiasis.   13. MRI brain w/ and w/o  contrast done at [facility] 3/31/22--no acute intracranial abnormality.   14. CT C/A/P w/o contrast done at [facility] 4/5/22--progressive retroperitoneal and mediastinal adenopathy, with development of hilar adenopathy, increase in size and number of pulmonary nodules.   15. CT done at [facility] showed large hematoma 14cm in bladder, results not available.  16. CT head w/o contrast done at [facility] 7/31/22--No intracranial hemorrhage or acute traumatic brain parenchymal injury evident by CT.     17. CT C/A/P w/o contrast done at [facility] 8/29/22--mixed response to treatment, worsening mediastinal adenopathy, stable pulmonary nodules, improved retroperitoneal lymph node, enlarging pericaval lymph node.  18. CT C/A/P w/o contrast at [facility] 11/18/22--Interval decreased in mediastinal adenopathy, pulmonary nodules and portacaval nodes, c/w treatment response.   19. CT C/A/P w/ contrast at [facility] 2/8/23--stable mediastinal lymph nodes, enlarging left lower lobe pulmonary nodule, enlarging retroperitoneal lymph nodes.   20. CT  C/A/P w/o contrast at [facility] done 6/20/23--minimally enlarged mediastinal lymph nodes, otherwise no other significant changes, pericaval nodes unchanged.   21. CT C/A/P w/o contrast at [facility] done 10/11/23--improved appearance of the right lung base with decreased ground glass opacity, stable to decreased size of mediastinal and pericaval lymph nodes (index prevascular node decreased 1X1.6cm, pericaval measures 1.6X2cm (previous 2X2.4cm),     Treatment History:   1. IL2 started on 11/2/20. 2nd cycle started 11/16/20 - stopped after 7 days due to acute renal failure, volume overload and confusion. Stopped due to progression.  2. Keytruda/Inlyta 1/8/21--1/31/22 (stopped due to progression, autoimmune hepatitis).  3. Prednisone 80mg daily 1/31/22--weaned and stopped 4/14/22.    Hospitalization:   [facility] 7/2022 14cm hematoma s/p TURP causing hydronephrosis, JOE and patient required initiation of dialysis, UTI, Covid infection.     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     Treatment Plan:  Cabometyx 60mg daily started 4/7/22  Decrease dose to 40mg daily 4/28/22 due to side effects.  HOLD on 5/19/22 until groin rash and abscess heal/also underwent TURP on 6/23/22  Restarted 7/6/22--held 7/16/22 for hospitalization/bladder hematoma  Cabometyx 40mg daily restarted 9/1/22. On hold since 10/24/22 for hand/foot syndrome.   Restarted on 11/9/22, held 11/28/22 for hand-foot syndrome, foot wound.   Dose decreased to 20mg daily. Restarted on 1/5/23. Stopped on 3/17/23 due to diabetic foot wound.   Restarted 6/1/23    Chief Complaint: Gout, Diarrhea, Nausea, and Pain    HPI   Patient presents for follow-up of renal cell carcinoma. He continues on Cabometyx 20mg daily. Reports still having some diarrhea and has had increasing nausea. He is having more problems with gout. But he is otherwise doing okay. No new complaints today. His CT shows disease improvement.   Past Medical  History:   Diagnosis Date    Arthritis of knee 08/13/2013    Cancer     stage IV renal cell carcinoma    Cataract     Coronary artery disease     Diabetes mellitus, type 2     HTN (hypertension) 08/13/2013    Hx of CABG     Hyperlipidemia     Type II or unspecified type diabetes mellitus without mention of complication, not stated as uncontrolled 08/13/2013      Review of patient's allergies indicates:  No Known Allergies     Current Outpatient Medications:     allopurinoL (ZYLOPRIM) 300 MG tablet, Take 300 mg by mouth once daily., Disp: , Rfl:     amLODIPine (NORVASC) 10 MG tablet, Take 1 tablet (10 mg total) by mouth every morning., Disp: 30 tablet, Rfl: 11    aspirin 81 mg Cap, Take 81 mg by mouth once daily at 6am., Disp: , Rfl:     atorvastatin (LIPITOR) 80 MG tablet, Take 80 mg by mouth once daily., Disp: , Rfl:     azelastine (ASTELIN) 137 mcg (0.1 %) nasal spray, SMARTSIG:Both Nares, Disp: , Rfl:     cabozantinib (CABOMETYX) 20 mg Tab, Take 1 tablet (20 mg) by mouth once daily., Disp: 30 tablet, Rfl: 6    carvediloL (COREG) 12.5 MG tablet, Take 1 tablet by mouth 2 (two) times daily., Disp: , Rfl:     colchicine (COLCRYS) 0.6 mg tablet, Take 2 tablets by mouth once, then take 1 tablet 1 hour later. Max 1.8 mg/day. Then take 1 tablets by mouth daily until 2 days after flare resolves., Disp: , Rfl:     desvenlafaxine succinate (PRISTIQ) 100 MG Tb24, Take 100 mg by mouth., Disp: , Rfl:     diphenoxylate-atropine 2.5-0.025 mg (LOMOTIL) 2.5-0.025 mg per tablet, Take 1 tablet by mouth as needed., Disp: , Rfl:     docusate sodium 100 mg capsule, Take by mouth once daily., Disp: , Rfl:     finasteride (PROSCAR) 5 mg tablet, Take 5 mg by mouth as needed., Disp: , Rfl:     furosemide (LASIX) 20 MG tablet, Take 20 mg by mouth once daily. Pt stated he thinks her takes 10mg daily, Disp: , Rfl:     hydrocortisone 2.5 % cream, APPLY TO THE AFFECTED AREA(S) TWICE DAILY as needed for SKIN IRRITATION FOR 14 DAYS, Disp: , Rfl:  "    hydrOXYzine pamoate (VISTARIL) 25 MG Cap, Take 1 capsule (25 mg total) by mouth every 4 (four) hours as needed (itching)., Disp: 30 capsule, Rfl: 4    hyoscyamine (LEVSIN/SL) 0.125 mg Subl, Place 0.125 mg under the tongue every 6 (six) hours as needed., Disp: , Rfl:     insulin glargine (LANTUS) 100 unit/mL injection, Inject into the skin. Takes 20units, Disp: , Rfl:     lancets Misc, Use TID to check blood sugar. Dx: E11.9, patient is insulin dependent, Disp: , Rfl:     multivitamin (THERAGRAN) per tablet, Take 1 tablet by mouth once daily., Disp: , Rfl:     ondansetron (ZOFRAN) 8 MG tablet, Take 1 tablet (8 mg total) by mouth every 8 (eight) hours as needed for Nausea., Disp: 30 tablet, Rfl: 2    pantoprazole (PROTONIX) 40 MG tablet, TAKE 1 TABLET BY MOUTH DAILY, Disp: 30 tablet, Rfl: 6    pregabalin (LYRICA) 100 MG capsule, Take 100 mg by mouth., Disp: , Rfl:     prochlorperazine (COMPAZINE) 5 MG tablet, Take 5 mg by mouth every 8 (eight) hours as needed., Disp: , Rfl:     QUEtiapine (SEROQUEL) 50 MG tablet, Take 3 tablets by mouth every evening., Disp: , Rfl:     tamsulosin (FLOMAX) 0.4 mg Cap, TAKE 1 CAPSULE BY MOUTH EVERY EVENING, Disp: 30 capsule, Rfl: 6    tolterodine (DETROL LA) 4 MG 24 hr capsule, Take 4 mg by mouth once daily., Disp: , Rfl:     traMADoL (ULTRAM) 50 mg tablet, Take 50 mg by mouth every 6 (six) hours., Disp: , Rfl:     TRUE METRIX GLUCOSE TEST STRIP Strp, 3 (three) times daily., Disp: , Rfl:     insulin syr/ndl U100 half warner (BD VEO INSULIN SYR, HALF UNIT,) 0.3 mL 31 gauge x 15/64" Syrg, USE 1 syringe DAILY, Disp: , Rfl:     sertraline (ZOLOFT) 50 MG tablet, Take 50 mg by mouth., Disp: , Rfl:     tuberculin,purif.prot.deriv. (TUBERSOL IDRM), Inject 0.1 mLs into the skin., Disp: , Rfl:      Review of Systems   Constitutional:  Positive for fatigue. Negative for appetite change and unexpected weight change.   HENT:  Negative for mouth sores.    Respiratory:  Negative for cough and " shortness of breath.    Cardiovascular:  Negative for chest pain.   Gastrointestinal:  Positive for diarrhea and nausea. Negative for abdominal pain.   Genitourinary:  Negative for frequency.   Musculoskeletal:  Negative for back pain.   Integumentary:         S/p left great toe amputation, improved   Neurological:  Negative for headaches.   Hematological:  Negative for adenopathy.   Psychiatric/Behavioral:  The patient is not nervous/anxious.      Vitals:    10/18/23 1050   BP: 132/75   Pulse: (!) 58   Resp: 14   Temp: 97.7 °F (36.5 °C)     Wt Readings from Last 3 Encounters:   10/18/23 1050 103.3 kg (227 lb 12.8 oz)   09/26/23 1117 101.7 kg (224 lb 4.8 oz)   08/29/23 0924 105.9 kg (233 lb 8 oz)     Physical Exam  Constitutional:       Appearance: Normal appearance.   HENT:      Head: Normocephalic and atraumatic.      Nose: Nose normal.   Eyes:      General: Lids are normal. Vision grossly intact.      Extraocular Movements: Extraocular movements intact.      Conjunctiva/sclera: Conjunctivae normal.   Cardiovascular:      Rate and Rhythm: Normal rate and regular rhythm.      Heart sounds: Normal heart sounds.   Pulmonary:      Effort: Pulmonary effort is normal.      Breath sounds: Normal breath sounds.   Chest:      Comments: HD access removed to right upper CW  Abdominal:      General: Abdomen is protuberant. Bowel sounds are normal. There is no distension.      Palpations: Abdomen is soft.      Tenderness: There is no abdominal tenderness.   Musculoskeletal:         General: Normal range of motion.      Cervical back: Neck supple.      Right lower leg: No edema.      Left lower leg: No edema.   Feet:      Comments: Left great toe s/p ampuation, wound healed, very small scab, no signs infection  Skin:     General: Skin is warm and dry.   Neurological:      General: No focal deficit present.      Mental Status: He is alert and oriented to person, place, and time.   Psychiatric:         Attention and Perception:  Attention normal.         Mood and Affect: Mood normal.         Speech: Speech normal.         Behavior: Behavior normal. Behavior is cooperative.         Outside labs in [facility] on 9/25/23: CBC with WBC 9.52, Hgb of 14.2/45.1, platelets 284, CMP with poatssium 3.0, BUN 29 and creat 1.9, glucose 154, alk phos 178, albumin 2.9. Otherwise good.     Lab Visit on 10/18/2023   Component Date Value Ref Range Status    WBC 10/18/2023 9.08  4.50 - 11.50 x10(3)/mcL Final    RBC 10/18/2023 5.38  4.70 - 6.10 x10(6)/mcL Final    Hgb 10/18/2023 13.1 (L)  14.0 - 18.0 g/dL Final    Hct 10/18/2023 43.9  42.0 - 52.0 % Final    MCV 10/18/2023 81.6  80.0 - 94.0 fL Final    MCH 10/18/2023 24.3 (L)  27.0 - 31.0 pg Final    MCHC 10/18/2023 29.8 (L)  33.0 - 36.0 g/dL Final    RDW 10/18/2023 20.5 (H)  11.5 - 17.0 % Final    Platelet 10/18/2023 228  130 - 400 x10(3)/mcL Final    MPV 10/18/2023 10.1  7.4 - 10.4 fL Final    Neut % 10/18/2023 68.5  % Final    Lymph % 10/18/2023 21.5  % Final    Mono % 10/18/2023 8.0  % Final    Eos % 10/18/2023 1.3  % Final    Basophil % 10/18/2023 0.3  % Final    Lymph # 10/18/2023 1.95  0.6 - 4.6 x10(3)/mcL Final    Neut # 10/18/2023 6.21  2.1 - 9.2 x10(3)/mcL Final    Mono # 10/18/2023 0.73  0.1 - 1.3 x10(3)/mcL Final    Eos # 10/18/2023 0.12  0 - 0.9 x10(3)/mcL Final    Baso # 10/18/2023 0.03  <=0.2 x10(3)/mcL Final    IG# 10/18/2023 0.04  0 - 0.04 x10(3)/mcL Final    IG% 10/18/2023 0.4  % Final         Assessment:        1. Clear cell carcinoma of right kidney        Plan:         Patient with large right renal mass and retroperitoneal adenopathy diagnosed by CT in 7/2020.  CT chest showed mediastinal fanny mass and biopsy done via EUS + for metastatic renal cell carcinoma.  Patient s/p radical right nephrectomy done 9/11/20. Pathology showed 9.2cm clear cell renal cell carcinoma, margins clear with 4/6 lymph nodes positive.  Post-operative course complicated by hematoma requiring blood  transfusion.  CARIS results- MSI stable, Mismatch repair status proficient. Tumor burden low. Negative for: PD-L1, NTRK1/2/3, BAP1, FH, MET, PBRM1, SDHA. Pathologic variants identified: KDMSC (Exon 13 and 11) and VHL. Immunohistochemistry results: MLH1, MSH2, MSH6, and PM52 positive. PD-L1 negative.     Referred to Dr. Haja Sal for evaluation for IL-2. Explained this is only treatment to be shown to achieve long-term remissions in stage IV disease and patient has limited disease at this time.  Patient started 1st 5 days of IL-2 on 11/2/20.   Started his 2nd round on 11/16/20 and it was stopped on 11/22/20 due to acute renal failure, volume overload and agitation.   CT scan on 12/23/20 at [facility] revealed progression of his disease. Dr. Sal contacted patient and discussed the results. He also reached out to Dr. Alvarado to notify her that the patient will not be returning.   Started Keytruda 200mg every 3 weeks on 1/8/21 + Inlyta 5mg oral BID on 1/11/21.  CT C/A/P done at [facility] on 10/22/21 showed stable mild reactive mediastinal nodes, otherwise stable.  Patient continued on treatment s/p cycle 18 Keytruda given 1/10/22.  Labs on 1/10/22 showed elevated LFTs. Inlyta held.  CT C/A/P done at [facility] on 1/26/22 showed unfortunately disease progression with new pulmonary nodules, enlargement in mediastinal node, new mesenteric nodule and retroperitoneal adenopathy.  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 Hepatitis panel negative. Keytruda stopped. Started on Prednisone 80mg daily on 1/31/22.  Patient had suicidal ideation and severe depression during visit in 3/2022, seen by psychiatry and meds adjusted, much improved.  CT C/A/P from [facility] on 4/5/22 showed progressive disease.  Treatment change recommended to Cabometyx 60mg daily per NCCN guidelines (Category 1) for subsequent treatment. Started on 4/7/22.  Prednisone weaned and stopped 4/14/22.  Patient was having multiple side effects  (n/v, diarrhea, mouth sores) from Cabometyx.  Reduced dose to 40mg on 4/28/22. Held on 5/19/22 due to cellulitis/rashes/abscesses.      Continued to hold s/p TURP on 6/23/22 and restarted 7/6/22.  Patient hospitalized at [facility] 7/16/22 for large bladder hematoma s/p clot evacuation X 2. Cabometyx stopped again.   Repeat CT C/A/P done at [facility] on 8/29/22 showed mixed findings, but mostly disease progression in fanny metastases in mediastinum, which is to be expected since he has been off treatment.   Restarted back on Cabometyx (lower dose 40mg) on 9/1/22 although it has not been studied in dialysis patients, but no dosage adjustment is usually required.  Held on 10/24/22 for Hand-foot syndrome, restarted 11/9/22.   CT done at [facility] on 11/18/22 with disease improvement.  Cabometyx held again for hand-foot syndrome and wound on left toe 11/28/22.  Restarted Cabometyx on 1/5/23 at 20mg daily.    CT C/A/P at [facility] done on 2/8/23 showed mixed findings, stable mediastinal lymph nodes, enlargement in LLL nodule, and enlargement in abdominal lymph nodes.  Given that the findings are mixed and that he has not been on Cabometyx consistently, recommended to continue with current treatment and repeat CT in 3 months.   Cabometyx held on 3/17/23 due to worsening diabetic foot wound.  S/p left great toe amputation on 5/8/23, healed and cleared by podiatry to restart Cabometyx which was restarted on 6/1/23 Cabometyx 20mg daily.    Repeat CT C/A/P done 6/20/23 with only minimal enlargement in mediastinal nodes, good considering he was off treatment for sometime. This will serve as new baseline.   Repeat CT C/A/P 10/11/23 with stable to improved disease, no new disease.         CBCdiff today with mild anemia. CMP pending and will follow-up.     Continue Cabometyx. Foot wound is much better and he continues follow-up with Dr. Wright.    RTC 4 weeks for follow-up with repeat labs here same day as  visit.     Plan to repeat CT in 3-4 months.     Looking ahead to future treatment options, would try again with immunotherapy, single agent Opdivo. He never really failed Keytruda, just developed autoimmune hepatitis, so this would need to be monitored closely.     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 Referred for genetic testing which was done on 3/16/23 and negative.    Continue Compazine for nausea.   Continue Norco for joint pain.   Continue Vistaril for chronic pruritis.     All questions answered at this time.      Elizabeth Alvarado MD

## 2023-10-18 ENCOUNTER — OFFICE VISIT (OUTPATIENT)
Dept: HEMATOLOGY/ONCOLOGY | Facility: CLINIC | Age: 63
End: 2023-10-18
Payer: MEDICARE

## 2023-10-18 VITALS
HEIGHT: 70 IN | RESPIRATION RATE: 14 BRPM | SYSTOLIC BLOOD PRESSURE: 132 MMHG | HEART RATE: 58 BPM | OXYGEN SATURATION: 95 % | DIASTOLIC BLOOD PRESSURE: 75 MMHG | TEMPERATURE: 98 F | BODY MASS INDEX: 32.61 KG/M2 | WEIGHT: 227.81 LBS

## 2023-10-18 DIAGNOSIS — C64.1 CLEAR CELL CARCINOMA OF RIGHT KIDNEY: Primary | ICD-10-CM

## 2023-10-18 PROCEDURE — 1160F PR REVIEW ALL MEDS BY PRESCRIBER/CLIN PHARMACIST DOCUMENTED: ICD-10-PCS | Mod: CPTII,S$GLB,, | Performed by: INTERNAL MEDICINE

## 2023-10-18 PROCEDURE — 3044F HG A1C LEVEL LT 7.0%: CPT | Mod: CPTII,S$GLB,, | Performed by: INTERNAL MEDICINE

## 2023-10-18 PROCEDURE — 3008F BODY MASS INDEX DOCD: CPT | Mod: CPTII,S$GLB,, | Performed by: INTERNAL MEDICINE

## 2023-10-18 PROCEDURE — 1159F MED LIST DOCD IN RCRD: CPT | Mod: CPTII,S$GLB,, | Performed by: INTERNAL MEDICINE

## 2023-10-18 PROCEDURE — 1159F PR MEDICATION LIST DOCUMENTED IN MEDICAL RECORD: ICD-10-PCS | Mod: CPTII,S$GLB,, | Performed by: INTERNAL MEDICINE

## 2023-10-18 PROCEDURE — 3008F PR BODY MASS INDEX (BMI) DOCUMENTED: ICD-10-PCS | Mod: CPTII,S$GLB,, | Performed by: INTERNAL MEDICINE

## 2023-10-18 PROCEDURE — 99999 PR PBB SHADOW E&M-EST. PATIENT-LVL V: ICD-10-PCS | Mod: PBBFAC,,, | Performed by: INTERNAL MEDICINE

## 2023-10-18 PROCEDURE — 3044F PR MOST RECENT HEMOGLOBIN A1C LEVEL <7.0%: ICD-10-PCS | Mod: CPTII,S$GLB,, | Performed by: INTERNAL MEDICINE

## 2023-10-18 PROCEDURE — 3078F DIAST BP <80 MM HG: CPT | Mod: CPTII,S$GLB,, | Performed by: INTERNAL MEDICINE

## 2023-10-18 PROCEDURE — 99215 PR OFFICE/OUTPT VISIT, EST, LEVL V, 40-54 MIN: ICD-10-PCS | Mod: S$GLB,,, | Performed by: INTERNAL MEDICINE

## 2023-10-18 PROCEDURE — 99215 OFFICE O/P EST HI 40 MIN: CPT | Mod: S$GLB,,, | Performed by: INTERNAL MEDICINE

## 2023-10-18 PROCEDURE — 3075F SYST BP GE 130 - 139MM HG: CPT | Mod: CPTII,S$GLB,, | Performed by: INTERNAL MEDICINE

## 2023-10-18 PROCEDURE — 3075F PR MOST RECENT SYSTOLIC BLOOD PRESS GE 130-139MM HG: ICD-10-PCS | Mod: CPTII,S$GLB,, | Performed by: INTERNAL MEDICINE

## 2023-10-18 PROCEDURE — 99999 PR PBB SHADOW E&M-EST. PATIENT-LVL V: CPT | Mod: PBBFAC,,, | Performed by: INTERNAL MEDICINE

## 2023-10-18 PROCEDURE — 1160F RVW MEDS BY RX/DR IN RCRD: CPT | Mod: CPTII,S$GLB,, | Performed by: INTERNAL MEDICINE

## 2023-10-18 PROCEDURE — 3078F PR MOST RECENT DIASTOLIC BLOOD PRESSURE < 80 MM HG: ICD-10-PCS | Mod: CPTII,S$GLB,, | Performed by: INTERNAL MEDICINE

## 2023-11-08 PROBLEM — Z79.899 IMMUNODEFICIENCY DUE TO DRUGS: Status: ACTIVE | Noted: 2023-11-08

## 2023-11-08 PROBLEM — D68.9 COAGULATION DEFECT, UNSPECIFIED: Status: RESOLVED | Noted: 2022-08-02 | Resolved: 2023-11-08

## 2023-11-08 PROBLEM — D84.821 IMMUNODEFICIENCY DUE TO DRUGS: Status: ACTIVE | Noted: 2023-11-08

## 2023-11-08 NOTE — PROGRESS NOTES
Subjective:       Patient ID: Ye Olivera is a 63 y.o. male.    Urologist: Dr. Christopher Villasenor  PCP: Dr. yL Quevedo  Nephrologist: Dr. Jenae Le    Renal Cell Carcinoma Stage IV([id number]) Mediastinal lymph nodes--Diagnosed 20  Biopsy/pathology:  Upper EUS done 9/3/20--large cluster of malignant appearing lymph nodes in subcarinal mediastinum causing extrinsic compression in middle 1/3 and thoracic esophagus biopsy c/w metastatic carcinoma, c/w renal cell carcinoma CD10+, PAX -8+, erythematous mucosa in antrum biopsy c/w mild chronic gastritis, inactive, normal duodenum, no pathology in CBD or left lobe of liver or pancreas. CARIS results- MSI stable, Mismatch repair status proficient. Tumor burden low. Negative for: PD-L1, NTRK1/2/3, BAP1, FH, MET, PBRM1, SDHA. Pathologic variants identified: KDMSC (Exon 13 and 11) and VHL. Immunohistochemistry results: MLH1, MSH2, MSH6, and PM52 positive. PD-L1 negative.   Surgery/pathology:  Right radical nephrectomy and lymph node dissection done 20--clear cell renal cell carcinoma measures 9.2X8C8.7cm, Grade 3, margins clear, tumor limited to kidney, 4/6 lymph nodes positive for metastatic clear cell carcinoma.   Imagin. CT A/P w/o contrast [facility] 20--bulky right renal mass, midpole, measuring 10cm with evidence of internal necrosis and calcifications, primary mass does not extend outside the perinephric space, associated right perinephric edema, multiple enlarged retroperitoneal lymph nodes at level of the kidneys and inferior retroperitoneum, one measures 2.9X1.4cm, additional node measures 2.1X1.8cm, additional node measures 1.8X1.2cm, inferior retroperitoneal node at level of inferior pole right kidney measures 1.8X1.4cm, no convincing evidence of renal vein involvement, no right adrenal gland involvement, no disease apparent outside of abdomen.  2. CT A/P w/ and w/o contrast [facility] 20--large right renal mass measures 10.6X9.5X7.4cm, with  enlarged retroperitoneal nodes, largest measures 2.7cm.  3. CT chest [facility] 8/28/20--solitary left supraclavicular node, small and not overtly concerning, heterogeneous retroesophageal mass at the level of the tariq c/w metastatic deposit, no other evidence for metastatic disease.  4. MRI brain w/ and w/o contrast [facility] 8/28/20--no evidence for intracranial metastatic disease.  5. CT C/A/P done at [facility] 10/28/20--now appears to be 2 adjacent masses at the level of the tariq, measuring in aggregate 6.2X2.7cm as compared with 5.2X2.1cm, subcentimeter mediastinal and hilar nodes are seen, large intermediate density structure at the right renal fossa extending to pelvic brim, most likely post-operative hematoma or seroma, resolution of previously seen retroperitoneal adenopathy.  6. CT C/A/P w&w/o at [facility] on 12/23/20--Interval enlargement of the retro-esophageal mass with mild interval increase in size of multiple paraesophageal and prevascular lymph nodes compatible with progression of disease. Interval decrease in size of the right retroperitoneal postop seroma or hematoma. No evidence of metastatic disease below the diaphragm. Prior right nephrectomy and adrenalectomy. Chololithiasis and stable atherosclerosis.   7. CT C/A/P w/o contrast done at [facility] on 4/5/21--interval decrease in size of multiple prevascular, subcarinal and para-esophageal lymph nodes c/w response to treatment, no new pulmonary nodule or lymph node, no evidence for metastatic disease below the diaphragm, interval decrease in size of the post-operative seromas adjacent to right psoas muscle.  8. CT C/A/P w/o contrast done at [facility] on 7/7/21--stable appearance of previously identified prevascular, subcarinal, and paraesophageal nodes, there are an increased number of sub-centimeter nodes in the aortopulmonary window and precarinal region, nonspecific in appearance, no other evidence of local recurrence or metastatic disease.   9. MRI cervical spine w/ and w/o  contrast done at [facility] 8/4/21--no metastatic disease, multilevel spondylotic changes, no acute abnormality.   10. MRI brain w/ and w/o contrast 8/4/21--normal maturation of the previously seen lacunar infarct at the left centrum semiovale, no new abnormalities, specifically, no evidence of intracranial metastatic disease.   11. CT C/A/P w/o contrast done at [facility] 10/22/21--stable reactive mediastinal adenopathy, interval resection of right kidney and adrenal gland with post-surgical changes but no evidence of metastatic disease.   12. CT C/A/P w/o contrast done at [facility] 1/26/22--4 new pulmonary nodules larges LLL 14mm, 9mm LLL, 5mm LLL, 5mm RML, with enlarging prevascular lymph node now measures 18mm, previous 8mm, concerning for metastatic disease, new mesenteric implant in LLQ measures 11mm, enlarging retroperitoneal LN now measures [id number], previous 14X8mm concerning for progression disease, cholelithiasis.   13. MRI brain w/ and w/o  contrast done at [facility] 3/31/22--no acute intracranial abnormality.   14. CT C/A/P w/o contrast done at [facility] 4/5/22--progressive retroperitoneal and mediastinal adenopathy, with development of hilar adenopathy, increase in size and number of pulmonary nodules.   15. CT done at [facility] showed large hematoma 14cm in bladder, results not available.  16. CT head w/o contrast done at [facility] 7/31/22--No intracranial hemorrhage or acute traumatic brain parenchymal injury evident by CT.     17. CT C/A/P w/o contrast done at [facility] 8/29/22--mixed response to treatment, worsening mediastinal adenopathy, stable pulmonary nodules, improved retroperitoneal lymph node, enlarging pericaval lymph node.  18. CT C/A/P w/o contrast at [facility] 11/18/22--Interval decreased in mediastinal adenopathy, pulmonary nodules and portacaval nodes, c/w treatment response.   19. CT C/A/P w/ contrast at [facility] 2/8/23--stable mediastinal lymph nodes, enlarging left lower lobe pulmonary nodule, enlarging retroperitoneal lymph nodes.   20. CT  C/A/P w/o contrast at [facility] done 6/20/23--minimally enlarged mediastinal lymph nodes, otherwise no other significant changes, pericaval nodes unchanged.   21. CT C/A/P w/o contrast at [facility] done 10/11/23--improved appearance of the right lung base with decreased ground glass opacity, stable to decreased size of mediastinal and pericaval lymph nodes (index prevascular node decreased 1X1.6cm, pericaval measures 1.6X2cm (previous 2X2.4cm),     Treatment History:   1. IL2 started on 11/2/20. 2nd cycle started 11/16/20 - stopped after 7 days due to acute renal failure, volume overload and confusion. Stopped due to progression.  2. Keytruda/Inlyta 1/8/21--1/31/22 (stopped due to progression, autoimmune hepatitis).  3. Prednisone 80mg daily 1/31/22--weaned and stopped 4/14/22.    Hospitalization:   [facility] 7/2022 14cm hematoma s/p TURP causing hydronephrosis, JOE and patient required initiation of dialysis, UTI, Covid infection.     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     Treatment Plan:  Cabometyx 60mg daily started 4/7/22  Decrease dose to 40mg daily 4/28/22 due to side effects.  HOLD on 5/19/22 until groin rash and abscess heal/also underwent TURP on 6/23/22  Restarted 7/6/22--held 7/16/22 for hospitalization/bladder hematoma  Cabometyx 40mg daily restarted 9/1/22. On hold since 10/24/22 for hand/foot syndrome.   Restarted on 11/9/22, held 11/28/22 for hand-foot syndrome, foot wound.   Dose decreased to 20mg daily. Restarted on 1/5/23. Stopped on 3/17/23 due to diabetic foot wound.   Restarted 6/1/23    Chief Complaint: Peripheral Neuropathy, Shortness of Breath, and Diarrhea    HPI   Patient presents for follow-up of renal cell carcinoma. He continues on Cabometyx 20mg daily. He is doing very well. Reports still having some diarrhea but not significant enough to take medications. Still with peripheral neuropathy symptoms. He takes Lyrica which helps. He is  otherwise doing okay. No new complaints today.     Past Medical History:   Diagnosis Date    Arthritis of knee 08/13/2013    Cancer     stage IV renal cell carcinoma    Cataract     Coronary artery disease     Diabetes mellitus, type 2     HTN (hypertension) 08/13/2013    Hx of CABG     Hyperlipidemia     Type II or unspecified type diabetes mellitus without mention of complication, not stated as uncontrolled 08/13/2013      Review of patient's allergies indicates:  No Known Allergies     Current Outpatient Medications:     allopurinoL (ZYLOPRIM) 300 MG tablet, Take 300 mg by mouth once daily., Disp: , Rfl:     amLODIPine (NORVASC) 10 MG tablet, Take 1 tablet (10 mg total) by mouth every morning., Disp: 30 tablet, Rfl: 11    aspirin 81 mg Cap, Take 81 mg by mouth once daily at 6am., Disp: , Rfl:     atorvastatin (LIPITOR) 80 MG tablet, Take 80 mg by mouth once daily., Disp: , Rfl:     azelastine (ASTELIN) 137 mcg (0.1 %) nasal spray, SMARTSIG:Both Nares, Disp: , Rfl:     cabozantinib (CABOMETYX) 20 mg Tab, Take 1 tablet (20 mg) by mouth once daily., Disp: 30 tablet, Rfl: 6    carvediloL (COREG) 12.5 MG tablet, Take 1 tablet by mouth 2 (two) times daily., Disp: , Rfl:     colchicine (COLCRYS) 0.6 mg tablet, Take 2 tablets by mouth once, then take 1 tablet 1 hour later. Max 1.8 mg/day. Then take 1 tablets by mouth daily until 2 days after flare resolves., Disp: , Rfl:     desvenlafaxine succinate (PRISTIQ) 100 MG Tb24, Take 100 mg by mouth., Disp: , Rfl:     diphenoxylate-atropine 2.5-0.025 mg (LOMOTIL) 2.5-0.025 mg per tablet, Take 1 tablet by mouth as needed., Disp: , Rfl:     docusate sodium 100 mg capsule, Take by mouth once daily., Disp: , Rfl:     finasteride (PROSCAR) 5 mg tablet, Take 5 mg by mouth as needed., Disp: , Rfl:     furosemide (LASIX) 20 MG tablet, Take 20 mg by mouth once daily. Pt stated he thinks her takes 10mg daily, Disp: , Rfl:     hydrocortisone 2.5 % cream, APPLY TO THE AFFECTED AREA(S)  "TWICE DAILY as needed for SKIN IRRITATION FOR 14 DAYS, Disp: , Rfl:     hydrOXYzine pamoate (VISTARIL) 25 MG Cap, Take 1 capsule (25 mg total) by mouth every 4 (four) hours as needed (itching)., Disp: 30 capsule, Rfl: 4    hyoscyamine (LEVSIN/SL) 0.125 mg Subl, Place 0.125 mg under the tongue every 6 (six) hours as needed., Disp: , Rfl:     insulin glargine (LANTUS) 100 unit/mL injection, Inject into the skin. Takes 20units, Disp: , Rfl:     insulin syr/ndl U100 half warner (BD VEO INSULIN SYR, HALF UNIT,) 0.3 mL 31 gauge x 15/64" Syrg, USE 1 syringe DAILY, Disp: , Rfl:     lancets Misc, Use TID to check blood sugar. Dx: E11.9, patient is insulin dependent, Disp: , Rfl:     multivitamin (THERAGRAN) per tablet, Take 1 tablet by mouth once daily., Disp: , Rfl:     ondansetron (ZOFRAN) 8 MG tablet, Take 1 tablet (8 mg total) by mouth every 8 (eight) hours as needed for Nausea., Disp: 30 tablet, Rfl: 2    pantoprazole (PROTONIX) 40 MG tablet, TAKE 1 TABLET BY MOUTH DAILY, Disp: 30 tablet, Rfl: 6    pregabalin (LYRICA) 100 MG capsule, Take 100 mg by mouth., Disp: , Rfl:     prochlorperazine (COMPAZINE) 5 MG tablet, Take 5 mg by mouth every 8 (eight) hours as needed., Disp: , Rfl:     QUEtiapine (SEROQUEL) 50 MG tablet, Take 3 tablets by mouth every evening., Disp: , Rfl:     tamsulosin (FLOMAX) 0.4 mg Cap, TAKE 1 CAPSULE BY MOUTH EVERY EVENING, Disp: 30 capsule, Rfl: 6    tolterodine (DETROL LA) 4 MG 24 hr capsule, Take 4 mg by mouth once daily., Disp: , Rfl:     traMADoL (ULTRAM) 50 mg tablet, Take 50 mg by mouth every 6 (six) hours., Disp: , Rfl:     TRUE METRIX GLUCOSE TEST STRIP Strp, 3 (three) times daily., Disp: , Rfl:     sertraline (ZOLOFT) 50 MG tablet, Take 50 mg by mouth., Disp: , Rfl:     tuberculin,purif.prot.deriv. (TUBERSOL IDRM), Inject 0.1 mLs into the skin., Disp: , Rfl:      Review of Systems   Constitutional:  Positive for fatigue. Negative for appetite change and unexpected weight change.   HENT:  " Negative for mouth sores.    Respiratory:  Negative for cough and shortness of breath.    Cardiovascular:  Negative for chest pain.   Gastrointestinal:  Positive for diarrhea. Negative for abdominal pain.   Genitourinary:  Negative for frequency.   Musculoskeletal:  Negative for back pain.   Integumentary:         S/p left great toe amputation, improved   Neurological:  Negative for headaches.   Hematological:  Negative for adenopathy.   Psychiatric/Behavioral:  The patient is not nervous/anxious.      Vitals:    11/15/23 0915   BP: 127/70   Pulse: 62   Resp: 14   Temp: 97.9 °F (36.6 °C)     Wt Readings from Last 3 Encounters:   11/15/23 0915 102.9 kg (226 lb 12.8 oz)   10/18/23 1050 103.3 kg (227 lb 12.8 oz)   09/26/23 1117 101.7 kg (224 lb 4.8 oz)     Physical Exam  Constitutional:       Appearance: Normal appearance.   HENT:      Head: Normocephalic and atraumatic.      Nose: Nose normal.   Eyes:      General: Lids are normal. Vision grossly intact.      Extraocular Movements: Extraocular movements intact.      Conjunctiva/sclera: Conjunctivae normal.   Cardiovascular:      Rate and Rhythm: Normal rate and regular rhythm.      Heart sounds: Normal heart sounds.   Pulmonary:      Effort: Pulmonary effort is normal.      Breath sounds: Normal breath sounds.   Chest:      Comments: HD access removed to right upper CW  Abdominal:      General: Abdomen is protuberant. Bowel sounds are normal. There is no distension.      Palpations: Abdomen is soft.      Tenderness: There is no abdominal tenderness.   Musculoskeletal:         General: Normal range of motion.      Cervical back: Neck supple.      Right lower leg: No edema.      Left lower leg: No edema.   Feet:      Comments: Left great toe s/p ampuation, wound healed, very small scab, no signs infection  Skin:     General: Skin is warm and dry.   Neurological:      General: No focal deficit present.      Mental Status: He is alert and oriented to person, place, and  time.   Psychiatric:         Attention and Perception: Attention normal.         Mood and Affect: Mood normal.         Speech: Speech normal.         Behavior: Behavior normal. Behavior is cooperative.       No visits with results within 1 Week(s) from this visit.   Latest known visit with results is:   Lab Visit on 10/18/2023   Component Date Value Ref Range Status    Sodium Level 10/18/2023 140  136 - 145 mmol/L Final    Potassium Level 10/18/2023 3.7  3.5 - 5.1 mmol/L Final    Chloride 10/18/2023 101  98 - 107 mmol/L Final    Carbon Dioxide 10/18/2023 29  23 - 31 mmol/L Final    Glucose Level 10/18/2023 97  82 - 115 mg/dL Final    Blood Urea Nitrogen 10/18/2023 18.3  8.4 - 25.7 mg/dL Final    Creatinine 10/18/2023 1.77 (H)  0.73 - 1.18 mg/dL Final    Calcium Level Total 10/18/2023 8.4 (L)  8.8 - 10.0 mg/dL Final    Protein Total 10/18/2023 6.8  5.8 - 7.6 gm/dL Final    Albumin Level 10/18/2023 2.9 (L)  3.4 - 4.8 g/dL Final    Globulin 10/18/2023 3.9 (H)  2.4 - 3.5 gm/dL Final    Albumin/Globulin Ratio 10/18/2023 0.7 (L)  1.1 - 2.0 ratio Final    Bilirubin Total 10/18/2023 0.5  <=1.5 mg/dL Final    Alkaline Phosphatase 10/18/2023 140  40 - 150 unit/L Final    Alanine Aminotransferase 10/18/2023 21  0 - 55 unit/L Final    Aspartate Aminotransferase 10/18/2023 23  5 - 34 unit/L Final    eGFR 10/18/2023 43  mls/min/1.73/m2 Final    WBC 10/18/2023 9.08  4.50 - 11.50 x10(3)/mcL Final    RBC 10/18/2023 5.38  4.70 - 6.10 x10(6)/mcL Final    Hgb 10/18/2023 13.1 (L)  14.0 - 18.0 g/dL Final    Hct 10/18/2023 43.9  42.0 - 52.0 % Final    MCV 10/18/2023 81.6  80.0 - 94.0 fL Final    MCH 10/18/2023 24.3 (L)  27.0 - 31.0 pg Final    MCHC 10/18/2023 29.8 (L)  33.0 - 36.0 g/dL Final    RDW 10/18/2023 20.5 (H)  11.5 - 17.0 % Final    Platelet 10/18/2023 228  130 - 400 x10(3)/mcL Final    MPV 10/18/2023 10.1  7.4 - 10.4 fL Final    Neut % 10/18/2023 68.5  % Final    Lymph % 10/18/2023 21.5  % Final    Mono % 10/18/2023 8.0  % Final     Eos % 10/18/2023 1.3  % Final    Basophil % 10/18/2023 0.3  % Final    Lymph # 10/18/2023 1.95  0.6 - 4.6 x10(3)/mcL Final    Neut # 10/18/2023 6.21  2.1 - 9.2 x10(3)/mcL Final    Mono # 10/18/2023 0.73  0.1 - 1.3 x10(3)/mcL Final    Eos # 10/18/2023 0.12  0 - 0.9 x10(3)/mcL Final    Baso # 10/18/2023 0.03  <=0.2 x10(3)/mcL Final    IG# 10/18/2023 0.04  0 - 0.04 x10(3)/mcL Final    IG% 10/18/2023 0.4  % Final         Assessment:        1. Clear cell carcinoma of right kidney    2. Anemia in stage 3b chronic kidney disease    3. Other iron deficiency anemia    4. Chemotherapy induced nausea and vomiting    5. 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  CT chest showed mediastinal fanny mass and biopsy done via EUS + for metastatic renal cell carcinoma.  Patient s/p radical right nephrectomy done 9/11/20. Pathology showed 9.2cm clear cell renal cell carcinoma, margins clear with 4/6 lymph nodes positive.  Post-operative course complicated by hematoma requiring blood transfusion.  CARIS results- MSI stable, Mismatch repair status proficient. Tumor burden low. Negative for: PD-L1, NTRK1/2/3, BAP1, FH, MET, PBRM1, SDHA. Pathologic variants identified: KDMSC (Exon 13 and 11) and VHL. Immunohistochemistry results: MLH1, MSH2, MSH6, and PM52 positive. PD-L1 negative.     Referred to Dr. Haja Sal for evaluation for IL-2. Explained this is only treatment to be shown to achieve long-term remissions in stage IV disease and patient has limited disease at this time.  Patient started 1st 5 days of IL-2 on 11/2/20.   Started his 2nd round on 11/16/20 and it was stopped on 11/22/20 due to acute renal failure, volume overload and agitation.   CT scan on 12/23/20 at [facility] revealed progression of his disease. Dr. Sal contacted patient and discussed the results. He also reached out to  Dr. Alvarado to notify her that the patient will not be returning.   Started Keytruda 200mg every 3 weeks on 1/8/21 + Inlyta 5mg oral BID on 1/11/21.  CT C/A/P done at [facility] on 10/22/21 showed stable mild reactive mediastinal nodes, otherwise stable.  Patient continued on treatment s/p cycle 18 Keytruda given 1/10/22.  Labs on 1/10/22 showed elevated LFTs. Inlyta held.  CT C/A/P done at [facility] on 1/26/22 showed unfortunately disease progression with new pulmonary nodules, enlargement in mediastinal node, new mesenteric nodule and retroperitoneal adenopathy.  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 Hepatitis panel negative. Keytruda stopped. Started on Prednisone 80mg daily on 1/31/22.  Patient had suicidal ideation and severe depression during visit in 3/2022, seen by psychiatry and meds adjusted, much improved.  CT C/A/P from [facility] on 4/5/22 showed progressive disease.    Treatment change recommended to Cabometyx 60mg daily per NCCN guidelines (Category 1) for subsequent treatment. Started on 4/7/22.  Prednisone weaned and stopped 4/14/22.  Patient was having multiple side effects (n/v, diarrhea, mouth sores) from Cabometyx.  Reduced dose to 40mg on 4/28/22. Held on 5/19/22 due to cellulitis/rashes/abscesses.      Continued to hold s/p TURP on 6/23/22 and restarted 7/6/22.  Patient hospitalized at [facility] 7/16/22 for large bladder hematoma s/p clot evacuation X 2. Cabometyx stopped again.   Repeat CT C/A/P done at [facility] on 8/29/22 showed mixed findings, but mostly disease progression in fanny metastases in mediastinum, which is to be expected since he has been off treatment.   Restarted back on Cabometyx (lower dose 40mg) on 9/1/22 although it has not been studied in dialysis patients, but no dosage adjustment is usually required.  Held on 10/24/22 for Hand-foot syndrome, restarted 11/9/22.   CT done at [facility] on 11/18/22 with disease improvement.  Cabometyx held again  for hand-foot syndrome and wound on left toe 11/28/22.  Restarted Cabometyx on 1/5/23 at 20mg daily.    Cabometyx held on 3/17/23 due to worsening diabetic foot wound.  S/p left great toe amputation on 5/8/23, healed and cleared by podiatry to restart Cabometyx which was restarted on 6/1/23 Cabometyx 20mg daily.  Repeat CT C/A/P done 6/20/23 with only minimal enlargement in mediastinal nodes, good considering he was off treatment for sometime. This will serve as new baseline.   Repeat CT C/A/P 10/11/23 with stable to improved disease, no new disease.     Labs today are both still pending and will follow-up.  Continue Cabometyx. Foot wound is much better and he continues follow-up with Dr. Wright.  RTC 4 weeks for follow-up with repeat labs here same day as visit.   Plan to repeat CT in January 2024.     Looking ahead to future treatment options, would try again with immunotherapy, single agent Opdivo. He never really failed Keytruda, just developed autoimmune hepatitis, so this would need to be monitored closely.     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 Referred for genetic testing which was done on 3/16/23 and negative.    Continue Compazine for nausea.   Continue Norco for joint pain.   Continue Vistaril for chronic pruritis.     All questions answered at this time.      Jackie Boyd, REYES

## 2023-11-15 ENCOUNTER — OFFICE VISIT (OUTPATIENT)
Dept: HEMATOLOGY/ONCOLOGY | Facility: CLINIC | Age: 63
End: 2023-11-15
Payer: MEDICARE

## 2023-11-15 VITALS
BODY MASS INDEX: 32.47 KG/M2 | RESPIRATION RATE: 14 BRPM | HEART RATE: 62 BPM | HEIGHT: 70 IN | OXYGEN SATURATION: 95 % | SYSTOLIC BLOOD PRESSURE: 127 MMHG | TEMPERATURE: 98 F | DIASTOLIC BLOOD PRESSURE: 70 MMHG | WEIGHT: 226.81 LBS

## 2023-11-15 DIAGNOSIS — C64.1 CLEAR CELL CARCINOMA OF RIGHT KIDNEY: Primary | ICD-10-CM

## 2023-11-15 DIAGNOSIS — Z79.899 IMMUNODEFICIENCY DUE TO DRUGS: ICD-10-CM

## 2023-11-15 DIAGNOSIS — R11.2 CHEMOTHERAPY INDUCED NAUSEA AND VOMITING: ICD-10-CM

## 2023-11-15 DIAGNOSIS — D50.8 OTHER IRON DEFICIENCY ANEMIA: ICD-10-CM

## 2023-11-15 DIAGNOSIS — D84.821 IMMUNODEFICIENCY DUE TO DRUGS: ICD-10-CM

## 2023-11-15 DIAGNOSIS — D63.1 ANEMIA IN STAGE 3B CHRONIC KIDNEY DISEASE: ICD-10-CM

## 2023-11-15 DIAGNOSIS — N18.32 STAGE 3B CHRONIC KIDNEY DISEASE: ICD-10-CM

## 2023-11-15 DIAGNOSIS — C77.1 MALIGNANT NEOPLASM METASTATIC TO INTRATHORACIC LYMPH NODE: ICD-10-CM

## 2023-11-15 DIAGNOSIS — N18.32 ANEMIA IN STAGE 3B CHRONIC KIDNEY DISEASE: ICD-10-CM

## 2023-11-15 DIAGNOSIS — T45.1X5A CHEMOTHERAPY INDUCED NAUSEA AND VOMITING: ICD-10-CM

## 2023-11-15 PROCEDURE — 3074F PR MOST RECENT SYSTOLIC BLOOD PRESSURE < 130 MM HG: ICD-10-PCS | Mod: CPTII,S$GLB,, | Performed by: NURSE PRACTITIONER

## 2023-11-15 PROCEDURE — 1159F MED LIST DOCD IN RCRD: CPT | Mod: CPTII,S$GLB,, | Performed by: NURSE PRACTITIONER

## 2023-11-15 PROCEDURE — 3008F PR BODY MASS INDEX (BMI) DOCUMENTED: ICD-10-PCS | Mod: CPTII,S$GLB,, | Performed by: NURSE PRACTITIONER

## 2023-11-15 PROCEDURE — 99999 PR PBB SHADOW E&M-EST. PATIENT-LVL V: ICD-10-PCS | Mod: PBBFAC,,, | Performed by: NURSE PRACTITIONER

## 2023-11-15 PROCEDURE — 99215 OFFICE O/P EST HI 40 MIN: CPT | Mod: S$GLB,,, | Performed by: NURSE PRACTITIONER

## 2023-11-15 PROCEDURE — 3074F SYST BP LT 130 MM HG: CPT | Mod: CPTII,S$GLB,, | Performed by: NURSE PRACTITIONER

## 2023-11-15 PROCEDURE — 99215 PR OFFICE/OUTPT VISIT, EST, LEVL V, 40-54 MIN: ICD-10-PCS | Mod: S$GLB,,, | Performed by: NURSE PRACTITIONER

## 2023-11-15 PROCEDURE — 3044F HG A1C LEVEL LT 7.0%: CPT | Mod: CPTII,S$GLB,, | Performed by: NURSE PRACTITIONER

## 2023-11-15 PROCEDURE — 3078F DIAST BP <80 MM HG: CPT | Mod: CPTII,S$GLB,, | Performed by: NURSE PRACTITIONER

## 2023-11-15 PROCEDURE — 3044F PR MOST RECENT HEMOGLOBIN A1C LEVEL <7.0%: ICD-10-PCS | Mod: CPTII,S$GLB,, | Performed by: NURSE PRACTITIONER

## 2023-11-15 PROCEDURE — 1160F PR REVIEW ALL MEDS BY PRESCRIBER/CLIN PHARMACIST DOCUMENTED: ICD-10-PCS | Mod: CPTII,S$GLB,, | Performed by: NURSE PRACTITIONER

## 2023-11-15 PROCEDURE — 3078F PR MOST RECENT DIASTOLIC BLOOD PRESSURE < 80 MM HG: ICD-10-PCS | Mod: CPTII,S$GLB,, | Performed by: NURSE PRACTITIONER

## 2023-11-15 PROCEDURE — 99999 PR PBB SHADOW E&M-EST. PATIENT-LVL V: CPT | Mod: PBBFAC,,, | Performed by: NURSE PRACTITIONER

## 2023-11-15 PROCEDURE — 3008F BODY MASS INDEX DOCD: CPT | Mod: CPTII,S$GLB,, | Performed by: NURSE PRACTITIONER

## 2023-11-15 PROCEDURE — 1160F RVW MEDS BY RX/DR IN RCRD: CPT | Mod: CPTII,S$GLB,, | Performed by: NURSE PRACTITIONER

## 2023-11-15 PROCEDURE — 1159F PR MEDICATION LIST DOCUMENTED IN MEDICAL RECORD: ICD-10-PCS | Mod: CPTII,S$GLB,, | Performed by: NURSE PRACTITIONER

## 2023-12-08 NOTE — PROGRESS NOTES
Subjective:       Patient ID: Ye Olivera is a 63 y.o. male.    Urologist: Dr. Christopher Villasenor  PCP: Dr. Ly Quevedo  Nephrologist: Dr. Jenae Le    Renal Cell Carcinoma Stage IV([id number]) Mediastinal lymph nodes--Diagnosed 20  Biopsy/pathology:  Upper EUS done 9/3/20--large cluster of malignant appearing lymph nodes in subcarinal mediastinum causing extrinsic compression in middle 1/3 and thoracic esophagus biopsy c/w metastatic carcinoma, c/w renal cell carcinoma CD10+, PAX -8+, erythematous mucosa in antrum biopsy c/w mild chronic gastritis, inactive, normal duodenum, no pathology in CBD or left lobe of liver or pancreas. CARIS results- MSI stable, Mismatch repair status proficient. Tumor burden low. Negative for: PD-L1, NTRK1/2/3, BAP1, FH, MET, PBRM1, SDHA. Pathologic variants identified: KDMSC (Exon 13 and 11) and VHL. Immunohistochemistry results: MLH1, MSH2, MSH6, and PM52 positive. PD-L1 negative.   Surgery/pathology:  Right radical nephrectomy and lymph node dissection done 20--clear cell renal cell carcinoma measures 9.2X8C8.7cm, Grade 3, margins clear, tumor limited to kidney, 4/6 lymph nodes positive for metastatic clear cell carcinoma.   Imagin. CT A/P w/o contrast [facility] 20--bulky right renal mass, midpole, measuring 10cm with evidence of internal necrosis and calcifications, primary mass does not extend outside the perinephric space, associated right perinephric edema, multiple enlarged retroperitoneal lymph nodes at level of the kidneys and inferior retroperitoneum, one measures 2.9X1.4cm, additional node measures 2.1X1.8cm, additional node measures 1.8X1.2cm, inferior retroperitoneal node at level of inferior pole right kidney measures 1.8X1.4cm, no convincing evidence of renal vein involvement, no right adrenal gland involvement, no disease apparent outside of abdomen.  2. CT A/P w/ and w/o contrast [facility] 20--large right renal mass measures 10.6X9.5X7.4cm, with  enlarged retroperitoneal nodes, largest measures 2.7cm.  3. CT chest [facility] 8/28/20--solitary left supraclavicular node, small and not overtly concerning, heterogeneous retroesophageal mass at the level of the tariq c/w metastatic deposit, no other evidence for metastatic disease.  4. MRI brain w/ and w/o contrast [facility] 8/28/20--no evidence for intracranial metastatic disease.  5. CT C/A/P done at [facility] 10/28/20--now appears to be 2 adjacent masses at the level of the tariq, measuring in aggregate 6.2X2.7cm as compared with 5.2X2.1cm, subcentimeter mediastinal and hilar nodes are seen, large intermediate density structure at the right renal fossa extending to pelvic brim, most likely post-operative hematoma or seroma, resolution of previously seen retroperitoneal adenopathy.  6. CT C/A/P w&w/o at [facility] on 12/23/20--Interval enlargement of the retro-esophageal mass with mild interval increase in size of multiple paraesophageal and prevascular lymph nodes compatible with progression of disease. Interval decrease in size of the right retroperitoneal postop seroma or hematoma. No evidence of metastatic disease below the diaphragm. Prior right nephrectomy and adrenalectomy. Chololithiasis and stable atherosclerosis.   7. CT C/A/P w/o contrast done at [facility] on 4/5/21--interval decrease in size of multiple prevascular, subcarinal and para-esophageal lymph nodes c/w response to treatment, no new pulmonary nodule or lymph node, no evidence for metastatic disease below the diaphragm, interval decrease in size of the post-operative seromas adjacent to right psoas muscle.  8. CT C/A/P w/o contrast done at [facility] on 7/7/21--stable appearance of previously identified prevascular, subcarinal, and paraesophageal nodes, there are an increased number of sub-centimeter nodes in the aortopulmonary window and precarinal region, nonspecific in appearance, no other evidence of local recurrence or metastatic disease.   9. MRI cervical spine w/ and w/o  contrast done at [facility] 8/4/21--no metastatic disease, multilevel spondylotic changes, no acute abnormality.   10. MRI brain w/ and w/o contrast 8/4/21--normal maturation of the previously seen lacunar infarct at the left centrum semiovale, no new abnormalities, specifically, no evidence of intracranial metastatic disease.   11. CT C/A/P w/o contrast done at [facility] 10/22/21--stable reactive mediastinal adenopathy, interval resection of right kidney and adrenal gland with post-surgical changes but no evidence of metastatic disease.   12. CT C/A/P w/o contrast done at [facility] 1/26/22--4 new pulmonary nodules larges LLL 14mm, 9mm LLL, 5mm LLL, 5mm RML, with enlarging prevascular lymph node now measures 18mm, previous 8mm, concerning for metastatic disease, new mesenteric implant in LLQ measures 11mm, enlarging retroperitoneal LN now measures [id number], previous 14X8mm concerning for progression disease, cholelithiasis.   13. MRI brain w/ and w/o  contrast done at [facility] 3/31/22--no acute intracranial abnormality.   14. CT C/A/P w/o contrast done at [facility] 4/5/22--progressive retroperitoneal and mediastinal adenopathy, with development of hilar adenopathy, increase in size and number of pulmonary nodules.   15. CT done at [facility] showed large hematoma 14cm in bladder, results not available.  16. CT head w/o contrast done at [facility] 7/31/22--No intracranial hemorrhage or acute traumatic brain parenchymal injury evident by CT.     17. CT C/A/P w/o contrast done at [facility] 8/29/22--mixed response to treatment, worsening mediastinal adenopathy, stable pulmonary nodules, improved retroperitoneal lymph node, enlarging pericaval lymph node.  18. CT C/A/P w/o contrast at [facility] 11/18/22--Interval decreased in mediastinal adenopathy, pulmonary nodules and portacaval nodes, c/w treatment response.   19. CT C/A/P w/ contrast at [facility] 2/8/23--stable mediastinal lymph nodes, enlarging left lower lobe pulmonary nodule, enlarging retroperitoneal lymph nodes.   20. CT  C/A/P w/o contrast at [facility] done 6/20/23--minimally enlarged mediastinal lymph nodes, otherwise no other significant changes, pericaval nodes unchanged.   21. CT C/A/P w/o contrast at [facility] done 10/11/23--improved appearance of the right lung base with decreased ground glass opacity, stable to decreased size of mediastinal and pericaval lymph nodes (index prevascular node decreased 1X1.6cm, pericaval measures 1.6X2cm (previous 2X2.4cm),     Treatment History:   1. IL2 started on 11/2/20. 2nd cycle started 11/16/20 - stopped after 7 days due to acute renal failure, volume overload and confusion. Stopped due to progression.  2. Keytruda/Inlyta 1/8/21--1/31/22 (stopped due to progression, autoimmune hepatitis).  3. Prednisone 80mg daily 1/31/22--weaned and stopped 4/14/22.    Hospitalization:   [facility] 7/2022 14cm hematoma s/p TURP causing hydronephrosis, JOE and patient required initiation of dialysis, UTI, Covid infection.     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     Treatment Plan:  Cabometyx 60mg daily started 4/7/22  Decrease dose to 40mg daily 4/28/22 due to side effects.  HOLD on 5/19/22 until groin rash and abscess heal/also underwent TURP on 6/23/22  Restarted 7/6/22--held 7/16/22 for hospitalization/bladder hematoma  Cabometyx 40mg daily restarted 9/1/22. On hold since 10/24/22 for hand/foot syndrome.   Restarted on 11/9/22, held 11/28/22 for hand-foot syndrome, foot wound.   Dose decreased to 20mg daily. Restarted on 1/5/23. Stopped on 3/17/23 due to diabetic foot wound.   Restarted 6/1/23    Chief Complaint: Peripheral Neuropathy    HPI   Patient presents for follow-up of renal cell carcinoma. He continues on Cabometyx 20mg daily. He is doing well. Has some ongoing peripheral neuropathy for which he is taking Lyrica. He reports that he has a small place leaking from his foot and will see Dr. Sobiesk soon. He is on antibiotics. Diarrhea is controlled.  He says he has no appetite.     Past Medical History:   Diagnosis Date    Arthritis of knee 08/13/2013    Cancer     stage IV renal cell carcinoma    Cataract     Coronary artery disease     Diabetes mellitus, type 2     HTN (hypertension) 08/13/2013    Hx of CABG     Hyperlipidemia     Type II or unspecified type diabetes mellitus without mention of complication, not stated as uncontrolled 08/13/2013      Review of patient's allergies indicates:  No Known Allergies     Current Outpatient Medications:     allopurinoL (ZYLOPRIM) 300 MG tablet, Take 200 mg by mouth once daily., Disp: , Rfl:     amLODIPine (NORVASC) 10 MG tablet, Take 1 tablet (10 mg total) by mouth every morning., Disp: 30 tablet, Rfl: 11    aspirin 81 mg Cap, Take 81 mg by mouth once daily at 6am., Disp: , Rfl:     atorvastatin (LIPITOR) 80 MG tablet, Take 80 mg by mouth once daily., Disp: , Rfl:     azelastine (ASTELIN) 137 mcg (0.1 %) nasal spray, SMARTSIG:Both Nares, Disp: , Rfl:     cabozantinib (CABOMETYX) 20 mg Tab, Take 1 tablet (20 mg) by mouth once daily., Disp: 30 tablet, Rfl: 6    carvediloL (COREG) 12.5 MG tablet, Take 1 tablet by mouth 2 (two) times daily., Disp: , Rfl:     colchicine (COLCRYS) 0.6 mg tablet, Take 2 tablets by mouth once, then take 1 tablet 1 hour later. Max 1.8 mg/day. Then take 1 tablets by mouth daily until 2 days after flare resolves., Disp: , Rfl:     desvenlafaxine succinate (PRISTIQ) 100 MG Tb24, Take 100 mg by mouth., Disp: , Rfl:     diphenoxylate-atropine 2.5-0.025 mg (LOMOTIL) 2.5-0.025 mg per tablet, Take 1 tablet by mouth as needed., Disp: , Rfl:     docusate sodium 100 mg capsule, Take by mouth once daily., Disp: , Rfl:     finasteride (PROSCAR) 5 mg tablet, Take 5 mg by mouth as needed., Disp: , Rfl:     furosemide (LASIX) 20 MG tablet, Take 20 mg by mouth once daily. Pt stated he thinks her takes 10mg daily, Disp: , Rfl:     hydrocortisone 2.5 % cream, APPLY TO THE AFFECTED AREA(S) TWICE DAILY as needed  "for SKIN IRRITATION FOR 14 DAYS, Disp: , Rfl:     hydrOXYzine pamoate (VISTARIL) 25 MG Cap, Take 1 capsule (25 mg total) by mouth every 4 (four) hours as needed (itching)., Disp: 30 capsule, Rfl: 4    hyoscyamine (LEVSIN/SL) 0.125 mg Subl, Place 0.125 mg under the tongue every 6 (six) hours as needed., Disp: , Rfl:     insulin glargine (LANTUS) 100 unit/mL injection, Inject into the skin. Takes 20units, Disp: , Rfl:     insulin syr/ndl U100 half warner (BD VEO INSULIN SYR, HALF UNIT,) 0.3 mL 31 gauge x 15/64" Syrg, USE 1 syringe DAILY, Disp: , Rfl:     lancets Misc, Use TID to check blood sugar. Dx: E11.9, patient is insulin dependent, Disp: , Rfl:     multivitamin (THERAGRAN) per tablet, Take 1 tablet by mouth once daily., Disp: , Rfl:     ondansetron (ZOFRAN) 8 MG tablet, Take 1 tablet (8 mg total) by mouth every 8 (eight) hours as needed for Nausea., Disp: 30 tablet, Rfl: 2    pantoprazole (PROTONIX) 40 MG tablet, TAKE 1 TABLET BY MOUTH DAILY, Disp: 30 tablet, Rfl: 0    pregabalin (LYRICA) 100 MG capsule, Take 100 mg by mouth., Disp: , Rfl:     prochlorperazine (COMPAZINE) 5 MG tablet, Take 5 mg by mouth every 8 (eight) hours as needed., Disp: , Rfl:     QUEtiapine (SEROQUEL) 50 MG tablet, Take 3 tablets by mouth every evening., Disp: , Rfl:     tamsulosin (FLOMAX) 0.4 mg Cap, TAKE 1 CAPSULE BY MOUTH EVERY EVENING, Disp: 30 capsule, Rfl: 6    tolterodine (DETROL LA) 4 MG 24 hr capsule, Take 4 mg by mouth once daily., Disp: , Rfl:     traMADoL (ULTRAM) 50 mg tablet, Take 50 mg by mouth every 6 (six) hours., Disp: , Rfl:     TRUE METRIX GLUCOSE TEST STRIP Strp, 3 (three) times daily., Disp: , Rfl:     tuberculin,purif.prot.deriv. (TUBERSOL IDRM), Inject 0.1 mLs into the skin., Disp: , Rfl:     sertraline (ZOLOFT) 50 MG tablet, Take 50 mg by mouth., Disp: , Rfl:      Review of Systems   Constitutional:  Positive for fatigue. Negative for appetite change and unexpected weight change.   HENT:  Negative for mouth sores.  "   Respiratory:  Negative for cough and shortness of breath.    Cardiovascular:  Negative for chest pain.   Gastrointestinal:  Positive for diarrhea. Negative for abdominal pain.   Genitourinary:  Negative for frequency.   Musculoskeletal:  Negative for back pain.   Integumentary:         S/p left great toe amputation, improved   Neurological:  Positive for numbness. Negative for headaches.   Hematological:  Negative for adenopathy.   Psychiatric/Behavioral:  The patient is not nervous/anxious.      Vitals:    12/14/23 1029   BP: 125/76   Pulse: 80   Resp: 14   Temp: 97.7 °F (36.5 °C)       Wt Readings from Last 3 Encounters:   12/14/23 1029 100.5 kg (221 lb 8 oz)   11/15/23 0915 102.9 kg (226 lb 12.8 oz)   10/18/23 1050 103.3 kg (227 lb 12.8 oz)     Physical Exam  Constitutional:       Appearance: Normal appearance.   HENT:      Head: Normocephalic and atraumatic.      Nose: Nose normal.   Eyes:      General: Lids are normal. Vision grossly intact.      Extraocular Movements: Extraocular movements intact.      Conjunctiva/sclera: Conjunctivae normal.   Cardiovascular:      Rate and Rhythm: Normal rate and regular rhythm.      Heart sounds: Normal heart sounds.   Pulmonary:      Effort: Pulmonary effort is normal.      Breath sounds: Normal breath sounds.   Chest:      Comments: HD access removed to right upper CW  Abdominal:      General: Abdomen is protuberant. Bowel sounds are normal. There is no distension.      Palpations: Abdomen is soft.      Tenderness: There is no abdominal tenderness.   Musculoskeletal:         General: Normal range of motion.      Cervical back: Neck supple.      Right lower leg: No edema.      Left lower leg: No edema.   Feet:      Comments: Feet not examined today  Skin:     General: Skin is warm and dry.   Neurological:      General: No focal deficit present.      Mental Status: He is alert and oriented to person, place, and time.   Psychiatric:         Attention and Perception:  Attention normal.         Mood and Affect: Mood normal.         Speech: Speech normal.         Behavior: Behavior normal. Behavior is cooperative.       Lab Visit on 12/14/2023   Component Date Value Ref Range Status    WBC 12/14/2023 8.88  4.50 - 11.50 x10(3)/mcL Final    RBC 12/14/2023 5.44  4.70 - 6.10 x10(6)/mcL Final    Hgb 12/14/2023 13.7 (L)  14.0 - 18.0 g/dL Final    Hct 12/14/2023 44.0  42.0 - 52.0 % Final    MCV 12/14/2023 80.9  80.0 - 94.0 fL Final    MCH 12/14/2023 25.2 (L)  27.0 - 31.0 pg Final    MCHC 12/14/2023 31.1 (L)  33.0 - 36.0 g/dL Final    RDW 12/14/2023 18.5 (H)  11.5 - 17.0 % Final    Platelet 12/14/2023 277  130 - 400 x10(3)/mcL Final    MPV 12/14/2023 9.8  7.4 - 10.4 fL Final    Neut % 12/14/2023 69.6  % Final    Lymph % 12/14/2023 19.4  % Final    Mono % 12/14/2023 8.2  % Final    Eos % 12/14/2023 2.3  % Final    Basophil % 12/14/2023 0.3  % Final    Lymph # 12/14/2023 1.72  0.6 - 4.6 x10(3)/mcL Final    Neut # 12/14/2023 6.18  2.1 - 9.2 x10(3)/mcL Final    Mono # 12/14/2023 0.73  0.1 - 1.3 x10(3)/mcL Final    Eos # 12/14/2023 0.20  0 - 0.9 x10(3)/mcL Final    Baso # 12/14/2023 0.03  <=0.2 x10(3)/mcL Final    IG# 12/14/2023 0.02  0 - 0.04 x10(3)/mcL Final    IG% 12/14/2023 0.2  % Final     Assessment:        1. Clear cell carcinoma of right kidney      Plan:         Patient with large right renal mass and retroperitoneal adenopathy diagnosed by CT in 7/2020.  CT chest showed mediastinal fanny mass and biopsy done via EUS + for metastatic renal cell carcinoma.  Patient s/p radical right nephrectomy done 9/11/20. Pathology showed 9.2cm clear cell renal cell carcinoma, margins clear with 4/6 lymph nodes positive.  Post-operative course complicated by hematoma requiring blood transfusion.  CARIS results- MSI stable, Mismatch repair status proficient. Tumor burden low. Negative for: PD-L1, NTRK1/2/3, BAP1, FH, MET, PBRM1, SDHA. Pathologic variants identified: KDMSC (Exon 13 and 11) and VHL.  Immunohistochemistry results: MLH1, MSH2, MSH6, and PM52 positive. PD-L1 negative.     Referred to Dr. Haja Sal for evaluation for IL-2. Explained this is only treatment to be shown to achieve long-term remissions in stage IV disease and patient has limited disease at this time.  Patient started 1st 5 days of IL-2 on 11/2/20.   Started his 2nd round on 11/16/20 and it was stopped on 11/22/20 due to acute renal failure, volume overload and agitation.   CT scan on 12/23/20 at [facility] revealed progression of his disease. Dr. Sal contacted patient and discussed the results. He also reached out to Dr. Alvarado to notify her that the patient will not be returning.   Started Keytruda 200mg every 3 weeks on 1/8/21 + Inlyta 5mg oral BID on 1/11/21.  CT C/A/P done at [facility] on 10/22/21 showed stable mild reactive mediastinal nodes, otherwise stable.  Patient continued on treatment s/p cycle 18 Keytruda given 1/10/22.  Labs on 1/10/22 showed elevated LFTs. Inlyta held.  CT C/A/P done at [facility] on 1/26/22 showed unfortunately disease progression with new pulmonary nodules, enlargement in mediastinal node, new mesenteric nodule and retroperitoneal adenopathy.  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 Hepatitis panel negative. Keytruda stopped. Started on Prednisone 80mg daily on 1/31/22.  Patient had suicidal ideation and severe depression during visit in 3/2022, seen by psychiatry and meds adjusted, much improved.  CT C/A/P from [facility] on 4/5/22 showed progressive disease.    Treatment change recommended to Cabometyx 60mg daily per NCCN guidelines (Category 1) for subsequent treatment. Started on 4/7/22.  Prednisone weaned and stopped 4/14/22.  Patient was having multiple side effects (n/v, diarrhea, mouth sores) from Cabometyx.  Reduced dose to 40mg on 4/28/22. Held on 5/19/22 due to cellulitis/rashes/abscesses.      Continued to hold s/p TURP on 6/23/22 and restarted 7/6/22.  Patient  hospitalized at [facility] 7/16/22 for large bladder hematoma s/p clot evacuation X 2. Cabometyx stopped again.   Repeat CT C/A/P done at [facility] on 8/29/22 showed mixed findings, but mostly disease progression in fanny metastases in mediastinum, which is to be expected since he has been off treatment.   Restarted back on Cabometyx (lower dose 40mg) on 9/1/22 although it has not been studied in dialysis patients, but no dosage adjustment is usually required.  Held on 10/24/22 for Hand-foot syndrome, restarted 11/9/22.   CT done at [facility] on 11/18/22 with disease improvement.  Cabometyx held again for hand-foot syndrome and wound on left toe 11/28/22.  Restarted Cabometyx on 1/5/23 at 20mg daily.    Cabometyx held on 3/17/23 due to worsening diabetic foot wound.  S/p left great toe amputation on 5/8/23, healed and cleared by podiatry to restart Cabometyx which was restarted on 6/1/23 Cabometyx 20mg daily.  Repeat CT C/A/P done 6/20/23 with only minimal enlargement in mediastinal nodes, good considering he was off treatment for sometime. This will serve as new baseline.     Repeat CT C/A/P 10/11/23 with stable to improved disease, no new disease.     Labs today with CBC mild anemia, CMP pending.   Continue Cabometyx. Patient to watch his feet closely, as he is prone to getting nonhealing wounds.     RTC 4 weeks for follow-up with repeat labs here same day as visit.   Repeat CT C/A/P w/o contrast in 4 weeks before RTC.     Looking ahead to future treatment options, would try again with immunotherapy, single agent Opdivo. He never really failed Keytruda, just developed autoimmune hepatitis, so this would need to be monitored closely.     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 Referred for genetic testing which was done on 3/16/23 and negative.    Continue Compazine for nausea.   Continue Norco for joint pain.   Continue Vistaril for chronic pruritis.     All questions answered at this  time.      Elizabeth Alvarado MD

## 2023-12-12 DIAGNOSIS — N18.4 CHRONIC KIDNEY DISEASE, STAGE IV (SEVERE): ICD-10-CM

## 2023-12-12 DIAGNOSIS — N18.4 ANEMIA IN STAGE 4 CHRONIC KIDNEY DISEASE: ICD-10-CM

## 2023-12-12 DIAGNOSIS — D63.1 ANEMIA IN STAGE 4 CHRONIC KIDNEY DISEASE: ICD-10-CM

## 2023-12-12 DIAGNOSIS — C64.1 CLEAR CELL CARCINOMA OF RIGHT KIDNEY: ICD-10-CM

## 2023-12-12 RX ORDER — PANTOPRAZOLE SODIUM 40 MG/1
TABLET, DELAYED RELEASE ORAL
Qty: 30 TABLET | Refills: 0 | Status: SHIPPED | OUTPATIENT
Start: 2023-12-12 | End: 2024-01-09

## 2023-12-14 ENCOUNTER — OFFICE VISIT (OUTPATIENT)
Dept: HEMATOLOGY/ONCOLOGY | Facility: CLINIC | Age: 63
End: 2023-12-14
Payer: MEDICARE

## 2023-12-14 VITALS
WEIGHT: 221.5 LBS | HEIGHT: 70 IN | BODY MASS INDEX: 31.71 KG/M2 | SYSTOLIC BLOOD PRESSURE: 125 MMHG | TEMPERATURE: 98 F | HEART RATE: 80 BPM | DIASTOLIC BLOOD PRESSURE: 76 MMHG | RESPIRATION RATE: 14 BRPM | OXYGEN SATURATION: 95 %

## 2023-12-14 DIAGNOSIS — C64.1 CLEAR CELL CARCINOMA OF RIGHT KIDNEY: Primary | ICD-10-CM

## 2023-12-14 DIAGNOSIS — E03.2 HYPOTHYROIDISM DUE TO DRUGS: ICD-10-CM

## 2023-12-14 PROCEDURE — 3008F BODY MASS INDEX DOCD: CPT | Mod: CPTII,S$GLB,, | Performed by: INTERNAL MEDICINE

## 2023-12-14 PROCEDURE — 3078F DIAST BP <80 MM HG: CPT | Mod: CPTII,S$GLB,, | Performed by: INTERNAL MEDICINE

## 2023-12-14 PROCEDURE — 3008F PR BODY MASS INDEX (BMI) DOCUMENTED: ICD-10-PCS | Mod: CPTII,S$GLB,, | Performed by: INTERNAL MEDICINE

## 2023-12-14 PROCEDURE — 3074F PR MOST RECENT SYSTOLIC BLOOD PRESSURE < 130 MM HG: ICD-10-PCS | Mod: CPTII,S$GLB,, | Performed by: INTERNAL MEDICINE

## 2023-12-14 PROCEDURE — 3044F PR MOST RECENT HEMOGLOBIN A1C LEVEL <7.0%: ICD-10-PCS | Mod: CPTII,S$GLB,, | Performed by: INTERNAL MEDICINE

## 2023-12-14 PROCEDURE — 3074F SYST BP LT 130 MM HG: CPT | Mod: CPTII,S$GLB,, | Performed by: INTERNAL MEDICINE

## 2023-12-14 PROCEDURE — 1159F MED LIST DOCD IN RCRD: CPT | Mod: CPTII,S$GLB,, | Performed by: INTERNAL MEDICINE

## 2023-12-14 PROCEDURE — 99999 PR PBB SHADOW E&M-EST. PATIENT-LVL V: ICD-10-PCS | Mod: PBBFAC,,, | Performed by: INTERNAL MEDICINE

## 2023-12-14 PROCEDURE — 3078F PR MOST RECENT DIASTOLIC BLOOD PRESSURE < 80 MM HG: ICD-10-PCS | Mod: CPTII,S$GLB,, | Performed by: INTERNAL MEDICINE

## 2023-12-14 PROCEDURE — 1160F PR REVIEW ALL MEDS BY PRESCRIBER/CLIN PHARMACIST DOCUMENTED: ICD-10-PCS | Mod: CPTII,S$GLB,, | Performed by: INTERNAL MEDICINE

## 2023-12-14 PROCEDURE — 99999 PR PBB SHADOW E&M-EST. PATIENT-LVL V: CPT | Mod: PBBFAC,,, | Performed by: INTERNAL MEDICINE

## 2023-12-14 PROCEDURE — 99215 OFFICE O/P EST HI 40 MIN: CPT | Mod: S$GLB,,, | Performed by: INTERNAL MEDICINE

## 2023-12-14 PROCEDURE — 1160F RVW MEDS BY RX/DR IN RCRD: CPT | Mod: CPTII,S$GLB,, | Performed by: INTERNAL MEDICINE

## 2023-12-14 PROCEDURE — 3044F HG A1C LEVEL LT 7.0%: CPT | Mod: CPTII,S$GLB,, | Performed by: INTERNAL MEDICINE

## 2023-12-14 PROCEDURE — 1159F PR MEDICATION LIST DOCUMENTED IN MEDICAL RECORD: ICD-10-PCS | Mod: CPTII,S$GLB,, | Performed by: INTERNAL MEDICINE

## 2023-12-14 PROCEDURE — 99215 PR OFFICE/OUTPT VISIT, EST, LEVL V, 40-54 MIN: ICD-10-PCS | Mod: S$GLB,,, | Performed by: INTERNAL MEDICINE

## 2024-01-02 DIAGNOSIS — D63.1 ANEMIA IN STAGE 4 CHRONIC KIDNEY DISEASE: ICD-10-CM

## 2024-01-02 DIAGNOSIS — N18.4 ANEMIA IN STAGE 4 CHRONIC KIDNEY DISEASE: ICD-10-CM

## 2024-01-02 DIAGNOSIS — C64.1 CLEAR CELL CARCINOMA OF RIGHT KIDNEY: ICD-10-CM

## 2024-01-02 DIAGNOSIS — N18.4 CHRONIC KIDNEY DISEASE, STAGE IV (SEVERE): ICD-10-CM

## 2024-01-02 RX ORDER — TAMSULOSIN HYDROCHLORIDE 0.4 MG/1
CAPSULE ORAL
Qty: 30 CAPSULE | Refills: 6 | Status: SHIPPED | OUTPATIENT
Start: 2024-01-02

## 2024-01-09 DIAGNOSIS — N18.4 ANEMIA IN STAGE 4 CHRONIC KIDNEY DISEASE: ICD-10-CM

## 2024-01-09 DIAGNOSIS — C64.1 CLEAR CELL CARCINOMA OF RIGHT KIDNEY: ICD-10-CM

## 2024-01-09 DIAGNOSIS — D63.1 ANEMIA IN STAGE 4 CHRONIC KIDNEY DISEASE: ICD-10-CM

## 2024-01-09 DIAGNOSIS — N18.4 CHRONIC KIDNEY DISEASE, STAGE IV (SEVERE): ICD-10-CM

## 2024-01-09 RX ORDER — PANTOPRAZOLE SODIUM 40 MG/1
40 TABLET, DELAYED RELEASE ORAL
Qty: 30 TABLET | Refills: 0 | Status: SHIPPED | OUTPATIENT
Start: 2024-01-09 | End: 2024-02-05

## 2024-01-12 ENCOUNTER — OFFICE VISIT (OUTPATIENT)
Dept: HEMATOLOGY/ONCOLOGY | Facility: CLINIC | Age: 64
End: 2024-01-12
Payer: MEDICARE

## 2024-01-12 ENCOUNTER — LAB VISIT (OUTPATIENT)
Dept: LAB | Facility: HOSPITAL | Age: 64
End: 2024-01-12
Attending: INTERNAL MEDICINE
Payer: MEDICARE

## 2024-01-12 VITALS
TEMPERATURE: 98 F | HEIGHT: 70 IN | OXYGEN SATURATION: 95 % | RESPIRATION RATE: 14 BRPM | BODY MASS INDEX: 31.11 KG/M2 | DIASTOLIC BLOOD PRESSURE: 71 MMHG | WEIGHT: 217.31 LBS | SYSTOLIC BLOOD PRESSURE: 118 MMHG | HEART RATE: 68 BPM

## 2024-01-12 DIAGNOSIS — C77.1 MALIGNANT NEOPLASM METASTATIC TO INTRATHORACIC LYMPH NODE: ICD-10-CM

## 2024-01-12 DIAGNOSIS — D84.821 IMMUNODEFICIENCY DUE TO DRUGS: ICD-10-CM

## 2024-01-12 DIAGNOSIS — N18.32 ANEMIA IN STAGE 3B CHRONIC KIDNEY DISEASE: ICD-10-CM

## 2024-01-12 DIAGNOSIS — N18.4 STAGE 4 CHRONIC KIDNEY DISEASE: ICD-10-CM

## 2024-01-12 DIAGNOSIS — I12.9 MALIGNANT HYPERTENSIVE RENAL DISEASE, STAGE 1 THROUGH STAGE 4 OR UNSPECIFIED CHRONIC KIDNEY DISEASE: Primary | ICD-10-CM

## 2024-01-12 DIAGNOSIS — E11.621 DIABETIC ULCER OF LEFT GREAT TOE: ICD-10-CM

## 2024-01-12 DIAGNOSIS — C64.1 CLEAR CELL CARCINOMA OF RIGHT KIDNEY: ICD-10-CM

## 2024-01-12 DIAGNOSIS — L97.529 DIABETIC ULCER OF LEFT GREAT TOE: ICD-10-CM

## 2024-01-12 DIAGNOSIS — Z79.899 IMMUNODEFICIENCY DUE TO DRUGS: ICD-10-CM

## 2024-01-12 DIAGNOSIS — Z89.412 STATUS POST AMPUTATION OF LEFT GREAT TOE: ICD-10-CM

## 2024-01-12 DIAGNOSIS — E03.2 HYPOTHYROIDISM DUE TO DRUGS: ICD-10-CM

## 2024-01-12 DIAGNOSIS — D63.1 ANEMIA IN STAGE 3B CHRONIC KIDNEY DISEASE: ICD-10-CM

## 2024-01-12 DIAGNOSIS — D50.8 OTHER IRON DEFICIENCY ANEMIA: ICD-10-CM

## 2024-01-12 LAB
ALBUMIN SERPL-MCNC: 2.7 G/DL (ref 3.4–4.8)
ALBUMIN/GLOB SERPL: 0.7 RATIO (ref 1.1–2)
ALP SERPL-CCNC: 140 UNIT/L (ref 40–150)
ALT SERPL-CCNC: 22 UNIT/L (ref 0–55)
AST SERPL-CCNC: 25 UNIT/L (ref 5–34)
BASOPHILS # BLD AUTO: 0.02 X10(3)/MCL
BASOPHILS NFR BLD AUTO: 0.2 %
BILIRUB SERPL-MCNC: 0.4 MG/DL
BUN SERPL-MCNC: 21.5 MG/DL (ref 8.4–25.7)
CALCIUM SERPL-MCNC: 8 MG/DL (ref 8.8–10)
CHLORIDE SERPL-SCNC: 105 MMOL/L (ref 98–107)
CO2 SERPL-SCNC: 24 MMOL/L (ref 23–31)
CREAT SERPL-MCNC: 1.64 MG/DL (ref 0.73–1.18)
EOSINOPHIL # BLD AUTO: 0.26 X10(3)/MCL (ref 0–0.9)
EOSINOPHIL NFR BLD AUTO: 2.6 %
ERYTHROCYTE [DISTWIDTH] IN BLOOD BY AUTOMATED COUNT: 18.6 % (ref 11.5–17)
GFR SERPLBLD CREATININE-BSD FMLA CKD-EPI: 47 MLS/MIN/1.73/M2
GLOBULIN SER-MCNC: 4.1 GM/DL (ref 2.4–3.5)
GLUCOSE SERPL-MCNC: 102 MG/DL (ref 82–115)
HCT VFR BLD AUTO: 41.3 % (ref 42–52)
HGB BLD-MCNC: 12.7 G/DL (ref 14–18)
IMM GRANULOCYTES # BLD AUTO: 0.03 X10(3)/MCL (ref 0–0.04)
IMM GRANULOCYTES NFR BLD AUTO: 0.3 %
LYMPHOCYTES # BLD AUTO: 2.02 X10(3)/MCL (ref 0.6–4.6)
LYMPHOCYTES NFR BLD AUTO: 20.1 %
MCH RBC QN AUTO: 24.9 PG (ref 27–31)
MCHC RBC AUTO-ENTMCNC: 30.8 G/DL (ref 33–36)
MCV RBC AUTO: 81 FL (ref 80–94)
MONOCYTES # BLD AUTO: 0.84 X10(3)/MCL (ref 0.1–1.3)
MONOCYTES NFR BLD AUTO: 8.4 %
NEUTROPHILS # BLD AUTO: 6.87 X10(3)/MCL (ref 2.1–9.2)
NEUTROPHILS NFR BLD AUTO: 68.4 %
PLATELET # BLD AUTO: 248 X10(3)/MCL (ref 130–400)
PMV BLD AUTO: 9.6 FL (ref 7.4–10.4)
POTASSIUM SERPL-SCNC: 3.6 MMOL/L (ref 3.5–5.1)
PROT SERPL-MCNC: 6.8 GM/DL (ref 5.8–7.6)
RBC # BLD AUTO: 5.1 X10(6)/MCL (ref 4.7–6.1)
SODIUM SERPL-SCNC: 142 MMOL/L (ref 136–145)
TSH SERPL-ACNC: 2.94 UIU/ML (ref 0.35–4.94)
WBC # SPEC AUTO: 10.04 X10(3)/MCL (ref 4.5–11.5)

## 2024-01-12 PROCEDURE — 80053 COMPREHEN METABOLIC PANEL: CPT

## 2024-01-12 PROCEDURE — 3074F SYST BP LT 130 MM HG: CPT | Mod: CPTII,S$GLB,, | Performed by: NURSE PRACTITIONER

## 2024-01-12 PROCEDURE — 1159F MED LIST DOCD IN RCRD: CPT | Mod: CPTII,S$GLB,, | Performed by: NURSE PRACTITIONER

## 2024-01-12 PROCEDURE — 3078F DIAST BP <80 MM HG: CPT | Mod: CPTII,S$GLB,, | Performed by: NURSE PRACTITIONER

## 2024-01-12 PROCEDURE — 36415 COLL VENOUS BLD VENIPUNCTURE: CPT

## 2024-01-12 PROCEDURE — 84443 ASSAY THYROID STIM HORMONE: CPT

## 2024-01-12 PROCEDURE — 3008F BODY MASS INDEX DOCD: CPT | Mod: CPTII,S$GLB,, | Performed by: NURSE PRACTITIONER

## 2024-01-12 PROCEDURE — 85025 COMPLETE CBC W/AUTO DIFF WBC: CPT

## 2024-01-12 PROCEDURE — 99999 PR PBB SHADOW E&M-EST. PATIENT-LVL V: CPT | Mod: PBBFAC,,, | Performed by: NURSE PRACTITIONER

## 2024-01-12 PROCEDURE — 99215 OFFICE O/P EST HI 40 MIN: CPT | Mod: S$GLB,,, | Performed by: NURSE PRACTITIONER

## 2024-01-12 PROCEDURE — 1160F RVW MEDS BY RX/DR IN RCRD: CPT | Mod: CPTII,S$GLB,, | Performed by: NURSE PRACTITIONER

## 2024-01-12 NOTE — PROGRESS NOTES
Subjective:       Patient ID: Ye Olivera is a 63 y.o. male.    Urologist: Dr. Christopher Villasenor  PCP: Dr. Ly Quevedo  Nephrologist: Dr. Jenae Le    Renal Cell Carcinoma Stage IV([id number]) Mediastinal lymph nodes--Diagnosed 20  Biopsy/pathology:  Upper EUS done 9/3/20--large cluster of malignant appearing lymph nodes in subcarinal mediastinum causing extrinsic compression in middle 1/3 and thoracic esophagus biopsy c/w metastatic carcinoma, c/w renal cell carcinoma CD10+, PAX -8+, erythematous mucosa in antrum biopsy c/w mild chronic gastritis, inactive, normal duodenum, no pathology in CBD or left lobe of liver or pancreas. CARIS results- MSI stable, Mismatch repair status proficient. Tumor burden low. Negative for: PD-L1, NTRK1/2/3, BAP1, FH, MET, PBRM1, SDHA. Pathologic variants identified: KDMSC (Exon 13 and 11) and VHL. Immunohistochemistry results: MLH1, MSH2, MSH6, and PM52 positive. PD-L1 negative.   Surgery/pathology:  Right radical nephrectomy and lymph node dissection done 20--clear cell renal cell carcinoma measures 9.2X8C8.7cm, Grade 3, margins clear, tumor limited to kidney, 4/6 lymph nodes positive for metastatic clear cell carcinoma.   Imagin. CT A/P w/o contrast [facility] 20--bulky right renal mass, midpole, measuring 10cm with evidence of internal necrosis and calcifications, primary mass does not extend outside the perinephric space, associated right perinephric edema, multiple enlarged retroperitoneal lymph nodes at level of the kidneys and inferior retroperitoneum, one measures 2.9X1.4cm, additional node measures 2.1X1.8cm, additional node measures 1.8X1.2cm, inferior retroperitoneal node at level of inferior pole right kidney measures 1.8X1.4cm, no convincing evidence of renal vein involvement, no right adrenal gland involvement, no disease apparent outside of abdomen.  2. CT A/P w/ and w/o contrast [facility] 20--large right renal mass measures 10.6X9.5X7.4cm, with  enlarged retroperitoneal nodes, largest measures 2.7cm.  3. CT chest [facility] 8/28/20--solitary left supraclavicular node, small and not overtly concerning, heterogeneous retroesophageal mass at the level of the tariq c/w metastatic deposit, no other evidence for metastatic disease.  4. MRI brain w/ and w/o contrast [facility] 8/28/20--no evidence for intracranial metastatic disease.  5. CT C/A/P done at [facility] 10/28/20--now appears to be 2 adjacent masses at the level of the tariq, measuring in aggregate 6.2X2.7cm as compared with 5.2X2.1cm, subcentimeter mediastinal and hilar nodes are seen, large intermediate density structure at the right renal fossa extending to pelvic brim, most likely post-operative hematoma or seroma, resolution of previously seen retroperitoneal adenopathy.  6. CT C/A/P w&w/o at [facility] on 12/23/20--Interval enlargement of the retro-esophageal mass with mild interval increase in size of multiple paraesophageal and prevascular lymph nodes compatible with progression of disease. Interval decrease in size of the right retroperitoneal postop seroma or hematoma. No evidence of metastatic disease below the diaphragm. Prior right nephrectomy and adrenalectomy. Chololithiasis and stable atherosclerosis.   7. CT C/A/P w/o contrast done at [facility] on 4/5/21--interval decrease in size of multiple prevascular, subcarinal and para-esophageal lymph nodes c/w response to treatment, no new pulmonary nodule or lymph node, no evidence for metastatic disease below the diaphragm, interval decrease in size of the post-operative seromas adjacent to right psoas muscle.  8. CT C/A/P w/o contrast done at [facility] on 7/7/21--stable appearance of previously identified prevascular, subcarinal, and paraesophageal nodes, there are an increased number of sub-centimeter nodes in the aortopulmonary window and precarinal region, nonspecific in appearance, no other evidence of local recurrence or metastatic disease.   9. MRI cervical spine w/ and w/o  contrast done at [facility] 8/4/21--no metastatic disease, multilevel spondylotic changes, no acute abnormality.   10. MRI brain w/ and w/o contrast 8/4/21--normal maturation of the previously seen lacunar infarct at the left centrum semiovale, no new abnormalities, specifically, no evidence of intracranial metastatic disease.   11. CT C/A/P w/o contrast done at [facility] 10/22/21--stable reactive mediastinal adenopathy, interval resection of right kidney and adrenal gland with post-surgical changes but no evidence of metastatic disease.   12. CT C/A/P w/o contrast done at [facility] 1/26/22--4 new pulmonary nodules larges LLL 14mm, 9mm LLL, 5mm LLL, 5mm RML, with enlarging prevascular lymph node now measures 18mm, previous 8mm, concerning for metastatic disease, new mesenteric implant in LLQ measures 11mm, enlarging retroperitoneal LN now measures 21O23ot, previous 14X8mm concerning for progression disease, cholelithiasis.   13. MRI brain w/ and w/o  contrast done at [facility] 3/31/22--no acute intracranial abnormality.   14. CT C/A/P w/o contrast done at [facility] 4/5/22--progressive retroperitoneal and mediastinal adenopathy, with development of hilar adenopathy, increase in size and number of pulmonary nodules.   15. CT done at [facility] showed large hematoma 14cm in bladder, results not available.  16. CT head w/o contrast done at [facility] 7/31/22--No intracranial hemorrhage or acute traumatic brain parenchymal injury evident by CT.     17. CT C/A/P w/o contrast done at [facility] 8/29/22--mixed response to treatment, worsening mediastinal adenopathy, stable pulmonary nodules, improved retroperitoneal lymph node, enlarging pericaval lymph node.  18. CT C/A/P w/o contrast at [facility] 11/18/22--Interval decreased in mediastinal adenopathy, pulmonary nodules and portacaval nodes, c/w treatment response.   19. CT C/A/P w/ contrast at [facility] 2/8/23--stable mediastinal lymph nodes, enlarging left lower lobe pulmonary nodule, enlarging retroperitoneal lymph nodes.   20. CT  C/A/P w/o contrast at [facility] done 6/20/23--minimally enlarged mediastinal lymph nodes, otherwise no other significant changes, pericaval nodes unchanged.   21. CT C/A/P w/o contrast at [facility] done 10/11/23--improved appearance of the right lung base with decreased ground glass opacity, stable to decreased size of mediastinal and pericaval lymph nodes (index prevascular node decreased 1X1.6cm, pericaval measures 1.6X2cm (previous 2X2.4cm),   22. CT C/A/P w/o at [facility] on 1/10/24--Small left basilar pulmonary nodule stable. No additional pulmonary nodules. Millimetric size increase of right peritracheal and aorticopulmonary lymph nodes as well as nodes at the azygos esophageal recess. Intraluminal gallbladder sludge and possible tiny stones. Postsurgical changes of the right nephrectomy. Cortical scarring or postsurgical changes of the upper pole of the left kidney.     Treatment History:   1. IL2 started on 11/2/20. 2nd cycle started 11/16/20 - stopped after 7 days due to acute renal failure, volume overload and confusion. Stopped due to progression.  2. Keytruda/Inlyta 1/8/21--1/31/22 (stopped due to progression, autoimmune hepatitis).  3. Prednisone 80mg daily 1/31/22--weaned and stopped 4/14/22.    Hospitalization:   [facility] 7/2022 14cm hematoma s/p TURP causing hydronephrosis, JOE and patient required initiation of dialysis, UTI, Covid infection.     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     Treatment Plan:  Cabometyx 60mg daily started 4/7/22  Decrease dose to 40mg daily 4/28/22 due to side effects.  HOLD on 5/19/22 until groin rash and abscess heal/also underwent TURP on 6/23/22  Restarted 7/6/22--held 7/16/22 for hospitalization/bladder hematoma  Cabometyx 40mg daily restarted 9/1/22. On hold since 10/24/22 for hand/foot syndrome.   Restarted on 11/9/22, held 11/28/22 for hand-foot syndrome, foot wound.   Dose decreased to 20mg daily. Restarted on 1/5/23.  Stopped on 3/17/23 due to diabetic foot wound.   Restarted 6/1/23    Chief Complaint: Diarrhea    HPI   Patient presents for follow-up of renal cell carcinoma. He continues on Cabometyx 20mg daily. He is doing well. He still has on/off diarrhea which is controlled with medications. Recent outside CT scan revealed a millimetric size increase in the right peritracheal, aorticopulmonary and azygos esophageal recess. Dr. Alvarado recommends continuing on the Cabometyx for now and rescanning again in 2 months. Has some ongoing peripheral neuropathy for which he is taking Lyrica. He decreased appetite and he has lost 10 lbs over 3 months. No other complaints reported.     Past Medical History:   Diagnosis Date    Arthritis of knee 08/13/2013    Cancer     stage IV renal cell carcinoma    Cataract     Coronary artery disease     Diabetes mellitus, type 2     HTN (hypertension) 08/13/2013    Hx of CABG     Hyperlipidemia     Type II or unspecified type diabetes mellitus without mention of complication, not stated as uncontrolled 08/13/2013      Review of patient's allergies indicates:  No Known Allergies     Current Outpatient Medications:     allopurinoL (ZYLOPRIM) 300 MG tablet, Take 200 mg by mouth once daily., Disp: , Rfl:     amLODIPine (NORVASC) 10 MG tablet, Take 1 tablet (10 mg total) by mouth every morning., Disp: 30 tablet, Rfl: 11    aspirin 81 mg Cap, Take 81 mg by mouth once daily at 6am., Disp: , Rfl:     atorvastatin (LIPITOR) 80 MG tablet, Take 80 mg by mouth once daily., Disp: , Rfl:     azelastine (ASTELIN) 137 mcg (0.1 %) nasal spray, SMARTSIG:Both Nares, Disp: , Rfl:     cabozantinib (CABOMETYX) 20 mg Tab, Take 1 tablet (20 mg) by mouth once daily., Disp: 30 tablet, Rfl: 6    carvediloL (COREG) 12.5 MG tablet, Take 1 tablet by mouth 2 (two) times daily., Disp: , Rfl:     colchicine (COLCRYS) 0.6 mg tablet, Take 2 tablets by mouth once, then take 1 tablet 1 hour later. Max 1.8 mg/day. Then take 1 tablets by  "mouth daily until 2 days after flare resolves., Disp: , Rfl:     desvenlafaxine succinate (PRISTIQ) 100 MG Tb24, Take 100 mg by mouth., Disp: , Rfl:     diphenoxylate-atropine 2.5-0.025 mg (LOMOTIL) 2.5-0.025 mg per tablet, Take 1 tablet by mouth as needed., Disp: , Rfl:     finasteride (PROSCAR) 5 mg tablet, Take 5 mg by mouth as needed., Disp: , Rfl:     furosemide (LASIX) 20 MG tablet, Take 20 mg by mouth once daily. Pt stated he thinks her takes 10mg daily, Disp: , Rfl:     hydrOXYzine pamoate (VISTARIL) 25 MG Cap, Take 1 capsule (25 mg total) by mouth every 4 (four) hours as needed (itching)., Disp: 30 capsule, Rfl: 4    hyoscyamine (LEVSIN/SL) 0.125 mg Subl, Place 0.125 mg under the tongue every 6 (six) hours as needed., Disp: , Rfl:     insulin glargine (LANTUS) 100 unit/mL injection, Inject into the skin. Takes 20units, Disp: , Rfl:     insulin syr/ndl U100 half warner (BD VEO INSULIN SYR, HALF UNIT,) 0.3 mL 31 gauge x 15/64" Syrg, USE 1 syringe DAILY, Disp: , Rfl:     lancets Misc, Use TID to check blood sugar. Dx: E11.9, patient is insulin dependent, Disp: , Rfl:     multivitamin (THERAGRAN) per tablet, Take 1 tablet by mouth once daily., Disp: , Rfl:     ondansetron (ZOFRAN) 8 MG tablet, Take 1 tablet (8 mg total) by mouth every 8 (eight) hours as needed for Nausea., Disp: 30 tablet, Rfl: 2    pantoprazole (PROTONIX) 40 MG tablet, TAKE 1 TABLET BY MOUTH EVERY DAY, Disp: 30 tablet, Rfl: 0    pregabalin (LYRICA) 100 MG capsule, Take 100 mg by mouth., Disp: , Rfl:     prochlorperazine (COMPAZINE) 5 MG tablet, Take 5 mg by mouth every 8 (eight) hours as needed., Disp: , Rfl:     QUEtiapine (SEROQUEL) 50 MG tablet, Take 3 tablets by mouth every evening., Disp: , Rfl:     tamsulosin (FLOMAX) 0.4 mg Cap, TAKE 1 CAPSULE BY MOUTH EVERY EVENING, Disp: 30 capsule, Rfl: 6    traMADoL (ULTRAM) 50 mg tablet, Take 50 mg by mouth every 6 (six) hours., Disp: , Rfl:     TRUE METRIX GLUCOSE TEST STRIP Strp, 3 (three) times " daily., Disp: , Rfl:     docusate sodium 100 mg capsule, Take by mouth once daily., Disp: , Rfl:     hydrocortisone 2.5 % cream, APPLY TO THE AFFECTED AREA(S) TWICE DAILY as needed for SKIN IRRITATION FOR 14 DAYS, Disp: , Rfl:     sertraline (ZOLOFT) 50 MG tablet, Take 50 mg by mouth., Disp: , Rfl:     tolterodine (DETROL LA) 4 MG 24 hr capsule, Take 4 mg by mouth once daily., Disp: , Rfl:     tuberculin,purif.prot.deriv. (TUBERSOL IDRM), Inject 0.1 mLs into the skin., Disp: , Rfl:      Review of Systems   Constitutional:  Positive for appetite change and fatigue. Negative for unexpected weight change.   HENT:  Negative for mouth sores.    Respiratory:  Negative for cough and shortness of breath.    Cardiovascular:  Negative for chest pain.   Gastrointestinal:  Positive for diarrhea. Negative for abdominal pain.   Genitourinary:  Negative for frequency.   Musculoskeletal:  Negative for back pain.   Integumentary:         S/p left great toe amputation, improved   Neurological:  Positive for numbness. Negative for headaches.   Hematological:  Negative for adenopathy.   Psychiatric/Behavioral:  The patient is not nervous/anxious.      Vitals:    01/12/24 0904   BP: 118/71   Pulse: 68   Resp: 14   Temp: 97.6 °F (36.4 °C)     Wt Readings from Last 3 Encounters:   01/12/24 0904 98.6 kg (217 lb 4.8 oz)   12/14/23 1029 100.5 kg (221 lb 8 oz)   11/15/23 0915 102.9 kg (226 lb 12.8 oz)     Physical Exam  Constitutional:       Appearance: Normal appearance. He is overweight.   HENT:      Head: Normocephalic and atraumatic.      Nose: Nose normal.   Eyes:      General: Lids are normal. Vision grossly intact.      Extraocular Movements: Extraocular movements intact.      Conjunctiva/sclera: Conjunctivae normal.   Cardiovascular:      Rate and Rhythm: Normal rate and regular rhythm.      Heart sounds: Normal heart sounds.   Pulmonary:      Effort: Pulmonary effort is normal.      Breath sounds: Normal breath sounds.   Chest:       Comments: HD access removed to right upper CW  Abdominal:      General: Abdomen is protuberant. Bowel sounds are normal. There is no distension.      Palpations: Abdomen is soft.      Tenderness: There is no abdominal tenderness.   Musculoskeletal:         General: Normal range of motion.      Cervical back: Neck supple.      Right lower leg: No edema.      Left lower leg: No edema.   Feet:      Comments: Left foot with toe amputation - still with dry skin noted over incision. No opening or leaking today.   Skin:     General: Skin is warm and dry.   Neurological:      General: No focal deficit present.      Mental Status: He is alert and oriented to person, place, and time.   Psychiatric:         Attention and Perception: Attention normal.         Mood and Affect: Mood normal.         Speech: Speech normal.         Behavior: Behavior normal. Behavior is cooperative.       Lab Visit on 01/12/2024   Component Date Value Ref Range Status    WBC 01/12/2024 10.04  4.50 - 11.50 x10(3)/mcL Final    RBC 01/12/2024 5.10  4.70 - 6.10 x10(6)/mcL Final    Hgb 01/12/2024 12.7 (L)  14.0 - 18.0 g/dL Final    Hct 01/12/2024 41.3 (L)  42.0 - 52.0 % Final    MCV 01/12/2024 81.0  80.0 - 94.0 fL Final    MCH 01/12/2024 24.9 (L)  27.0 - 31.0 pg Final    MCHC 01/12/2024 30.8 (L)  33.0 - 36.0 g/dL Final    RDW 01/12/2024 18.6 (H)  11.5 - 17.0 % Final    Platelet 01/12/2024 248  130 - 400 x10(3)/mcL Final    MPV 01/12/2024 9.6  7.4 - 10.4 fL Final    Neut % 01/12/2024 68.4  % Final    Lymph % 01/12/2024 20.1  % Final    Mono % 01/12/2024 8.4  % Final    Eos % 01/12/2024 2.6  % Final    Basophil % 01/12/2024 0.2  % Final    Lymph # 01/12/2024 2.02  0.6 - 4.6 x10(3)/mcL Final    Neut # 01/12/2024 6.87  2.1 - 9.2 x10(3)/mcL Final    Mono # 01/12/2024 0.84  0.1 - 1.3 x10(3)/mcL Final    Eos # 01/12/2024 0.26  0 - 0.9 x10(3)/mcL Final    Baso # 01/12/2024 0.02  <=0.2 x10(3)/mcL Final    IG# 01/12/2024 0.03  0 - 0.04 x10(3)/mcL Final    IG%  01/12/2024 0.3  % Final     Assessment:        1. 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 Clear cell carcinoma of right kidney    3. 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 Anemia in stage 3b chronic kidney disease    5. Other iron deficiency anemia    6. Immunodeficiency due to drugs    7. Stage 4 chronic kidney disease    8. Diabetic ulcer of left great toe    9. Status post amputation of left great toe      Plan:         Patient with large right renal mass and retroperitoneal adenopathy diagnosed by CT in 7/2020.  CT chest showed mediastinal fanny mass and biopsy done via EUS + for metastatic renal cell carcinoma.  Patient s/p radical right nephrectomy done 9/11/20. Pathology showed 9.2cm clear cell renal cell carcinoma, margins clear with 4/6 lymph nodes positive.  Post-operative course complicated by hematoma requiring blood transfusion.  CARIS results- MSI stable, Mismatch repair status proficient. Tumor burden low. Negative for: PD-L1, NTRK1/2/3, BAP1, FH, MET, PBRM1, SDHA. Pathologic variants identified: KDMSC (Exon 13 and 11) and VHL. Immunohistochemistry results: MLH1, MSH2, MSH6, and PM52 positive. PD-L1 negative.     Referred to Dr. Haja Sal for evaluation for IL-2. Explained this is only treatment to be shown to achieve long-term remissions in stage IV disease and patient has limited disease at this time.  Patient started 1st 5 days of IL-2 on 11/2/20.   Started his 2nd round on 11/16/20 and it was stopped on 11/22/20 due to acute renal failure, volume overload and agitation.   CT scan on 12/23/20 at [facility] revealed progression of his disease. Dr. Sal contacted patient and discussed the results. He also reached out to Dr. Alvarado to notify her that the patient will not be returning.   Started Keytruda 200mg every 3 weeks on 1/8/21 + Inlyta 5mg oral BID on 1/11/21.  CT C/A/P done at [facility] on 10/22/21  showed stable mild reactive mediastinal nodes, otherwise stable.  Patient continued on treatment s/p cycle 18 Keytruda given 1/10/22.  Labs on 1/10/22 showed elevated LFTs. Inlyta held.  CT C/A/P done at [facility] on 1/26/22 showed unfortunately disease progression with new pulmonary nodules, enlargement in mediastinal node, new mesenteric nodule and retroperitoneal adenopathy.  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 Hepatitis panel negative. Keytruda stopped. Started on Prednisone 80mg daily on 1/31/22.  Patient had suicidal ideation and severe depression during visit in 3/2022, seen by psychiatry and meds adjusted, much improved.  CT C/A/P from [facility] on 4/5/22 showed progressive disease.    Treatment change recommended to Cabometyx 60mg daily per NCCN guidelines (Category 1) for subsequent treatment. Started on 4/7/22.  Prednisone weaned and stopped 4/14/22.  Patient was having multiple side effects (n/v, diarrhea, mouth sores) from Cabometyx.  Reduced dose to 40mg on 4/28/22. Held on 5/19/22 due to cellulitis/rashes/abscesses.      Continued to hold s/p TURP on 6/23/22 and restarted 7/6/22.  Patient hospitalized at [facility] 7/16/22 for large bladder hematoma s/p clot evacuation X 2. Cabometyx stopped again.   Repeat CT C/A/P done at [facility] on 8/29/22 showed mixed findings, but mostly disease progression in fanny metastases in mediastinum, which is to be expected since he has been off treatment.   Restarted back on Cabometyx (lower dose 40mg) on 9/1/22 although it has not been studied in dialysis patients, but no dosage adjustment is usually required.  Held on 10/24/22 for Hand-foot syndrome, restarted 11/9/22.   CT done at [facility] on 11/18/22 with disease improvement.  Cabometyx held again for hand-foot syndrome and wound on left toe 11/28/22.  Restarted Cabometyx on 1/5/23 at 20mg daily.    Cabometyx held on 3/17/23 due to worsening diabetic foot wound.  S/p left great  toe amputation on 5/8/23, healed and cleared by podiatry to restart Cabometyx which was restarted on 6/1/23 Cabometyx 20mg daily.  Repeat CT C/A/P done 6/20/23 with only minimal enlargement in mediastinal nodes, good considering he was off treatment for sometime. This will serve as new baseline.     Repeat CT C/A/P on 1/10/24 with a millimetric size increase in the right peritracheal, aorticopulmonary and azygos esophageal recess.   Dr. Alvarado aware of scan results and recommends continuing with treatment for now and rescanning again in 2 months.   Labs today with CBC mild anemia, CMP pending.   Continue Cabometyx. Patient to watch his feet closely, as he is prone to getting nonhealing wounds.   RTC 4 weeks for follow-up with repeat labs here same day as visit.     Looking ahead to future treatment options, would try again with immunotherapy, single agent Opdivo. He never really failed Keytruda, just developed autoimmune hepatitis, so this would need to be monitored closely.     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 Referred for genetic testing which was done on 3/16/23 and negative.    Continue Compazine for nausea.   Continue Norco for joint pain.   Continue Vistaril for chronic pruritis.     All questions answered at this time.    Jackie Boyd REYES

## 2024-02-15 ENCOUNTER — OFFICE VISIT (OUTPATIENT)
Dept: HEMATOLOGY/ONCOLOGY | Facility: CLINIC | Age: 64
End: 2024-02-15
Payer: MEDICARE

## 2024-02-15 VITALS
BODY MASS INDEX: 31.4 KG/M2 | TEMPERATURE: 98 F | HEIGHT: 70 IN | SYSTOLIC BLOOD PRESSURE: 137 MMHG | HEART RATE: 72 BPM | RESPIRATION RATE: 14 BRPM | DIASTOLIC BLOOD PRESSURE: 73 MMHG | WEIGHT: 219.31 LBS | OXYGEN SATURATION: 97 %

## 2024-02-15 DIAGNOSIS — D84.821 IMMUNODEFICIENCY DUE TO DRUGS: ICD-10-CM

## 2024-02-15 DIAGNOSIS — Z79.899 IMMUNODEFICIENCY DUE TO DRUGS: ICD-10-CM

## 2024-02-15 DIAGNOSIS — C77.1 MALIGNANT NEOPLASM METASTATIC TO INTRATHORACIC LYMPH NODE: ICD-10-CM

## 2024-02-15 DIAGNOSIS — I12.9 MALIGNANT HYPERTENSIVE RENAL DISEASE, STAGE 1 THROUGH STAGE 4 OR UNSPECIFIED CHRONIC KIDNEY DISEASE: Primary | ICD-10-CM

## 2024-02-15 PROCEDURE — 3008F BODY MASS INDEX DOCD: CPT | Mod: CPTII,S$GLB,,

## 2024-02-15 PROCEDURE — 99215 OFFICE O/P EST HI 40 MIN: CPT | Mod: S$GLB,,,

## 2024-02-15 PROCEDURE — 3075F SYST BP GE 130 - 139MM HG: CPT | Mod: CPTII,S$GLB,,

## 2024-02-15 PROCEDURE — 99999 PR PBB SHADOW E&M-EST. PATIENT-LVL V: CPT | Mod: PBBFAC,,,

## 2024-02-15 PROCEDURE — 1160F RVW MEDS BY RX/DR IN RCRD: CPT | Mod: CPTII,S$GLB,,

## 2024-02-15 PROCEDURE — 3078F DIAST BP <80 MM HG: CPT | Mod: CPTII,S$GLB,,

## 2024-02-15 PROCEDURE — 1159F MED LIST DOCD IN RCRD: CPT | Mod: CPTII,S$GLB,,

## 2024-02-15 NOTE — PROGRESS NOTES
Subjective:       Patient ID: Ye Olivera is a 63 y.o. male.    Urologist: Dr. Christopher Villasenor  PCP: Dr. Ly Quevedo  Nephrologist: Dr. Jenae Le    Renal Cell Carcinoma Stage IV([id number]) Mediastinal lymph nodes--Diagnosed 20  Biopsy/pathology:  Upper EUS done 9/3/20--large cluster of malignant appearing lymph nodes in subcarinal mediastinum causing extrinsic compression in middle 1/3 and thoracic esophagus biopsy c/w metastatic carcinoma, c/w renal cell carcinoma CD10+, PAX -8+, erythematous mucosa in antrum biopsy c/w mild chronic gastritis, inactive, normal duodenum, no pathology in CBD or left lobe of liver or pancreas. CARIS results- MSI stable, Mismatch repair status proficient. Tumor burden low. Negative for: PD-L1, NTRK1/2/3, BAP1, FH, MET, PBRM1, SDHA. Pathologic variants identified: KDMSC (Exon 13 and 11) and VHL. Immunohistochemistry results: MLH1, MSH2, MSH6, and PM52 positive. PD-L1 negative.   Surgery/pathology:  Right radical nephrectomy and lymph node dissection done 20--clear cell renal cell carcinoma measures 9.2X8C8.7cm, Grade 3, margins clear, tumor limited to kidney, 4/6 lymph nodes positive for metastatic clear cell carcinoma.   Imagin. CT A/P w/o contrast [facility] 20--bulky right renal mass, midpole, measuring 10cm with evidence of internal necrosis and calcifications, primary mass does not extend outside the perinephric space, associated right perinephric edema, multiple enlarged retroperitoneal lymph nodes at level of the kidneys and inferior retroperitoneum, one measures 2.9X1.4cm, additional node measures 2.1X1.8cm, additional node measures 1.8X1.2cm, inferior retroperitoneal node at level of inferior pole right kidney measures 1.8X1.4cm, no convincing evidence of renal vein involvement, no right adrenal gland involvement, no disease apparent outside of abdomen.  2. CT A/P w/ and w/o contrast [facility] 20--large right renal mass measures 10.6X9.5X7.4cm, with  enlarged retroperitoneal nodes, largest measures 2.7cm.  3. CT chest [facility] 8/28/20--solitary left supraclavicular node, small and not overtly concerning, heterogeneous retroesophageal mass at the level of the tariq c/w metastatic deposit, no other evidence for metastatic disease.  4. MRI brain w/ and w/o contrast [facility] 8/28/20--no evidence for intracranial metastatic disease.  5. CT C/A/P done at [facility] 10/28/20--now appears to be 2 adjacent masses at the level of the tariq, measuring in aggregate 6.2X2.7cm as compared with 5.2X2.1cm, subcentimeter mediastinal and hilar nodes are seen, large intermediate density structure at the right renal fossa extending to pelvic brim, most likely post-operative hematoma or seroma, resolution of previously seen retroperitoneal adenopathy.  6. CT C/A/P w&w/o at [facility] on 12/23/20--Interval enlargement of the retro-esophageal mass with mild interval increase in size of multiple paraesophageal and prevascular lymph nodes compatible with progression of disease. Interval decrease in size of the right retroperitoneal postop seroma or hematoma. No evidence of metastatic disease below the diaphragm. Prior right nephrectomy and adrenalectomy. Chololithiasis and stable atherosclerosis.   7. CT C/A/P w/o contrast done at [facility] on 4/5/21--interval decrease in size of multiple prevascular, subcarinal and para-esophageal lymph nodes c/w response to treatment, no new pulmonary nodule or lymph node, no evidence for metastatic disease below the diaphragm, interval decrease in size of the post-operative seromas adjacent to right psoas muscle.  8. CT C/A/P w/o contrast done at [facility] on 7/7/21--stable appearance of previously identified prevascular, subcarinal, and paraesophageal nodes, there are an increased number of sub-centimeter nodes in the aortopulmonary window and precarinal region, nonspecific in appearance, no other evidence of local recurrence or metastatic disease.   9. MRI cervical spine w/ and w/o  contrast done at [facility] 8/4/21--no metastatic disease, multilevel spondylotic changes, no acute abnormality.   10. MRI brain w/ and w/o contrast 8/4/21--normal maturation of the previously seen lacunar infarct at the left centrum semiovale, no new abnormalities, specifically, no evidence of intracranial metastatic disease.   11. CT C/A/P w/o contrast done at [facility] 10/22/21--stable reactive mediastinal adenopathy, interval resection of right kidney and adrenal gland with post-surgical changes but no evidence of metastatic disease.   12. CT C/A/P w/o contrast done at [facility] 1/26/22--4 new pulmonary nodules larges LLL 14mm, 9mm LLL, 5mm LLL, 5mm RML, with enlarging prevascular lymph node now measures 18mm, previous 8mm, concerning for metastatic disease, new mesenteric implant in LLQ measures 11mm, enlarging retroperitoneal LN now measures [id number], previous 14X8mm concerning for progression disease, cholelithiasis.   13. MRI brain w/ and w/o  contrast done at [facility] 3/31/22--no acute intracranial abnormality.   14. CT C/A/P w/o contrast done at [facility] 4/5/22--progressive retroperitoneal and mediastinal adenopathy, with development of hilar adenopathy, increase in size and number of pulmonary nodules.   15. CT done at [facility] showed large hematoma 14cm in bladder, results not available.  16. CT head w/o contrast done at [facility] 7/31/22--No intracranial hemorrhage or acute traumatic brain parenchymal injury evident by CT.     17. CT C/A/P w/o contrast done at [facility] 8/29/22--mixed response to treatment, worsening mediastinal adenopathy, stable pulmonary nodules, improved retroperitoneal lymph node, enlarging pericaval lymph node.  18. CT C/A/P w/o contrast at [facility] 11/18/22--Interval decreased in mediastinal adenopathy, pulmonary nodules and portacaval nodes, c/w treatment response.   19. CT C/A/P w/ contrast at [facility] 2/8/23--stable mediastinal lymph nodes, enlarging left lower lobe pulmonary nodule, enlarging retroperitoneal lymph nodes.   20. CT  C/A/P w/o contrast at [facility] done 6/20/23--minimally enlarged mediastinal lymph nodes, otherwise no other significant changes, pericaval nodes unchanged.   21. CT C/A/P w/o contrast at [facility] done 10/11/23--improved appearance of the right lung base with decreased ground glass opacity, stable to decreased size of mediastinal and pericaval lymph nodes (index prevascular node decreased 1X1.6cm, pericaval measures 1.6X2cm (previous 2X2.4cm),   22. CT C/A/P w/o at [facility] on 1/10/24--Small left basilar pulmonary nodule stable. No additional pulmonary nodules. Millimetric size increase of right peritracheal and aorticopulmonary lymph nodes as well as nodes at the azygos esophageal recess. Intraluminal gallbladder sludge and possible tiny stones. Postsurgical changes of the right nephrectomy. Cortical scarring or postsurgical changes of the upper pole of the left kidney.     Treatment History:   1. IL2 started on 11/2/20. 2nd cycle started 11/16/20 - stopped after 7 days due to acute renal failure, volume overload and confusion. Stopped due to progression.  2. Keytruda/Inlyta 1/8/21--1/31/22 (stopped due to progression, autoimmune hepatitis).  3. Prednisone 80mg daily 1/31/22--weaned and stopped 4/14/22.    Hospitalization:   [facility] 7/2022 14cm hematoma s/p TURP causing hydronephrosis, JOE and patient required initiation of dialysis, UTI, Covid infection.     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     Treatment Plan:  Cabometyx 60mg daily started 4/7/22  Decrease dose to 40mg daily 4/28/22 due to side effects.  HOLD on 5/19/22 until groin rash and abscess heal/also underwent TURP on 6/23/22  Restarted 7/6/22--held 7/16/22 for hospitalization/bladder hematoma  Cabometyx 40mg daily restarted 9/1/22. On hold since 10/24/22 for hand/foot syndrome.   Restarted on 11/9/22, held 11/28/22 for hand-foot syndrome, foot wound.   Dose decreased to 20mg daily. Restarted on 1/5/23.  Stopped on 3/17/23 due to diabetic foot wound.   Restarted 6/1/23    Chief Complaint: No chief complaint on file.    HPI   Patient presents for follow-up of renal cell carcinoma. He continues on Cabometyx 20mg daily. He is doing well. He still has occasional diarrhea which is controlled with medications. He does complain of fatigue and peripheral neuropathy that is controlled with Lyrica. CT scan on 1/10/2024 revealed a millimetric size increase in the right peritracheal, aorticopulmonary and azygos esophageal recess. Dr. Alvarado recommends continuing on the Cabometyx for now and rescanning again in 2 months. No other complaints. He denies decreased appetite, nausea, vomiting, constipation, rash, musculoskeletal pain, cough, and upper respiratory tract infection.     Past Medical History:   Diagnosis Date    Arthritis of knee 08/13/2013    Cancer     stage IV renal cell carcinoma    Cataract     Coronary artery disease     Diabetes mellitus, type 2     HTN (hypertension) 08/13/2013    Hx of CABG     Hyperlipidemia     Type II or unspecified type diabetes mellitus without mention of complication, not stated as uncontrolled 08/13/2013      Review of patient's allergies indicates:  No Known Allergies     Current Outpatient Medications:     allopurinoL (ZYLOPRIM) 300 MG tablet, Take 200 mg by mouth once daily., Disp: , Rfl:     amLODIPine (NORVASC) 10 MG tablet, Take 1 tablet (10 mg total) by mouth every morning., Disp: 30 tablet, Rfl: 11    aspirin 81 mg Cap, Take 81 mg by mouth once daily at 6am., Disp: , Rfl:     atorvastatin (LIPITOR) 80 MG tablet, Take 80 mg by mouth once daily., Disp: , Rfl:     azelastine (ASTELIN) 137 mcg (0.1 %) nasal spray, SMARTSIG:Both Nares, Disp: , Rfl:     cabozantinib (CABOMETYX) 20 mg Tab, Take 1 tablet (20 mg) by mouth once daily., Disp: 30 tablet, Rfl: 6    carvediloL (COREG) 12.5 MG tablet, Take 1 tablet by mouth 2 (two) times daily., Disp: , Rfl:     colchicine (COLCRYS) 0.6 mg  "tablet, Take 2 tablets by mouth once, then take 1 tablet 1 hour later. Max 1.8 mg/day. Then take 1 tablets by mouth daily until 2 days after flare resolves., Disp: , Rfl:     desvenlafaxine succinate (PRISTIQ) 100 MG Tb24, Take 100 mg by mouth., Disp: , Rfl:     diphenoxylate-atropine 2.5-0.025 mg (LOMOTIL) 2.5-0.025 mg per tablet, Take 1 tablet by mouth as needed., Disp: , Rfl:     docusate sodium 100 mg capsule, Take by mouth once daily., Disp: , Rfl:     finasteride (PROSCAR) 5 mg tablet, Take 5 mg by mouth as needed., Disp: , Rfl:     furosemide (LASIX) 20 MG tablet, Take 20 mg by mouth once daily. Pt stated he thinks her takes 10mg daily, Disp: , Rfl:     hydrocortisone 2.5 % cream, APPLY TO THE AFFECTED AREA(S) TWICE DAILY as needed for SKIN IRRITATION FOR 14 DAYS, Disp: , Rfl:     hydrOXYzine pamoate (VISTARIL) 25 MG Cap, Take 1 capsule (25 mg total) by mouth every 4 (four) hours as needed (itching)., Disp: 30 capsule, Rfl: 4    hyoscyamine (LEVSIN/SL) 0.125 mg Subl, Place 0.125 mg under the tongue every 6 (six) hours as needed., Disp: , Rfl:     insulin glargine (LANTUS) 100 unit/mL injection, Inject into the skin. Takes 20units, Disp: , Rfl:     insulin syr/ndl U100 half warner (BD VEO INSULIN SYR, HALF UNIT,) 0.3 mL 31 gauge x 15/64" Syrg, USE 1 syringe DAILY, Disp: , Rfl:     lancets Misc, Use TID to check blood sugar. Dx: E11.9, patient is insulin dependent, Disp: , Rfl:     multivitamin (THERAGRAN) per tablet, Take 1 tablet by mouth once daily., Disp: , Rfl:     ondansetron (ZOFRAN) 8 MG tablet, Take 1 tablet (8 mg total) by mouth every 8 (eight) hours as needed for Nausea., Disp: 30 tablet, Rfl: 2    pantoprazole (PROTONIX) 40 MG tablet, TAKE 1 TABLET BY MOUTH EVERY DAY, Disp: 30 tablet, Rfl: 0    prochlorperazine (COMPAZINE) 5 MG tablet, Take 5 mg by mouth every 8 (eight) hours as needed., Disp: , Rfl:     QUEtiapine (SEROQUEL) 50 MG tablet, Take 3 tablets by mouth every evening., Disp: , Rfl:     " sertraline (ZOLOFT) 50 MG tablet, Take 50 mg by mouth., Disp: , Rfl:     tamsulosin (FLOMAX) 0.4 mg Cap, TAKE 1 CAPSULE BY MOUTH EVERY EVENING, Disp: 30 capsule, Rfl: 6    tolterodine (DETROL LA) 4 MG 24 hr capsule, Take 4 mg by mouth once daily., Disp: , Rfl:     traMADoL (ULTRAM) 50 mg tablet, Take 50 mg by mouth every 6 (six) hours., Disp: , Rfl:     TRUE METRIX GLUCOSE TEST STRIP Strp, 3 (three) times daily., Disp: , Rfl:     tuberculin,purif.prot.deriv. (TUBERSOL IDRM), Inject 0.1 mLs into the skin., Disp: , Rfl:     pregabalin (LYRICA) 100 MG capsule, Take 100 mg by mouth., Disp: , Rfl:      Review of Systems   Constitutional:  Positive for fatigue. Negative for appetite change and unexpected weight change.   HENT:  Negative for mouth sores.    Respiratory:  Negative for cough and shortness of breath.    Cardiovascular:  Negative for chest pain.   Gastrointestinal:  Positive for diarrhea. Negative for abdominal pain.   Genitourinary:  Negative for frequency.   Musculoskeletal:  Negative for back pain.   Integumentary:         S/p left great toe amputation, improved   Neurological:  Positive for numbness. Negative for headaches.   Hematological:  Negative for adenopathy.   Psychiatric/Behavioral:  The patient is not nervous/anxious.      Vitals:    02/15/24 1246   BP: 137/73   Pulse: 72   Resp: 14   Temp: 97.6 °F (36.4 °C)       Wt Readings from Last 3 Encounters:   02/15/24 1246 99.5 kg (219 lb 4.8 oz)   01/12/24 0904 98.6 kg (217 lb 4.8 oz)   12/14/23 1029 100.5 kg (221 lb 8 oz)     Physical Exam  Constitutional:       Appearance: Normal appearance. He is overweight.   HENT:      Head: Normocephalic and atraumatic.      Nose: Nose normal.   Eyes:      General: Lids are normal. Vision grossly intact.      Extraocular Movements: Extraocular movements intact.      Conjunctiva/sclera: Conjunctivae normal.   Cardiovascular:      Rate and Rhythm: Normal rate and regular rhythm.      Heart sounds: Normal heart  sounds.   Pulmonary:      Effort: Pulmonary effort is normal.      Breath sounds: Normal breath sounds.   Chest:      Comments: HD access removed to right upper CW  Abdominal:      General: Abdomen is protuberant. Bowel sounds are normal. There is no distension.      Palpations: Abdomen is soft.      Tenderness: There is no abdominal tenderness.   Musculoskeletal:         General: Normal range of motion.      Cervical back: Neck supple.      Right lower leg: No edema.      Left lower leg: No edema.   Feet:      Comments: Left foot with toe amputation - still with dry skin noted over incision. No opening or leaking today.   Lymphadenopathy:      Cervical: No cervical adenopathy.      Upper Body:      Right upper body: No supraclavicular or axillary adenopathy.      Left upper body: No supraclavicular or axillary adenopathy.   Skin:     General: Skin is warm and dry.   Neurological:      General: No focal deficit present.      Mental Status: He is alert and oriented to person, place, and time.   Psychiatric:         Attention and Perception: Attention normal.         Mood and Affect: Mood normal.         Speech: Speech normal.         Behavior: Behavior normal. Behavior is cooperative.       Lab Visit on 02/15/2024   Component Date Value Ref Range Status    WBC 02/15/2024 7.92  4.50 - 11.50 x10(3)/mcL Final    RBC 02/15/2024 4.98  4.70 - 6.10 x10(6)/mcL Final    Hgb 02/15/2024 12.6 (L)  14.0 - 18.0 g/dL Final    Hct 02/15/2024 40.4 (L)  42.0 - 52.0 % Final    MCV 02/15/2024 81.1  80.0 - 94.0 fL Final    MCH 02/15/2024 25.3 (L)  27.0 - 31.0 pg Final    MCHC 02/15/2024 31.2 (L)  33.0 - 36.0 g/dL Final    RDW 02/15/2024 18.8 (H)  11.5 - 17.0 % Final    Platelet 02/15/2024 251  130 - 400 x10(3)/mcL Final    MPV 02/15/2024 9.9  7.4 - 10.4 fL Final    Neut % 02/15/2024 68.2  % Final    Lymph % 02/15/2024 20.5  % Final    Mono % 02/15/2024 7.6  % Final    Eos % 02/15/2024 3.3  % Final    Basophil % 02/15/2024 0.1  % Final     Lymph # 02/15/2024 1.62  0.6 - 4.6 x10(3)/mcL Final    Neut # 02/15/2024 5.41  2.1 - 9.2 x10(3)/mcL Final    Mono # 02/15/2024 0.60  0.1 - 1.3 x10(3)/mcL Final    Eos # 02/15/2024 0.26  0 - 0.9 x10(3)/mcL Final    Baso # 02/15/2024 0.01  <=0.2 x10(3)/mcL Final    IG# 02/15/2024 0.02  0 - 0.04 x10(3)/mcL Final    IG% 02/15/2024 0.3  % Final      Assessment:        1. 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  CT chest showed mediastinal fanny mass and biopsy done via EUS + for metastatic renal cell carcinoma.  Patient s/p radical right nephrectomy done 9/11/20. Pathology showed 9.2cm clear cell renal cell carcinoma, margins clear with 4/6 lymph nodes positive.  Post-operative course complicated by hematoma requiring blood transfusion.  CARIS results- MSI stable, Mismatch repair status proficient. Tumor burden low. Negative for: PD-L1, NTRK1/2/3, BAP1, FH, MET, PBRM1, SDHA. Pathologic variants identified: KDMSC (Exon 13 and 11) and VHL. Immunohistochemistry results: MLH1, MSH2, MSH6, and PM52 positive. PD-L1 negative.     Referred to Dr. Haja Sal for evaluation for IL-2. Explained this is only treatment to be shown to achieve long-term remissions in stage IV disease and patient has limited disease at this time.  Patient started 1st 5 days of IL-2 on 11/2/20.   Started his 2nd round on 11/16/20 and it was stopped on 11/22/20 due to acute renal failure, volume overload and agitation.   CT scan on 12/23/20 at [facility] revealed progression of his disease. Dr. Sal contacted patient and discussed the results. He also reached out to Dr. Alvarado to notify her that the patient will not be returning.   Started Keytruda 200mg every 3 weeks on  1/8/21 + Inlyta 5mg oral BID on 1/11/21.  CT C/A/P done at [facility] on 10/22/21 showed stable mild reactive mediastinal nodes, otherwise stable.  Patient continued on treatment s/p cycle 18 Keytruda given 1/10/22.  Labs on 1/10/22 showed elevated LFTs. Inlyta held.  CT C/A/P done at [facility] on 1/26/22 showed unfortunately disease progression with new pulmonary nodules, enlargement in mediastinal node, new mesenteric nodule and retroperitoneal adenopathy.  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 Hepatitis panel negative. Keytruda stopped. Started on Prednisone 80mg daily on 1/31/22.  Patient had suicidal ideation and severe depression during visit in 3/2022, seen by psychiatry and meds adjusted, much improved.  CT C/A/P from [facility] on 4/5/22 showed progressive disease.    Treatment change recommended to Cabometyx 60mg daily per NCCN guidelines (Category 1) for subsequent treatment. Started on 4/7/22.  Prednisone weaned and stopped 4/14/22.  Patient was having multiple side effects (n/v, diarrhea, mouth sores) from Cabometyx.  Reduced dose to 40mg on 4/28/22. Held on 5/19/22 due to cellulitis/rashes/abscesses.      Continued to hold s/p TURP on 6/23/22 and restarted 7/6/22.  Patient hospitalized at [facility] 7/16/22 for large bladder hematoma s/p clot evacuation X 2. Cabometyx stopped again.   Repeat CT C/A/P done at [facility] on 8/29/22 showed mixed findings, but mostly disease progression in fanny metastases in mediastinum, which is to be expected since he has been off treatment.   Restarted back on Cabometyx (lower dose 40mg) on 9/1/22 although it has not been studied in dialysis patients, but no dosage adjustment is usually required.  Held on 10/24/22 for Hand-foot syndrome, restarted 11/9/22.   CT done at [facility] on 11/18/22 with disease improvement.  Cabometyx held again for hand-foot syndrome and wound on left toe 11/28/22.  Restarted Cabometyx on 1/5/23 at 20mg  daily.    Cabometyx held on 3/17/23 due to worsening diabetic foot wound.  S/p left great toe amputation on 5/8/23, healed and cleared by podiatry to restart Cabometyx which was restarted on 6/1/23 Cabometyx 20mg daily.  Repeat CT C/A/P done 6/20/23 with only minimal enlargement in mediastinal nodes, good considering he was off treatment for sometime. This will serve as new baseline.     Repeat CT C/A/P on 1/10/24 with a millimetric size increase in the right peritracheal, aorticopulmonary and azygos esophageal recess.   Dr. Alvarado aware of scan results and recommends continuing with treatment for now and rescanning again in 2 months.   Labs today with CBC mild anemia, CMP pending.   Continue Cabometyx. Patient to watch his feet closely, as he is prone to getting nonhealing wounds.   CT C/A/P ordered   RTC 4 weeks for follow-up with repeat labs here same day as visit.     Looking ahead to future treatment options, would try again with immunotherapy, single agent Opdivo. He never really failed Keytruda, just developed autoimmune hepatitis, so this would need to be monitored closely.     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 Referred for genetic testing which was done on 3/16/23 and negative.    Continue Compazine for nausea.   Continue Norco for joint pain.   Continue Vistaril for chronic pruritis.   Start OTC calcium supplements    All questions answered at this time.    Love Lane, FNP-C  Oncology/Hematology  [facility]

## 2024-03-06 ENCOUNTER — TELEPHONE (OUTPATIENT)
Dept: HEMATOLOGY/ONCOLOGY | Facility: CLINIC | Age: 64
End: 2024-03-06
Payer: MEDICARE

## 2024-03-06 NOTE — TELEPHONE ENCOUNTER
Patient left voicemail regarding scheduling scans for upcoming visit. Voice message forwarded to the radiology scheduling team.

## 2024-03-12 DIAGNOSIS — D63.1 ANEMIA IN STAGE 4 CHRONIC KIDNEY DISEASE: ICD-10-CM

## 2024-03-12 DIAGNOSIS — C64.1 CLEAR CELL CARCINOMA OF RIGHT KIDNEY: ICD-10-CM

## 2024-03-12 DIAGNOSIS — N18.4 CHRONIC KIDNEY DISEASE, STAGE IV (SEVERE): ICD-10-CM

## 2024-03-12 DIAGNOSIS — N18.4 ANEMIA IN STAGE 4 CHRONIC KIDNEY DISEASE: ICD-10-CM

## 2024-03-12 RX ORDER — PANTOPRAZOLE SODIUM 40 MG/1
40 TABLET, DELAYED RELEASE ORAL
Qty: 30 TABLET | Refills: 3 | Status: SHIPPED | OUTPATIENT
Start: 2024-03-12

## 2024-03-13 ENCOUNTER — TELEPHONE (OUTPATIENT)
Dept: HEMATOLOGY/ONCOLOGY | Facility: CLINIC | Age: 64
End: 2024-03-13
Payer: MEDICARE

## 2024-03-14 ENCOUNTER — OFFICE VISIT (OUTPATIENT)
Dept: HEMATOLOGY/ONCOLOGY | Facility: CLINIC | Age: 64
End: 2024-03-14
Payer: MEDICARE

## 2024-03-14 ENCOUNTER — LAB VISIT (OUTPATIENT)
Dept: LAB | Facility: HOSPITAL | Age: 64
End: 2024-03-14
Attending: INTERNAL MEDICINE
Payer: MEDICARE

## 2024-03-14 VITALS
TEMPERATURE: 98 F | BODY MASS INDEX: 30.56 KG/M2 | DIASTOLIC BLOOD PRESSURE: 74 MMHG | WEIGHT: 213.5 LBS | HEART RATE: 68 BPM | RESPIRATION RATE: 14 BRPM | HEIGHT: 70 IN | OXYGEN SATURATION: 96 % | SYSTOLIC BLOOD PRESSURE: 129 MMHG

## 2024-03-14 DIAGNOSIS — C64.1 CLEAR CELL CARCINOMA OF RIGHT KIDNEY: ICD-10-CM

## 2024-03-14 DIAGNOSIS — Z79.899 IMMUNODEFICIENCY DUE TO DRUGS: ICD-10-CM

## 2024-03-14 DIAGNOSIS — C77.8 MALIGNANT NEOPLASM METASTATIC TO LYMPH NODES OF MULTIPLE SITES: ICD-10-CM

## 2024-03-14 DIAGNOSIS — C77.2 MALIGNANT NEOPLASM METASTATIC TO INTRA-ABDOMINAL LYMPH NODE: ICD-10-CM

## 2024-03-14 DIAGNOSIS — D84.821 IMMUNODEFICIENCY DUE TO DRUGS: ICD-10-CM

## 2024-03-14 DIAGNOSIS — C64.9 RENAL CELL CARCINOMA, UNSPECIFIED LATERALITY: ICD-10-CM

## 2024-03-14 DIAGNOSIS — E03.2 HYPOTHYROIDISM DUE TO DRUGS: ICD-10-CM

## 2024-03-14 DIAGNOSIS — I12.9 MALIGNANT HYPERTENSIVE RENAL DISEASE, STAGE 1 THROUGH STAGE 4 OR UNSPECIFIED CHRONIC KIDNEY DISEASE: ICD-10-CM

## 2024-03-14 DIAGNOSIS — C77.1 MALIGNANT NEOPLASM METASTATIC TO INTRATHORACIC LYMPH NODE: ICD-10-CM

## 2024-03-14 DIAGNOSIS — C64.1 CLEAR CELL CARCINOMA OF RIGHT KIDNEY: Primary | ICD-10-CM

## 2024-03-14 LAB
ALBUMIN SERPL-MCNC: 2.7 G/DL (ref 3.4–4.8)
ALBUMIN/GLOB SERPL: 0.7 RATIO (ref 1.1–2)
ALP SERPL-CCNC: 139 UNIT/L (ref 40–150)
ALT SERPL-CCNC: 14 UNIT/L (ref 0–55)
AST SERPL-CCNC: 16 UNIT/L (ref 5–34)
BASOPHILS # BLD AUTO: 0.01 X10(3)/MCL
BASOPHILS NFR BLD AUTO: 0.1 %
BILIRUB SERPL-MCNC: 0.3 MG/DL
BUN SERPL-MCNC: 20.9 MG/DL (ref 8.4–25.7)
CALCIUM SERPL-MCNC: 7.9 MG/DL (ref 8.8–10)
CHLORIDE SERPL-SCNC: 102 MMOL/L (ref 98–107)
CO2 SERPL-SCNC: 26 MMOL/L (ref 23–31)
CREAT SERPL-MCNC: 1.73 MG/DL (ref 0.73–1.18)
EOSINOPHIL # BLD AUTO: 0.28 X10(3)/MCL (ref 0–0.9)
EOSINOPHIL NFR BLD AUTO: 2.8 %
ERYTHROCYTE [DISTWIDTH] IN BLOOD BY AUTOMATED COUNT: 18.5 % (ref 11.5–17)
GFR SERPLBLD CREATININE-BSD FMLA CKD-EPI: 44 MLS/MIN/1.73/M2
GLOBULIN SER-MCNC: 4.1 GM/DL (ref 2.4–3.5)
GLUCOSE SERPL-MCNC: 126 MG/DL (ref 82–115)
HCT VFR BLD AUTO: 40.5 % (ref 42–52)
HGB BLD-MCNC: 12.8 G/DL (ref 14–18)
IMM GRANULOCYTES # BLD AUTO: 0.03 X10(3)/MCL (ref 0–0.04)
IMM GRANULOCYTES NFR BLD AUTO: 0.3 %
LYMPHOCYTES # BLD AUTO: 1.7 X10(3)/MCL (ref 0.6–4.6)
LYMPHOCYTES NFR BLD AUTO: 16.9 %
MCH RBC QN AUTO: 24.7 PG (ref 27–31)
MCHC RBC AUTO-ENTMCNC: 31.6 G/DL (ref 33–36)
MCV RBC AUTO: 78.2 FL (ref 80–94)
MONOCYTES # BLD AUTO: 0.69 X10(3)/MCL (ref 0.1–1.3)
MONOCYTES NFR BLD AUTO: 6.9 %
NEUTROPHILS # BLD AUTO: 7.32 X10(3)/MCL (ref 2.1–9.2)
NEUTROPHILS NFR BLD AUTO: 73 %
PLATELET # BLD AUTO: 286 X10(3)/MCL (ref 130–400)
PMV BLD AUTO: 9.6 FL (ref 7.4–10.4)
POTASSIUM SERPL-SCNC: 3.5 MMOL/L (ref 3.5–5.1)
PROT SERPL-MCNC: 6.8 GM/DL (ref 5.8–7.6)
RBC # BLD AUTO: 5.18 X10(6)/MCL (ref 4.7–6.1)
SODIUM SERPL-SCNC: 141 MMOL/L (ref 136–145)
WBC # SPEC AUTO: 10.03 X10(3)/MCL (ref 4.5–11.5)

## 2024-03-14 PROCEDURE — 3078F DIAST BP <80 MM HG: CPT | Mod: CPTII,S$GLB,, | Performed by: INTERNAL MEDICINE

## 2024-03-14 PROCEDURE — 1160F RVW MEDS BY RX/DR IN RCRD: CPT | Mod: CPTII,S$GLB,, | Performed by: INTERNAL MEDICINE

## 2024-03-14 PROCEDURE — 3074F SYST BP LT 130 MM HG: CPT | Mod: CPTII,S$GLB,, | Performed by: INTERNAL MEDICINE

## 2024-03-14 PROCEDURE — 80053 COMPREHEN METABOLIC PANEL: CPT

## 2024-03-14 PROCEDURE — 36415 COLL VENOUS BLD VENIPUNCTURE: CPT

## 2024-03-14 PROCEDURE — 99215 OFFICE O/P EST HI 40 MIN: CPT | Mod: S$GLB,,, | Performed by: INTERNAL MEDICINE

## 2024-03-14 PROCEDURE — 1159F MED LIST DOCD IN RCRD: CPT | Mod: CPTII,S$GLB,, | Performed by: INTERNAL MEDICINE

## 2024-03-14 PROCEDURE — 3008F BODY MASS INDEX DOCD: CPT | Mod: CPTII,S$GLB,, | Performed by: INTERNAL MEDICINE

## 2024-03-14 PROCEDURE — 99999 PR PBB SHADOW E&M-EST. PATIENT-LVL IV: CPT | Mod: PBBFAC,,, | Performed by: INTERNAL MEDICINE

## 2024-03-14 PROCEDURE — 85025 COMPLETE CBC W/AUTO DIFF WBC: CPT

## 2024-03-14 RX ORDER — HYDROXYZINE PAMOATE 25 MG/1
CAPSULE ORAL
Qty: 60 CAPSULE | Refills: 3 | Status: SHIPPED | OUTPATIENT
Start: 2024-03-14 | End: 2024-04-11

## 2024-03-14 NOTE — PROGRESS NOTES
Subjective:       Patient ID: Ye Olivera is a 63 y.o. male.    Urologist: Dr. Christopher Villasenor  PCP: Dr. Ly Quevedo  Nephrologist: Dr. Jenae Le    Renal Cell Carcinoma Stage IV([id number]) Mediastinal lymph nodes--Diagnosed 20  Biopsy/pathology:  Upper EUS done 9/3/20--large cluster of malignant appearing lymph nodes in subcarinal mediastinum causing extrinsic compression in middle 1/3 and thoracic esophagus biopsy c/w metastatic carcinoma, c/w renal cell carcinoma CD10+, PAX -8+, erythematous mucosa in antrum biopsy c/w mild chronic gastritis, inactive, normal duodenum, no pathology in CBD or left lobe of liver or pancreas. CARIS results- MSI stable, Mismatch repair status proficient. Tumor burden low. Negative for: PD-L1, NTRK1/2/3, BAP1, FH, MET, PBRM1, SDHA. Pathologic variants identified: KDMSC (Exon 13 and 11) and VHL. Immunohistochemistry results: MLH1, MSH2, MSH6, and PM52 positive. PD-L1 negative.   Surgery/pathology:  Right radical nephrectomy and lymph node dissection done 20--clear cell renal cell carcinoma measures 9.2X8C8.7cm, Grade 3, margins clear, tumor limited to kidney, 4/6 lymph nodes positive for metastatic clear cell carcinoma.   Imagin. CT A/P w/o contrast [facility] 20--bulky right renal mass, midpole, measuring 10cm with evidence of internal necrosis and calcifications, primary mass does not extend outside the perinephric space, associated right perinephric edema, multiple enlarged retroperitoneal lymph nodes at level of the kidneys and inferior retroperitoneum, one measures 2.9X1.4cm, additional node measures 2.1X1.8cm, additional node measures 1.8X1.2cm, inferior retroperitoneal node at level of inferior pole right kidney measures 1.8X1.4cm, no convincing evidence of renal vein involvement, no right adrenal gland involvement, no disease apparent outside of abdomen.  2. CT A/P w/ and w/o contrast [facility] 20--large right renal mass measures 10.6X9.5X7.4cm, with  enlarged retroperitoneal nodes, largest measures 2.7cm.  3. CT chest [facility] 8/28/20--solitary left supraclavicular node, small and not overtly concerning, heterogeneous retroesophageal mass at the level of the tariq c/w metastatic deposit, no other evidence for metastatic disease.  4. MRI brain w/ and w/o contrast [facility] 8/28/20--no evidence for intracranial metastatic disease.  5. CT C/A/P done at [facility] 10/28/20--now appears to be 2 adjacent masses at the level of the tariq, measuring in aggregate 6.2X2.7cm as compared with 5.2X2.1cm, subcentimeter mediastinal and hilar nodes are seen, large intermediate density structure at the right renal fossa extending to pelvic brim, most likely post-operative hematoma or seroma, resolution of previously seen retroperitoneal adenopathy.  6. CT C/A/P w&w/o at [facility] on 12/23/20--Interval enlargement of the retro-esophageal mass with mild interval increase in size of multiple paraesophageal and prevascular lymph nodes compatible with progression of disease. Interval decrease in size of the right retroperitoneal postop seroma or hematoma. No evidence of metastatic disease below the diaphragm. Prior right nephrectomy and adrenalectomy. Chololithiasis and stable atherosclerosis.   7. CT C/A/P w/o contrast done at [facility] on 4/5/21--interval decrease in size of multiple prevascular, subcarinal and para-esophageal lymph nodes c/w response to treatment, no new pulmonary nodule or lymph node, no evidence for metastatic disease below the diaphragm, interval decrease in size of the post-operative seromas adjacent to right psoas muscle.  8. CT C/A/P w/o contrast done at [facility] on 7/7/21--stable appearance of previously identified prevascular, subcarinal, and paraesophageal nodes, there are an increased number of sub-centimeter nodes in the aortopulmonary window and precarinal region, nonspecific in appearance, no other evidence of local recurrence or metastatic disease.   9. MRI cervical spine w/ and w/o  contrast done at [facility] 8/4/21--no metastatic disease, multilevel spondylotic changes, no acute abnormality.   10. MRI brain w/ and w/o contrast 8/4/21--normal maturation of the previously seen lacunar infarct at the left centrum semiovale, no new abnormalities, specifically, no evidence of intracranial metastatic disease.   11. CT C/A/P w/o contrast done at [facility] 10/22/21--stable reactive mediastinal adenopathy, interval resection of right kidney and adrenal gland with post-surgical changes but no evidence of metastatic disease.   12. CT C/A/P w/o contrast done at [facility] 1/26/22--4 new pulmonary nodules larges LLL 14mm, 9mm LLL, 5mm LLL, 5mm RML, with enlarging prevascular lymph node now measures 18mm, previous 8mm, concerning for metastatic disease, new mesenteric implant in LLQ measures 11mm, enlarging retroperitoneal LN now measures [id number], previous 14X8mm concerning for progression disease, cholelithiasis.   13. MRI brain w/ and w/o  contrast done at [facility] 3/31/22--no acute intracranial abnormality.   14. CT C/A/P w/o contrast done at [facility] 4/5/22--progressive retroperitoneal and mediastinal adenopathy, with development of hilar adenopathy, increase in size and number of pulmonary nodules.   15. CT done at [facility] showed large hematoma 14cm in bladder, results not available.  16. CT head w/o contrast done at [facility] 7/31/22--No intracranial hemorrhage or acute traumatic brain parenchymal injury evident by CT.     17. CT C/A/P w/o contrast done at [facility] 8/29/22--mixed response to treatment, worsening mediastinal adenopathy, stable pulmonary nodules, improved retroperitoneal lymph node, enlarging pericaval lymph node.  18. CT C/A/P w/o contrast at [facility] 11/18/22--Interval decreased in mediastinal adenopathy, pulmonary nodules and portacaval nodes, c/w treatment response.   19. CT C/A/P w/ contrast at [facility] 2/8/23--stable mediastinal lymph nodes, enlarging left lower lobe pulmonary nodule, enlarging retroperitoneal lymph nodes.   20. CT  C/A/P w/o contrast at [facility] done 6/20/23--minimally enlarged mediastinal lymph nodes, otherwise no other significant changes, pericaval nodes unchanged.   21. CT C/A/P w/o contrast at [facility] done 10/11/23--improved appearance of the right lung base with decreased ground glass opacity, stable to decreased size of mediastinal and pericaval lymph nodes (index prevascular node decreased 1X1.6cm, pericaval measures 1.6X2cm (previous 2X2.4cm),   22. CT C/A/P w/o contrast at [facility] on 1/10/24--Small left basilar pulmonary nodule stable. No additional pulmonary nodules. Millimetric size increase of right peritracheal and aorticopulmonary lymph nodes as well as nodes at the azygos esophageal recess. Intraluminal gallbladder sludge and possible tiny stones. Postsurgical changes of the right nephrectomy. Cortical scarring or postsurgical changes of the upper pole of the left kidney.   23. CT C/A/P w/ contrast at [facility] 3/11/24--stable mediastinal adenopathy, with stable 2cm LLL pulmonary nodule in left infrahilar region however, there are a few additional pulmonary micronodules in right and left lower lobe concerning for progressive disease, for example 4.9mm LLL subpleural nodule, with a few additional new bilateral lower lobe pulmonary nodules measuring 2-3mm.    Treatment History:   1. IL2 started on 11/2/20. 2nd cycle started 11/16/20 - stopped after 7 days due to acute renal failure, volume overload and confusion. Stopped due to progression.  2. Keytruda/Inlyta 1/8/21--1/31/22 (stopped due to progression, autoimmune hepatitis).  3. Prednisone 80mg daily 1/31/22--weaned and stopped 4/14/22.    Hospitalization:   [facility] 7/2022 14cm hematoma s/p TURP causing hydronephrosis, JOE and patient required initiation of dialysis, UTI, Covid infection.     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     Treatment Plan:  Cabometyx 60mg daily started 4/7/22  Decrease dose to 40mg daily 4/28/22  due to side effects.  HOLD on 5/19/22 until groin rash and abscess heal/also underwent TURP on 6/23/22  Restarted 7/6/22--held 7/16/22 for hospitalization/bladder hematoma  Cabometyx 40mg daily restarted 9/1/22. On hold since 10/24/22 for hand/foot syndrome.   Restarted on 11/9/22, held 11/28/22 for hand-foot syndrome, foot wound.   Dose decreased to 20mg daily. Restarted on 1/5/23. Stopped on 3/17/23 due to diabetic foot wound.   Restarted 6/1/23    Chief Complaint: Peripheral Neuropathy, Nausea, Diarrhea (Pt reports a loss of appetite.), and Itching    HPI   Patient presents for follow-up of renal cell carcinoma. He continues on Cabometyx 20mg daily. He is doing well. He still has occasional diarrhea which is controlled with medications. He did have one other sore on his toe and saw Dr. Wright and is now healed. No rashes or mouth sores. Recent CT shows a few new millimetric lung nodules, but otherwise stable. Discussed recommendations to continue for now. He did lose his 34yo son recently to heart failure that was drug related. Reports that his skin itching has returned and he will get back on Vistaril.     Past Medical History:   Diagnosis Date    Arthritis of knee 08/13/2013    Cancer     stage IV renal cell carcinoma    Cataract     Coronary artery disease     Diabetes mellitus, type 2     HTN (hypertension) 08/13/2013    Hx of CABG     Hyperlipidemia     Type II or unspecified type diabetes mellitus without mention of complication, not stated as uncontrolled 08/13/2013      Review of patient's allergies indicates:  No Known Allergies     Current Outpatient Medications:     allopurinoL (ZYLOPRIM) 300 MG tablet, Take 200 mg by mouth once daily., Disp: , Rfl:     amLODIPine (NORVASC) 10 MG tablet, Take 1 tablet (10 mg total) by mouth every morning., Disp: 30 tablet, Rfl: 11    aspirin 81 mg Cap, Take 81 mg by mouth once daily at 6am., Disp: , Rfl:     atorvastatin (LIPITOR) 80 MG tablet, Take 80 mg by mouth  "once daily., Disp: , Rfl:     azelastine (ASTELIN) 137 mcg (0.1 %) nasal spray, SMARTSIG:Both Nares, Disp: , Rfl:     cabozantinib (CABOMETYX) 20 mg Tab, Take 1 tablet (20 mg) by mouth once daily., Disp: 30 tablet, Rfl: 6    carvediloL (COREG) 12.5 MG tablet, Take 1 tablet by mouth 2 (two) times daily., Disp: , Rfl:     colchicine (COLCRYS) 0.6 mg tablet, Take 2 tablets by mouth once, then take 1 tablet 1 hour later. Max 1.8 mg/day. Then take 1 tablets by mouth daily until 2 days after flare resolves., Disp: , Rfl:     desvenlafaxine succinate (PRISTIQ) 100 MG Tb24, Take 100 mg by mouth., Disp: , Rfl:     diphenoxylate-atropine 2.5-0.025 mg (LOMOTIL) 2.5-0.025 mg per tablet, Take 1 tablet by mouth as needed., Disp: , Rfl:     docusate sodium 100 mg capsule, Take by mouth once daily., Disp: , Rfl:     finasteride (PROSCAR) 5 mg tablet, Take 5 mg by mouth as needed., Disp: , Rfl:     furosemide (LASIX) 20 MG tablet, Take 20 mg by mouth once daily. Pt stated he thinks her takes 10mg daily, Disp: , Rfl:     hydrocortisone 2.5 % cream, APPLY TO THE AFFECTED AREA(S) TWICE DAILY as needed for SKIN IRRITATION FOR 14 DAYS, Disp: , Rfl:     hydrOXYzine pamoate (VISTARIL) 25 MG Cap, Take 1 capsule (25 mg total) by mouth every 4 (four) hours as needed (itching)., Disp: 30 capsule, Rfl: 4    insulin glargine (LANTUS) 100 unit/mL injection, Inject into the skin. Takes 20units, Disp: , Rfl:     insulin syr/ndl U100 half warner (BD VEO INSULIN SYR, HALF UNIT,) 0.3 mL 31 gauge x 15/64" Syrg, USE 1 syringe DAILY, Disp: , Rfl:     lancets Misc, Use TID to check blood sugar. Dx: E11.9, patient is insulin dependent, Disp: , Rfl:     multivitamin (THERAGRAN) per tablet, Take 1 tablet by mouth once daily., Disp: , Rfl:     ondansetron (ZOFRAN) 8 MG tablet, Take 1 tablet (8 mg total) by mouth every 8 (eight) hours as needed for Nausea., Disp: 30 tablet, Rfl: 2    pantoprazole (PROTONIX) 40 MG tablet, TAKE 1 TABLET BY MOUTH EVERY DAY, Disp: " 30 tablet, Rfl: 3    pregabalin (LYRICA) 100 MG capsule, Take 100 mg by mouth., Disp: , Rfl:     prochlorperazine (COMPAZINE) 5 MG tablet, Take 5 mg by mouth every 8 (eight) hours as needed., Disp: , Rfl:     QUEtiapine (SEROQUEL) 50 MG tablet, Take 3 tablets by mouth every evening., Disp: , Rfl:     tamsulosin (FLOMAX) 0.4 mg Cap, TAKE 1 CAPSULE BY MOUTH EVERY EVENING, Disp: 30 capsule, Rfl: 6    tolterodine (DETROL LA) 4 MG 24 hr capsule, Take 4 mg by mouth once daily., Disp: , Rfl:     traMADoL (ULTRAM) 50 mg tablet, Take 50 mg by mouth every 6 (six) hours., Disp: , Rfl:     TRUE METRIX GLUCOSE TEST STRIP Strp, 3 (three) times daily., Disp: , Rfl:     tuberculin,purif.prot.deriv. (TUBERSOL IDRM), Inject 0.1 mLs into the skin., Disp: , Rfl:     hyoscyamine (LEVSIN/SL) 0.125 mg Subl, Place 0.125 mg under the tongue every 6 (six) hours as needed., Disp: , Rfl:     sertraline (ZOLOFT) 50 MG tablet, Take 50 mg by mouth., Disp: , Rfl:      Review of Systems   Constitutional:  Positive for fatigue. Negative for appetite change and unexpected weight change.   HENT:  Negative for mouth sores.    Respiratory:  Negative for cough and shortness of breath.    Cardiovascular:  Negative for chest pain.   Gastrointestinal:  Positive for diarrhea. Negative for abdominal pain.   Genitourinary:  Negative for frequency.   Musculoskeletal:  Negative for back pain.   Integumentary:         S/p left great toe amputation, improved   Neurological:  Positive for numbness. Negative for headaches.   Hematological:  Negative for adenopathy.   Psychiatric/Behavioral:  The patient is not nervous/anxious.      Vitals:    03/14/24 1250   BP: 129/74   Pulse: 68   Resp: 14   Temp: 97.8 °F (36.6 °C)       Wt Readings from Last 3 Encounters:   03/14/24 1250 96.8 kg (213 lb 8 oz)   02/15/24 1246 99.5 kg (219 lb 4.8 oz)   01/12/24 0904 98.6 kg (217 lb 4.8 oz)     Physical Exam  Constitutional:       Appearance: Normal appearance. He is overweight.    HENT:      Head: Normocephalic and atraumatic.      Nose: Nose normal.   Eyes:      General: Lids are normal. Vision grossly intact.      Extraocular Movements: Extraocular movements intact.      Conjunctiva/sclera: Conjunctivae normal.   Cardiovascular:      Rate and Rhythm: Normal rate and regular rhythm.      Heart sounds: Normal heart sounds.   Pulmonary:      Effort: Pulmonary effort is normal.      Breath sounds: Normal breath sounds.   Chest:      Comments: HD access removed to right upper CW  Abdominal:      General: Abdomen is protuberant. Bowel sounds are normal. There is no distension.      Palpations: Abdomen is soft.      Tenderness: There is no abdominal tenderness.   Musculoskeletal:         General: Normal range of motion.      Cervical back: Neck supple.      Right lower leg: No edema.      Left lower leg: No edema.   Feet:      Comments: Feet not examined today  Lymphadenopathy:      Cervical: No cervical adenopathy.      Upper Body:      Right upper body: No supraclavicular or axillary adenopathy.      Left upper body: No supraclavicular or axillary adenopathy.   Skin:     General: Skin is warm and dry.   Neurological:      General: No focal deficit present.      Mental Status: He is alert and oriented to person, place, and time.   Psychiatric:         Attention and Perception: Attention normal.         Mood and Affect: Mood normal.         Speech: Speech normal.         Behavior: Behavior normal. Behavior is cooperative.       Lab Visit on 03/14/2024   Component Date Value Ref Range Status    WBC 03/14/2024 10.03  4.50 - 11.50 x10(3)/mcL Final    RBC 03/14/2024 5.18  4.70 - 6.10 x10(6)/mcL Final    Hgb 03/14/2024 12.8 (L)  14.0 - 18.0 g/dL Final    Hct 03/14/2024 40.5 (L)  42.0 - 52.0 % Final    MCV 03/14/2024 78.2 (L)  80.0 - 94.0 fL Final    MCH 03/14/2024 24.7 (L)  27.0 - 31.0 pg Final    MCHC 03/14/2024 31.6 (L)  33.0 - 36.0 g/dL Final    RDW 03/14/2024 18.5 (H)  11.5 - 17.0 % Final     Platelet 03/14/2024 286  130 - 400 x10(3)/mcL Final    MPV 03/14/2024 9.6  7.4 - 10.4 fL Final    Neut % 03/14/2024 73.0  % Final    Lymph % 03/14/2024 16.9  % Final    Mono % 03/14/2024 6.9  % Final    Eos % 03/14/2024 2.8  % Final    Basophil % 03/14/2024 0.1  % Final    Lymph # 03/14/2024 1.70  0.6 - 4.6 x10(3)/mcL Final    Neut # 03/14/2024 7.32  2.1 - 9.2 x10(3)/mcL Final    Mono # 03/14/2024 0.69  0.1 - 1.3 x10(3)/mcL Final    Eos # 03/14/2024 0.28  0 - 0.9 x10(3)/mcL Final    Baso # 03/14/2024 0.01  <=0.2 x10(3)/mcL Final    IG# 03/14/2024 0.03  0 - 0.04 x10(3)/mcL Final    IG% 03/14/2024 0.3  % Final      Assessment:        1. Clear cell carcinoma of right kidney    2. 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  CT chest showed mediastinal fanny mass and biopsy done via EUS + for metastatic renal cell carcinoma.  Patient s/p radical right nephrectomy done 9/11/20. Pathology showed 9.2cm clear cell renal cell carcinoma, margins clear with 4/6 lymph nodes positive.  Post-operative course complicated by hematoma requiring blood transfusion.  CARIS results- MSI stable, Mismatch repair status proficient. Tumor burden low. Negative for: PD-L1, NTRK1/2/3, BAP1, FH, MET, PBRM1, SDHA. Pathologic variants identified: KDMSC (Exon 13 and 11) and VHL. Immunohistochemistry results: MLH1, MSH2, MSH6, and PM52 positive. PD-L1 negative.     Referred to Dr. Haja Sal for evaluation for IL-2. Explained this is only treatment to be shown to achieve long-term remissions in stage IV disease and patient has limited disease at this time.  Patient started 1st 5 days of IL-2 on 11/2/20.   Started his 2nd round on 11/16/20 and it was stopped on 11/22/20 due to acute renal failure, volume overload and agitation.   CT scan on 12/23/20 at [facility] revealed progression of  his disease. Dr. Sal contacted patient and discussed the results. He also reached out to Dr. Alvarado to notify her that the patient will not be returning.   Started Keytruda 200mg every 3 weeks on 1/8/21 + Inlyta 5mg oral BID on 1/11/21.  CT C/A/P done at [facility] on 10/22/21 showed stable mild reactive mediastinal nodes, otherwise stable.  Patient continued on treatment s/p cycle 18 Keytruda given 1/10/22.  Labs on 1/10/22 showed elevated LFTs. Inlyta held.  CT C/A/P done at [facility] on 1/26/22 showed unfortunately disease progression with new pulmonary nodules, enlargement in mediastinal node, new mesenteric nodule and retroperitoneal adenopathy.  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 Hepatitis panel negative. Keytruda stopped. Started on Prednisone 80mg daily on 1/31/22.  Patient had suicidal ideation and severe depression during visit in 3/2022, seen by psychiatry and meds adjusted, much improved.  CT C/A/P from [facility] on 4/5/22 showed progressive disease.    Treatment change recommended to Cabometyx 60mg daily per NCCN guidelines (Category 1) for subsequent treatment. Started on 4/7/22.  Prednisone weaned and stopped 4/14/22.  Patient was having multiple side effects (n/v, diarrhea, mouth sores) from Cabometyx.  Reduced dose to 40mg on 4/28/22. Held on 5/19/22 due to cellulitis/rashes/abscesses.      Continued to hold s/p TURP on 6/23/22 and restarted 7/6/22.  Patient hospitalized at [facility] 7/16/22 for large bladder hematoma s/p clot evacuation X 2. Cabometyx stopped again.   Repeat CT C/A/P done at [facility] on 8/29/22 showed mixed findings, but mostly disease progression in fanny metastases in mediastinum, which is to be expected since he has been off treatment.   Restarted back on Cabometyx (lower dose 40mg) on 9/1/22 although it has not been studied in dialysis patients, but no dosage adjustment is usually required.  Held on 10/24/22 for Hand-foot syndrome, restarted  11/9/22.   CT done at [facility] on 11/18/22 with disease improvement.  Cabometyx held again for hand-foot syndrome and wound on left toe 11/28/22.  Restarted Cabometyx on 1/5/23 at 20mg daily.    Cabometyx held on 3/17/23 due to worsening diabetic foot wound.  S/p left great toe amputation on 5/8/23, healed and cleared by podiatry to restart Cabometyx which was restarted on 6/1/23 Cabometyx 20mg daily.    Repeat CT C/A/P on 3/11/24 with a few new millimetric lung nodules, but other disease stable.   Unclear if these actually represent disease, since so small so do not recommend any treatment change at this time.  Plan to repeat CT C/A/P w/o contrast (oral contrast only) in 3 months at [facility].     Labs today with CBC ongoing mild anemia, CMP pending.     Continue Cabometyx. Patient to watch his feet closely, as he is prone to getting nonhealing wounds.     RTC 4 weeks for follow-up with repeat labs here same day as visit.     Looking ahead to future treatment options, would try again with immunotherapy, single agent Opdivo. He never really failed Keytruda, just developed autoimmune hepatitis, so this would need to be monitored closely.     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 Referred for genetic testing which was done on 3/16/23 and negative.      Continue Compazine for nausea.   Continue Norco for joint pain.   Continue Vistaril for chronic pruritis.   Start OTC calcium supplements    All questions answered at this time.    Elizabeth Alvarado MD

## 2024-04-04 NOTE — PROGRESS NOTES
Subjective:       Patient ID: Ye Olivera is a 64 y.o. male.    Urologist: Dr. Christopher Villasenor  PCP: Dr. Ly Quevedo  Nephrologist: Dr. Jenae Le    Renal Cell Carcinoma Stage IV([id number]) Mediastinal lymph nodes--Diagnosed 20  Biopsy/pathology:  Upper EUS done 9/3/20--large cluster of malignant appearing lymph nodes in subcarinal mediastinum causing extrinsic compression in middle 1/3 and thoracic esophagus biopsy c/w metastatic carcinoma, c/w renal cell carcinoma CD10+, PAX -8+, erythematous mucosa in antrum biopsy c/w mild chronic gastritis, inactive, normal duodenum, no pathology in CBD or left lobe of liver or pancreas. CARIS results- MSI stable, Mismatch repair status proficient. Tumor burden low. Negative for: PD-L1, NTRK1/2/3, BAP1, FH, MET, PBRM1, SDHA. Pathologic variants identified: KDMSC (Exon 13 and 11) and VHL. Immunohistochemistry results: MLH1, MSH2, MSH6, and PM52 positive. PD-L1 negative.   Surgery/pathology:  Right radical nephrectomy and lymph node dissection done 20--clear cell renal cell carcinoma measures 9.2X8C8.7cm, Grade 3, margins clear, tumor limited to kidney, 4/6 lymph nodes positive for metastatic clear cell carcinoma.   Imagin. CT A/P w/o contrast [facility] 20--bulky right renal mass, midpole, measuring 10cm with evidence of internal necrosis and calcifications, primary mass does not extend outside the perinephric space, associated right perinephric edema, multiple enlarged retroperitoneal lymph nodes at level of the kidneys and inferior retroperitoneum, one measures 2.9X1.4cm, additional node measures 2.1X1.8cm, additional node measures 1.8X1.2cm, inferior retroperitoneal node at level of inferior pole right kidney measures 1.8X1.4cm, no convincing evidence of renal vein involvement, no right adrenal gland involvement, no disease apparent outside of abdomen.  2. CT A/P w/ and w/o contrast [facility] 20--large right renal mass measures 10.6X9.5X7.4cm, with  enlarged retroperitoneal nodes, largest measures 2.7cm.  3. CT chest [facility] 8/28/20--solitary left supraclavicular node, small and not overtly concerning, heterogeneous retroesophageal mass at the level of the tariq c/w metastatic deposit, no other evidence for metastatic disease.  4. MRI brain w/ and w/o contrast [facility] 8/28/20--no evidence for intracranial metastatic disease.  5. CT C/A/P done at [facility] 10/28/20--now appears to be 2 adjacent masses at the level of the tariq, measuring in aggregate 6.2X2.7cm as compared with 5.2X2.1cm, subcentimeter mediastinal and hilar nodes are seen, large intermediate density structure at the right renal fossa extending to pelvic brim, most likely post-operative hematoma or seroma, resolution of previously seen retroperitoneal adenopathy.  6. CT C/A/P w&w/o at [facility] on 12/23/20--Interval enlargement of the retro-esophageal mass with mild interval increase in size of multiple paraesophageal and prevascular lymph nodes compatible with progression of disease. Interval decrease in size of the right retroperitoneal postop seroma or hematoma. No evidence of metastatic disease below the diaphragm. Prior right nephrectomy and adrenalectomy. Chololithiasis and stable atherosclerosis.   7. CT C/A/P w/o contrast done at [facility] on 4/5/21--interval decrease in size of multiple prevascular, subcarinal and para-esophageal lymph nodes c/w response to treatment, no new pulmonary nodule or lymph node, no evidence for metastatic disease below the diaphragm, interval decrease in size of the post-operative seromas adjacent to right psoas muscle.  8. CT C/A/P w/o contrast done at [facility] on 7/7/21--stable appearance of previously identified prevascular, subcarinal, and paraesophageal nodes, there are an increased number of sub-centimeter nodes in the aortopulmonary window and precarinal region, nonspecific in appearance, no other evidence of local recurrence or metastatic disease.   9. MRI cervical spine w/ and w/o  contrast done at [facility] 8/4/21--no metastatic disease, multilevel spondylotic changes, no acute abnormality.   10. MRI brain w/ and w/o contrast 8/4/21--normal maturation of the previously seen lacunar infarct at the left centrum semiovale, no new abnormalities, specifically, no evidence of intracranial metastatic disease.   11. CT C/A/P w/o contrast done at [facility] 10/22/21--stable reactive mediastinal adenopathy, interval resection of right kidney and adrenal gland with post-surgical changes but no evidence of metastatic disease.   12. CT C/A/P w/o contrast done at [facility] 1/26/22--4 new pulmonary nodules larges LLL 14mm, 9mm LLL, 5mm LLL, 5mm RML, with enlarging prevascular lymph node now measures 18mm, previous 8mm, concerning for metastatic disease, new mesenteric implant in LLQ measures 11mm, enlarging retroperitoneal LN now measures [id number], previous 14X8mm concerning for progression disease, cholelithiasis.   13. MRI brain w/ and w/o  contrast done at [facility] 3/31/22--no acute intracranial abnormality.   14. CT C/A/P w/o contrast done at [facility] 4/5/22--progressive retroperitoneal and mediastinal adenopathy, with development of hilar adenopathy, increase in size and number of pulmonary nodules.   15. CT done at [facility] showed large hematoma 14cm in bladder, results not available.  16. CT head w/o contrast done at [facility] 7/31/22--No intracranial hemorrhage or acute traumatic brain parenchymal injury evident by CT.     17. CT C/A/P w/o contrast done at [facility] 8/29/22--mixed response to treatment, worsening mediastinal adenopathy, stable pulmonary nodules, improved retroperitoneal lymph node, enlarging pericaval lymph node.  18. CT C/A/P w/o contrast at [facility] 11/18/22--Interval decreased in mediastinal adenopathy, pulmonary nodules and portacaval nodes, c/w treatment response.   19. CT C/A/P w/ contrast at [facility] 2/8/23--stable mediastinal lymph nodes, enlarging left lower lobe pulmonary nodule, enlarging retroperitoneal lymph nodes.   20. CT  C/A/P w/o contrast at [facility] done 6/20/23--minimally enlarged mediastinal lymph nodes, otherwise no other significant changes, pericaval nodes unchanged.   21. CT C/A/P w/o contrast at [facility] done 10/11/23--improved appearance of the right lung base with decreased ground glass opacity, stable to decreased size of mediastinal and pericaval lymph nodes (index prevascular node decreased 1X1.6cm, pericaval measures 1.6X2cm (previous 2X2.4cm),   22. CT C/A/P w/o contrast at [facility] on 1/10/24--Small left basilar pulmonary nodule stable. No additional pulmonary nodules. Millimetric size increase of right peritracheal and aorticopulmonary lymph nodes as well as nodes at the azygos esophageal recess. Intraluminal gallbladder sludge and possible tiny stones. Postsurgical changes of the right nephrectomy. Cortical scarring or postsurgical changes of the upper pole of the left kidney.   23. CT C/A/P w/ contrast at [facility] 3/11/24--stable mediastinal adenopathy, with stable 2cm LLL pulmonary nodule in left infrahilar region however, there are a few additional pulmonary micronodules in right and left lower lobe concerning for progressive disease, for example 4.9mm LLL subpleural nodule, with a few additional new bilateral lower lobe pulmonary nodules measuring 2-3mm.    Treatment History:   1. IL2 started on 11/2/20. 2nd cycle started 11/16/20 - stopped after 7 days due to acute renal failure, volume overload and confusion. Stopped due to progression.  2. Keytruda/Inlyta 1/8/21--1/31/22 (stopped due to progression, autoimmune hepatitis).  3. Prednisone 80mg daily 1/31/22--weaned and stopped 4/14/22.    Hospitalization:   [facility] 7/2022 14cm hematoma s/p TURP causing hydronephrosis, JOE and patient required initiation of dialysis, UTI, Covid infection.     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     Treatment Plan:  Cabometyx 60mg daily started 4/7/22  Decrease dose to 40mg daily 4/28/22  due to side effects.  HOLD on 5/19/22 until groin rash and abscess heal/also underwent TURP on 6/23/22  Restarted 7/6/22--held 7/16/22 for hospitalization/bladder hematoma  Cabometyx 40mg daily restarted 9/1/22. On hold since 10/24/22 for hand/foot syndrome.   Restarted on 11/9/22, held 11/28/22 for hand-foot syndrome, foot wound.   Dose decreased to 20mg daily. Restarted on 1/5/23. Stopped on 3/17/23 due to diabetic foot wound.   Restarted 6/1/23.     Chief Complaint: Nausea, Constipation, and Peripheral Neuropathy (Pt states that PCP sent a referral to neurology for shakes and memory loss.)    HPI   Patient presents for follow-up of renal cell carcinoma. He is doing fairly well. Continues on Cabometyx 20mg daily. Mentions he is currently on antibiotics for an abscess on his back. He has intermittent nausea. No rashes or mouth sores. Denies any new wounds/lesions to his lower extremities. He has an insert that he wears to prevent his toes from rubbing together. Still followed closely by Dr. Wright. His PCP referred him to neurology for his memory loss and tremors to his hand.      Past Medical History:   Diagnosis Date    Arthritis of knee 08/13/2013    Cancer     stage IV renal cell carcinoma    Cataract     Coronary artery disease     Diabetes mellitus, type 2     HTN (hypertension) 08/13/2013    Hx of CABG     Hyperlipidemia     Type II or unspecified type diabetes mellitus without mention of complication, not stated as uncontrolled 08/13/2013      Review of patient's allergies indicates:  No Known Allergies     Current Outpatient Medications:     allopurinoL (ZYLOPRIM) 300 MG tablet, Take 200 mg by mouth once daily., Disp: , Rfl:     amLODIPine (NORVASC) 10 MG tablet, Take 1 tablet (10 mg total) by mouth every morning., Disp: 30 tablet, Rfl: 11    amoxicillin (AMOXIL) 500 MG capsule, Take 500 mg by mouth., Disp: , Rfl:     aspirin 81 mg Cap, Take 81 mg by mouth once daily at 6am., Disp: , Rfl:     atorvastatin  "(LIPITOR) 80 MG tablet, Take 80 mg by mouth once daily., Disp: , Rfl:     azelastine (ASTELIN) 137 mcg (0.1 %) nasal spray, SMARTSIG:Both Nares, Disp: , Rfl:     cabozantinib (CABOMETYX) 20 mg Tab, Take 1 tablet (20 mg) by mouth once daily., Disp: 30 tablet, Rfl: 6    carvediloL (COREG) 12.5 MG tablet, Take 1 tablet by mouth 2 (two) times daily., Disp: , Rfl:     colchicine (COLCRYS) 0.6 mg tablet, Take 2 tablets by mouth once, then take 1 tablet 1 hour later. Max 1.8 mg/day. Then take 1 tablets by mouth daily until 2 days after flare resolves., Disp: , Rfl:     desvenlafaxine succinate (PRISTIQ) 100 MG Tb24, Take 100 mg by mouth., Disp: , Rfl:     diphenoxylate-atropine 2.5-0.025 mg (LOMOTIL) 2.5-0.025 mg per tablet, Take 1 tablet by mouth as needed., Disp: , Rfl:     docusate sodium 100 mg capsule, Take by mouth once daily., Disp: , Rfl:     finasteride (PROSCAR) 5 mg tablet, Take 5 mg by mouth as needed., Disp: , Rfl:     furosemide (LASIX) 20 MG tablet, Take 20 mg by mouth once daily. Pt stated he thinks her takes 10mg daily, Disp: , Rfl:     hydrocortisone 2.5 % cream, APPLY TO THE AFFECTED AREA(S) TWICE DAILY as needed for SKIN IRRITATION FOR 14 DAYS, Disp: , Rfl:     hyoscyamine (LEVSIN/SL) 0.125 mg Subl, Place 0.125 mg under the tongue every 6 (six) hours as needed., Disp: , Rfl:     insulin glargine (LANTUS) 100 unit/mL injection, Inject into the skin. Takes 20units, Disp: , Rfl:     insulin syr/ndl U100 half warner (BD VEO INSULIN SYR, HALF UNIT,) 0.3 mL 31 gauge x 15/64" Syrg, USE 1 syringe DAILY, Disp: , Rfl:     lancets Misc, Use TID to check blood sugar. Dx: E11.9, patient is insulin dependent, Disp: , Rfl:     multivitamin (THERAGRAN) per tablet, Take 1 tablet by mouth once daily., Disp: , Rfl:     ondansetron (ZOFRAN) 8 MG tablet, Take 1 tablet (8 mg total) by mouth every 8 (eight) hours as needed for Nausea., Disp: 30 tablet, Rfl: 2    pantoprazole (PROTONIX) 40 MG tablet, TAKE 1 TABLET BY MOUTH EVERY " DAY, Disp: 30 tablet, Rfl: 3    pregabalin (LYRICA) 100 MG capsule, Take 100 mg by mouth., Disp: , Rfl:     prochlorperazine (COMPAZINE) 5 MG tablet, Take 5 mg by mouth every 8 (eight) hours as needed., Disp: , Rfl:     QUEtiapine (SEROQUEL) 50 MG tablet, Take 3 tablets by mouth every evening., Disp: , Rfl:     tamsulosin (FLOMAX) 0.4 mg Cap, TAKE 1 CAPSULE BY MOUTH EVERY EVENING, Disp: 30 capsule, Rfl: 6    tolterodine (DETROL LA) 4 MG 24 hr capsule, Take 4 mg by mouth once daily., Disp: , Rfl:     traMADoL (ULTRAM) 50 mg tablet, Take 50 mg by mouth every 6 (six) hours., Disp: , Rfl:     TRUE METRIX GLUCOSE TEST STRIP Strp, 3 (three) times daily., Disp: , Rfl:     tuberculin,purif.prot.deriv. (TUBERSOL IDRM), Inject 0.1 mLs into the skin., Disp: , Rfl:     sertraline (ZOLOFT) 50 MG tablet, Take 50 mg by mouth. (Patient not taking: Reported on 4/11/2024), Disp: , Rfl:      Review of Systems   Constitutional:  Positive for fatigue. Negative for appetite change and unexpected weight change.   HENT:  Negative for mouth sores.    Respiratory:  Negative for cough and shortness of breath.    Cardiovascular:  Negative for chest pain.   Gastrointestinal:  Positive for nausea. Negative for abdominal pain.   Genitourinary:  Negative for frequency.   Musculoskeletal:  Negative for back pain.   Integumentary:         S/p left great toe amputation, improved. Healing back abscess   Neurological:  Positive for numbness. Negative for headaches.   Hematological:  Negative for adenopathy.   Psychiatric/Behavioral:  The patient is not nervous/anxious.      Vitals:    04/11/24 0957   BP: 103/67   Pulse: 79   Resp: 14   Temp: 97.8 °F (36.6 °C)     Wt Readings from Last 3 Encounters:   04/11/24 0957 99.2 kg (218 lb 12.8 oz)   03/14/24 1250 96.8 kg (213 lb 8 oz)   02/15/24 1246 99.5 kg (219 lb 4.8 oz)     Physical Exam  Constitutional:       Appearance: Normal appearance. He is overweight.   HENT:      Head: Normocephalic and atraumatic.       Nose: Nose normal.   Eyes:      General: Lids are normal. Vision grossly intact.      Extraocular Movements: Extraocular movements intact.      Conjunctiva/sclera: Conjunctivae normal.   Cardiovascular:      Rate and Rhythm: Normal rate and regular rhythm.      Heart sounds: Normal heart sounds.   Pulmonary:      Effort: Pulmonary effort is normal.      Breath sounds: Normal breath sounds.   Chest:      Comments: HD access removed to right upper CW  Abdominal:      General: Abdomen is protuberant. Bowel sounds are normal. There is no distension.      Palpations: Abdomen is soft.      Tenderness: There is no abdominal tenderness.   Musculoskeletal:         General: Normal range of motion.      Cervical back: Neck supple.      Right lower leg: No edema.      Left lower leg: No edema.   Feet:      Comments: Feet not examined today  Lymphadenopathy:      Cervical: No cervical adenopathy.      Upper Body:      Right upper body: No supraclavicular or axillary adenopathy.      Left upper body: No supraclavicular or axillary adenopathy.   Skin:     General: Skin is warm and dry.      Comments: Left lower back with healing abscess. No s/s of infection noted.    Neurological:      General: No focal deficit present.      Mental Status: He is alert and oriented to person, place, and time.   Psychiatric:         Attention and Perception: Attention normal.         Mood and Affect: Mood normal.         Speech: Speech normal.         Behavior: Behavior normal. Behavior is cooperative.       Lab Visit on 04/11/2024   Component Date Value Ref Range Status    WBC 04/11/2024 10.17  4.50 - 11.50 x10(3)/mcL Final    RBC 04/11/2024 4.96  4.70 - 6.10 x10(6)/mcL Final    Hgb 04/11/2024 12.7 (L)  14.0 - 18.0 g/dL Final    Hct 04/11/2024 39.8 (L)  42.0 - 52.0 % Final    MCV 04/11/2024 80.2  80.0 - 94.0 fL Final    MCH 04/11/2024 25.6 (L)  27.0 - 31.0 pg Final    MCHC 04/11/2024 31.9 (L)  33.0 - 36.0 g/dL Final    RDW 04/11/2024 19.4 (H)   11.5 - 17.0 % Final    Platelet 04/11/2024 248  130 - 400 x10(3)/mcL Final    MPV 04/11/2024 9.6  7.4 - 10.4 fL Final    Neut % 04/11/2024 70.0  % Final    Lymph % 04/11/2024 17.7  % Final    Mono % 04/11/2024 8.5  % Final    Eos % 04/11/2024 2.7  % Final    Basophil % 04/11/2024 0.3  % Final    Lymph # 04/11/2024 1.80  0.6 - 4.6 x10(3)/mcL Final    Neut # 04/11/2024 7.13  2.1 - 9.2 x10(3)/mcL Final    Mono # 04/11/2024 0.86  0.1 - 1.3 x10(3)/mcL Final    Eos # 04/11/2024 0.27  0 - 0.9 x10(3)/mcL Final    Baso # 04/11/2024 0.03  <=0.2 x10(3)/mcL Final    IG# 04/11/2024 0.08 (H)  0 - 0.04 x10(3)/mcL Final    IG% 04/11/2024 0.8  % Final      Assessment:        1. Clear cell carcinoma of right kidney    2. 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 Anemia in stage 3b chronic kidney disease    4. Immunodeficiency due to drugs    5. Stage 3b chronic kidney disease        Plan:         Patient with large right renal mass and retroperitoneal adenopathy diagnosed by CT in 7/2020.  CT chest showed mediastinal fanny mass and biopsy done via EUS + for metastatic renal cell carcinoma.  Patient s/p radical right nephrectomy done 9/11/20. Pathology showed 9.2cm clear cell renal cell carcinoma, margins clear with 4/6 lymph nodes positive.  Post-operative course complicated by hematoma requiring blood transfusion.  CARIS results- MSI stable, Mismatch repair status proficient. Tumor burden low. Negative for: PD-L1, NTRK1/2/3, BAP1, FH, MET, PBRM1, SDHA. Pathologic variants identified: KDMSC (Exon 13 and 11) and VHL. Immunohistochemistry results: MLH1, MSH2, MSH6, and PM52 positive. PD-L1 negative.     Referred to Dr. Haja Sal for evaluation for IL-2. Explained this is only treatment to be shown to achieve long-term remissions in stage IV disease and patient has limited disease at this time.  Patient started 1st 5 days of IL-2 on 11/2/20.   Started his 2nd round on 11/16/20 and it was  stopped on 11/22/20 due to acute renal failure, volume overload and agitation.   CT scan on 12/23/20 at [facility] revealed progression of his disease. Dr. Sal contacted patient and discussed the results. He also reached out to Dr. Alvarado to notify her that the patient will not be returning.   Started Keytruda 200mg every 3 weeks on 1/8/21 + Inlyta 5mg oral BID on 1/11/21.  CT C/A/P done at [facility] on 10/22/21 showed stable mild reactive mediastinal nodes, otherwise stable.  Patient continued on treatment s/p cycle 18 Keytruda given 1/10/22.  Labs on 1/10/22 showed elevated LFTs. Inlyta held.  CT C/A/P done at [facility] on 1/26/22 showed unfortunately disease progression with new pulmonary nodules, enlargement in mediastinal node, new mesenteric nodule and retroperitoneal adenopathy.  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 Hepatitis panel negative. Keytruda stopped. Started on Prednisone 80mg daily on 1/31/22.  Patient had suicidal ideation and severe depression during visit in 3/2022, seen by psychiatry and meds adjusted, much improved.  CT C/A/P from [facility] on 4/5/22 showed progressive disease.    Treatment change recommended to Cabometyx 60mg daily per NCCN guidelines (Category 1) for subsequent treatment. Started on 4/7/22.  Prednisone weaned and stopped 4/14/22.  Patient was having multiple side effects (n/v, diarrhea, mouth sores) from Cabometyx.  Reduced dose to 40mg on 4/28/22. Held on 5/19/22 due to cellulitis/rashes/abscesses.      Continued to hold s/p TURP on 6/23/22 and restarted 7/6/22.  Patient hospitalized at [facility] 7/16/22 for large bladder hematoma s/p clot evacuation X 2. Cabometyx stopped again.   Repeat CT C/A/P done at [facility] on 8/29/22 showed mixed findings, but mostly disease progression in fanny metastases in mediastinum, which is to be expected since he has been off treatment.   Restarted back on Cabometyx (lower dose 40mg) on 9/1/22 although it has not been  studied in dialysis patients, but no dosage adjustment is usually required.  Held on 10/24/22 for Hand-foot syndrome, restarted 11/9/22.   CT done at [facility] on 11/18/22 with disease improvement.  Cabometyx held again for hand-foot syndrome and wound on left toe 11/28/22.  Restarted Cabometyx on 1/5/23 at 20mg daily.    Cabometyx held on 3/17/23 due to worsening diabetic foot wound.  S/p left great toe amputation on 5/8/23, healed and cleared by podiatry to restart Cabometyx which was restarted on 6/1/23 Cabometyx 20mg daily.  Repeat CT C/A/P on 3/11/24 with a few new millimetric lung nodules, but other disease stable.   Unclear if these actually represent disease, since so small so do not recommend any treatment change at this time.  Plan to repeat CT C/A/P w/o contrast (oral contrast only) in June at [facility].   Labs today with CBC ongoing mild anemia, CMP pending.   Continue Cabometyx. Patient to watch his feet closely, as he is prone to getting nonhealing wounds.   Currently on antibiotics for abscess, healing well.   RTC 4 weeks for follow-up with repeat labs here same day as visit.     Looking ahead to future treatment options, would try again with immunotherapy, single agent Opdivo. He never really failed Keytruda, just developed autoimmune hepatitis, so this would need to be monitored closely.   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 Referred for genetic testing which was done on 3/16/23 and negative.      Continue Compazine for nausea.   Continue Norco for joint pain.   Continue Vistaril for chronic pruritis.   Continue OTC calcium supplements  All questions answered at this time.      Jackie Boyd, [facility]

## 2024-04-11 ENCOUNTER — CLINICAL SUPPORT (OUTPATIENT)
Dept: HEMATOLOGY/ONCOLOGY | Facility: CLINIC | Age: 64
End: 2024-04-11
Payer: MEDICARE

## 2024-04-11 ENCOUNTER — OFFICE VISIT (OUTPATIENT)
Dept: HEMATOLOGY/ONCOLOGY | Facility: CLINIC | Age: 64
End: 2024-04-11
Payer: MEDICARE

## 2024-04-11 ENCOUNTER — LAB VISIT (OUTPATIENT)
Dept: LAB | Facility: HOSPITAL | Age: 64
End: 2024-04-11
Attending: INTERNAL MEDICINE
Payer: MEDICARE

## 2024-04-11 VITALS
BODY MASS INDEX: 31.32 KG/M2 | HEIGHT: 70 IN | WEIGHT: 218.81 LBS | RESPIRATION RATE: 14 BRPM | DIASTOLIC BLOOD PRESSURE: 67 MMHG | HEART RATE: 79 BPM | SYSTOLIC BLOOD PRESSURE: 103 MMHG | OXYGEN SATURATION: 99 % | TEMPERATURE: 98 F

## 2024-04-11 DIAGNOSIS — N18.32 ANEMIA IN STAGE 3B CHRONIC KIDNEY DISEASE: ICD-10-CM

## 2024-04-11 DIAGNOSIS — Z79.899 IMMUNODEFICIENCY DUE TO DRUGS: ICD-10-CM

## 2024-04-11 DIAGNOSIS — N18.32 STAGE 3B CHRONIC KIDNEY DISEASE: ICD-10-CM

## 2024-04-11 DIAGNOSIS — D63.1 ANEMIA IN STAGE 3B CHRONIC KIDNEY DISEASE: ICD-10-CM

## 2024-04-11 DIAGNOSIS — E03.2 HYPOTHYROIDISM DUE TO DRUGS: ICD-10-CM

## 2024-04-11 DIAGNOSIS — C64.1 CLEAR CELL CARCINOMA OF RIGHT KIDNEY: ICD-10-CM

## 2024-04-11 DIAGNOSIS — C64.1 CLEAR CELL CARCINOMA OF RIGHT KIDNEY: Primary | ICD-10-CM

## 2024-04-11 DIAGNOSIS — D84.821 IMMUNODEFICIENCY DUE TO DRUGS: ICD-10-CM

## 2024-04-11 DIAGNOSIS — C77.9 MALIGNANT NEOPLASM METASTATIC TO LYMPH NODES, UNSPECIFIED LYMPH NODE REGION: ICD-10-CM

## 2024-04-11 PROBLEM — N17.9 ACUTE NONTRAUMATIC KIDNEY INJURY: Status: ACTIVE | Noted: 2022-08-24

## 2024-04-11 LAB
ALBUMIN SERPL-MCNC: 2.6 G/DL (ref 3.4–4.8)
ALBUMIN/GLOB SERPL: 0.7 RATIO (ref 1.1–2)
ALP SERPL-CCNC: 130 UNIT/L (ref 40–150)
ALT SERPL-CCNC: 17 UNIT/L (ref 0–55)
AST SERPL-CCNC: 15 UNIT/L (ref 5–34)
BASOPHILS # BLD AUTO: 0.03 X10(3)/MCL
BASOPHILS NFR BLD AUTO: 0.3 %
BILIRUB SERPL-MCNC: 0.4 MG/DL
BUN SERPL-MCNC: 27.8 MG/DL (ref 8.4–25.7)
CALCIUM SERPL-MCNC: 8.3 MG/DL (ref 8.8–10)
CHLORIDE SERPL-SCNC: 104 MMOL/L (ref 98–107)
CO2 SERPL-SCNC: 25 MMOL/L (ref 23–31)
CREAT SERPL-MCNC: 1.55 MG/DL (ref 0.73–1.18)
EOSINOPHIL # BLD AUTO: 0.27 X10(3)/MCL (ref 0–0.9)
EOSINOPHIL NFR BLD AUTO: 2.7 %
ERYTHROCYTE [DISTWIDTH] IN BLOOD BY AUTOMATED COUNT: 19.4 % (ref 11.5–17)
GFR SERPLBLD CREATININE-BSD FMLA CKD-EPI: 50 MLS/MIN/1.73/M2
GLOBULIN SER-MCNC: 3.7 GM/DL (ref 2.4–3.5)
GLUCOSE SERPL-MCNC: 135 MG/DL (ref 82–115)
HCT VFR BLD AUTO: 39.8 % (ref 42–52)
HGB BLD-MCNC: 12.7 G/DL (ref 14–18)
IMM GRANULOCYTES # BLD AUTO: 0.08 X10(3)/MCL (ref 0–0.04)
IMM GRANULOCYTES NFR BLD AUTO: 0.8 %
LYMPHOCYTES # BLD AUTO: 1.8 X10(3)/MCL (ref 0.6–4.6)
LYMPHOCYTES NFR BLD AUTO: 17.7 %
MCH RBC QN AUTO: 25.6 PG (ref 27–31)
MCHC RBC AUTO-ENTMCNC: 31.9 G/DL (ref 33–36)
MCV RBC AUTO: 80.2 FL (ref 80–94)
MONOCYTES # BLD AUTO: 0.86 X10(3)/MCL (ref 0.1–1.3)
MONOCYTES NFR BLD AUTO: 8.5 %
NEUTROPHILS # BLD AUTO: 7.13 X10(3)/MCL (ref 2.1–9.2)
NEUTROPHILS NFR BLD AUTO: 70 %
PLATELET # BLD AUTO: 248 X10(3)/MCL (ref 130–400)
PMV BLD AUTO: 9.6 FL (ref 7.4–10.4)
POTASSIUM SERPL-SCNC: 3.7 MMOL/L (ref 3.5–5.1)
PROT SERPL-MCNC: 6.3 GM/DL (ref 5.8–7.6)
RBC # BLD AUTO: 4.96 X10(6)/MCL (ref 4.7–6.1)
SODIUM SERPL-SCNC: 140 MMOL/L (ref 136–145)
TSH SERPL-ACNC: 1.62 UIU/ML (ref 0.35–4.94)
WBC # SPEC AUTO: 10.17 X10(3)/MCL (ref 4.5–11.5)

## 2024-04-11 PROCEDURE — 3008F BODY MASS INDEX DOCD: CPT | Mod: CPTII,S$GLB,, | Performed by: NURSE PRACTITIONER

## 2024-04-11 PROCEDURE — 3074F SYST BP LT 130 MM HG: CPT | Mod: CPTII,S$GLB,, | Performed by: NURSE PRACTITIONER

## 2024-04-11 PROCEDURE — 99999 PR PBB SHADOW E&M-EST. PATIENT-LVL V: CPT | Mod: PBBFAC,,, | Performed by: NURSE PRACTITIONER

## 2024-04-11 PROCEDURE — 85025 COMPLETE CBC W/AUTO DIFF WBC: CPT

## 2024-04-11 PROCEDURE — 3078F DIAST BP <80 MM HG: CPT | Mod: CPTII,S$GLB,, | Performed by: NURSE PRACTITIONER

## 2024-04-11 PROCEDURE — 1159F MED LIST DOCD IN RCRD: CPT | Mod: CPTII,S$GLB,, | Performed by: NURSE PRACTITIONER

## 2024-04-11 PROCEDURE — 99999 PR PBB SHADOW E&M-EST. PATIENT-LVL I: CPT | Mod: PBBFAC,,,

## 2024-04-11 PROCEDURE — 36415 COLL VENOUS BLD VENIPUNCTURE: CPT

## 2024-04-11 PROCEDURE — 80053 COMPREHEN METABOLIC PANEL: CPT

## 2024-04-11 PROCEDURE — 99215 OFFICE O/P EST HI 40 MIN: CPT | Mod: S$GLB,,, | Performed by: NURSE PRACTITIONER

## 2024-04-11 PROCEDURE — 1160F RVW MEDS BY RX/DR IN RCRD: CPT | Mod: CPTII,S$GLB,, | Performed by: NURSE PRACTITIONER

## 2024-04-11 PROCEDURE — 84443 ASSAY THYROID STIM HORMONE: CPT

## 2024-04-11 RX ORDER — HYDROXYZINE PAMOATE 25 MG/1
25 CAPSULE ORAL EVERY 6 HOURS PRN
COMMUNITY

## 2024-04-11 RX ORDER — AMOXICILLIN 500 MG/1
500 CAPSULE ORAL
COMMUNITY
Start: 2024-04-04 | End: 2024-04-11

## 2024-04-11 NOTE — PROGRESS NOTES
Oncology Nutrition Evaluation      Ye Olivera   1960    Oncology Provider:   Elizabeth Alvarado MD    Reason for Visit:  Nutrition Screen    Oncology/Hematology Diagnosis:   Renal Cell Carcinoma Stage IV([id number]) Mediastinal lymph nodes--Diagnosed 7/30/20     Treatment Plan:  Cabometyx     Treatment History:  Cabometyx 60mg daily started 4/7/22  Decrease dose to 40mg daily 4/28/22 due to side effects.  HOLD on 5/19/22 until groin rash and abscess heal/also underwent TURP on 6/23/22  Restarted 7/6/22--held 7/16/22 for hospitalization/bladder hematoma  Cabometyx 40mg daily restarted 9/1/22. On hold since 10/24/22 for hand/foot syndrome.   Restarted on 11/9/22, held 11/28/22 for hand-foot syndrome, foot wound.   Dose decreased to 20mg daily. Restarted on 1/5/23. Stopped on 3/17/23 due to diabetic foot wound.   Restarted 6/1/23.     Nutrition Recommendations:  1. Regular diet as tolerated    Nutrition Assessment    4/11/24: This is a 64 y.o.male with a medical diagnosis of renal cell carcinoma. He is here today for NP f/u. Had positive nutrition risk screen for decreased appetite and wt loss 11# in 6 months. He has gained 5-6# in past 3 weeks. No c/o n/v/c/d and tolerating regular diet at this time.     Nutrition Factors Affecting Intake  none identified    PMHx: HTN, DM, CAD, CABG, HLD    Allergies: Patient has no known allergies.    Current Medications:    Current Outpatient Medications:     allopurinoL (ZYLOPRIM) 300 MG tablet, Take 200 mg by mouth once daily., Disp: , Rfl:     amLODIPine (NORVASC) 10 MG tablet, Take 1 tablet (10 mg total) by mouth every morning., Disp: 30 tablet, Rfl: 11    amoxicillin (AMOXIL) 500 MG capsule, Take 500 mg by mouth., Disp: , Rfl:     aspirin 81 mg Cap, Take 81 mg by mouth once daily at 6am., Disp: , Rfl:     atorvastatin (LIPITOR) 80 MG tablet, Take 80 mg by mouth once daily., Disp: , Rfl:     azelastine (ASTELIN) 137 mcg (0.1 %) nasal spray, SMARTSIG:Both Nares, Disp: ,  "Rfl:     cabozantinib (CABOMETYX) 20 mg Tab, Take 1 tablet (20 mg) by mouth once daily., Disp: 30 tablet, Rfl: 6    carvediloL (COREG) 12.5 MG tablet, Take 1 tablet by mouth 2 (two) times daily., Disp: , Rfl:     colchicine (COLCRYS) 0.6 mg tablet, Take 2 tablets by mouth once, then take 1 tablet 1 hour later. Max 1.8 mg/day. Then take 1 tablets by mouth daily until 2 days after flare resolves., Disp: , Rfl:     desvenlafaxine succinate (PRISTIQ) 100 MG Tb24, Take 100 mg by mouth., Disp: , Rfl:     diphenoxylate-atropine 2.5-0.025 mg (LOMOTIL) 2.5-0.025 mg per tablet, Take 1 tablet by mouth as needed., Disp: , Rfl:     docusate sodium 100 mg capsule, Take by mouth once daily., Disp: , Rfl:     finasteride (PROSCAR) 5 mg tablet, Take 5 mg by mouth as needed., Disp: , Rfl:     furosemide (LASIX) 20 MG tablet, Take 20 mg by mouth once daily. Pt stated he thinks her takes 10mg daily, Disp: , Rfl:     hydrocortisone 2.5 % cream, APPLY TO THE AFFECTED AREA(S) TWICE DAILY as needed for SKIN IRRITATION FOR 14 DAYS, Disp: , Rfl:     hydrOXYzine pamoate (VISTARIL) 25 MG Cap, Take 25 mg by mouth every 6 (six) hours as needed., Disp: , Rfl:     hyoscyamine (LEVSIN/SL) 0.125 mg Subl, Place 0.125 mg under the tongue every 6 (six) hours as needed., Disp: , Rfl:     insulin glargine (LANTUS) 100 unit/mL injection, Inject into the skin. Takes 20units, Disp: , Rfl:     insulin syr/ndl U100 half warner (BD VEO INSULIN SYR, HALF UNIT,) 0.3 mL 31 gauge x 15/64" Syrg, USE 1 syringe DAILY, Disp: , Rfl:     lancets Misc, Use TID to check blood sugar. Dx: E11.9, patient is insulin dependent, Disp: , Rfl:     multivitamin (THERAGRAN) per tablet, Take 1 tablet by mouth once daily., Disp: , Rfl:     ondansetron (ZOFRAN) 8 MG tablet, Take 1 tablet (8 mg total) by mouth every 8 (eight) hours as needed for Nausea., Disp: 30 tablet, Rfl: 2    pantoprazole (PROTONIX) 40 MG tablet, TAKE 1 TABLET BY MOUTH EVERY DAY, Disp: 30 tablet, Rfl: 3    " "pregabalin (LYRICA) 100 MG capsule, Take 100 mg by mouth., Disp: , Rfl:     prochlorperazine (COMPAZINE) 5 MG tablet, Take 5 mg by mouth every 8 (eight) hours as needed., Disp: , Rfl:     QUEtiapine (SEROQUEL) 50 MG tablet, Take 3 tablets by mouth every evening., Disp: , Rfl:     sertraline (ZOLOFT) 50 MG tablet, Take 50 mg by mouth. (Patient not taking: Reported on 4/11/2024), Disp: , Rfl:     tamsulosin (FLOMAX) 0.4 mg Cap, TAKE 1 CAPSULE BY MOUTH EVERY EVENING, Disp: 30 capsule, Rfl: 6    tolterodine (DETROL LA) 4 MG 24 hr capsule, Take 4 mg by mouth once daily., Disp: , Rfl:     traMADoL (ULTRAM) 50 mg tablet, Take 50 mg by mouth every 6 (six) hours., Disp: , Rfl:     TRUE METRIX GLUCOSE TEST STRIP Strp, 3 (three) times daily., Disp: , Rfl:     tuberculin,purif.prot.deriv. (TUBERSOL IDRM), Inject 0.1 mLs into the skin., Disp: , Rfl:     Labs: 4/11/24 BUN 27.8 (H), creatinine 1.55 (H)    Anthropometrics    Height:   Ht Readings from Last 1 Encounters:   04/11/24 5' 10" (1.778 m)      Weight:   Wt Readings from Last 1 Encounters:   04/11/24 99.2 kg (218 lb 12.8 oz)        Usual Body Weight: 96.8 kg (213 lb)  % Weight Change: +2.3% past 1 month    BMI: 31.3 (obese I)    Ideal Weight: 75.4 kg (166 lb)  % Ideal Weight: 131%      Nutrition Diagnosis    No nutrition diagnosis at this time.    Nutrition Risk  low    Nutrition Intervention    Interventions(treatment strategy):  general/healthful diet       Nutrition Monitoring and Evaluation    Ongoing monitoring not warranted at this time. Please consult AB prn.        Gaby Medina MS, RD, , LDN                                                                                                                                                                                                                                                                                  "

## 2024-04-11 NOTE — PROGRESS NOTES
Subjective:       Patient ID: Ye Olivera is a 64 y.o. male.    Urologist: Dr. Christopher Villasenor  PCP: Dr. Ly Quevedo  Nephrologist: Dr. Jenae Le    Renal Cell Carcinoma Stage IV([id number]) Mediastinal lymph nodes--Diagnosed 20  Biopsy/pathology:  Upper EUS done 9/3/20--large cluster of malignant appearing lymph nodes in subcarinal mediastinum causing extrinsic compression in middle 1/3 and thoracic esophagus biopsy c/w metastatic carcinoma, c/w renal cell carcinoma CD10+, PAX -8+, erythematous mucosa in antrum biopsy c/w mild chronic gastritis, inactive, normal duodenum, no pathology in CBD or left lobe of liver or pancreas. CARIS results- MSI stable, Mismatch repair status proficient. Tumor burden low. Negative for: PD-L1, NTRK1/2/3, BAP1, FH, MET, PBRM1, SDHA. Pathologic variants identified: KDMSC (Exon 13 and 11) and VHL. Immunohistochemistry results: MLH1, MSH2, MSH6, and PM52 positive. PD-L1 negative.   Surgery/pathology:  Right radical nephrectomy and lymph node dissection done 20--clear cell renal cell carcinoma measures 9.2X8C8.7cm, Grade 3, margins clear, tumor limited to kidney, 4/6 lymph nodes positive for metastatic clear cell carcinoma.   Imagin. CT A/P w/o contrast [facility] 20--bulky right renal mass, midpole, measuring 10cm with evidence of internal necrosis and calcifications, primary mass does not extend outside the perinephric space, associated right perinephric edema, multiple enlarged retroperitoneal lymph nodes at level of the kidneys and inferior retroperitoneum, one measures 2.9X1.4cm, additional node measures 2.1X1.8cm, additional node measures 1.8X1.2cm, inferior retroperitoneal node at level of inferior pole right kidney measures 1.8X1.4cm, no convincing evidence of renal vein involvement, no right adrenal gland involvement, no disease apparent outside of abdomen.  2. CT A/P w/ and w/o contrast [facility] 20--large right renal mass measures 10.6X9.5X7.4cm, with  enlarged retroperitoneal nodes, largest measures 2.7cm.  3. CT chest [facility] 8/28/20--solitary left supraclavicular node, small and not overtly concerning, heterogeneous retroesophageal mass at the level of the tariq c/w metastatic deposit, no other evidence for metastatic disease.  4. MRI brain w/ and w/o contrast [facility] 8/28/20--no evidence for intracranial metastatic disease.  5. CT C/A/P done at [facility] 10/28/20--now appears to be 2 adjacent masses at the level of the tariq, measuring in aggregate 6.2X2.7cm as compared with 5.2X2.1cm, subcentimeter mediastinal and hilar nodes are seen, large intermediate density structure at the right renal fossa extending to pelvic brim, most likely post-operative hematoma or seroma, resolution of previously seen retroperitoneal adenopathy.  6. CT C/A/P w&w/o at [facility] on 12/23/20--Interval enlargement of the retro-esophageal mass with mild interval increase in size of multiple paraesophageal and prevascular lymph nodes compatible with progression of disease. Interval decrease in size of the right retroperitoneal postop seroma or hematoma. No evidence of metastatic disease below the diaphragm. Prior right nephrectomy and adrenalectomy. Chololithiasis and stable atherosclerosis.   7. CT C/A/P w/o contrast done at [facility] on 4/5/21--interval decrease in size of multiple prevascular, subcarinal and para-esophageal lymph nodes c/w response to treatment, no new pulmonary nodule or lymph node, no evidence for metastatic disease below the diaphragm, interval decrease in size of the post-operative seromas adjacent to right psoas muscle.  8. CT C/A/P w/o contrast done at [facility] on 7/7/21--stable appearance of previously identified prevascular, subcarinal, and paraesophageal nodes, there are an increased number of sub-centimeter nodes in the aortopulmonary window and precarinal region, nonspecific in appearance, no other evidence of local recurrence or metastatic disease.   9. MRI cervical spine w/ and w/o  contrast done at [facility] 8/4/21--no metastatic disease, multilevel spondylotic changes, no acute abnormality.   10. MRI brain w/ and w/o contrast 8/4/21--normal maturation of the previously seen lacunar infarct at the left centrum semiovale, no new abnormalities, specifically, no evidence of intracranial metastatic disease.   11. CT C/A/P w/o contrast done at [facility] 10/22/21--stable reactive mediastinal adenopathy, interval resection of right kidney and adrenal gland with post-surgical changes but no evidence of metastatic disease.   12. CT C/A/P w/o contrast done at [facility] 1/26/22--4 new pulmonary nodules larges LLL 14mm, 9mm LLL, 5mm LLL, 5mm RML, with enlarging prevascular lymph node now measures 18mm, previous 8mm, concerning for metastatic disease, new mesenteric implant in LLQ measures 11mm, enlarging retroperitoneal LN now measures 21X14gz, previous 14X8mm concerning for progression disease, cholelithiasis.   13. MRI brain w/ and w/o  contrast done at [facility] 3/31/22--no acute intracranial abnormality.   14. CT C/A/P w/o contrast done at [facility] 4/5/22--progressive retroperitoneal and mediastinal adenopathy, with development of hilar adenopathy, increase in size and number of pulmonary nodules.   15. CT done at [facility] showed large hematoma 14cm in bladder, results not available.  16. CT head w/o contrast done at [facility] 7/31/22--No intracranial hemorrhage or acute traumatic brain parenchymal injury evident by CT.     17. CT C/A/P w/o contrast done at [facility] 8/29/22--mixed response to treatment, worsening mediastinal adenopathy, stable pulmonary nodules, improved retroperitoneal lymph node, enlarging pericaval lymph node.  18. CT C/A/P w/o contrast at [facility] 11/18/22--Interval decreased in mediastinal adenopathy, pulmonary nodules and portacaval nodes, c/w treatment response.   19. CT C/A/P w/ contrast at [facility] 2/8/23--stable mediastinal lymph nodes, enlarging left lower lobe pulmonary nodule, enlarging retroperitoneal lymph nodes.   20. CT  C/A/P w/o contrast at [facility] done 6/20/23--minimally enlarged mediastinal lymph nodes, otherwise no other significant changes, pericaval nodes unchanged.   21. CT C/A/P w/o contrast at [facility] done 10/11/23--improved appearance of the right lung base with decreased ground glass opacity, stable to decreased size of mediastinal and pericaval lymph nodes (index prevascular node decreased 1X1.6cm, pericaval measures 1.6X2cm (previous 2X2.4cm),   22. CT C/A/P w/o contrast at [facility] on 1/10/24--Small left basilar pulmonary nodule stable. No additional pulmonary nodules. Millimetric size increase of right peritracheal and aorticopulmonary lymph nodes as well as nodes at the azygos esophageal recess. Intraluminal gallbladder sludge and possible tiny stones. Postsurgical changes of the right nephrectomy. Cortical scarring or postsurgical changes of the upper pole of the left kidney.   23. CT C/A/P w/ contrast at [facility] 3/11/24--stable mediastinal adenopathy, with stable 2cm LLL pulmonary nodule in left infrahilar region however, there are a few additional pulmonary micronodules in right and left lower lobe concerning for progressive disease, for example 4.9mm LLL subpleural nodule, with a few additional new bilateral lower lobe pulmonary nodules measuring 2-3mm.    Treatment History:   1. IL2 started on 11/2/20. 2nd cycle started 11/16/20 - stopped after 7 days due to acute renal failure, volume overload and confusion. Stopped due to progression.  2. Keytruda/Inlyta 1/8/21--1/31/22 (stopped due to progression, autoimmune hepatitis).  3. Prednisone 80mg daily 1/31/22--weaned and stopped 4/14/22.    Hospitalization:   [facility] 7/2022 14cm hematoma s/p TURP causing hydronephrosis, JOE and patient required initiation of dialysis, UTI, Covid infection.     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     Treatment Plan:  Cabometyx 60mg daily started 4/7/22  Decrease dose to 40mg daily 4/28/22  due to side effects.  HOLD on 5/19/22 until groin rash and abscess heal/also underwent TURP on 6/23/22  Restarted 7/6/22--held 7/16/22 for hospitalization/bladder hematoma  Cabometyx 40mg daily restarted 9/1/22. On hold since 10/24/22 for hand/foot syndrome.   Restarted on 11/9/22, held 11/28/22 for hand-foot syndrome, foot wound.   Dose decreased to 20mg daily. Restarted on 1/5/23. Stopped on 3/17/23 due to diabetic foot wound.   Restarted 6/1/23.     Chief Complaint: Nausea, Constipation, and Peripheral Neuropathy (Pt states that PCP sent a referral to neurology for shakes and memory loss.)    HPI   Patient presents for follow-up of renal cell carcinoma. He is doing fairly well. Continues on Cabometyx 20mg daily. Mentions he is currently on antibiotics for an abscess on his back. He has intermittent nausea. No rashes or mouth sores. Denies any new wounds/lesions to his lower extremities. He has an insert that he wears to prevent his toes from rubbing together. Still followed closely by Dr. Wright. His PCP referred him to neurology for his memory loss and tremors to his hand.      Past Medical History:   Diagnosis Date    Arthritis of knee 08/13/2013    Cancer     stage IV renal cell carcinoma    Cataract     Coronary artery disease     Diabetes mellitus, type 2     HTN (hypertension) 08/13/2013    Hx of CABG     Hyperlipidemia     Type II or unspecified type diabetes mellitus without mention of complication, not stated as uncontrolled 08/13/2013      Review of patient's allergies indicates:  No Known Allergies     Current Outpatient Medications:     allopurinoL (ZYLOPRIM) 300 MG tablet, Take 200 mg by mouth once daily., Disp: , Rfl:     amLODIPine (NORVASC) 10 MG tablet, Take 1 tablet (10 mg total) by mouth every morning., Disp: 30 tablet, Rfl: 11    amoxicillin (AMOXIL) 500 MG capsule, Take 500 mg by mouth., Disp: , Rfl:     aspirin 81 mg Cap, Take 81 mg by mouth once daily at 6am., Disp: , Rfl:     atorvastatin  "(LIPITOR) 80 MG tablet, Take 80 mg by mouth once daily., Disp: , Rfl:     azelastine (ASTELIN) 137 mcg (0.1 %) nasal spray, SMARTSIG:Both Nares, Disp: , Rfl:     cabozantinib (CABOMETYX) 20 mg Tab, Take 1 tablet (20 mg) by mouth once daily., Disp: 30 tablet, Rfl: 6    carvediloL (COREG) 12.5 MG tablet, Take 1 tablet by mouth 2 (two) times daily., Disp: , Rfl:     colchicine (COLCRYS) 0.6 mg tablet, Take 2 tablets by mouth once, then take 1 tablet 1 hour later. Max 1.8 mg/day. Then take 1 tablets by mouth daily until 2 days after flare resolves., Disp: , Rfl:     desvenlafaxine succinate (PRISTIQ) 100 MG Tb24, Take 100 mg by mouth., Disp: , Rfl:     diphenoxylate-atropine 2.5-0.025 mg (LOMOTIL) 2.5-0.025 mg per tablet, Take 1 tablet by mouth as needed., Disp: , Rfl:     docusate sodium 100 mg capsule, Take by mouth once daily., Disp: , Rfl:     finasteride (PROSCAR) 5 mg tablet, Take 5 mg by mouth as needed., Disp: , Rfl:     furosemide (LASIX) 20 MG tablet, Take 20 mg by mouth once daily. Pt stated he thinks her takes 10mg daily, Disp: , Rfl:     hydrocortisone 2.5 % cream, APPLY TO THE AFFECTED AREA(S) TWICE DAILY as needed for SKIN IRRITATION FOR 14 DAYS, Disp: , Rfl:     hydrOXYzine pamoate (VISTARIL) 25 MG Cap, TAKE 1 CAPSULE BY MOUTH TWICE DAILY AS NEEDED FOR ITCHING ***May cause drowsiness **no alcohol**, Disp: 60 capsule, Rfl: 3    hyoscyamine (LEVSIN/SL) 0.125 mg Subl, Place 0.125 mg under the tongue every 6 (six) hours as needed., Disp: , Rfl:     insulin glargine (LANTUS) 100 unit/mL injection, Inject into the skin. Takes 20units, Disp: , Rfl:     insulin syr/ndl U100 half warner (BD VEO INSULIN SYR, HALF UNIT,) 0.3 mL 31 gauge x 15/64" Syrg, USE 1 syringe DAILY, Disp: , Rfl:     lancets Misc, Use TID to check blood sugar. Dx: E11.9, patient is insulin dependent, Disp: , Rfl:     multivitamin (THERAGRAN) per tablet, Take 1 tablet by mouth once daily., Disp: , Rfl:     ondansetron (ZOFRAN) 8 MG tablet, Take 1 " tablet (8 mg total) by mouth every 8 (eight) hours as needed for Nausea., Disp: 30 tablet, Rfl: 2    pantoprazole (PROTONIX) 40 MG tablet, TAKE 1 TABLET BY MOUTH EVERY DAY, Disp: 30 tablet, Rfl: 3    pregabalin (LYRICA) 100 MG capsule, Take 100 mg by mouth., Disp: , Rfl:     prochlorperazine (COMPAZINE) 5 MG tablet, Take 5 mg by mouth every 8 (eight) hours as needed., Disp: , Rfl:     QUEtiapine (SEROQUEL) 50 MG tablet, Take 3 tablets by mouth every evening., Disp: , Rfl:     tamsulosin (FLOMAX) 0.4 mg Cap, TAKE 1 CAPSULE BY MOUTH EVERY EVENING, Disp: 30 capsule, Rfl: 6    tolterodine (DETROL LA) 4 MG 24 hr capsule, Take 4 mg by mouth once daily., Disp: , Rfl:     traMADoL (ULTRAM) 50 mg tablet, Take 50 mg by mouth every 6 (six) hours., Disp: , Rfl:     TRUE METRIX GLUCOSE TEST STRIP Strp, 3 (three) times daily., Disp: , Rfl:     tuberculin,purif.prot.deriv. (TUBERSOL IDRM), Inject 0.1 mLs into the skin., Disp: , Rfl:     sertraline (ZOLOFT) 50 MG tablet, Take 50 mg by mouth. (Patient not taking: Reported on 4/11/2024), Disp: , Rfl:      Review of Systems   Constitutional:  Positive for fatigue. Negative for appetite change and unexpected weight change.   HENT:  Negative for mouth sores.    Respiratory:  Negative for cough and shortness of breath.    Cardiovascular:  Negative for chest pain.   Gastrointestinal:  Positive for nausea. Negative for abdominal pain.   Genitourinary:  Negative for frequency.   Musculoskeletal:  Negative for back pain.   Integumentary:         S/p left great toe amputation, improved. Healing back abscess   Neurological:  Positive for numbness. Negative for headaches.   Hematological:  Negative for adenopathy.   Psychiatric/Behavioral:  The patient is not nervous/anxious.      Vitals:    04/11/24 0957   BP: 103/67   Pulse: 79   Resp: 14   Temp: 97.8 °F (36.6 °C)     Wt Readings from Last 3 Encounters:   04/11/24 0957 99.2 kg (218 lb 12.8 oz)   03/14/24 1250 96.8 kg (213 lb 8 oz)   02/15/24  1246 99.5 kg (219 lb 4.8 oz)     Physical Exam  Constitutional:       Appearance: Normal appearance. He is overweight.   HENT:      Head: Normocephalic and atraumatic.      Nose: Nose normal.   Eyes:      General: Lids are normal. Vision grossly intact.      Extraocular Movements: Extraocular movements intact.      Conjunctiva/sclera: Conjunctivae normal.   Cardiovascular:      Rate and Rhythm: Normal rate and regular rhythm.      Heart sounds: Normal heart sounds.   Pulmonary:      Effort: Pulmonary effort is normal.      Breath sounds: Normal breath sounds.   Chest:      Comments: HD access removed to right upper CW  Abdominal:      General: Abdomen is protuberant. Bowel sounds are normal. There is no distension.      Palpations: Abdomen is soft.      Tenderness: There is no abdominal tenderness.   Musculoskeletal:         General: Normal range of motion.      Cervical back: Neck supple.      Right lower leg: No edema.      Left lower leg: No edema.   Feet:      Comments: Feet not examined today  Lymphadenopathy:      Cervical: No cervical adenopathy.      Upper Body:      Right upper body: No supraclavicular or axillary adenopathy.      Left upper body: No supraclavicular or axillary adenopathy.   Skin:     General: Skin is warm and dry.      Comments: Left lower back with healing abscess. No s/s of infection noted.    Neurological:      General: No focal deficit present.      Mental Status: He is alert and oriented to person, place, and time.   Psychiatric:         Attention and Perception: Attention normal.         Mood and Affect: Mood normal.         Speech: Speech normal.         Behavior: Behavior normal. Behavior is cooperative.       Lab Visit on 04/11/2024   Component Date Value Ref Range Status    WBC 04/11/2024 10.17  4.50 - 11.50 x10(3)/mcL Final    RBC 04/11/2024 4.96  4.70 - 6.10 x10(6)/mcL Final    Hgb 04/11/2024 12.7 (L)  14.0 - 18.0 g/dL Final    Hct 04/11/2024 39.8 (L)  42.0 - 52.0 % Final     MCV 04/11/2024 80.2  80.0 - 94.0 fL Final    MCH 04/11/2024 25.6 (L)  27.0 - 31.0 pg Final    MCHC 04/11/2024 31.9 (L)  33.0 - 36.0 g/dL Final    RDW 04/11/2024 19.4 (H)  11.5 - 17.0 % Final    Platelet 04/11/2024 248  130 - 400 x10(3)/mcL Final    MPV 04/11/2024 9.6  7.4 - 10.4 fL Final    Neut % 04/11/2024 70.0  % Final    Lymph % 04/11/2024 17.7  % Final    Mono % 04/11/2024 8.5  % Final    Eos % 04/11/2024 2.7  % Final    Basophil % 04/11/2024 0.3  % Final    Lymph # 04/11/2024 1.80  0.6 - 4.6 x10(3)/mcL Final    Neut # 04/11/2024 7.13  2.1 - 9.2 x10(3)/mcL Final    Mono # 04/11/2024 0.86  0.1 - 1.3 x10(3)/mcL Final    Eos # 04/11/2024 0.27  0 - 0.9 x10(3)/mcL Final    Baso # 04/11/2024 0.03  <=0.2 x10(3)/mcL Final    IG# 04/11/2024 0.08 (H)  0 - 0.04 x10(3)/mcL Final    IG% 04/11/2024 0.8  % Final      Assessment:        1. Clear cell carcinoma of right kidney    2. 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 Anemia in stage 3b chronic kidney disease    4. Immunodeficiency due to drugs    5. Stage 3b chronic kidney disease        Plan:         Patient with large right renal mass and retroperitoneal adenopathy diagnosed by CT in 7/2020.  CT chest showed mediastinal fanny mass and biopsy done via EUS + for metastatic renal cell carcinoma.  Patient s/p radical right nephrectomy done 9/11/20. Pathology showed 9.2cm clear cell renal cell carcinoma, margins clear with 4/6 lymph nodes positive.  Post-operative course complicated by hematoma requiring blood transfusion.  CARIS results- MSI stable, Mismatch repair status proficient. Tumor burden low. Negative for: PD-L1, NTRK1/2/3, BAP1, FH, MET, PBRM1, SDHA. Pathologic variants identified: KDMSC (Exon 13 and 11) and VHL. Immunohistochemistry results: MLH1, MSH2, MSH6, and PM52 positive. PD-L1 negative.     Referred to Dr. Haja Sal for evaluation for IL-2. Explained this is only treatment to be shown to achieve long-term  remissions in stage IV disease and patient has limited disease at this time.  Patient started 1st 5 days of IL-2 on 11/2/20.   Started his 2nd round on 11/16/20 and it was stopped on 11/22/20 due to acute renal failure, volume overload and agitation.   CT scan on 12/23/20 at [facility] revealed progression of his disease. Dr. Sal contacted patient and discussed the results. He also reached out to Dr. Alvarado to notify her that the patient will not be returning.   Started Keytruda 200mg every 3 weeks on 1/8/21 + Inlyta 5mg oral BID on 1/11/21.  CT C/A/P done at [facility] on 10/22/21 showed stable mild reactive mediastinal nodes, otherwise stable.  Patient continued on treatment s/p cycle 18 Keytruda given 1/10/22.  Labs on 1/10/22 showed elevated LFTs. Inlyta held.  CT C/A/P done at [facility] on 1/26/22 showed unfortunately disease progression with new pulmonary nodules, enlargement in mediastinal node, new mesenteric nodule and retroperitoneal adenopathy.  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 Hepatitis panel negative. Keytruda stopped. Started on Prednisone 80mg daily on 1/31/22.  Patient had suicidal ideation and severe depression during visit in 3/2022, seen by psychiatry and meds adjusted, much improved.  CT C/A/P from [facility] on 4/5/22 showed progressive disease.    Treatment change recommended to Cabometyx 60mg daily per NCCN guidelines (Category 1) for subsequent treatment. Started on 4/7/22.  Prednisone weaned and stopped 4/14/22.  Patient was having multiple side effects (n/v, diarrhea, mouth sores) from Cabometyx.  Reduced dose to 40mg on 4/28/22. Held on 5/19/22 due to cellulitis/rashes/abscesses.      Continued to hold s/p TURP on 6/23/22 and restarted 7/6/22.  Patient hospitalized at [facility] 7/16/22 for large bladder hematoma s/p clot evacuation X 2. Cabometyx stopped again.   Repeat CT C/A/P done at [facility] on 8/29/22 showed mixed findings, but mostly disease progression in  fanny metastases in mediastinum, which is to be expected since he has been off treatment.   Restarted back on Cabometyx (lower dose 40mg) on 9/1/22 although it has not been studied in dialysis patients, but no dosage adjustment is usually required.  Held on 10/24/22 for Hand-foot syndrome, restarted 11/9/22.   CT done at [facility] on 11/18/22 with disease improvement.  Cabometyx held again for hand-foot syndrome and wound on left toe 11/28/22.  Restarted Cabometyx on 1/5/23 at 20mg daily.    Cabometyx held on 3/17/23 due to worsening diabetic foot wound.  S/p left great toe amputation on 5/8/23, healed and cleared by podiatry to restart Cabometyx which was restarted on 6/1/23 Cabometyx 20mg daily.  Repeat CT C/A/P on 3/11/24 with a few new millimetric lung nodules, but other disease stable.   Unclear if these actually represent disease, since so small so do not recommend any treatment change at this time.  Plan to repeat CT C/A/P w/o contrast (oral contrast only) in June at [facility].   Labs today with CBC ongoing mild anemia, CMP pending.   Continue Cabometyx. Patient to watch his feet closely, as he is prone to getting nonhealing wounds.   Currently on antibiotics for abscess, healing well.   RTC 4 weeks for follow-up with repeat labs here same day as visit.     Looking ahead to future treatment options, would try again with immunotherapy, single agent Opdivo. He never really failed Keytruda, just developed autoimmune hepatitis, so this would need to be monitored closely.   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 Referred for genetic testing which was done on 3/16/23 and negative.      Continue Compazine for nausea.   Continue Norco for joint pain.   Continue Vistaril for chronic pruritis.   Continue OTC calcium supplements  All questions answered at this time.      Jackie Boyd, [facility]           Alert and oriented to person, place and time

## 2024-05-06 DIAGNOSIS — C64.1 CLEAR CELL CARCINOMA OF RIGHT KIDNEY: ICD-10-CM

## 2024-05-06 DIAGNOSIS — C77.1 MALIGNANT NEOPLASM METASTATIC TO INTRATHORACIC LYMPH NODE: ICD-10-CM

## 2024-05-06 NOTE — PROGRESS NOTES
Subjective:       Patient ID: Ye Olivera is a 64 y.o. male.    Urologist: Dr. Christopher Villasenor  PCP: Dr. Ly Quevedo  Nephrologist: Dr. Jenae Le    Renal Cell Carcinoma Stage IV([id number]) Mediastinal lymph nodes--Diagnosed 20  Biopsy/pathology:  Upper EUS done 9/3/20--large cluster of malignant appearing lymph nodes in subcarinal mediastinum causing extrinsic compression in middle 1/3 and thoracic esophagus biopsy c/w metastatic carcinoma, c/w renal cell carcinoma CD10+, PAX -8+, erythematous mucosa in antrum biopsy c/w mild chronic gastritis, inactive, normal duodenum, no pathology in CBD or left lobe of liver or pancreas. CARIS results- MSI stable, Mismatch repair status proficient. Tumor burden low. Negative for: PD-L1, NTRK1/2/3, BAP1, FH, MET, PBRM1, SDHA. Pathologic variants identified: KDMSC (Exon 13 and 11) and VHL. Immunohistochemistry results: MLH1, MSH2, MSH6, and PM52 positive. PD-L1 negative.   Surgery/pathology:  Right radical nephrectomy and lymph node dissection done 20--clear cell renal cell carcinoma measures 9.2X8C8.7cm, Grade 3, margins clear, tumor limited to kidney, 4/6 lymph nodes positive for metastatic clear cell carcinoma.   Imagin. CT A/P w/o contrast [facility] 20--bulky right renal mass, midpole, measuring 10cm with evidence of internal necrosis and calcifications, primary mass does not extend outside the perinephric space, associated right perinephric edema, multiple enlarged retroperitoneal lymph nodes at level of the kidneys and inferior retroperitoneum, one measures 2.9X1.4cm, additional node measures 2.1X1.8cm, additional node measures 1.8X1.2cm, inferior retroperitoneal node at level of inferior pole right kidney measures 1.8X1.4cm, no convincing evidence of renal vein involvement, no right adrenal gland involvement, no disease apparent outside of abdomen.  2. CT A/P w/ and w/o contrast [facility] 20--large right renal mass measures 10.6X9.5X7.4cm, with  enlarged retroperitoneal nodes, largest measures 2.7cm.  3. CT chest [facility] 8/28/20--solitary left supraclavicular node, small and not overtly concerning, heterogeneous retroesophageal mass at the level of the tariq c/w metastatic deposit, no other evidence for metastatic disease.  4. MRI brain w/ and w/o contrast [facility] 8/28/20--no evidence for intracranial metastatic disease.  5. CT C/A/P done at [facility] 10/28/20--now appears to be 2 adjacent masses at the level of the tariq, measuring in aggregate 6.2X2.7cm as compared with 5.2X2.1cm, subcentimeter mediastinal and hilar nodes are seen, large intermediate density structure at the right renal fossa extending to pelvic brim, most likely post-operative hematoma or seroma, resolution of previously seen retroperitoneal adenopathy.  6. CT C/A/P w&w/o at [facility] on 12/23/20--Interval enlargement of the retro-esophageal mass with mild interval increase in size of multiple paraesophageal and prevascular lymph nodes compatible with progression of disease. Interval decrease in size of the right retroperitoneal postop seroma or hematoma. No evidence of metastatic disease below the diaphragm. Prior right nephrectomy and adrenalectomy. Chololithiasis and stable atherosclerosis.   7. CT C/A/P w/o contrast done at [facility] on 4/5/21--interval decrease in size of multiple prevascular, subcarinal and para-esophageal lymph nodes c/w response to treatment, no new pulmonary nodule or lymph node, no evidence for metastatic disease below the diaphragm, interval decrease in size of the post-operative seromas adjacent to right psoas muscle.  8. CT C/A/P w/o contrast done at [facility] on 7/7/21--stable appearance of previously identified prevascular, subcarinal, and paraesophageal nodes, there are an increased number of sub-centimeter nodes in the aortopulmonary window and precarinal region, nonspecific in appearance, no other evidence of local recurrence or metastatic disease.   9. MRI cervical spine w/ and w/o  contrast done at [facility] 8/4/21--no metastatic disease, multilevel spondylotic changes, no acute abnormality.   10. MRI brain w/ and w/o contrast 8/4/21--normal maturation of the previously seen lacunar infarct at the left centrum semiovale, no new abnormalities, specifically, no evidence of intracranial metastatic disease.   11. CT C/A/P w/o contrast done at [facility] 10/22/21--stable reactive mediastinal adenopathy, interval resection of right kidney and adrenal gland with post-surgical changes but no evidence of metastatic disease.   12. CT C/A/P w/o contrast done at [facility] 1/26/22--4 new pulmonary nodules larges LLL 14mm, 9mm LLL, 5mm LLL, 5mm RML, with enlarging prevascular lymph node now measures 18mm, previous 8mm, concerning for metastatic disease, new mesenteric implant in LLQ measures 11mm, enlarging retroperitoneal LN now measures [id number], previous 14X8mm concerning for progression disease, cholelithiasis.   13. MRI brain w/ and w/o  contrast done at [facility] 3/31/22--no acute intracranial abnormality.   14. CT C/A/P w/o contrast done at [facility] 4/5/22--progressive retroperitoneal and mediastinal adenopathy, with development of hilar adenopathy, increase in size and number of pulmonary nodules.   15. CT done at [facility] showed large hematoma 14cm in bladder, results not available.  16. CT head w/o contrast done at [facility] 7/31/22--No intracranial hemorrhage or acute traumatic brain parenchymal injury evident by CT.     17. CT C/A/P w/o contrast done at [facility] 8/29/22--mixed response to treatment, worsening mediastinal adenopathy, stable pulmonary nodules, improved retroperitoneal lymph node, enlarging pericaval lymph node.  18. CT C/A/P w/o contrast at [facility] 11/18/22--Interval decreased in mediastinal adenopathy, pulmonary nodules and portacaval nodes, c/w treatment response.   19. CT C/A/P w/ contrast at [facility] 2/8/23--stable mediastinal lymph nodes, enlarging left lower lobe pulmonary nodule, enlarging retroperitoneal lymph nodes.   20. CT  C/A/P w/o contrast at [facility] done 6/20/23--minimally enlarged mediastinal lymph nodes, otherwise no other significant changes, pericaval nodes unchanged.   21. CT C/A/P w/o contrast at [facility] done 10/11/23--improved appearance of the right lung base with decreased ground glass opacity, stable to decreased size of mediastinal and pericaval lymph nodes (index prevascular node decreased 1X1.6cm, pericaval measures 1.6X2cm (previous 2X2.4cm),   22. CT C/A/P w/o contrast at [facility] on 1/10/24--Small left basilar pulmonary nodule stable. No additional pulmonary nodules. Millimetric size increase of right peritracheal and aorticopulmonary lymph nodes as well as nodes at the azygos esophageal recess. Intraluminal gallbladder sludge and possible tiny stones. Postsurgical changes of the right nephrectomy. Cortical scarring or postsurgical changes of the upper pole of the left kidney.   23. CT C/A/P w/ contrast at [facility] 3/11/24--stable mediastinal adenopathy, with stable 2cm LLL pulmonary nodule in left infrahilar region however, there are a few additional pulmonary micronodules in right and left lower lobe concerning for progressive disease, for example 4.9mm LLL subpleural nodule, with a few additional new bilateral lower lobe pulmonary nodules measuring 2-3mm.    Treatment History:   1. IL2 started on 11/2/20. 2nd cycle started 11/16/20 - stopped after 7 days due to acute renal failure, volume overload and confusion. Stopped due to progression.  2. Keytruda/Inlyta 1/8/21--1/31/22 (stopped due to progression, autoimmune hepatitis).  3. Prednisone 80mg daily 1/31/22--weaned and stopped 4/14/22.    Hospitalization:   [facility] 7/2022 14cm hematoma s/p TURP causing hydronephrosis, JOE and patient required initiation of dialysis, UTI, Covid infection.     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     Treatment Plan:  Cabometyx 60mg daily started 4/7/22  Decrease dose to 40mg daily 4/28/22  due to side effects.  HOLD on 5/19/22 until groin rash and abscess heal/also underwent TURP on 6/23/22  Restarted 7/6/22--held 7/16/22 for hospitalization/bladder hematoma  Cabometyx 40mg daily restarted 9/1/22. On hold since 10/24/22 for hand/foot syndrome.   Restarted on 11/9/22, held 11/28/22 for hand-foot syndrome, foot wound.   Dose decreased to 20mg daily. Restarted on 1/5/23. Stopped on 3/17/23 due to diabetic foot wound.   Restarted 6/1/23. Stopped on 4/22/24 due to osteomyelitis.     Chief Complaint: Other Misc (Pt reports itching at night. States that he had another toe amputated.) and Peripheral Neuropathy    HPI   Patient presents for follow-up of renal cell carcinoma. He is doing fairly well. He was recently admitted at [facility] from 4/22/24 - 4/26/24 with osteomyelitis of second toe left foot and diabetic ulcer of the left foot. Started on antibiotics and podiatrist consulted. S/p partial amputation of left 2nd toe on 4/23/24. The interoperative tissue cx (+) MSSA. He was discharged home on oral antibiotics for 2 weeks.   He will complete antibiotics in a few days. Scheduled to see Dr. Wright tomorrow to remove his stitches. Denies any other wounds/lesions to his lower extremities. Denies any n/v/c/d. Appetite good and weight stable. His PCP referred him to neurology for his memory loss and tremors to his hand. No other problems reported today.      Past Medical History:   Diagnosis Date    Arthritis of knee 08/13/2013    Cancer     stage IV renal cell carcinoma    Cataract     Coronary artery disease     Diabetes mellitus, type 2     HTN (hypertension) 08/13/2013    Hx of CABG     Hyperlipidemia     Type II or unspecified type diabetes mellitus without mention of complication, not stated as uncontrolled 08/13/2013      Review of patient's allergies indicates:  No Known Allergies     Current Outpatient Medications:     allopurinoL (ZYLOPRIM) 300 MG tablet, Take 200 mg by mouth once daily., Disp: , Rfl:     " amLODIPine (NORVASC) 10 MG tablet, Take 1 tablet (10 mg total) by mouth every morning., Disp: 30 tablet, Rfl: 11    aspirin 81 mg Cap, Take 81 mg by mouth once daily at 6am., Disp: , Rfl:     atorvastatin (LIPITOR) 80 MG tablet, Take 80 mg by mouth once daily., Disp: , Rfl:     azelastine (ASTELIN) 137 mcg (0.1 %) nasal spray, SMARTSIG:Both Nares, Disp: , Rfl:     carvediloL (COREG) 12.5 MG tablet, Take 1 tablet by mouth 2 (two) times daily., Disp: , Rfl:     colchicine (COLCRYS) 0.6 mg tablet, Take 2 tablets by mouth once, then take 1 tablet 1 hour later. Max 1.8 mg/day. Then take 1 tablets by mouth daily until 2 days after flare resolves., Disp: , Rfl:     desvenlafaxine succinate (PRISTIQ) 100 MG Tb24, Take 100 mg by mouth., Disp: , Rfl:     diphenoxylate-atropine 2.5-0.025 mg (LOMOTIL) 2.5-0.025 mg per tablet, Take 1 tablet by mouth as needed., Disp: , Rfl:     docusate sodium 100 mg capsule, Take by mouth once daily., Disp: , Rfl:     finasteride (PROSCAR) 5 mg tablet, Take 5 mg by mouth as needed., Disp: , Rfl:     furosemide (LASIX) 20 MG tablet, Take 20 mg by mouth once daily. Pt stated he thinks her takes 10mg daily, Disp: , Rfl:     hydrocortisone 2.5 % cream, APPLY TO THE AFFECTED AREA(S) TWICE DAILY as needed for SKIN IRRITATION FOR 14 DAYS, Disp: , Rfl:     hydrOXYzine pamoate (VISTARIL) 25 MG Cap, Take 25 mg by mouth every 6 (six) hours as needed., Disp: , Rfl:     hyoscyamine (LEVSIN/SL) 0.125 mg Subl, Place 0.125 mg under the tongue every 6 (six) hours as needed., Disp: , Rfl:     insulin glargine (LANTUS) 100 unit/mL injection, Inject into the skin. Takes 20units, Disp: , Rfl:     insulin syr/ndl U100 half warner (BD VEO INSULIN SYR, HALF UNIT,) 0.3 mL 31 gauge x 15/64" Syrg, USE 1 syringe DAILY, Disp: , Rfl:     lancets Misc, Use TID to check blood sugar. Dx: E11.9, patient is insulin dependent, Disp: , Rfl:     multivitamin (THERAGRAN) per tablet, Take 1 tablet by mouth once daily., Disp: , Rfl:    "  ondansetron (ZOFRAN) 8 MG tablet, Take 1 tablet (8 mg total) by mouth every 8 (eight) hours as needed for Nausea., Disp: 30 tablet, Rfl: 2    pantoprazole (PROTONIX) 40 MG tablet, TAKE 1 TABLET BY MOUTH EVERY DAY, Disp: 30 tablet, Rfl: 3    pregabalin (LYRICA) 100 MG capsule, Take 100 mg by mouth., Disp: , Rfl:     prochlorperazine (COMPAZINE) 5 MG tablet, Take 5 mg by mouth every 8 (eight) hours as needed., Disp: , Rfl:     QUEtiapine (SEROQUEL) 50 MG tablet, Take 3 tablets by mouth every evening., Disp: , Rfl:     tamsulosin (FLOMAX) 0.4 mg Cap, TAKE 1 CAPSULE BY MOUTH EVERY EVENING, Disp: 30 capsule, Rfl: 6    tolterodine (DETROL LA) 4 MG 24 hr capsule, Take 4 mg by mouth once daily., Disp: , Rfl:     traMADoL (ULTRAM) 50 mg tablet, Take 50 mg by mouth every 6 (six) hours., Disp: , Rfl:     TRUE METRIX GLUCOSE TEST STRIP Strp, 3 (three) times daily., Disp: , Rfl:     cabozantinib (CABOMETYX) 20 mg Tab, Take 1 tablet (20 mg) by mouth once daily. (Patient not taking: Reported on 5/9/2024.), Disp: 30 tablet, Rfl: 6    sertraline (ZOLOFT) 50 MG tablet, Take 50 mg by mouth. (Patient not taking: Reported on 4/11/2024), Disp: , Rfl:     tuberculin,purif.prot.deriv. (TUBERSOL IDRM), Inject 0.1 mLs into the skin., Disp: , Rfl:      Review of Systems   Constitutional:  Positive for fatigue. Negative for appetite change and unexpected weight change.   HENT:  Negative for mouth sores.    Respiratory:  Negative for cough and shortness of breath.    Cardiovascular:  Negative for chest pain.   Gastrointestinal:  Negative for abdominal pain.   Genitourinary:  Negative for frequency.   Musculoskeletal:  Negative for back pain.   Integumentary:         S/p left great toe amputation. Recent left 2nd toe partial amputation.    Neurological:  Positive for numbness. Negative for headaches.   Hematological:  Negative for adenopathy.   Psychiatric/Behavioral:  The patient is not nervous/anxious.      Vitals:    05/09/24 0855   BP:  114/65   Pulse: 61   Resp: 14   Temp: 97.5 °F (36.4 °C)     Wt Readings from Last 3 Encounters:   05/09/24 0855 97.9 kg (215 lb 12.8 oz)   04/11/24 0957 99.2 kg (218 lb 12.8 oz)   03/14/24 1250 96.8 kg (213 lb 8 oz)     Physical Exam  Constitutional:       Appearance: Normal appearance. He is overweight.   HENT:      Head: Normocephalic and atraumatic.      Nose: Nose normal.   Eyes:      General: Lids are normal. Vision grossly intact.      Extraocular Movements: Extraocular movements intact.      Conjunctiva/sclera: Conjunctivae normal.   Cardiovascular:      Rate and Rhythm: Normal rate and regular rhythm.      Heart sounds: Normal heart sounds.   Pulmonary:      Effort: Pulmonary effort is normal.      Breath sounds: Normal breath sounds.   Chest:      Comments: HD access removed to right upper CW  Abdominal:      General: Abdomen is protuberant. Bowel sounds are normal. There is no distension.      Palpations: Abdomen is soft.      Tenderness: There is no abdominal tenderness.   Musculoskeletal:         General: Normal range of motion.      Cervical back: Neck supple.      Right lower leg: No edema.      Left lower leg: No edema.   Feet:      Comments: Left great toe amputation healed. Left 2nd partial toe amputation with stitches intact. Scab present along incision.   Lymphadenopathy:      Cervical: No cervical adenopathy.      Upper Body:      Right upper body: No supraclavicular or axillary adenopathy.      Left upper body: No supraclavicular or axillary adenopathy.   Skin:     General: Skin is warm and dry.      Comments: Left lower back with healing abscess. No s/s of infection noted.    Neurological:      General: No focal deficit present.      Mental Status: He is alert and oriented to person, place, and time.   Psychiatric:         Attention and Perception: Attention normal.         Mood and Affect: Mood normal.         Speech: Speech normal.         Behavior: Behavior normal. Behavior is cooperative.        Lab Visit on 05/09/2024   Component Date Value Ref Range Status    WBC 05/09/2024 9.61  4.50 - 11.50 x10(3)/mcL Final    RBC 05/09/2024 4.75  4.70 - 6.10 x10(6)/mcL Final    Hgb 05/09/2024 11.8 (L)  14.0 - 18.0 g/dL Final    Hct 05/09/2024 38.5 (L)  42.0 - 52.0 % Final    MCV 05/09/2024 81.1  80.0 - 94.0 fL Final    MCH 05/09/2024 24.8 (L)  27.0 - 31.0 pg Final    MCHC 05/09/2024 30.6 (L)  33.0 - 36.0 g/dL Final    RDW 05/09/2024 19.5 (H)  11.5 - 17.0 % Final    Platelet 05/09/2024 310  130 - 400 x10(3)/mcL Final    MPV 05/09/2024 9.8  7.4 - 10.4 fL Final    Neut % 05/09/2024 73.0  % Final    Lymph % 05/09/2024 15.1  % Final    Mono % 05/09/2024 8.7  % Final    Eos % 05/09/2024 2.2  % Final    Basophil % 05/09/2024 0.2  % Final    Lymph # 05/09/2024 1.45  0.6 - 4.6 x10(3)/mcL Final    Neut # 05/09/2024 7.01  2.1 - 9.2 x10(3)/mcL Final    Mono # 05/09/2024 0.84  0.1 - 1.3 x10(3)/mcL Final    Eos # 05/09/2024 0.21  0 - 0.9 x10(3)/mcL Final    Baso # 05/09/2024 0.02  <=0.2 x10(3)/mcL Final    IG# 05/09/2024 0.08 (H)  0 - 0.04 x10(3)/mcL Final    IG% 05/09/2024 0.8  % Final      Assessment:        1. 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 Stage 3b chronic kidney disease    4. Diabetic foot infection        Plan:         Patient with large right renal mass and retroperitoneal adenopathy diagnosed by CT in 7/2020.  CT chest showed mediastinal fanny mass and biopsy done via EUS + for metastatic renal cell carcinoma.  Patient s/p radical right nephrectomy done 9/11/20. Pathology showed 9.2cm clear cell renal cell carcinoma, margins clear with 4/6 lymph nodes positive.  Post-operative course complicated by hematoma requiring blood transfusion.  CARIS results- MSI stable, Mismatch repair status proficient. Tumor burden low. Negative for: PD-L1, NTRK1/2/3, BAP1, FH, MET, PBRM1, SDHA. Pathologic variants identified: KDMSC (Exon 13 and 11) and VHL. Immunohistochemistry results: MLH1,  MSH2, MSH6, and PM52 positive. PD-L1 negative.     Referred to Dr. Haja Sal for evaluation for IL-2. Explained this is only treatment to be shown to achieve long-term remissions in stage IV disease and patient has limited disease at this time.  Patient started 1st 5 days of IL-2 on 11/2/20.   Started his 2nd round on 11/16/20 and it was stopped on 11/22/20 due to acute renal failure, volume overload and agitation.   CT scan on 12/23/20 at [facility] revealed progression of his disease. Dr. Sal contacted patient and discussed the results. He also reached out to Dr. Alvarado to notify her that the patient will not be returning.   Started Keytruda 200mg every 3 weeks on 1/8/21 + Inlyta 5mg oral BID on 1/11/21.  CT C/A/P done at [facility] on 10/22/21 showed stable mild reactive mediastinal nodes, otherwise stable.  Patient continued on treatment s/p cycle 18 Keytruda given 1/10/22.  Labs on 1/10/22 showed elevated LFTs. Inlyta held.  CT C/A/P done at [facility] on 1/26/22 showed unfortunately disease progression with new pulmonary nodules, enlargement in mediastinal node, new mesenteric nodule and retroperitoneal adenopathy.  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 Hepatitis panel negative. Keytruda stopped. Started on Prednisone 80mg daily on 1/31/22.  Patient had suicidal ideation and severe depression during visit in 3/2022, seen by psychiatry and meds adjusted, much improved.  CT C/A/P from [facility] on 4/5/22 showed progressive disease.    Treatment change recommended to Cabometyx 60mg daily per NCCN guidelines (Category 1) for subsequent treatment. Started on 4/7/22.  Prednisone weaned and stopped 4/14/22.  Patient was having multiple side effects (n/v, diarrhea, mouth sores) from Cabometyx.  Reduced dose to 40mg on 4/28/22. Held on 5/19/22 due to cellulitis/rashes/abscesses.      Continued to hold s/p TURP on 6/23/22 and restarted 7/6/22.  Patient hospitalized at [facility] 7/16/22 for large bladder  hematoma s/p clot evacuation X 2. Cabometyx stopped again.   Repeat CT C/A/P done at [facility] on 8/29/22 showed mixed findings, but mostly disease progression in fanny metastases in mediastinum, which is to be expected since he has been off treatment.   Restarted back on Cabometyx (lower dose 40mg) on 9/1/22 although it has not been studied in dialysis patients, but no dosage adjustment is usually required.  Held on 10/24/22 for Hand-foot syndrome, restarted 11/9/22.   CT done at [facility] on 11/18/22 with disease improvement.  Cabometyx held again for hand-foot syndrome and wound on left toe 11/28/22.  Restarted Cabometyx on 1/5/23 at 20mg daily.    Cabometyx held on 3/17/23 due to worsening diabetic foot wound.  S/p left great toe amputation on 5/8/23, healed and cleared by podiatry to restart Cabometyx which was restarted on 6/1/23 Cabometyx 20mg daily.  Repeat CT C/A/P on 3/11/24 with a few new millimetric lung nodules, but other disease stable.   Unclear if these actually represent disease, since so small so do not recommend any treatment change at this time.  Plan to repeat CT C/A/P w/o contrast (oral contrast only) in June at [facility].   He was recently admitted at [facility] from 4/22/24 - 4/26/24 with osteomyelitis of second toe left foot and diabetic ulcer of the left foot. Started on antibiotics and podiatrist consulted. S/p partial amputation of left 2nd toe on 4/23/24. The interoperative tissue cx (+) MSSA. He was discharged home on oral antibiotics for 2 weeks.     Labs today with CBC ongoing anemia, CMP pending.   Cabometyx has been on hold since 4/22/24.   Currently on antibiotics for osteomyelitis of the 2nd toe. He will complete over the weekend.   Scheduled to see Dr. Wright tomorrow for stitches removal and to see if wound has healed enough to restart the Cabometyx. He will keep us updated.   Due for repeat scans in June. Will order to be done at [facility].   RTC 4 weeks for  follow-up with repeat labs here same day as visit.     Looking ahead to future treatment options, would try again with immunotherapy, single agent Opdivo. He never really failed Keytruda, just developed autoimmune hepatitis, so this would need to be monitored closely.   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 Referred for genetic testing which was done on 3/16/23 and negative.      Continue Compazine for nausea.   Continue Norco for joint pain.   Continue Vistaril for chronic pruritis.   Continue OTC calcium supplements  All questions answered at this time.      Jackie Boyd, PC

## 2024-05-09 ENCOUNTER — OFFICE VISIT (OUTPATIENT)
Dept: HEMATOLOGY/ONCOLOGY | Facility: CLINIC | Age: 64
End: 2024-05-09
Payer: MEDICARE

## 2024-05-09 ENCOUNTER — LAB VISIT (OUTPATIENT)
Dept: LAB | Facility: HOSPITAL | Age: 64
End: 2024-05-09
Attending: NURSE PRACTITIONER
Payer: MEDICARE

## 2024-05-09 VITALS
OXYGEN SATURATION: 95 % | DIASTOLIC BLOOD PRESSURE: 65 MMHG | HEIGHT: 70 IN | WEIGHT: 215.81 LBS | TEMPERATURE: 98 F | SYSTOLIC BLOOD PRESSURE: 114 MMHG | BODY MASS INDEX: 30.9 KG/M2 | HEART RATE: 61 BPM | RESPIRATION RATE: 14 BRPM

## 2024-05-09 DIAGNOSIS — C77.9 MALIGNANT NEOPLASM METASTATIC TO LYMPH NODES, UNSPECIFIED LYMPH NODE REGION: ICD-10-CM

## 2024-05-09 DIAGNOSIS — D84.821 IMMUNODEFICIENCY DUE TO DRUGS: ICD-10-CM

## 2024-05-09 DIAGNOSIS — Z79.899 IMMUNODEFICIENCY DUE TO DRUGS: ICD-10-CM

## 2024-05-09 DIAGNOSIS — N18.32 STAGE 3B CHRONIC KIDNEY DISEASE: ICD-10-CM

## 2024-05-09 DIAGNOSIS — N18.32 ANEMIA IN STAGE 3B CHRONIC KIDNEY DISEASE: ICD-10-CM

## 2024-05-09 DIAGNOSIS — C64.1 CLEAR CELL CARCINOMA OF RIGHT KIDNEY: Primary | ICD-10-CM

## 2024-05-09 DIAGNOSIS — E11.628 DIABETIC FOOT INFECTION: ICD-10-CM

## 2024-05-09 DIAGNOSIS — L08.9 DIABETIC FOOT INFECTION: ICD-10-CM

## 2024-05-09 DIAGNOSIS — D63.1 ANEMIA IN STAGE 3B CHRONIC KIDNEY DISEASE: ICD-10-CM

## 2024-05-09 DIAGNOSIS — C64.1 CLEAR CELL CARCINOMA OF RIGHT KIDNEY: ICD-10-CM

## 2024-05-09 LAB
ALBUMIN SERPL-MCNC: 2.5 G/DL (ref 3.4–4.8)
ALBUMIN/GLOB SERPL: 0.6 RATIO (ref 1.1–2)
ALP SERPL-CCNC: 120 UNIT/L (ref 40–150)
ALT SERPL-CCNC: 8 UNIT/L (ref 0–55)
AST SERPL-CCNC: 11 UNIT/L (ref 5–34)
BASOPHILS # BLD AUTO: 0.02 X10(3)/MCL
BASOPHILS NFR BLD AUTO: 0.2 %
BILIRUB SERPL-MCNC: 0.3 MG/DL
BUN SERPL-MCNC: 29.8 MG/DL (ref 8.4–25.7)
CALCIUM SERPL-MCNC: 8.6 MG/DL (ref 8.8–10)
CHLORIDE SERPL-SCNC: 103 MMOL/L (ref 98–107)
CO2 SERPL-SCNC: 27 MMOL/L (ref 23–31)
CREAT SERPL-MCNC: 1.8 MG/DL (ref 0.73–1.18)
EOSINOPHIL # BLD AUTO: 0.21 X10(3)/MCL (ref 0–0.9)
EOSINOPHIL NFR BLD AUTO: 2.2 %
ERYTHROCYTE [DISTWIDTH] IN BLOOD BY AUTOMATED COUNT: 19.5 % (ref 11.5–17)
GFR SERPLBLD CREATININE-BSD FMLA CKD-EPI: 42 ML/MIN/1.73/M2
GLOBULIN SER-MCNC: 4.4 GM/DL (ref 2.4–3.5)
GLUCOSE SERPL-MCNC: 156 MG/DL (ref 82–115)
HCT VFR BLD AUTO: 38.5 % (ref 42–52)
HGB BLD-MCNC: 11.8 G/DL (ref 14–18)
IMM GRANULOCYTES # BLD AUTO: 0.08 X10(3)/MCL (ref 0–0.04)
IMM GRANULOCYTES NFR BLD AUTO: 0.8 %
LYMPHOCYTES # BLD AUTO: 1.45 X10(3)/MCL (ref 0.6–4.6)
LYMPHOCYTES NFR BLD AUTO: 15.1 %
MCH RBC QN AUTO: 24.8 PG (ref 27–31)
MCHC RBC AUTO-ENTMCNC: 30.6 G/DL (ref 33–36)
MCV RBC AUTO: 81.1 FL (ref 80–94)
MONOCYTES # BLD AUTO: 0.84 X10(3)/MCL (ref 0.1–1.3)
MONOCYTES NFR BLD AUTO: 8.7 %
NEUTROPHILS # BLD AUTO: 7.01 X10(3)/MCL (ref 2.1–9.2)
NEUTROPHILS NFR BLD AUTO: 73 %
PLATELET # BLD AUTO: 310 X10(3)/MCL (ref 130–400)
PMV BLD AUTO: 9.8 FL (ref 7.4–10.4)
POTASSIUM SERPL-SCNC: 3.8 MMOL/L (ref 3.5–5.1)
PROT SERPL-MCNC: 6.9 GM/DL (ref 5.8–7.6)
RBC # BLD AUTO: 4.75 X10(6)/MCL (ref 4.7–6.1)
SODIUM SERPL-SCNC: 140 MMOL/L (ref 136–145)
WBC # SPEC AUTO: 9.61 X10(3)/MCL (ref 4.5–11.5)

## 2024-05-09 PROCEDURE — 99999 PR PBB SHADOW E&M-EST. PATIENT-LVL V: CPT | Mod: PBBFAC,,, | Performed by: NURSE PRACTITIONER

## 2024-05-09 PROCEDURE — 3008F BODY MASS INDEX DOCD: CPT | Mod: CPTII,S$GLB,, | Performed by: NURSE PRACTITIONER

## 2024-05-09 PROCEDURE — 85025 COMPLETE CBC W/AUTO DIFF WBC: CPT

## 2024-05-09 PROCEDURE — 1160F RVW MEDS BY RX/DR IN RCRD: CPT | Mod: CPTII,S$GLB,, | Performed by: NURSE PRACTITIONER

## 2024-05-09 PROCEDURE — 36415 COLL VENOUS BLD VENIPUNCTURE: CPT

## 2024-05-09 PROCEDURE — 3078F DIAST BP <80 MM HG: CPT | Mod: CPTII,S$GLB,, | Performed by: NURSE PRACTITIONER

## 2024-05-09 PROCEDURE — 80053 COMPREHEN METABOLIC PANEL: CPT

## 2024-05-09 PROCEDURE — 1159F MED LIST DOCD IN RCRD: CPT | Mod: CPTII,S$GLB,, | Performed by: NURSE PRACTITIONER

## 2024-05-09 PROCEDURE — 99215 OFFICE O/P EST HI 40 MIN: CPT | Mod: S$GLB,,, | Performed by: NURSE PRACTITIONER

## 2024-05-09 PROCEDURE — 3074F SYST BP LT 130 MM HG: CPT | Mod: CPTII,S$GLB,, | Performed by: NURSE PRACTITIONER

## 2024-06-03 NOTE — PROGRESS NOTES
Subjective:       Patient ID: Ye Olivera is a 64 y.o. male.    Urologist: Dr. Christopher Villasenor  PCP: Dr. Ly Quevedo  Nephrologist: Dr. Jenae Le    Renal Cell Carcinoma Stage IV([id number]) Mediastinal lymph nodes--Diagnosed 20  Biopsy/pathology:  Upper EUS done 9/3/20--large cluster of malignant appearing lymph nodes in subcarinal mediastinum causing extrinsic compression in middle 1/3 and thoracic esophagus biopsy c/w metastatic carcinoma, c/w renal cell carcinoma CD10+, PAX -8+, erythematous mucosa in antrum biopsy c/w mild chronic gastritis, inactive, normal duodenum, no pathology in CBD or left lobe of liver or pancreas. CARIS results- MSI stable, Mismatch repair status proficient. Tumor burden low. Negative for: PD-L1, NTRK1/2/3, BAP1, FH, MET, PBRM1, SDHA. Pathologic variants identified: KDMSC (Exon 13 and 11) and VHL. Immunohistochemistry results: MLH1, MSH2, MSH6, and PM52 positive. PD-L1 negative.   Surgery/pathology:  Right radical nephrectomy and lymph node dissection done 20--clear cell renal cell carcinoma measures 9.2X8C8.7cm, Grade 3, margins clear, tumor limited to kidney, 4/6 lymph nodes positive for metastatic clear cell carcinoma.   Imagin. CT A/P w/o contrast [facility] 20--bulky right renal mass, midpole, measuring 10cm with evidence of internal necrosis and calcifications, primary mass does not extend outside the perinephric space, associated right perinephric edema, multiple enlarged retroperitoneal lymph nodes at level of the kidneys and inferior retroperitoneum, one measures 2.9X1.4cm, additional node measures 2.1X1.8cm, additional node measures 1.8X1.2cm, inferior retroperitoneal node at level of inferior pole right kidney measures 1.8X1.4cm, no convincing evidence of renal vein involvement, no right adrenal gland involvement, no disease apparent outside of abdomen.  2. CT A/P w/ and w/o contrast [facility] 20--large right renal mass measures 10.6X9.5X7.4cm, with  enlarged retroperitoneal nodes, largest measures 2.7cm.  3. CT chest [facility] 8/28/20--solitary left supraclavicular node, small and not overtly concerning, heterogeneous retroesophageal mass at the level of the tariq c/w metastatic deposit, no other evidence for metastatic disease.  4. MRI brain w/ and w/o contrast [facility] 8/28/20--no evidence for intracranial metastatic disease.  5. CT C/A/P done at [facility] 10/28/20--now appears to be 2 adjacent masses at the level of the tariq, measuring in aggregate 6.2X2.7cm as compared with 5.2X2.1cm, subcentimeter mediastinal and hilar nodes are seen, large intermediate density structure at the right renal fossa extending to pelvic brim, most likely post-operative hematoma or seroma, resolution of previously seen retroperitoneal adenopathy.  6. CT C/A/P w&w/o at [facility] on 12/23/20--Interval enlargement of the retro-esophageal mass with mild interval increase in size of multiple paraesophageal and prevascular lymph nodes compatible with progression of disease. Interval decrease in size of the right retroperitoneal postop seroma or hematoma. No evidence of metastatic disease below the diaphragm. Prior right nephrectomy and adrenalectomy. Chololithiasis and stable atherosclerosis.   7. CT C/A/P w/o contrast done at [facility] on 4/5/21--interval decrease in size of multiple prevascular, subcarinal and para-esophageal lymph nodes c/w response to treatment, no new pulmonary nodule or lymph node, no evidence for metastatic disease below the diaphragm, interval decrease in size of the post-operative seromas adjacent to right psoas muscle.  8. CT C/A/P w/o contrast done at [facility] on 7/7/21--stable appearance of previously identified prevascular, subcarinal, and paraesophageal nodes, there are an increased number of sub-centimeter nodes in the aortopulmonary window and precarinal region, nonspecific in appearance, no other evidence of local recurrence or metastatic disease.   9. MRI cervical spine w/ and w/o  contrast done at [facility] 8/4/21--no metastatic disease, multilevel spondylotic changes, no acute abnormality.   10. MRI brain w/ and w/o contrast 8/4/21--normal maturation of the previously seen lacunar infarct at the left centrum semiovale, no new abnormalities, specifically, no evidence of intracranial metastatic disease.   11. CT C/A/P w/o contrast done at [facility] 10/22/21--stable reactive mediastinal adenopathy, interval resection of right kidney and adrenal gland with post-surgical changes but no evidence of metastatic disease.   12. CT C/A/P w/o contrast done at [facility] 1/26/22--4 new pulmonary nodules larges LLL 14mm, 9mm LLL, 5mm LLL, 5mm RML, with enlarging prevascular lymph node now measures 18mm, previous 8mm, concerning for metastatic disease, new mesenteric implant in LLQ measures 11mm, enlarging retroperitoneal LN now measures [id number], previous 14X8mm concerning for progression disease, cholelithiasis.   13. MRI brain w/ and w/o  contrast done at [facility] 3/31/22--no acute intracranial abnormality.   14. CT C/A/P w/o contrast done at [facility] 4/5/22--progressive retroperitoneal and mediastinal adenopathy, with development of hilar adenopathy, increase in size and number of pulmonary nodules.   15. CT done at [facility] showed large hematoma 14cm in bladder, results not available.  16. CT head w/o contrast done at [facility] 7/31/22--No intracranial hemorrhage or acute traumatic brain parenchymal injury evident by CT.     17. CT C/A/P w/o contrast done at [facility] 8/29/22--mixed response to treatment, worsening mediastinal adenopathy, stable pulmonary nodules, improved retroperitoneal lymph node, enlarging pericaval lymph node.  18. CT C/A/P w/o contrast at [facility] 11/18/22--Interval decreased in mediastinal adenopathy, pulmonary nodules and portacaval nodes, c/w treatment response.   19. CT C/A/P w/ contrast at [facility] 2/8/23--stable mediastinal lymph nodes, enlarging left lower lobe pulmonary nodule, enlarging retroperitoneal lymph nodes.   20. CT  C/A/P w/o contrast at [facility] done 6/20/23--minimally enlarged mediastinal lymph nodes, otherwise no other significant changes, pericaval nodes unchanged.   21. CT C/A/P w/o contrast at [facility] done 10/11/23--improved appearance of the right lung base with decreased ground glass opacity, stable to decreased size of mediastinal and pericaval lymph nodes (index prevascular node decreased 1X1.6cm, pericaval measures 1.6X2cm (previous 2X2.4cm),   22. CT C/A/P w/o contrast at [facility] on 1/10/24--Small left basilar pulmonary nodule stable. No additional pulmonary nodules. Millimetric size increase of right peritracheal and aorticopulmonary lymph nodes as well as nodes at the azygos esophageal recess. Intraluminal gallbladder sludge and possible tiny stones. Postsurgical changes of the right nephrectomy. Cortical scarring or postsurgical changes of the upper pole of the left kidney.   23. CT C/A/P w/ contrast at [facility] 3/11/24--stable mediastinal adenopathy, with stable 2cm LLL pulmonary nodule in left infrahilar region however, there are a few additional pulmonary micronodules in right and left lower lobe concerning for progressive disease, for example 4.9mm LLL subpleural nodule, with a few additional new bilateral lower lobe pulmonary nodules measuring 2-3mm.  24. CT C/A/P at [facility] 6/7/24--multiple new bilateral pulmonary nodules with mild interval increase in size of mediastinal and left supraclavicular lymph nodes, findings c/w disease progression, portal caval adenopathy redemonstrated with index lesion measuring 2.5cm similar to prior.     Treatment History:   1. IL2 started on 11/2/20. 2nd cycle started 11/16/20 - stopped after 7 days due to acute renal failure, volume overload and confusion. Stopped due to progression.  2. Keytruda/Inlyta 1/8/21--1/31/22 (stopped due to progression, autoimmune hepatitis).  3. Prednisone 80mg daily 1/31/22--weaned and stopped 4/14/22.    Hospitalization:   [facility] 7/2022 14cm hematoma s/p TURP causing  hydronephrosis, JOE and patient required initiation of dialysis, UTI, Covid infection.     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     Treatment Plan:  Cabometyx 60mg daily started 4/7/22  Decrease dose to 40mg daily 4/28/22 due to side effects.  HOLD on 5/19/22 until groin rash and abscess heal/also underwent TURP on 6/23/22  Restarted 7/6/22--held 7/16/22 for hospitalization/bladder hematoma  Cabometyx 40mg daily restarted 9/1/22. On hold since 10/24/22 for hand/foot syndrome.   Restarted on 11/9/22, held 11/28/22 for hand-foot syndrome, foot wound.   Dose decreased to 20mg daily. Restarted on 1/5/23. Stopped on 3/17/23 due to diabetic foot wound.   Restarted 6/1/23. Stopped on 4/22/24 due to osteomyelitis.   Restarted 5/27/24    Chief Complaint: Shortness of Breath, Pain, and Peripheral Neuropathy    HPI   Patient presents for follow-up of renal cell carcinoma.He is doing okay. Reports he is still following closely with his foot doctor. He did resume Cabometyx 2 weeks ago and is tolerating well. Still has a scab on 2nd toe from recent osteomyelitis. Recent CT shows some disease progression, but I discussed that he was off his treatment for a month. Will continue for now. He reports having some soreness to his hip and will see his PCP soon.      Past Medical History:   Diagnosis Date    Arthritis of knee 08/13/2013    Cancer     stage IV renal cell carcinoma    Cataract     Coronary artery disease     Diabetes mellitus, type 2     HTN (hypertension) 08/13/2013    Hx of CABG     Hyperlipidemia     Type II or unspecified type diabetes mellitus without mention of complication, not stated as uncontrolled 08/13/2013      Review of patient's allergies indicates:  No Known Allergies     Current Outpatient Medications:     allopurinoL (ZYLOPRIM) 300 MG tablet, Take 200 mg by mouth once daily., Disp: , Rfl:     amLODIPine (NORVASC) 10 MG tablet, Take 1 tablet (10  "mg total) by mouth every morning., Disp: 30 tablet, Rfl: 11    aspirin 81 mg Cap, Take 81 mg by mouth once daily at 6am., Disp: , Rfl:     atorvastatin (LIPITOR) 80 MG tablet, Take 80 mg by mouth once daily., Disp: , Rfl:     azelastine (ASTELIN) 137 mcg (0.1 %) nasal spray, SMARTSIG:Both Nares, Disp: , Rfl:     cabozantinib (CABOMETYX) 20 mg Tab, Take 1 tablet (20 mg) by mouth once daily., Disp: 30 tablet, Rfl: 6    carvediloL (COREG) 12.5 MG tablet, Take 1 tablet by mouth 2 (two) times daily., Disp: , Rfl:     colchicine (COLCRYS) 0.6 mg tablet, Take 2 tablets by mouth once, then take 1 tablet 1 hour later. Max 1.8 mg/day. Then take 1 tablets by mouth daily until 2 days after flare resolves., Disp: , Rfl:     desvenlafaxine succinate (PRISTIQ) 100 MG Tb24, Take 100 mg by mouth., Disp: , Rfl:     diphenoxylate-atropine 2.5-0.025 mg (LOMOTIL) 2.5-0.025 mg per tablet, Take 1 tablet by mouth as needed., Disp: , Rfl:     docusate sodium 100 mg capsule, Take by mouth once daily., Disp: , Rfl:     finasteride (PROSCAR) 5 mg tablet, Take 5 mg by mouth as needed., Disp: , Rfl:     furosemide (LASIX) 20 MG tablet, Take 20 mg by mouth once daily. Pt stated he thinks her takes 10mg daily, Disp: , Rfl:     hydrocortisone 2.5 % cream, APPLY TO THE AFFECTED AREA(S) TWICE DAILY as needed for SKIN IRRITATION FOR 14 DAYS, Disp: , Rfl:     hydrOXYzine pamoate (VISTARIL) 25 MG Cap, Take 25 mg by mouth every 6 (six) hours as needed., Disp: , Rfl:     hyoscyamine (LEVSIN/SL) 0.125 mg Subl, Place 0.125 mg under the tongue every 6 (six) hours as needed., Disp: , Rfl:     insulin glargine (LANTUS) 100 unit/mL injection, Inject into the skin. Takes 20units, Disp: , Rfl:     insulin syr/ndl U100 half warner (BD VEO INSULIN SYR, HALF UNIT,) 0.3 mL 31 gauge x 15/64" Syrg, USE 1 syringe DAILY, Disp: , Rfl:     lancets Misc, Use TID to check blood sugar. Dx: E11.9, patient is insulin dependent, Disp: , Rfl:     multivitamin (THERAGRAN) per " tablet, Take 1 tablet by mouth once daily., Disp: , Rfl:     ondansetron (ZOFRAN) 8 MG tablet, Take 1 tablet (8 mg total) by mouth every 8 (eight) hours as needed for Nausea., Disp: 30 tablet, Rfl: 2    pantoprazole (PROTONIX) 40 MG tablet, TAKE 1 TABLET BY MOUTH EVERY DAY, Disp: 30 tablet, Rfl: 3    pregabalin (LYRICA) 100 MG capsule, Take 100 mg by mouth., Disp: , Rfl:     prochlorperazine (COMPAZINE) 5 MG tablet, Take 5 mg by mouth every 8 (eight) hours as needed., Disp: , Rfl:     QUEtiapine (SEROQUEL) 50 MG tablet, Take 3 tablets by mouth every evening., Disp: , Rfl:     tamsulosin (FLOMAX) 0.4 mg Cap, TAKE 1 CAPSULE BY MOUTH EVERY EVENING, Disp: 30 capsule, Rfl: 6    tolterodine (DETROL LA) 4 MG 24 hr capsule, Take 4 mg by mouth once daily., Disp: , Rfl:     traMADoL (ULTRAM) 50 mg tablet, Take 50 mg by mouth every 6 (six) hours., Disp: , Rfl:     TRUE METRIX GLUCOSE TEST STRIP Strp, 3 (three) times daily., Disp: , Rfl:     sertraline (ZOLOFT) 50 MG tablet, Take 50 mg by mouth. (Patient not taking: Reported on 4/11/2024), Disp: , Rfl:     tuberculin,purif.prot.deriv. (TUBERSOL IDRM), Inject 0.1 mLs into the skin. (Patient not taking: Reported on 6/10/2024), Disp: , Rfl:      Review of Systems   Constitutional:  Positive for fatigue. Negative for appetite change and unexpected weight change.   HENT:  Negative for mouth sores.    Eyes:  Positive for visual disturbance.   Respiratory:  Negative for cough and shortness of breath.    Cardiovascular:  Negative for chest pain.   Gastrointestinal:  Negative for abdominal pain and diarrhea.   Genitourinary:  Negative for frequency.   Musculoskeletal:  Negative for back pain.        Hip pain   Integumentary:  Negative for rash.        S/p left great toe amputation. Recent left 2nd toe partial amputation.    Neurological:  Positive for numbness. Negative for headaches.   Hematological:  Negative for adenopathy.   Psychiatric/Behavioral:  The patient is not  nervous/anxious.      Vitals:    06/10/24 0902   BP: 125/73   Pulse: (!) 52   Resp: 14   Temp: 98 °F (36.7 °C)       Wt Readings from Last 3 Encounters:   06/10/24 0902 95 kg (209 lb 8 oz)   05/09/24 0855 97.9 kg (215 lb 12.8 oz)   04/11/24 0957 99.2 kg (218 lb 12.8 oz)     Physical Exam  Constitutional:       Appearance: Normal appearance. He is overweight.   HENT:      Head: Normocephalic and atraumatic.      Nose: Nose normal.   Eyes:      General: Lids are normal. Vision grossly intact.      Extraocular Movements: Extraocular movements intact.      Conjunctiva/sclera: Conjunctivae normal.   Cardiovascular:      Rate and Rhythm: Normal rate and regular rhythm.      Heart sounds: Normal heart sounds.   Pulmonary:      Effort: Pulmonary effort is normal.      Breath sounds: Normal breath sounds.   Chest:      Comments: HD access removed to right upper CW  Abdominal:      General: Abdomen is protuberant. Bowel sounds are normal. There is no distension.      Palpations: Abdomen is soft.      Tenderness: There is no abdominal tenderness.   Musculoskeletal:         General: Normal range of motion.      Cervical back: Neck supple.      Right lower leg: No edema.      Left lower leg: No edema.   Feet:      Comments: Left great toe amputation healed. Left 2nd partial toe amputation with scab present, no signs infection, appears to be healing   Lymphadenopathy:      Cervical: No cervical adenopathy.      Upper Body:      Right upper body: No supraclavicular or axillary adenopathy.      Left upper body: No supraclavicular or axillary adenopathy.   Skin:     General: Skin is warm and dry.      Comments: Left lower back with healing abscess. No s/s of infection noted.    Neurological:      General: No focal deficit present.      Mental Status: He is alert and oriented to person, place, and time.   Psychiatric:         Attention and Perception: Attention normal.         Mood and Affect: Mood normal.         Speech: Speech  normal.         Behavior: Behavior normal. Behavior is cooperative.       Lab Visit on 06/10/2024   Component Date Value Ref Range Status    WBC 06/10/2024 9.67  4.50 - 11.50 x10(3)/mcL Final    RBC 06/10/2024 4.95  4.70 - 6.10 x10(6)/mcL Final    Hgb 06/10/2024 12.0 (L)  14.0 - 18.0 g/dL Final    Hct 06/10/2024 38.6 (L)  42.0 - 52.0 % Final    MCV 06/10/2024 78.0 (L)  80.0 - 94.0 fL Final    MCH 06/10/2024 24.2 (L)  27.0 - 31.0 pg Final    MCHC 06/10/2024 31.1 (L)  33.0 - 36.0 g/dL Final    RDW 06/10/2024 18.5 (H)  11.5 - 17.0 % Final    Platelet 06/10/2024 275  130 - 400 x10(3)/mcL Final    MPV 06/10/2024 9.7  7.4 - 10.4 fL Final    Neut % 06/10/2024 65.6  % Final    Lymph % 06/10/2024 21.3  % Final    Mono % 06/10/2024 10.1  % Final    Eos % 06/10/2024 2.5  % Final    Basophil % 06/10/2024 0.2  % Final    Lymph # 06/10/2024 2.06  0.6 - 4.6 x10(3)/mcL Final    Neut # 06/10/2024 6.34  2.1 - 9.2 x10(3)/mcL Final    Mono # 06/10/2024 0.98  0.1 - 1.3 x10(3)/mcL Final    Eos # 06/10/2024 0.24  0 - 0.9 x10(3)/mcL Final    Baso # 06/10/2024 0.02  <=0.2 x10(3)/mcL Final    IG# 06/10/2024 0.03  0 - 0.04 x10(3)/mcL Final    IG% 06/10/2024 0.3  % Final      Assessment:        1. Clear cell carcinoma of right kidney      Plan:         Patient with large right renal mass and retroperitoneal adenopathy diagnosed by CT in 7/2020.  CT chest showed mediastinal fanny mass and biopsy done via EUS + for metastatic renal cell carcinoma.  Patient s/p radical right nephrectomy done 9/11/20. Pathology showed 9.2cm clear cell renal cell carcinoma, margins clear with 4/6 lymph nodes positive.  Post-operative course complicated by hematoma requiring blood transfusion.  CARIS results- MSI stable, Mismatch repair status proficient. Tumor burden low. Negative for: PD-L1, NTRK1/2/3, BAP1, FH, MET, PBRM1, SDHA. Pathologic variants identified: KDMSC (Exon 13 and 11) and VHL. Immunohistochemistry results: MLH1, MSH2, MSH6, and PM52 positive. PD-L1  negative.     Referred to Dr. Haja Sal for evaluation for IL-2. Explained this is only treatment to be shown to achieve long-term remissions in stage IV disease and patient has limited disease at this time.  Patient started 1st 5 days of IL-2 on 11/2/20.   Started his 2nd round on 11/16/20 and it was stopped on 11/22/20 due to acute renal failure, volume overload and agitation.   CT scan on 12/23/20 at [facility] revealed progression of his disease. Dr. Sal contacted patient and discussed the results. He also reached out to Dr. Alvarado to notify her that the patient will not be returning.   Started Keytruda 200mg every 3 weeks on 1/8/21 + Inlyta 5mg oral BID on 1/11/21.  CT C/A/P done at [facility] on 10/22/21 showed stable mild reactive mediastinal nodes, otherwise stable.  Patient continued on treatment s/p cycle 18 Keytruda given 1/10/22.  Labs on 1/10/22 showed elevated LFTs. Inlyta held.  CT C/A/P done at [facility] on 1/26/22 showed unfortunately disease progression with new pulmonary nodules, enlargement in mediastinal node, new mesenteric nodule and retroperitoneal adenopathy.  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 Hepatitis panel negative. Keytruda stopped. Started on Prednisone 80mg daily on 1/31/22.  Patient had suicidal ideation and severe depression during visit in 3/2022, seen by psychiatry and meds adjusted, much improved.  CT C/A/P from [facility] on 4/5/22 showed progressive disease.    Treatment change recommended to Cabometyx 60mg daily per NCCN guidelines (Category 1) for subsequent treatment. Started on 4/7/22.  Prednisone weaned and stopped 4/14/22.  Patient was having multiple side effects (n/v, diarrhea, mouth sores) from Cabometyx.  Reduced dose to 40mg on 4/28/22. Held on 5/19/22 due to cellulitis/rashes/abscesses.      Continued to hold s/p TURP on 6/23/22 and restarted 7/6/22.  Patient hospitalized at [facility] 7/16/22 for large bladder hematoma s/p clot evacuation X 2.  Cabometyx stopped again.   Repeat CT C/A/P done at [facility] on 8/29/22 showed mixed findings, but mostly disease progression in fanny metastases in mediastinum, which is to be expected since he has been off treatment.   Restarted back on Cabometyx (lower dose 40mg) on 9/1/22 although it has not been studied in dialysis patients, but no dosage adjustment is usually required.  Held on 10/24/22 for Hand-foot syndrome, restarted 11/9/22.   CT done at [facility] on 11/18/22 with disease improvement.  Cabometyx held again for hand-foot syndrome and wound on left toe 11/28/22.  Restarted Cabometyx on 1/5/23 at 20mg daily.    Cabometyx held on 3/17/23 due to worsening diabetic foot wound.  S/p left great toe amputation on 5/8/23, healed and cleared by podiatry to restart Cabometyx which was restarted on 6/1/23 Cabometyx 20mg daily.  Repeat CT C/A/P on 3/11/24 with a few new millimetric lung nodules, but other disease stable.   Unclear if these actually represent disease, since so small so do not recommend any treatment change at this time.  Patient admitted at [facility] from 4/22/24 - 4/26/24 with osteomyelitis of second toe left foot and diabetic ulcer of the left foot, S/p partial amputation of left 2nd toe on 4/23/24. The interoperative tissue cx (+) MSSA. He was discharged home on oral antibiotics for 2 weeks. Cabometyx held and restarted no 5/27/24.     CT C/A/P done 6/7/24 with some disease progression new small lung nodules and mild progression in mediastinal lymph nodes.  Suspect this was because he was off treatment X 1 month.     Labs today with CBC ongoing mild anemia, CMP pending.     Continue Cabometyx for now.   Plan to repeat CT in 9/2024.  F/u in 6 weeks with repeat labs.     Looking ahead to future treatment options, would try again with immunotherapy, single agent Opdivo. He never really failed Keytruda, just developed autoimmune hepatitis, so this would need to be monitored closely.   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 Referred for genetic testing which was done on 3/16/23 and negative.      Continue Compazine for nausea.   Continue Norco for joint pain.   Continue Vistaril for chronic pruritis.   Continue OTC calcium supplements  All questions answered at this time.      Elizabeth Alvarado MD

## 2024-06-10 ENCOUNTER — LAB VISIT (OUTPATIENT)
Dept: LAB | Facility: HOSPITAL | Age: 64
End: 2024-06-10
Attending: INTERNAL MEDICINE
Payer: MEDICARE

## 2024-06-10 ENCOUNTER — OFFICE VISIT (OUTPATIENT)
Dept: HEMATOLOGY/ONCOLOGY | Facility: CLINIC | Age: 64
End: 2024-06-10
Payer: MEDICARE

## 2024-06-10 VITALS
HEART RATE: 52 BPM | DIASTOLIC BLOOD PRESSURE: 73 MMHG | HEIGHT: 70 IN | RESPIRATION RATE: 14 BRPM | WEIGHT: 209.5 LBS | SYSTOLIC BLOOD PRESSURE: 125 MMHG | BODY MASS INDEX: 29.99 KG/M2 | OXYGEN SATURATION: 96 % | TEMPERATURE: 98 F

## 2024-06-10 DIAGNOSIS — N18.32 STAGE 3B CHRONIC KIDNEY DISEASE: ICD-10-CM

## 2024-06-10 DIAGNOSIS — E11.628 DIABETIC FOOT INFECTION: ICD-10-CM

## 2024-06-10 DIAGNOSIS — D84.821 IMMUNODEFICIENCY DUE TO DRUGS: ICD-10-CM

## 2024-06-10 DIAGNOSIS — Z79.899 IMMUNODEFICIENCY DUE TO DRUGS: ICD-10-CM

## 2024-06-10 DIAGNOSIS — C64.1 CLEAR CELL CARCINOMA OF RIGHT KIDNEY: ICD-10-CM

## 2024-06-10 DIAGNOSIS — C64.1 CLEAR CELL CARCINOMA OF RIGHT KIDNEY: Primary | ICD-10-CM

## 2024-06-10 DIAGNOSIS — L08.9 DIABETIC FOOT INFECTION: ICD-10-CM

## 2024-06-10 LAB
ALBUMIN SERPL-MCNC: 2.3 G/DL (ref 3.4–4.8)
ALBUMIN/GLOB SERPL: 0.5 RATIO (ref 1.1–2)
ALP SERPL-CCNC: 130 UNIT/L (ref 40–150)
ALT SERPL-CCNC: 13 UNIT/L (ref 0–55)
ANION GAP SERPL CALC-SCNC: 11 MEQ/L
AST SERPL-CCNC: 15 UNIT/L (ref 5–34)
BASOPHILS # BLD AUTO: 0.02 X10(3)/MCL
BASOPHILS NFR BLD AUTO: 0.2 %
BILIRUB SERPL-MCNC: 0.3 MG/DL
BUN SERPL-MCNC: 24.7 MG/DL (ref 8.4–25.7)
CALCIUM SERPL-MCNC: 8 MG/DL (ref 8.8–10)
CHLORIDE SERPL-SCNC: 101 MMOL/L (ref 98–107)
CO2 SERPL-SCNC: 27 MMOL/L (ref 23–31)
CREAT SERPL-MCNC: 1.75 MG/DL (ref 0.73–1.18)
CREAT/UREA NIT SERPL: 14
EOSINOPHIL # BLD AUTO: 0.24 X10(3)/MCL (ref 0–0.9)
EOSINOPHIL NFR BLD AUTO: 2.5 %
ERYTHROCYTE [DISTWIDTH] IN BLOOD BY AUTOMATED COUNT: 18.5 % (ref 11.5–17)
GFR SERPLBLD CREATININE-BSD FMLA CKD-EPI: 43 ML/MIN/1.73/M2
GLOBULIN SER-MCNC: 4.4 GM/DL (ref 2.4–3.5)
GLUCOSE SERPL-MCNC: 103 MG/DL (ref 82–115)
HCT VFR BLD AUTO: 38.6 % (ref 42–52)
HGB BLD-MCNC: 12 G/DL (ref 14–18)
IMM GRANULOCYTES # BLD AUTO: 0.03 X10(3)/MCL (ref 0–0.04)
IMM GRANULOCYTES NFR BLD AUTO: 0.3 %
LYMPHOCYTES # BLD AUTO: 2.06 X10(3)/MCL (ref 0.6–4.6)
LYMPHOCYTES NFR BLD AUTO: 21.3 %
MCH RBC QN AUTO: 24.2 PG (ref 27–31)
MCHC RBC AUTO-ENTMCNC: 31.1 G/DL (ref 33–36)
MCV RBC AUTO: 78 FL (ref 80–94)
MONOCYTES # BLD AUTO: 0.98 X10(3)/MCL (ref 0.1–1.3)
MONOCYTES NFR BLD AUTO: 10.1 %
NEUTROPHILS # BLD AUTO: 6.34 X10(3)/MCL (ref 2.1–9.2)
NEUTROPHILS NFR BLD AUTO: 65.6 %
PLATELET # BLD AUTO: 275 X10(3)/MCL (ref 130–400)
PMV BLD AUTO: 9.7 FL (ref 7.4–10.4)
POTASSIUM SERPL-SCNC: 3.8 MMOL/L (ref 3.5–5.1)
PROT SERPL-MCNC: 6.7 GM/DL (ref 5.8–7.6)
RBC # BLD AUTO: 4.95 X10(6)/MCL (ref 4.7–6.1)
SODIUM SERPL-SCNC: 139 MMOL/L (ref 136–145)
WBC # SPEC AUTO: 9.67 X10(3)/MCL (ref 4.5–11.5)

## 2024-06-10 PROCEDURE — 1159F MED LIST DOCD IN RCRD: CPT | Mod: CPTII,S$GLB,, | Performed by: INTERNAL MEDICINE

## 2024-06-10 PROCEDURE — 99999 PR PBB SHADOW E&M-EST. PATIENT-LVL V: CPT | Mod: PBBFAC,,, | Performed by: INTERNAL MEDICINE

## 2024-06-10 PROCEDURE — 99215 OFFICE O/P EST HI 40 MIN: CPT | Mod: S$GLB,,, | Performed by: INTERNAL MEDICINE

## 2024-06-10 PROCEDURE — 1160F RVW MEDS BY RX/DR IN RCRD: CPT | Mod: CPTII,S$GLB,, | Performed by: INTERNAL MEDICINE

## 2024-06-10 PROCEDURE — 3078F DIAST BP <80 MM HG: CPT | Mod: CPTII,S$GLB,, | Performed by: INTERNAL MEDICINE

## 2024-06-10 PROCEDURE — 3074F SYST BP LT 130 MM HG: CPT | Mod: CPTII,S$GLB,, | Performed by: INTERNAL MEDICINE

## 2024-06-10 PROCEDURE — 80053 COMPREHEN METABOLIC PANEL: CPT

## 2024-06-10 PROCEDURE — 3008F BODY MASS INDEX DOCD: CPT | Mod: CPTII,S$GLB,, | Performed by: INTERNAL MEDICINE

## 2024-06-10 PROCEDURE — 36415 COLL VENOUS BLD VENIPUNCTURE: CPT

## 2024-06-10 PROCEDURE — 85025 COMPLETE CBC W/AUTO DIFF WBC: CPT

## 2024-07-05 DIAGNOSIS — C64.1 CLEAR CELL CARCINOMA OF RIGHT KIDNEY: ICD-10-CM

## 2024-07-05 DIAGNOSIS — N18.4 ANEMIA IN STAGE 4 CHRONIC KIDNEY DISEASE: ICD-10-CM

## 2024-07-05 DIAGNOSIS — D63.1 ANEMIA IN STAGE 4 CHRONIC KIDNEY DISEASE: ICD-10-CM

## 2024-07-05 DIAGNOSIS — N18.4 CHRONIC KIDNEY DISEASE, STAGE IV (SEVERE): ICD-10-CM

## 2024-07-05 RX ORDER — PANTOPRAZOLE SODIUM 40 MG/1
40 TABLET, DELAYED RELEASE ORAL
Qty: 30 TABLET | Refills: 3 | Status: SHIPPED | OUTPATIENT
Start: 2024-07-05

## 2024-07-15 PROBLEM — N17.9 ACUTE NONTRAUMATIC KIDNEY INJURY: Status: RESOLVED | Noted: 2022-08-24 | Resolved: 2024-07-15

## 2024-07-22 ENCOUNTER — LAB VISIT (OUTPATIENT)
Dept: LAB | Facility: HOSPITAL | Age: 64
End: 2024-07-22
Attending: NURSE PRACTITIONER
Payer: MEDICARE

## 2024-07-22 ENCOUNTER — OFFICE VISIT (OUTPATIENT)
Dept: HEMATOLOGY/ONCOLOGY | Facility: CLINIC | Age: 64
End: 2024-07-22
Payer: MEDICARE

## 2024-07-22 VITALS
HEIGHT: 70 IN | RESPIRATION RATE: 14 BRPM | OXYGEN SATURATION: 98 % | HEART RATE: 67 BPM | SYSTOLIC BLOOD PRESSURE: 126 MMHG | WEIGHT: 202.63 LBS | DIASTOLIC BLOOD PRESSURE: 78 MMHG | TEMPERATURE: 98 F | BODY MASS INDEX: 29.01 KG/M2

## 2024-07-22 DIAGNOSIS — Z79.899 IMMUNODEFICIENCY DUE TO DRUGS: ICD-10-CM

## 2024-07-22 DIAGNOSIS — D63.1 ANEMIA IN STAGE 3B CHRONIC KIDNEY DISEASE: ICD-10-CM

## 2024-07-22 DIAGNOSIS — M54.40 ACUTE RIGHT-SIDED LOW BACK PAIN WITH SCIATICA, SCIATICA LATERALITY UNSPECIFIED: ICD-10-CM

## 2024-07-22 DIAGNOSIS — C77.9 MALIGNANT NEOPLASM METASTATIC TO LYMPH NODES, UNSPECIFIED LYMPH NODE REGION: ICD-10-CM

## 2024-07-22 DIAGNOSIS — C64.1 CLEAR CELL CARCINOMA OF RIGHT KIDNEY: ICD-10-CM

## 2024-07-22 DIAGNOSIS — N18.32 ANEMIA IN STAGE 3B CHRONIC KIDNEY DISEASE: ICD-10-CM

## 2024-07-22 DIAGNOSIS — R11.2 CHEMOTHERAPY INDUCED NAUSEA AND VOMITING: ICD-10-CM

## 2024-07-22 DIAGNOSIS — D84.821 IMMUNODEFICIENCY DUE TO DRUGS: ICD-10-CM

## 2024-07-22 DIAGNOSIS — T45.1X5A CHEMOTHERAPY INDUCED NAUSEA AND VOMITING: ICD-10-CM

## 2024-07-22 DIAGNOSIS — D50.9 IRON DEFICIENCY ANEMIA, UNSPECIFIED IRON DEFICIENCY ANEMIA TYPE: ICD-10-CM

## 2024-07-22 DIAGNOSIS — C64.1 CLEAR CELL CARCINOMA OF RIGHT KIDNEY: Primary | ICD-10-CM

## 2024-07-22 LAB
ALBUMIN SERPL-MCNC: 2.5 G/DL (ref 3.4–4.8)
ALBUMIN/GLOB SERPL: 0.5 RATIO (ref 1.1–2)
ALP SERPL-CCNC: 136 UNIT/L (ref 40–150)
ALT SERPL-CCNC: 15 UNIT/L (ref 0–55)
ANION GAP SERPL CALC-SCNC: 10 MEQ/L
AST SERPL-CCNC: 19 UNIT/L (ref 5–34)
BASOPHILS # BLD AUTO: 0.03 X10(3)/MCL
BASOPHILS NFR BLD AUTO: 0.3 %
BILIRUB SERPL-MCNC: 0.3 MG/DL
BUN SERPL-MCNC: 27.8 MG/DL (ref 8.4–25.7)
CALCIUM SERPL-MCNC: 8.7 MG/DL (ref 8.8–10)
CHLORIDE SERPL-SCNC: 99 MMOL/L (ref 98–107)
CO2 SERPL-SCNC: 30 MMOL/L (ref 23–31)
CREAT SERPL-MCNC: 1.74 MG/DL (ref 0.73–1.18)
CREAT/UREA NIT SERPL: 16
EOSINOPHIL # BLD AUTO: 0.25 X10(3)/MCL (ref 0–0.9)
EOSINOPHIL NFR BLD AUTO: 2.5 %
ERYTHROCYTE [DISTWIDTH] IN BLOOD BY AUTOMATED COUNT: 19.6 % (ref 11.5–17)
GFR SERPLBLD CREATININE-BSD FMLA CKD-EPI: 43 ML/MIN/1.73/M2
GLOBULIN SER-MCNC: 4.8 GM/DL (ref 2.4–3.5)
GLUCOSE SERPL-MCNC: 84 MG/DL (ref 82–115)
HCT VFR BLD AUTO: 42.3 % (ref 42–52)
HGB BLD-MCNC: 13 G/DL (ref 14–18)
IMM GRANULOCYTES # BLD AUTO: 0.02 X10(3)/MCL (ref 0–0.04)
IMM GRANULOCYTES NFR BLD AUTO: 0.2 %
LYMPHOCYTES # BLD AUTO: 2.09 X10(3)/MCL (ref 0.6–4.6)
LYMPHOCYTES NFR BLD AUTO: 21.3 %
MCH RBC QN AUTO: 23.5 PG (ref 27–31)
MCHC RBC AUTO-ENTMCNC: 30.7 G/DL (ref 33–36)
MCV RBC AUTO: 76.4 FL (ref 80–94)
MONOCYTES # BLD AUTO: 0.9 X10(3)/MCL (ref 0.1–1.3)
MONOCYTES NFR BLD AUTO: 9.2 %
NEUTROPHILS # BLD AUTO: 6.54 X10(3)/MCL (ref 2.1–9.2)
NEUTROPHILS NFR BLD AUTO: 66.5 %
PLATELET # BLD AUTO: 305 X10(3)/MCL (ref 130–400)
PMV BLD AUTO: 9.4 FL (ref 7.4–10.4)
POTASSIUM SERPL-SCNC: 3.9 MMOL/L (ref 3.5–5.1)
PROT SERPL-MCNC: 7.3 GM/DL (ref 5.8–7.6)
RBC # BLD AUTO: 5.54 X10(6)/MCL (ref 4.7–6.1)
SODIUM SERPL-SCNC: 139 MMOL/L (ref 136–145)
WBC # BLD AUTO: 9.83 X10(3)/MCL (ref 4.5–11.5)

## 2024-07-22 PROCEDURE — 36415 COLL VENOUS BLD VENIPUNCTURE: CPT

## 2024-07-22 PROCEDURE — 99215 OFFICE O/P EST HI 40 MIN: CPT | Mod: S$GLB,,, | Performed by: NURSE PRACTITIONER

## 2024-07-22 PROCEDURE — 1159F MED LIST DOCD IN RCRD: CPT | Mod: CPTII,S$GLB,, | Performed by: NURSE PRACTITIONER

## 2024-07-22 PROCEDURE — 1160F RVW MEDS BY RX/DR IN RCRD: CPT | Mod: CPTII,S$GLB,, | Performed by: NURSE PRACTITIONER

## 2024-07-22 PROCEDURE — 80053 COMPREHEN METABOLIC PANEL: CPT

## 2024-07-22 PROCEDURE — 3078F DIAST BP <80 MM HG: CPT | Mod: CPTII,S$GLB,, | Performed by: NURSE PRACTITIONER

## 2024-07-22 PROCEDURE — 99999 PR PBB SHADOW E&M-EST. PATIENT-LVL V: CPT | Mod: PBBFAC,,, | Performed by: NURSE PRACTITIONER

## 2024-07-22 PROCEDURE — 3008F BODY MASS INDEX DOCD: CPT | Mod: CPTII,S$GLB,, | Performed by: NURSE PRACTITIONER

## 2024-07-22 PROCEDURE — 85025 COMPLETE CBC W/AUTO DIFF WBC: CPT

## 2024-07-22 PROCEDURE — 3074F SYST BP LT 130 MM HG: CPT | Mod: CPTII,S$GLB,, | Performed by: NURSE PRACTITIONER

## 2024-07-22 RX ORDER — PROCHLORPERAZINE MALEATE 5 MG
5 TABLET ORAL 3 TIMES DAILY PRN
Qty: 30 TABLET | Refills: 6 | Status: SHIPPED | OUTPATIENT
Start: 2024-07-22

## 2024-07-22 RX ORDER — FLUTICASONE PROPIONATE 50 MCG
2 SPRAY, SUSPENSION (ML) NASAL
COMMUNITY
Start: 2024-05-27

## 2024-07-22 NOTE — PROGRESS NOTES
Subjective:       Patient ID: Ye Olivera is a 64 y.o. male.    Urologist: Dr. Christopher Villasenor  PCP: Dr. Ly Quevedo  Nephrologist: Dr. Jenae Le    Renal Cell Carcinoma Stage IV([id number]) Mediastinal lymph nodes--Diagnosed 20  Biopsy/pathology:  Upper EUS done 9/3/20--large cluster of malignant appearing lymph nodes in subcarinal mediastinum causing extrinsic compression in middle 1/3 and thoracic esophagus biopsy c/w metastatic carcinoma, c/w renal cell carcinoma CD10+, PAX -8+, erythematous mucosa in antrum biopsy c/w mild chronic gastritis, inactive, normal duodenum, no pathology in CBD or left lobe of liver or pancreas. CARIS results- MSI stable, Mismatch repair status proficient. Tumor burden low. Negative for: PD-L1, NTRK1/2/3, BAP1, FH, MET, PBRM1, SDHA. Pathologic variants identified: KDMSC (Exon 13 and 11) and VHL. Immunohistochemistry results: MLH1, MSH2, MSH6, and PM52 positive. PD-L1 negative.   Surgery/pathology:  Right radical nephrectomy and lymph node dissection done 20--clear cell renal cell carcinoma measures 9.2X8C8.7cm, Grade 3, margins clear, tumor limited to kidney, 4/6 lymph nodes positive for metastatic clear cell carcinoma.   Imagin. CT A/P w/o contrast [facility] 20--bulky right renal mass, midpole, measuring 10cm with evidence of internal necrosis and calcifications, primary mass does not extend outside the perinephric space, associated right perinephric edema, multiple enlarged retroperitoneal lymph nodes at level of the kidneys and inferior retroperitoneum, one measures 2.9X1.4cm, additional node measures 2.1X1.8cm, additional node measures 1.8X1.2cm, inferior retroperitoneal node at level of inferior pole right kidney measures 1.8X1.4cm, no convincing evidence of renal vein involvement, no right adrenal gland involvement, no disease apparent outside of abdomen.  2. CT A/P w/ and w/o contrast [facility] 20--large right renal mass measures 10.6X9.5X7.4cm, with  enlarged retroperitoneal nodes, largest measures 2.7cm.  3. CT chest [facility] 8/28/20--solitary left supraclavicular node, small and not overtly concerning, heterogeneous retroesophageal mass at the level of the tariq c/w metastatic deposit, no other evidence for metastatic disease.  4. MRI brain w/ and w/o contrast [facility] 8/28/20--no evidence for intracranial metastatic disease.  5. CT C/A/P done at [facility] 10/28/20--now appears to be 2 adjacent masses at the level of the tariq, measuring in aggregate 6.2X2.7cm as compared with 5.2X2.1cm, subcentimeter mediastinal and hilar nodes are seen, large intermediate density structure at the right renal fossa extending to pelvic brim, most likely post-operative hematoma or seroma, resolution of previously seen retroperitoneal adenopathy.  6. CT C/A/P w&w/o at [facility] on 12/23/20--Interval enlargement of the retro-esophageal mass with mild interval increase in size of multiple paraesophageal and prevascular lymph nodes compatible with progression of disease. Interval decrease in size of the right retroperitoneal postop seroma or hematoma. No evidence of metastatic disease below the diaphragm. Prior right nephrectomy and adrenalectomy. Chololithiasis and stable atherosclerosis.   7. CT C/A/P w/o contrast done at [facility] on 4/5/21--interval decrease in size of multiple prevascular, subcarinal and para-esophageal lymph nodes c/w response to treatment, no new pulmonary nodule or lymph node, no evidence for metastatic disease below the diaphragm, interval decrease in size of the post-operative seromas adjacent to right psoas muscle.  8. CT C/A/P w/o contrast done at [facility] on 7/7/21--stable appearance of previously identified prevascular, subcarinal, and paraesophageal nodes, there are an increased number of sub-centimeter nodes in the aortopulmonary window and precarinal region, nonspecific in appearance, no other evidence of local recurrence or metastatic disease.   9. MRI cervical spine w/ and w/o  contrast done at [facility] 8/4/21--no metastatic disease, multilevel spondylotic changes, no acute abnormality.   10. MRI brain w/ and w/o contrast 8/4/21--normal maturation of the previously seen lacunar infarct at the left centrum semiovale, no new abnormalities, specifically, no evidence of intracranial metastatic disease.   11. CT C/A/P w/o contrast done at [facility] 10/22/21--stable reactive mediastinal adenopathy, interval resection of right kidney and adrenal gland with post-surgical changes but no evidence of metastatic disease.   12. CT C/A/P w/o contrast done at [facility] 1/26/22--4 new pulmonary nodules larges LLL 14mm, 9mm LLL, 5mm LLL, 5mm RML, with enlarging prevascular lymph node now measures 18mm, previous 8mm, concerning for metastatic disease, new mesenteric implant in LLQ measures 11mm, enlarging retroperitoneal LN now measures [id number], previous 14X8mm concerning for progression disease, cholelithiasis.   13. MRI brain w/ and w/o  contrast done at [facility] 3/31/22--no acute intracranial abnormality.   14. CT C/A/P w/o contrast done at [facility] 4/5/22--progressive retroperitoneal and mediastinal adenopathy, with development of hilar adenopathy, increase in size and number of pulmonary nodules.   15. CT done at [facility] showed large hematoma 14cm in bladder, results not available.  16. CT head w/o contrast done at [facility] 7/31/22--No intracranial hemorrhage or acute traumatic brain parenchymal injury evident by CT.     17. CT C/A/P w/o contrast done at [facility] 8/29/22--mixed response to treatment, worsening mediastinal adenopathy, stable pulmonary nodules, improved retroperitoneal lymph node, enlarging pericaval lymph node.  18. CT C/A/P w/o contrast at [facility] 11/18/22--Interval decreased in mediastinal adenopathy, pulmonary nodules and portacaval nodes, c/w treatment response.   19. CT C/A/P w/ contrast at [facility] 2/8/23--stable mediastinal lymph nodes, enlarging left lower lobe pulmonary nodule, enlarging retroperitoneal lymph nodes.   20. CT  C/A/P w/o contrast at [facility] done 6/20/23--minimally enlarged mediastinal lymph nodes, otherwise no other significant changes, pericaval nodes unchanged.   21. CT C/A/P w/o contrast at [facility] done 10/11/23--improved appearance of the right lung base with decreased ground glass opacity, stable to decreased size of mediastinal and pericaval lymph nodes (index prevascular node decreased 1X1.6cm, pericaval measures 1.6X2cm (previous 2X2.4cm),   22. CT C/A/P w/o contrast at [facility] on 1/10/24--Small left basilar pulmonary nodule stable. No additional pulmonary nodules. Millimetric size increase of right peritracheal and aorticopulmonary lymph nodes as well as nodes at the azygos esophageal recess. Intraluminal gallbladder sludge and possible tiny stones. Postsurgical changes of the right nephrectomy. Cortical scarring or postsurgical changes of the upper pole of the left kidney.   23. CT C/A/P w/ contrast at [facility] 3/11/24--stable mediastinal adenopathy, with stable 2cm LLL pulmonary nodule in left infrahilar region however, there are a few additional pulmonary micronodules in right and left lower lobe concerning for progressive disease, for example 4.9mm LLL subpleural nodule, with a few additional new bilateral lower lobe pulmonary nodules measuring 2-3mm.  24. CT C/A/P at [facility] 6/7/24--multiple new bilateral pulmonary nodules with mild interval increase in size of mediastinal and left supraclavicular lymph nodes, findings c/w disease progression, portal caval adenopathy redemonstrated with index lesion measuring 2.5cm similar to prior.     Treatment History:   1. IL2 started on 11/2/20. 2nd cycle started 11/16/20 - stopped after 7 days due to acute renal failure, volume overload and confusion. Stopped due to progression.  2. Keytruda/Inlyta 1/8/21--1/31/22 (stopped due to progression, autoimmune hepatitis).  3. Prednisone 80mg daily 1/31/22--weaned and stopped 4/14/22.    Hospitalization:   [facility] 7/2022 14cm hematoma s/p TURP causing  hydronephrosis, JOE and patient required initiation of dialysis, UTI, Covid infection.     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     Treatment Plan:  Cabometyx 60mg daily started 4/7/22  Decrease dose to 40mg daily 4/28/22 due to side effects.  HOLD on 5/19/22 until groin rash and abscess heal/also underwent TURP on 6/23/22  Restarted 7/6/22--held 7/16/22 for hospitalization/bladder hematoma  Cabometyx 40mg daily restarted 9/1/22. On hold since 10/24/22 for hand/foot syndrome.   Restarted on 11/9/22, held 11/28/22 for hand-foot syndrome, foot wound.   Dose decreased to 20mg daily. Restarted on 1/5/23. Stopped on 3/17/23 due to diabetic foot wound.   Restarted 6/1/23. Stopped on 4/22/24 due to osteomyelitis.   Restarted 5/27/24.     Chief Complaint: Other Misc (Pt reports groin pain.)    HPI   Patient presents for follow-up of renal cell carcinoma.He is doing okay. Reports he is still following closely with his foot doctor. He did resume Cabometyx and is tolerating well. Still has a scab on 2nd toe from recent osteomyelitis that they are monitoring. Complains of low back pain that radiates to his right groin/buttock area that is new. Denies any new rash or lesions. He is also more unsteady on his feet and will be seeing Neurology next month. Pain is controlled on current medications. He complains of decreased appetite with 7 lb weight loss since his last appointment. States he only eats one meal a day. He was using marijuana but has not been consistent lately. He also drinks one Boost daily.      Past Medical History:   Diagnosis Date    Arthritis of knee 08/13/2013    Cancer     stage IV renal cell carcinoma    Cataract     Coronary artery disease     Diabetes mellitus, type 2     HTN (hypertension) 08/13/2013    Hx of CABG     Hyperlipidemia     Type II or unspecified type diabetes mellitus without mention of complication, not stated as uncontrolled  08/13/2013      Review of patient's allergies indicates:  No Known Allergies     Current Outpatient Medications:     allopurinoL (ZYLOPRIM) 300 MG tablet, Take 200 mg by mouth once daily., Disp: , Rfl:     amLODIPine (NORVASC) 10 MG tablet, Take 1 tablet (10 mg total) by mouth every morning., Disp: 30 tablet, Rfl: 11    aspirin 81 mg Cap, Take 81 mg by mouth once daily at 6am., Disp: , Rfl:     atorvastatin (LIPITOR) 80 MG tablet, Take 80 mg by mouth once daily., Disp: , Rfl:     azelastine (ASTELIN) 137 mcg (0.1 %) nasal spray, SMARTSIG:Both Nares, Disp: , Rfl:     cabozantinib (CABOMETYX) 20 mg Tab, Take 1 tablet (20 mg) by mouth once daily., Disp: 30 tablet, Rfl: 6    carvediloL (COREG) 12.5 MG tablet, Take 1 tablet by mouth 2 (two) times daily., Disp: , Rfl:     colchicine (COLCRYS) 0.6 mg tablet, Take 2 tablets by mouth once, then take 1 tablet 1 hour later. Max 1.8 mg/day. Then take 1 tablets by mouth daily until 2 days after flare resolves., Disp: , Rfl:     desvenlafaxine succinate (PRISTIQ) 100 MG Tb24, Take 100 mg by mouth., Disp: , Rfl:     diphenoxylate-atropine 2.5-0.025 mg (LOMOTIL) 2.5-0.025 mg per tablet, Take 1 tablet by mouth as needed., Disp: , Rfl:     docusate sodium 100 mg capsule, Take by mouth once daily., Disp: , Rfl:     finasteride (PROSCAR) 5 mg tablet, Take 5 mg by mouth as needed., Disp: , Rfl:     furosemide (LASIX) 20 MG tablet, Take 20 mg by mouth once daily. Pt stated he thinks her takes 10mg daily, Disp: , Rfl:     hydrocortisone 2.5 % cream, APPLY TO THE AFFECTED AREA(S) TWICE DAILY as needed for SKIN IRRITATION FOR 14 DAYS, Disp: , Rfl:     hydrOXYzine pamoate (VISTARIL) 25 MG Cap, Take 25 mg by mouth every 6 (six) hours as needed., Disp: , Rfl:     hyoscyamine (LEVSIN/SL) 0.125 mg Subl, Place 0.125 mg under the tongue every 6 (six) hours as needed., Disp: , Rfl:     insulin glargine (LANTUS) 100 unit/mL injection, Inject into the skin. Takes 20units, Disp: , Rfl:     insulin  "syr/ndl U100 half warner (BD VEO INSULIN SYR, HALF UNIT,) 0.3 mL 31 gauge x 15/64" Syrg, USE 1 syringe DAILY, Disp: , Rfl:     lancets Misc, Use TID to check blood sugar. Dx: E11.9, patient is insulin dependent, Disp: , Rfl:     multivitamin (THERAGRAN) per tablet, Take 1 tablet by mouth once daily., Disp: , Rfl:     ondansetron (ZOFRAN) 8 MG tablet, Take 1 tablet (8 mg total) by mouth every 8 (eight) hours as needed for Nausea., Disp: 30 tablet, Rfl: 2    pantoprazole (PROTONIX) 40 MG tablet, TAKE 1 TABLET BY MOUTH EVERY DAY, Disp: 30 tablet, Rfl: 3    pregabalin (LYRICA) 100 MG capsule, Take 100 mg by mouth., Disp: , Rfl:     prochlorperazine (COMPAZINE) 5 MG tablet, Take 5 mg by mouth every 8 (eight) hours as needed., Disp: , Rfl:     QUEtiapine (SEROQUEL) 50 MG tablet, Take 3 tablets by mouth every evening., Disp: , Rfl:     tamsulosin (FLOMAX) 0.4 mg Cap, TAKE 1 CAPSULE BY MOUTH EVERY EVENING, Disp: 30 capsule, Rfl: 6    tolterodine (DETROL LA) 4 MG 24 hr capsule, Take 4 mg by mouth once daily., Disp: , Rfl:     traMADoL (ULTRAM) 50 mg tablet, Take 50 mg by mouth every 6 (six) hours., Disp: , Rfl:     TRUE METRIX GLUCOSE TEST STRIP Strp, 3 (three) times daily., Disp: , Rfl:     tuberculin,purif.prot.deriv. (TUBERSOL IDRM), Inject 0.1 mLs into the skin., Disp: , Rfl:     sertraline (ZOLOFT) 50 MG tablet, Take 50 mg by mouth. (Patient not taking: Reported on 7/22/2024), Disp: , Rfl:      Review of Systems   Constitutional:  Positive for appetite change and unexpected weight change.   HENT:  Negative for mouth sores.    Eyes:  Negative for visual disturbance.   Respiratory:  Positive for shortness of breath. Negative for cough.    Cardiovascular:  Negative for chest pain.   Gastrointestinal:  Negative for abdominal pain and diarrhea.   Genitourinary:  Negative for frequency.   Musculoskeletal:  Positive for back pain.        Low back pain radiating to groin and buttocks   Integumentary:  Negative for rash. "   Neurological:  Positive for headaches.   Hematological:  Negative for adenopathy.   Psychiatric/Behavioral:  The patient is nervous/anxious.      Vitals:    07/22/24 1433   BP: 126/78   Pulse: 67   Resp: 14   Temp: 98.3 °F (36.8 °C)     Wt Readings from Last 3 Encounters:   07/22/24 1433 91.9 kg (202 lb 9.6 oz)   06/10/24 0902 95 kg (209 lb 8 oz)   05/09/24 0855 97.9 kg (215 lb 12.8 oz)     Physical Exam  Constitutional:       Appearance: Normal appearance. He is overweight.   HENT:      Head: Normocephalic and atraumatic.      Nose: Nose normal.   Eyes:      General: Lids are normal. Vision grossly intact.      Extraocular Movements: Extraocular movements intact.      Conjunctiva/sclera: Conjunctivae normal.   Cardiovascular:      Rate and Rhythm: Normal rate and regular rhythm.      Heart sounds: Normal heart sounds.   Pulmonary:      Effort: Pulmonary effort is normal.      Breath sounds: Normal breath sounds.   Chest:      Comments: HD access removed to right upper CW  Abdominal:      General: Abdomen is protuberant. Bowel sounds are normal. There is no distension.      Palpations: Abdomen is soft.      Tenderness: There is no abdominal tenderness.   Musculoskeletal:         General: Normal range of motion.      Cervical back: Neck supple.      Right lower leg: No edema.      Left lower leg: No edema.   Feet:      Comments: Left great toe amputation healed. Left 2nd partial toe amputation with scab present, no signs infection, appears to be healing   Lymphadenopathy:      Cervical: No cervical adenopathy.      Upper Body:      Right upper body: No supraclavicular or axillary adenopathy.      Left upper body: No supraclavicular or axillary adenopathy.   Skin:     General: Skin is warm and dry.   Neurological:      General: No focal deficit present.      Mental Status: He is alert and oriented to person, place, and time.   Psychiatric:         Attention and Perception: Attention normal.         Mood and Affect:  Mood normal.         Speech: Speech normal.         Behavior: Behavior normal. Behavior is cooperative.       Lab Visit on 07/22/2024   Component Date Value Ref Range Status    WBC 07/22/2024 9.83  4.50 - 11.50 x10(3)/mcL Final    RBC 07/22/2024 5.54  4.70 - 6.10 x10(6)/mcL Final    Hgb 07/22/2024 13.0 (L)  14.0 - 18.0 g/dL Final    Hct 07/22/2024 42.3  42.0 - 52.0 % Final    MCV 07/22/2024 76.4 (L)  80.0 - 94.0 fL Final    MCH 07/22/2024 23.5 (L)  27.0 - 31.0 pg Final    MCHC 07/22/2024 30.7 (L)  33.0 - 36.0 g/dL Final    RDW 07/22/2024 19.6 (H)  11.5 - 17.0 % Final    Platelet 07/22/2024 305  130 - 400 x10(3)/mcL Final    MPV 07/22/2024 9.4  7.4 - 10.4 fL Final    Neut % 07/22/2024 66.5  % Final    Lymph % 07/22/2024 21.3  % Final    Mono % 07/22/2024 9.2  % Final    Eos % 07/22/2024 2.5  % Final    Basophil % 07/22/2024 0.3  % Final    Lymph # 07/22/2024 2.09  0.6 - 4.6 x10(3)/mcL Final    Neut # 07/22/2024 6.54  2.1 - 9.2 x10(3)/mcL Final    Mono # 07/22/2024 0.90  0.1 - 1.3 x10(3)/mcL Final    Eos # 07/22/2024 0.25  0 - 0.9 x10(3)/mcL Final    Baso # 07/22/2024 0.03  <=0.2 x10(3)/mcL Final    IG# 07/22/2024 0.02  0 - 0.04 x10(3)/mcL Final    IG% 07/22/2024 0.2  % Final      Assessment:        1. 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 Anemia in stage 3b chronic kidney disease    4. Iron deficiency anemia, unspecified iron deficiency anemia type      Plan:         Patient with large right renal mass and retroperitoneal adenopathy diagnosed by CT in 7/2020.  CT chest showed mediastinal fanny mass and biopsy done via EUS + for metastatic renal cell carcinoma.  Patient s/p radical right nephrectomy done 9/11/20. Pathology showed 9.2cm clear cell renal cell carcinoma, margins clear with 4/6 lymph nodes positive.  Post-operative course complicated by hematoma requiring blood transfusion.  CARIS results- MSI stable, Mismatch repair status proficient. Tumor burden low. Negative  for: PD-L1, NTRK1/2/3, BAP1, FH, MET, PBRM1, SDHA. Pathologic variants identified: KDMSC (Exon 13 and 11) and VHL. Immunohistochemistry results: MLH1, MSH2, MSH6, and PM52 positive. PD-L1 negative.     Referred to Dr. Haja Sal for evaluation for IL-2. Explained this is only treatment to be shown to achieve long-term remissions in stage IV disease and patient has limited disease at this time.  Patient started 1st 5 days of IL-2 on 11/2/20.   Started his 2nd round on 11/16/20 and it was stopped on 11/22/20 due to acute renal failure, volume overload and agitation.   CT scan on 12/23/20 at [facility] revealed progression of his disease. Dr. Sal contacted patient and discussed the results. He also reached out to Dr. Alvarado to notify her that the patient will not be returning.   Started Keytruda 200mg every 3 weeks on 1/8/21 + Inlyta 5mg oral BID on 1/11/21.  CT C/A/P done at [facility] on 10/22/21 showed stable mild reactive mediastinal nodes, otherwise stable.  Patient continued on treatment s/p cycle 18 Keytruda given 1/10/22.  Labs on 1/10/22 showed elevated LFTs. Inlyta held.  CT C/A/P done at [facility] on 1/26/22 showed unfortunately disease progression with new pulmonary nodules, enlargement in mediastinal node, new mesenteric nodule and retroperitoneal adenopathy.  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 Hepatitis panel negative. Keytruda stopped. Started on Prednisone 80mg daily on 1/31/22.  Patient had suicidal ideation and severe depression during visit in 3/2022, seen by psychiatry and meds adjusted, much improved.  CT C/A/P from [facility] on 4/5/22 showed progressive disease.    Treatment change recommended to Cabometyx 60mg daily per NCCN guidelines (Category 1) for subsequent treatment. Started on 4/7/22.  Prednisone weaned and stopped 4/14/22.  Patient was having multiple side effects (n/v, diarrhea, mouth sores) from Cabometyx.  Reduced dose to 40mg on 4/28/22. Held on 5/19/22 due to  cellulitis/rashes/abscesses.      Continued to hold s/p TURP on 6/23/22 and restarted 7/6/22.  Patient hospitalized at [facility] 7/16/22 for large bladder hematoma s/p clot evacuation X 2. Cabometyx stopped again.   Repeat CT C/A/P done at [facility] on 8/29/22 showed mixed findings, but mostly disease progression in fanny metastases in mediastinum, which is to be expected since he has been off treatment.   Restarted back on Cabometyx (lower dose 40mg) on 9/1/22 although it has not been studied in dialysis patients, but no dosage adjustment is usually required.  Held on 10/24/22 for Hand-foot syndrome, restarted 11/9/22.   CT done at [facility] on 11/18/22 with disease improvement.  Cabometyx held again for hand-foot syndrome and wound on left toe 11/28/22.  Restarted Cabometyx on 1/5/23 at 20mg daily.    Cabometyx held on 3/17/23 due to worsening diabetic foot wound.  S/p left great toe amputation on 5/8/23, healed and cleared by podiatry to restart Cabometyx which was restarted on 6/1/23 Cabometyx 20mg daily.  Repeat CT C/A/P on 3/11/24 with a few new millimetric lung nodules, but other disease stable.   Unclear if these actually represent disease, since so small so do not recommend any treatment change at this time.  Patient admitted at [facility] from 4/22/24 - 4/26/24 with osteomyelitis of second toe left foot and diabetic ulcer of the left foot, S/p partial amputation of left 2nd toe on 4/23/24. The interoperative tissue cx (+) MSSA. He was discharged home on oral antibiotics for 2 weeks. Cabometyx held and restarted no 5/27/24.     CT C/A/P done 6/7/24 with some disease progression new small lung nodules and mild progression in mediastinal lymph nodes.  Suspect this was because he was off treatment X 1 month.   Continue Cabometyx for now.     Labs today with CBC ongoing mild anemia, CMP pending.   Plan to repeat CT in 9/2024.  Will obtain MRI of lumbar spine next week at [facility] for his new low back pain that  radiates to his groin/buttock area.   Recommended he increase his supplements to twice daily. Consider starting Megace if no improvement by his next appointment.   F/u in 6 weeks with repeat labs after scans completed.     Looking ahead to future treatment options, would try again with immunotherapy, single agent Opdivo. He never really failed Keytruda, just developed autoimmune hepatitis, so this would need to be monitored closely.   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 Referred for genetic testing which was done on 3/16/23 and negative.      Continue Compazine for nausea.   Continue Norco for joint pain.   Continue Vistaril for chronic pruritis.   Continue OTC calcium supplements  All questions answered at this time.      MARCIN Laboy

## 2024-08-26 NOTE — PROGRESS NOTES
Subjective:       Patient ID: Ye Olivera is a 64 y.o. male.    Urologist: Dr. Christopher Villasenor  PCP: Dr. Ly Quevedo  Nephrologist: Dr. Jenae Le    Renal Cell Carcinoma Stage IV([id number]) Mediastinal lymph nodes--Diagnosed 20  Biopsy/pathology:  Upper EUS done 9/3/20--large cluster of malignant appearing lymph nodes in subcarinal mediastinum causing extrinsic compression in middle 1/3 and thoracic esophagus biopsy c/w metastatic carcinoma, c/w renal cell carcinoma CD10+, PAX -8+, erythematous mucosa in antrum biopsy c/w mild chronic gastritis, inactive, normal duodenum, no pathology in CBD or left lobe of liver or pancreas. CARIS results- MSI stable, Mismatch repair status proficient. Tumor burden low. Negative for: PD-L1, NTRK1/2/3, BAP1, FH, MET, PBRM1, SDHA. Pathologic variants identified: KDMSC (Exon 13 and 11) and VHL. Immunohistochemistry results: MLH1, MSH2, MSH6, and PM52 positive. PD-L1 negative.   Surgery/pathology:  Right radical nephrectomy and lymph node dissection done 20--clear cell renal cell carcinoma measures 9.2X8C8.7cm, Grade 3, margins clear, tumor limited to kidney, 4/6 lymph nodes positive for metastatic clear cell carcinoma.   Imagin. CT A/P w/o contrast [facility] 20--bulky right renal mass, midpole, measuring 10cm with evidence of internal necrosis and calcifications, primary mass does not extend outside the perinephric space, associated right perinephric edema, multiple enlarged retroperitoneal lymph nodes at level of the kidneys and inferior retroperitoneum, one measures 2.9X1.4cm, additional node measures 2.1X1.8cm, additional node measures 1.8X1.2cm, inferior retroperitoneal node at level of inferior pole right kidney measures 1.8X1.4cm, no convincing evidence of renal vein involvement, no right adrenal gland involvement, no disease apparent outside of abdomen.  2. CT A/P w/ and w/o contrast [facility] 20--large right renal mass measures 10.6X9.5X7.4cm, with  enlarged retroperitoneal nodes, largest measures 2.7cm.  3. CT chest [facility] 8/28/20--solitary left supraclavicular node, small and not overtly concerning, heterogeneous retroesophageal mass at the level of the tariq c/w metastatic deposit, no other evidence for metastatic disease.  4. MRI brain w/ and w/o contrast [facility] 8/28/20--no evidence for intracranial metastatic disease.  5. CT C/A/P done at [facility] 10/28/20--now appears to be 2 adjacent masses at the level of the tariq, measuring in aggregate 6.2X2.7cm as compared with 5.2X2.1cm, subcentimeter mediastinal and hilar nodes are seen, large intermediate density structure at the right renal fossa extending to pelvic brim, most likely post-operative hematoma or seroma, resolution of previously seen retroperitoneal adenopathy.  6. CT C/A/P w&w/o at [facility] on 12/23/20--Interval enlargement of the retro-esophageal mass with mild interval increase in size of multiple paraesophageal and prevascular lymph nodes compatible with progression of disease. Interval decrease in size of the right retroperitoneal postop seroma or hematoma. No evidence of metastatic disease below the diaphragm. Prior right nephrectomy and adrenalectomy. Chololithiasis and stable atherosclerosis.   7. CT C/A/P w/o contrast done at [facility] on 4/5/21--interval decrease in size of multiple prevascular, subcarinal and para-esophageal lymph nodes c/w response to treatment, no new pulmonary nodule or lymph node, no evidence for metastatic disease below the diaphragm, interval decrease in size of the post-operative seromas adjacent to right psoas muscle.  8. CT C/A/P w/o contrast done at [facility] on 7/7/21--stable appearance of previously identified prevascular, subcarinal, and paraesophageal nodes, there are an increased number of sub-centimeter nodes in the aortopulmonary window and precarinal region, nonspecific in appearance, no other evidence of local recurrence or metastatic disease.   9. MRI cervical spine w/ and w/o  contrast done at [facility] 8/4/21--no metastatic disease, multilevel spondylotic changes, no acute abnormality.   10. MRI brain w/ and w/o contrast 8/4/21--normal maturation of the previously seen lacunar infarct at the left centrum semiovale, no new abnormalities, specifically, no evidence of intracranial metastatic disease.   11. CT C/A/P w/o contrast done at [facility] 10/22/21--stable reactive mediastinal adenopathy, interval resection of right kidney and adrenal gland with post-surgical changes but no evidence of metastatic disease.   12. CT C/A/P w/o contrast done at [facility] 1/26/22--4 new pulmonary nodules larges LLL 14mm, 9mm LLL, 5mm LLL, 5mm RML, with enlarging prevascular lymph node now measures 18mm, previous 8mm, concerning for metastatic disease, new mesenteric implant in LLQ measures 11mm, enlarging retroperitoneal LN now measures [id number], previous 14X8mm concerning for progression disease, cholelithiasis.   13. MRI brain w/ and w/o  contrast done at [facility] 3/31/22--no acute intracranial abnormality.   14. CT C/A/P w/o contrast done at [facility] 4/5/22--progressive retroperitoneal and mediastinal adenopathy, with development of hilar adenopathy, increase in size and number of pulmonary nodules.   15. CT done at [facility] showed large hematoma 14cm in bladder, results not available.  16. CT head w/o contrast done at [facility] 7/31/22--No intracranial hemorrhage or acute traumatic brain parenchymal injury evident by CT.     17. CT C/A/P w/o contrast done at [facility] 8/29/22--mixed response to treatment, worsening mediastinal adenopathy, stable pulmonary nodules, improved retroperitoneal lymph node, enlarging pericaval lymph node.  18. CT C/A/P w/o contrast at [facility] 11/18/22--Interval decreased in mediastinal adenopathy, pulmonary nodules and portacaval nodes, c/w treatment response.   19. CT C/A/P w/ contrast at [facility] 2/8/23--stable mediastinal lymph nodes, enlarging left lower lobe pulmonary nodule, enlarging retroperitoneal lymph nodes.   20. CT  C/A/P w/o contrast at [facility] done 6/20/23--minimally enlarged mediastinal lymph nodes, otherwise no other significant changes, pericaval nodes unchanged.   21. CT C/A/P w/o contrast at [facility] done 10/11/23--improved appearance of the right lung base with decreased ground glass opacity, stable to decreased size of mediastinal and pericaval lymph nodes (index prevascular node decreased 1X1.6cm, pericaval measures 1.6X2cm (previous 2X2.4cm),   22. CT C/A/P w/o contrast at [facility] on 1/10/24--Small left basilar pulmonary nodule stable. No additional pulmonary nodules. Millimetric size increase of right peritracheal and aorticopulmonary lymph nodes as well as nodes at the azygos esophageal recess. Intraluminal gallbladder sludge and possible tiny stones. Postsurgical changes of the right nephrectomy. Cortical scarring or postsurgical changes of the upper pole of the left kidney.   23. CT C/A/P w/ contrast at [facility] 3/11/24--stable mediastinal adenopathy, with stable 2cm LLL pulmonary nodule in left infrahilar region however, there are a few additional pulmonary micronodules in right and left lower lobe concerning for progressive disease, for example 4.9mm LLL subpleural nodule, with a few additional new bilateral lower lobe pulmonary nodules measuring 2-3mm.  24. CT C/A/P at [facility] 6/7/24--multiple new bilateral pulmonary nodules with mild interval increase in size of mediastinal and left supraclavicular lymph nodes, findings c/w disease progression, portal caval adenopathy redemonstrated with index lesion measuring 2.5cm similar to prior.   25. CT C/A/P done at [facility] 8/19/24--slight further increase in size of supraclavicular and mediastinal lymph nodes as well as slight interval increase in pulmonary nodules w/o definite new nodules, stable portacaval and periportal nodes.  26. MRI lumbar spine w/o contrast 8/30/24--no acute abnormality, no metastatic disease, spondylotic/disc degenerative changes.    Treatment History:   1. IL2 started  on 11/2/20. 2nd cycle started 11/16/20 - stopped after 7 days due to acute renal failure, volume overload and confusion. Stopped due to progression.  2. Keytruda/Inlyta 1/8/21--1/31/22 (stopped due to progression, autoimmune hepatitis).  3. Prednisone 80mg daily 1/31/22--weaned and stopped 4/14/22.    Hospitalization:   [facility] 7/2022 14cm hematoma s/p TURP causing hydronephrosis, JOE and patient required initiation of dialysis, UTI, Covid infection.     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     Treatment Plan:  Cabometyx 60mg daily started 4/7/22  Decrease dose to 40mg daily 4/28/22 due to side effects.  HOLD on 5/19/22 until groin rash and abscess heal/also underwent TURP on 6/23/22  Restarted 7/6/22--held 7/16/22 for hospitalization/bladder hematoma  Cabometyx 40mg daily restarted 9/1/22. On hold since 10/24/22 for hand/foot syndrome.   Restarted on 11/9/22, held 11/28/22 for hand-foot syndrome, foot wound.   Dose decreased to 20mg daily. Restarted on 1/5/23. Stopped on 3/17/23 due to diabetic foot wound.   Restarted 6/1/23. Stopped on 4/22/24 due to osteomyelitis.   Restarted 5/27/24.   Off for a few weeks in 8/2024    Chief Complaint: Diarrhea and Shortness of Breath    HPI   Patient presents for follow-up of renal cell carcinoma. He is doing okay. He continues on Cabometyx, but was off for several weeks in 8/2024 with continued problems with his feet. Continues close follow-up with his foot doctor. He also mentions that he was seen by neurologist for some memory problems and was placed on Namenda. Also has some mild tremors which Dr. Erazo will address at next visit. Recent CT does show some slight disease progression mediastinal nodes and pulmonary nodules and stable disease elsewhere. Discussed continuation for now.      Past Medical History:   Diagnosis Date    Arthritis of knee 08/13/2013    Cancer     stage IV renal cell carcinoma    Cataract      Coronary artery disease     Diabetes mellitus, type 2     HTN (hypertension) 08/13/2013    Hx of CABG     Hyperlipidemia     Memory loss     Neuropathy     Type II or unspecified type diabetes mellitus without mention of complication, not stated as uncontrolled 08/13/2013      Review of patient's allergies indicates:  No Known Allergies     Current Outpatient Medications:     allopurinoL (ZYLOPRIM) 300 MG tablet, Take 200 mg by mouth once daily., Disp: , Rfl:     aspirin 81 mg Cap, Take 81 mg by mouth once daily at 6am., Disp: , Rfl:     atorvastatin (LIPITOR) 80 MG tablet, Take 80 mg by mouth once daily., Disp: , Rfl:     azelastine (ASTELIN) 137 mcg (0.1 %) nasal spray, SMARTSIG:Both Nares, Disp: , Rfl:     cabozantinib (CABOMETYX) 20 mg Tab, Take 1 tablet (20 mg) by mouth once daily., Disp: 30 tablet, Rfl: 6    carvediloL (COREG) 12.5 MG tablet, Take 1 tablet by mouth 2 (two) times daily., Disp: , Rfl:     colchicine (COLCRYS) 0.6 mg tablet, Take 2 tablets by mouth once, then take 1 tablet 1 hour later. Max 1.8 mg/day. Then take 1 tablets by mouth daily until 2 days after flare resolves., Disp: , Rfl:     desvenlafaxine succinate (PRISTIQ) 100 MG Tb24, Take 100 mg by mouth., Disp: , Rfl:     diphenoxylate-atropine 2.5-0.025 mg (LOMOTIL) 2.5-0.025 mg per tablet, Take 1 tablet by mouth as needed., Disp: , Rfl:     finasteride (PROSCAR) 5 mg tablet, Take 5 mg by mouth as needed., Disp: , Rfl:     fluticasone propionate (FLONASE) 50 mcg/actuation nasal spray, 2 sprays by Each Nostril route., Disp: , Rfl:     furosemide (LASIX) 20 MG tablet, Take 20 mg by mouth once daily. Pt stated he thinks her takes 10mg daily, Disp: , Rfl:     hydrocortisone 2.5 % cream, APPLY TO THE AFFECTED AREA(S) TWICE DAILY as needed for SKIN IRRITATION FOR 14 DAYS, Disp: , Rfl:     hydrOXYzine pamoate (VISTARIL) 25 MG Cap, Take 25 mg by mouth every 6 (six) hours as needed., Disp: , Rfl:     hyoscyamine (LEVSIN/SL) 0.125 mg Subl, Place  "0.125 mg under the tongue every 6 (six) hours as needed., Disp: , Rfl:     insulin glargine (LANTUS) 100 unit/mL injection, Inject into the skin. Takes 20units, Disp: , Rfl:     insulin syr/ndl U100 half warner (BD VEO INSULIN SYR, HALF UNIT,) 0.3 mL 31 gauge x 15/64" Syrg, , Disp: , Rfl:     lancets Misc, Use TID to check blood sugar. Dx: E11.9, patient is insulin dependent, Disp: , Rfl:     lisinopriL (PRINIVIL,ZESTRIL) 5 MG tablet, Take 5 mg by mouth., Disp: , Rfl:     memantine (NAMENDA) 10 MG Tab, Take 1 tablet (10 mg total) by mouth 2 (two) times daily., Disp: 60 tablet, Rfl: 11    multivitamin (THERAGRAN) per tablet, Take 1 tablet by mouth once daily., Disp: , Rfl:     ondansetron (ZOFRAN) 8 MG tablet, Take 1 tablet (8 mg total) by mouth every 8 (eight) hours as needed for Nausea., Disp: 30 tablet, Rfl: 2    pantoprazole (PROTONIX) 40 MG tablet, TAKE 1 TABLET BY MOUTH EVERY DAY, Disp: 30 tablet, Rfl: 3    pregabalin (LYRICA) 100 MG capsule, Take 100 mg by mouth., Disp: , Rfl:     prochlorperazine (COMPAZINE) 5 MG tablet, Take 1 tablet (5 mg total) by mouth 3 (three) times daily as needed for Nausea., Disp: 30 tablet, Rfl: 6    QUEtiapine (SEROQUEL) 50 MG tablet, Take 3 tablets by mouth every evening., Disp: , Rfl:     tamsulosin (FLOMAX) 0.4 mg Cap, TAKE 1 CAPSULE BY MOUTH EVERY EVENING, Disp: 30 capsule, Rfl: 6    tolterodine (DETROL LA) 4 MG 24 hr capsule, Take 4 mg by mouth once daily., Disp: , Rfl:     traMADoL (ULTRAM) 50 mg tablet, Take 50 mg by mouth every 6 (six) hours., Disp: , Rfl:     TRUE METRIX GLUCOSE TEST STRIP Strp, 3 (three) times daily., Disp: , Rfl:     tuberculin,purif.prot.deriv. (TUBERSOL IDRM), Inject 0.1 mLs into the skin., Disp: , Rfl:     amLODIPine (NORVASC) 10 MG tablet, Take 1 tablet (10 mg total) by mouth every morning., Disp: 30 tablet, Rfl: 11    docusate sodium 100 mg capsule, Take by mouth once daily. (Patient not taking: Reported on 8/29/2024), Disp: , Rfl:     sertraline " (ZOLOFT) 50 MG tablet, Take 50 mg by mouth. (Patient not taking: Reported on 7/22/2024), Disp: , Rfl:      Review of Systems   Constitutional:  Positive for appetite change and unexpected weight change.   HENT:  Negative for mouth sores.    Eyes:  Negative for visual disturbance.   Respiratory:  Positive for shortness of breath. Negative for cough.    Cardiovascular:  Negative for chest pain.   Gastrointestinal:  Negative for abdominal pain and diarrhea.   Genitourinary:  Negative for frequency.   Musculoskeletal:  Positive for back pain.        Low back pain radiating to groin and buttocks   Integumentary:  Negative for rash.   Neurological:  Positive for headaches.   Hematological:  Negative for adenopathy.   Psychiatric/Behavioral:  The patient is nervous/anxious.      There were no vitals filed for this visit.    Wt Readings from Last 3 Encounters:   08/29/24 0846 91.6 kg (202 lb)   07/22/24 1433 91.9 kg (202 lb 9.6 oz)   06/10/24 0902 95 kg (209 lb 8 oz)     Physical Exam  Constitutional:       Appearance: Normal appearance. He is overweight.   HENT:      Head: Normocephalic and atraumatic.      Nose: Nose normal.   Eyes:      General: Lids are normal. Vision grossly intact.      Extraocular Movements: Extraocular movements intact.      Conjunctiva/sclera: Conjunctivae normal.   Cardiovascular:      Rate and Rhythm: Normal rate and regular rhythm.      Heart sounds: Normal heart sounds.   Pulmonary:      Effort: Pulmonary effort is normal.      Breath sounds: Normal breath sounds.   Chest:      Comments: HD access removed to right upper CW  Abdominal:      General: Abdomen is protuberant. Bowel sounds are normal. There is no distension.      Palpations: Abdomen is soft.      Tenderness: There is no abdominal tenderness.   Musculoskeletal:         General: Normal range of motion.      Cervical back: Neck supple.      Right lower leg: No edema.      Left lower leg: No edema.   Feet:      Comments: Left great toe  amputation healed. Left 2nd partial toe amputation with scab present, no signs infection, healing  Lymphadenopathy:      Cervical: No cervical adenopathy.      Upper Body:      Right upper body: No supraclavicular or axillary adenopathy.      Left upper body: No supraclavicular or axillary adenopathy.   Skin:     General: Skin is warm and dry.   Neurological:      General: No focal deficit present.      Mental Status: He is alert and oriented to person, place, and time.   Psychiatric:         Attention and Perception: Attention normal.         Mood and Affect: Mood normal.         Speech: Speech normal.         Behavior: Behavior normal. Behavior is cooperative.       Lab Visit on 09/04/2024   Component Date Value Ref Range Status    WBC 09/04/2024 9.59  4.50 - 11.50 x10(3)/mcL Final    RBC 09/04/2024 5.06  4.70 - 6.10 x10(6)/mcL Final    Hgb 09/04/2024 11.9 (L)  14.0 - 18.0 g/dL Final    Hct 09/04/2024 38.4 (L)  42.0 - 52.0 % Final    MCV 09/04/2024 75.9 (L)  80.0 - 94.0 fL Final    MCH 09/04/2024 23.5 (L)  27.0 - 31.0 pg Final    MCHC 09/04/2024 31.0 (L)  33.0 - 36.0 g/dL Final    RDW 09/04/2024 21.2 (H)  11.5 - 17.0 % Final    Platelet 09/04/2024 247  130 - 400 x10(3)/mcL Final    MPV 09/04/2024 8.9  7.4 - 10.4 fL Final    Neut % 09/04/2024 68.6  % Final    Lymph % 09/04/2024 18.9  % Final    Mono % 09/04/2024 8.2  % Final    Eos % 09/04/2024 3.6  % Final    Basophil % 09/04/2024 0.3  % Final    Lymph # 09/04/2024 1.81  0.6 - 4.6 x10(3)/mcL Final    Neut # 09/04/2024 6.57  2.1 - 9.2 x10(3)/mcL Final    Mono # 09/04/2024 0.79  0.1 - 1.3 x10(3)/mcL Final    Eos # 09/04/2024 0.35  0 - 0.9 x10(3)/mcL Final    Baso # 09/04/2024 0.03  <=0.2 x10(3)/mcL Final    IG# 09/04/2024 0.04  0 - 0.04 x10(3)/mcL Final    IG% 09/04/2024 0.4  % Final      Assessment:        1. Clear cell carcinoma of right kidney    2. 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  CT chest showed mediastinal fanny mass and biopsy done via EUS + for metastatic renal cell carcinoma.  Patient s/p radical right nephrectomy done 9/11/20. Pathology showed 9.2cm clear cell renal cell carcinoma, margins clear with 4/6 lymph nodes positive.  Post-operative course complicated by hematoma requiring blood transfusion.  CARIS results- MSI stable, Mismatch repair status proficient. Tumor burden low. Negative for: PD-L1, NTRK1/2/3, BAP1, FH, MET, PBRM1, SDHA. Pathologic variants identified: KDMSC (Exon 13 and 11) and VHL. Immunohistochemistry results: MLH1, MSH2, MSH6, and PM52 positive. PD-L1 negative.     Referred to Dr. Haja Sal for evaluation for IL-2. Explained this is only treatment to be shown to achieve long-term remissions in stage IV disease and patient has limited disease at this time.  Patient started 1st 5 days of IL-2 on 11/2/20.   Started his 2nd round on 11/16/20 and it was stopped on 11/22/20 due to acute renal failure, volume overload and agitation.   CT scan on 12/23/20 at [facility] revealed progression of his disease. Dr. Sal contacted patient and discussed the results. He also reached out to Dr. Alvarado to notify her that the patient will not be returning.   Started Keytruda 200mg every 3 weeks on 1/8/21 + Inlyta 5mg oral BID on 1/11/21.  CT C/A/P done at [facility] on 10/22/21 showed stable mild reactive mediastinal nodes, otherwise stable.  Patient continued on treatment s/p cycle 18 Keytruda given 1/10/22.  Labs on 1/10/22 showed elevated LFTs. Inlyta held.  CT C/A/P done at [facility] on 1/26/22 showed unfortunately disease progression with new pulmonary nodules, enlargement in mediastinal node, new mesenteric nodule and retroperitoneal adenopathy.  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 Hepatitis panel negative. Keytruda stopped. Started on Prednisone 80mg daily on 1/31/22.  Patient had suicidal  ideation and severe depression during visit in 3/2022, seen by psychiatry and meds adjusted, much improved.  CT C/A/P from [facility] on 4/5/22 showed progressive disease.    Treatment change recommended to Cabometyx 60mg daily per NCCN guidelines (Category 1) for subsequent treatment. Started on 4/7/22.  Prednisone weaned and stopped 4/14/22.  Patient was having multiple side effects (n/v, diarrhea, mouth sores) from Cabometyx.  Reduced dose to 40mg on 4/28/22. Held on 5/19/22 due to cellulitis/rashes/abscesses.      Continued to hold s/p TURP on 6/23/22 and restarted 7/6/22.  Patient hospitalized at [facility] 7/16/22 for large bladder hematoma s/p clot evacuation X 2. Cabometyx stopped again.   Repeat CT C/A/P done at [facility] on 8/29/22 showed mixed findings, but mostly disease progression in fanny metastases in mediastinum, which is to be expected since he has been off treatment.   Restarted back on Cabometyx (lower dose 40mg) on 9/1/22 although it has not been studied in dialysis patients, but no dosage adjustment is usually required.  Held on 10/24/22 for Hand-foot syndrome, restarted 11/9/22.   CT done at [facility] on 11/18/22 with disease improvement.  Cabometyx held again for hand-foot syndrome and wound on left toe 11/28/22.  Restarted Cabometyx on 1/5/23 at 20mg daily.    Cabometyx held on 3/17/23 due to worsening diabetic foot wound.  S/p left great toe amputation on 5/8/23, healed and cleared by podiatry to restart Cabometyx which was restarted on 6/1/23 Cabometyx 20mg daily.  Repeat CT C/A/P on 3/11/24 with a few new millimetric lung nodules, but other disease stable.   Unclear if these actually represent disease, since so small so do not recommend any treatment change at this time.  Patient admitted at [facility] from 4/22/24 - 4/26/24 with osteomyelitis of second toe left foot and diabetic ulcer of the left foot, S/p partial amputation of left 2nd toe on 4/23/24. The interoperative tissue cx (+) MSSA.  He was discharged home on oral antibiotics for 2 weeks. Cabometyx held and restarted no 5/27/24.     CT C/A/P done 6/7/24 with some disease progression new small lung nodules and mild progression in mediastinal lymph nodes.  Recommended to continue with Cabometyx since he had been off for 1 month.  CT C/A/P done 8/19/24 with some disease progression in lung nodules and mild progression in mediastinal nodes, stable other disease.  MRI lumbar spine done 8/30/24 for back pain with no metastatic disease, only degenerative changes.  He was also off Cabometyx for 3-4 weeks during this time.   Since he is asymptomatic, will continue with treatment with Cabometyx for now.   Discussed with patient about trying to continue treatment consistently so for his next scan, we need to see if he is really failing therapy.     Plan to repeat CT in 3 months, early 12/2024.    F/u in 4 weeks with repeat labs and visit.     Looking ahead to future treatment options, would possibly try again with immunotherapy, single agent Opdivo. Or could also try single agent Afinitor or Lenvima. I am not sure he is a candidate for doublet therapy given all his comorbidities.    He never really failed Keytruda, just developed autoimmune hepatitis, so this would need to be monitored closely.   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 Referred for genetic testing which was done on 3/16/23 and negative.      Continue Compazine for nausea.   Continue Norco for joint pain.   Continue Vistaril for chronic pruritis.   Continue OTC calcium supplements  All questions answered at this time.      Elizabeth Alvarado MD

## 2024-08-29 ENCOUNTER — OFFICE VISIT (OUTPATIENT)
Dept: NEUROLOGY | Facility: CLINIC | Age: 64
End: 2024-08-29
Payer: MEDICARE

## 2024-08-29 VITALS
DIASTOLIC BLOOD PRESSURE: 78 MMHG | HEIGHT: 70 IN | SYSTOLIC BLOOD PRESSURE: 124 MMHG | WEIGHT: 202 LBS | BODY MASS INDEX: 28.92 KG/M2

## 2024-08-29 DIAGNOSIS — R41.3 MEMORY LOSS: Primary | ICD-10-CM

## 2024-08-29 DIAGNOSIS — R41.3 OTHER AMNESIA: ICD-10-CM

## 2024-08-29 DIAGNOSIS — G25.0 ESSENTIAL TREMOR: ICD-10-CM

## 2024-08-29 PROCEDURE — 3078F DIAST BP <80 MM HG: CPT | Mod: CPTII,S$GLB,, | Performed by: PSYCHIATRY & NEUROLOGY

## 2024-08-29 PROCEDURE — 99999 PR PBB SHADOW E&M-EST. PATIENT-LVL V: CPT | Mod: PBBFAC,,, | Performed by: PSYCHIATRY & NEUROLOGY

## 2024-08-29 PROCEDURE — 99205 OFFICE O/P NEW HI 60 MIN: CPT | Mod: S$GLB,,, | Performed by: PSYCHIATRY & NEUROLOGY

## 2024-08-29 PROCEDURE — 3074F SYST BP LT 130 MM HG: CPT | Mod: CPTII,S$GLB,, | Performed by: PSYCHIATRY & NEUROLOGY

## 2024-08-29 PROCEDURE — 3008F BODY MASS INDEX DOCD: CPT | Mod: CPTII,S$GLB,, | Performed by: PSYCHIATRY & NEUROLOGY

## 2024-08-29 PROCEDURE — 4010F ACE/ARB THERAPY RXD/TAKEN: CPT | Mod: CPTII,S$GLB,, | Performed by: PSYCHIATRY & NEUROLOGY

## 2024-08-29 PROCEDURE — 1159F MED LIST DOCD IN RCRD: CPT | Mod: CPTII,S$GLB,, | Performed by: PSYCHIATRY & NEUROLOGY

## 2024-08-29 PROCEDURE — 3044F HG A1C LEVEL LT 7.0%: CPT | Mod: CPTII,S$GLB,, | Performed by: PSYCHIATRY & NEUROLOGY

## 2024-08-29 RX ORDER — LISINOPRIL 5 MG/1
5 TABLET ORAL
COMMUNITY
Start: 2024-08-05 | End: 2025-02-01

## 2024-08-29 RX ORDER — MEMANTINE HYDROCHLORIDE 10 MG/1
10 TABLET ORAL 2 TIMES DAILY
Qty: 60 TABLET | Refills: 11 | Status: SHIPPED | OUTPATIENT
Start: 2024-08-29 | End: 2025-08-29

## 2024-08-29 NOTE — PROGRESS NOTES
Subjective     Patient ID: Ye Olivera is a 64 y.o. male.    Chief Complaint: tremor and gait abnormality (New pt- tremors - gait abnormality- referred by Dr. Ly Quevedo)    HPI  Kidney cancer  Chronic depression  1-2 y of tremor; postrual/intention; no parkinsonism  +poor balance  +progressive memory loss; repeats self; forgets things    Driving is ok    No fam hx of tremor  No hallucinations  No etoh  Review of Systems  The remainder of the 14 system ROS is noncontributory or negative unless mentioned/reviewed above.       Objective     Physical Exam  Mental Status: Alert and oriented x3. Language is fluent with good comprehension.    Cranial Nerve: Pupils are equal, round, and reactive to light. Visual fields are intact to confrontation. Normal fundi. Ocular movements are intact. Face is symmetric at rest and with activation with intact sensation throughout. Hearing intact to finger rub bilaterally. Muscles of tongue and palate activate symmetrically. No dysarthria. Strength is full in sternocleidomastoid and trapezius bilaterally.    Motor: Muscle bulk and tone are normal. Strength is 5/5 in all four extremities both proximally and distally. Intact fine motor movements bilaterally. There is no pronator drift or satelliting on arm roll.    Sensory: Sensation is intact to light touch, pinprick, vibration, and proprioception throughout. Romberg is negative.    Reflexes: 2+ and symmetric at the biceps, triceps, brachioradialis, patella, and Achilles bilaterally. Plantar response is flexor bilaterally.    Coordination: No dysmetria on finger-nose-finger or heel-knee-shin. Normal rapid alternating movements. Fast finger tapping with normal amplitude and speed.    Gait: Narrow based with normal stride length and good arm swing bilaterally. Able to walk on heels, toes, and in tandem.    Hand atrophy  UE postrual tremor     Assessment and Plan     1. Memory loss    2. Essential tremor    3. Other amnesia  -      MRI Brain Without Contrast; Future; Expected date: 08/29/2024    Other orders  -     memantine (NAMENDA) 10 MG Tab; Take 1 tablet (10 mg total) by mouth 2 (two) times daily.  Dispense: 60 tablet; Refill: 11        Mri brain  Memantine  Hold off on tremor meds; can consider calatrio  B12 folate spep         Follow up in about 4 weeks (around 9/26/2024).

## 2024-09-04 ENCOUNTER — LAB VISIT (OUTPATIENT)
Dept: LAB | Facility: HOSPITAL | Age: 64
End: 2024-09-04
Attending: INTERNAL MEDICINE
Payer: MEDICARE

## 2024-09-04 ENCOUNTER — OFFICE VISIT (OUTPATIENT)
Dept: HEMATOLOGY/ONCOLOGY | Facility: CLINIC | Age: 64
End: 2024-09-04
Payer: MEDICARE

## 2024-09-04 VITALS
BODY MASS INDEX: 29.01 KG/M2 | HEIGHT: 70 IN | HEART RATE: 67 BPM | TEMPERATURE: 98 F | OXYGEN SATURATION: 95 % | RESPIRATION RATE: 14 BRPM | WEIGHT: 202.63 LBS | SYSTOLIC BLOOD PRESSURE: 90 MMHG | DIASTOLIC BLOOD PRESSURE: 64 MMHG

## 2024-09-04 DIAGNOSIS — D63.1 ANEMIA IN STAGE 3B CHRONIC KIDNEY DISEASE: ICD-10-CM

## 2024-09-04 DIAGNOSIS — D84.821 IMMUNODEFICIENCY DUE TO DRUGS: ICD-10-CM

## 2024-09-04 DIAGNOSIS — N18.32 ANEMIA IN STAGE 3B CHRONIC KIDNEY DISEASE: ICD-10-CM

## 2024-09-04 DIAGNOSIS — D50.9 IRON DEFICIENCY ANEMIA, UNSPECIFIED IRON DEFICIENCY ANEMIA TYPE: ICD-10-CM

## 2024-09-04 DIAGNOSIS — Z79.899 IMMUNODEFICIENCY DUE TO DRUGS: ICD-10-CM

## 2024-09-04 DIAGNOSIS — C64.1 CLEAR CELL CARCINOMA OF RIGHT KIDNEY: ICD-10-CM

## 2024-09-04 DIAGNOSIS — C77.9 MALIGNANT NEOPLASM METASTATIC TO LYMPH NODES, UNSPECIFIED LYMPH NODE REGION: ICD-10-CM

## 2024-09-04 DIAGNOSIS — M54.40 ACUTE RIGHT-SIDED LOW BACK PAIN WITH SCIATICA, SCIATICA LATERALITY UNSPECIFIED: ICD-10-CM

## 2024-09-04 DIAGNOSIS — C77.1 MALIGNANT NEOPLASM METASTATIC TO INTRATHORACIC LYMPH NODE: ICD-10-CM

## 2024-09-04 DIAGNOSIS — C64.1 CLEAR CELL CARCINOMA OF RIGHT KIDNEY: Primary | ICD-10-CM

## 2024-09-04 LAB
ALBUMIN SERPL-MCNC: 2.2 G/DL (ref 3.4–4.8)
ALBUMIN/GLOB SERPL: 0.5 RATIO (ref 1.1–2)
ALP SERPL-CCNC: 124 UNIT/L (ref 40–150)
ALT SERPL-CCNC: 12 UNIT/L (ref 0–55)
ANION GAP SERPL CALC-SCNC: 9 MEQ/L
AST SERPL-CCNC: 19 UNIT/L (ref 5–34)
BASOPHILS # BLD AUTO: 0.03 X10(3)/MCL
BASOPHILS NFR BLD AUTO: 0.3 %
BILIRUB SERPL-MCNC: 0.3 MG/DL
BUN SERPL-MCNC: 35.1 MG/DL (ref 8.4–25.7)
CALCIUM SERPL-MCNC: 8.3 MG/DL (ref 8.8–10)
CHLORIDE SERPL-SCNC: 105 MMOL/L (ref 98–107)
CO2 SERPL-SCNC: 25 MMOL/L (ref 23–31)
CREAT SERPL-MCNC: 2.03 MG/DL (ref 0.73–1.18)
CREAT/UREA NIT SERPL: 17
EOSINOPHIL # BLD AUTO: 0.35 X10(3)/MCL (ref 0–0.9)
EOSINOPHIL NFR BLD AUTO: 3.6 %
ERYTHROCYTE [DISTWIDTH] IN BLOOD BY AUTOMATED COUNT: 21.2 % (ref 11.5–17)
GFR SERPLBLD CREATININE-BSD FMLA CKD-EPI: 36 ML/MIN/1.73/M2
GLOBULIN SER-MCNC: 4.4 GM/DL (ref 2.4–3.5)
GLUCOSE SERPL-MCNC: 167 MG/DL (ref 82–115)
HCT VFR BLD AUTO: 38.4 % (ref 42–52)
HGB BLD-MCNC: 11.9 G/DL (ref 14–18)
IMM GRANULOCYTES # BLD AUTO: 0.04 X10(3)/MCL (ref 0–0.04)
IMM GRANULOCYTES NFR BLD AUTO: 0.4 %
LYMPHOCYTES # BLD AUTO: 1.81 X10(3)/MCL (ref 0.6–4.6)
LYMPHOCYTES NFR BLD AUTO: 18.9 %
MCH RBC QN AUTO: 23.5 PG (ref 27–31)
MCHC RBC AUTO-ENTMCNC: 31 G/DL (ref 33–36)
MCV RBC AUTO: 75.9 FL (ref 80–94)
MONOCYTES # BLD AUTO: 0.79 X10(3)/MCL (ref 0.1–1.3)
MONOCYTES NFR BLD AUTO: 8.2 %
NEUTROPHILS # BLD AUTO: 6.57 X10(3)/MCL (ref 2.1–9.2)
NEUTROPHILS NFR BLD AUTO: 68.6 %
PLATELET # BLD AUTO: 247 X10(3)/MCL (ref 130–400)
PMV BLD AUTO: 8.9 FL (ref 7.4–10.4)
POTASSIUM SERPL-SCNC: 3.9 MMOL/L (ref 3.5–5.1)
PROT SERPL-MCNC: 6.6 GM/DL (ref 5.8–7.6)
RBC # BLD AUTO: 5.06 X10(6)/MCL (ref 4.7–6.1)
SODIUM SERPL-SCNC: 139 MMOL/L (ref 136–145)
WBC # BLD AUTO: 9.59 X10(3)/MCL (ref 4.5–11.5)

## 2024-09-04 PROCEDURE — 80053 COMPREHEN METABOLIC PANEL: CPT

## 2024-09-04 PROCEDURE — 85025 COMPLETE CBC W/AUTO DIFF WBC: CPT

## 2024-09-04 PROCEDURE — 3078F DIAST BP <80 MM HG: CPT | Mod: CPTII,S$GLB,, | Performed by: INTERNAL MEDICINE

## 2024-09-04 PROCEDURE — 36415 COLL VENOUS BLD VENIPUNCTURE: CPT

## 2024-09-04 PROCEDURE — 4010F ACE/ARB THERAPY RXD/TAKEN: CPT | Mod: CPTII,S$GLB,, | Performed by: INTERNAL MEDICINE

## 2024-09-04 PROCEDURE — 99215 OFFICE O/P EST HI 40 MIN: CPT | Mod: S$GLB,,, | Performed by: INTERNAL MEDICINE

## 2024-09-04 PROCEDURE — 3074F SYST BP LT 130 MM HG: CPT | Mod: CPTII,S$GLB,, | Performed by: INTERNAL MEDICINE

## 2024-09-04 PROCEDURE — 3044F HG A1C LEVEL LT 7.0%: CPT | Mod: CPTII,S$GLB,, | Performed by: INTERNAL MEDICINE

## 2024-09-04 PROCEDURE — 1159F MED LIST DOCD IN RCRD: CPT | Mod: CPTII,S$GLB,, | Performed by: INTERNAL MEDICINE

## 2024-09-04 PROCEDURE — 99999 PR PBB SHADOW E&M-EST. PATIENT-LVL V: CPT | Mod: PBBFAC,,, | Performed by: INTERNAL MEDICINE

## 2024-09-04 PROCEDURE — 3008F BODY MASS INDEX DOCD: CPT | Mod: CPTII,S$GLB,, | Performed by: INTERNAL MEDICINE

## 2024-09-13 ENCOUNTER — HOSPITAL ENCOUNTER (OUTPATIENT)
Dept: RADIOLOGY | Facility: HOSPITAL | Age: 64
Discharge: HOME | End: 2024-09-13
Attending: PSYCHIATRY & NEUROLOGY
Payer: MEDICARE

## 2024-09-13 DIAGNOSIS — R41.3 OTHER AMNESIA: ICD-10-CM

## 2024-09-13 PROCEDURE — 70551 MRI BRAIN STEM W/O DYE: CPT | Mod: TC

## 2024-09-26 PROBLEM — C77.1 MALIGNANT NEOPLASM METASTATIC TO INTRATHORACIC LYMPH NODE: Status: ACTIVE | Noted: 2022-05-18

## 2024-09-26 NOTE — PROGRESS NOTES
Subjective:       Patient ID: Ye Olivera is a 64 y.o. male.    Urologist: Dr. Christopher Villasenor  PCP: Dr. Ly Quevedo  Nephrologist: Dr. Jenae Le    Renal Cell Carcinoma Stage IV([id number]) Mediastinal lymph nodes--Diagnosed 20  Biopsy/pathology:  Upper EUS done 9/3/20--large cluster of malignant appearing lymph nodes in subcarinal mediastinum causing extrinsic compression in middle 1/3 and thoracic esophagus biopsy c/w metastatic carcinoma, c/w renal cell carcinoma CD10+, PAX -8+, erythematous mucosa in antrum biopsy c/w mild chronic gastritis, inactive, normal duodenum, no pathology in CBD or left lobe of liver or pancreas. CARIS results- MSI stable, Mismatch repair status proficient. Tumor burden low. Negative for: PD-L1, NTRK1/2/3, BAP1, FH, MET, PBRM1, SDHA. Pathologic variants identified: KDMSC (Exon 13 and 11) and VHL. Immunohistochemistry results: MLH1, MSH2, MSH6, and PM52 positive. PD-L1 negative.   Surgery/pathology:  Right radical nephrectomy and lymph node dissection done 20--clear cell renal cell carcinoma measures 9.2X8C8.7cm, Grade 3, margins clear, tumor limited to kidney, 4/6 lymph nodes positive for metastatic clear cell carcinoma.   Imagin. CT A/P w/o contrast [facility] 20--bulky right renal mass, midpole, measuring 10cm with evidence of internal necrosis and calcifications, primary mass does not extend outside the perinephric space, associated right perinephric edema, multiple enlarged retroperitoneal lymph nodes at level of the kidneys and inferior retroperitoneum, one measures 2.9X1.4cm, additional node measures 2.1X1.8cm, additional node measures 1.8X1.2cm, inferior retroperitoneal node at level of inferior pole right kidney measures 1.8X1.4cm, no convincing evidence of renal vein involvement, no right adrenal gland involvement, no disease apparent outside of abdomen.  2. CT A/P w/ and w/o contrast [facility] 20--large right renal mass measures 10.6X9.5X7.4cm, with  enlarged retroperitoneal nodes, largest measures 2.7cm.  3. CT chest [facility] 8/28/20--solitary left supraclavicular node, small and not overtly concerning, heterogeneous retroesophageal mass at the level of the tariq c/w metastatic deposit, no other evidence for metastatic disease.  4. MRI brain w/ and w/o contrast [facility] 8/28/20--no evidence for intracranial metastatic disease.  5. CT C/A/P done at [facility] 10/28/20--now appears to be 2 adjacent masses at the level of the tariq, measuring in aggregate 6.2X2.7cm as compared with 5.2X2.1cm, subcentimeter mediastinal and hilar nodes are seen, large intermediate density structure at the right renal fossa extending to pelvic brim, most likely post-operative hematoma or seroma, resolution of previously seen retroperitoneal adenopathy.  6. CT C/A/P w&w/o at [facility] on 12/23/20--Interval enlargement of the retro-esophageal mass with mild interval increase in size of multiple paraesophageal and prevascular lymph nodes compatible with progression of disease. Interval decrease in size of the right retroperitoneal postop seroma or hematoma. No evidence of metastatic disease below the diaphragm. Prior right nephrectomy and adrenalectomy. Chololithiasis and stable atherosclerosis.   7. CT C/A/P w/o contrast done at [facility] on 4/5/21--interval decrease in size of multiple prevascular, subcarinal and para-esophageal lymph nodes c/w response to treatment, no new pulmonary nodule or lymph node, no evidence for metastatic disease below the diaphragm, interval decrease in size of the post-operative seromas adjacent to right psoas muscle.  8. CT C/A/P w/o contrast done at [facility] on 7/7/21--stable appearance of previously identified prevascular, subcarinal, and paraesophageal nodes, there are an increased number of sub-centimeter nodes in the aortopulmonary window and precarinal region, nonspecific in appearance, no other evidence of local recurrence or metastatic disease.   9. MRI cervical spine w/ and w/o  contrast done at [facility] 8/4/21--no metastatic disease, multilevel spondylotic changes, no acute abnormality.   10. MRI brain w/ and w/o contrast 8/4/21--normal maturation of the previously seen lacunar infarct at the left centrum semiovale, no new abnormalities, specifically, no evidence of intracranial metastatic disease.   11. CT C/A/P w/o contrast done at [facility] 10/22/21--stable reactive mediastinal adenopathy, interval resection of right kidney and adrenal gland with post-surgical changes but no evidence of metastatic disease.   12. CT C/A/P w/o contrast done at [facility] 1/26/22--4 new pulmonary nodules larges LLL 14mm, 9mm LLL, 5mm LLL, 5mm RML, with enlarging prevascular lymph node now measures 18mm, previous 8mm, concerning for metastatic disease, new mesenteric implant in LLQ measures 11mm, enlarging retroperitoneal LN now measures [id number], previous 14X8mm concerning for progression disease, cholelithiasis.   13. MRI brain w/ and w/o  contrast done at [facility] 3/31/22--no acute intracranial abnormality.   14. CT C/A/P w/o contrast done at [facility] 4/5/22--progressive retroperitoneal and mediastinal adenopathy, with development of hilar adenopathy, increase in size and number of pulmonary nodules.   15. CT done at [facility] showed large hematoma 14cm in bladder, results not available.  16. CT head w/o contrast done at [facility] 7/31/22--No intracranial hemorrhage or acute traumatic brain parenchymal injury evident by CT.     17. CT C/A/P w/o contrast done at [facility] 8/29/22--mixed response to treatment, worsening mediastinal adenopathy, stable pulmonary nodules, improved retroperitoneal lymph node, enlarging pericaval lymph node.  18. CT C/A/P w/o contrast at [facility] 11/18/22--Interval decreased in mediastinal adenopathy, pulmonary nodules and portacaval nodes, c/w treatment response.   19. CT C/A/P w/ contrast at [facility] 2/8/23--stable mediastinal lymph nodes, enlarging left lower lobe pulmonary nodule, enlarging retroperitoneal lymph nodes.   20. CT  C/A/P w/o contrast at [facility] done 6/20/23--minimally enlarged mediastinal lymph nodes, otherwise no other significant changes, pericaval nodes unchanged.   21. CT C/A/P w/o contrast at [facility] done 10/11/23--improved appearance of the right lung base with decreased ground glass opacity, stable to decreased size of mediastinal and pericaval lymph nodes (index prevascular node decreased 1X1.6cm, pericaval measures 1.6X2cm (previous 2X2.4cm),   22. CT C/A/P w/o contrast at [facility] on 1/10/24--Small left basilar pulmonary nodule stable. No additional pulmonary nodules. Millimetric size increase of right peritracheal and aorticopulmonary lymph nodes as well as nodes at the azygos esophageal recess. Intraluminal gallbladder sludge and possible tiny stones. Postsurgical changes of the right nephrectomy. Cortical scarring or postsurgical changes of the upper pole of the left kidney.   23. CT C/A/P w/ contrast at [facility] 3/11/24--stable mediastinal adenopathy, with stable 2cm LLL pulmonary nodule in left infrahilar region however, there are a few additional pulmonary micronodules in right and left lower lobe concerning for progressive disease, for example 4.9mm LLL subpleural nodule, with a few additional new bilateral lower lobe pulmonary nodules measuring 2-3mm.  24. CT C/A/P at [facility] 6/7/24--multiple new bilateral pulmonary nodules with mild interval increase in size of mediastinal and left supraclavicular lymph nodes, findings c/w disease progression, portal caval adenopathy redemonstrated with index lesion measuring 2.5cm similar to prior.   25. CT C/A/P done at [facility] 8/19/24--slight further increase in size of supraclavicular and mediastinal lymph nodes as well as slight interval increase in pulmonary nodules w/o definite new nodules, stable portacaval and periportal nodes.  26. MRI lumbar spine w/o contrast 8/30/24--no acute abnormality, no metastatic disease, spondylotic/disc degenerative changes.    Treatment History:   1. IL2 started  on 11/2/20. 2nd cycle started 11/16/20 - stopped after 7 days due to acute renal failure, volume overload and confusion. Stopped due to progression.  2. Keytruda/Inlyta 1/8/21--1/31/22 (stopped due to progression, autoimmune hepatitis).  3. Prednisone 80mg daily 1/31/22--weaned and stopped 4/14/22.    Hospitalization:   [facility] 7/2022 14cm hematoma s/p TURP causing hydronephrosis, JOE and patient required initiation of dialysis, UTI, Covid infection.     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     Treatment Plan:  Cabometyx 60mg daily started 4/7/22  Decrease dose to 40mg daily 4/28/22 due to side effects.  HOLD on 5/19/22 until groin rash and abscess heal/also underwent TURP on 6/23/22  Restarted 7/6/22--held 7/16/22 for hospitalization/bladder hematoma  Cabometyx 40mg daily restarted 9/1/22. On hold since 10/24/22 for hand/foot syndrome.   Restarted on 11/9/22, held 11/28/22 for hand-foot syndrome, foot wound.   Dose decreased to 20mg daily. Restarted on 1/5/23. Stopped on 3/17/23 due to diabetic foot wound.   Restarted 6/1/23. Stopped on 4/22/24 due to osteomyelitis.   Restarted 5/27/24.   Off for a few weeks in 8/2024. Restarted on 9/4/24.     Chief Complaint: Pain, Constipation, Peripheral Neuropathy, and Shortness of Breath    HPI   Patient presents for follow-up of renal cell carcinoma. He is doing okay. He restarted back on the Cabometyx after his last appointment. Continues close follow-up with his foot doctor. He also mentions that he was seen by neurologist for some memory problems and was placed on Namenda. Also has some mild tremors which Dr. Erazo will address at next visit. He has  complained of low back pain radiating down his right leg and groin a few months ago and MRI of lumbar spine done revealed no spinal metastasis but spondylotic/disc degenerative changes. Mentions he did fall in the yard last week which may have made his back pain  worse as he is complaining of constant pain today. He is not having any pain relief with Tramadol. Pain is radiating to his buttock and down his right leg. We discussed referral to Dr. Khoury and he is in agreement. No other problems reported today.      Past Medical History:   Diagnosis Date    Arthritis of knee 08/13/2013    Cancer     stage IV renal cell carcinoma    Cataract     Coronary artery disease     Diabetes mellitus, type 2     HTN (hypertension) 08/13/2013    Hx of CABG     Hyperlipidemia     Memory loss     Neuropathy     Type II or unspecified type diabetes mellitus without mention of complication, not stated as uncontrolled 08/13/2013      Review of patient's allergies indicates:  No Known Allergies     Current Outpatient Medications:     allopurinoL (ZYLOPRIM) 300 MG tablet, Take 200 mg by mouth once daily., Disp: , Rfl:     aspirin 81 mg Cap, Take 81 mg by mouth once daily at 6am., Disp: , Rfl:     atorvastatin (LIPITOR) 80 MG tablet, Take 80 mg by mouth once daily., Disp: , Rfl:     azelastine (ASTELIN) 137 mcg (0.1 %) nasal spray, SMARTSIG:Both Nares, Disp: , Rfl:     cabozantinib (CABOMETYX) 20 mg Tab, Take 1 tablet (20 mg) by mouth once daily., Disp: 30 tablet, Rfl: 6    carvediloL (COREG) 12.5 MG tablet, Take 1 tablet by mouth 2 (two) times daily., Disp: , Rfl:     colchicine (COLCRYS) 0.6 mg tablet, Take 2 tablets by mouth once, then take 1 tablet 1 hour later. Max 1.8 mg/day. Then take 1 tablets by mouth daily until 2 days after flare resolves., Disp: , Rfl:     desvenlafaxine succinate (PRISTIQ) 100 MG Tb24, Take 100 mg by mouth., Disp: , Rfl:     diphenoxylate-atropine 2.5-0.025 mg (LOMOTIL) 2.5-0.025 mg per tablet, Take 1 tablet by mouth as needed., Disp: , Rfl:     finasteride (PROSCAR) 5 mg tablet, Take 5 mg by mouth as needed., Disp: , Rfl:     fluticasone propionate (FLONASE) 50 mcg/actuation nasal spray, 2 sprays by Each Nostril route., Disp: , Rfl:     furosemide (LASIX) 20 MG  "tablet, Take 20 mg by mouth once daily. Pt stated he thinks her takes 10mg daily, Disp: , Rfl:     hydrocortisone 2.5 % cream, APPLY TO THE AFFECTED AREA(S) TWICE DAILY as needed for SKIN IRRITATION FOR 14 DAYS, Disp: , Rfl:     hydrOXYzine pamoate (VISTARIL) 25 MG Cap, Take 25 mg by mouth every 6 (six) hours as needed., Disp: , Rfl:     hyoscyamine (LEVSIN/SL) 0.125 mg Subl, Place 0.125 mg under the tongue every 6 (six) hours as needed., Disp: , Rfl:     insulin glargine (LANTUS) 100 unit/mL injection, Inject into the skin. Takes 20units, Disp: , Rfl:     insulin syr/ndl U100 half warner (BD VEO INSULIN SYR, HALF UNIT,) 0.3 mL 31 gauge x 15/64" Syrg, , Disp: , Rfl:     lancets Misc, Use TID to check blood sugar. Dx: E11.9, patient is insulin dependent, Disp: , Rfl:     lisinopriL (PRINIVIL,ZESTRIL) 5 MG tablet, Take 5 mg by mouth., Disp: , Rfl:     memantine (NAMENDA) 10 MG Tab, Take 1 tablet (10 mg total) by mouth 2 (two) times daily., Disp: 60 tablet, Rfl: 11    multivitamin (THERAGRAN) per tablet, Take 1 tablet by mouth once daily., Disp: , Rfl:     pantoprazole (PROTONIX) 40 MG tablet, TAKE 1 TABLET BY MOUTH EVERY DAY, Disp: 30 tablet, Rfl: 3    pregabalin (LYRICA) 100 MG capsule, Take 100 mg by mouth., Disp: , Rfl:     prochlorperazine (COMPAZINE) 5 MG tablet, Take 1 tablet (5 mg total) by mouth 3 (three) times daily as needed for Nausea., Disp: 30 tablet, Rfl: 6    QUEtiapine (SEROQUEL) 50 MG tablet, Take 3 tablets by mouth every evening., Disp: , Rfl:     tamsulosin (FLOMAX) 0.4 mg Cap, TAKE 1 CAPSULE BY MOUTH EVERY EVENING, Disp: 30 capsule, Rfl: 6    tolterodine (DETROL LA) 4 MG 24 hr capsule, Take 4 mg by mouth once daily., Disp: , Rfl:     TRUE METRIX GLUCOSE TEST STRIP Strp, 3 (three) times daily., Disp: , Rfl:     tuberculin,purif.prot.deriv. (TUBERSOL IDRM), Inject 0.1 mLs into the skin., Disp: , Rfl:     HYDROcodone-acetaminophen (NORCO) 7.5-325 mg per tablet, Take 1 tablet by mouth every 6 (six) hours " as needed for Pain., Disp: 90 tablet, Rfl: 0     Review of Systems   Constitutional:  Positive for appetite change and unexpected weight change.   HENT:  Negative for mouth sores.    Eyes:  Negative for visual disturbance.   Respiratory:  Positive for shortness of breath. Negative for cough.    Cardiovascular:  Negative for chest pain.   Gastrointestinal:  Negative for abdominal pain and diarrhea.   Genitourinary:  Negative for frequency.   Musculoskeletal:  Positive for back pain.        Low back pain radiating to groin and buttocks   Integumentary:  Negative for rash.   Neurological:  Positive for headaches.   Hematological:  Negative for adenopathy.   Psychiatric/Behavioral:  The patient is nervous/anxious.      Vitals:    10/02/24 1419   BP: 124/75   Pulse: 79   Resp: 14   Temp: 98.3 °F (36.8 °C)     Wt Readings from Last 3 Encounters:   10/02/24 1419 93 kg (205 lb 1.6 oz)   09/04/24 0921 91.9 kg (202 lb 9.6 oz)   08/29/24 0846 91.6 kg (202 lb)     Physical Exam  Constitutional:       Appearance: Normal appearance. He is overweight.   HENT:      Head: Normocephalic and atraumatic.      Nose: Nose normal.   Eyes:      General: Lids are normal. Vision grossly intact.      Extraocular Movements: Extraocular movements intact.      Conjunctiva/sclera: Conjunctivae normal.   Cardiovascular:      Rate and Rhythm: Normal rate and regular rhythm.      Heart sounds: Normal heart sounds.   Pulmonary:      Effort: Pulmonary effort is normal.      Breath sounds: Normal breath sounds.   Chest:      Comments: HD access removed to right upper CW  Abdominal:      General: Abdomen is protuberant. Bowel sounds are normal. There is no distension.      Palpations: Abdomen is soft.      Tenderness: There is no abdominal tenderness.   Musculoskeletal:         General: Normal range of motion.      Cervical back: Neck supple.      Right lower leg: No edema.      Left lower leg: No edema.   Feet:      Comments: Left great toe amputation  healed. Left 2nd partial toe amputation with scab present, no signs infection, healing  Lymphadenopathy:      Cervical: No cervical adenopathy.      Upper Body:      Right upper body: No supraclavicular or axillary adenopathy.      Left upper body: No supraclavicular or axillary adenopathy.   Skin:     General: Skin is warm and dry.   Neurological:      General: No focal deficit present.      Mental Status: He is alert and oriented to person, place, and time.   Psychiatric:         Attention and Perception: Attention normal.         Mood and Affect: Mood normal.         Speech: Speech normal.         Behavior: Behavior normal. Behavior is cooperative.       Lab Visit on 10/02/2024   Component Date Value Ref Range Status    WBC 10/02/2024 10.44  4.50 - 11.50 x10(3)/mcL Final    RBC 10/02/2024 4.85  4.70 - 6.10 x10(6)/mcL Final    Hgb 10/02/2024 11.5 (L)  14.0 - 18.0 g/dL Final    Hct 10/02/2024 37.8 (L)  42.0 - 52.0 % Final    MCV 10/02/2024 77.9 (L)  80.0 - 94.0 fL Final    MCH 10/02/2024 23.7 (L)  27.0 - 31.0 pg Final    MCHC 10/02/2024 30.4 (L)  33.0 - 36.0 g/dL Final    RDW 10/02/2024 21.0 (H)  11.5 - 17.0 % Final    Platelet 10/02/2024 241  130 - 400 x10(3)/mcL Final    MPV 10/02/2024 8.5  7.4 - 10.4 fL Final    Neut % 10/02/2024 68.9  % Final    Lymph % 10/02/2024 18.9  % Final    Mono % 10/02/2024 8.8  % Final    Eos % 10/02/2024 2.6  % Final    Basophil % 10/02/2024 0.3  % Final    Lymph # 10/02/2024 1.97  0.6 - 4.6 x10(3)/mcL Final    Neut # 10/02/2024 7.20  2.1 - 9.2 x10(3)/mcL Final    Mono # 10/02/2024 0.92  0.1 - 1.3 x10(3)/mcL Final    Eos # 10/02/2024 0.27  0 - 0.9 x10(3)/mcL Final    Baso # 10/02/2024 0.03  <=0.2 x10(3)/mcL Final    IG# 10/02/2024 0.05 (H)  0 - 0.04 x10(3)/mcL Final    IG% 10/02/2024 0.5  % Final      Assessment:        1. Clear cell carcinoma of right kidney    2. 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 Anemia in stage 3b chronic kidney disease    4. Stage 4  chronic kidney disease    5. Acute right-sided low back pain with left-sided sciatica    6. Cancer related pain        Plan:         Patient with large right renal mass and retroperitoneal adenopathy diagnosed by CT in 7/2020.  CT chest showed mediastinal fanny mass and biopsy done via EUS + for metastatic renal cell carcinoma.  Patient s/p radical right nephrectomy done 9/11/20. Pathology showed 9.2cm clear cell renal cell carcinoma, margins clear with 4/6 lymph nodes positive.  Post-operative course complicated by hematoma requiring blood transfusion.  CARIS results- MSI stable, Mismatch repair status proficient. Tumor burden low. Negative for: PD-L1, NTRK1/2/3, BAP1, FH, MET, PBRM1, SDHA. Pathologic variants identified: KDMSC (Exon 13 and 11) and VHL. Immunohistochemistry results: MLH1, MSH2, MSH6, and PM52 positive. PD-L1 negative.     Referred to Dr. Haja Sal for evaluation for IL-2. Explained this is only treatment to be shown to achieve long-term remissions in stage IV disease and patient has limited disease at this time.  Patient started 1st 5 days of IL-2 on 11/2/20.   Started his 2nd round on 11/16/20 and it was stopped on 11/22/20 due to acute renal failure, volume overload and agitation.   CT scan on 12/23/20 at [facility] revealed progression of his disease. Dr. Sal contacted patient and discussed the results. He also reached out to Dr. Alvarado to notify her that the patient will not be returning.   Started Keytruda 200mg every 3 weeks on 1/8/21 + Inlyta 5mg oral BID on 1/11/21.  CT C/A/P done at [facility] on 10/22/21 showed stable mild reactive mediastinal nodes, otherwise stable.  Patient continued on treatment s/p cycle 18 Keytruda given 1/10/22.  Labs on 1/10/22 showed elevated LFTs. Inlyta held.  CT C/A/P done at [facility] on 1/26/22 showed unfortunately disease progression with new pulmonary nodules, enlargement in mediastinal node, new mesenteric nodule and retroperitoneal adenopathy.  LFTs continued  increasing, autoimmune hepatitis from Keytruda suspected. Hepatitis panel negative. Keytruda stopped. Started on Prednisone 80mg daily on 1/31/22.  Patient had suicidal ideation and severe depression during visit in 3/2022, seen by psychiatry and meds adjusted, much improved.  CT C/A/P from [facility] on 4/5/22 showed progressive disease.    Treatment change recommended to Cabometyx 60mg daily per NCCN guidelines (Category 1) for subsequent treatment. Started on 4/7/22.  Prednisone weaned and stopped 4/14/22.  Patient was having multiple side effects (n/v, diarrhea, mouth sores) from Cabometyx.  Reduced dose to 40mg on 4/28/22. Held on 5/19/22 due to cellulitis/rashes/abscesses.      Continued to hold s/p TURP on 6/23/22 and restarted 7/6/22.  Patient hospitalized at [facility] 7/16/22 for large bladder hematoma s/p clot evacuation X 2. Cabometyx stopped again.   Repeat CT C/A/P done at [facility] on 8/29/22 showed mixed findings, but mostly disease progression in fanny metastases in mediastinum, which is to be expected since he has been off treatment.   Restarted back on Cabometyx (lower dose 40mg) on 9/1/22 although it has not been studied in dialysis patients, but no dosage adjustment is usually required.  Held on 10/24/22 for Hand-foot syndrome, restarted 11/9/22.   CT done at [facility] on 11/18/22 with disease improvement.  Cabometyx held again for hand-foot syndrome and wound on left toe 11/28/22.  Restarted Cabometyx on 1/5/23 at 20mg daily.    Cabometyx held on 3/17/23 due to worsening diabetic foot wound.  S/p left great toe amputation on 5/8/23, healed and cleared by podiatry to restart Cabometyx which was restarted on 6/1/23 Cabometyx 20mg daily.  Repeat CT C/A/P on 3/11/24 with a few new millimetric lung nodules, but other disease stable.   Unclear if these actually represent disease, since so small so do not recommend any treatment change at this time.  Patient admitted at [facility] from 4/22/24 - 4/26/24  with osteomyelitis of second toe left foot and diabetic ulcer of the left foot, S/p partial amputation of left 2nd toe on 4/23/24. The interoperative tissue cx (+) MSSA. He was discharged home on oral antibiotics for 2 weeks. Cabometyx held and restarted no 5/27/24.     CT C/A/P done 6/7/24 with some disease progression new small lung nodules and mild progression in mediastinal lymph nodes.  Recommended to continue with Cabometyx since he had been off for 1 month.  CT C/A/P on 8/19/24 with some disease progression in lung nodules and mild progression in mediastinal nodes, stable other disease.  MRI lumbar spine done 8/30/24 for back pain with no metastatic disease, only degenerative changes.  He was also off Cabometyx for 3-4 weeks during this time.   Since he is asymptomatic, will continue with treatment with Cabometyx for now.   Discussed with patient about trying to continue treatment consistently so for his next scan, we need to see if he is really failing therapy.   Restarted daily on 9/4/24.   Labs today with stable mild anemia, CMP pending.   Plan to repeat CT in 3 months, early 12/2024.  F/u in 4 weeks with repeat labs and visit.   Will send referral to Dr. Amor Street, to see if any options available for his low back pain. In the interim, I will send prescription for Norco 7.5/325 q 6 hours, # 90 to his pharmacy to see if this helps. If there are no options available for his pain will consider referral to pain management.     Looking ahead to future treatment options, would possibly try again with immunotherapy, single agent Opdivo. Or could also try single agent Afinitor or Lenvima. I am not sure he is a candidate for doublet therapy given all his comorbidities.    He never really failed Keytruda, just developed autoimmune hepatitis, so this would need to be monitored closely.   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 Referred for genetic testing which was done on 3/16/23 and negative.       Continue Compazine for nausea.   Continue Norco for joint pain.   Continue Vistaril for chronic pruritis.   Continue OTC calcium supplements  All questions answered at this time.      MARCIN Laboy

## 2024-10-02 ENCOUNTER — LAB VISIT (OUTPATIENT)
Dept: LAB | Facility: HOSPITAL | Age: 64
End: 2024-10-02
Attending: NURSE PRACTITIONER
Payer: MEDICARE

## 2024-10-02 ENCOUNTER — OFFICE VISIT (OUTPATIENT)
Dept: HEMATOLOGY/ONCOLOGY | Facility: CLINIC | Age: 64
End: 2024-10-02
Payer: MEDICARE

## 2024-10-02 VITALS
DIASTOLIC BLOOD PRESSURE: 75 MMHG | BODY MASS INDEX: 29.37 KG/M2 | RESPIRATION RATE: 14 BRPM | HEART RATE: 79 BPM | OXYGEN SATURATION: 94 % | TEMPERATURE: 98 F | WEIGHT: 205.13 LBS | HEIGHT: 70 IN | SYSTOLIC BLOOD PRESSURE: 124 MMHG

## 2024-10-02 DIAGNOSIS — N18.4 STAGE 4 CHRONIC KIDNEY DISEASE: ICD-10-CM

## 2024-10-02 DIAGNOSIS — N18.32 ANEMIA IN STAGE 3B CHRONIC KIDNEY DISEASE: ICD-10-CM

## 2024-10-02 DIAGNOSIS — C64.1 CLEAR CELL CARCINOMA OF RIGHT KIDNEY: Primary | ICD-10-CM

## 2024-10-02 DIAGNOSIS — G89.3 CANCER RELATED PAIN: ICD-10-CM

## 2024-10-02 DIAGNOSIS — C64.1 CLEAR CELL CARCINOMA OF RIGHT KIDNEY: ICD-10-CM

## 2024-10-02 DIAGNOSIS — C77.1 MALIGNANT NEOPLASM METASTATIC TO INTRATHORACIC LYMPH NODE: ICD-10-CM

## 2024-10-02 DIAGNOSIS — D63.1 ANEMIA IN STAGE 3B CHRONIC KIDNEY DISEASE: ICD-10-CM

## 2024-10-02 DIAGNOSIS — M54.42 ACUTE RIGHT-SIDED LOW BACK PAIN WITH LEFT-SIDED SCIATICA: ICD-10-CM

## 2024-10-02 LAB
ALBUMIN SERPL-MCNC: 2.3 G/DL (ref 3.4–4.8)
ALBUMIN/GLOB SERPL: 0.5 RATIO (ref 1.1–2)
ALP SERPL-CCNC: 129 UNIT/L (ref 40–150)
ALT SERPL-CCNC: 15 UNIT/L (ref 0–55)
ANION GAP SERPL CALC-SCNC: 11 MEQ/L
AST SERPL-CCNC: 21 UNIT/L (ref 5–34)
BASOPHILS # BLD AUTO: 0.03 X10(3)/MCL
BASOPHILS NFR BLD AUTO: 0.3 %
BILIRUB SERPL-MCNC: 0.3 MG/DL
BUN SERPL-MCNC: 31.1 MG/DL (ref 8.4–25.7)
CALCIUM SERPL-MCNC: 8.2 MG/DL (ref 8.8–10)
CHLORIDE SERPL-SCNC: 101 MMOL/L (ref 98–107)
CO2 SERPL-SCNC: 29 MMOL/L (ref 23–31)
CREAT SERPL-MCNC: 1.79 MG/DL (ref 0.73–1.18)
CREAT/UREA NIT SERPL: 17
EOSINOPHIL # BLD AUTO: 0.27 X10(3)/MCL (ref 0–0.9)
EOSINOPHIL NFR BLD AUTO: 2.6 %
ERYTHROCYTE [DISTWIDTH] IN BLOOD BY AUTOMATED COUNT: 21 % (ref 11.5–17)
GFR SERPLBLD CREATININE-BSD FMLA CKD-EPI: 42 ML/MIN/1.73/M2
GLOBULIN SER-MCNC: 4.6 GM/DL (ref 2.4–3.5)
GLUCOSE SERPL-MCNC: 113 MG/DL (ref 82–115)
HCT VFR BLD AUTO: 37.8 % (ref 42–52)
HGB BLD-MCNC: 11.5 G/DL (ref 14–18)
IMM GRANULOCYTES # BLD AUTO: 0.05 X10(3)/MCL (ref 0–0.04)
IMM GRANULOCYTES NFR BLD AUTO: 0.5 %
LYMPHOCYTES # BLD AUTO: 1.97 X10(3)/MCL (ref 0.6–4.6)
LYMPHOCYTES NFR BLD AUTO: 18.9 %
MCH RBC QN AUTO: 23.7 PG (ref 27–31)
MCHC RBC AUTO-ENTMCNC: 30.4 G/DL (ref 33–36)
MCV RBC AUTO: 77.9 FL (ref 80–94)
MONOCYTES # BLD AUTO: 0.92 X10(3)/MCL (ref 0.1–1.3)
MONOCYTES NFR BLD AUTO: 8.8 %
NEUTROPHILS # BLD AUTO: 7.2 X10(3)/MCL (ref 2.1–9.2)
NEUTROPHILS NFR BLD AUTO: 68.9 %
PLATELET # BLD AUTO: 241 X10(3)/MCL (ref 130–400)
PMV BLD AUTO: 8.5 FL (ref 7.4–10.4)
POTASSIUM SERPL-SCNC: 4.7 MMOL/L (ref 3.5–5.1)
PROT SERPL-MCNC: 6.9 GM/DL (ref 5.8–7.6)
RBC # BLD AUTO: 4.85 X10(6)/MCL (ref 4.7–6.1)
SODIUM SERPL-SCNC: 141 MMOL/L (ref 136–145)
WBC # BLD AUTO: 10.44 X10(3)/MCL (ref 4.5–11.5)

## 2024-10-02 PROCEDURE — 99999 PR PBB SHADOW E&M-EST. PATIENT-LVL IV: CPT | Mod: PBBFAC,,, | Performed by: NURSE PRACTITIONER

## 2024-10-02 PROCEDURE — 36415 COLL VENOUS BLD VENIPUNCTURE: CPT

## 2024-10-02 PROCEDURE — 80053 COMPREHEN METABOLIC PANEL: CPT

## 2024-10-02 PROCEDURE — 85025 COMPLETE CBC W/AUTO DIFF WBC: CPT

## 2024-10-02 RX ORDER — HYDROCODONE BITARTRATE AND ACETAMINOPHEN 7.5; 325 MG/1; MG/1
1 TABLET ORAL EVERY 6 HOURS PRN
Qty: 90 TABLET | Refills: 0 | Status: SHIPPED | OUTPATIENT
Start: 2024-10-02

## 2024-10-07 ENCOUNTER — OFFICE VISIT (OUTPATIENT)
Dept: NEUROLOGY | Facility: CLINIC | Age: 64
End: 2024-10-07
Payer: MEDICARE

## 2024-10-07 VITALS
SYSTOLIC BLOOD PRESSURE: 126 MMHG | BODY MASS INDEX: 29.35 KG/M2 | DIASTOLIC BLOOD PRESSURE: 72 MMHG | WEIGHT: 205 LBS | HEIGHT: 70 IN

## 2024-10-07 DIAGNOSIS — M54.30 SCIATICA, UNSPECIFIED LATERALITY: ICD-10-CM

## 2024-10-07 DIAGNOSIS — R41.3 MEMORY LOSS: Primary | ICD-10-CM

## 2024-10-07 PROCEDURE — 99999 PR PBB SHADOW E&M-EST. PATIENT-LVL III: CPT | Mod: PBBFAC,,, | Performed by: PSYCHIATRY & NEUROLOGY

## 2024-10-07 PROCEDURE — 3008F BODY MASS INDEX DOCD: CPT | Mod: CPTII,S$GLB,, | Performed by: PSYCHIATRY & NEUROLOGY

## 2024-10-07 PROCEDURE — 1159F MED LIST DOCD IN RCRD: CPT | Mod: CPTII,S$GLB,, | Performed by: PSYCHIATRY & NEUROLOGY

## 2024-10-07 PROCEDURE — 3074F SYST BP LT 130 MM HG: CPT | Mod: CPTII,S$GLB,, | Performed by: PSYCHIATRY & NEUROLOGY

## 2024-10-07 PROCEDURE — 3044F HG A1C LEVEL LT 7.0%: CPT | Mod: CPTII,S$GLB,, | Performed by: PSYCHIATRY & NEUROLOGY

## 2024-10-07 PROCEDURE — 4010F ACE/ARB THERAPY RXD/TAKEN: CPT | Mod: CPTII,S$GLB,, | Performed by: PSYCHIATRY & NEUROLOGY

## 2024-10-07 PROCEDURE — 99215 OFFICE O/P EST HI 40 MIN: CPT | Mod: S$GLB,,, | Performed by: PSYCHIATRY & NEUROLOGY

## 2024-10-07 PROCEDURE — 3078F DIAST BP <80 MM HG: CPT | Mod: CPTII,S$GLB,, | Performed by: PSYCHIATRY & NEUROLOGY

## 2024-10-07 RX ORDER — MEMANTINE HYDROCHLORIDE 10 MG/1
10 TABLET ORAL 2 TIMES DAILY
Qty: 180 TABLET | Refills: 4 | Status: SHIPPED | OUTPATIENT
Start: 2024-10-07 | End: 2025-10-07

## 2024-10-07 NOTE — PROGRESS NOTES
Subjective     Patient ID: Ye Olivera is a 64 y.o. male.    Chief Complaint: mri results    HPI    Memory is better; seems to have repsonded to memantine  No side effects  Reviewed mri brain images; mild wm changes    RLE sciatica; mri L spine report; mild degen changes  Review of Systems  The remainder of the 14 system ROS is noncontributory or negative unless mentioned/reviewed above.       Objective     Physical Exam  Mental Status: Alert and oriented x3. Language is fluent with good comprehension.    Cranial Nerve: Pupils are equal, round, and reactive to light. Visual fields are intact to confrontation. Normal fundi. Ocular movements are intact. Face is symmetric at rest and with activation with intact sensation throughout. Hearing intact to finger rub bilaterally. Muscles of tongue and palate activate symmetrically. No dysarthria. Strength is full in sternocleidomastoid and trapezius bilaterally.    Motor: Muscle bulk and tone are normal. Strength is 5/5 in all four extremities both proximally and distally. Intact fine motor movements bilaterally. There is no pronator drift or satelliting on arm roll.    Sensory: Sensation is intact to light touch, pinprick, vibration, and proprioception throughout. Romberg is negative.    Reflexes: 2+ and symmetric at the biceps, triceps, brachioradialis, patella, and Achilles bilaterally. Plantar response is flexor bilaterally.    Coordination: No dysmetria on finger-nose-finger or heel-knee-shin. Normal rapid alternating movements. Fast finger tapping with normal amplitude and speed.    Gait: Narrow based with normal stride length and good arm swing bilaterally. Able to walk on heels, toes, and in tandem.       Assessment and Plan     1. Memory loss    2. Sciatica, unspecified laterality  -     Ambulatory referral/consult to Physical/Occupational Therapy; Future; Expected date: 10/14/2024    Other orders  -     memantine (NAMENDA) 10 MG Tab; Take 1 tablet (10 mg  total) by mouth 2 (two) times daily.  Dispense: 180 tablet; Refill: 4        Memantine  PT for RLE sciatica         Follow up in about 1 year (around 10/7/2025).

## 2024-11-01 ENCOUNTER — TELEPHONE (OUTPATIENT)
Dept: HEMATOLOGY/ONCOLOGY | Facility: CLINIC | Age: 64
End: 2024-11-01
Payer: MEDICARE

## 2024-11-01 NOTE — PROGRESS NOTES
Subjective:       Patient ID: Ye Olivera is a 64 y.o. male.    Urologist: Dr. Christopher Villasenor  PCP: Dr. Ly Quevedo  Nephrologist: Dr. Jenae Le    Renal Cell Carcinoma Stage IV([id number]) Mediastinal lymph nodes--Diagnosed 20  Biopsy/pathology:  Upper EUS done 9/3/20--large cluster of malignant appearing lymph nodes in subcarinal mediastinum causing extrinsic compression in middle 1/3 and thoracic esophagus biopsy c/w metastatic carcinoma, c/w renal cell carcinoma CD10+, PAX -8+, erythematous mucosa in antrum biopsy c/w mild chronic gastritis, inactive, normal duodenum, no pathology in CBD or left lobe of liver or pancreas. CARIS results- MSI stable, Mismatch repair status proficient. Tumor burden low. Negative for: PD-L1, NTRK1/2/3, BAP1, FH, MET, PBRM1, SDHA. Pathologic variants identified: KDMSC (Exon 13 and 11) and VHL. Immunohistochemistry results: MLH1, MSH2, MSH6, and PM52 positive. PD-L1 negative.   Surgery/pathology:  Right radical nephrectomy and lymph node dissection done 20--clear cell renal cell carcinoma measures 9.2X8C8.7cm, Grade 3, margins clear, tumor limited to kidney, 4/6 lymph nodes positive for metastatic clear cell carcinoma.   Imagin. CT A/P w/o contrast [facility] 20--bulky right renal mass, midpole, measuring 10cm with evidence of internal necrosis and calcifications, primary mass does not extend outside the perinephric space, associated right perinephric edema, multiple enlarged retroperitoneal lymph nodes at level of the kidneys and inferior retroperitoneum, one measures 2.9X1.4cm, additional node measures 2.1X1.8cm, additional node measures 1.8X1.2cm, inferior retroperitoneal node at level of inferior pole right kidney measures 1.8X1.4cm, no convincing evidence of renal vein involvement, no right adrenal gland involvement, no disease apparent outside of abdomen.  2. CT A/P w/ and w/o contrast [facility] 20--large right renal mass measures 10.6X9.5X7.4cm, with  enlarged retroperitoneal nodes, largest measures 2.7cm.  3. CT chest [facility] 8/28/20--solitary left supraclavicular node, small and not overtly concerning, heterogeneous retroesophageal mass at the level of the tariq c/w metastatic deposit, no other evidence for metastatic disease.  4. MRI brain w/ and w/o contrast [facility] 8/28/20--no evidence for intracranial metastatic disease.  5. CT C/A/P done at [facility] 10/28/20--now appears to be 2 adjacent masses at the level of the tariq, measuring in aggregate 6.2X2.7cm as compared with 5.2X2.1cm, subcentimeter mediastinal and hilar nodes are seen, large intermediate density structure at the right renal fossa extending to pelvic brim, most likely post-operative hematoma or seroma, resolution of previously seen retroperitoneal adenopathy.  6. CT C/A/P w&w/o at [facility] on 12/23/20--Interval enlargement of the retro-esophageal mass with mild interval increase in size of multiple paraesophageal and prevascular lymph nodes compatible with progression of disease. Interval decrease in size of the right retroperitoneal postop seroma or hematoma. No evidence of metastatic disease below the diaphragm. Prior right nephrectomy and adrenalectomy. Chololithiasis and stable atherosclerosis.   7. CT C/A/P w/o contrast done at [facility] on 4/5/21--interval decrease in size of multiple prevascular, subcarinal and para-esophageal lymph nodes c/w response to treatment, no new pulmonary nodule or lymph node, no evidence for metastatic disease below the diaphragm, interval decrease in size of the post-operative seromas adjacent to right psoas muscle.  8. CT C/A/P w/o contrast done at [facility] on 7/7/21--stable appearance of previously identified prevascular, subcarinal, and paraesophageal nodes, there are an increased number of sub-centimeter nodes in the aortopulmonary window and precarinal region, nonspecific in appearance, no other evidence of local recurrence or metastatic disease.   9. MRI cervical spine w/ and w/o  contrast done at [facility] 8/4/21--no metastatic disease, multilevel spondylotic changes, no acute abnormality.   10. MRI brain w/ and w/o contrast 8/4/21--normal maturation of the previously seen lacunar infarct at the left centrum semiovale, no new abnormalities, specifically, no evidence of intracranial metastatic disease.   11. CT C/A/P w/o contrast done at [facility] 10/22/21--stable reactive mediastinal adenopathy, interval resection of right kidney and adrenal gland with post-surgical changes but no evidence of metastatic disease.   12. CT C/A/P w/o contrast done at [facility] 1/26/22--4 new pulmonary nodules larges LLL 14mm, 9mm LLL, 5mm LLL, 5mm RML, with enlarging prevascular lymph node now measures 18mm, previous 8mm, concerning for metastatic disease, new mesenteric implant in LLQ measures 11mm, enlarging retroperitoneal LN now measures [id number], previous 14X8mm concerning for progression disease, cholelithiasis.   13. MRI brain w/ and w/o  contrast done at [facility] 3/31/22--no acute intracranial abnormality.   14. CT C/A/P w/o contrast done at [facility] 4/5/22--progressive retroperitoneal and mediastinal adenopathy, with development of hilar adenopathy, increase in size and number of pulmonary nodules.   15. CT done at [facility] showed large hematoma 14cm in bladder, results not available.  16. CT head w/o contrast done at [facility] 7/31/22--No intracranial hemorrhage or acute traumatic brain parenchymal injury evident by CT.     17. CT C/A/P w/o contrast done at [facility] 8/29/22--mixed response to treatment, worsening mediastinal adenopathy, stable pulmonary nodules, improved retroperitoneal lymph node, enlarging pericaval lymph node.  18. CT C/A/P w/o contrast at [facility] 11/18/22--Interval decreased in mediastinal adenopathy, pulmonary nodules and portacaval nodes, c/w treatment response.   19. CT C/A/P w/ contrast at [facility] 2/8/23--stable mediastinal lymph nodes, enlarging left lower lobe pulmonary nodule, enlarging retroperitoneal lymph nodes.   20. CT  C/A/P w/o contrast at [facility] done 6/20/23--minimally enlarged mediastinal lymph nodes, otherwise no other significant changes, pericaval nodes unchanged.   21. CT C/A/P w/o contrast at [facility] done 10/11/23--improved appearance of the right lung base with decreased ground glass opacity, stable to decreased size of mediastinal and pericaval lymph nodes (index prevascular node decreased 1X1.6cm, pericaval measures 1.6X2cm (previous 2X2.4cm),   22. CT C/A/P w/o contrast at [facility] on 1/10/24--Small left basilar pulmonary nodule stable. No additional pulmonary nodules. Millimetric size increase of right peritracheal and aorticopulmonary lymph nodes as well as nodes at the azygos esophageal recess. Intraluminal gallbladder sludge and possible tiny stones. Postsurgical changes of the right nephrectomy. Cortical scarring or postsurgical changes of the upper pole of the left kidney.   23. CT C/A/P w/ contrast at [facility] 3/11/24--stable mediastinal adenopathy, with stable 2cm LLL pulmonary nodule in left infrahilar region however, there are a few additional pulmonary micronodules in right and left lower lobe concerning for progressive disease, for example 4.9mm LLL subpleural nodule, with a few additional new bilateral lower lobe pulmonary nodules measuring 2-3mm.  24. CT C/A/P at [facility] 6/7/24--multiple new bilateral pulmonary nodules with mild interval increase in size of mediastinal and left supraclavicular lymph nodes, findings c/w disease progression, portal caval adenopathy redemonstrated with index lesion measuring 2.5cm similar to prior.   25. CT C/A/P done at [facility] 8/19/24--slight further increase in size of supraclavicular and mediastinal lymph nodes as well as slight interval increase in pulmonary nodules w/o definite new nodules, stable portacaval and periportal nodes.  26. MRI lumbar spine w/o contrast 8/30/24--no acute abnormality, no metastatic disease, spondylotic/disc degenerative changes.    Treatment History:   1. IL2 started  on 11/2/20. 2nd cycle started 11/16/20 - stopped after 7 days due to acute renal failure, volume overload and confusion. Stopped due to progression.  2. Keytruda/Inlyta 1/8/21--1/31/22 (stopped due to progression, autoimmune hepatitis).  3. Prednisone 80mg daily 1/31/22--weaned and stopped 4/14/22.    Hospitalization:   [facility] 7/2022 14cm hematoma s/p TURP causing hydronephrosis, JOE and patient required initiation of dialysis, UTI, Covid infection.     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     Treatment Plan:  Cabometyx 60mg daily started 4/7/22.  Decrease dose to 40mg daily 4/28/22 due to side effects.  HOLD on 5/19/22 until groin rash and abscess heal/also underwent TURP on 6/23/22  Restarted 7/6/22--held 7/16/22 for hospitalization/bladder hematoma  Cabometyx 40mg daily restarted 9/1/22. On hold since 10/24/22 for hand/foot syndrome.   Restarted on 11/9/22, held 11/28/22 for hand-foot syndrome, foot wound.   Dose decreased to 20mg daily. Restarted on 1/5/23. Stopped on 3/17/23 due to diabetic foot wound.   Restarted 6/1/23. Stopped on 4/22/24 due to osteomyelitis.   Restarted 5/27/24.   Off for a few weeks in 8/2024. Restarted on 9/4/24.     Chief Complaint: Back Pain, Pain, and Shortness of Breath    HPI   Patient presents for follow-up of renal cell carcinoma. He is doing okay. He remains on the Cabometyx daily. Continues close follow-up with his foot doctor. He also mentions that he was seen by neurologist for some memory problems and was placed on Namenda. Also has some mild tremors which Dr. Erazo will address at next visit. He has complained of low back pain radiating down his right leg and groin a few months ago and MRI of lumbar spine done revealed no spinal metastasis but spondylotic/disc degenerative changes. Mentions his pain is a little better but still present. Taking Norco as needed. Attempted referral to Dr. Khoury but he does not  take his insurance. We discussed referral to palliative care for pain control and he is in agreement. Appetite good and weight stable. No other problems reported today.      Past Medical History:   Diagnosis Date    Arthritis of knee 08/13/2013    Cancer     stage IV renal cell carcinoma    Cataract     Coronary artery disease     Diabetes mellitus, type 2     HTN (hypertension) 08/13/2013    Hx of CABG     Hyperlipidemia     Memory loss     Neuropathy     Type II or unspecified type diabetes mellitus without mention of complication, not stated as uncontrolled 08/13/2013      Review of patient's allergies indicates:  No Known Allergies     Current Outpatient Medications:     allopurinoL (ZYLOPRIM) 300 MG tablet, Take 200 mg by mouth once daily., Disp: , Rfl:     aspirin 81 mg Cap, Take 81 mg by mouth once daily at 6am., Disp: , Rfl:     atorvastatin (LIPITOR) 80 MG tablet, Take 80 mg by mouth once daily., Disp: , Rfl:     azelastine (ASTELIN) 137 mcg (0.1 %) nasal spray, SMARTSIG:Both Nares, Disp: , Rfl:     cabozantinib (CABOMETYX) 20 mg Tab, Take 1 tablet (20 mg) by mouth once daily., Disp: 30 tablet, Rfl: 6    colchicine (COLCRYS) 0.6 mg tablet, Take 2 tablets by mouth once, then take 1 tablet 1 hour later. Max 1.8 mg/day. Then take 1 tablets by mouth daily until 2 days after flare resolves., Disp: , Rfl:     desvenlafaxine succinate (PRISTIQ) 100 MG Tb24, Take 100 mg by mouth., Disp: , Rfl:     diphenoxylate-atropine 2.5-0.025 mg (LOMOTIL) 2.5-0.025 mg per tablet, Take 1 tablet by mouth as needed., Disp: , Rfl:     finasteride (PROSCAR) 5 mg tablet, Take 5 mg by mouth as needed., Disp: , Rfl:     fluticasone propionate (FLONASE) 50 mcg/actuation nasal spray, 2 sprays by Each Nostril route., Disp: , Rfl:     furosemide (LASIX) 20 MG tablet, Take 20 mg by mouth once daily. Pt stated he thinks her takes 10mg daily, Disp: , Rfl:     hydrocortisone 2.5 % cream, APPLY TO THE AFFECTED AREA(S) TWICE DAILY as needed for  "SKIN IRRITATION FOR 14 DAYS, Disp: , Rfl:     hydrOXYzine pamoate (VISTARIL) 25 MG Cap, Take 25 mg by mouth every 6 (six) hours as needed., Disp: , Rfl:     insulin glargine (LANTUS) 100 unit/mL injection, Inject into the skin. Takes 20units, Disp: , Rfl:     insulin syr/ndl U100 half warner (BD VEO INSULIN SYR, HALF UNIT,) 0.3 mL 31 gauge x 15/64" Syrg, , Disp: , Rfl:     lancets Misc, Use TID to check blood sugar. Dx: E11.9, patient is insulin dependent, Disp: , Rfl:     lisinopriL (PRINIVIL,ZESTRIL) 5 MG tablet, Take 5 mg by mouth., Disp: , Rfl:     memantine (NAMENDA) 10 MG Tab, Take 1 tablet (10 mg total) by mouth 2 (two) times daily., Disp: 180 tablet, Rfl: 4    multivitamin (THERAGRAN) per tablet, Take 1 tablet by mouth once daily., Disp: , Rfl:     pantoprazole (PROTONIX) 40 MG tablet, TAKE 1 TABLET BY MOUTH EVERY DAY, Disp: 30 tablet, Rfl: 3    pregabalin (LYRICA) 100 MG capsule, Take 100 mg by mouth., Disp: , Rfl:     prochlorperazine (COMPAZINE) 5 MG tablet, Take 1 tablet (5 mg total) by mouth 3 (three) times daily as needed for Nausea., Disp: 30 tablet, Rfl: 6    QUEtiapine (SEROQUEL) 50 MG tablet, Take 3 tablets by mouth every evening., Disp: , Rfl:     tamsulosin (FLOMAX) 0.4 mg Cap, TAKE 1 CAPSULE BY MOUTH EVERY EVENING, Disp: 30 capsule, Rfl: 6    tolterodine (DETROL LA) 4 MG 24 hr capsule, Take 4 mg by mouth once daily., Disp: , Rfl:     TRUE METRIX GLUCOSE TEST STRIP Strp, 3 (three) times daily., Disp: , Rfl:     tuberculin,purif.prot.deriv. (TUBERSOL IDRM), Inject 0.1 mLs into the skin., Disp: , Rfl:     carvediloL (COREG) 12.5 MG tablet, Take 1 tablet by mouth 2 (two) times daily. (Patient not taking: Reported on 11/6/2024), Disp: , Rfl:     HYDROcodone-acetaminophen (NORCO) 7.5-325 mg per tablet, Take 1 tablet by mouth every 6 (six) hours as needed for Pain., Disp: 90 tablet, Rfl: 0    hyoscyamine (LEVSIN/SL) 0.125 mg Subl, Place 0.125 mg under the tongue every 6 (six) hours as needed. (Patient " not taking: Reported on 11/6/2024), Disp: , Rfl:      Review of Systems   Constitutional:  Positive for fatigue. Negative for appetite change and unexpected weight change.   HENT:  Negative for mouth sores.    Eyes:  Negative for visual disturbance.   Respiratory:  Positive for shortness of breath. Negative for cough.    Cardiovascular:  Negative for chest pain.   Gastrointestinal:  Negative for abdominal pain and diarrhea.   Genitourinary:  Negative for frequency.   Musculoskeletal:  Positive for back pain.   Integumentary:  Negative for rash.   Neurological:  Negative for headaches.   Hematological:  Negative for adenopathy.   Psychiatric/Behavioral:  The patient is nervous/anxious.      Vitals:    11/06/24 1340   BP: 96/69   Pulse: 72   Resp: 14   Temp: 98.1 °F (36.7 °C)     Wt Readings from Last 3 Encounters:   11/06/24 1340 93.8 kg (206 lb 14.4 oz)   10/07/24 0814 93 kg (205 lb)   10/02/24 1419 93 kg (205 lb 1.6 oz)     Physical Exam  Constitutional:       Appearance: Normal appearance. He is overweight.   HENT:      Head: Normocephalic and atraumatic.      Nose: Nose normal.   Eyes:      General: Lids are normal. Vision grossly intact.      Extraocular Movements: Extraocular movements intact.      Conjunctiva/sclera: Conjunctivae normal.   Cardiovascular:      Rate and Rhythm: Normal rate and regular rhythm.      Heart sounds: Normal heart sounds.   Pulmonary:      Effort: Pulmonary effort is normal.      Breath sounds: Normal breath sounds.   Chest:      Comments: HD access removed to right upper CW  Abdominal:      General: Abdomen is protuberant. Bowel sounds are normal. There is no distension.      Palpations: Abdomen is soft.      Tenderness: There is no abdominal tenderness.   Musculoskeletal:         General: Normal range of motion.      Cervical back: Neck supple.      Right lower leg: No edema.      Left lower leg: No edema.   Feet:      Comments: Left great toe amputation. Left 2nd partial toe  amputation - feet not examined today.  Lymphadenopathy:      Cervical: No cervical adenopathy.      Upper Body:      Right upper body: No supraclavicular or axillary adenopathy.      Left upper body: No supraclavicular or axillary adenopathy.   Skin:     General: Skin is warm and dry.   Neurological:      General: No focal deficit present.      Mental Status: He is alert and oriented to person, place, and time.   Psychiatric:         Attention and Perception: Attention normal.         Mood and Affect: Mood normal.         Speech: Speech normal.         Behavior: Behavior normal. Behavior is cooperative.       Lab Visit on 11/06/2024   Component Date Value Ref Range Status    WBC 11/06/2024 9.67  4.50 - 11.50 x10(3)/mcL Final    RBC 11/06/2024 4.48 (L)  4.70 - 6.10 x10(6)/mcL Final    Hgb 11/06/2024 10.5 (L)  14.0 - 18.0 g/dL Final    Hct 11/06/2024 35.0 (L)  42.0 - 52.0 % Final    MCV 11/06/2024 78.1 (L)  80.0 - 94.0 fL Final    MCH 11/06/2024 23.4 (L)  27.0 - 31.0 pg Final    MCHC 11/06/2024 30.0 (L)  33.0 - 36.0 g/dL Final    RDW 11/06/2024 20.0 (H)  11.5 - 17.0 % Final    Platelet 11/06/2024 290  130 - 400 x10(3)/mcL Final    MPV 11/06/2024 9.2  7.4 - 10.4 fL Final    Neut % 11/06/2024 69.6  % Final    Lymph % 11/06/2024 18.9  % Final    Mono % 11/06/2024 8.1  % Final    Eos % 11/06/2024 2.8  % Final    Basophil % 11/06/2024 0.2  % Final    Lymph # 11/06/2024 1.83  0.6 - 4.6 x10(3)/mcL Final    Neut # 11/06/2024 6.73  2.1 - 9.2 x10(3)/mcL Final    Mono # 11/06/2024 0.78  0.1 - 1.3 x10(3)/mcL Final    Eos # 11/06/2024 0.27  0 - 0.9 x10(3)/mcL Final    Baso # 11/06/2024 0.02  <=0.2 x10(3)/mcL Final    IG# 11/06/2024 0.04  0 - 0.04 x10(3)/mcL Final    IG% 11/06/2024 0.4  % Final      Assessment:        1. Clear cell carcinoma of right kidney    2. 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 Anemia in stage 3b chronic kidney disease    4. Cancer related pain    5. Anemia, unspecified type    6.  Acute right-sided low back pain with left-sided sciatica        Plan:         Patient with large right renal mass and retroperitoneal adenopathy diagnosed by CT in 7/2020.  CT chest showed mediastinal fanny mass and biopsy done via EUS + for metastatic renal cell carcinoma.  Patient s/p radical right nephrectomy done 9/11/20. Pathology showed 9.2cm clear cell renal cell carcinoma, margins clear with 4/6 lymph nodes positive.  Post-operative course complicated by hematoma requiring blood transfusion.  CARIS results- MSI stable, Mismatch repair status proficient. Tumor burden low. Negative for: PD-L1, NTRK1/2/3, BAP1, FH, MET, PBRM1, SDHA. Pathologic variants identified: KDMSC (Exon 13 and 11) and VHL. Immunohistochemistry results: MLH1, MSH2, MSH6, and PM52 positive. PD-L1 negative.     Referred to Dr. Haja Sal for evaluation for IL-2. Explained this is only treatment to be shown to achieve long-term remissions in stage IV disease and patient has limited disease at this time.  Patient started 1st 5 days of IL-2 on 11/2/20.   Started his 2nd round on 11/16/20 and it was stopped on 11/22/20 due to acute renal failure, volume overload and agitation.   CT scan on 12/23/20 at [facility] revealed progression of his disease. Dr. Sal contacted patient and discussed the results. He also reached out to Dr. Alvarado to notify her that the patient will not be returning.   Started Keytruda 200mg every 3 weeks on 1/8/21 + Inlyta 5mg oral BID on 1/11/21.  CT C/A/P done at [facility] on 10/22/21 showed stable mild reactive mediastinal nodes, otherwise stable.  Patient continued on treatment s/p cycle 18 Keytruda given 1/10/22.  Labs on 1/10/22 showed elevated LFTs. Inlyta held.  CT C/A/P done at [facility] on 1/26/22 showed unfortunately disease progression with new pulmonary nodules, enlargement in mediastinal node, new mesenteric nodule and retroperitoneal adenopathy.  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 Hepatitis panel negative. Keytruda stopped. Started on Prednisone 80mg daily on 1/31/22.  Patient had suicidal ideation and severe depression during visit in 3/2022, seen by psychiatry and meds adjusted, much improved.  CT C/A/P from [facility] on 4/5/22 showed progressive disease.    Treatment change recommended to Cabometyx 60mg daily per NCCN guidelines (Category 1) for subsequent treatment. Started on 4/7/22.  Prednisone weaned and stopped 4/14/22.  Patient was having multiple side effects (n/v, diarrhea, mouth sores) from Cabometyx.  Reduced dose to 40mg on 4/28/22. Held on 5/19/22 due to cellulitis/rashes/abscesses.      Continued to hold s/p TURP on 6/23/22 and restarted 7/6/22.  Patient hospitalized at [facility] 7/16/22 for large bladder hematoma s/p clot evacuation X 2. Cabometyx stopped again.   Repeat CT C/A/P done at [facility] on 8/29/22 showed mixed findings, but mostly disease progression in fanny metastases in mediastinum, which is to be expected since he has been off treatment.   Restarted back on Cabometyx (lower dose 40mg) on 9/1/22 although it has not been studied in dialysis patients, but no dosage adjustment is usually required.  Held on 10/24/22 for Hand-foot syndrome, restarted 11/9/22.   CT done at [facility] on 11/18/22 with disease improvement.  Cabometyx held again for hand-foot syndrome and wound on left toe 11/28/22.  Restarted Cabometyx on 1/5/23 at 20mg daily.    Cabometyx held on 3/17/23 due to worsening diabetic foot wound.  S/p left great toe amputation on 5/8/23, healed and cleared by podiatry to restart Cabometyx which was restarted on 6/1/23 Cabometyx 20mg daily.  Repeat CT C/A/P on 3/11/24 with a few new millimetric lung nodules, but other disease stable.   Unclear if these actually represent disease, since so small so do not recommend any treatment change at this time.  Patient admitted at [facility] from 4/22/24 - 4/26/24 with osteomyelitis of second toe left foot and  diabetic ulcer of the left foot, S/p partial amputation of left 2nd toe on 4/23/24. The interoperative tissue cx (+) MSSA. He was discharged home on oral antibiotics for 2 weeks. Cabometyx held and restarted no 5/27/24.     CT C/A/P done 6/7/24 with some disease progression new small lung nodules and mild progression in mediastinal lymph nodes.  Recommended to continue with Cabometyx since he had been off for 1 month.  CT C/A/P on 8/19/24 with some disease progression in lung nodules and mild progression in mediastinal nodes, stable other disease.  MRI lumbar spine done 8/30/24 for back pain with no metastatic disease, only degenerative changes.  He was also off Cabometyx for 3-4 weeks during this time.   Since he is asymptomatic, will continue with treatment with Cabometyx for now.   Discussed with patient about trying to continue treatment consistently so for his next scan, we need to see if he is really failing therapy.   Restarted daily on 9/4/24.   Labs today with slightly worsening anemia, otherwise CBC is good. MCV and MCH are low, will add iron studies to labs drawn today. CMP pending.   Plan to repeat CT in 3 months, early 12/2024. Will send orders to [facility].   F/u in 4 weeks with repeat labs after scans completed.   Attempted referral to Dr. Amor Street, to see if any options available for his low back pain but he did not accept his insurance.   Plan to refer to palliative care - Dr. Nagy. In the interim, I will refill prescription for Norco 7.5/325 q 6 hours, # 90 to his pharmacy.     Looking ahead to future treatment options, would possibly try again with immunotherapy, single agent Opdivo. Or could also try single agent Afinitor or Lenvima. I am not sure he is a candidate for doublet therapy given all his comorbidities.  He never really failed Keytruda, just developed autoimmune hepatitis, so this would need to be monitored closely.   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  Referred for genetic testing which was done on 3/16/23 and negative.      Continue Compazine for nausea.   Continue Norco for joint pain.   Continue Vistaril for chronic pruritis.   Continue OTC calcium supplements  All questions answered at this time.      MARCIN Laboy

## 2024-11-06 ENCOUNTER — LAB VISIT (OUTPATIENT)
Dept: LAB | Facility: HOSPITAL | Age: 64
End: 2024-11-06
Attending: NURSE PRACTITIONER
Payer: MEDICARE

## 2024-11-06 ENCOUNTER — OFFICE VISIT (OUTPATIENT)
Dept: HEMATOLOGY/ONCOLOGY | Facility: CLINIC | Age: 64
End: 2024-11-06
Payer: MEDICARE

## 2024-11-06 VITALS
HEIGHT: 70 IN | OXYGEN SATURATION: 99 % | DIASTOLIC BLOOD PRESSURE: 69 MMHG | WEIGHT: 206.88 LBS | SYSTOLIC BLOOD PRESSURE: 96 MMHG | RESPIRATION RATE: 14 BRPM | BODY MASS INDEX: 29.62 KG/M2 | HEART RATE: 72 BPM | TEMPERATURE: 98 F

## 2024-11-06 DIAGNOSIS — M54.42 ACUTE RIGHT-SIDED LOW BACK PAIN WITH LEFT-SIDED SCIATICA: ICD-10-CM

## 2024-11-06 DIAGNOSIS — C77.1 MALIGNANT NEOPLASM METASTATIC TO INTRATHORACIC LYMPH NODE: ICD-10-CM

## 2024-11-06 DIAGNOSIS — N18.4 STAGE 4 CHRONIC KIDNEY DISEASE: ICD-10-CM

## 2024-11-06 DIAGNOSIS — C64.1 CLEAR CELL CARCINOMA OF RIGHT KIDNEY: ICD-10-CM

## 2024-11-06 DIAGNOSIS — D63.1 ANEMIA IN STAGE 3B CHRONIC KIDNEY DISEASE: ICD-10-CM

## 2024-11-06 DIAGNOSIS — D64.9 ANEMIA, UNSPECIFIED TYPE: ICD-10-CM

## 2024-11-06 DIAGNOSIS — G89.3 CANCER RELATED PAIN: ICD-10-CM

## 2024-11-06 DIAGNOSIS — N18.32 ANEMIA IN STAGE 3B CHRONIC KIDNEY DISEASE: ICD-10-CM

## 2024-11-06 DIAGNOSIS — C64.1 CLEAR CELL CARCINOMA OF RIGHT KIDNEY: Primary | ICD-10-CM

## 2024-11-06 LAB
ALBUMIN SERPL-MCNC: 1.9 G/DL (ref 3.4–4.8)
ALBUMIN/GLOB SERPL: 0.4 RATIO (ref 1.1–2)
ALP SERPL-CCNC: 132 UNIT/L (ref 40–150)
ALT SERPL-CCNC: 15 UNIT/L (ref 0–55)
ANION GAP SERPL CALC-SCNC: 7 MEQ/L
AST SERPL-CCNC: 29 UNIT/L (ref 5–34)
BASOPHILS # BLD AUTO: 0.02 X10(3)/MCL
BASOPHILS NFR BLD AUTO: 0.2 %
BILIRUB SERPL-MCNC: 0.2 MG/DL
BUN SERPL-MCNC: 36.2 MG/DL (ref 8.4–25.7)
CALCIUM SERPL-MCNC: 8.9 MG/DL (ref 8.8–10)
CHLORIDE SERPL-SCNC: 103 MMOL/L (ref 98–107)
CO2 SERPL-SCNC: 29 MMOL/L (ref 23–31)
CREAT SERPL-MCNC: 1.75 MG/DL (ref 0.72–1.25)
CREAT/UREA NIT SERPL: 21
EOSINOPHIL # BLD AUTO: 0.27 X10(3)/MCL (ref 0–0.9)
EOSINOPHIL NFR BLD AUTO: 2.8 %
ERYTHROCYTE [DISTWIDTH] IN BLOOD BY AUTOMATED COUNT: 20 % (ref 11.5–17)
GFR SERPLBLD CREATININE-BSD FMLA CKD-EPI: 43 ML/MIN/1.73/M2
GLOBULIN SER-MCNC: 5.4 GM/DL (ref 2.4–3.5)
GLUCOSE SERPL-MCNC: 124 MG/DL (ref 82–115)
HCT VFR BLD AUTO: 35 % (ref 42–52)
HGB BLD-MCNC: 10.5 G/DL (ref 14–18)
IMM GRANULOCYTES # BLD AUTO: 0.04 X10(3)/MCL (ref 0–0.04)
IMM GRANULOCYTES NFR BLD AUTO: 0.4 %
LYMPHOCYTES # BLD AUTO: 1.83 X10(3)/MCL (ref 0.6–4.6)
LYMPHOCYTES NFR BLD AUTO: 18.9 %
MCH RBC QN AUTO: 23.4 PG (ref 27–31)
MCHC RBC AUTO-ENTMCNC: 30 G/DL (ref 33–36)
MCV RBC AUTO: 78.1 FL (ref 80–94)
MONOCYTES # BLD AUTO: 0.78 X10(3)/MCL (ref 0.1–1.3)
MONOCYTES NFR BLD AUTO: 8.1 %
NEUTROPHILS # BLD AUTO: 6.73 X10(3)/MCL (ref 2.1–9.2)
NEUTROPHILS NFR BLD AUTO: 69.6 %
PLATELET # BLD AUTO: 290 X10(3)/MCL (ref 130–400)
PMV BLD AUTO: 9.2 FL (ref 7.4–10.4)
POTASSIUM SERPL-SCNC: 4.7 MMOL/L (ref 3.5–5.1)
PROT SERPL-MCNC: 7.3 GM/DL (ref 5.8–7.6)
RBC # BLD AUTO: 4.48 X10(6)/MCL (ref 4.7–6.1)
SODIUM SERPL-SCNC: 139 MMOL/L (ref 136–145)
WBC # BLD AUTO: 9.67 X10(3)/MCL (ref 4.5–11.5)

## 2024-11-06 PROCEDURE — 83550 IRON BINDING TEST: CPT

## 2024-11-06 PROCEDURE — 3008F BODY MASS INDEX DOCD: CPT | Mod: CPTII,S$GLB,, | Performed by: NURSE PRACTITIONER

## 2024-11-06 PROCEDURE — 1159F MED LIST DOCD IN RCRD: CPT | Mod: CPTII,S$GLB,, | Performed by: NURSE PRACTITIONER

## 2024-11-06 PROCEDURE — 80053 COMPREHEN METABOLIC PANEL: CPT

## 2024-11-06 PROCEDURE — 99215 OFFICE O/P EST HI 40 MIN: CPT | Mod: S$GLB,,, | Performed by: NURSE PRACTITIONER

## 2024-11-06 PROCEDURE — 3074F SYST BP LT 130 MM HG: CPT | Mod: CPTII,S$GLB,, | Performed by: NURSE PRACTITIONER

## 2024-11-06 PROCEDURE — 99999 PR PBB SHADOW E&M-EST. PATIENT-LVL IV: CPT | Mod: PBBFAC,,, | Performed by: NURSE PRACTITIONER

## 2024-11-06 PROCEDURE — 36415 COLL VENOUS BLD VENIPUNCTURE: CPT

## 2024-11-06 PROCEDURE — 3078F DIAST BP <80 MM HG: CPT | Mod: CPTII,S$GLB,, | Performed by: NURSE PRACTITIONER

## 2024-11-06 PROCEDURE — 1160F RVW MEDS BY RX/DR IN RCRD: CPT | Mod: CPTII,S$GLB,, | Performed by: NURSE PRACTITIONER

## 2024-11-06 PROCEDURE — 85025 COMPLETE CBC W/AUTO DIFF WBC: CPT

## 2024-11-06 PROCEDURE — 3044F HG A1C LEVEL LT 7.0%: CPT | Mod: CPTII,S$GLB,, | Performed by: NURSE PRACTITIONER

## 2024-11-06 PROCEDURE — 4010F ACE/ARB THERAPY RXD/TAKEN: CPT | Mod: CPTII,S$GLB,, | Performed by: NURSE PRACTITIONER

## 2024-11-06 PROCEDURE — 82728 ASSAY OF FERRITIN: CPT

## 2024-11-06 RX ORDER — HYDROCODONE BITARTRATE AND ACETAMINOPHEN 7.5; 325 MG/1; MG/1
1 TABLET ORAL EVERY 6 HOURS PRN
Qty: 90 TABLET | Refills: 0 | Status: SHIPPED | OUTPATIENT
Start: 2024-11-06

## 2024-11-07 ENCOUNTER — TELEPHONE (OUTPATIENT)
Dept: HEMATOLOGY/ONCOLOGY | Facility: CLINIC | Age: 64
End: 2024-11-07
Payer: MEDICARE

## 2024-11-07 LAB
FERRITIN SERPL-MCNC: 233.26 NG/ML (ref 21.81–274.66)
IRON SATN MFR SERPL: 12 % (ref 20–50)
IRON SERPL-MCNC: 19 UG/DL (ref 65–175)
TIBC SERPL-MCNC: 144 UG/DL (ref 69–240)
TIBC SERPL-MCNC: 163 UG/DL (ref 250–450)
TRANSFERRIN SERPL-MCNC: 136 MG/DL (ref 163–344)

## 2024-11-07 NOTE — TELEPHONE ENCOUNTER
Attempted to contact patient. Unable to reach. Left detailed message with iron levels and instructions to start daily iron tab.

## 2024-11-07 NOTE — PROGRESS NOTES
I saw him yesterday and I added iron studies to his labs due to worsening anemia. His iron level is low at 19 but ferritin is normal at 233.26. He does not need any IV iron but he could start a once daily oral iron and we will monitor. Likely his anemia is from his Cabometyx.

## 2024-11-07 NOTE — TELEPHONE ENCOUNTER
----- Message from DESIRE Laboy sent at 11/7/2024 10:39 AM CST -----  I saw him yesterday and I added iron studies to his labs due to worsening anemia. His iron level is low at 19 but ferritin is normal at 233.26. He does not need any IV iron but he could start a once daily oral iron and we will monitor. Likely his ane  zoila is from his Cabometyx.

## 2024-11-11 DIAGNOSIS — C64.1 CLEAR CELL CARCINOMA OF RIGHT KIDNEY: ICD-10-CM

## 2024-11-11 DIAGNOSIS — N18.4 CHRONIC KIDNEY DISEASE, STAGE IV (SEVERE): ICD-10-CM

## 2024-11-11 DIAGNOSIS — D63.1 ANEMIA IN STAGE 4 CHRONIC KIDNEY DISEASE: ICD-10-CM

## 2024-11-11 DIAGNOSIS — N18.4 ANEMIA IN STAGE 4 CHRONIC KIDNEY DISEASE: ICD-10-CM

## 2024-11-11 RX ORDER — PANTOPRAZOLE SODIUM 40 MG/1
40 TABLET, DELAYED RELEASE ORAL
Qty: 30 TABLET | Refills: 3 | Status: SHIPPED | OUTPATIENT
Start: 2024-11-11

## 2024-11-29 ENCOUNTER — PATIENT MESSAGE (OUTPATIENT)
Dept: HEMATOLOGY/ONCOLOGY | Facility: CLINIC | Age: 64
End: 2024-11-29
Payer: MEDICARE

## 2024-12-04 ENCOUNTER — TELEPHONE (OUTPATIENT)
Dept: HEMATOLOGY/ONCOLOGY | Facility: CLINIC | Age: 64
End: 2024-12-04
Payer: MEDICARE

## 2024-12-04 PROBLEM — F05 ACUTE CONFUSIONAL STATE: Status: ACTIVE | Noted: 2024-12-04

## 2024-12-04 NOTE — TELEPHONE ENCOUNTER
LM on pt's VM to check on him since he was a no show for appt this morning. I also asked for clarification as to whether or not he is moving to  and where does he want to do his scans.

## 2024-12-10 ENCOUNTER — TELEPHONE (OUTPATIENT)
Dept: HEMATOLOGY/ONCOLOGY | Facility: CLINIC | Age: 64
End: 2024-12-10
Payer: MEDICARE

## 2024-12-10 NOTE — TELEPHONE ENCOUNTER
Patient's wife returned call this morning. States that patient had been acting strange for the past 6 weeks. He left home without her knowledge and was notified by ER physician that he was at their facility unaware of who he was; brought in by ambulance. States they treated the lesion as some type of infection and called it meningitis. He was treated with IV antibiotics. States he is getting better. No symptoms or complaints. No personal or family history of mental illness that she is aware of. States he was recently diagnosed with dementia and is taking namenda.     I also questioned her about the possible transfer of care to [place] and she stated that they are staying here; no need to refer. Wanting to schedule f/u here and have CT scans done. Please advise if okay.

## 2024-12-10 NOTE — TELEPHONE ENCOUNTER
Yes I believe they are working on getting his scans rescheduled then we can make a follow-up with Dr. Alvarado.

## 2024-12-12 NOTE — PROGRESS NOTES
Subjective:       Patient ID: Ye Olivera is a 64 y.o. male.    Urologist: Dr. Christopher Villasenor  PCP: Dr. Ly Quevedo  Nephrologist: Dr. Jenae Le    Renal Cell Carcinoma Stage IV([id number]) Mediastinal lymph nodes--Diagnosed 20  Biopsy/pathology:  Upper EUS done 9/3/20--large cluster of malignant appearing lymph nodes in subcarinal mediastinum causing extrinsic compression in middle 1/3 and thoracic esophagus biopsy c/w metastatic carcinoma, c/w renal cell carcinoma CD10+, PAX -8+, erythematous mucosa in antrum biopsy c/w mild chronic gastritis, inactive, normal duodenum, no pathology in CBD or left lobe of liver or pancreas. CARIS results- MSI stable, Mismatch repair status proficient. Tumor burden low. Negative for: PD-L1, NTRK1/2/3, BAP1, FH, MET, PBRM1, SDHA. Pathologic variants identified: KDMSC (Exon 13 and 11) and VHL. Immunohistochemistry results: MLH1, MSH2, MSH6, and PM52 positive. PD-L1 negative.   Surgery/pathology:  Right radical nephrectomy and lymph node dissection done 20--clear cell renal cell carcinoma measures 9.2X8C8.7cm, Grade 3, margins clear, tumor limited to kidney, 4/6 lymph nodes positive for metastatic clear cell carcinoma.   Imagin. CT A/P w/o contrast [facility] 20--bulky right renal mass, midpole, measuring 10cm with evidence of internal necrosis and calcifications, primary mass does not extend outside the perinephric space, associated right perinephric edema, multiple enlarged retroperitoneal lymph nodes at level of the kidneys and inferior retroperitoneum, one measures 2.9X1.4cm, additional node measures 2.1X1.8cm, additional node measures 1.8X1.2cm, inferior retroperitoneal node at level of inferior pole right kidney measures 1.8X1.4cm, no convincing evidence of renal vein involvement, no right adrenal gland involvement, no disease apparent outside of abdomen.  2. CT A/P w/ and w/o contrast [facility] 20--large right renal mass measures 10.6X9.5X7.4cm, with  enlarged retroperitoneal nodes, largest measures 2.7cm.  3. CT chest [facility] 8/28/20--solitary left supraclavicular node, small and not overtly concerning, heterogeneous retroesophageal mass at the level of the tariq c/w metastatic deposit, no other evidence for metastatic disease.  4. MRI brain w/ and w/o contrast [facility] 8/28/20--no evidence for intracranial metastatic disease.  5. CT C/A/P done at [facility] 10/28/20--now appears to be 2 adjacent masses at the level of the tariq, measuring in aggregate 6.2X2.7cm as compared with 5.2X2.1cm, subcentimeter mediastinal and hilar nodes are seen, large intermediate density structure at the right renal fossa extending to pelvic brim, most likely post-operative hematoma or seroma, resolution of previously seen retroperitoneal adenopathy.  6. CT C/A/P w&w/o at [facility] on 12/23/20--Interval enlargement of the retro-esophageal mass with mild interval increase in size of multiple paraesophageal and prevascular lymph nodes compatible with progression of disease. Interval decrease in size of the right retroperitoneal postop seroma or hematoma. No evidence of metastatic disease below the diaphragm. Prior right nephrectomy and adrenalectomy. Chololithiasis and stable atherosclerosis.   7. CT C/A/P w/o contrast done at [facility] on 4/5/21--interval decrease in size of multiple prevascular, subcarinal and para-esophageal lymph nodes c/w response to treatment, no new pulmonary nodule or lymph node, no evidence for metastatic disease below the diaphragm, interval decrease in size of the post-operative seromas adjacent to right psoas muscle.  8. CT C/A/P w/o contrast done at [facility] on 7/7/21--stable appearance of previously identified prevascular, subcarinal, and paraesophageal nodes, there are an increased number of sub-centimeter nodes in the aortopulmonary window and precarinal region, nonspecific in appearance, no other evidence of local recurrence or metastatic disease.   9. MRI cervical spine w/ and w/o  contrast done at [facility] 8/4/21--no metastatic disease, multilevel spondylotic changes, no acute abnormality.   10. MRI brain w/ and w/o contrast 8/4/21--normal maturation of the previously seen lacunar infarct at the left centrum semiovale, no new abnormalities, specifically, no evidence of intracranial metastatic disease.   11. CT C/A/P w/o contrast done at [facility] 10/22/21--stable reactive mediastinal adenopathy, interval resection of right kidney and adrenal gland with post-surgical changes but no evidence of metastatic disease.   12. CT C/A/P w/o contrast done at [facility] 1/26/22--4 new pulmonary nodules larges LLL 14mm, 9mm LLL, 5mm LLL, 5mm RML, with enlarging prevascular lymph node now measures 18mm, previous 8mm, concerning for metastatic disease, new mesenteric implant in LLQ measures 11mm, enlarging retroperitoneal LN now measures [id number], previous 14X8mm concerning for progression disease, cholelithiasis.   13. MRI brain w/ and w/o  contrast done at [facility] 3/31/22--no acute intracranial abnormality.   14. CT C/A/P w/o contrast done at [facility] 4/5/22--progressive retroperitoneal and mediastinal adenopathy, with development of hilar adenopathy, increase in size and number of pulmonary nodules.   15. CT done at [facility] showed large hematoma 14cm in bladder, results not available.  16. CT head w/o contrast done at [facility] 7/31/22--No intracranial hemorrhage or acute traumatic brain parenchymal injury evident by CT.     17. CT C/A/P w/o contrast done at [facility] 8/29/22--mixed response to treatment, worsening mediastinal adenopathy, stable pulmonary nodules, improved retroperitoneal lymph node, enlarging pericaval lymph node.  18. CT C/A/P w/o contrast at [facility] 11/18/22--Interval decreased in mediastinal adenopathy, pulmonary nodules and portacaval nodes, c/w treatment response.   19. CT C/A/P w/ contrast at [facility] 2/8/23--stable mediastinal lymph nodes, enlarging left lower lobe pulmonary nodule, enlarging retroperitoneal lymph nodes.   20. CT  C/A/P w/o contrast at [facility] done 6/20/23--minimally enlarged mediastinal lymph nodes, otherwise no other significant changes, pericaval nodes unchanged.   21. CT C/A/P w/o contrast at [facility] done 10/11/23--improved appearance of the right lung base with decreased ground glass opacity, stable to decreased size of mediastinal and pericaval lymph nodes (index prevascular node decreased 1X1.6cm, pericaval measures 1.6X2cm (previous 2X2.4cm),   22. CT C/A/P w/o contrast at [facility] on 1/10/24--Small left basilar pulmonary nodule stable. No additional pulmonary nodules. Millimetric size increase of right peritracheal and aorticopulmonary lymph nodes as well as nodes at the azygos esophageal recess. Intraluminal gallbladder sludge and possible tiny stones. Postsurgical changes of the right nephrectomy. Cortical scarring or postsurgical changes of the upper pole of the left kidney.   23. CT C/A/P w/ contrast at [facility] 3/11/24--stable mediastinal adenopathy, with stable 2cm LLL pulmonary nodule in left infrahilar region however, there are a few additional pulmonary micronodules in right and left lower lobe concerning for progressive disease, for example 4.9mm LLL subpleural nodule, with a few additional new bilateral lower lobe pulmonary nodules measuring 2-3mm.  24. CT C/A/P at [facility] 6/7/24--multiple new bilateral pulmonary nodules with mild interval increase in size of mediastinal and left supraclavicular lymph nodes, findings c/w disease progression, portal caval adenopathy redemonstrated with index lesion measuring 2.5cm similar to prior.   25. CT C/A/P done at [facility] 8/19/24--slight further increase in size of supraclavicular and mediastinal lymph nodes as well as slight interval increase in pulmonary nodules w/o definite new nodules, stable portacaval and periportal nodes.  26. MRI lumbar spine w/o contrast 8/30/24--no acute abnormality, no metastatic disease, spondylotic/disc degenerative changes.  26. MRI brain w/ and w/o done  12/5/24--rounded subcentimeter nodular focus of cortical enhancement involving right frontal lobe measures 3mm, nonspecific, very small early metastasis is in the differentia, short term follow-up recommended 2-3 months, subcentimeter foci of diffusion signal change w/n bilateral cerebral hemispheres as above suggesting chronic pseudo normalized lacunar infarcts, nonspecific gliotic white matter signal change likely related to sequela of chronic small vessel ischemia.   27. CT C/A/P done at [facility] 12/16/24--increased lucent lesion T3, c/w metastasis, similar lesion in C7, additional lesion in humeral head, further progression of supraclavicular adenopathy bilateral with representative left side node now measuring 2.3cm, mediastinal adenopathy with mixed reponse, decrease in some and increase in others, slight decrease in bulk of subcarinal conglomeration of nodes, mixed interstitial and alveolar infiltrate in superior segment RLL, may represent infectious or inflammatory process, mixed response involving pulmonary nodules, decrease in size of nodule RUL and decrease in LLL nodules with increase in others, lytic lesion acetabular roof on left, destructive, lytic lesion ischial tuberosity in right with loss of cortical integrity and a soft tissue component, increased prominence of portacaval and pericaval nodes as well as peripancreatic and mesenteric nodes extending into RLQ; development of clear-cut skeletal metastatic disease with progressive supraclavicular and abdominal adenopathy with mixed findings of mediastinal nodes and pulmonary nodules.     Treatment History:   1. IL2 started on 11/2/20. 2nd cycle started 11/16/20 - stopped after 7 days due to acute renal failure, volume overload and confusion. Stopped due to progression.  2. Keytruda/Inlyta 1/8/21--1/31/22 (stopped due to progression, autoimmune hepatitis).  3. Prednisone 80mg daily 1/31/22--weaned and stopped 4/14/22.  4. Cabometyx 4/7/22--12/2024 (stopped  due to progression).    Hospitalization:   [facility] 7/2022 14cm hematoma s/p TURP causing hydronephrosis, JOE and patient required initiation of dialysis, UTI, Covid infection.     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     Treatment Plan: Hospice care    Chief Complaint: Follow-up    HPI   Patient presents for follow-up of renal cell carcinoma. He is not doing very well. He was hospitalized recently with confusion, and this was felt to likely be from dehydration. Since that time, he has not been doing well. His wife and sister-in-law are with him today. His wife reports that patient is just sitting around all day, has to use walker even to get around his house and he is unable to care for himself. It was extremely difficult for them to come to the clinic today for his appointment, given his overall condition. He is having an increasing amount of pain in pelvis/hips. His confusion is better. CT scans show disease progression. Long discussion with patient and family regarding care plan. Due to his declining performance status and overall poor condition, hospice care recommended and they are in agreement.      Past Medical History:   Diagnosis Date    Arthritis of knee 08/13/2013    Cancer     stage IV renal cell carcinoma    Cataract     Coronary artery disease     Diabetes mellitus, type 2     HTN (hypertension) 08/13/2013    Hx of CABG     Hyperlipidemia     Memory loss     Neuropathy     Type II or unspecified type diabetes mellitus without mention of complication, not stated as uncontrolled 08/13/2013      Review of patient's allergies indicates:  No Known Allergies     Current Outpatient Medications:     allopurinoL (ZYLOPRIM) 300 MG tablet, Take 200 mg by mouth once daily. (Patient taking differently: Take 100 mg by mouth once daily.), Disp: , Rfl:     aspirin 81 mg Cap, Take 81 mg by mouth once daily at 6am., Disp: , Rfl:     atorvastatin (LIPITOR) 80 MG  tablet, Take 80 mg by mouth once daily., Disp: , Rfl:     azelastine (ASTELIN) 137 mcg (0.1 %) nasal spray, SMARTSIG:Both Nares, Disp: , Rfl:     cabozantinib (CABOMETYX) 20 mg Tab, Take 1 tablet (20 mg) by mouth once daily., Disp: 30 tablet, Rfl: 6    desvenlafaxine succinate (PRISTIQ) 100 MG Tb24, Take 100 mg by mouth., Disp: , Rfl:     diphenoxylate-atropine 2.5-0.025 mg (LOMOTIL) 2.5-0.025 mg per tablet, Take 1 tablet by mouth as needed., Disp: , Rfl:     finasteride (PROSCAR) 5 mg tablet, Take 5 mg by mouth as needed., Disp: , Rfl:     fluticasone propionate (FLONASE) 50 mcg/actuation nasal spray, 2 sprays by Each Nostril route., Disp: , Rfl:     furosemide (LASIX) 40 MG tablet, Take 40 mg by mouth every morning., Disp: , Rfl:     HYDROcodone-acetaminophen (NORCO) 7.5-325 mg per tablet, Take 1 tablet by mouth every 6 (six) hours as needed for Pain., Disp: 90 tablet, Rfl: 0    hydrocortisone 2.5 % cream, APPLY TO THE AFFECTED AREA(S) TWICE DAILY as needed for SKIN IRRITATION FOR 14 DAYS, Disp: , Rfl:     insulin glargine (LANTUS) 100 unit/mL injection, Inject into the skin. Takes 20units, Disp: , Rfl:     lancets Misc, Use TID to check blood sugar. Dx: E11.9, patient is insulin dependent, Disp: , Rfl:     memantine (NAMENDA) 10 MG Tab, Take 10 mg by mouth., Disp: , Rfl:     multivitamin (THERAGRAN) per tablet, Take 1 tablet by mouth once daily., Disp: , Rfl:     pantoprazole (PROTONIX) 40 MG tablet, TAKE 1 TABLET BY MOUTH EVERY DAY, Disp: 30 tablet, Rfl: 3    QUEtiapine (SEROQUEL) 50 MG tablet, Take 150 mg by mouth every evening., Disp: , Rfl:     tamsulosin (FLOMAX) 0.4 mg Cap, TAKE 1 CAPSULE BY MOUTH EVERY EVENING, Disp: 30 capsule, Rfl: 6    tolterodine (DETROL LA) 4 MG 24 hr capsule, Take 4 mg by mouth., Disp: , Rfl:     TRUE METRIX GLUCOSE TEST STRIP Strp, 3 (three) times daily., Disp: , Rfl:     sodium bicarbonate 650 MG tablet, Take 2 tablets (1,300 mg total) by mouth 2 (two) times daily. for 10 days,  Disp: 40 tablet, Rfl: 0     Review of Systems   Constitutional:  Positive for fatigue. Negative for appetite change and unexpected weight change.   HENT:  Negative for mouth sores.    Eyes:  Negative for visual disturbance.   Respiratory:  Positive for shortness of breath. Negative for cough.    Cardiovascular:  Negative for chest pain.   Gastrointestinal:  Negative for abdominal pain and diarrhea.   Genitourinary:  Negative for frequency.   Musculoskeletal:  Positive for back pain.   Integumentary:  Negative for rash.   Neurological:  Positive for weakness. Negative for headaches.   Hematological:  Negative for adenopathy.   Psychiatric/Behavioral:  The patient is nervous/anxious.      Vitals:    12/18/24 0943   BP: 95/62   Pulse: 92   Resp: 18   Temp: 97.2 °F (36.2 °C)       Wt Readings from Last 3 Encounters:   12/18/24 0943 90.2 kg (198 lb 12.8 oz)   12/04/24 1835 90.3 kg (199 lb)   12/04/24 1459 90.3 kg (199 lb)   11/06/24 1340 93.8 kg (206 lb 14.4 oz)     Physical Exam  Constitutional:       Appearance: He is overweight.      Comments: Appears chronically ill   HENT:      Head: Normocephalic and atraumatic.      Nose: Nose normal.   Eyes:      General: Lids are normal. Vision grossly intact.      Extraocular Movements: Extraocular movements intact.      Conjunctiva/sclera: Conjunctivae normal.   Cardiovascular:      Rate and Rhythm: Normal rate and regular rhythm.      Heart sounds: Normal heart sounds.   Pulmonary:      Effort: Pulmonary effort is normal.      Breath sounds: Normal breath sounds.   Chest:      Comments: HD access removed to right upper CW  Abdominal:      General: Abdomen is protuberant. Bowel sounds are normal. There is no distension.      Palpations: Abdomen is soft.      Tenderness: There is no abdominal tenderness.   Musculoskeletal:      Cervical back: Neck supple.      Right lower leg: No edema.      Left lower leg: No edema.      Comments: Severe generalized weakness using a walker    Feet:      Comments: Left great toe amputation. Left 2nd partial toe amputation - feet not examined today.  Lymphadenopathy:      Cervical: No cervical adenopathy.      Upper Body:      Right upper body: No supraclavicular or axillary adenopathy.      Left upper body: No supraclavicular or axillary adenopathy.   Skin:     General: Skin is warm and dry.   Neurological:      General: No focal deficit present.      Mental Status: He is alert.   Psychiatric:         Attention and Perception: Attention normal.         Mood and Affect: Mood normal.         Speech: Speech normal.         Behavior: Behavior normal. Behavior is cooperative.       Lab Visit on 12/18/2024   Component Date Value Ref Range Status    WBC 12/18/2024 8.52  4.50 - 11.50 x10(3)/mcL Final    RBC 12/18/2024 4.02 (L)  4.70 - 6.10 x10(6)/mcL Final    Hgb 12/18/2024 9.0 (L)  14.0 - 18.0 g/dL Final    Hct 12/18/2024 30.5 (L)  42.0 - 52.0 % Final    MCV 12/18/2024 75.9 (L)  80.0 - 94.0 fL Final    MCH 12/18/2024 22.4 (L)  27.0 - 31.0 pg Final    MCHC 12/18/2024 29.5 (L)  33.0 - 36.0 g/dL Final    RDW 12/18/2024 19.6 (H)  11.5 - 17.0 % Final    Platelet 12/18/2024 423 (H)  130 - 400 x10(3)/mcL Final    MPV 12/18/2024 10.0  7.4 - 10.4 fL Final    Neut % 12/18/2024 76.8  % Final    Lymph % 12/18/2024 13.5  % Final    Mono % 12/18/2024 6.7  % Final    Eos % 12/18/2024 1.8  % Final    Basophil % 12/18/2024 0.5  % Final    Lymph # 12/18/2024 1.15  0.6 - 4.6 x10(3)/mcL Final    Neut # 12/18/2024 6.55  2.1 - 9.2 x10(3)/mcL Final    Mono # 12/18/2024 0.57  0.1 - 1.3 x10(3)/mcL Final    Eos # 12/18/2024 0.15  0 - 0.9 x10(3)/mcL Final    Baso # 12/18/2024 0.04  <=0.2 x10(3)/mcL Final    IG# 12/18/2024 0.06 (H)  0 - 0.04 x10(3)/mcL Final    IG% 12/18/2024 0.7  % Final      Assessment:        1. Clear cell carcinoma of right kidney    2. 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 Secondary malignant neoplasm of bone        Plan:         Patient  with large right renal mass and retroperitoneal adenopathy diagnosed by CT in 7/2020.  CT chest showed mediastinal fanny mass and biopsy done via EUS + for metastatic renal cell carcinoma.  Patient s/p radical right nephrectomy done 9/11/20. Pathology showed 9.2cm clear cell renal cell carcinoma, margins clear with 4/6 lymph nodes positive.  Post-operative course complicated by hematoma requiring blood transfusion.  CARIS results- MSI stable, Mismatch repair status proficient. Tumor burden low. Negative for: PD-L1, NTRK1/2/3, BAP1, FH, MET, PBRM1, SDHA. Pathologic variants identified: KDMSC (Exon 13 and 11) and VHL. Immunohistochemistry results: MLH1, MSH2, MSH6, and PM52 positive. PD-L1 negative.     Referred to Dr. Haja Sal for evaluation for IL-2. Explained this is only treatment to be shown to achieve long-term remissions in stage IV disease and patient has limited disease at this time.  Patient started 1st 5 days of IL-2 on 11/2/20.   Started his 2nd round on 11/16/20 and it was stopped on 11/22/20 due to acute renal failure, volume overload and agitation.   CT scan on 12/23/20 at [facility] revealed progression of his disease. Dr. Sal contacted patient and discussed the results. He also reached out to Dr. Alvarado to notify her that the patient will not be returning.   Started Keytruda 200mg every 3 weeks on 1/8/21 + Inlyta 5mg oral BID on 1/11/21.  CT C/A/P done at [facility] on 10/22/21 showed stable mild reactive mediastinal nodes, otherwise stable.  Patient continued on treatment s/p cycle 18 Keytruda given 1/10/22.  Labs on 1/10/22 showed elevated LFTs. Inlyta held.  CT C/A/P done at [facility] on 1/26/22 showed unfortunately disease progression with new pulmonary nodules, enlargement in mediastinal node, new mesenteric nodule and retroperitoneal adenopathy.  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 Hepatitis panel negative. Keytruda stopped. Started on Prednisone 80mg daily on  1/31/22.  Patient had suicidal ideation and severe depression during visit in 3/2022, seen by psychiatry and meds adjusted, much improved.  CT C/A/P from [facility] on 4/5/22 showed progressive disease.    Treatment change recommended to Cabometyx 60mg daily per NCCN guidelines (Category 1) for subsequent treatment. Started on 4/7/22.  Prednisone weaned and stopped 4/14/22.  Patient was having multiple side effects (n/v, diarrhea, mouth sores) from Cabometyx.  Reduced dose to 40mg on 4/28/22. Held on 5/19/22 due to cellulitis/rashes/abscesses.      Continued to hold s/p TURP on 6/23/22 and restarted 7/6/22.  Patient hospitalized at [facility] 7/16/22 for large bladder hematoma s/p clot evacuation X 2. Cabometyx stopped again.   Repeat CT C/A/P done at [facility] on 8/29/22 showed mixed findings, but mostly disease progression in fanny metastases in mediastinum, which is to be expected since he has been off treatment.   Restarted back on Cabometyx (lower dose 40mg) on 9/1/22 although it has not been studied in dialysis patients, but no dosage adjustment is usually required.  Held on 10/24/22 for Hand-foot syndrome, restarted 11/9/22.   CT done at [facility] on 11/18/22 with disease improvement.  Cabometyx held again for hand-foot syndrome and wound on left toe 11/28/22.  Restarted Cabometyx on 1/5/23 at 20mg daily.    Cabometyx held on 3/17/23 due to worsening diabetic foot wound.  S/p left great toe amputation on 5/8/23, healed and cleared by podiatry to restart Cabometyx which was restarted on 6/1/23 Cabometyx 20mg daily.  Repeat CT C/A/P on 3/11/24 with a few new millimetric lung nodules, but other disease stable.   Unclear if these actually represent disease, since so small so do not recommend any treatment change at this time.  Patient admitted at [facility] from 4/22/24 - 4/26/24 with osteomyelitis of second toe left foot and diabetic ulcer of the left foot, S/p partial amputation of left 2nd toe on 4/23/24. The  interoperative tissue cx (+) MSSA. He was discharged home on oral antibiotics for 2 weeks. Cabometyx held and restarted no 5/27/24.     CT C/A/P done 6/7/24 with some disease progression new small lung nodules and mild progression in mediastinal lymph nodes.  Recommended to continue with Cabometyx since he had been off for 1 month.  CT C/A/P on 8/19/24 with some disease progression in lung nodules and mild progression in mediastinal nodes, stable other disease.  MRI lumbar spine done 8/30/24 for back pain with no metastatic disease, only degenerative changes.  He was also off Cabometyx for 3-4 weeks during this time.   Since he is asymptomatic, recommended to continue with treatment with Cabometyx for now. Restarted daily on 9/4/24.              Patient recently off Cabometyx for 3-4 weeks.  Hospitalized for dehydration, brain MRI showed questionable early metastatic lesion.  CT C/A/P done shows disease progression with multiple new bone metastases, and mixed response in other areas.  Patient has declined clinically and his performance status now is 3.  Would not recommend any further treatment.  He is also having more pain.  Hospice care recommended and patient and family are in agreement.    Will send referral for home hospice.    RTC PRN.  God bless Mr. Olivera and his family. It has been an honor to care for him.  All questions answered at this time.      Elizabeth Alvarado MD

## 2024-12-18 ENCOUNTER — OFFICE VISIT (OUTPATIENT)
Dept: HEMATOLOGY/ONCOLOGY | Facility: CLINIC | Age: 64
End: 2024-12-18
Payer: MEDICARE

## 2024-12-18 ENCOUNTER — TELEPHONE (OUTPATIENT)
Dept: HEMATOLOGY/ONCOLOGY | Facility: CLINIC | Age: 64
End: 2024-12-18

## 2024-12-18 ENCOUNTER — LAB VISIT (OUTPATIENT)
Dept: LAB | Facility: HOSPITAL | Age: 64
End: 2024-12-18
Attending: NURSE PRACTITIONER
Payer: MEDICARE

## 2024-12-18 VITALS
TEMPERATURE: 97 F | HEART RATE: 92 BPM | BODY MASS INDEX: 28.46 KG/M2 | DIASTOLIC BLOOD PRESSURE: 62 MMHG | WEIGHT: 198.81 LBS | OXYGEN SATURATION: 97 % | HEIGHT: 70 IN | SYSTOLIC BLOOD PRESSURE: 95 MMHG | RESPIRATION RATE: 18 BRPM

## 2024-12-18 DIAGNOSIS — D63.1 ANEMIA IN STAGE 3B CHRONIC KIDNEY DISEASE: ICD-10-CM

## 2024-12-18 DIAGNOSIS — N18.32 ANEMIA IN STAGE 3B CHRONIC KIDNEY DISEASE: ICD-10-CM

## 2024-12-18 DIAGNOSIS — D64.9 ANEMIA, UNSPECIFIED TYPE: ICD-10-CM

## 2024-12-18 DIAGNOSIS — C77.1 MALIGNANT NEOPLASM METASTATIC TO INTRATHORACIC LYMPH NODE: ICD-10-CM

## 2024-12-18 DIAGNOSIS — G89.3 CANCER RELATED PAIN: ICD-10-CM

## 2024-12-18 DIAGNOSIS — C64.1 CLEAR CELL CARCINOMA OF RIGHT KIDNEY: Primary | ICD-10-CM

## 2024-12-18 DIAGNOSIS — C64.1 CLEAR CELL CARCINOMA OF RIGHT KIDNEY: ICD-10-CM

## 2024-12-18 DIAGNOSIS — C79.51 SECONDARY MALIGNANT NEOPLASM OF BONE: ICD-10-CM

## 2024-12-18 PROBLEM — D63.0 ANEMIA IN NEOPLASTIC DISEASE: Status: ACTIVE | Noted: 2022-08-22

## 2024-12-18 LAB
ALBUMIN SERPL-MCNC: 1.7 G/DL (ref 3.4–4.8)
ALBUMIN/GLOB SERPL: 0.3 RATIO (ref 1.1–2)
ALP SERPL-CCNC: 123 UNIT/L (ref 40–150)
ALT SERPL-CCNC: 40 UNIT/L (ref 0–55)
ANION GAP SERPL CALC-SCNC: 11 MEQ/L
AST SERPL-CCNC: 42 UNIT/L (ref 5–34)
BASOPHILS # BLD AUTO: 0.04 X10(3)/MCL
BASOPHILS NFR BLD AUTO: 0.5 %
BILIRUB SERPL-MCNC: 0.2 MG/DL
BUN SERPL-MCNC: 37.9 MG/DL (ref 8.4–25.7)
CALCIUM SERPL-MCNC: 8.3 MG/DL (ref 8.8–10)
CHLORIDE SERPL-SCNC: 107 MMOL/L (ref 98–107)
CO2 SERPL-SCNC: 21 MMOL/L (ref 23–31)
CREAT SERPL-MCNC: 1.85 MG/DL (ref 0.72–1.25)
CREAT/UREA NIT SERPL: 20
EOSINOPHIL # BLD AUTO: 0.15 X10(3)/MCL (ref 0–0.9)
EOSINOPHIL NFR BLD AUTO: 1.8 %
ERYTHROCYTE [DISTWIDTH] IN BLOOD BY AUTOMATED COUNT: 19.6 % (ref 11.5–17)
GFR SERPLBLD CREATININE-BSD FMLA CKD-EPI: 40 ML/MIN/1.73/M2
GLOBULIN SER-MCNC: 5.4 GM/DL (ref 2.4–3.5)
GLUCOSE SERPL-MCNC: 172 MG/DL (ref 82–115)
HCT VFR BLD AUTO: 30.5 % (ref 42–52)
HGB BLD-MCNC: 9 G/DL (ref 14–18)
IMM GRANULOCYTES # BLD AUTO: 0.06 X10(3)/MCL (ref 0–0.04)
IMM GRANULOCYTES NFR BLD AUTO: 0.7 %
LYMPHOCYTES # BLD AUTO: 1.15 X10(3)/MCL (ref 0.6–4.6)
LYMPHOCYTES NFR BLD AUTO: 13.5 %
MCH RBC QN AUTO: 22.4 PG (ref 27–31)
MCHC RBC AUTO-ENTMCNC: 29.5 G/DL (ref 33–36)
MCV RBC AUTO: 75.9 FL (ref 80–94)
MONOCYTES # BLD AUTO: 0.57 X10(3)/MCL (ref 0.1–1.3)
MONOCYTES NFR BLD AUTO: 6.7 %
NEUTROPHILS # BLD AUTO: 6.55 X10(3)/MCL (ref 2.1–9.2)
NEUTROPHILS NFR BLD AUTO: 76.8 %
PLATELET # BLD AUTO: 423 X10(3)/MCL (ref 130–400)
PMV BLD AUTO: 10 FL (ref 7.4–10.4)
POTASSIUM SERPL-SCNC: 4.7 MMOL/L (ref 3.5–5.1)
PROT SERPL-MCNC: 7.1 GM/DL (ref 5.8–7.6)
RBC # BLD AUTO: 4.02 X10(6)/MCL (ref 4.7–6.1)
SODIUM SERPL-SCNC: 139 MMOL/L (ref 136–145)
WBC # BLD AUTO: 8.52 X10(3)/MCL (ref 4.5–11.5)

## 2024-12-18 PROCEDURE — 1159F MED LIST DOCD IN RCRD: CPT | Mod: CPTII,S$GLB,, | Performed by: INTERNAL MEDICINE

## 2024-12-18 PROCEDURE — 3078F DIAST BP <80 MM HG: CPT | Mod: CPTII,S$GLB,, | Performed by: INTERNAL MEDICINE

## 2024-12-18 PROCEDURE — 85025 COMPLETE CBC W/AUTO DIFF WBC: CPT

## 2024-12-18 PROCEDURE — 3044F HG A1C LEVEL LT 7.0%: CPT | Mod: CPTII,S$GLB,, | Performed by: INTERNAL MEDICINE

## 2024-12-18 PROCEDURE — 4010F ACE/ARB THERAPY RXD/TAKEN: CPT | Mod: CPTII,S$GLB,, | Performed by: INTERNAL MEDICINE

## 2024-12-18 PROCEDURE — 99999 PR PBB SHADOW E&M-EST. PATIENT-LVL V: CPT | Mod: PBBFAC,,, | Performed by: INTERNAL MEDICINE

## 2024-12-18 PROCEDURE — 99215 OFFICE O/P EST HI 40 MIN: CPT | Mod: S$GLB,,, | Performed by: INTERNAL MEDICINE

## 2024-12-18 PROCEDURE — 1160F RVW MEDS BY RX/DR IN RCRD: CPT | Mod: CPTII,S$GLB,, | Performed by: INTERNAL MEDICINE

## 2024-12-18 PROCEDURE — 3008F BODY MASS INDEX DOCD: CPT | Mod: CPTII,S$GLB,, | Performed by: INTERNAL MEDICINE

## 2024-12-18 PROCEDURE — 1111F DSCHRG MED/CURRENT MED MERGE: CPT | Mod: CPTII,S$GLB,, | Performed by: INTERNAL MEDICINE

## 2024-12-18 PROCEDURE — 3074F SYST BP LT 130 MM HG: CPT | Mod: CPTII,S$GLB,, | Performed by: INTERNAL MEDICINE

## 2024-12-18 PROCEDURE — 36415 COLL VENOUS BLD VENIPUNCTURE: CPT

## 2024-12-18 PROCEDURE — 80053 COMPREHEN METABOLIC PANEL: CPT

## 2024-12-18 RX ORDER — QUETIAPINE FUMARATE 50 MG/1
150 TABLET, FILM COATED ORAL NIGHTLY
COMMUNITY

## 2024-12-18 RX ORDER — MEMANTINE HYDROCHLORIDE 10 MG/1
10 TABLET ORAL
COMMUNITY
Start: 2024-11-26

## 2024-12-18 RX ORDER — FUROSEMIDE 40 MG/1
40 TABLET ORAL EVERY MORNING
COMMUNITY

## 2024-12-18 RX ORDER — TOLTERODINE 4 MG/1
4 CAPSULE, EXTENDED RELEASE ORAL
COMMUNITY
Start: 2024-12-07

## 2025-02-11 ENCOUNTER — TELEPHONE (OUTPATIENT)
Dept: HEMATOLOGY/ONCOLOGY | Facility: CLINIC | Age: 65
End: 2025-02-11
Payer: MEDICARE

## (undated) DEVICE — BLADE SURG STAINLESS STEEL #15

## (undated) DEVICE — GAUZE SPONGE 4X4 12PLY

## (undated) DEVICE — BANDAGE VELCLOSE ELAS 4INX5YD

## (undated) DEVICE — BANDAGE ESMARK 4INX3YD

## (undated) DEVICE — KIT SURGICAL TURNOVER

## (undated) DEVICE — WRAP COBAN NL STRL 4INX5YD

## (undated) DEVICE — Device

## (undated) DEVICE — BANDAGE GAUZE CONF STRL 6X4.1Y

## (undated) DEVICE — GLOVE PROTEXIS HYDROGEL SZ7.5

## (undated) DEVICE — BLADE MICRO STR COARSE 9.0MM

## (undated) DEVICE — NDL SAFETY 25G X 1.5 ECLIPSE

## (undated) DEVICE — DRESSING N ADH OIL EMUL 3X3

## (undated) DEVICE — DRESSING N ADH OIL EMUL 3X8 3S

## (undated) DEVICE — GAUZE SPONGE 4'X4 12 PLY

## (undated) DEVICE — NDL HYPO REG 25G X 1 1/2

## (undated) DEVICE — ELECTRODE PATIENT RETURN DISP

## (undated) DEVICE — NDL HYPO 22GX1 1/2 SYR 10ML LL

## (undated) DEVICE — BANDAGE KERLIX AMD

## (undated) DEVICE — WRAP SELF ADH. COBAN 4X5YD

## (undated) DEVICE — SUT ETHILON 4-0 PS4 18 BLK

## (undated) DEVICE — TRAY SKIN SCRUB WET PREMIUM

## (undated) DEVICE — BANDAGE COBAN COLOR ASSORT 3X5

## (undated) DEVICE — DRAPE EXTREMITY HVY DTY REINF

## (undated) DEVICE — SYR 10CC LUER LOCK

## (undated) DEVICE — STOCKINET 4INX48

## (undated) DEVICE — SUT BLK MONO 3-0 CUT 18IN F

## (undated) DEVICE — SPONGE KERLIX ANTIMIC 6X6.75IN

## (undated) DEVICE — SPONGE LAP STRL 18X18IN

## (undated) DEVICE — SOL NACL IRR 1000ML BTL